# Patient Record
Sex: MALE | Race: WHITE | Employment: OTHER | URBAN - METROPOLITAN AREA
[De-identification: names, ages, dates, MRNs, and addresses within clinical notes are randomized per-mention and may not be internally consistent; named-entity substitution may affect disease eponyms.]

---

## 2017-03-28 ENCOUNTER — HOSPITAL ENCOUNTER (OUTPATIENT)
Dept: SPEECH THERAPY | Age: 62
Discharge: HOME | End: 2017-03-31
Attending: INTERNAL MEDICINE | Admitting: INTERNAL MEDICINE

## 2017-03-28 DIAGNOSIS — R07.0 THROAT PAIN: Primary | ICD-10-CM

## 2017-04-01 ENCOUNTER — HOSPITAL ENCOUNTER (OUTPATIENT)
Dept: OTHER | Age: 62
Discharge: HOME | End: 2017-04-30
Attending: INTERNAL MEDICINE | Admitting: INTERNAL MEDICINE

## 2017-04-10 ENCOUNTER — HOSPITAL ENCOUNTER (OUTPATIENT)
Dept: GENERAL RADIOLOGY | Age: 62
Discharge: HOME | End: 2017-04-10
Attending: UROLOGY | Admitting: UROLOGY

## 2017-04-10 DIAGNOSIS — N20.0 CALCULUS OF KIDNEY: ICD-10-CM

## 2017-07-17 ENCOUNTER — HOSPITAL ENCOUNTER (OUTPATIENT)
Dept: SPEECH THERAPY | Age: 62
Discharge: HOME | End: 2017-07-31
Attending: INTERNAL MEDICINE | Admitting: INTERNAL MEDICINE

## 2017-08-01 ENCOUNTER — HOSPITAL ENCOUNTER (OUTPATIENT)
Dept: OTHER | Age: 62
Discharge: HOME | End: 2017-08-31
Attending: INTERNAL MEDICINE | Admitting: INTERNAL MEDICINE

## 2018-07-20 PROBLEM — J96.00 SEPSIS DUE TO UNDETERMINED ORGANISM WITH ACUTE RESPIRATORY FAILURE (HCC): Status: ACTIVE | Noted: 2018-07-20

## 2018-07-20 PROBLEM — R65.20 SEPSIS DUE TO UNDETERMINED ORGANISM WITH ACUTE RESPIRATORY FAILURE (HCC): Status: ACTIVE | Noted: 2018-07-20

## 2018-07-20 PROBLEM — A41.9 SEPSIS DUE TO UNDETERMINED ORGANISM WITH ACUTE RESPIRATORY FAILURE (HCC): Status: ACTIVE | Noted: 2018-07-20

## 2018-11-06 ENCOUNTER — ANESTHESIA EVENT (OUTPATIENT)
Dept: ENDOSCOPY | Age: 63
End: 2018-11-06
Payer: MEDICARE

## 2018-11-06 RX ORDER — CYANOCOBALAMIN 1000 UG/ML
1000 INJECTION INTRAMUSCULAR; SUBCUTANEOUS
Status: ON HOLD | COMMUNITY
End: 2018-11-07

## 2018-11-06 RX ORDER — WHEAT DEXTRIN 3 G/3.8 G
4 POWDER (GRAM) ORAL 2 TIMES DAILY
COMMUNITY

## 2018-11-06 RX ORDER — PRIMIDONE 50 MG/1
50 TABLET ORAL 2 TIMES DAILY
Status: ON HOLD | COMMUNITY
End: 2020-03-25 | Stop reason: HOSPADM

## 2018-11-06 RX ORDER — LANOLIN ALCOHOL/MO/W.PET/CERES
3 CREAM (GRAM) TOPICAL NIGHTLY PRN
Status: ON HOLD | COMMUNITY
End: 2020-04-11

## 2018-11-06 RX ORDER — QUETIAPINE FUMARATE 50 MG/1
50 TABLET, EXTENDED RELEASE ORAL NIGHTLY
COMMUNITY

## 2018-11-06 RX ORDER — GUAIFENESIN 600 MG/1
1200 TABLET, EXTENDED RELEASE ORAL 2 TIMES DAILY PRN
Status: ON HOLD | COMMUNITY
End: 2018-11-07

## 2018-11-06 RX ORDER — HYDROCODONE BITARTRATE AND ACETAMINOPHEN 5; 325 MG/1; MG/1
1 TABLET ORAL EVERY 4 HOURS PRN
Status: ON HOLD | COMMUNITY
End: 2018-11-07

## 2018-11-06 RX ORDER — DICYCLOMINE HYDROCHLORIDE 10 MG/1
10 CAPSULE ORAL 3 TIMES DAILY
COMMUNITY
End: 2020-04-11 | Stop reason: ALTCHOICE

## 2018-11-06 RX ORDER — LISINOPRIL 5 MG/1
5 TABLET ORAL DAILY
Status: ON HOLD | COMMUNITY
End: 2020-03-25 | Stop reason: HOSPADM

## 2018-11-06 RX ORDER — FOLIC ACID 1 MG/1
1 TABLET ORAL DAILY
COMMUNITY

## 2018-11-06 RX ORDER — PAROXETINE HYDROCHLORIDE 20 MG/1
20 TABLET, FILM COATED ORAL EVERY MORNING
COMMUNITY

## 2018-11-06 RX ORDER — LEVOTHYROXINE SODIUM 112 UG/1
112 TABLET ORAL DAILY
Status: ON HOLD | COMMUNITY
End: 2020-04-13 | Stop reason: HOSPADM

## 2018-11-06 RX ORDER — SODIUM PHOSPHATE, DIBASIC AND SODIUM PHOSPHATE, MONOBASIC 7; 19 G/133ML; G/133ML
1 ENEMA RECTAL
Status: ON HOLD | COMMUNITY
End: 2018-11-07

## 2018-11-06 RX ORDER — LOPERAMIDE HYDROCHLORIDE 2 MG/1
2 CAPSULE ORAL 4 TIMES DAILY PRN
Status: ON HOLD | COMMUNITY
End: 2018-11-07

## 2018-11-06 RX ORDER — BISACODYL 10 MG
10 SUPPOSITORY, RECTAL RECTAL PRN
Status: ON HOLD | COMMUNITY
End: 2018-11-07

## 2018-11-06 NOTE — ANESTHESIA PRE PROCEDURE
limited  Mouth opening: > = 3 FB Dental:    (+) edentulous      Pulmonary:   (+) pneumonia: no interval change,  rhonchi,  current smoker                          ROS comment: Recurrent aspiration pneumonia   Cardiovascular:  Exercise tolerance: poor (<4 METS),   (+) hypertension:, hyperlipidemia      NYHA Classification: II                 Beta Blocker:  Not on Beta Blocker         Neuro/Psych:   (+) seizures (last seizure ,March 2013- hx grand mal):, psychiatric history:depression/anxiety              ROS comment: CP with hemiplegia   GI/Hepatic/Renal:   (+) PUD, renal disease: kidney stones,          ROS comment: Recurrent aspiration. Endo/Other:    (+) hypothyroidism, blood dyscrasia: anemia:., .                 Abdominal:           Vascular:                                      Anesthesia Plan      general and MAC     ASA 4       Induction: intravenous. Anesthetic plan and risks discussed with patient. Pre Anesthesia Assessment complete.  Chart reviewed on 11/6/2018        MARY Liang - JATIN   11/6/2018

## 2018-11-07 ENCOUNTER — HOSPITAL ENCOUNTER (OUTPATIENT)
Age: 63
Setting detail: OUTPATIENT SURGERY
Discharge: HOME | End: 2018-11-07
Attending: INTERNAL MEDICINE | Admitting: INTERNAL MEDICINE
Payer: MEDICARE

## 2018-11-07 ENCOUNTER — ANESTHESIA (OUTPATIENT)
Dept: ENDOSCOPY | Age: 63
End: 2018-11-07
Payer: MEDICARE

## 2018-11-07 VITALS
WEIGHT: 201 LBS | SYSTOLIC BLOOD PRESSURE: 125 MMHG | TEMPERATURE: 98 F | BODY MASS INDEX: 33.49 KG/M2 | DIASTOLIC BLOOD PRESSURE: 78 MMHG | HEART RATE: 61 BPM | OXYGEN SATURATION: 98 % | RESPIRATION RATE: 18 BRPM | HEIGHT: 65 IN

## 2018-11-07 PROCEDURE — 2580000003 HC RX 258

## 2018-11-07 RX ORDER — SELENIUM 50 MCG
1 TABLET ORAL DAILY
COMMUNITY

## 2018-11-07 RX ORDER — SODIUM CHLORIDE, SODIUM LACTATE, POTASSIUM CHLORIDE, CALCIUM CHLORIDE 600; 310; 30; 20 MG/100ML; MG/100ML; MG/100ML; MG/100ML
INJECTION, SOLUTION INTRAVENOUS ONCE
Status: COMPLETED | OUTPATIENT
Start: 2018-11-07 | End: 2018-11-07

## 2018-11-07 RX ORDER — SODIUM CHLORIDE, SODIUM LACTATE, POTASSIUM CHLORIDE, CALCIUM CHLORIDE 600; 310; 30; 20 MG/100ML; MG/100ML; MG/100ML; MG/100ML
INJECTION, SOLUTION INTRAVENOUS
Status: COMPLETED
Start: 2018-11-07 | End: 2018-11-07

## 2018-11-07 RX ADMIN — SODIUM CHLORIDE, SODIUM LACTATE, POTASSIUM CHLORIDE, CALCIUM CHLORIDE: 600; 310; 30; 20 INJECTION, SOLUTION INTRAVENOUS at 09:08

## 2018-11-07 RX ADMIN — SODIUM CHLORIDE, POTASSIUM CHLORIDE, SODIUM LACTATE AND CALCIUM CHLORIDE: 600; 310; 30; 20 INJECTION, SOLUTION INTRAVENOUS at 09:08

## 2018-11-07 ASSESSMENT — LIFESTYLE VARIABLES: SMOKING_STATUS: 1

## 2018-11-07 ASSESSMENT — PAIN - FUNCTIONAL ASSESSMENT: PAIN_FUNCTIONAL_ASSESSMENT: 0-10

## 2018-11-12 ENCOUNTER — ANESTHESIA EVENT (OUTPATIENT)
Dept: ENDOSCOPY | Age: 63
End: 2018-11-12
Payer: MEDICARE

## 2018-11-12 NOTE — ANESTHESIA PRE PROCEDURE
Department of Anesthesiology  Preprocedure Note       Name:  Jose Guadalupe Cevallos   Age:  59 y.o.  :  1955                                          MRN:  [id number]         Date:  2018      Surgeon: Edwar Kaminski):  Dee Dee Morel MD    Procedure: EGD ESOPHAGOGASTRODUODENOSCOPY WITH DILATION (N/A )    Medications prior to admission:   Prior to Admission medications    Medication Sig Start Date End Date Taking?  Authorizing Provider   Lactobacillus (ACIDOPHILUS) CAPS capsule Take 1 capsule by mouth daily    Historical Provider, MD   aspirin 81 MG tablet Take 81 mg by mouth daily    Historical Provider, MD   Wheat Dextrin (BENEFIBER) POWD Take 4 g by mouth 3 times daily (with meals) Takes 3 tsp in liquid twice per day    Historical Provider, MD   dicyclomine (BENTYL) 10 MG capsule Take 10 mg by mouth 3 times daily    Historical Provider, MD   Lactulose Encephalopathy (ENULOSE PO) Take 30 mLs by mouth 3 times daily    Historical Provider, MD   folic acid (FOLVITE) 1 MG tablet Take 1 mg by mouth daily    Historical Provider, MD   levothyroxine (SYNTHROID) 112 MCG tablet Take 112 mcg by mouth Daily    Historical Provider, MD   lisinopril (PRINIVIL;ZESTRIL) 5 MG tablet Take 5 mg by mouth daily    Historical Provider, MD   melatonin 3 MG TABS tablet Take 3 mg by mouth nightly as needed    Historical Provider, MD   PARoxetine (PAXIL) 20 MG tablet Take 20 mg by mouth every morning    Historical Provider, MD   primidone (MYSOLINE) 50 MG tablet Take 50 mg by mouth 2 times daily    Historical Provider, MD   QUEtiapine (SEROQUEL XR) 50 MG extended release tablet Take 50 mg by mouth nightly    Historical Provider, MD   Multiple Vitamins-Minerals (MULTIVITAMIN PO) Take by mouth daily    Historical Provider, MD   tamsulosin (FLOMAX) 0.4 MG capsule Take 1 capsule by mouth daily 3/18/17   Merdis [place], PA   acetaminophen (APAP EXTRA STRENGTH) 500 MG tablet Take 1 tablet by mouth every 6 hours as needed for Pain 3/18/17   Gabby Stake nightly      Multiple Vitamins-Minerals (MULTIVITAMIN PO) Take by mouth daily      tamsulosin (FLOMAX) 0.4 MG capsule Take 1 capsule by mouth daily 30 capsule 0    acetaminophen (APAP EXTRA STRENGTH) 500 MG tablet Take 1 tablet by mouth every 6 hours as needed for Pain 30 tablet 0    divalproex (DEPAKOTE) 500 MG DR tablet Take 500 mg by mouth 2 times daily       Lactose Hydrous POWD by Does not apply route      simvastatin (ZOCOR) 20 MG tablet Take 20 mg by mouth nightly.  Cholecalciferol (VITAMIN D3) 1000 UNITS CAPS Take 1 tablet by mouth daily.  vitamin B-12 (CYANOCOBALAMIN) 500 MCG tablet Take 500 mcg by mouth once a week. Takes on Wed      lacosamide (VIMPAT) 50 MG TABS tablet Take 150 mg by mouth 2 times daily.  clorazepate (TRANXENE) 7.5 MG tablet Take 7.5 mg by mouth 2 times daily.  zonisamide (ZONEGRAN) 100 MG capsule Take 300 mg by mouth nightly.  Patient takes 2 capsules in the am and 3 capsules at bedtime         Allergies:  No Known Allergies    Problem List:    Patient Active Problem List   Diagnosis Code    Cerebral palsy, infantile hemiplegia (HCC) G80.8    Rosacea, acne L71.9    IBS (irritable bowel syndrome) K58.9    Hypertension I10    Calculus of ureter N20.1    Pyelonephritis N12    Sepsis ([facility] A41.9    Pneumonia J18.9    Increased ammonia level R79.89    Aspiration pneumonia ([facility]) J69.0    Hypokalemia E87.6    Hypomagnesemia E83.42    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    Seizure disorder ([facility] G40.909    Seizure disorder, grand mal ([facility] G40.409    Sepsis due to undetermined organism with acute respiratory failure ([facility]) A41.9, R65.20, J96.00       Past Medical History:        Diagnosis Date    Anemia     Aspiration pneumonia ([address]     dx 6/9/2014 with this per ecf/ per old chart dx with pneumonia with admission 9/18/2018    Cerebral palsy ([facility]     \"has no feeling on left side of body\"alert but has some dementia but not diagnosed, has good long term but poor

## 2018-11-13 ENCOUNTER — ANESTHESIA (OUTPATIENT)
Dept: ENDOSCOPY | Age: 63
End: 2018-11-13
Payer: MEDICARE

## 2018-11-13 ENCOUNTER — HOSPITAL ENCOUNTER (OUTPATIENT)
Age: 63
Setting detail: OUTPATIENT SURGERY
Discharge: HOME | End: 2018-11-13
Attending: INTERNAL MEDICINE | Admitting: INTERNAL MEDICINE
Payer: MEDICARE

## 2018-11-13 VITALS
TEMPERATURE: 97 F | DIASTOLIC BLOOD PRESSURE: 80 MMHG | HEIGHT: 65 IN | OXYGEN SATURATION: 98 % | SYSTOLIC BLOOD PRESSURE: 158 MMHG | HEART RATE: 62 BPM | WEIGHT: 201 LBS | RESPIRATION RATE: 16 BRPM | BODY MASS INDEX: 33.49 KG/M2

## 2018-11-13 VITALS — DIASTOLIC BLOOD PRESSURE: 72 MMHG | OXYGEN SATURATION: 99 % | SYSTOLIC BLOOD PRESSURE: 143 MMHG

## 2018-11-13 PROCEDURE — 2500000003 HC RX 250 WO HCPCS: Performed by: NURSE ANESTHETIST, CERTIFIED REGISTERED

## 2018-11-13 PROCEDURE — 2580000003 HC RX 258: Performed by: NURSE ANESTHETIST, CERTIFIED REGISTERED

## 2018-11-13 PROCEDURE — C1726 CATH, BAL DIL, NON-VASCULAR: HCPCS | Performed by: INTERNAL MEDICINE

## 2018-11-13 PROCEDURE — 88305 TISSUE EXAM BY PATHOLOGIST: CPT

## 2018-11-13 PROCEDURE — 3609012500 HC EGD DILATION BALLOON: Performed by: INTERNAL MEDICINE

## 2018-11-13 PROCEDURE — 2580000003 HC RX 258: Performed by: ANESTHESIOLOGY

## 2018-11-13 PROCEDURE — 7100000010 HC PHASE II RECOVERY - FIRST 15 MIN: Performed by: INTERNAL MEDICINE

## 2018-11-13 PROCEDURE — 3700000000 HC ANESTHESIA ATTENDED CARE: Performed by: INTERNAL MEDICINE

## 2018-11-13 PROCEDURE — 6360000002 HC RX W HCPCS: Performed by: NURSE ANESTHETIST, CERTIFIED REGISTERED

## 2018-11-13 PROCEDURE — 7100000011 HC PHASE II RECOVERY - ADDTL 15 MIN: Performed by: INTERNAL MEDICINE

## 2018-11-13 PROCEDURE — 88342 IMHCHEM/IMCYTCHM 1ST ANTB: CPT

## 2018-11-13 PROCEDURE — 2709999900 HC NON-CHARGEABLE SUPPLY: Performed by: INTERNAL MEDICINE

## 2018-11-13 PROCEDURE — 3700000001 HC ADD 15 MINUTES (ANESTHESIA): Performed by: INTERNAL MEDICINE

## 2018-11-13 RX ORDER — PROPOFOL 10 MG/ML
INJECTION, EMULSION INTRAVENOUS PRN
Status: DISCONTINUED | OUTPATIENT
Start: 2018-11-13 | End: 2018-11-13 | Stop reason: SDUPTHER

## 2018-11-13 RX ORDER — SODIUM CHLORIDE, SODIUM LACTATE, POTASSIUM CHLORIDE, CALCIUM CHLORIDE 600; 310; 30; 20 MG/100ML; MG/100ML; MG/100ML; MG/100ML
INJECTION, SOLUTION INTRAVENOUS ONCE
Status: COMPLETED | OUTPATIENT
Start: 2018-11-13 | End: 2018-11-13

## 2018-11-13 RX ORDER — SODIUM CHLORIDE 9 MG/ML
INJECTION, SOLUTION INTRAVENOUS CONTINUOUS PRN
Status: DISCONTINUED | OUTPATIENT
Start: 2018-11-13 | End: 2018-11-13 | Stop reason: SDUPTHER

## 2018-11-13 RX ORDER — LIDOCAINE HYDROCHLORIDE 20 MG/ML
INJECTION, SOLUTION EPIDURAL; INFILTRATION; INTRACAUDAL; PERINEURAL PRN
Status: DISCONTINUED | OUTPATIENT
Start: 2018-11-13 | End: 2018-11-13 | Stop reason: SDUPTHER

## 2018-11-13 RX ADMIN — PROPOFOL 30 MG: 10 INJECTION, EMULSION INTRAVENOUS at 12:27

## 2018-11-13 RX ADMIN — PROPOFOL 30 MG: 10 INJECTION, EMULSION INTRAVENOUS at 12:22

## 2018-11-13 RX ADMIN — LIDOCAINE HYDROCHLORIDE 100 MG: 20 INJECTION, SOLUTION EPIDURAL; INFILTRATION; INTRACAUDAL; PERINEURAL at 12:19

## 2018-11-13 RX ADMIN — SODIUM CHLORIDE: 9 INJECTION, SOLUTION INTRAVENOUS at 12:18

## 2018-11-13 RX ADMIN — PROPOFOL 100 MG: 10 INJECTION, EMULSION INTRAVENOUS at 12:19

## 2018-11-13 RX ADMIN — SODIUM CHLORIDE, POTASSIUM CHLORIDE, SODIUM LACTATE AND CALCIUM CHLORIDE: 600; 310; 30; 20 INJECTION, SOLUTION INTRAVENOUS at 11:27

## 2018-11-13 RX ADMIN — PROPOFOL 30 MG: 10 INJECTION, EMULSION INTRAVENOUS at 12:24

## 2018-11-13 NOTE — H&P
Take 150 mg by mouth 2 times daily.  clorazepate (TRANXENE) 7.5 MG tablet Take 7.5 mg by mouth 2 times daily.  zonisamide (ZONEGRAN) 100 MG capsule Take 300 mg by mouth nightly. Patient takes 2 capsules in the am and 3 capsules at bedtime           Allergies:  Patient has no known allergies. Social History:   TOBACCO:   reports that he has never smoked. He has never used smokeless tobacco.  ETOH:   reports that he does not drink alcohol.     Family History:   Family History   Problem Relation Age of Onset    Heart Disease Mother         atrial fib    High Blood Pressure Mother     Asthma Mother     High Cholesterol Mother     Depression Mother    Davion Gather Migraines Mother     High Blood Pressure Father     Heart Disease Father     Asthma Sister     High Cholesterol Sister     Depression Sister     Migraines Sister        REVIEW OF SYSTEMS:    Negative except for HPI        PHYSICAL EXAM:      Vitals:    BP (!) 141/74   Pulse 83   Temp 97.4 °F (36.3 °C) (Temporal)   Resp 16   Ht 5' 5\" (1.651 m)   Wt 201 lb (91.2 kg)   SpO2 100%   BMI 33.45 kg/m²     General Appearance:    Alert, cooperative, no distress, appears stated age   [de-identified]:    Anicteric, normocephalic, atraumatic   Neck:   Supple, symmetrical, trachea midline, no adenopathy       Lungs:     Clear to auscultation bilaterally, respirations unlabored        Heart:    Regular rate and rhythm, S1 and S2 normal, no murmur, rub   or gallop   Abdomen:     Soft, non-tender, bowel sounds active all four quadrants,     no masses, no organomegaly, no ascites       Extremities:   Extremities normal, atraumatic, no cyanosis or edema   Pulses:   2+ and symmetric all extremities   Skin:   Skin color, texture, turgor normal, no rashes or lesions       Neurologic:   No focal deficits, moving all four extremities      ASA Grade:  ASA 3 - Patient with moderate systemic disease with functional limitations  Air way:    Prior Anesthesia complications: Nill      ASSESSMENT AND PLAN:    1. Patient is a 59 y.o. male here for EGD with deep sedation  2. Procedure options, risks and benefits reviewed with guardian. Guardian expresses understanding.       Mykel Fleming MD  [facility] gastroenterology  11/13/2018  12:13 PM

## 2019-01-22 ENCOUNTER — HOSPITAL ENCOUNTER (EMERGENCY)
Age: 64
Discharge: HOME | End: 2019-01-23
Attending: EMERGENCY MEDICINE
Payer: MEDICARE

## 2019-01-22 ENCOUNTER — APPOINTMENT (OUTPATIENT)
Dept: CT IMAGING | Age: 64
End: 2019-01-22
Payer: MEDICARE

## 2019-01-22 VITALS
HEART RATE: 82 BPM | RESPIRATION RATE: 19 BRPM | WEIGHT: 201 LBS | HEIGHT: 65 IN | SYSTOLIC BLOOD PRESSURE: 154 MMHG | OXYGEN SATURATION: 100 % | DIASTOLIC BLOOD PRESSURE: 82 MMHG | TEMPERATURE: 97.7 F | BODY MASS INDEX: 33.49 KG/M2

## 2019-01-22 DIAGNOSIS — S22.019A CLOSED FRACTURE OF FIRST THORACIC VERTEBRA, UNSPECIFIED FRACTURE MORPHOLOGY, INITIAL ENCOUNTER (HCC): Primary | ICD-10-CM
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
## 2019-01-22 PROCEDURE — 6370000000 HC RX 637 (ALT 250 FOR IP): Performed by: EMERGENCY MEDICINE

## 2019-01-22 PROCEDURE — 72128 CT CHEST SPINE W/O DYE: CPT

## 2019-01-22 PROCEDURE — 72125 CT NECK SPINE W/O DYE: CPT

## 2019-01-22 PROCEDURE — 99284 EMERGENCY DEPT VISIT MOD MDM: CPT

## 2019-01-22 RX ORDER — IBUPROFEN 800 MG/1
800 TABLET ORAL ONCE
Status: COMPLETED | OUTPATIENT
Start: 2019-01-22 | End: 2019-01-22

## 2019-01-22 RX ADMIN — IBUPROFEN 800 MG: 800 TABLET, FILM COATED ORAL at 23:08

## 2019-01-22 ASSESSMENT — PAIN SCALES - GENERAL
PAINLEVEL_OUTOF10: 8
PAINLEVEL_OUTOF10: 6

## 2019-01-22 ASSESSMENT — PAIN DESCRIPTION - PAIN TYPE: TYPE: ACUTE PAIN

## 2019-01-22 ASSESSMENT — PAIN DESCRIPTION - ORIENTATION: ORIENTATION: MID

## 2019-01-22 ASSESSMENT — PAIN DESCRIPTION - LOCATION: LOCATION: BACK;NECK

## 2019-01-23 PROCEDURE — 6370000000 HC RX 637 (ALT 250 FOR IP): Performed by: EMERGENCY MEDICINE

## 2019-01-23 RX ORDER — HYDROCODONE BITARTRATE AND ACETAMINOPHEN 5; 325 MG/1; MG/1
1 TABLET ORAL EVERY 6 HOURS PRN
Qty: 20 TABLET | Refills: 0 | Status: SHIPPED | OUTPATIENT
Start: 2019-01-23 | End: 2019-01-28

## 2019-01-23 RX ORDER — HYDROCODONE BITARTRATE AND ACETAMINOPHEN 5; 325 MG/1; MG/1
1 TABLET ORAL ONCE
Status: COMPLETED | OUTPATIENT
Start: 2019-01-23 | End: 2019-01-23

## 2019-01-23 RX ORDER — DOCUSATE SODIUM 100 MG/1
100 CAPSULE, LIQUID FILLED ORAL 2 TIMES DAILY PRN
Qty: 20 CAPSULE | Refills: 0 | Status: ON HOLD | OUTPATIENT
Start: 2019-01-23 | End: 2020-04-11

## 2019-01-23 RX ORDER — POLYETHYLENE GLYCOL 3350 17 G/17G
17 POWDER, FOR SOLUTION ORAL DAILY PRN
Qty: 10 EACH | Refills: 0 | Status: SHIPPED | OUTPATIENT
Start: 2019-01-23 | End: 2019-02-02

## 2019-01-23 RX ADMIN — HYDROCODONE BITARTRATE AND ACETAMINOPHEN 1 TABLET: 5; 325 TABLET ORAL at 00:56

## 2019-01-23 ASSESSMENT — PAIN SCALES - GENERAL: PAINLEVEL_OUTOF10: 4

## 2019-02-27 ENCOUNTER — HOSPITAL ENCOUNTER (EMERGENCY)
Age: 64
Discharge: HOME | End: 2019-02-27
Payer: MEDICARE

## 2019-02-27 VITALS
HEART RATE: 71 BPM | TEMPERATURE: 97.5 F | DIASTOLIC BLOOD PRESSURE: 70 MMHG | BODY MASS INDEX: 32.45 KG/M2 | SYSTOLIC BLOOD PRESSURE: 112 MMHG | RESPIRATION RATE: 12 BRPM | OXYGEN SATURATION: 99 % | WEIGHT: 195 LBS

## 2019-02-27 DIAGNOSIS — S00.81XA ABRASION OF FACE, INITIAL ENCOUNTER: ICD-10-CM
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
## 2019-02-27 PROCEDURE — 99283 EMERGENCY DEPT VISIT LOW MDM: CPT

## 2019-05-15 ENCOUNTER — HOSPITAL ENCOUNTER (OUTPATIENT)
Dept: GENERAL RADIOLOGY | Age: 64
Discharge: HOME | End: 2019-05-15
Payer: MEDICARE

## 2019-05-15 ENCOUNTER — HOSPITAL ENCOUNTER (OUTPATIENT)
Age: 64
Discharge: HOME | End: 2019-05-15
Payer: MEDICARE

## 2019-05-15 DIAGNOSIS — R09.89 ABNORMAL CHEST SOUNDS: ICD-10-CM

## 2019-05-15 DIAGNOSIS — R13.10 PROBLEMS WITH SWALLOWING AND MASTICATION: ICD-10-CM

## 2019-05-15 DIAGNOSIS — I10 ESSENTIAL HYPERTENSION, MALIGNANT: ICD-10-CM

## 2019-05-15 PROCEDURE — 71046 X-RAY EXAM CHEST 2 VIEWS: CPT

## 2019-05-15 PROCEDURE — 71045 X-RAY EXAM CHEST 1 VIEW: CPT

## 2019-05-16 ENCOUNTER — HOSPITAL ENCOUNTER (OUTPATIENT)
Age: 64
Setting detail: SPECIMEN
Discharge: HOME | End: 2019-05-16
Payer: MEDICARE

## 2019-05-16 LAB
BACTERIA: NEGATIVE /HPF
BILIRUBIN URINE: NEGATIVE MG/DL
BLOOD, URINE: NEGATIVE
CLARITY: CLEAR
COLOR: YELLOW
GLUCOSE, URINE: NEGATIVE MG/DL
KETONES, URINE: NEGATIVE MG/DL
LEUKOCYTE ESTERASE, URINE: NEGATIVE
MUCUS: ABNORMAL HPF
NITRITE URINE, QUANTITATIVE: NEGATIVE
PH, URINE: 5 (ref 5–8)
PROTEIN UA: NEGATIVE MG/DL
RBC URINE: <1 /HPF (ref 0–3)
SPECIFIC GRAVITY UA: 1.02 (ref 1–1.03)
SQUAMOUS EPITHELIAL: 1 /HPF
TRICHOMONAS: ABNORMAL /HPF
UROBILINOGEN, URINE: NORMAL MG/DL (ref 0.2–1)
WBC UA: 3 /HPF (ref 0–2)

## 2019-05-16 PROCEDURE — 81001 URINALYSIS AUTO W/SCOPE: CPT

## 2019-05-29 ENCOUNTER — HOSPITAL ENCOUNTER (OUTPATIENT)
Age: 64
Setting detail: SPECIMEN
Discharge: HOME | End: 2019-05-29
Payer: MEDICARE

## 2019-05-29 PROCEDURE — G0328 FECAL BLOOD SCRN IMMUNOASSAY: HCPCS

## 2019-05-30 LAB — HEMOCCULT SP1 STL QL: NEGATIVE

## 2019-07-08 ENCOUNTER — HOSPITAL ENCOUNTER (OUTPATIENT)
Dept: ULTRASOUND IMAGING | Age: 64
Discharge: HOME | End: 2019-07-08
Payer: MEDICARE

## 2019-07-08 DIAGNOSIS — E04.1 NONTOXIC UNINODULAR GOITER: ICD-10-CM

## 2019-07-08 PROCEDURE — 76536 US EXAM OF HEAD AND NECK: CPT

## 2019-08-08 ENCOUNTER — HOSPITAL ENCOUNTER (OUTPATIENT)
Age: 64
Discharge: HOME | End: 2019-08-08
Payer: MEDICARE

## 2019-08-08 LAB
AMMONIA: 24 UMOL/L (ref 16–60)
DOSE AMOUNT: NORMAL
DOSE TIME: NORMAL
VALPROIC ACID LEVEL: 51.5 UG/ML (ref 50–100)

## 2019-08-08 PROCEDURE — 80164 ASSAY DIPROPYLACETIC ACD TOT: CPT

## 2019-08-08 PROCEDURE — 36415 COLL VENOUS BLD VENIPUNCTURE: CPT

## 2019-08-08 PROCEDURE — 82140 ASSAY OF AMMONIA: CPT

## 2019-08-28 ENCOUNTER — HOSPITAL ENCOUNTER (OUTPATIENT)
Dept: GENERAL RADIOLOGY | Age: 64
Discharge: HOME | End: 2019-08-28
Payer: MEDICARE

## 2019-08-28 ENCOUNTER — HOSPITAL ENCOUNTER (OUTPATIENT)
Age: 64
Discharge: HOME | End: 2019-08-28
Payer: MEDICARE

## 2019-08-28 DIAGNOSIS — S09.93XA HEAD, FACE & NECK INJURY, INITIAL ENCOUNTER: ICD-10-CM

## 2019-08-28 DIAGNOSIS — S09.90XA HEAD, FACE & NECK INJURY, INITIAL ENCOUNTER: ICD-10-CM

## 2019-08-28 DIAGNOSIS — S19.9XXA HEAD, FACE & NECK INJURY, INITIAL ENCOUNTER: ICD-10-CM

## 2019-08-28 PROCEDURE — 72050 X-RAY EXAM NECK SPINE 4/5VWS: CPT

## 2019-09-17 ENCOUNTER — HOSPITAL ENCOUNTER (OUTPATIENT)
Dept: GENERAL RADIOLOGY | Age: 64
Discharge: HOME | End: 2019-09-17
Payer: MEDICARE

## 2019-09-17 ENCOUNTER — HOSPITAL ENCOUNTER (OUTPATIENT)
Dept: SPEECH THERAPY | Age: 64
Setting detail: THERAPIES SERIES
Discharge: HOME | End: 2019-09-17
Payer: MEDICARE

## 2019-09-17 DIAGNOSIS — R13.10 PROBLEMS WITH SWALLOWING AND MASTICATION: Primary | ICD-10-CM

## 2019-09-17 DIAGNOSIS — R13.10 DYSPHAGIA, UNSPECIFIED TYPE: ICD-10-CM

## 2019-09-17 DIAGNOSIS — R09.89 ABNORMAL CHEST SOUNDS: ICD-10-CM

## 2019-09-17 PROCEDURE — 92526 ORAL FUNCTION THERAPY: CPT | Performed by: SPEECH-LANGUAGE PATHOLOGIST

## 2019-09-17 PROCEDURE — 92611 MOTION FLUOROSCOPY/SWALLOW: CPT | Performed by: SPEECH-LANGUAGE PATHOLOGIST

## 2019-09-17 PROCEDURE — 74230 X-RAY XM SWLNG FUNCJ C+: CPT

## 2019-09-17 NOTE — PROCEDURES
chin-tuck strategy was tested for solid bolus with improved laryngeal elevation and pharyngeal clearance for regular solid. 3) A left head turn (weaker side) was attempted however, pt lacked meaningful ROM to accomplish. 4) Alternating solids and liquids was effective in clearing pharynx of moderate solid residual by >50%. Esophageal screen was unremarkable. Recommendations:  Continue Minced and moist (Mechanical soft) with no bread (due to caregiver report of choking). Thin liquids by straw. Strict chin-tuck posture for all intake (reduced penetration and increased clearance for solids). Encourage alternating solids and liquids during meals every 2-3 bites.  or [facility] Speech therapy treatment to train and monitor use of compensation strategies, complete trials of small bites of bread, target goals including therapeutic exercises and caregiver training. Dysphagia treatment was completed x 30 minutes, see goals targeted below. Treatment Dx and ICD 10: R13.12  Patient Position: Lateral and Patient Degrees: 90  Consistencies Administered: Reg solid; Dysphagia Pureed (Dysphagia I); Thin straw; Thin cup;Nectar straw;Nectar cup  Postural Changes and/or Swallow Maneuvers Trialed: Chin tuck; Head turn left    Dysphagia Outcome Severity Scale: Level 3: Moderate dysphagia- Total assisstance, supervision or strategies. Two or more diet consistencies restricted  Penetration-Aspiration Scale (PAS): 8 - Material Enters the airway, passes below the vocal folds, and no effort is made to eject    Recommended Diet:  Solid consistency: Dysphagia Minced and Moist (Dysphagia II)  Liquid consistency: Thin  Liquid administration via: Straw    Medication administration: Meds in puree    Safe Swallow Protocol:  Supervision: 1:1  Compensatory Swallowing Strategies: Alternate solids and liquids; Chin tuck;Eat/Feed slowly      Recommendations/Treatment  Requires SLP Intervention: Yes      Postural Changes and/or Swallow

## 2019-10-15 ENCOUNTER — HOSPITAL ENCOUNTER (OUTPATIENT)
Age: 64
Discharge: HOME | End: 2019-10-15
Payer: MEDICARE

## 2019-10-15 LAB
ALT SERPL-CCNC: 10 U/L (ref 10–40)
AST SERPL-CCNC: 11 IU/L (ref 15–37)
BASOPHILS ABSOLUTE: 0 K/CU MM
BASOPHILS RELATIVE PERCENT: 0.4 % (ref 0–1)
DIFFERENTIAL TYPE: ABNORMAL
DOSE AMOUNT: NORMAL
DOSE TIME: NORMAL
EOSINOPHILS ABSOLUTE: 0.4 K/CU MM
EOSINOPHILS RELATIVE PERCENT: 4.4 % (ref 0–3)
HCT VFR BLD CALC: 46 % (ref 42–52)
HEMOGLOBIN: 15 GM/DL (ref 13.5–18)
IMMATURE NEUTROPHIL %: 0.5 % (ref 0–0.43)
LYMPHOCYTES ABSOLUTE: 1.8 K/CU MM
LYMPHOCYTES RELATIVE PERCENT: 22.7 % (ref 24–44)
MCH RBC QN AUTO: 30.5 PG (ref 27–31)
MCHC RBC AUTO-ENTMCNC: 32.6 % (ref 32–36)
MCV RBC AUTO: 93.7 FL (ref 78–100)
MONOCYTES ABSOLUTE: 0.9 K/CU MM
MONOCYTES RELATIVE PERCENT: 11.9 % (ref 0–4)
NUCLEATED RBC %: 0 %
PDW BLD-RTO: 13.1 % (ref 11.7–14.9)
PLATELET # BLD: 204 K/CU MM (ref 140–440)
PMV BLD AUTO: 11.8 FL (ref 7.5–11.1)
RBC # BLD: 4.91 M/CU MM (ref 4.6–6.2)
SEGMENTED NEUTROPHILS ABSOLUTE COUNT: 4.8 K/CU MM
SEGMENTED NEUTROPHILS RELATIVE PERCENT: 60.1 % (ref 36–66)
TOTAL IMMATURE NEUTOROPHIL: 0.04 K/CU MM
TOTAL NUCLEATED RBC: 0 K/CU MM
VALPROIC ACID LEVEL: 52.9 UG/ML (ref 50–100)
WBC # BLD: 7.9 K/CU MM (ref 4–10.5)

## 2019-10-15 PROCEDURE — 84460 ALANINE AMINO (ALT) (SGPT): CPT

## 2019-10-15 PROCEDURE — 36415 COLL VENOUS BLD VENIPUNCTURE: CPT

## 2019-10-15 PROCEDURE — 80164 ASSAY DIPROPYLACETIC ACD TOT: CPT

## 2019-10-15 PROCEDURE — 84450 TRANSFERASE (AST) (SGOT): CPT

## 2019-10-15 PROCEDURE — 85025 COMPLETE CBC W/AUTO DIFF WBC: CPT

## 2019-10-18 ENCOUNTER — HOSPITAL ENCOUNTER (OUTPATIENT)
Dept: GENERAL RADIOLOGY | Age: 64
Discharge: HOME | End: 2019-10-18
Payer: MEDICARE

## 2019-10-18 ENCOUNTER — HOSPITAL ENCOUNTER (OUTPATIENT)
Age: 64
Discharge: HOME | End: 2019-10-18
Payer: MEDICARE

## 2019-10-18 DIAGNOSIS — N20.0 KIDNEY STONE: ICD-10-CM

## 2019-10-18 LAB — PROSTATE SPECIFIC ANTIGEN: 1.28 NG/ML (ref 0–4)

## 2019-10-18 PROCEDURE — G0103 PSA SCREENING: HCPCS

## 2019-10-18 PROCEDURE — 74018 RADEX ABDOMEN 1 VIEW: CPT

## 2019-10-18 PROCEDURE — 36415 COLL VENOUS BLD VENIPUNCTURE: CPT

## 2019-10-19 ENCOUNTER — APPOINTMENT (OUTPATIENT)
Dept: GENERAL RADIOLOGY | Age: 64
End: 2019-10-19
Payer: MEDICARE

## 2019-10-19 ENCOUNTER — APPOINTMENT (OUTPATIENT)
Dept: CT IMAGING | Age: 64
End: 2019-10-19
Payer: MEDICARE

## 2019-10-19 ENCOUNTER — HOSPITAL ENCOUNTER (EMERGENCY)
Age: 64
Discharge: HOME | End: 2019-10-19
Attending: EMERGENCY MEDICINE
Payer: MEDICARE

## 2019-10-19 VITALS
SYSTOLIC BLOOD PRESSURE: 117 MMHG | HEIGHT: 65 IN | BODY MASS INDEX: 32.49 KG/M2 | HEART RATE: 76 BPM | WEIGHT: 195 LBS | DIASTOLIC BLOOD PRESSURE: 32 MMHG | RESPIRATION RATE: 16 BRPM | OXYGEN SATURATION: 99 % | TEMPERATURE: 97.8 F

## 2019-10-19 DIAGNOSIS — S09.90XA INJURY OF HEAD, INITIAL ENCOUNTER: Primary | ICD-10-CM

## 2019-10-19 DIAGNOSIS — S69.91XA INJURY OF RIGHT HAND, INITIAL ENCOUNTER: ICD-10-CM

## 2019-10-19 PROCEDURE — 99284 EMERGENCY DEPT VISIT MOD MDM: CPT

## 2019-10-19 PROCEDURE — 70450 CT HEAD/BRAIN W/O DYE: CPT

## 2019-10-19 PROCEDURE — 72125 CT NECK SPINE W/O DYE: CPT

## 2019-10-19 PROCEDURE — 73130 X-RAY EXAM OF HAND: CPT

## 2019-10-19 ASSESSMENT — ENCOUNTER SYMPTOMS
STRIDOR: 0
SHORTNESS OF BREATH: 0
COLOR CHANGE: 0
EYE PAIN: 0
ABDOMINAL PAIN: 0
TROUBLE SWALLOWING: 0
VOMITING: 0
BACK PAIN: 0
WHEEZING: 0
NAUSEA: 0
VOICE CHANGE: 0

## 2019-10-19 ASSESSMENT — PAIN SCALES - GENERAL: PAINLEVEL_OUTOF10: 4

## 2019-10-19 ASSESSMENT — PAIN DESCRIPTION - PAIN TYPE: TYPE: ACUTE PAIN

## 2020-01-03 ENCOUNTER — HOSPITAL ENCOUNTER (OUTPATIENT)
Age: 65
Discharge: HOME | End: 2020-01-03
Payer: MEDICARE

## 2020-01-03 LAB — AMMONIA: 102 UMOL/L (ref 16–60)

## 2020-01-03 PROCEDURE — 82140 ASSAY OF AMMONIA: CPT

## 2020-01-03 PROCEDURE — 36415 COLL VENOUS BLD VENIPUNCTURE: CPT

## 2020-01-25 ENCOUNTER — APPOINTMENT (OUTPATIENT)
Dept: GENERAL RADIOLOGY | Age: 65
End: 2020-01-25
Payer: MEDICARE

## 2020-01-25 ENCOUNTER — HOSPITAL ENCOUNTER (EMERGENCY)
Age: 65
Discharge: HOME | End: 2020-01-26
Payer: MEDICARE

## 2020-01-25 VITALS
TEMPERATURE: 97.8 F | SYSTOLIC BLOOD PRESSURE: 124 MMHG | HEART RATE: 55 BPM | DIASTOLIC BLOOD PRESSURE: 74 MMHG | RESPIRATION RATE: 16 BRPM | WEIGHT: 195 LBS | HEIGHT: 65 IN | OXYGEN SATURATION: 100 % | BODY MASS INDEX: 32.49 KG/M2

## 2020-01-25 PROCEDURE — 72100 X-RAY EXAM L-S SPINE 2/3 VWS: CPT

## 2020-01-25 PROCEDURE — 73501 X-RAY EXAM HIP UNI 1 VIEW: CPT

## 2020-01-25 PROCEDURE — 99283 EMERGENCY DEPT VISIT LOW MDM: CPT

## 2020-01-25 ASSESSMENT — PAIN DESCRIPTION - ORIENTATION: ORIENTATION: RIGHT

## 2020-01-25 ASSESSMENT — PAIN DESCRIPTION - DESCRIPTORS: DESCRIPTORS: ACHING

## 2020-01-25 ASSESSMENT — PAIN DESCRIPTION - FREQUENCY: FREQUENCY: CONTINUOUS

## 2020-01-25 ASSESSMENT — PAIN DESCRIPTION - PAIN TYPE: TYPE: ACUTE PAIN

## 2020-01-25 ASSESSMENT — PAIN SCALES - GENERAL: PAINLEVEL_OUTOF10: 8

## 2020-01-25 ASSESSMENT — PAIN DESCRIPTION - ONSET: ONSET: SUDDEN

## 2020-01-25 ASSESSMENT — PAIN DESCRIPTION - LOCATION: LOCATION: BACK;HIP

## 2020-01-25 ASSESSMENT — PAIN DESCRIPTION - PROGRESSION: CLINICAL_PROGRESSION: NOT CHANGED

## 2020-01-26 NOTE — ED NOTES
Bed: ED-22  Expected date:   Expected time:   Means of arrival:   Comments:  ems     Ean Jaimes RN  01/25/20 2012

## 2020-01-26 NOTE — ED PROVIDER NOTES
4/2014    Hyperammonemia (HCC)     Hypertension     on Lisinopril    IBS (irritable bowel syndrome)     ulcerative colitis    Kidney stone     for surgery for stent placement 7/11/2013    Mood disorder ([facility]     per staff at [address] [facility] Occasional tremors     \"makes it difficult for him to write\"    Prostatitis     hx given per H&P old chart    Thyroid disease     Ulcerative colitis ([facility] [address]     hx given per staff at [facility] 4/13/2015     Past Surgical History:   Procedure Laterality Date    CHOLECYSTECTOMY      COLONOSCOPY  8/19/14, 5/2012 8/19/14: hemorrhoids, 5/2012 at [address] Left 07/11/2013    with stnet placement   [address]  [address] [place] [address] 13      OTHER SURGICAL HISTORY  04/15/2015    Revision of vagal nerve stimulator    UPPER GASTROINTESTINAL ENDOSCOPY N/A 11/13/2018    EGD DILATION BALLOON AND BIOPSY performed by Severo Grebe, MD at [place] [address]    Has to have bettery changed every 5 yrs.       Family History   Problem Relation Age of Onset    Heart Disease Mother         atrial fib    High Blood Pressure Mother     Asthma Mother     High Cholesterol Mother     Depression Mother    Ryan Yunior Migraines Mother     High Blood Pressure Father     Heart Disease Father     Asthma Sister     High Cholesterol Sister     Depression Sister     Migraines Sister      Social History     Socioeconomic History    Marital status: Single     Spouse name: Not on file    Number of children: Not on file    Years of education: Not on file    Highest education level: Not on file   Occupational History    Not on file   Social Needs    Financial resource strain: Not on file    Food insecurity:     Worry: Not on file     Inability: Not on file    Transportation needs:     Medical: Not on file     Non-medical: Not on file   Tobacco Use    Smoking status: Never Smoker    Smokeless tobacco: Never Used   Substance and Sexual Activity discoloration, focal tenderness, palpable cord. Han's sign negative  Integument:  Well hydrated, no rash, no pallor. Back:   - No gross deformity, swelling, or discoloration.    -  + generalized low lumbar and Paralumbar tenderness without focus. No masses, fluctuance, warmth, or skin changes.  - No localized midline bony tenderness.   - No change in pain with forward flexion  - SLR test negative      No CVA tenderness to percussion        Neurologic:    No obvious neurological deficits. Patient moves without obvious difficulty or weakness. HIGH sensitivity Neuro Exam for Lumbar spine  -(L1-L2)  inner thigh sensation intact  -(S3,4,5) Groin sensation intact      -Lower extremity ROM intact including:       -(L2) cross legs (adduct thighs)        -(L3) extend knees & flex knees (S1)       -(L4) dorsiflex ankles       -(L5) point great toes upward & plantar flex toes (S2)        -(L2,3,4) Knee reflexes intact bilaterally      Legs are equal in length without rotation. Patent stands without depression of hips. There is unilateral tenderness to palpation to the iliac crest. Full ROM of his bilateral with internal rotation, external rotation, Abduction, Adduction, flexion and extension. No obvious deformities. Vascular:    Femoral & Distal pulses, & capillary refill intact bilateral lower extremities             I have reviewed and interpreted all of the currently available lab results from this visit (if applicable):  No results found for this visit on 01/25/20. Radiographs (if obtained):  [] The following radiograph was interpreted by myself in the absence of a radiologist:   [] Radiologist's Report Reviewed:  XR LUMBAR SPINE (2-3 VIEWS)   Final Result   No acute osseous abnormality of the lumbar spine. No acute osseous abnormality of the pelvis. XR HIP 1 VW W PELVIS RIGHT   Final Result   No acute osseous abnormality of the lumbar spine.       No acute osseous abnormality of the spine, three views: Lumbar vertebral alignment is normal.  Bone density is preserved with no acute fracture. Multilevel lumbar spondylosis with bridging osteophytes anteriorly consistent with DISH. Sclerotic focus in the T12 vertebral body is stable, likely a bone island. AP pelvis: No acute osseous abnormality of the pelvis. There is partial ankylosis of the SI joints bilaterally, left more advanced than the right. Mild symmetric osteoarthritic changes of the hip joints, similar to the previous study. No acute osseous abnormality of the lumbar spine. No acute osseous abnormality of the pelvis. MDM:  Patient presented with back pain. No trauma. No evidence of cauda equina, no spinal cord injury. Patient's neurologic exam is intact and nonfocal.  Patient taking medications at home with no significant improvement. I estimate there is LOW risk for RAPIDLY EXPANDING OR RUPTURED AAA, CAUDA EQUINA SYNDROME, EPIDURAL MASS LESION OR HERNIATED DISK CAUSING SEVERE STENOSIS, thus I consider the discharge disposition reasonable. /74   Pulse 55   Temp 97.8 °F (36.6 °C) (Oral)   Resp 16   Ht 5' 5\" (1.651 m)   Wt 195 lb (88.5 kg)   SpO2 100%   BMI 32.45 kg/m²       Pt is to be discharged home. Pt is  to return immediately to the emergency department if he has any new, worrisome or worsening symptoms. Pt is to follow up with PCP within 2 days. Patient/Surrogate vocalizes agreement and understanding with this plan and he has no questions upon disposition. Pt is comfortable upon disposition home. Patient is stable, Patients vital signs are stable. Vital signs and nursing notes reviewed during ED course. I independently managed patient today in the ED. All pertinent Lab data and radiographic results reviewed with patient at bedside. The patient and/or the family were informed of the results of any tests/labs/imaging, the treatment plan, and time was allotted to answer questions. See chart for details of medications given during the ED stay. Clinical Impression:  1. Right hip pain      Disposition referral (if applicable):   Roland Gutierres MD  [address] [facility] [address] [phone]    In 2 days      Disposition medications (if applicable):  Discharge Medication List as of 1/25/2020 11:38 PM        (Please note that portions of this note may have been completed with a voice recognition program. Efforts were made to edit the dictations but occasionally words are mis-transcribed.)       Aisha Terrell PA-C  01/25/20 [address] [place], [place] [address]NIELS  02/01/20 [address] [place] [address]  02/01/20 [id number]

## 2020-02-12 ENCOUNTER — HOSPITAL ENCOUNTER (OUTPATIENT)
Age: 65
Discharge: HOME | End: 2020-02-12
Payer: MEDICARE

## 2020-02-12 LAB — AMMONIA: 46 UMOL/L (ref 16–60)

## 2020-02-12 PROCEDURE — 36415 COLL VENOUS BLD VENIPUNCTURE: CPT

## 2020-02-12 PROCEDURE — 82140 ASSAY OF AMMONIA: CPT

## 2020-03-04 ENCOUNTER — APPOINTMENT (OUTPATIENT)
Dept: GENERAL RADIOLOGY | Age: 65
End: 2020-03-04
Payer: MEDICARE

## 2020-03-04 ENCOUNTER — APPOINTMENT (OUTPATIENT)
Dept: CT IMAGING | Age: 65
End: 2020-03-04
Payer: MEDICARE

## 2020-03-04 ENCOUNTER — HOSPITAL ENCOUNTER (EMERGENCY)
Age: 65
Discharge: HOME | End: 2020-03-04
Payer: MEDICARE

## 2020-03-04 VITALS
SYSTOLIC BLOOD PRESSURE: 119 MMHG | OXYGEN SATURATION: 96 % | TEMPERATURE: 99 F | HEART RATE: 66 BPM | DIASTOLIC BLOOD PRESSURE: 68 MMHG | RESPIRATION RATE: 18 BRPM

## 2020-03-04 PROCEDURE — 72125 CT NECK SPINE W/O DYE: CPT

## 2020-03-04 PROCEDURE — 70450 CT HEAD/BRAIN W/O DYE: CPT

## 2020-03-04 PROCEDURE — 99284 EMERGENCY DEPT VISIT MOD MDM: CPT

## 2020-03-04 PROCEDURE — 73630 X-RAY EXAM OF FOOT: CPT

## 2020-03-04 PROCEDURE — 72170 X-RAY EXAM OF PELVIS: CPT

## 2020-03-04 NOTE — ED TRIAGE NOTES
Patient from group home with reports of fall. Patient with left sided weakness at baseline per EMS patient falls often. Patient fell multiple times in the last week. EMS repost bruising to the left side.

## 2020-03-05 NOTE — ED PROVIDER NOTES
eMERGENCY dEPARTMENT eNCOUnter      PCP: Noah Sidhu MD    CHIEF COMPLAINT    Chief Complaint   Patient presents with   Equilla Oralia Fall     This patient was not evaluated by the attending physician. I have independently evaluated this patient. HPI    Oli Jimenez is a 62 y.o. male with PMH of seizure disorder, cerebral palsy who presents after fall with bruise of right hip. Onset was around 6:15am. The reason why the patient fell (context) was patient stepped backward in the bathroom and lost his balance falling and landing on his right hip and hitting his head. He did have a helmet on and did not lose consciousness. He was able to get up after fall. The patient has no complaints at this time. Patient comes from group home with staff member who reports that his left foot looked \"purplish\" after he fell for a few minutes and wants it checked for an injury. Staff member reports that patient had to be seen by provider since he fell so they brought him to the ED. Staff reports patient has an unsteady gait and falls frequently. The fall was mechanical in nature without preceding symptoms. Patient is on ASA daily. Patient denies neurologic symptoms, pain. Staff denies patient acting abnormally. History provided by patient and staff. REVIEW OF SYSTEMS    General: No fever  ENT:  No visual changes. No headache. Cardiac: No Chest Pain, no syncope  Respiratory: No cough or difficulty breathing  GI: No vomiting. No Bloody Stool or Diarrhea  : No Dysuria or Hematuria  MSKTL:  See HPI. No neck or back pain. No hip pain. No Extremity pain. Integumentary: + bruise; No abrasions or lacerations  Neurologic: No LOC, no headache, dizziness, confusion.   No hearing loss    See HPI and nursing notes for additional information     PAST MEDICAL & SURGICAL HISTORY    Past Medical History:   Diagnosis Date    Anemia     Aspiration pneumonia ([facility] [address]     dx 6/9/2014 with this per ecf/ per old chart dx with pneumonia with admission 9/18/2018    Cerebral palsy ([facility] [address]     \"has no feeling on left side of body\"alert but has some dementia but not diagnosed, has good long term but poor short term and wakes up disoriented after a nap\"(per yaneth)(2014)    Depression     Epilepsy ([facility] [address] 1961    last seizure 2/2018- hx grand mal    Frequent falls     with phone assess- family stated pt fell this am (7/10/2013\"in the process of trying to find a facility to help build him back up- (pt now at [facility])    Glaucoma     \"has been in treatment for this in the past- no treatment needed at present\"    History of IBS     Hx MRSA infection     + nasal culture 4/2014    Hyperammonemia (HCC)     Hypertension     on Lisinopril    IBS (irritable bowel syndrome)     ulcerative colitis    Kidney stone     for surgery for stent placement 7/11/2013    Mood disorder ([facility] [address]     per staff at [address] [facility] Occasional tremors     \"makes it difficult for him to write\"    Prostatitis     hx given per H&P old chart    Thyroid disease     Ulcerative colitis ([facility] [address]     hx given per staff at [facility] 4/13/2015     Past Surgical History:   Procedure Laterality Date    CHOLECYSTECTOMY      COLONOSCOPY  8/19/14, 5/2012 8/19/14: hemorrhoids, 5/[address] Left 07/11/2013    with stnet placement   [address] [place] [address]      OTHER SURGICAL HISTORY  04/15/2015    Revision of vagal nerve stimulator    UPPER GASTROINTESTINAL ENDOSCOPY N/A 11/13/2018    EGD DILATION BALLOON AND BIOPSY performed by Seth Clarke MD at [facility]  2002    Has to have bettery changed every 5 yrs.         CURRENT MEDICATIONS    Current Outpatient Rx   Medication Sig Dispense Refill    docusate sodium (COLACE) 100 MG capsule Take 1 capsule by mouth 2 times daily as needed for Constipation 20 capsule 0    Lactobacillus (ACIDOPHILUS) CAPS capsule Take 1 capsule by mouth daily      aspirin 81 MG tablet Take 81 mg by mouth daily      Wheat Dextrin (BENEFIBER) POWD Take 4 g by mouth 3 times daily (with meals) Takes 3 tsp in liquid twice per day      dicyclomine (BENTYL) 10 MG capsule Take 10 mg by mouth 3 times daily      Lactulose Encephalopathy (ENULOSE PO) Take 30 mLs by mouth 3 times daily      folic acid (FOLVITE) 1 MG tablet Take 1 mg by mouth daily      levothyroxine (SYNTHROID) 112 MCG tablet Take 112 mcg by mouth Daily      lisinopril (PRINIVIL;ZESTRIL) 5 MG tablet Take 5 mg by mouth daily      melatonin 3 MG TABS tablet Take 3 mg by mouth nightly as needed      PARoxetine (PAXIL) 20 MG tablet Take 20 mg by mouth every morning      primidone (MYSOLINE) 50 MG tablet Take 50 mg by mouth 2 times daily      QUEtiapine (SEROQUEL XR) 50 MG extended release tablet Take 50 mg by mouth nightly      Multiple Vitamins-Minerals (MULTIVITAMIN PO) Take by mouth daily      tamsulosin (FLOMAX) 0.4 MG capsule Take 1 capsule by mouth daily 30 capsule 0    acetaminophen (APAP EXTRA STRENGTH) 500 MG tablet Take 1 tablet by mouth every 6 hours as needed for Pain 30 tablet 0    divalproex (DEPAKOTE) 500 MG DR tablet Take 500 mg by mouth 2 times daily       Lactose Hydrous POWD by Does not apply route      simvastatin (ZOCOR) 20 MG tablet Take 20 mg by mouth nightly.  Cholecalciferol (VITAMIN D3) 1000 UNITS CAPS Take 1 tablet by mouth daily.  vitamin B-12 (CYANOCOBALAMIN) 500 MCG tablet Take 500 mcg by mouth once a week. Takes on Wed      lacosamide (VIMPAT) 50 MG TABS tablet Take 150 mg by mouth 2 times daily.  clorazepate (TRANXENE) 7.5 MG tablet Take 7.5 mg by mouth 2 times daily.  zonisamide (ZONEGRAN) 100 MG capsule Take 300 mg by mouth nightly.  Patient takes 2 capsules in the am and 3 capsules at bedtime         ALLERGIES    No Known Allergies    SOCIAL & FAMILY HISTORY    Social History     Socioeconomic History    Marital status: Single     Spouse name: None    Number of

## 2020-03-21 ENCOUNTER — APPOINTMENT (OUTPATIENT)
Dept: GENERAL RADIOLOGY | Age: 65
DRG: 871 | End: 2020-03-21
Payer: MEDICARE

## 2020-03-21 ENCOUNTER — HOSPITAL ENCOUNTER (INPATIENT)
Age: 65
LOS: 4 days | Discharge: HOME | DRG: 871 | End: 2020-03-25
Attending: EMERGENCY MEDICINE | Admitting: INTERNAL MEDICINE
Payer: MEDICARE

## 2020-03-21 ENCOUNTER — APPOINTMENT (OUTPATIENT)
Dept: CT IMAGING | Age: 65
DRG: 871 | End: 2020-03-21
Payer: MEDICARE

## 2020-03-21 LAB
ALBUMIN SERPL-MCNC: 3.6 GM/DL (ref 3.4–5)
ALP BLD-CCNC: 75 IU/L (ref 40–129)
ALT SERPL-CCNC: 9 U/L (ref 10–40)
AMMONIA: 30 UMOL/L (ref 16–60)
ANION GAP SERPL CALCULATED.3IONS-SCNC: 10 MMOL/L (ref 4–16)
AST SERPL-CCNC: 12 IU/L (ref 15–37)
BACTERIA: NEGATIVE /HPF
BASE EXCESS MIXED: ABNORMAL (ref 0–1.2)
BASOPHILS ABSOLUTE: 0 K/CU MM
BASOPHILS RELATIVE PERCENT: 0.1 % (ref 0–1)
BILIRUB SERPL-MCNC: 0.5 MG/DL (ref 0–1)
BILIRUBIN URINE: NEGATIVE MG/DL
BLOOD, URINE: ABNORMAL
BUN BLDV-MCNC: 13 MG/DL (ref 6–23)
CALCIUM SERPL-MCNC: 9.1 MG/DL (ref 8.3–10.6)
CHLORIDE BLD-SCNC: 97 MMOL/L (ref 99–110)
CLARITY: CLEAR
CO2: 23 MMOL/L (ref 21–32)
COLOR: ABNORMAL
COMMENT: ABNORMAL
CREAT SERPL-MCNC: 0.6 MG/DL (ref 0.9–1.3)
DIFFERENTIAL TYPE: ABNORMAL
EOSINOPHILS ABSOLUTE: 0 K/CU MM
EOSINOPHILS RELATIVE PERCENT: 0.1 % (ref 0–3)
GFR AFRICAN AMERICAN: >60 ML/MIN/1.73M2
GFR NON-AFRICAN AMERICAN: >60 ML/MIN/1.73M2
GLUCOSE BLD-MCNC: 106 MG/DL (ref 70–99)
GLUCOSE, URINE: NEGATIVE MG/DL
HCO3 VENOUS: 26.5 MMOL/L (ref 19–25)
HCT VFR BLD CALC: 42.7 % (ref 42–52)
HEMOGLOBIN: 14.9 GM/DL (ref 13.5–18)
IMMATURE NEUTROPHIL %: 0.7 % (ref 0–0.43)
KETONES, URINE: NEGATIVE MG/DL
LACTATE: 1.3 MMOL/L (ref 0.4–2)
LEUKOCYTE ESTERASE, URINE: NEGATIVE
LYMPHOCYTES ABSOLUTE: 0.6 K/CU MM
LYMPHOCYTES RELATIVE PERCENT: 4.2 % (ref 24–44)
MAGNESIUM: 1.6 MG/DL (ref 1.8–2.4)
MCH RBC QN AUTO: 31.8 PG (ref 27–31)
MCHC RBC AUTO-ENTMCNC: 34.9 % (ref 32–36)
MCV RBC AUTO: 91 FL (ref 78–100)
MONOCYTES ABSOLUTE: 1.1 K/CU MM
MONOCYTES RELATIVE PERCENT: 7.7 % (ref 0–4)
NITRITE URINE, QUANTITATIVE: NEGATIVE
NUCLEATED RBC %: 0 %
O2 SAT, VEN: 94.9 % (ref 50–70)
PCO2, VEN: 40 MMHG (ref 38–52)
PDW BLD-RTO: 12.7 % (ref 11.7–14.9)
PH VENOUS: 7.43 (ref 7.32–7.42)
PH, URINE: 6 (ref 5–8)
PLATELET # BLD: 137 K/CU MM (ref 140–440)
PMV BLD AUTO: 11.3 FL (ref 7.5–11.1)
PO2, VEN: 148 MMHG (ref 28–48)
POTASSIUM SERPL-SCNC: 3.3 MMOL/L (ref 3.5–5.1)
PROCALCITONIN: 0.31
PROTEIN UA: NEGATIVE MG/DL
RAPID INFLUENZA  B AGN: NEGATIVE
RAPID INFLUENZA A AGN: NEGATIVE
RBC # BLD: 4.69 M/CU MM (ref 4.6–6.2)
RBC URINE: 1 /HPF (ref 0–3)
SEGMENTED NEUTROPHILS ABSOLUTE COUNT: 12.9 K/CU MM
SEGMENTED NEUTROPHILS RELATIVE PERCENT: 87.2 % (ref 36–66)
SODIUM BLD-SCNC: 130 MMOL/L (ref 135–145)
SPECIFIC GRAVITY UA: 1.01 (ref 1–1.03)
TOTAL CK: 68 IU/L (ref 38–174)
TOTAL IMMATURE NEUTOROPHIL: 0.11 K/CU MM
TOTAL NUCLEATED RBC: 0 K/CU MM
TOTAL PROTEIN: 7.2 GM/DL (ref 6.4–8.2)
TRICHOMONAS: ABNORMAL /HPF
UROBILINOGEN, URINE: NORMAL MG/DL (ref 0.2–1)
WBC # BLD: 14.8 K/CU MM (ref 4–10.5)
WBC UA: 1 /HPF (ref 0–2)

## 2020-03-21 PROCEDURE — 82550 ASSAY OF CK (CPK): CPT

## 2020-03-21 PROCEDURE — 84145 PROCALCITONIN (PCT): CPT

## 2020-03-21 PROCEDURE — 83735 ASSAY OF MAGNESIUM: CPT

## 2020-03-21 PROCEDURE — 6370000000 HC RX 637 (ALT 250 FOR IP): Performed by: EMERGENCY MEDICINE

## 2020-03-21 PROCEDURE — 71045 X-RAY EXAM CHEST 1 VIEW: CPT

## 2020-03-21 PROCEDURE — 70450 CT HEAD/BRAIN W/O DYE: CPT

## 2020-03-21 PROCEDURE — 87899 AGENT NOS ASSAY W/OPTIC: CPT

## 2020-03-21 PROCEDURE — 82805 BLOOD GASES W/O2 SATURATION: CPT

## 2020-03-21 PROCEDURE — 87449 NOS EACH ORGANISM AG IA: CPT

## 2020-03-21 PROCEDURE — 99285 EMERGENCY DEPT VISIT HI MDM: CPT

## 2020-03-21 PROCEDURE — 36415 COLL VENOUS BLD VENIPUNCTURE: CPT

## 2020-03-21 PROCEDURE — 2580000003 HC RX 258: Performed by: EMERGENCY MEDICINE

## 2020-03-21 PROCEDURE — 87804 INFLUENZA ASSAY W/OPTIC: CPT

## 2020-03-21 PROCEDURE — 83605 ASSAY OF LACTIC ACID: CPT

## 2020-03-21 PROCEDURE — 82140 ASSAY OF AMMONIA: CPT

## 2020-03-21 PROCEDURE — 85025 COMPLETE CBC W/AUTO DIFF WBC: CPT

## 2020-03-21 PROCEDURE — 6360000002 HC RX W HCPCS: Performed by: EMERGENCY MEDICINE

## 2020-03-21 PROCEDURE — 81001 URINALYSIS AUTO W/SCOPE: CPT

## 2020-03-21 PROCEDURE — 1200000000 HC SEMI PRIVATE

## 2020-03-21 PROCEDURE — 87040 BLOOD CULTURE FOR BACTERIA: CPT

## 2020-03-21 PROCEDURE — 80053 COMPREHEN METABOLIC PANEL: CPT

## 2020-03-21 RX ORDER — GUAIFENESIN 600 MG/1
600 TABLET, EXTENDED RELEASE ORAL 2 TIMES DAILY
Status: DISCONTINUED | OUTPATIENT
Start: 2020-03-21 | End: 2020-03-25 | Stop reason: HOSPADM

## 2020-03-21 RX ORDER — BENZONATATE 100 MG/1
100 CAPSULE ORAL 3 TIMES DAILY PRN
Status: DISCONTINUED | OUTPATIENT
Start: 2020-03-21 | End: 2020-03-25 | Stop reason: HOSPADM

## 2020-03-21 RX ORDER — LEVOTHYROXINE SODIUM 112 UG/1
112 TABLET ORAL DAILY
Status: DISCONTINUED | OUTPATIENT
Start: 2020-03-22 | End: 2020-03-25 | Stop reason: HOSPADM

## 2020-03-21 RX ORDER — ONDANSETRON 2 MG/ML
4 INJECTION INTRAMUSCULAR; INTRAVENOUS EVERY 6 HOURS PRN
Status: DISCONTINUED | OUTPATIENT
Start: 2020-03-21 | End: 2020-03-25 | Stop reason: HOSPADM

## 2020-03-21 RX ORDER — ATORVASTATIN CALCIUM 20 MG/1
20 TABLET, FILM COATED ORAL DAILY
Status: DISCONTINUED | OUTPATIENT
Start: 2020-03-22 | End: 2020-03-25 | Stop reason: HOSPADM

## 2020-03-21 RX ORDER — QUETIAPINE FUMARATE 50 MG/1
50 TABLET, EXTENDED RELEASE ORAL NIGHTLY
Status: DISCONTINUED | OUTPATIENT
Start: 2020-03-21 | End: 2020-03-25 | Stop reason: HOSPADM

## 2020-03-21 RX ORDER — POTASSIUM CHLORIDE 20 MEQ/1
40 TABLET, EXTENDED RELEASE ORAL PRN
Status: DISCONTINUED | OUTPATIENT
Start: 2020-03-21 | End: 2020-03-25 | Stop reason: HOSPADM

## 2020-03-21 RX ORDER — LORAZEPAM 2 MG/ML
1 INJECTION INTRAMUSCULAR EVERY 4 HOURS PRN
Status: DISCONTINUED | OUTPATIENT
Start: 2020-03-21 | End: 2020-03-25 | Stop reason: HOSPADM

## 2020-03-21 RX ORDER — SODIUM CHLORIDE 9 MG/ML
INJECTION, SOLUTION INTRAVENOUS CONTINUOUS
Status: DISCONTINUED | OUTPATIENT
Start: 2020-03-21 | End: 2020-03-25 | Stop reason: HOSPADM

## 2020-03-21 RX ORDER — IPRATROPIUM BROMIDE AND ALBUTEROL SULFATE 2.5; .5 MG/3ML; MG/3ML
1 SOLUTION RESPIRATORY (INHALATION)
Status: DISCONTINUED | OUTPATIENT
Start: 2020-03-22 | End: 2020-03-22

## 2020-03-21 RX ORDER — POLYETHYLENE GLYCOL 3350 17 G/17G
17 POWDER, FOR SOLUTION ORAL DAILY PRN
Status: DISCONTINUED | OUTPATIENT
Start: 2020-03-21 | End: 2020-03-25 | Stop reason: HOSPADM

## 2020-03-21 RX ORDER — LACTULOSE 10 G/15ML
20 SOLUTION ORAL 3 TIMES DAILY
Status: DISCONTINUED | OUTPATIENT
Start: 2020-03-21 | End: 2020-03-25 | Stop reason: HOSPADM

## 2020-03-21 RX ORDER — PRIMIDONE 50 MG/1
50 TABLET ORAL 2 TIMES DAILY
Status: DISCONTINUED | OUTPATIENT
Start: 2020-03-21 | End: 2020-03-22

## 2020-03-21 RX ORDER — SODIUM CHLORIDE 0.9 % (FLUSH) 0.9 %
10 SYRINGE (ML) INJECTION EVERY 12 HOURS SCHEDULED
Status: DISCONTINUED | OUTPATIENT
Start: 2020-03-21 | End: 2020-03-25 | Stop reason: HOSPADM

## 2020-03-21 RX ORDER — ZONISAMIDE 100 MG/1
300 CAPSULE ORAL NIGHTLY
Status: DISCONTINUED | OUTPATIENT
Start: 2020-03-21 | End: 2020-03-25 | Stop reason: HOSPADM

## 2020-03-21 RX ORDER — POTASSIUM CHLORIDE 7.45 MG/ML
10 INJECTION INTRAVENOUS PRN
Status: DISCONTINUED | OUTPATIENT
Start: 2020-03-21 | End: 2020-03-25 | Stop reason: HOSPADM

## 2020-03-21 RX ORDER — DIVALPROEX SODIUM 500 MG/1
500 TABLET, DELAYED RELEASE ORAL 2 TIMES DAILY
Status: DISCONTINUED | OUTPATIENT
Start: 2020-03-21 | End: 2020-03-25 | Stop reason: HOSPADM

## 2020-03-21 RX ORDER — FOLIC ACID 1 MG/1
1 TABLET ORAL DAILY
Status: DISCONTINUED | OUTPATIENT
Start: 2020-03-22 | End: 2020-03-25 | Stop reason: HOSPADM

## 2020-03-21 RX ORDER — DICYCLOMINE HYDROCHLORIDE 10 MG/1
10 CAPSULE ORAL 3 TIMES DAILY
Status: DISCONTINUED | OUTPATIENT
Start: 2020-03-21 | End: 2020-03-25 | Stop reason: HOSPADM

## 2020-03-21 RX ORDER — TAMSULOSIN HYDROCHLORIDE 0.4 MG/1
0.4 CAPSULE ORAL DAILY
Status: DISCONTINUED | OUTPATIENT
Start: 2020-03-22 | End: 2020-03-25 | Stop reason: HOSPADM

## 2020-03-21 RX ORDER — ACETAMINOPHEN 325 MG/1
650 TABLET ORAL EVERY 6 HOURS PRN
Status: DISCONTINUED | OUTPATIENT
Start: 2020-03-21 | End: 2020-03-25 | Stop reason: HOSPADM

## 2020-03-21 RX ORDER — ACETAMINOPHEN 650 MG/1
650 SUPPOSITORY RECTAL EVERY 6 HOURS PRN
Status: DISCONTINUED | OUTPATIENT
Start: 2020-03-21 | End: 2020-03-25 | Stop reason: HOSPADM

## 2020-03-21 RX ORDER — MAGNESIUM SULFATE 1 G/100ML
1 INJECTION INTRAVENOUS PRN
Status: DISCONTINUED | OUTPATIENT
Start: 2020-03-21 | End: 2020-03-25 | Stop reason: HOSPADM

## 2020-03-21 RX ORDER — SODIUM CHLORIDE 0.9 % (FLUSH) 0.9 %
10 SYRINGE (ML) INJECTION PRN
Status: DISCONTINUED | OUTPATIENT
Start: 2020-03-21 | End: 2020-03-25 | Stop reason: HOSPADM

## 2020-03-21 RX ORDER — ASPIRIN 81 MG/1
81 TABLET, CHEWABLE ORAL DAILY
Status: DISCONTINUED | OUTPATIENT
Start: 2020-03-22 | End: 2020-03-25 | Stop reason: HOSPADM

## 2020-03-21 RX ORDER — DOCUSATE SODIUM 100 MG/1
100 CAPSULE, LIQUID FILLED ORAL 2 TIMES DAILY PRN
Status: DISCONTINUED | OUTPATIENT
Start: 2020-03-21 | End: 2020-03-25 | Stop reason: HOSPADM

## 2020-03-21 RX ORDER — PAROXETINE HYDROCHLORIDE 20 MG/1
20 TABLET, FILM COATED ORAL EVERY MORNING
Status: DISCONTINUED | OUTPATIENT
Start: 2020-03-22 | End: 2020-03-25 | Stop reason: HOSPADM

## 2020-03-21 RX ORDER — 0.9 % SODIUM CHLORIDE 0.9 %
1000 INTRAVENOUS SOLUTION INTRAVENOUS ONCE
Status: COMPLETED | OUTPATIENT
Start: 2020-03-21 | End: 2020-03-22

## 2020-03-21 RX ORDER — PROMETHAZINE HYDROCHLORIDE 25 MG/1
12.5 TABLET ORAL EVERY 6 HOURS PRN
Status: DISCONTINUED | OUTPATIENT
Start: 2020-03-21 | End: 2020-03-25 | Stop reason: HOSPADM

## 2020-03-21 RX ORDER — ACETAMINOPHEN 500 MG
1000 TABLET ORAL ONCE
Status: COMPLETED | OUTPATIENT
Start: 2020-03-21 | End: 2020-03-21

## 2020-03-21 RX ADMIN — SODIUM CHLORIDE 1000 ML: 9 INJECTION, SOLUTION INTRAVENOUS at 20:12

## 2020-03-21 RX ADMIN — AZITHROMYCIN MONOHYDRATE 500 MG: 500 INJECTION, POWDER, LYOPHILIZED, FOR SOLUTION INTRAVENOUS at 21:17

## 2020-03-21 RX ADMIN — CEFEPIME HYDROCHLORIDE 2 G: 2 INJECTION, POWDER, FOR SOLUTION INTRAVENOUS at 20:12

## 2020-03-21 RX ADMIN — ACETAMINOPHEN 1000 MG: 500 TABLET ORAL at 17:06

## 2020-03-21 ASSESSMENT — ENCOUNTER SYMPTOMS
SORE THROAT: 0
EYE DISCHARGE: 0
SHORTNESS OF BREATH: 0
EYE PAIN: 0
RHINORRHEA: 0
BACK PAIN: 0
COUGH: 0
ABDOMINAL PAIN: 0
VOMITING: 0
NAUSEA: 0

## 2020-03-21 ASSESSMENT — PAIN DESCRIPTION - LOCATION: LOCATION: HEAD

## 2020-03-21 ASSESSMENT — PAIN SCALES - GENERAL
PAINLEVEL_OUTOF10: 0
PAINLEVEL_OUTOF10: 0
PAINLEVEL_OUTOF10: 5

## 2020-03-21 ASSESSMENT — PAIN DESCRIPTION - PAIN TYPE: TYPE: ACUTE PAIN

## 2020-03-21 NOTE — ED PROVIDER NOTES
pain, nausea and vomiting. Endocrine: Negative for polydipsia and polyuria. Genitourinary: Negative for dysuria and flank pain. Musculoskeletal: Negative for back pain and neck pain. Skin: Negative for pallor and wound. Neurological: Positive for seizures. Negative for dizziness, facial asymmetry, light-headedness, numbness and headaches. Psychiatric/Behavioral: Negative for confusion. Except as noted above the remainder of the review of systems was reviewed and negative. PAST MEDICAL HISTORY     Past Medical History:   Diagnosis Date    Anemia     Aspiration pneumonia ([address]     dx 6/9/2014 with this per ecf/ per old chart dx with pneumonia with admission 9/18/2018    Cerebral palsy ([facility]     \"has no feeling on left side of body\"alert but has some dementia but not diagnosed, has good long term but poor short term and wakes up disoriented after a nap\"(per yaneth)(2014)    Depression     Epilepsy ([facility] 1961    last seizure 2/2018- hx grand mal    Frequent falls     with phone assess- family stated pt fell this am (7/10/2013\"in the process of trying to find a facility to help build him back up- (pt now at [facility])    Glaucoma     \"has been in treatment for this in the past- no treatment needed at present\"    History of IBS     Hx MRSA infection     + nasal culture 4/2014    Hyperammonemia (HCC)     Hypertension     on Lisinopril    IBS (irritable bowel syndrome)     ulcerative colitis    Kidney stone     for surgery for stent placement 7/11/2013    Mood disorder ([facility]     per staff at [address] [facility] Occasional tremors     \"makes it difficult for him to write\"    Prostatitis     hx given per H&P old chart    Thyroid disease     Ulcerative colitis ([facility] [address]     hx given [facility] at [facility] 4/13/2015       Prior to Admission medications    Medication Sig Start Date End Date Taking?  Authorizing Provider   docusate sodium (COLACE) 100 MG capsule Take 1 capsule by mouth 2 times daily as daily.    DICYCLOMINE (BENTYL) 10 MG CAPSULE    Take 10 mg by mouth 3 times daily    DIVALPROEX (DEPAKOTE) 500 MG DR TABLET    Take 500 mg by mouth 2 times daily     DOCUSATE SODIUM (COLACE) 100 MG CAPSULE    Take 1 capsule by mouth 2 times daily as needed for Constipation    FOLIC ACID (FOLVITE) 1 MG TABLET    Take 1 mg by mouth daily    LACOSAMIDE (VIMPAT) 50 MG TABS TABLET    Take 150 mg by mouth 2 times daily. LACTOBACILLUS (ACIDOPHILUS) CAPS CAPSULE    Take 1 capsule by mouth daily    LACTOSE HYDROUS POWD    by Does not apply route    LACTULOSE ENCEPHALOPATHY (ENULOSE PO)    Take 30 mLs by mouth 3 times daily    LEVOTHYROXINE (SYNTHROID) 112 MCG TABLET    Take 112 mcg by mouth Daily    LISINOPRIL (PRINIVIL;ZESTRIL) 5 MG TABLET    Take 5 mg by mouth daily    MELATONIN 3 MG TABS TABLET    Take 3 mg by mouth nightly as needed    MULTIPLE VITAMINS-MINERALS (MULTIVITAMIN PO)    Take by mouth daily    PAROXETINE (PAXIL) 20 MG TABLET    Take 20 mg by mouth every morning    PRIMIDONE (MYSOLINE) 50 MG TABLET    Take 50 mg by mouth 2 times daily    QUETIAPINE (SEROQUEL XR) 50 MG EXTENDED RELEASE TABLET    Take 50 mg by mouth nightly    SIMVASTATIN (ZOCOR) 20 MG TABLET    Take 20 mg by mouth nightly. TAMSULOSIN (FLOMAX) 0.4 MG CAPSULE    Take 1 capsule by mouth daily    VITAMIN B-12 (CYANOCOBALAMIN) 500 MCG TABLET    Take 500 mcg by mouth once a week. [facility] POWD    Take 4 g by mouth 3 times daily (with meals) Takes 3 tsp in liquid twice per day    ZONISAMIDE (ZONEGRAN) 100 MG CAPSULE    Take 300 mg by mouth nightly. Patient takes 2 capsules in the am and 3 capsules at bedtime       ALLERGIES     Patient has no known allergies.     FAMILY HISTORY       Family History   Problem Relation Age of Onset    Heart Disease Mother         atrial fib    High Blood Pressure Mother     Asthma Mother     High Cholesterol Mother     Depression Mother     Migraines Mother     High Blood Pressure Father     Heart Disease Father     Asthma Sister     High Cholesterol Sister     Depression Sister     Migraines Sister           SOCIAL HISTORY       Social History     Socioeconomic History    Marital status: Single     Spouse name: None    Number of children: None    Years of education: None    Highest education level: None   Occupational History    None   Social Needs    Financial resource strain: None    Food insecurity     Worry: None     Inability: None    Transportation needs     Medical: None     Non-medical: None   Tobacco Use    Smoking status: Never Smoker    Smokeless tobacco: Never Used   Substance and Sexual Activity    Alcohol use: No    Drug use: No    Sexual activity: None   Lifestyle    Physical activity     Days per week: None     Minutes per session: None    Stress: None   Relationships    Social connections     Talks on phone: None     Gets together: None     Attends [employer] service: None     Active member of club or organization: None     Attends meetings of clubs or organizations: None     Relationship status: None    Intimate partner violence     Fear of current or ex partner: None     Emotionally abused: None     Physically abused: None     Forced sexual activity: None   Other Topics Concern    None   Social History Narrative    None       SCREENINGS    [place] Coma Scale  Eye Opening: Spontaneous  Best Verbal Response: Oriented  Best Motor Response: Obeys commands  [place] Coma Scale Score: 15          PHYSICAL EXAM    (up to 7 for level 4, 8 or more for level 5)     ED Triage Vitals [03/21/20 1537]   BP Temp Temp Source Pulse Resp SpO2 Height Weight   133/70 102.4 °F (39.1 °C) Oral 99 18 96 % 5' 5\" (1.651 m) 180 lb (81.6 kg)       Physical Exam  Vitals signs reviewed. Constitutional:       Appearance: He is not ill-appearing or toxic-appearing. HENT:      Head: Normocephalic and atraumatic. Nose: No congestion or rhinorrhea.       Mouth/Throat: fever and the findings of pneumonia they are not comfortable with treating this at home patella given that he has multiple roommates. He will be started on IV antibiotics and admitted for further evaluation management. He is agreed to plan of care. No convulsions or seizure activity were noted in the emergency department. He did have a low sodium 130 in the emergency department. He also at one point had low systolic blood pressure in the 90s. He is given fluids for treatment of both the low sodium as well as the blood pressure. Blood pressure did improve after treatment with fluids. Amount and/or Complexity of Data Reviewed  Clinical lab tests: reviewed  Tests in the radiology section of CPT®: reviewed  Decide to obtain previous medical records or to obtain history from someone other than the patient: yes          CRITICAL CARE TIME     Total critical care time provided today was 35 minutes. This excludes seperately billable procedures and family discussion time. Critical care time provided for obtaining history, conducting a physical exam, performing and monitoring interventions, ordering, collecting and interpreting tests, and establishing medical decision-making. There was a potential for life/limb threatening pathology requiring close evaluation and intervention with concern for patient decompensation. CONSULTS:  PHARMACY TO DOSE VANCOMYCIN  IP CONSULT TO HOSPITALIST    PROCEDURES:  None performed unless otherwise noted below     Procedures        FINAL IMPRESSION      1. Pneumonia due to organism    2. Fever, unspecified    3. Seizure ([facility]          DISPOSITION/PLAN   DISPOSITION        PATIENT REFERRED TO:  No follow-up provider specified. DISCHARGE MEDICATIONS:  New Prescriptions    No medications on file       ED Provider Disposition Time  DISPOSITION        The Patient was instructed to read the package inserts with any medication that was prescribed.   Major potential reactions and

## 2020-03-21 NOTE — ED NOTES
Bed: ED-14  Expected date:   Expected time:   Means of arrival:   Comments:  Medic 4 fever seizure     Jacob Culver RN  03/21/20 [id number]

## 2020-03-22 LAB
ALBUMIN SERPL-MCNC: 3.5 GM/DL (ref 3.4–5)
ALP BLD-CCNC: 74 IU/L (ref 40–128)
ALT SERPL-CCNC: 10 U/L (ref 10–40)
ANION GAP SERPL CALCULATED.3IONS-SCNC: 11 MMOL/L (ref 4–16)
AST SERPL-CCNC: 10 IU/L (ref 15–37)
BASOPHILS ABSOLUTE: 0 K/CU MM
BASOPHILS RELATIVE PERCENT: 0.3 % (ref 0–1)
BILIRUB SERPL-MCNC: 0.5 MG/DL (ref 0–1)
BUN BLDV-MCNC: 10 MG/DL (ref 6–23)
CALCIUM SERPL-MCNC: 9.5 MG/DL (ref 8.3–10.6)
CHLORIDE BLD-SCNC: 103 MMOL/L (ref 99–110)
CO2: 26 MMOL/L (ref 21–32)
CREAT SERPL-MCNC: 0.6 MG/DL (ref 0.9–1.3)
DIFFERENTIAL TYPE: ABNORMAL
EOSINOPHILS ABSOLUTE: 0.1 K/CU MM
EOSINOPHILS RELATIVE PERCENT: 0.5 % (ref 0–3)
GFR AFRICAN AMERICAN: >60 ML/MIN/1.73M2
GFR NON-AFRICAN AMERICAN: >60 ML/MIN/1.73M2
GLUCOSE BLD-MCNC: 145 MG/DL (ref 70–99)
HCT VFR BLD CALC: 40.6 % (ref 42–52)
HEMOGLOBIN: 13.4 GM/DL (ref 13.5–18)
HIGH SENSITIVE C-REACTIVE PROTEIN: 190.3 MG/L
IMMATURE NEUTROPHIL %: 0.7 % (ref 0–0.43)
LEGIONELLA URINARY AG: NEGATIVE
LYMPHOCYTES ABSOLUTE: 1 K/CU MM
LYMPHOCYTES RELATIVE PERCENT: 6.4 % (ref 24–44)
MAGNESIUM: 1.8 MG/DL (ref 1.8–2.4)
MCH RBC QN AUTO: 30.9 PG (ref 27–31)
MCHC RBC AUTO-ENTMCNC: 33 % (ref 32–36)
MCV RBC AUTO: 93.8 FL (ref 78–100)
MONOCYTES ABSOLUTE: 1.7 K/CU MM
MONOCYTES RELATIVE PERCENT: 10.4 % (ref 0–4)
NUCLEATED RBC %: 0.1 %
PDW BLD-RTO: 12.9 % (ref 11.7–14.9)
PLATELET # BLD: 138 K/CU MM (ref 140–440)
PMV BLD AUTO: 11 FL (ref 7.5–11.1)
POTASSIUM SERPL-SCNC: 3.7 MMOL/L (ref 3.5–5.1)
RBC # BLD: 4.33 M/CU MM (ref 4.6–6.2)
SEGMENTED NEUTROPHILS ABSOLUTE COUNT: 13.1 K/CU MM
SEGMENTED NEUTROPHILS RELATIVE PERCENT: 81.7 % (ref 36–66)
SODIUM BLD-SCNC: 140 MMOL/L (ref 135–145)
STREP PNEUMONIAE ANTIGEN: NORMAL
TOTAL IMMATURE NEUTOROPHIL: 0.11 K/CU MM
TOTAL NUCLEATED RBC: 0 K/CU MM
TOTAL PROTEIN: 6.3 GM/DL (ref 6.4–8.2)
WBC # BLD: 16 K/CU MM (ref 4–10.5)

## 2020-03-22 PROCEDURE — 2580000003 HC RX 258: Performed by: NURSE PRACTITIONER

## 2020-03-22 PROCEDURE — 6360000002 HC RX W HCPCS: Performed by: NURSE PRACTITIONER

## 2020-03-22 PROCEDURE — 86141 C-REACTIVE PROTEIN HS: CPT

## 2020-03-22 PROCEDURE — 2500000003 HC RX 250 WO HCPCS: Performed by: NURSE PRACTITIONER

## 2020-03-22 PROCEDURE — 80053 COMPREHEN METABOLIC PANEL: CPT

## 2020-03-22 PROCEDURE — 83735 ASSAY OF MAGNESIUM: CPT

## 2020-03-22 PROCEDURE — 6370000000 HC RX 637 (ALT 250 FOR IP): Performed by: NURSE PRACTITIONER

## 2020-03-22 PROCEDURE — 85025 COMPLETE CBC W/AUTO DIFF WBC: CPT

## 2020-03-22 PROCEDURE — 94761 N-INVAS EAR/PLS OXIMETRY MLT: CPT

## 2020-03-22 PROCEDURE — 6370000000 HC RX 637 (ALT 250 FOR IP): Performed by: INTERNAL MEDICINE

## 2020-03-22 PROCEDURE — 94640 AIRWAY INHALATION TREATMENT: CPT

## 2020-03-22 PROCEDURE — 1200000000 HC SEMI PRIVATE

## 2020-03-22 PROCEDURE — 6370000000 HC RX 637 (ALT 250 FOR IP): Performed by: PSYCHIATRY & NEUROLOGY

## 2020-03-22 RX ORDER — ALBUTEROL SULFATE 90 UG/1
2 AEROSOL, METERED RESPIRATORY (INHALATION) EVERY 6 HOURS PRN
Status: DISCONTINUED | OUTPATIENT
Start: 2020-03-22 | End: 2020-03-25 | Stop reason: HOSPADM

## 2020-03-22 RX ORDER — ALBUTEROL SULFATE 90 UG/1
2 AEROSOL, METERED RESPIRATORY (INHALATION) EVERY 6 HOURS PRN
Status: DISCONTINUED | OUTPATIENT
Start: 2020-03-22 | End: 2020-03-22

## 2020-03-22 RX ORDER — IPRATROPIUM BROMIDE AND ALBUTEROL SULFATE 2.5; .5 MG/3ML; MG/3ML
1 SOLUTION RESPIRATORY (INHALATION) EVERY 4 HOURS PRN
Status: DISCONTINUED | OUTPATIENT
Start: 2020-03-22 | End: 2020-03-22

## 2020-03-22 RX ORDER — PRIMIDONE 50 MG/1
100 TABLET ORAL 2 TIMES DAILY
Status: DISCONTINUED | OUTPATIENT
Start: 2020-03-22 | End: 2020-03-25 | Stop reason: HOSPADM

## 2020-03-22 RX ORDER — LACOSAMIDE 100 MG/1
200 TABLET ORAL 2 TIMES DAILY
Status: DISCONTINUED | OUTPATIENT
Start: 2020-03-22 | End: 2020-03-25 | Stop reason: HOSPADM

## 2020-03-22 RX ADMIN — PIPERACILLIN AND TAZOBACTAM 3.38 G: 3; .375 INJECTION, POWDER, LYOPHILIZED, FOR SOLUTION INTRAVENOUS at 03:30

## 2020-03-22 RX ADMIN — TAMSULOSIN HYDROCHLORIDE 0.4 MG: 0.4 CAPSULE ORAL at 07:37

## 2020-03-22 RX ADMIN — SODIUM CHLORIDE: 9 INJECTION, SOLUTION INTRAVENOUS at 00:33

## 2020-03-22 RX ADMIN — DICYCLOMINE HYDROCHLORIDE 10 MG: 10 CAPSULE ORAL at 15:51

## 2020-03-22 RX ADMIN — DIVALPROEX SODIUM 500 MG: 500 TABLET, DELAYED RELEASE ORAL at 07:38

## 2020-03-22 RX ADMIN — FAMOTIDINE 20 MG: 10 INJECTION INTRAVENOUS at 07:33

## 2020-03-22 RX ADMIN — QUETIAPINE FUMARATE 50 MG: 50 TABLET, EXTENDED RELEASE ORAL at 20:47

## 2020-03-22 RX ADMIN — PIPERACILLIN AND TAZOBACTAM 3.38 G: 3; .375 INJECTION, POWDER, LYOPHILIZED, FOR SOLUTION INTRAVENOUS at 15:50

## 2020-03-22 RX ADMIN — GUAIFENESIN 600 MG: 600 TABLET, EXTENDED RELEASE ORAL at 07:37

## 2020-03-22 RX ADMIN — FAMOTIDINE 20 MG: 10 INJECTION INTRAVENOUS at 00:34

## 2020-03-22 RX ADMIN — QUETIAPINE FUMARATE 50 MG: 50 TABLET, EXTENDED RELEASE ORAL at 00:28

## 2020-03-22 RX ADMIN — LACTULOSE 20 G: 10 SOLUTION ORAL at 00:34

## 2020-03-22 RX ADMIN — ASPIRIN 81 MG 81 MG: 81 TABLET ORAL at 07:37

## 2020-03-22 RX ADMIN — LACOSAMIDE 150 MG: 100 TABLET, FILM COATED ORAL at 00:41

## 2020-03-22 RX ADMIN — GUAIFENESIN 600 MG: 600 TABLET, EXTENDED RELEASE ORAL at 00:32

## 2020-03-22 RX ADMIN — POTASSIUM CHLORIDE 40 MEQ: 1500 TABLET, EXTENDED RELEASE ORAL at 00:32

## 2020-03-22 RX ADMIN — ATORVASTATIN CALCIUM 20 MG: 20 TABLET, FILM COATED ORAL at 07:37

## 2020-03-22 RX ADMIN — DIVALPROEX SODIUM 500 MG: 500 TABLET, DELAYED RELEASE ORAL at 00:30

## 2020-03-22 RX ADMIN — LACTULOSE 20 G: 10 SOLUTION ORAL at 20:47

## 2020-03-22 RX ADMIN — LACTULOSE 20 G: 10 SOLUTION ORAL at 07:37

## 2020-03-22 RX ADMIN — ZONISAMIDE 300 MG: 100 CAPSULE ORAL at 00:31

## 2020-03-22 RX ADMIN — PIPERACILLIN AND TAZOBACTAM 3.38 G: 3; .375 INJECTION, POWDER, LYOPHILIZED, FOR SOLUTION INTRAVENOUS at 11:40

## 2020-03-22 RX ADMIN — DICYCLOMINE HYDROCHLORIDE 10 MG: 10 CAPSULE ORAL at 07:37

## 2020-03-22 RX ADMIN — LEVOTHYROXINE SODIUM 112 MCG: 112 TABLET ORAL at 07:33

## 2020-03-22 RX ADMIN — DIVALPROEX SODIUM 500 MG: 500 TABLET, DELAYED RELEASE ORAL at 22:43

## 2020-03-22 RX ADMIN — DICYCLOMINE HYDROCHLORIDE 10 MG: 10 CAPSULE ORAL at 20:51

## 2020-03-22 RX ADMIN — PIPERACILLIN AND TAZOBACTAM 3.38 G: 3; .375 INJECTION, POWDER, LYOPHILIZED, FOR SOLUTION INTRAVENOUS at 20:46

## 2020-03-22 RX ADMIN — SODIUM CHLORIDE: 9 INJECTION, SOLUTION INTRAVENOUS at 20:47

## 2020-03-22 RX ADMIN — ZONISAMIDE 300 MG: 100 CAPSULE ORAL at 20:48

## 2020-03-22 RX ADMIN — PRIMIDONE 50 MG: 50 TABLET ORAL at 00:31

## 2020-03-22 RX ADMIN — ENOXAPARIN SODIUM 40 MG: 40 INJECTION SUBCUTANEOUS at 07:37

## 2020-03-22 RX ADMIN — SODIUM CHLORIDE, PRESERVATIVE FREE 10 ML: 5 INJECTION INTRAVENOUS at 20:46

## 2020-03-22 RX ADMIN — VANCOMYCIN HYDROCHLORIDE 2000 MG: 1 INJECTION, POWDER, LYOPHILIZED, FOR SOLUTION INTRAVENOUS at 00:35

## 2020-03-22 RX ADMIN — SODIUM CHLORIDE: 9 INJECTION, SOLUTION INTRAVENOUS at 18:39

## 2020-03-22 RX ADMIN — DICYCLOMINE HYDROCHLORIDE 10 MG: 10 CAPSULE ORAL at 00:32

## 2020-03-22 RX ADMIN — LACOSAMIDE 150 MG: 100 TABLET, FILM COATED ORAL at 07:33

## 2020-03-22 RX ADMIN — FAMOTIDINE 20 MG: 10 INJECTION INTRAVENOUS at 20:47

## 2020-03-22 RX ADMIN — PROMETHAZINE HYDROCHLORIDE 12.5 MG: 25 TABLET ORAL at 20:48

## 2020-03-22 RX ADMIN — FOLIC ACID 1 MG: 1 TABLET ORAL at 07:37

## 2020-03-22 RX ADMIN — LACTULOSE 20 G: 10 SOLUTION ORAL at 15:51

## 2020-03-22 RX ADMIN — PRIMIDONE 100 MG: 50 TABLET ORAL at 20:47

## 2020-03-22 RX ADMIN — PAROXETINE HYDROCHLORIDE 20 MG: 20 TABLET, FILM COATED ORAL at 07:37

## 2020-03-22 RX ADMIN — PRIMIDONE 50 MG: 50 TABLET ORAL at 07:36

## 2020-03-22 RX ADMIN — GUAIFENESIN 600 MG: 600 TABLET, EXTENDED RELEASE ORAL at 20:47

## 2020-03-22 RX ADMIN — LACOSAMIDE 200 MG: 100 TABLET, FILM COATED ORAL at 20:47

## 2020-03-22 RX ADMIN — ALBUTEROL SULFATE 2 PUFF: 90 AEROSOL, METERED RESPIRATORY (INHALATION) at 08:07

## 2020-03-22 ASSESSMENT — PAIN SCALES - GENERAL
PAINLEVEL_OUTOF10: 0

## 2020-03-22 NOTE — PROGRESS NOTES
Hospitalist Progress Note      Name:  Michelle Petty /Age/Sex: 1955  (62 y.o. male)   MRN & CSN:  [id number] & [id number] Admission Date/Time: 3/21/2020  3:37 PM   Location:  [address] [place] [address] PCP: Jamie Garcia MD         Hospital Day: 2    Assessment and Plan:   Michelle Petty is a 62 y.o.  male  who presents with Fever and seizure        Sepsis 2/2 Possible Asp Pneumonia in setting of seizure activity    -SIRS POA: Fever and leukocytosis   -CRP and Procal: 190.3 and 0.314  -Check Legionella and Strept Pneu Ag  -Continue IV Zosyn   -Swallow eval by RT                  Break Through Seizure   -CT head: No intracranial abnormality  -Continue AEDs  -Seizure precautions      -PRN Ativan  -Neurology on board      Electrolyte abnormalities  -Hypomagnesemia 1.6  -Hypokalemia 3.2  -Replete; will continue to monitor    Diet DIET CARDIAC;   DVT Prophylaxis [] Lovenox, []  Heparin, [] SCDs, [] Ambulation   GI Prophylaxis [] PPI,  [] H2 Blocker,  [] Carafate,  [] Diet/Tube Feeds   Code Status Full Code   Disposition Group Home   MDM      History of Present Illness:     Patient was seen and examined  Denied any further seizures or fevers  No N/V/D  No chest pain or worsening SOB  No dizziness or palpitation    Ten point ROS reviewed negative, unless as noted above    Objective: Intake/Output Summary (Last 24 hours) at 3/22/2020 1032  Last data filed at 3/22/2020 0031  Gross per 24 hour   Intake 50 ml   Output 100 ml   Net -50 ml      Vitals:   Vitals:    20 0807   BP:    Pulse:    Resp: 18   Temp:    SpO2:      Physical Exam:   GEN Awake male, sitting upright in bed in no apparent distress. Appears given age. EYES Pupils are equally round. No scleral erythema, discharge, or conjunctivitis. HENT Mucous membranes are moist. Oral pharynx without exudates, no evidence of thrush. NECK Supple, no apparent thyromegaly or masses. RESP Clear to auscultation, no wheezes, rales or rhonchi.   Symmetric chest movement while on room air. CARDIO/VASC S1/S2 auscultated. Regular rate without appreciable murmurs, rubs, or gallops. No JVD or carotid bruits. Peripheral pulses equal bilaterally and palpable. No peripheral edema. GI Abdomen is soft without significant tenderness, masses, or guarding. Bowel sounds are normoactive. Rectal exam deferred.  No costovertebral angle tenderness. Normal appearing external genitalia. Abdullahi catheter is not present. HEME/LYMPH No palpable cervical lymphadenopathy and no hepatosplenomegaly. No petechiae or ecchymoses. MSK No gross joint deformities. SKIN Normal coloration, warm, dry. NEURO Cranial nerves appear grossly intact, normal speech, no lateralizing weakness. PSYCH Awake, alert, oriented x 4. Affect appropriate.     Medications:   Medications:    zonisamide  300 mg Oral Nightly    tamsulosin  0.4 mg Oral Daily    QUEtiapine  50 mg Oral Nightly    primidone  50 mg Oral BID    PARoxetine  20 mg Oral QAM    levothyroxine  112 mcg Oral Daily    lacosamide  150 mg Oral BID    divalproex  500 mg Oral BID    folic acid  1 mg Oral Daily    aspirin  81 mg Oral Daily    dicyclomine  10 mg Oral TID    lactulose  20 g Oral TID    atorvastatin  20 mg Oral Daily    sodium chloride flush  10 mL Intravenous 2 times per day    famotidine (PEPCID) injection  20 mg Intravenous BID    enoxaparin  40 mg Subcutaneous Daily    guaiFENesin  600 mg Oral BID    piperacillin-tazobactam  3.375 g Intravenous Q6H      Infusions:    sodium chloride 75 mL/hr at 03/22/20 0033     PRN Meds: ipratropium-albuterol, 1 ampule, Q4H PRN  albuterol sulfate HFA, 2 puff, Q6H PRN  docusate sodium, 100 mg, BID PRN  sodium chloride flush, 10 mL, PRN  acetaminophen, 650 mg, Q6H PRN    Or  acetaminophen, 650 mg, Q6H PRN  polyethylene glycol, 17 g, Daily PRN  promethazine, 12.5 mg, Q6H PRN    Or  ondansetron, 4 mg, Q6H PRN  benzonatate, 100 mg, TID PRN  LORazepam, 1 mg, Q4H PRN  magnesium sulfate, 1 g,

## 2020-03-22 NOTE — CONSULTS
Arlin Soto MD.  Section of General Neurology - Adult  Consult Note        Reason for Consult:    Requesting Physician:  No referring provider defined for this encounter. Thank you for your kind referral.    CHIEF COMPLAINT:  Seizures. HISTORY OF PRESENT ILLNESS:              The patient is a 62 y.o. male with a history of male who presents with 2 seizure episodes, fever. Patient is MRDD and resides in a group home. Staff was concerned because of increased lethargy and fever T-max reported as 103. While in emergency room work-up was indicative of pneumonia. The patient denies cough he does report some chest congestion. Upon arrival to the emergency room he was alert and oriented and apparently at his baseline. pt has a hx of seizures with pt not having a seizure for a year per POA. pt is on keppra vimpat and zonegran.     Past Medical History:        Diagnosis Date    Anemia     Aspiration pneumonia ([address]     dx 6/9/2014 with this per ecf/ per old chart dx with pneumonia with admission 9/18/2018    Cerebral palsy ([address]     \"has no feeling on left side of body\"alert but has some dementia but not diagnosed, has good long term but poor short term and wakes up disoriented after a nap\"(per yaneth)(2014)    Depression     Epilepsy ([address] 1961    last seizure 2/2018- hx grand mal    Frequent falls     with phone assess- family stated pt fell this am (7/10/2013\"in the process of trying to find a facility to help build him back up- ([facility] at [facility])    Glaucoma     \"has been in treatment for this in the past- no treatment needed at present\"    History of IBS     Hx MRSA infection     + nasal culture 4/2014    Hyperammonemia (HCC)     Hypertension     on Lisinopril    IBS (irritable bowel syndrome)     ulcerative colitis    Kidney stone     for surgery for stent placement 7/11/2013    Mood disorder ([address]     per staff at [address] [facility] Occasional tremors     \"makes it difficult for him to write\"    Prostatitis     hx given per H&P old chart    Thyroid disease     Ulcerative colitis ([facility] [address]     hx [facility] staff [facility] 4/13/2015     Past Surgical History:        Procedure Laterality Date    CHOLECYSTECTOMY      COLONOSCOPY  8/19/14, 5/2012 8/19/14: hemorrhoids, 5/2012 at [address] Left 07/11/2013    with stnet placement   [address]  2005   [facility]      OTHER SURGICAL HISTORY  04/15/2015    Revision of vagal nerve stimulator    UPPER GASTROINTESTINAL ENDOSCOPY N/A 11/13/2018    EGD DILATION BALLOON AND BIOPSY performed by Geraldo Phillips MD at [place] [address]    Has to have bettery changed every 5 yrs.       Current Medications:   Current Facility-Administered Medications: albuterol sulfate  (90 Base) MCG/ACT inhaler 2 puff, 2 puff, Inhalation, Q6H PRN  [START ON 3/23/2020] influenza quadrivalent split vaccine (FLUZONE;FLUARIX;FLULAVAL;AFLURIA) injection 0.5 mL, 0.5 mL, Intramuscular, Once  lacosamide (VIMPAT) tablet 200 mg, 200 mg, Oral, BID  primidone (MYSOLINE) tablet 100 mg, 100 mg, Oral, BID  zonisamide (ZONEGRAN) capsule 300 mg, 300 mg, Oral, Nightly  tamsulosin (FLOMAX) capsule 0.4 mg, 0.4 mg, Oral, Daily  QUEtiapine (SEROQUEL XR) extended release tablet 50 mg, 50 mg, Oral, Nightly  PARoxetine (PAXIL) tablet 20 mg, 20 mg, Oral, QAM  levothyroxine (SYNTHROID) tablet 112 mcg, 112 mcg, Oral, Daily  divalproex (DEPAKOTE) DR tablet 500 mg, 500 mg, Oral, BID  folic acid (FOLVITE) tablet 1 mg, 1 mg, Oral, Daily  docusate sodium (COLACE) capsule 100 mg, 100 mg, Oral, BID PRN  aspirin chewable tablet 81 mg, 81 mg, Oral, Daily  dicyclomine (BENTYL) capsule 10 mg, 10 mg, Oral, TID  lactulose (CHRONULAC) 10 GM/15ML solution 20 g, 20 g, Oral, TID  atorvastatin (LIPITOR) tablet 20 mg, 20 mg, Oral, Daily  0.9 % sodium chloride infusion, , Intravenous, Continuous  sodium chloride flush 0.9 % injection 10 mL, 10 mL, negative  GASTROINTESTINAL:  negative  GENITOURINARY:  negative  MUSCULOSKELETAL:  negative  BEHAVIOR/PSYCH:  Negative    ROS neg    Family hx neg    PHYSICAL EXAM  ------------------------  Vitals:  /89   Pulse 71   Temp 98.9 °F (37.2 °C) (Oral)   Resp 18   Ht 5' 5\" (1.651 m)   Wt 201 lb 14.4 oz (91.6 kg)   SpO2 96%   BMI 33.60 kg/m²      General:  Awake, alert, oriented X 2. Well developed, well nourished, well groomed. No apparent distress. HEENT:  Normocephalic, atraumatic. Pupils equal, round, reactive to light. No scleral icterus. No conjunctival injection. Normal lips, teeth, and gums. No nasal discharge. Neck:  Supple  Heart:  RRR, no murmurs, gallops, rubs  Lungs:  CTA bilaterally, bilat symmetrical expansion, no wheeze, rales, or rhonchi  Abdomen: Bowel sounds present, soft, nontender, no masses, no organomegaly, no peritoneal signs  Extremities:  No clubbing, cyanosis, or edema  Skin:  Warm and dry, no open lesions or rash  Breast: deferred  Rectal: deferred  Genitalia:  deferred    NEUROLOGICAL EXAM  ---------------------------------    Mental Status Exam:             Alert and oriented times two,follows commands,speech and language mild to moderate MRDD    Cranial [id number] Intact.         Cranial nerve II           Visual acuity:  normal                 Cranial nerve III           Pupils:  equal, round, reactive to light      Cranial nerves III, IV, VI           Extraocular Movements: intact      Cranial nerve V           Facial sensation:  intact      Cranial nerve VII           Facial strength: intact      Cranial nerve VIII           Hearing:  intact      Cranial nerve IX           Palate:  intact      Cranial nerve XI         Shoulder shrug:  intact      Cranial nerve XII          Tongue movement:  normal    Motor:    Drift:  absent  Motor exam is symmetrical 5 out of 5 left side--4-/5 left side with contractures  Tone:  normal  Abnormal Movements:  Absent    DTRs-1+ biceps,triceps,brachioradialis,knee jerks and ankle jerks bilaterally symmetrical.  Toes-downgoing bilaterally            Sensory:normal sensation              CBC with Differential:    Lab Results   Component Value Date    WBC 16.0 03/22/2020    RBC 4.33 03/22/2020    HGB 13.4 03/22/2020    HCT 40.6 03/22/2020     03/22/2020    MCV 93.8 03/22/2020    MCH 30.9 03/22/2020    MCHC 33.0 03/22/2020    RDW 12.9 03/22/2020    SEGSPCT 81.7 03/22/2020    BANDSPCT 8 07/24/2018    LYMPHOPCT 6.4 03/22/2020    MONOPCT 10.4 03/22/2020    MYELOPCT 2 05/05/2014    BASOPCT 0.3 03/22/2020    MONOSABS 1.7 03/22/2020    LYMPHSABS 1.0 03/22/2020    EOSABS 0.1 03/22/2020    BASOSABS 0.0 03/22/2020    DIFFTYPE AUTOMATED DIFFERENTIAL 03/22/2020     CMP:    Lab Results   Component Value Date     03/22/2020    K 3.7 03/22/2020     03/22/2020    CO2 26 03/22/2020    BUN 10 03/22/2020    CREATININE 0.6 03/22/2020    GFRAA >60 03/22/2020    LABGLOM >60 03/22/2020    GLUCOSE 145 03/22/2020    PROT 6.3 03/22/2020    LABALBU 3.5 03/22/2020    CALCIUM 9.5 03/22/2020    BILITOT 0.5 03/22/2020    ALKPHOS 74 03/22/2020    AST 10 03/22/2020    ALT 10 03/22/2020     BMP:    Lab Results   Component Value Date     03/22/2020    K 3.7 03/22/2020     03/22/2020    CO2 26 03/22/2020    BUN 10 03/22/2020    LABALBU 3.5 03/22/2020    CREATININE 0.6 03/22/2020    CALCIUM 9.5 03/22/2020    GFRAA >60 03/22/2020    LABGLOM >60 03/22/2020    GLUCOSE 145 03/22/2020     PT/INR:  No results found for: PROTIME, INR  PTT:  No results found for: APTT, PTT[APTT  U/A:    Lab Results   Component Value Date    COLORU STRAW 03/21/2020    PHUR 6.0 05/10/2013    WBCUA 1 03/21/2020    RBCUA 1 03/21/2020    MUCUS RARE 05/16/2019    TRICHOMONAS NONE SEEN 03/21/2020    YEAST FEW 07/04/2015    BACTERIA NEGATIVE 03/21/2020    CLARITYU CLEAR 03/21/2020    SPECGRAV 1.008 03/21/2020    LEUKOCYTESUR NEGATIVE 03/21/2020    UROBILINOGEN NORMAL 03/21/2020

## 2020-03-22 NOTE — H&P
History and Physical      Name:  Albert Mcmahon /Age/Sex: 1955  (45 y.o. male)   MRN & CSN:  [id number] & [id number] Admission Date/Time: 3/21/2020  3:37 PM   Location:  ED14/ED-14 PCP: Haley Hernández MD       Hospital Day: 1        Admitting Physician: Dr. Jerez Door and Plan:   Albert Mcmahon is a 62 y.o. male who presents with  Seizures and Fever     Pneumonia. Meet SIRS criteria. Presenting febrile (102.4)/leukocytosis (14.8)    Ad[facility] to [facility]   Respiratory invention/pulmonary hygiene   Bronchodilators   Pending culture/strep/Legionella/viral PCR. IV antibiotics Vanco/Zosyn-coverIing for aspiration pneumonia   Procalcitonin elevated -lower suspicion for COVID     Seizure disorder-likely 2/2 febrile illness   Continue AEDs   Seizure precautions    PRN Ativan   Consult neurology for evaluation     Electrolyte abnormalities  Hypomagnesemia 1.6  Hypokalemia 3.2   Replacement protocol ordered     Patient case discussed with ED provider    Diet Cardiac   DVT Prophylaxis [x] Lovenox, []  Heparin, [] SCDs, [] Ambulation  [] Long term AC   GI Prophylaxis [] PPI,  [x] H2 Blocker,  [] Carafate,  [] Diet/Tube Feeds   Code Status Full     Disposition Admit to inpatient. Patient plans to return home upon discharge   MDM [] Low, [] Moderate,[x]  High     -Patient assessment and plan discussed and reviewed with admitting physician: Almaz Lord MD.       History of Present Illness:     Chief Complaint: Seizures and Fever    Albert Mcmahon is a 62 y.o. male who presents with 2 seizure episodes, fever. Patient is MRDD and resides in a group home. Staff was concerned because of increased lethargy and fever T-max reported as 103. While in emergency room work-up was indicative of pneumonia. The patient denies cough he does report some chest congestion. Upon arrival to the emergency room he was alert and oriented and apparently at his baseline.      Ten point ROS: reviewed negative, unless as noted in above HPI. Objective: Intake/Output Summary (Last 24 hours) at 3/21/2020 2101  Last data filed at 3/21/2020 2051  Gross per 24 hour   Intake 50 ml   Output --   Net 50 ml        Vitals:   Vitals:    03/21/20 1902 03/21/20 1917 03/21/20 1933 03/21/20 1938   BP: 115/62 (!) 97/53 102/60 102/60   Pulse:    95   Resp:    16   Temp:    98.9 °F (37.2 °C)   TempSrc:    Oral   SpO2: 95% 96% 94% 94%   Weight:       Height:           Physical Exam: 03/21/20     GEN -Awake acutely ill appearing male, sitting upright in bed , NAD. Normal body habitus. Appears given age. EYES -PERRLA. No scleral erythema, discharge, or conjunctivitis. HENT -MM are moist. Oral pharynx without exudates, no evidence of thrush. NECK -Supple, no apparent thyromegaly or masses. RESP -diffuse anterior bronchial rhonchi. Symmetric chest movement while on supplemental oxygen. C/V -S1/S2 auscultated. RRR without appreciable M/R/G. No JVD or carotid bruits. Peripheral pulses equal bilaterally and palpable. Cap refill <3 sec. no peripheral edema. GI -Abdomen is soft non distended and without significant TTP. + BS. No masses or guarding. Rectal exam deferred. No HSM   -No CVA/ flank tenderness. Abdullahi catheter is not present. LYMPH-No palpable cervical lymphadenopathy and no hepatosplenomegaly. No petechiae or ecchymoses. MS -No gross joint deformities. SKIN -Normal coloration, warm, dry. NEURO-Cranial nerves appear grossly intact, normal speech, no lateralizing weakness. PSYC-Awake, alert, oriented x 2- person, place, time, situation,  Appropriate affect.     Past Medical History:      Past Medical History:   Diagnosis Date    Anemia     Aspiration pneumonia ([address]     dx 6/9/2014 with this per ecf/ per old chart dx with pneumonia with admission 9/18/2018    Cerebral palsy ([address]     \"has no feeling on left side of body\"alert but has some dementia but not diagnosed, has good long term but poor short term and wakes up disoriented after a nap\"(per yaneth)(2014)    Depression     Epilepsy ([facility] 1962    last seizure 2/2018- hx grand mal    Frequent falls     with phone assess- family stated pt fell this am (7/10/2013\"in the process of trying to find a facility to help build him back up- ([facility] at [facility])    Glaucoma     \"has been in treatment for this in the past- no treatment needed at present\"    History of IBS     Hx MRSA infection     + nasal culture 4/2014    Hyperammonemia (HCC)     Hypertension     on Lisinopril    IBS (irritable bowel syndrome)     ulcerative colitis    Kidney stone     for surgery for stent placement 7/11/2013    Mood disorder ([facility]     per staff at [address] [place] [address] Occasional tremors     \"makes it difficult for him to write\"    Prostatitis     hx given per H&P old chart    Thyroid disease     Ulcerative colitis ([facility]     hx given per staff at [facility] 4/13/2015       Past Surgical  History:    has a past surgical history that includes Gallbladder surgery (2005); vagal nerve stimulation (2002); Cholecystectomy; Cystoscopy (Left, 07/11/2013); Kidney stone surgery; Colonoscopy (8/19/14, 5/2012); other surgical history (04/15/2015); and Upper gastrointestinal endoscopy (N/A, 11/13/2018). Social History:    FAM HX: Reviewed  family history includes Asthma in his mother and sister; Depression in his mother and sister; Heart Disease in his father and mother; High Blood Pressure in his father and mother; High Cholesterol in his mother and sister; Migraines in his mother and sister.     Soc HX:   Social History     Socioeconomic History    Marital status: Single     Spouse name: None    Number of children: None    Years of education: None    Highest education level: None   Occupational History    None   Social Needs    Financial resource strain: None    Food insecurity     Worry: None     Inability: None    Transportation needs     Medical: None     Non-medical: None   Tobacco Use    tablet Take 5 mg by mouth daily    Historical Provider, MD   melatonin 3 MG TABS tablet Take 3 mg by mouth nightly as needed    Historical Provider, MD   PARoxetine (PAXIL) 20 MG tablet Take 20 mg by mouth every morning    Historical Provider, MD   primidone (MYSOLINE) 50 MG tablet Take 50 mg by mouth 2 times daily    Historical Provider, MD   QUEtiapine (SEROQUEL XR) 50 MG extended release tablet Take 50 mg by mouth nightly    Historical Provider, MD   Multiple Vitamins-Minerals (MULTIVITAMIN PO) Take by mouth daily    Historical Provider, MD   tamsulosin (FLOMAX) 0.4 MG capsule Take 1 capsule by mouth daily 3/18/17   CHRIS Cruz   acetaminophen (APAP EXTRA STRENGTH) 500 MG tablet Take 1 tablet by mouth every 6 hours as needed for Pain 3/18/17   CHRIS Cruz   divalproex (DEPAKOTE) 500 MG DR tablet Take 500 mg by mouth 2 times daily     Historical Provider, MD   Lactose Hydrous POWD by Does not apply route    Historical Provider, MD   simvastatin (ZOCOR) 20 MG tablet Take 20 mg by mouth nightly. Historical Provider, MD   Cholecalciferol (VITAMIN D3) 1000 UNITS CAPS Take 1 tablet by mouth daily. Historical Provider, MD   vitamin B-12 (CYANOCOBALAMIN) 500 MCG tablet Take 500 mcg by mouth once a week. Takes on [address] Provider, MD   lacosamide (VIMPAT) 50 MG TABS tablet Take 150 mg by mouth 2 times daily. Historical Provider, MD   clorazepate (TRANXENE) 7.5 MG tablet Take 7.5 mg by mouth 2 times daily. Historical Provider, MD   zonisamide (ZONEGRAN) 100 MG capsule Take 300 mg by mouth nightly.  Patient takes 2 capsules in the am and 3 capsules at bedtime    Historical Provider, MD         Medications:   Medications:    azithromycin  500 mg Intravenous Once    sodium chloride  1,000 mL Intravenous Once    vancomycin  2,000 mg Intravenous Once      Infusions:   PRN Meds:      Data:     Laboratory this visit:  Reviewed  Recent Labs     03/21/20  1605   WBC 14.8*   HGB 14.9   HCT 42.7

## 2020-03-23 LAB
ANION GAP SERPL CALCULATED.3IONS-SCNC: 7 MMOL/L (ref 4–16)
BUN BLDV-MCNC: 10 MG/DL (ref 6–23)
CALCIUM SERPL-MCNC: 9.1 MG/DL (ref 8.3–10.6)
CHLORIDE BLD-SCNC: 105 MMOL/L (ref 99–110)
CO2: 23 MMOL/L (ref 21–32)
CREAT SERPL-MCNC: 0.6 MG/DL (ref 0.9–1.3)
DOSE AMOUNT: ABNORMAL
DOSE TIME: ABNORMAL
GFR AFRICAN AMERICAN: >60 ML/MIN/1.73M2
GFR NON-AFRICAN AMERICAN: >60 ML/MIN/1.73M2
GLUCOSE BLD-MCNC: 129 MG/DL (ref 70–99)
HCT VFR BLD CALC: 38.3 % (ref 42–52)
HEMOGLOBIN: 12.2 GM/DL (ref 13.5–18)
MAGNESIUM: 1.9 MG/DL (ref 1.8–2.4)
MCH RBC QN AUTO: 30.8 PG (ref 27–31)
MCHC RBC AUTO-ENTMCNC: 31.9 % (ref 32–36)
MCV RBC AUTO: 96.7 FL (ref 78–100)
PDW BLD-RTO: 13.2 % (ref 11.7–14.9)
PLATELET # BLD: 133 K/CU MM (ref 140–440)
PMV BLD AUTO: 10.9 FL (ref 7.5–11.1)
POTASSIUM SERPL-SCNC: 3.9 MMOL/L (ref 3.5–5.1)
RBC # BLD: 3.96 M/CU MM (ref 4.6–6.2)
SODIUM BLD-SCNC: 135 MMOL/L (ref 135–145)
VALPROIC ACID LEVEL: 48.7 UG/ML (ref 50–100)
WBC # BLD: 9.4 K/CU MM (ref 4–10.5)

## 2020-03-23 PROCEDURE — 83735 ASSAY OF MAGNESIUM: CPT

## 2020-03-23 PROCEDURE — 94761 N-INVAS EAR/PLS OXIMETRY MLT: CPT

## 2020-03-23 PROCEDURE — 1200000000 HC SEMI PRIVATE

## 2020-03-23 PROCEDURE — 80164 ASSAY DIPROPYLACETIC ACD TOT: CPT

## 2020-03-23 PROCEDURE — 80048 BASIC METABOLIC PNL TOTAL CA: CPT

## 2020-03-23 PROCEDURE — 6370000000 HC RX 637 (ALT 250 FOR IP): Performed by: NURSE PRACTITIONER

## 2020-03-23 PROCEDURE — 85027 COMPLETE CBC AUTOMATED: CPT

## 2020-03-23 PROCEDURE — 2580000003 HC RX 258: Performed by: NURSE PRACTITIONER

## 2020-03-23 PROCEDURE — 6370000000 HC RX 637 (ALT 250 FOR IP): Performed by: PSYCHIATRY & NEUROLOGY

## 2020-03-23 PROCEDURE — 2500000003 HC RX 250 WO HCPCS: Performed by: NURSE PRACTITIONER

## 2020-03-23 PROCEDURE — 6360000002 HC RX W HCPCS: Performed by: NURSE PRACTITIONER

## 2020-03-23 PROCEDURE — 92610 EVALUATE SWALLOWING FUNCTION: CPT

## 2020-03-23 RX ADMIN — PIPERACILLIN AND TAZOBACTAM 3.38 G: 3; .375 INJECTION, POWDER, LYOPHILIZED, FOR SOLUTION INTRAVENOUS at 15:50

## 2020-03-23 RX ADMIN — PIPERACILLIN AND TAZOBACTAM 3.38 G: 3; .375 INJECTION, POWDER, LYOPHILIZED, FOR SOLUTION INTRAVENOUS at 04:12

## 2020-03-23 RX ADMIN — DICYCLOMINE HYDROCHLORIDE 10 MG: 10 CAPSULE ORAL at 09:50

## 2020-03-23 RX ADMIN — SODIUM CHLORIDE, PRESERVATIVE FREE 10 ML: 5 INJECTION INTRAVENOUS at 09:57

## 2020-03-23 RX ADMIN — PAROXETINE HYDROCHLORIDE 20 MG: 20 TABLET, FILM COATED ORAL at 09:50

## 2020-03-23 RX ADMIN — ASPIRIN 81 MG 81 MG: 81 TABLET ORAL at 09:50

## 2020-03-23 RX ADMIN — LACOSAMIDE 200 MG: 100 TABLET, FILM COATED ORAL at 09:49

## 2020-03-23 RX ADMIN — LACTULOSE 20 G: 10 SOLUTION ORAL at 15:49

## 2020-03-23 RX ADMIN — PRIMIDONE 100 MG: 50 TABLET ORAL at 09:49

## 2020-03-23 RX ADMIN — LACTULOSE 20 G: 10 SOLUTION ORAL at 09:49

## 2020-03-23 RX ADMIN — ATORVASTATIN CALCIUM 20 MG: 20 TABLET, FILM COATED ORAL at 09:50

## 2020-03-23 RX ADMIN — PIPERACILLIN AND TAZOBACTAM 3.38 G: 3; .375 INJECTION, POWDER, LYOPHILIZED, FOR SOLUTION INTRAVENOUS at 09:50

## 2020-03-23 RX ADMIN — FOLIC ACID 1 MG: 1 TABLET ORAL at 09:50

## 2020-03-23 RX ADMIN — LEVOTHYROXINE SODIUM 112 MCG: 112 TABLET ORAL at 04:13

## 2020-03-23 RX ADMIN — DIVALPROEX SODIUM 500 MG: 500 TABLET, DELAYED RELEASE ORAL at 09:54

## 2020-03-23 RX ADMIN — DICYCLOMINE HYDROCHLORIDE 10 MG: 10 CAPSULE ORAL at 15:49

## 2020-03-23 RX ADMIN — GUAIFENESIN 600 MG: 600 TABLET, EXTENDED RELEASE ORAL at 09:50

## 2020-03-23 RX ADMIN — FAMOTIDINE 20 MG: 10 INJECTION INTRAVENOUS at 09:50

## 2020-03-23 RX ADMIN — TAMSULOSIN HYDROCHLORIDE 0.4 MG: 0.4 CAPSULE ORAL at 09:50

## 2020-03-23 ASSESSMENT — PAIN SCALES - GENERAL: PAINLEVEL_OUTOF10: 0

## 2020-03-23 NOTE — CARE COORDINATION
Patient discharged to [address] [place] [address] on 3/25/2020    3/23 patient is MRDD in a group home, will follow chart    Initial Patient Assessment Note    What symptoms brought you to the hospital? Patient came in with fever and seizures from group home, had 2 seizures and fever of 103. Patient is MRDD      CXR:New retrocardiac opacity concerning for pneumonia    Where do you live:       [] Home       [] Independent Living     [] Assisted Living                     [] Skilled Facility   [] Homeless/Homeless Shelter    [x] Group Home      Primary Care Physician:  [x] Dr Jh Strickland                      [] None    [] PA/NP              [] [place] Physician              Pulmonary Physician:    Consulted:  [] Miriam Burnett     [] Yes   [] Vikas     [] No  [] Dwainewaljimena              [] Bahadmarco:    [] Other:      Pharmacy:      Meds to Beds:  [] Mag Savage      [] Yes   [] [facility]      [] No   [] Chiquis Nichole   [] Annette      [] [employer]    [] [facility] [employer]   [] [employer]   [] [employer]   [] [place]   [] [employer]   [] [employer]   [] [employer]   [] Noris   [] Other:    Home Care:         [x] Yes, Name:Group Home       [] No            SNF:  [] Yes, Name:   [x] No           Do you have:            DME Company:  [] Home O2     [] CM DME    [] BIPAP/CPAP    [] [employer]   [] Nebulizer     [] [place]                [x] None of the above    [] Other:       Home COPD Medications:  Inhalers:  Nebulizers: Other:    Are you out of any medications? [] Yes   [x] No   Can you pay for your meds? [x] Yes   [] No  Do you have transportation? [x] Yes   [] No          What time do you prefer your appointments:  [] AM   [x] PM  [] Anytime           Pneumonia education using the Pneumonia Stoplight and About Pneumonia pamphlet  placed in [address] [place] [address] packet for staff to review    CN contact information given.       PNEUMONIA CHECKLIST    Was the PNA Order set used      Yes  Pneumonia Stoplight Education    Yes  Antibiotic given within 24 hours    Yes 3/21

## 2020-03-23 NOTE — PROGRESS NOTES
Hospitalist Progress Note      Name:  Adryan Cutler /Age/Sex: 1955  (62 y.o. male)   MRN & CSN:  [id number] & [id number] Admission Date/Time: 3/21/2020  3:37 PM   Location:  [facility] PCP: Lyman Boas, MD         Hospital Day: 3    Assessment and Plan:     Adryan Cutler is a 62 y.o.  male  who presents with Fever and seizure       Sepsis 2/2 Possible Asp Pneumonia in setting of seizure activity    -SIRS POA: Fever and leukocytosis   -CRP and Procal: 190.3 and 0.314; cycle  -Legionella and Strept Pneu Ag neg  -Leucocytosis resolved  -Continue IV Zosyn   -Swallow eval by SLP today   -Possible dc tomorrow on PO Augmentin      Break Through Seizure   -CT head: No intracranial abnormality  -Continue AEDs  -Seizure precautions      -PRN Ativan  -Neurology on board; readjusted meds      Electrolyte abnormalities  -Hypomagnesemia 1.6  -Hypokalemia 3.2  -Replete; will continue to monitor    Diet DIET DYSPHAGIA MINCED AND MOIST;   DVT Prophylaxis [] Lovenox, []  Heparin, [] SCDs, [] Ambulation   GI Prophylaxis [] PPI,  [] H2 Blocker,  [] Carafate,  [] Diet/Tube Feeds   Code Status Full Code   Disposition Group home   MDM      History of Present Illness:     Patient was seen and examined  No fever, chills, N/V/D  No chest pain or SOB  No abdominal pain or dizziness       Ten point ROS reviewed negative, unless as noted above    Objective:   No intake or output data in the 24 hours ending 20 1019   Vitals:   Vitals:    20 0457   BP: 133/65   Pulse: 71   Resp: 16   Temp: 98.3 °F (36.8 °C)   SpO2: 96%     Physical Exam:   GEN Awake male, laying in bed in no apparent distress. Appears given age. EYES Pupils are equally round. No scleral erythema, discharge, or conjunctivitis. HENT Mucous membranes are moist. Oral pharynx without exudates, no evidence of thrush. NECK Supple, no apparent thyromegaly or masses. RESP Clear to auscultation, no wheezes, rales or rhonchi.   Symmetric chest movement while on room air. CARDIO/VASC S1/S2 auscultated. Regular rate without appreciable murmurs, rubs, or gallops. No JVD or carotid bruits. Peripheral pulses equal bilaterally and palpable. No peripheral edema. GI Abdomen is soft without significant tenderness, masses, or guarding. Bowel sounds are normoactive. Rectal exam deferred.  No costovertebral angle tenderness. Normal appearing external genitalia. Abdullahi catheter is not present. HEME/LYMPH No palpable cervical lymphadenopathy and no hepatosplenomegaly. No petechiae or ecchymoses. MSK No gross joint deformities. SKIN Normal coloration, warm, dry. NEURO Cranial nerves appear grossly intact, normal speech, no lateralizing weakness. PSYCH Awake, alert, oriented x 4. Affect appropriate.     Medications:   Medications:    influenza virus vaccine  0.5 mL Intramuscular Once    lacosamide  200 mg Oral BID    primidone  100 mg Oral BID    zonisamide  300 mg Oral Nightly    tamsulosin  0.4 mg Oral Daily    QUEtiapine  50 mg Oral Nightly    PARoxetine  20 mg Oral QAM    levothyroxine  112 mcg Oral Daily    divalproex  500 mg Oral BID    folic acid  1 mg Oral Daily    aspirin  81 mg Oral Daily    dicyclomine  10 mg Oral TID    lactulose  20 g Oral TID    atorvastatin  20 mg Oral Daily    sodium chloride flush  10 mL Intravenous 2 times per day    famotidine (PEPCID) injection  20 mg Intravenous BID    enoxaparin  40 mg Subcutaneous Daily    guaiFENesin  600 mg Oral BID    piperacillin-tazobactam  3.375 g Intravenous Q6H      Infusions:    sodium chloride 75 mL/hr at 03/22/20 2047     PRN Meds: albuterol sulfate HFA, 2 puff, Q6H PRN  docusate sodium, 100 mg, BID PRN  sodium chloride flush, 10 mL, PRN  acetaminophen, 650 mg, Q6H PRN    Or  acetaminophen, 650 mg, Q6H PRN  polyethylene glycol, 17 g, Daily PRN  promethazine, 12.5 mg, Q6H PRN    Or  ondansetron, 4 mg, Q6H PRN  benzonatate, 100 mg, TID PRN  LORazepam, 1 mg, Q4H PRN  magnesium sulfate, 1 g, PRN  potassium chloride, 40 mEq, PRN    Or  potassium alternative oral replacement, 40 mEq, PRN    Or  potassium chloride, 10 mEq, PRN          Electronically signed by Elisha Brenner MD on 3/23/2020 at 10:19 AM

## 2020-03-23 NOTE — PROGRESS NOTES
Speech Language Pathology  Facility/Department: [address]   CLINICAL BEDSIDE SWALLOW EVALUATION    NAME: Kathleen Ray  : 1955  MRN: [id number]    ADMISSION DATE: 3/21/2020  ADMITTING DIAGNOSIS: has Cerebral palsy, infantile hemiplegia ([address] Rosacea, acne; IBS (irritable bowel syndrome); Hypertension; Calculus of ureter; Pyelonephritis; Sepsis ([address] Pneumonia; Increased ammonia level; Aspiration pneumonia ([address] Hypokalemia; Hypomagnesemia; Hypophosphatasia; Seizure disorder ([address] Seizure disorder, grand mal ([address] and Sepsis due to undetermined organism with acute respiratory failure ([address] on their problem list.       Impressions: Kathleen Ray was seen for a bedside swallowing evaluation after being admitted to [facility] with a seizure and fever. Pt was lethargic throughout assessment. Relevant medical hx includes cerebral palsy, MRDD, pneumonia and seizure disorder. Pt has a significant hx of dysphagia. Most recent modified barium swallow study was completed 19 which indicated silent aspiration of nectar thick liquids and thin liquids by cup sips. Pt was on a mechanical soft diet/thin liquids by straw sips with a recommended chin tuck posture at baseline. For today's assessment pt was positioned upright in bed and presented with a clear vocal quality and strong volitional cough. Pt was unable to follow commands to complete oral mechanism examination at this time. PO trials of puree, soft solids, nectar thick liquids and thin liquids by straw sips were given. Prolonged mastication (pt does not have dentures in hospital), timely oral A-P transit and minimal lingual residue was observed with trials of soft solids. Suspect mild-moderate pharyngeal dysphagia characterized delayed swallow initiation and reduced laryngeal elevation. Pt had a wet/gurgly vocal quality with trials of thin liquids by straw sips.   Clear vocal quality and 0 overt s/s of aspiration were observed with trials of puree, soft solids and nectar thick liquids by cup sips were observed. Recommend dysphagia 2 diet/nectar thick liquids by straw sips with strict aspiration precautions. ST will continue to follow Edmond Murcia for diet tolerance monitoring and possible diet level advancement. ONSET DATE: this admission     Date of Eval: 3/23/2020  Evaluating Therapist: Kayleen Kathleen    Current Diet level:  Current Diet : Dysphagia Minced and Moist (Dysphagia II)  Current Liquid Diet : Thin      Primary Complaint       Pain:  Pain Assessment  Pain Assessment: 0-10  Pain Level: 0  Patient's Stated Pain Goal: No pain  Pain Type: Acute pain  Pain Location: Head(headache)    Reason for Referral  Edmond Murcia was referred for a bedside swallow evaluation to assess the efficiency of his swallow function, identify signs and symptoms of aspiration and make recommendations regarding safe dietary consistencies, effective compensatory strategies, and safe eating environment. Impression  Dysphagia Diagnosis: Mild to moderate oral stage dysphagia; Moderate pharyngeal stage dysphagia  Dysphagia Outcome Severity Scale: Level 3: Moderate dysphagia- Total assisstance, supervision or strategies. Two or more diet consistencies restricted     Treatment Plan  Requires SLP Intervention: Yes  Duration/Frequency of Treatment: 2-3xs weekly/ LOS or until goals are met   D/C Recommendations: To be determined       Recommended Diet and Intervention  Diet Solids Recommendation: Dysphagia Minced and Moist (Dysphagia II)  Liquid Consistency Recommendation: Thin  Recommended Form of Meds: Meds in puree  Recommendations: Dysphagia treatment  Therapeutic Interventions: Diet tolerance monitoring;Patient/Family education; Therapeutic PO trials with SLP    Compensatory Swallowing Strategies  Compensatory Swallowing Strategies: Upright as possible for all oral intake;Eat/Feed slowly; Small bites/sips    Treatment/Goals  Short-term Goals  Timeframe for Short-term Goals: LOS or until goals are met   Goal 1: Pt will tolerate dysphagia 2 diet/nectar thick liquids without clinical evidence of apsiration 100%   Goal 2: Pt/caregivers will demonstrate comprehension of recommendations/POC    General  Chart Reviewed: Yes  Behavior/Cognition: Lethargic;Cooperative;Pleasant mood  Respiratory Status: O2 via nasual cannula  O2 Device: Nasal cannula  Communication Observation: Functional  Follows Directions: Simple  Dentition: Edentulous  Patient Positioning: Upright in bed  Baseline Vocal Quality: Normal  Volitional Swallow: Delayed  Prior Dysphagia History: Pt has significant hx of dysphagia   Consistencies Administered: Dysphagia Minced and Moist (Dysphagia II); Dysphagia Pureed (Dysphagia I); Thin - straw;Nectar - straw           Vision/Hearing  Vision  Vision: Within Functional Limits  Hearing  Hearing: Within functional limits    Oral Motor Deficits  Oral/Motor  Oral Motor: (DIRK)    Oral Phase Dysfunction  Oral Phase  Oral Phase: Exceptions  Oral Phase Dysfunction  Impaired Mastication: Soft Solid  Lingual/Palatal Residue: Soft solid     Indicators of Pharyngeal Phase Dysfunction   Pharyngeal Phase  Pharyngeal Phase: Exceptions  Indicators of Pharyngeal Phase Dysfunction  Delayed Swallow: All  Decreased Laryngeal Elevation: All  Wet Vocal Quality: Thin - straw    Prognosis  Prognosis  Prognosis for safe diet advancement: fair  Barriers to reach goals: severity of dysphagia  Individuals consulted  Consulted and agree with results and recommendations: Patient;RN    Education  Patient Education: recommendations/POC  Patient Education Response: Verbalizes understanding  Safety Devices in place: Yes  Type of devices:  All fall risk precautions in place       Therapy Time  SLP Individual Minutes  Time In: 1100  Time Out: 36  Minutes: [address], [facility]-SLP, 3/23/2020

## 2020-03-24 ENCOUNTER — APPOINTMENT (OUTPATIENT)
Dept: ULTRASOUND IMAGING | Age: 65
DRG: 871 | End: 2020-03-24
Payer: MEDICARE

## 2020-03-24 LAB
ADENOVIRUS DETECTION BY PCR: NOT DETECTED
ANION GAP SERPL CALCULATED.3IONS-SCNC: 11 MMOL/L (ref 4–16)
BORDETELLA PERTUSSIS PCR: NOT DETECTED
BUN BLDV-MCNC: 9 MG/DL (ref 6–23)
CALCIUM SERPL-MCNC: 9.5 MG/DL (ref 8.3–10.6)
CHLAMYDOPHILA PNEUMONIA PCR: NOT DETECTED
CHLORIDE BLD-SCNC: 109 MMOL/L (ref 99–110)
CO2: 23 MMOL/L (ref 21–32)
CORONAVIRUS 229E PCR: NOT DETECTED
CORONAVIRUS HKU1 PCR: NOT DETECTED
CORONAVIRUS NL63 PCR: NOT DETECTED
CORONAVIRUS OC43 PCR: NOT DETECTED
CREAT SERPL-MCNC: 0.6 MG/DL (ref 0.9–1.3)
FOLATE: 17 NG/ML (ref 3.1–17.5)
GFR AFRICAN AMERICAN: >60 ML/MIN/1.73M2
GFR NON-AFRICAN AMERICAN: >60 ML/MIN/1.73M2
GLUCOSE BLD-MCNC: 94 MG/DL (ref 70–99)
HCT VFR BLD CALC: 41.7 % (ref 42–52)
HEMOGLOBIN: 13.5 GM/DL (ref 13.5–18)
HIGH SENSITIVE C-REACTIVE PROTEIN: 86.7 MG/L
HOMOCYSTEINE: NORMAL UMOL/L (ref 0–10)
HUMAN METAPNEUMOVIRUS PCR: NOT DETECTED
INFLUENZA A BY PCR: NOT DETECTED
INFLUENZA A H1 (2009) PCR: NOT DETECTED
INFLUENZA A H1 PANDEMIC PCR: NOT DETECTED
INFLUENZA A H3 PCR: NOT DETECTED
INFLUENZA B BY PCR: NOT DETECTED
MAGNESIUM: 1.9 MG/DL (ref 1.8–2.4)
MCH RBC QN AUTO: 31.1 PG (ref 27–31)
MCHC RBC AUTO-ENTMCNC: 32.4 % (ref 32–36)
MCV RBC AUTO: 96.1 FL (ref 78–100)
MYCOPLASMA PNEUMONIAE PCR: NOT DETECTED
PARAINFLUENZA 1 PCR: NOT DETECTED
PARAINFLUENZA 2 PCR: NOT DETECTED
PARAINFLUENZA 3 PCR: NOT DETECTED
PARAINFLUENZA 4 PCR: NOT DETECTED
PDW BLD-RTO: 13.2 % (ref 11.7–14.9)
PLATELET # BLD: 181 K/CU MM (ref 140–440)
PMV BLD AUTO: 11.1 FL (ref 7.5–11.1)
POTASSIUM SERPL-SCNC: 3.8 MMOL/L (ref 3.5–5.1)
PROCALCITONIN: 1.06
RBC # BLD: 4.34 M/CU MM (ref 4.6–6.2)
RHINOVIRUS ENTEROVIRUS PCR: NOT DETECTED
RSV PCR: NOT DETECTED
SODIUM BLD-SCNC: 143 MMOL/L (ref 135–145)
TSH HIGH SENSITIVITY: 9.1 UIU/ML (ref 0.27–4.2)
VITAMIN B-12: 393.5 PG/ML (ref 211–911)
WBC # BLD: 5.8 K/CU MM (ref 4–10.5)

## 2020-03-24 PROCEDURE — 2500000003 HC RX 250 WO HCPCS: Performed by: NURSE PRACTITIONER

## 2020-03-24 PROCEDURE — U0002 COVID-19 LAB TEST NON-CDC: HCPCS

## 2020-03-24 PROCEDURE — 82607 VITAMIN B-12: CPT

## 2020-03-24 PROCEDURE — 6360000002 HC RX W HCPCS: Performed by: NURSE PRACTITIONER

## 2020-03-24 PROCEDURE — 83735 ASSAY OF MAGNESIUM: CPT

## 2020-03-24 PROCEDURE — 82746 ASSAY OF FOLIC ACID SERUM: CPT

## 2020-03-24 PROCEDURE — 93880 EXTRACRANIAL BILAT STUDY: CPT

## 2020-03-24 PROCEDURE — 94761 N-INVAS EAR/PLS OXIMETRY MLT: CPT

## 2020-03-24 PROCEDURE — 2580000003 HC RX 258: Performed by: NURSE PRACTITIONER

## 2020-03-24 PROCEDURE — 84145 PROCALCITONIN (PCT): CPT

## 2020-03-24 PROCEDURE — 80048 BASIC METABOLIC PNL TOTAL CA: CPT

## 2020-03-24 PROCEDURE — 87633 RESP VIRUS 12-25 TARGETS: CPT

## 2020-03-24 PROCEDURE — 86141 C-REACTIVE PROTEIN HS: CPT

## 2020-03-24 PROCEDURE — 87486 CHLMYD PNEUM DNA AMP PROBE: CPT

## 2020-03-24 PROCEDURE — 1200000000 HC SEMI PRIVATE

## 2020-03-24 PROCEDURE — 87798 DETECT AGENT NOS DNA AMP: CPT

## 2020-03-24 PROCEDURE — 87581 M.PNEUMON DNA AMP PROBE: CPT

## 2020-03-24 PROCEDURE — 83090 ASSAY OF HOMOCYSTEINE: CPT

## 2020-03-24 PROCEDURE — 6370000000 HC RX 637 (ALT 250 FOR IP): Performed by: PSYCHIATRY & NEUROLOGY

## 2020-03-24 PROCEDURE — ~ 87635 HC SO COVID-19 ANY TECHNIQUE NON-CDC

## 2020-03-24 PROCEDURE — 84443 ASSAY THYROID STIM HORMONE: CPT

## 2020-03-24 PROCEDURE — 6370000000 HC RX 637 (ALT 250 FOR IP): Performed by: NURSE PRACTITIONER

## 2020-03-24 PROCEDURE — 85027 COMPLETE CBC AUTOMATED: CPT

## 2020-03-24 PROCEDURE — 36415 COLL VENOUS BLD VENIPUNCTURE: CPT

## 2020-03-24 RX ORDER — TIMOLOL MALEATE 5 MG/ML
1 SOLUTION/ DROPS OPHTHALMIC DAILY
COMMUNITY

## 2020-03-24 RX ORDER — LATANOPROST 50 UG/ML
1 SOLUTION/ DROPS OPHTHALMIC NIGHTLY
COMMUNITY

## 2020-03-24 RX ADMIN — DICYCLOMINE HYDROCHLORIDE 10 MG: 10 CAPSULE ORAL at 10:21

## 2020-03-24 RX ADMIN — QUETIAPINE FUMARATE 50 MG: 50 TABLET, EXTENDED RELEASE ORAL at 00:31

## 2020-03-24 RX ADMIN — FAMOTIDINE 20 MG: 10 INJECTION INTRAVENOUS at 10:22

## 2020-03-24 RX ADMIN — DIVALPROEX SODIUM 500 MG: 500 TABLET, DELAYED RELEASE ORAL at 20:40

## 2020-03-24 RX ADMIN — QUETIAPINE FUMARATE 50 MG: 50 TABLET, EXTENDED RELEASE ORAL at 20:40

## 2020-03-24 RX ADMIN — PRIMIDONE 100 MG: 50 TABLET ORAL at 20:41

## 2020-03-24 RX ADMIN — TAMSULOSIN HYDROCHLORIDE 0.4 MG: 0.4 CAPSULE ORAL at 10:21

## 2020-03-24 RX ADMIN — LACOSAMIDE 200 MG: 100 TABLET, FILM COATED ORAL at 20:39

## 2020-03-24 RX ADMIN — DICYCLOMINE HYDROCHLORIDE 10 MG: 10 CAPSULE ORAL at 20:41

## 2020-03-24 RX ADMIN — DIVALPROEX SODIUM 500 MG: 500 TABLET, DELAYED RELEASE ORAL at 10:51

## 2020-03-24 RX ADMIN — FAMOTIDINE 20 MG: 10 INJECTION INTRAVENOUS at 20:41

## 2020-03-24 RX ADMIN — SODIUM CHLORIDE: 9 INJECTION, SOLUTION INTRAVENOUS at 03:02

## 2020-03-24 RX ADMIN — SODIUM CHLORIDE: 9 INJECTION, SOLUTION INTRAVENOUS at 00:52

## 2020-03-24 RX ADMIN — PIPERACILLIN AND TAZOBACTAM 3.38 G: 3; .375 INJECTION, POWDER, LYOPHILIZED, FOR SOLUTION INTRAVENOUS at 14:58

## 2020-03-24 RX ADMIN — ZONISAMIDE 300 MG: 100 CAPSULE ORAL at 20:40

## 2020-03-24 RX ADMIN — DICYCLOMINE HYDROCHLORIDE 10 MG: 10 CAPSULE ORAL at 00:30

## 2020-03-24 RX ADMIN — LEVOTHYROXINE SODIUM 112 MCG: 112 TABLET ORAL at 06:03

## 2020-03-24 RX ADMIN — ZONISAMIDE 300 MG: 100 CAPSULE ORAL at 00:31

## 2020-03-24 RX ADMIN — SODIUM CHLORIDE: 9 INJECTION, SOLUTION INTRAVENOUS at 05:00

## 2020-03-24 RX ADMIN — LACTULOSE 20 G: 10 SOLUTION ORAL at 13:51

## 2020-03-24 RX ADMIN — ENOXAPARIN SODIUM 40 MG: 40 INJECTION SUBCUTANEOUS at 10:21

## 2020-03-24 RX ADMIN — LACTULOSE 20 G: 10 SOLUTION ORAL at 10:21

## 2020-03-24 RX ADMIN — LACTULOSE 20 G: 10 SOLUTION ORAL at 00:31

## 2020-03-24 RX ADMIN — LACOSAMIDE 200 MG: 100 TABLET, FILM COATED ORAL at 00:29

## 2020-03-24 RX ADMIN — PIPERACILLIN AND TAZOBACTAM 3.38 G: 3; .375 INJECTION, POWDER, LYOPHILIZED, FOR SOLUTION INTRAVENOUS at 06:03

## 2020-03-24 RX ADMIN — PAROXETINE HYDROCHLORIDE 20 MG: 20 TABLET, FILM COATED ORAL at 10:21

## 2020-03-24 RX ADMIN — FOLIC ACID 1 MG: 1 TABLET ORAL at 10:21

## 2020-03-24 RX ADMIN — PIPERACILLIN AND TAZOBACTAM 3.38 G: 3; .375 INJECTION, POWDER, LYOPHILIZED, FOR SOLUTION INTRAVENOUS at 00:30

## 2020-03-24 RX ADMIN — GUAIFENESIN 600 MG: 600 TABLET, EXTENDED RELEASE ORAL at 20:41

## 2020-03-24 RX ADMIN — PIPERACILLIN AND TAZOBACTAM 3.38 G: 3; .375 INJECTION, POWDER, LYOPHILIZED, FOR SOLUTION INTRAVENOUS at 10:22

## 2020-03-24 RX ADMIN — DIVALPROEX SODIUM 500 MG: 500 TABLET, DELAYED RELEASE ORAL at 00:31

## 2020-03-24 RX ADMIN — FAMOTIDINE 20 MG: 10 INJECTION INTRAVENOUS at 00:30

## 2020-03-24 RX ADMIN — PIPERACILLIN AND TAZOBACTAM 3.38 G: 3; .375 INJECTION, POWDER, LYOPHILIZED, FOR SOLUTION INTRAVENOUS at 20:41

## 2020-03-24 RX ADMIN — ASPIRIN 81 MG 81 MG: 81 TABLET ORAL at 10:21

## 2020-03-24 RX ADMIN — GUAIFENESIN 600 MG: 600 TABLET, EXTENDED RELEASE ORAL at 00:32

## 2020-03-24 RX ADMIN — DICYCLOMINE HYDROCHLORIDE 10 MG: 10 CAPSULE ORAL at 13:51

## 2020-03-24 RX ADMIN — LACOSAMIDE 200 MG: 100 TABLET, FILM COATED ORAL at 10:51

## 2020-03-24 RX ADMIN — GUAIFENESIN 600 MG: 600 TABLET, EXTENDED RELEASE ORAL at 10:21

## 2020-03-24 RX ADMIN — PRIMIDONE 100 MG: 50 TABLET ORAL at 10:20

## 2020-03-24 RX ADMIN — ATORVASTATIN CALCIUM 20 MG: 20 TABLET, FILM COATED ORAL at 10:22

## 2020-03-24 RX ADMIN — LACTULOSE 20 G: 10 SOLUTION ORAL at 20:41

## 2020-03-24 RX ADMIN — PRIMIDONE 100 MG: 50 TABLET ORAL at 00:30

## 2020-03-24 NOTE — PROGRESS NOTES
Hospitalist Progress Note      Name:  Jo Zamora /Age/Sex: 1955  (62 y.o. male)   MRN & CSN:  [id number] & [id number] Admission Date/Time: 3/21/2020  3:37 PM   Location:  [address] [place] [address]- PCP: Thelma Gonzalez MD         Hospital Day: 4    Assessment and Plan:     Jo Zamora is a 62 y.o.  male  who presents with Fever and seizure       Sepsis 2/2 Possible Asp GN Pneumonia in setting of seizure activity    -SIRS POA: Fever and leukocytosis   -CRP and Procal: 190.3 and 0.314; cycle  -Legionella and Strept Pneu Ag neg  -Leucocytosis resolved  -Continue IV Zosyn   -Swallow eval by SLP today   -Possible dc tomorrow on PO Augmentin      Break Through Seizure   -CT head: No intracranial abnormality  -Continue AEDs  -Seizure precautions      -PRN Ativan  -Neurology on board; readjusted meds      Electrolyte abnormalities  -Hypomagnesemia 1.6  -Hypokalemia 3.2  -Replete; will continue to monitor      High fever and lives in group home - ordered resp viral panel and covid 19  Planning to dc to home with isolation parameters  AM labs     Diet DIET DYSPHAGIA MINCED AND MOIST; Mildly Thick (Nectar)   DVT Prophylaxis [] Lovenox, []  Heparin, [] SCDs, [] Ambulation   GI Prophylaxis [] PPI,  [] H2 Blocker,  [] Carafate,  [] Diet/Tube Feeds   Code Status Full Code   Disposition Group home   MDM   [] One or more chronic illnesses with mild exacerbation progression    [x] Two or more stable chronic illnesses    [] Undiagnosed new problem with uncertain prognosis    [] Elective major surgery    []Prescription drug management     History of Present Illness:     Patient was seen and examined  Denies cp sob or ha. States he has his own room at the group home and would be able to isolate if necessary. Ten point ROS reviewed negative, unless as noted above    Objective:        Intake/Output Summary (Last 24 hours) at 3/24/2020 1315  Last data filed at 3/24/2020 1036  Gross per 24 hour   Intake 990 ml   Output 1900 ml   Net sulfate, 1 g, PRN  potassium chloride, 40 mEq, PRN    Or  potassium alternative oral replacement, 40 mEq, PRN    Or  potassium chloride, 10 mEq, PRN          Electronically signed by Nata Perkins DO on 3/24/2020 at 1:15 PM

## 2020-03-24 NOTE — PROGRESS NOTES
NEUROLOGY NOTE  DR. Irene Langford MD.  -------------------------------------------------  Subjective:    pts tremors are some better    Denies side effects    Doing better. Denies any new symptoms. Denies headache nausea vomiting dizziness    Denies numbness weakness extremities    Denies blurring of vision double vision    Objective:    /72   Pulse 69   Temp 97.4 °F (36.3 °C) (Oral)   Resp 18   Ht 5' 5\" (1.651 m)   Wt 201 lb 14.4 oz (91.6 kg)   SpO2 98%   BMI 33.60 kg/m²   HEENT nl      Neuro exam    Alert Oriented  X 3  Follow simple commands  EOMI Pupils 3 mm jose de jesus  5/5 right side--4-/5 left side      RADIOLOGY  -----------------    Xr Pelvis (1-2 Views)    Result Date: 3/4/2020  EXAMINATION: ONE XRAY VIEW OF THE PELVIS 3/4/2020 5:44 pm COMPARISON: 01/25/2020 HISTORY: ORDERING SYSTEM PROVIDED HISTORY: fall TECHNOLOGIST PROVIDED HISTORY: Reason for exam:->fall Reason for Exam: pelvis pain Acuity: Acute Type of Exam: Initial Mechanism of Injury: fell x2 in 1 week FINDINGS: There is no evidence of acute fracture. There is normal alignment. No acute joint abnormality. No focal osseous lesion. No focal soft tissue abnormality. Surgical clip centrally in the pelvis and triangular foreign body overlying right SI joint again noted. No acute osseous abnormality. Xr Foot Left (min 3 Views)    Result Date: 3/4/2020  EXAMINATION: THREE XRAY VIEWS OF THE LEFT FOOT 3/4/2020 5:44 pm COMPARISON: None. HISTORY: ORDERING SYSTEM PROVIDED HISTORY: fall TECHNOLOGIST PROVIDED HISTORY: PORTABLE Reason for exam:->fall Reason for Exam: left foot pain Acuity: Acute Type of Exam: Initial Mechanism of Injury: fell x2 in 1 week Relevant Medical/Surgical History: celebral palsy FINDINGS: Three views of the left foot were reviewed. No acute fracture identified. Well corticated ossicle adjacent to the medial malleolus favored to reflect sequela of remote trauma.   Small posterior and plantar calcaneal reasonably achievable. COMPARISON: 10/19/2019 HISTORY: ORDERING SYSTEM PROVIDED HISTORY: fall TECHNOLOGIST PROVIDED HISTORY: Reason for exam:->fall Reason for Exam: fall, head/neck pain. Acuity: Acute Type of Exam: Initial Mechanism of Injury: fall, head/neck pain. Relevant Medical/Surgical History: none FINDINGS: BONES/ALIGNMENT: There is no acute fracture or traumatic malalignment. There are large anterior bridging osteophytes throughout the cervical spine and upper thoracic spine. A congenital block vertebra is suspected at C6-C7. DEGENERATIVE CHANGES: Large anterior bridging osteophytes suggest DISH. SOFT TISSUES: There is no prevertebral soft tissue swelling. No acute abnormality of the cervical spine. No change from 10/19/2019. Large anterior bridging osteophytes in the cervical spine and upper thoracic spine most consistent with DISH. 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 Xr Chest 1 Vw    Result Date: 3/21/2020  EXAMINATION: ONE XRAY VIEW OF THE CHEST 3/21/2020 4:13 pm COMPARISON: May 15, 2019 HISTORY: ORDERING SYSTEM PROVIDED HISTORY: fever TECHNOLOGIST PROVIDED HISTORY: Reason for exam:->fever Reason for Exam: patient reports a fever and shakey hands FINDINGS: Vagal nerve stimulator device projects over the left chest.  Stable cardiomediastinal silhouette. The lungs show a new retrocardiac opacity. No obvious pleural effusion. Low lung volumes are seen. No pneumothorax. No acute osseous abnormality. 1. New retrocardiac opacity concerning for pneumonia. 2. Low lung volumes.        LAB RESULTS  --------------------    Recent Results (from the past 24 hour(s))   Magnesium    Collection Time: 03/23/20  4:00 AM   Result Value Ref Range    Magnesium 1.9 1.8 - 2.4 mg/dl   Basic metabolic panel    Collection Time: 03/23/20  4:00 AM   Result Value Ref Range    Sodium 135 135 - 145 MMOL/L    Potassium 3.9 3.5 - 5.1 MMOL/L    Chloride 105 99 - 110 mMol/L    CO2 23 21 - 32 MMOL/L    Anion Gap

## 2020-03-25 VITALS
SYSTOLIC BLOOD PRESSURE: 122 MMHG | WEIGHT: 201 LBS | OXYGEN SATURATION: 96 % | BODY MASS INDEX: 33.49 KG/M2 | RESPIRATION RATE: 18 BRPM | TEMPERATURE: 97.3 F | HEART RATE: 60 BPM | DIASTOLIC BLOOD PRESSURE: 61 MMHG | HEIGHT: 65 IN

## 2020-03-25 LAB
ANION GAP SERPL CALCULATED.3IONS-SCNC: 9 MMOL/L (ref 4–16)
BUN BLDV-MCNC: 7 MG/DL (ref 6–23)
CALCIUM SERPL-MCNC: 8.9 MG/DL (ref 8.3–10.6)
CHLORIDE BLD-SCNC: 108 MMOL/L (ref 99–110)
CO2: 21 MMOL/L (ref 21–32)
CREAT SERPL-MCNC: 0.6 MG/DL (ref 0.9–1.3)
GFR AFRICAN AMERICAN: >60 ML/MIN/1.73M2
GFR NON-AFRICAN AMERICAN: >60 ML/MIN/1.73M2
GLUCOSE BLD-MCNC: 116 MG/DL (ref 70–99)
HCT VFR BLD CALC: 39.7 % (ref 42–52)
HEMOGLOBIN: 12.7 GM/DL (ref 13.5–18)
MAGNESIUM: 1.8 MG/DL (ref 1.8–2.4)
MCH RBC QN AUTO: 30.7 PG (ref 27–31)
MCHC RBC AUTO-ENTMCNC: 32 % (ref 32–36)
MCV RBC AUTO: 95.9 FL (ref 78–100)
PDW BLD-RTO: 13.2 % (ref 11.7–14.9)
PLATELET # BLD: 156 K/CU MM (ref 140–440)
PMV BLD AUTO: 9.8 FL (ref 7.5–11.1)
POTASSIUM SERPL-SCNC: 3.8 MMOL/L (ref 3.5–5.1)
RBC # BLD: 4.14 M/CU MM (ref 4.6–6.2)
SODIUM BLD-SCNC: 138 MMOL/L (ref 135–145)
WBC # BLD: 4.1 K/CU MM (ref 4–10.5)

## 2020-03-25 PROCEDURE — 2580000003 HC RX 258: Performed by: NURSE PRACTITIONER

## 2020-03-25 PROCEDURE — 80048 BASIC METABOLIC PNL TOTAL CA: CPT

## 2020-03-25 PROCEDURE — 6370000000 HC RX 637 (ALT 250 FOR IP): Performed by: NURSE PRACTITIONER

## 2020-03-25 PROCEDURE — 6370000000 HC RX 637 (ALT 250 FOR IP): Performed by: PSYCHIATRY & NEUROLOGY

## 2020-03-25 PROCEDURE — 85027 COMPLETE CBC AUTOMATED: CPT

## 2020-03-25 PROCEDURE — 83735 ASSAY OF MAGNESIUM: CPT

## 2020-03-25 PROCEDURE — 94761 N-INVAS EAR/PLS OXIMETRY MLT: CPT

## 2020-03-25 PROCEDURE — 6360000002 HC RX W HCPCS: Performed by: NURSE PRACTITIONER

## 2020-03-25 PROCEDURE — 2500000003 HC RX 250 WO HCPCS: Performed by: NURSE PRACTITIONER

## 2020-03-25 RX ORDER — PRIMIDONE 50 MG/1
100 TABLET ORAL 2 TIMES DAILY
Qty: 90 TABLET | Refills: 0 | Status: ON HOLD | OUTPATIENT
Start: 2020-03-25 | End: 2020-04-11

## 2020-03-25 RX ORDER — LACOSAMIDE 200 MG/1
200 TABLET ORAL 2 TIMES DAILY
Qty: 60 TABLET | Refills: 0 | Status: SHIPPED | OUTPATIENT
Start: 2020-03-25 | End: 2021-12-28 | Stop reason: SDUPTHER

## 2020-03-25 RX ADMIN — PRIMIDONE 100 MG: 50 TABLET ORAL at 09:36

## 2020-03-25 RX ADMIN — DICYCLOMINE HYDROCHLORIDE 10 MG: 10 CAPSULE ORAL at 09:30

## 2020-03-25 RX ADMIN — LEVOTHYROXINE SODIUM 112 MCG: 112 TABLET ORAL at 04:40

## 2020-03-25 RX ADMIN — PAROXETINE HYDROCHLORIDE 20 MG: 20 TABLET, FILM COATED ORAL at 09:31

## 2020-03-25 RX ADMIN — ENOXAPARIN SODIUM 40 MG: 40 INJECTION SUBCUTANEOUS at 09:30

## 2020-03-25 RX ADMIN — PIPERACILLIN AND TAZOBACTAM 3.38 G: 3; .375 INJECTION, POWDER, LYOPHILIZED, FOR SOLUTION INTRAVENOUS at 03:26

## 2020-03-25 RX ADMIN — TAMSULOSIN HYDROCHLORIDE 0.4 MG: 0.4 CAPSULE ORAL at 09:36

## 2020-03-25 RX ADMIN — ASPIRIN 81 MG 81 MG: 81 TABLET ORAL at 09:30

## 2020-03-25 RX ADMIN — PIPERACILLIN AND TAZOBACTAM 3.38 G: 3; .375 INJECTION, POWDER, LYOPHILIZED, FOR SOLUTION INTRAVENOUS at 09:31

## 2020-03-25 RX ADMIN — FAMOTIDINE 20 MG: 10 INJECTION INTRAVENOUS at 09:30

## 2020-03-25 RX ADMIN — LACTULOSE 20 G: 10 SOLUTION ORAL at 09:30

## 2020-03-25 RX ADMIN — DIVALPROEX SODIUM 500 MG: 500 TABLET, DELAYED RELEASE ORAL at 09:36

## 2020-03-25 RX ADMIN — GUAIFENESIN 600 MG: 600 TABLET, EXTENDED RELEASE ORAL at 09:30

## 2020-03-25 RX ADMIN — ATORVASTATIN CALCIUM 20 MG: 20 TABLET, FILM COATED ORAL at 09:30

## 2020-03-25 RX ADMIN — FOLIC ACID 1 MG: 1 TABLET ORAL at 09:30

## 2020-03-25 RX ADMIN — LACOSAMIDE 200 MG: 100 TABLET, FILM COATED ORAL at 09:36

## 2020-03-25 NOTE — PROGRESS NOTES
CLINICAL PHARMACY NOTE: MEDS TO [address] Select Patient?: No  Total # of Prescriptions Filled: 1   The following medications were delivered to the patient:  · Primidone 50mg  Total # of Interventions Completed: 0  Time Spent (min): 15    Additional Documentation:

## 2020-03-25 NOTE — PROGRESS NOTES
NEUROLOGY NOTE  DR. Cortez Funes MD.  -------------------------------------------------  Subjective:    pts tremors are a lot better    Denies side effects    Doing better. Denies any new symptoms. Denies headache nausea vomiting dizziness    Denies numbness weakness extremities    Denies blurring of vision double vision    Objective:    BP (!) 156/72   Pulse 74   Temp 97.7 °F (36.5 °C) (Oral)   Resp 19   Ht 5' 5\" (1.651 m)   Wt 201 lb (91.2 kg)   SpO2 98%   BMI 33.45 kg/m²   HEENT nl      Neuro exam    Alert Oriented  X 3  Follow simple commands  EOMI Pupils 3 mm jose de jesus  5/5 right side--4-/5 left side      RADIOLOGY  -----------------    Xr Pelvis (1-2 Views)    Result Date: 3/4/2020  EXAMINATION: ONE XRAY VIEW OF THE PELVIS 3/4/2020 5:44 pm COMPARISON: 01/25/2020 HISTORY: ORDERING SYSTEM PROVIDED HISTORY: fall TECHNOLOGIST PROVIDED HISTORY: Reason for exam:->fall Reason for Exam: pelvis pain Acuity: Acute Type of Exam: Initial Mechanism of Injury: fell x2 in 1 week FINDINGS: There is no evidence of acute fracture. There is normal alignment. No acute joint abnormality. No focal osseous lesion. No focal soft tissue abnormality. Surgical clip centrally in the pelvis and triangular foreign body overlying right SI joint again noted. No acute osseous abnormality. Xr Foot Left (min 3 Views)    Result Date: 3/4/2020  EXAMINATION: THREE XRAY VIEWS OF THE LEFT FOOT 3/4/2020 5:44 pm COMPARISON: None. HISTORY: ORDERING SYSTEM PROVIDED HISTORY: fall TECHNOLOGIST PROVIDED HISTORY: PORTABLE Reason for exam:->fall Reason for Exam: left foot pain Acuity: Acute Type of Exam: Initial Mechanism of Injury: fell x2 in 1 week Relevant Medical/Surgical History: celebral palsy FINDINGS: Three views of the left foot were reviewed. No acute fracture identified. Well corticated ossicle adjacent to the medial malleolus favored to reflect sequela of remote trauma. Small posterior and plantar calcaneal enthesophytes. Mild-to-moderate osteoarthritic changes of the interphalangeal joints of the 1st through 5th digits and 1st MTP joint. Mild-to-moderate osteoarthritis of the hindfoot and midfoot. Mild subcutaneous edema. 1. No acute osseous abnormality of the left foot. 2. Mild-to-moderate osteoarthritis of the left foot. Ct Head Wo Contrast    Result Date: 3/21/2020  EXAMINATION: CT OF THE HEAD WITHOUT CONTRAST  3/21/2020 3:36 pm TECHNIQUE: CT of the head was performed without the administration of intravenous contrast. Dose modulation, iterative reconstruction, and/or weight based adjustment of the mA/kV was utilized to reduce the radiation dose to as low as reasonably achievable. COMPARISON: March 4, 2020 HISTORY: ORDERING SYSTEM PROVIDED HISTORY: headache, seizure TECHNOLOGIST PROVIDED HISTORY: Reason for exam:->headache, seizure Has a \"code stroke\" or \"stroke alert\" been called? ->No Reason for Exam: headache, seizure Acuity: Acute Type of Exam: Initial Mechanism of Injury: PT FELL, STATES HE DIDN'T HIT HEAD AND NO LOC, PT HAS FEVER Additional signs and symptoms: PT FELL FINDINGS: BRAIN/VENTRICLES: There is no acute intracranial hemorrhage, mass effect or midline shift. No abnormal extra-axial fluid collection. The gray-white differentiation is maintained without evidence of an acute infarct. There is no evidence of hydrocephalus. Extensive encephalomalacia changes are seen involving the right cerebral hemisphere, related to prior right MCA distribution infarct. ORBITS: The visualized portion of the orbits demonstrate no acute abnormality. SINUSES: The visualized paranasal sinuses and mastoid air cells demonstrate no acute abnormality. SOFT TISSUES/SKULL:  Soft tissue swelling is seen within the parietal scalp region bilaterally, without evidence of a skull fracture. 1. No acute intracranial abnormality 2. Prior right MCA distribution infarct 3.  Parietal scalp hematoma, without evidence of a skull fracture Ct Head Wo Contrast    Result Date: 3/4/2020  EXAMINATION: CT OF THE HEAD WITHOUT CONTRAST  3/4/2020 4:52 pm TECHNIQUE: CT of the head was performed without the administration of intravenous contrast. Dose modulation, iterative reconstruction, and/or weight based adjustment of the mA/kV was utilized to reduce the radiation dose to as low as reasonably achievable. COMPARISON: 10/19/2019 HISTORY: ORDERING SYSTEM PROVIDED HISTORY: fall TECHNOLOGIST PROVIDED HISTORY: Has a \"code stroke\" or \"stroke alert\" been called? ->No Reason for exam:->fall Reason for Exam: fall, head/neck pain. Acuity: Acute Type of Exam: Initial Mechanism of Injury: fall, head/neck pain. Relevant Medical/Surgical History: none FINDINGS: BRAIN/VENTRICLES: There is no acute intracranial hemorrhage, mass effect or midline shift. No abnormal extra-axial fluid collection. The gray-white differentiation is maintained without evidence of an acute infarct. There is no evidence of hydrocephalus. Encephalomalacia within the right MCA distribution secondary to remote infarct. There is associated porencephalic dilatation of the right lateral ventricle. Stable wallerian degeneration of the right cerebral peduncle. ORBITS: The visualized portion of the orbits demonstrate no acute abnormality. SINUSES: The visualized paranasal sinuses and mastoid air cells demonstrate no acute abnormality. SOFT TISSUES/SKULL:  No acute abnormality of the visualized skull or soft tissues. No acute intracranial abnormality. Ct Cervical Spine Wo Contrast    Result Date: 3/4/2020  EXAMINATION: CT OF THE CERVICAL SPINE WITHOUT CONTRAST 3/4/2020 4:52 pm TECHNIQUE: CT of the cervical spine was performed without the administration of intravenous contrast. Multiplanar reformatted images are provided for review.  Dose modulation, iterative reconstruction, and/or weight based adjustment of the mA/kV was utilized to reduce the radiation dose to as low as reasonably NOT DETECTED NOT DETECTED    Influenza B by PCR NOT DETECTED NOT DETECTED    Parainfluenza 1 PCR NOT DETECTED NOT DETECTED    Parainfluenza 2 PCR NOT DETECTED NOT DETECTED    Parainfluenza 3 PCR NOT DETECTED NOT DETECTED    Parainfluenza 4 PCR NOT DETECTED NOT DETECTED    RSV PCR NOT DETECTED NOT DETECTED    B Pertussis by PCR NOT DETECTED NOT DETECTED    Chlamydophila Pneumonia PCR NOT DETECTED NOT DETECTED    Mycoplasma pneumo by PCR NOT DETECTED NOT DETECTED         Medical problems    Patient Active Problem List:     Cerebral palsy, infantile hemiplegia (HCC)     Rosacea, acne     IBS (irritable bowel syndrome)     Hypertension     Calculus of ureter     Pyelonephritis     Sepsis (HCC)     Pneumonia     Increased ammonia level     Aspiration pneumonia (HCC)     Hypokalemia     Hypomagnesemia     Hypophosphatasia     Seizure disorder (HCC)     Seizure disorder, grand mal ([address]     Sepsis due to undetermined organism with acute respiratory failure ([facility])      ASSESSMENT:  ---------------------    Seizure disorder     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     Electronically signed by Pascual Sandoval MD on 3/24/2020 at 9:36 PM

## 2020-03-26 ENCOUNTER — CARE COORDINATION (OUTPATIENT)
Dept: CASE MANAGEMENT | Age: 65
End: 2020-03-26

## 2020-03-26 LAB
CULTURE: NORMAL
CULTURE: NORMAL
Lab: NORMAL
Lab: NORMAL
SPECIMEN: NORMAL
SPECIMEN: NORMAL

## 2020-03-26 NOTE — CARE COORDINATION
Instructed on worsening s/s to report to MD.     Patient has following risk factors of:  PNA/ Sepsis. CTN reviewed discharge instructions, medical action plan and red flags such as increased shortness of breath, increasing fever and signs of decompensation with POA who verbalized understanding. Discussed exposure protocols and quarantine with CDC Guidelines What to do if you are sick with coronavirus disease 2019 POA who was given an opportunity for questions and concerns. The POA agrees to contact the [facility] exposure line [phone], local health department  and PCP office for questions related to their healthcare. CTN provided contact information for future reference. Reviewed and educated POA on any new and changed medications related to discharge diagnosis. Denies any questions. Reviewed PNA zone management tool and s/s to report to MD.    Patient POA reports that patient has 24/7 support and his care needs are met. Denies any questions, equipment or resource needs. Agreeable to continued Care Transition. Plan for follow-up call in 14 days based on severity of symptoms and risk factors. No future appointments.     Jeral Gitelman, RN

## 2020-03-26 NOTE — PROGRESS NOTES
NEUROLOGY NOTE  DR. Marylene Haste MD.  -------------------------------------------------  Subjective:    pts tremors are a lot better    Denies side effects    Doing better. Denies any new symptoms. Denies headache nausea vomiting dizziness    Denies numbness weakness extremities    Denies blurring of vision double vision    Objective:    /61   Pulse 60   Temp 97.3 °F (36.3 °C) (Oral)   Resp 18   Ht 5' 5\" (1.651 m)   Wt 201 lb (91.2 kg)   SpO2 96%   BMI 33.45 kg/m²   HEENT nl      Neuro exam    Alert Oriented  X 3  Follow simple commands  EOMI Pupils 3 mm jose de jesus  5/5 right side--4-/5 left side      RADIOLOGY  -----------------    Xr Pelvis (1-2 Views)    Result Date: 3/4/2020  EXAMINATION: ONE XRAY VIEW OF THE PELVIS 3/4/2020 5:44 pm COMPARISON: 01/25/2020 HISTORY: ORDERING SYSTEM PROVIDED HISTORY: fall TECHNOLOGIST PROVIDED HISTORY: Reason for exam:->fall Reason for Exam: pelvis pain Acuity: Acute Type of Exam: Initial Mechanism of Injury: fell x2 in 1 week FINDINGS: There is no evidence of acute fracture. There is normal alignment. No acute joint abnormality. No focal osseous lesion. No focal soft tissue abnormality. Surgical clip centrally in the pelvis and triangular foreign body overlying right SI joint again noted. No acute osseous abnormality. Xr Foot Left (min 3 Views)    Result Date: 3/4/2020  EXAMINATION: THREE XRAY VIEWS OF THE LEFT FOOT 3/4/2020 5:44 pm COMPARISON: None. HISTORY: ORDERING SYSTEM PROVIDED HISTORY: fall TECHNOLOGIST PROVIDED HISTORY: PORTABLE Reason for exam:->fall Reason for Exam: left foot pain Acuity: Acute Type of Exam: Initial Mechanism of Injury: fell x2 in 1 week Relevant Medical/Surgical History: celebral palsy FINDINGS: Three views of the left foot were reviewed. No acute fracture identified. Well corticated ossicle adjacent to the medial malleolus favored to reflect sequela of remote trauma. Small posterior and plantar calcaneal enthesophytes. Ct Head Wo Contrast    Result Date: 3/4/2020  EXAMINATION: CT OF THE HEAD WITHOUT CONTRAST  3/4/2020 4:52 pm TECHNIQUE: CT of the head was performed without the administration of intravenous contrast. Dose modulation, iterative reconstruction, and/or weight based adjustment of the mA/kV was utilized to reduce the radiation dose to as low as reasonably achievable. COMPARISON: 10/19/2019 HISTORY: ORDERING SYSTEM PROVIDED HISTORY: fall TECHNOLOGIST PROVIDED HISTORY: Has a \"code stroke\" or \"stroke alert\" been called? ->No Reason for exam:->fall Reason for Exam: fall, head/neck pain. Acuity: Acute Type of Exam: Initial Mechanism of Injury: fall, head/neck pain. Relevant Medical/Surgical History: none FINDINGS: BRAIN/VENTRICLES: There is no acute intracranial hemorrhage, mass effect or midline shift. No abnormal extra-axial fluid collection. The gray-white differentiation is maintained without evidence of an acute infarct. There is no evidence of hydrocephalus. Encephalomalacia within the right MCA distribution secondary to remote infarct. There is associated porencephalic dilatation of the right lateral ventricle. Stable wallerian degeneration of the right cerebral peduncle. ORBITS: The visualized portion of the orbits demonstrate no acute abnormality. SINUSES: The visualized paranasal sinuses and mastoid air cells demonstrate no acute abnormality. SOFT TISSUES/SKULL:  No acute abnormality of the visualized skull or soft tissues. No acute intracranial abnormality. Ct Cervical Spine Wo Contrast    Result Date: 3/4/2020  EXAMINATION: CT OF THE CERVICAL SPINE WITHOUT CONTRAST 3/4/2020 4:52 pm TECHNIQUE: CT of the cervical spine was performed without the administration of intravenous contrast. Multiplanar reformatted images are provided for review.  Dose modulation, iterative reconstruction, and/or weight based adjustment of the mA/kV was utilized to reduce the radiation dose to as low as reasonably BUN 7 6 - 23 MG/DL    CREATININE 0.6 (L) 0.9 - 1.3 MG/DL    Glucose 116 (H) 70 - 99 MG/DL    Calcium 8.9 8.3 - 10.6 MG/DL    GFR Non-African American >60 >60 mL/min/1.73m2    GFR African American >60 >60 mL/min/1.73m2   CBC    Collection Time: 03/25/20  5:18 AM   Result Value Ref Range    WBC 4.1 4.0 - 10.5 K/CU MM    RBC 4.14 (L) 4.6 - 6.2 M/CU MM    Hemoglobin 12.7 (L) 13.5 - 18.0 GM/DL    Hematocrit 39.7 (L) 42 - 52 %    MCV 95.9 78 - 100 FL    MCH 30.7 27 - 31 PG    MCHC 32.0 32.0 - 36.0 %    RDW 13.2 11.7 - 14.9 %    Platelets 060 636 - 937 K/CU MM    MPV 9.8 7.5 - 11.1 FL         Medical problems    Patient Active Problem List:     Cerebral palsy, infantile hemiplegia (HCC)     Rosacea, acne     IBS (irritable bowel syndrome)     Hypertension     Calculus of ureter     Pyelonephritis     Sepsis (HCC)     Pneumonia     Increased ammonia level     Aspiration pneumonia (HCC)     Hypokalemia     Hypomagnesemia     Hypophosphatasia     Seizure disorder (HCC)     Seizure disorder, grand mal (HCC)     Sepsis due to undetermined organism with acute respiratory failure (HCC)      ASSESSMENT:  ---------------------    Seizure disorder     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     Electronically signed by Oli Sabillon MD on 3/25/2020 at 10:09 PM

## 2020-04-03 ENCOUNTER — OFFICE VISIT (OUTPATIENT)
Dept: PRIMARY CARE CLINIC | Age: 65
End: 2020-04-03
Payer: MEDICARE

## 2020-04-03 ENCOUNTER — TELEPHONE (OUTPATIENT)
Dept: PRIMARY CARE CLINIC | Age: 65
End: 2020-04-03

## 2020-04-03 PROCEDURE — 98966 PH1 ASSMT&MGMT NQHP 5-10: CPT | Performed by: NURSE PRACTITIONER

## 2020-04-03 RX ORDER — DIPHENOXYLATE HYDROCHLORIDE AND ATROPINE SULFATE 2.5; .025 MG/1; MG/1
1 TABLET ORAL 4 TIMES DAILY PRN
Qty: 8 TABLET | Refills: 0 | Status: CANCELLED | OUTPATIENT
Start: 2020-04-03 | End: 2020-04-13

## 2020-04-03 RX ORDER — DIPHENOXYLATE HYDROCHLORIDE AND ATROPINE SULFATE 2.5; .025 MG/1; MG/1
1 TABLET ORAL 4 TIMES DAILY PRN
Qty: 8 TABLET | Refills: 0 | Status: ON HOLD | OUTPATIENT
Start: 2020-04-03 | End: 2020-04-13 | Stop reason: HOSPADM

## 2020-04-04 LAB
SARS-COV-2: NORMAL
SOURCE: NORMAL

## 2020-04-07 ENCOUNTER — APPOINTMENT (OUTPATIENT)
Dept: GENERAL RADIOLOGY | Age: 65
End: 2020-04-07
Payer: MEDICARE

## 2020-04-07 ENCOUNTER — HOSPITAL ENCOUNTER (EMERGENCY)
Age: 65
Discharge: HOME | End: 2020-04-07
Payer: MEDICARE

## 2020-04-07 VITALS
SYSTOLIC BLOOD PRESSURE: 115 MMHG | DIASTOLIC BLOOD PRESSURE: 74 MMHG | HEART RATE: 68 BPM | OXYGEN SATURATION: 99 % | TEMPERATURE: 97.8 F | RESPIRATION RATE: 16 BRPM

## 2020-04-07 PROCEDURE — 2500000003 HC RX 250 WO HCPCS: Performed by: PHYSICIAN ASSISTANT

## 2020-04-07 PROCEDURE — 4500000027

## 2020-04-07 PROCEDURE — 73630 X-RAY EXAM OF FOOT: CPT

## 2020-04-07 PROCEDURE — 99283 EMERGENCY DEPT VISIT LOW MDM: CPT

## 2020-04-07 PROCEDURE — 6370000000 HC RX 637 (ALT 250 FOR IP): Performed by: PHYSICIAN ASSISTANT

## 2020-04-07 RX ORDER — LIDOCAINE HYDROCHLORIDE 20 MG/ML
10 INJECTION, SOLUTION INFILTRATION; PERINEURAL ONCE
Status: COMPLETED | OUTPATIENT
Start: 2020-04-07 | End: 2020-04-07

## 2020-04-07 RX ORDER — DIAPER,BRIEF,INFANT-TODD,DISP
EACH MISCELLANEOUS ONCE
Status: COMPLETED | OUTPATIENT
Start: 2020-04-07 | End: 2020-04-07

## 2020-04-07 RX ADMIN — BACITRACIN: 500 OINTMENT TOPICAL at 10:30

## 2020-04-07 RX ADMIN — LIDOCAINE HYDROCHLORIDE 10 ML: 20 INJECTION, SOLUTION INFILTRATION; PERINEURAL at 10:59

## 2020-04-07 ASSESSMENT — PAIN SCALES - GENERAL: PAINLEVEL_OUTOF10: 7

## 2020-04-07 NOTE — ED PROVIDER NOTES
EMERGENCY DEPARTMENT ENCOUNTER        CHIEF COMPLAINT    Chief Complaint   Patient presents with    Laceration     left foot    Fall         HPI    Casandra Obrien is a 62 y.o. male who presents with a laceration. Onset was this morning. Context was patient was transferring from toilet to wheelchair and tripped, injured left foot. Denies other injuries including head injury, neck or back pain, extremity pain. Laceration is localized to the left foot on plantar aspects of 2-4 toes. Patient reports associated bleeding, denies pain. Patient is up-to-date on Tetanus. REVIEW OF SYSTEMS    See HPI for further details. Review of systems otherwise negative. Constitutional:  Denies fever, chills. Musculoskeletal:  Denies edema, tenderness. Integument:  + laceration  Neurologic:  Denies any numbness or tingling.       PAST MEDICAL HISTORY    Past Medical History:   Diagnosis Date    Anemia     Aspiration pneumonia ([address]     dx 6/9/2014 with this per ecf/ per old chart dx with pneumonia with admission 9/18/2018    Cerebral palsy ([address]     \"has no feeling on left side of body\"alert but has some dementia but not diagnosed, has good long term but poor short term and wakes up disoriented after a nap\"(per yaneth)(2014)    Depression     Epilepsy ([address] 1961    last seizure 2/2018- hx grand mal    Frequent falls     with phone assess- family stated pt fell this am (7/10/2013\"in the process of trying to find a facility to help build him back up- ([facility], [facility])    Glaucoma     \"has been in treatment for this in the past- no treatment needed at present\"    History of IBS     Hx MRSA infection     + nasal culture 4/2014    Hyperammonemia (HCC)     Hypertension     on Lisinopril    IBS (irritable bowel syndrome)     ulcerative colitis    Kidney stone     for surgery for stent placement 7/11/2013    Mood disorder ([address]     per staff at [facility] Occasional tremors     \"makes it difficult for  Transportation needs     Medical: None     Non-medical: None   Tobacco Use    Smoking status: Never Smoker    Smokeless tobacco: Never Used   Substance and Sexual Activity    Alcohol use: No    Drug use: No    Sexual activity: None   Lifestyle    Physical activity     Days per week: None     Minutes per session: None    Stress: None   Relationships    Social connections     Talks on phone: None     Gets together: None     Attends Zoroastrianism service: None     Active member of club or organization: None     Attends meetings of clubs or organizations: None     Relationship status: None    Intimate partner violence     Fear of current or ex partner: None     Emotionally abused: None     Physically abused: None     Forced sexual activity: None   Other Topics Concern    None   Social History Narrative    None         PHYSICAL EXAM    VITAL SIGNS: /74   Pulse 68   Temp 97.8 °F (36.6 °C) (Oral)   Resp 16   SpO2 99%   Constitutional:  Well developed, well nourished, no acute distress  HENT:  NC/AT. Ears, nose, mouth normal.   Respiratory:  Normal respiratory effort. Cardiovascular:  DP and PT pulses 2+ on left  Musculoskeletal:  No edema, no tenderness, no deformities. Integument: There are 1 cm lacerations noted running transversely on plantar aspect of 2-4 toes at MTP joint. No bleeding noted. RADIOLOGY  [] The following radiograph was interpreted by myself in the absence of a radiologist:   [] Radiologist's Report Reviewed:  XR FOOT LEFT (MIN 3 VIEWS)   Final Result   1. Fracture of the proximal 5th toe proximal phalanx diaphysis with medial   apex angulation. .  Based on the cortical margins, this may be subacute. PROCEDURES      Laceration Repair Procedure Note  Indication:  Laceration to the left plantar toes    Procedure:  The patient was placed in the appropriate position, draped in sterile fashion, and anesthesia around the lacerations were obtained by infiltration

## 2020-04-07 NOTE — ED NOTES
Transport with [facility] scheduled and will be here to pick patient up at [phone].       Gela Mejia RN  04/07/20 1109

## 2020-04-07 NOTE — ED NOTES
Report received from Jaiden Badillo; care assumed a this time     Lukas Mike, [place]  04/07/20 9550

## 2020-04-07 NOTE — LETTER
[facility] Emergency Department  [id number]  Phone: [phone]  Fax: [phone]             April 7, 2020    Patient: Nel Fletcher   YOB: 1955   Date of Visit: 4/7/2020       To Whom It May Concern:    Guillermo Baumann was seen and treated in our emergency department on 4/7/2020. He may return to work on May 2020.       Sincerely,     Rossi Ohara RN        Signature:__________________________________

## 2020-04-08 ENCOUNTER — CARE COORDINATION (OUTPATIENT)
Dept: CASE MANAGEMENT | Age: 65
End: 2020-04-08

## 2020-04-08 NOTE — CARE COORDINATION
[facility] Initial Follow Up Call    Call within 2 business days of discharge: Yes    Patient: Suzanna Olivera Patient : 1955   MRN: [id number]  Reason for Admission: Fall, Left Foot Laceration  Discharge Date: 20 RARS: Readmission Risk Score: 23      Last Discharge [facility]       Complaint Diagnosis Description Type Department Provider    20 Laceration; Fall Laceration of left foot, initial encounter . .. ED (DISCHARGE) [facility] ED         Facility:  [address] [place] [address] Transitions 24 Hour Call    Do you have any ongoing symptoms?:  Yes  Patient-reported symptoms:  Other (Comment: Diarrhea)  Care Transitions Interventions       Currently in BPCI Bundle /Sepsis from 3/25/2020 thru 2020. Seen in ED 2020 for foot laceration. [place] briefly w/ Mother [phone] for BPCI follow up call. Reports she spoke w/ Patient last evening and he is reporting ongoing diarrhea. Mother requests CTN contact [address] [place] [address] Message left for [address] M[address] at [phone] requesting call back. 1346 2nd attempt to reach [address] M[address] at [phone] unsuccessful; message left requesting call back. Follow Up  No future appointments.     Marlene Alberts, RN

## 2020-04-09 ENCOUNTER — CARE COORDINATION (OUTPATIENT)
Dept: CASE MANAGEMENT | Age: 65
End: 2020-04-09

## 2020-04-10 ENCOUNTER — APPOINTMENT (OUTPATIENT)
Dept: CT IMAGING | Age: 65
DRG: 394 | End: 2020-04-10
Payer: MEDICARE

## 2020-04-10 ENCOUNTER — APPOINTMENT (OUTPATIENT)
Dept: GENERAL RADIOLOGY | Age: 65
DRG: 394 | End: 2020-04-10
Payer: MEDICARE

## 2020-04-10 ENCOUNTER — HOSPITAL ENCOUNTER (INPATIENT)
Age: 65
LOS: 2 days | Discharge: HOME | DRG: 394 | End: 2020-04-13
Attending: EMERGENCY MEDICINE | Admitting: INTERNAL MEDICINE
Payer: MEDICARE

## 2020-04-10 LAB
ALBUMIN SERPL-MCNC: 4 GM/DL (ref 3.4–5)
ALP BLD-CCNC: 113 IU/L (ref 40–129)
ALT SERPL-CCNC: 38 U/L (ref 10–40)
AMMONIA: 57 UMOL/L (ref 16–60)
ANION GAP SERPL CALCULATED.3IONS-SCNC: 10 MMOL/L (ref 4–16)
AST SERPL-CCNC: 28 IU/L (ref 15–37)
BASOPHILS ABSOLUTE: 0 K/CU MM
BASOPHILS RELATIVE PERCENT: 0.5 % (ref 0–1)
BILIRUB SERPL-MCNC: 0.2 MG/DL (ref 0–1)
BUN BLDV-MCNC: 7 MG/DL (ref 6–23)
CALCIUM SERPL-MCNC: 9.6 MG/DL (ref 8.3–10.6)
CHLORIDE BLD-SCNC: 102 MMOL/L (ref 99–110)
CO2: 30 MMOL/L (ref 21–32)
CREAT SERPL-MCNC: 0.7 MG/DL (ref 0.9–1.3)
DIFFERENTIAL TYPE: ABNORMAL
EOSINOPHILS ABSOLUTE: 0.2 K/CU MM
EOSINOPHILS RELATIVE PERCENT: 2.5 % (ref 0–3)
GFR AFRICAN AMERICAN: >60 ML/MIN/1.73M2
GFR NON-AFRICAN AMERICAN: >60 ML/MIN/1.73M2
GLUCOSE BLD-MCNC: 103 MG/DL (ref 70–99)
HCT VFR BLD CALC: 46.1 % (ref 42–52)
HEMOGLOBIN: 15.6 GM/DL (ref 13.5–18)
IMMATURE NEUTROPHIL %: 0.9 % (ref 0–0.43)
LACTATE: 1.8 MMOL/L (ref 0.4–2)
LIPASE: 15 IU/L (ref 13–60)
LYMPHOCYTES ABSOLUTE: 1.3 K/CU MM
LYMPHOCYTES RELATIVE PERCENT: 20.8 % (ref 24–44)
MAGNESIUM: 2.2 MG/DL (ref 1.8–2.4)
MCH RBC QN AUTO: 31.1 PG (ref 27–31)
MCHC RBC AUTO-ENTMCNC: 33.8 % (ref 32–36)
MCV RBC AUTO: 91.8 FL (ref 78–100)
MONOCYTES ABSOLUTE: 0.8 K/CU MM
MONOCYTES RELATIVE PERCENT: 11.9 % (ref 0–4)
NUCLEATED RBC %: 0 %
PDW BLD-RTO: 13.2 % (ref 11.7–14.9)
PLATELET # BLD: 298 K/CU MM (ref 140–440)
PMV BLD AUTO: 10.4 FL (ref 7.5–11.1)
POTASSIUM SERPL-SCNC: 3.5 MMOL/L (ref 3.5–5.1)
RBC # BLD: 5.02 M/CU MM (ref 4.6–6.2)
SEGMENTED NEUTROPHILS ABSOLUTE COUNT: 4.1 K/CU MM
SEGMENTED NEUTROPHILS RELATIVE PERCENT: 63.4 % (ref 36–66)
SODIUM BLD-SCNC: 142 MMOL/L (ref 135–145)
TOTAL IMMATURE NEUTOROPHIL: 0.06 K/CU MM
TOTAL NUCLEATED RBC: 0 K/CU MM
TOTAL PROTEIN: 7.5 GM/DL (ref 6.4–8.2)
WBC # BLD: 6.4 K/CU MM (ref 4–10.5)

## 2020-04-10 PROCEDURE — 99285 EMERGENCY DEPT VISIT HI MDM: CPT

## 2020-04-10 PROCEDURE — 83690 ASSAY OF LIPASE: CPT

## 2020-04-10 PROCEDURE — 80053 COMPREHEN METABOLIC PANEL: CPT

## 2020-04-10 PROCEDURE — 87507 IADNA-DNA/RNA PROBE TQ 12-25: CPT

## 2020-04-10 PROCEDURE — 83735 ASSAY OF MAGNESIUM: CPT

## 2020-04-10 PROCEDURE — 87324 CLOSTRIDIUM AG IA: CPT

## 2020-04-10 PROCEDURE — 2580000003 HC RX 258: Performed by: EMERGENCY MEDICINE

## 2020-04-10 PROCEDURE — 82140 ASSAY OF AMMONIA: CPT

## 2020-04-10 PROCEDURE — 93005 ELECTROCARDIOGRAM TRACING: CPT | Performed by: EMERGENCY MEDICINE

## 2020-04-10 PROCEDURE — 85025 COMPLETE CBC W/AUTO DIFF WBC: CPT

## 2020-04-10 PROCEDURE — 74177 CT ABD & PELVIS W/CONTRAST: CPT

## 2020-04-10 PROCEDURE — 71045 X-RAY EXAM CHEST 1 VIEW: CPT

## 2020-04-10 PROCEDURE — 83605 ASSAY OF LACTIC ACID: CPT

## 2020-04-10 RX ORDER — SODIUM CHLORIDE, SODIUM LACTATE, POTASSIUM CHLORIDE, CALCIUM CHLORIDE 600; 310; 30; 20 MG/100ML; MG/100ML; MG/100ML; MG/100ML
1000 INJECTION, SOLUTION INTRAVENOUS ONCE
Status: COMPLETED | OUTPATIENT
Start: 2020-04-10 | End: 2020-04-10

## 2020-04-10 RX ADMIN — SODIUM CHLORIDE, POTASSIUM CHLORIDE, SODIUM LACTATE AND CALCIUM CHLORIDE 1000 ML: 600; 310; 30; 20 INJECTION, SOLUTION INTRAVENOUS at 22:26

## 2020-04-11 ENCOUNTER — APPOINTMENT (OUTPATIENT)
Dept: GENERAL RADIOLOGY | Age: 65
DRG: 394 | End: 2020-04-11
Payer: MEDICARE

## 2020-04-11 ENCOUNTER — APPOINTMENT (OUTPATIENT)
Dept: CT IMAGING | Age: 65
DRG: 394 | End: 2020-04-11
Payer: MEDICARE

## 2020-04-11 PROBLEM — K63.1 PERFORATED BOWEL (HCC): Status: ACTIVE | Noted: 2020-04-11

## 2020-04-11 LAB
ADENOVIRUS F 40 41 PCR: NOT DETECTED
ASTROVIRUS PCR: NOT DETECTED
BACTERIA: NEGATIVE /HPF
BILIRUBIN URINE: NEGATIVE MG/DL
BLOOD, URINE: NEGATIVE
CAMPYLOBACTER PCR: NOT DETECTED
CLARITY: CLEAR
CLOSTRIDIUM DIFFICILE, PCR: NORMAL
CLOSTRIDIUM DIFFICILE, PCR: NORMAL
COLOR: YELLOW
CRYPTOSPORIDIUM PCR: NOT DETECTED
CYCLOSPORA CAYETANENSIS PCR: NOT DETECTED
E COLI 0157 PCR: NOT DETECTED
E COLI ENTEROAGGREGATIVE PCR: NOT DETECTED
E COLI ENTEROPATHOGENIC PCR: NOT DETECTED
E COLI ENTEROTOXIGENIC PCR: NOT DETECTED
E COLI SHIGA LIKE TOXIN PCR: NOT DETECTED
E COLI SHIGELLA/ENTEROINVASIVE PCR: NOT DETECTED
ENTAMOEBA HISTOLYTICA PCR: NOT DETECTED
GIARDIA LAMBLIA PCR: NOT DETECTED
GLUCOSE, URINE: NEGATIVE MG/DL
HCT VFR BLD CALC: 40.4 % (ref 42–52)
HEMOGLOBIN: 13.5 GM/DL (ref 13.5–18)
KETONES, URINE: NEGATIVE MG/DL
LACTIC ACID, SEPSIS: 0.9 MMOL/L (ref 0.5–1.9)
LACTIC ACID, SEPSIS: 1 MMOL/L (ref 0.5–1.9)
LEUKOCYTE ESTERASE, URINE: NEGATIVE
MCH RBC QN AUTO: 30.8 PG (ref 27–31)
MCHC RBC AUTO-ENTMCNC: 33.4 % (ref 32–36)
MCV RBC AUTO: 92 FL (ref 78–100)
MUCUS: ABNORMAL HPF
NITRITE URINE, QUANTITATIVE: NEGATIVE
NON SQUAM EPI CELLS: <1 /HPF
NOROVIRUS GI GII PCR: NOT DETECTED
PDW BLD-RTO: 13.2 % (ref 11.7–14.9)
PH, URINE: 6 (ref 5–8)
PLATELET # BLD: 266 K/CU MM (ref 140–440)
PLESIOMONAS SHIGELLOIDES PCR: NOT DETECTED
PMV BLD AUTO: 10.9 FL (ref 7.5–11.1)
PROTEIN UA: NEGATIVE MG/DL
RBC # BLD: 4.39 M/CU MM (ref 4.6–6.2)
RBC URINE: ABNORMAL /HPF (ref 0–3)
ROTAVIRUS A PCR: NOT DETECTED
SALMONELLA PCR: NOT DETECTED
SAPOVIRUS PCR: NOT DETECTED
SPECIFIC GRAVITY UA: >1.06 (ref 1–1.03)
SPECIFIC GRAVITY UA: ABNORMAL (ref 1–1.03)
SQUAMOUS EPITHELIAL: 1 /HPF
TRICHOMONAS: ABNORMAL /HPF
UROBILINOGEN, URINE: NORMAL MG/DL (ref 0.2–1)
VIBRIO CHOLERAE PCR: NOT DETECTED
VIBRIO PCR: NOT DETECTED
WBC # BLD: 6.1 K/CU MM (ref 4–10.5)
WBC UA: 2 /HPF (ref 0–2)
YERSINIA ENTEROCOLITICA PCR: NOT DETECTED

## 2020-04-11 PROCEDURE — 96365 THER/PROPH/DIAG IV INF INIT: CPT

## 2020-04-11 PROCEDURE — 2500000003 HC RX 250 WO HCPCS: Performed by: INTERNAL MEDICINE

## 2020-04-11 PROCEDURE — 85027 COMPLETE CBC AUTOMATED: CPT

## 2020-04-11 PROCEDURE — 6360000002 HC RX W HCPCS: Performed by: EMERGENCY MEDICINE

## 2020-04-11 PROCEDURE — 6360000002 HC RX W HCPCS: Performed by: INTERNAL MEDICINE

## 2020-04-11 PROCEDURE — 2140000000 HC CCU INTERMEDIATE R&B

## 2020-04-11 PROCEDURE — 2580000003 HC RX 258: Performed by: INTERNAL MEDICINE

## 2020-04-11 PROCEDURE — 02HV33Z INSERTION OF INFUSION DEVICE INTO SUPERIOR VENA CAVA, PERCUTANEOUS APPROACH: ICD-10-PCS | Performed by: INTERNAL MEDICINE

## 2020-04-11 PROCEDURE — 2580000003 HC RX 258: Performed by: NURSE PRACTITIONER

## 2020-04-11 PROCEDURE — 96376 TX/PRO/DX INJ SAME DRUG ADON: CPT

## 2020-04-11 PROCEDURE — 71045 X-RAY EXAM CHEST 1 VIEW: CPT

## 2020-04-11 PROCEDURE — 36573 INSJ PICC RS&I 5 YR+: CPT

## 2020-04-11 PROCEDURE — 99222 1ST HOSP IP/OBS MODERATE 55: CPT | Performed by: INTERNAL MEDICINE

## 2020-04-11 PROCEDURE — 96367 TX/PROPH/DG ADDL SEQ IV INF: CPT

## 2020-04-11 PROCEDURE — 93010 ELECTROCARDIOGRAM REPORT: CPT | Performed by: INTERNAL MEDICINE

## 2020-04-11 PROCEDURE — 96368 THER/DIAG CONCURRENT INF: CPT

## 2020-04-11 PROCEDURE — 81001 URINALYSIS AUTO W/SCOPE: CPT

## 2020-04-11 PROCEDURE — 96375 TX/PRO/DX INJ NEW DRUG ADDON: CPT

## 2020-04-11 PROCEDURE — 74177 CT ABD & PELVIS W/CONTRAST: CPT

## 2020-04-11 PROCEDURE — 76937 US GUIDE VASCULAR ACCESS: CPT

## 2020-04-11 PROCEDURE — 96366 THER/PROPH/DIAG IV INF ADDON: CPT

## 2020-04-11 PROCEDURE — C1751 CATH, INF, PER/CENT/MIDLINE: HCPCS

## 2020-04-11 PROCEDURE — 6360000004 HC RX CONTRAST MEDICATION: Performed by: EMERGENCY MEDICINE

## 2020-04-11 PROCEDURE — 2580000003 HC RX 258: Performed by: EMERGENCY MEDICINE

## 2020-04-11 PROCEDURE — 94761 N-INVAS EAR/PLS OXIMETRY MLT: CPT

## 2020-04-11 PROCEDURE — 99221 1ST HOSP IP/OBS SF/LOW 40: CPT | Performed by: SURGERY

## 2020-04-11 PROCEDURE — 83605 ASSAY OF LACTIC ACID: CPT

## 2020-04-11 PROCEDURE — 6360000002 HC RX W HCPCS: Performed by: NURSE PRACTITIONER

## 2020-04-11 PROCEDURE — 96372 THER/PROPH/DIAG INJ SC/IM: CPT

## 2020-04-11 PROCEDURE — 6370000000 HC RX 637 (ALT 250 FOR IP): Performed by: INTERNAL MEDICINE

## 2020-04-11 PROCEDURE — 36415 COLL VENOUS BLD VENIPUNCTURE: CPT

## 2020-04-11 PROCEDURE — C9254 INJECTION, LACOSAMIDE: HCPCS | Performed by: INTERNAL MEDICINE

## 2020-04-11 PROCEDURE — 2500000003 HC RX 250 WO HCPCS: Performed by: SURGERY

## 2020-04-11 RX ORDER — IBUPROFEN 200 MG
1 CAPSULE ORAL PRN
COMMUNITY
End: 2020-05-27 | Stop reason: ALTCHOICE

## 2020-04-11 RX ORDER — OMEPRAZOLE 20 MG/1
20 CAPSULE, DELAYED RELEASE ORAL
COMMUNITY

## 2020-04-11 RX ORDER — LOPERAMIDE HYDROCHLORIDE 2 MG/1
2 CAPSULE ORAL 4 TIMES DAILY PRN
Status: ON HOLD | COMMUNITY
End: 2020-04-13 | Stop reason: HOSPADM

## 2020-04-11 RX ORDER — MUSCLE RUB CREAM 100; 150 MG/G; MG/G
1 CREAM TOPICAL PRN
COMMUNITY

## 2020-04-11 RX ORDER — METOPROLOL TARTRATE 5 MG/5ML
5 INJECTION INTRAVENOUS EVERY 6 HOURS PRN
Status: DISCONTINUED | OUTPATIENT
Start: 2020-04-11 | End: 2020-04-12

## 2020-04-11 RX ORDER — CIPROFLOXACIN 500 MG/1
500 TABLET, FILM COATED ORAL 2 TIMES DAILY
Qty: 14 TABLET | Refills: 0 | Status: SHIPPED | OUTPATIENT
Start: 2020-04-11 | End: 2020-04-13 | Stop reason: HOSPADM

## 2020-04-11 RX ORDER — ZONISAMIDE 100 MG/1
300 CAPSULE ORAL NIGHTLY
COMMUNITY
End: 2020-05-27 | Stop reason: ALTCHOICE

## 2020-04-11 RX ORDER — ONDANSETRON 4 MG/1
4 TABLET, ORALLY DISINTEGRATING ORAL EVERY 8 HOURS PRN
Qty: 20 TABLET | Refills: 0 | Status: SHIPPED | OUTPATIENT
Start: 2020-04-11 | End: 2020-04-13 | Stop reason: SDUPTHER

## 2020-04-11 RX ORDER — SODIUM CHLORIDE 0.9 % (FLUSH) 0.9 %
10 SYRINGE (ML) INJECTION PRN
Status: DISCONTINUED | OUTPATIENT
Start: 2020-04-11 | End: 2020-04-13 | Stop reason: HOSPADM

## 2020-04-11 RX ORDER — LORAZEPAM 2 MG/ML
1 INJECTION INTRAMUSCULAR EVERY 6 HOURS PRN
Status: DISCONTINUED | OUTPATIENT
Start: 2020-04-11 | End: 2020-04-13 | Stop reason: HOSPADM

## 2020-04-11 RX ORDER — SODIUM CHLORIDE 0.9 % (FLUSH) 0.9 %
10 SYRINGE (ML) INJECTION EVERY 12 HOURS SCHEDULED
Status: DISCONTINUED | OUTPATIENT
Start: 2020-04-11 | End: 2020-04-11 | Stop reason: SDUPTHER

## 2020-04-11 RX ORDER — SODIUM CHLORIDE 0.9 % (FLUSH) 0.9 %
10 SYRINGE (ML) INJECTION 2 TIMES DAILY
Status: DISCONTINUED | OUTPATIENT
Start: 2020-04-11 | End: 2020-04-11

## 2020-04-11 RX ORDER — ONDANSETRON 2 MG/ML
4 INJECTION INTRAMUSCULAR; INTRAVENOUS EVERY 6 HOURS PRN
Status: DISCONTINUED | OUTPATIENT
Start: 2020-04-11 | End: 2020-04-13 | Stop reason: HOSPADM

## 2020-04-11 RX ORDER — SODIUM CHLORIDE 0.9 % (FLUSH) 0.9 %
10 SYRINGE (ML) INJECTION EVERY 12 HOURS SCHEDULED
Status: DISCONTINUED | OUTPATIENT
Start: 2020-04-11 | End: 2020-04-13 | Stop reason: HOSPADM

## 2020-04-11 RX ORDER — ACETAMINOPHEN 325 MG/1
650 TABLET ORAL EVERY 4 HOURS PRN
COMMUNITY
End: 2020-09-07

## 2020-04-11 RX ORDER — PRIMIDONE 50 MG/1
50 TABLET ORAL 3 TIMES DAILY
COMMUNITY

## 2020-04-11 RX ORDER — LACOSAMIDE 200 MG/1
200 TABLET ORAL PRN
Status: ON HOLD | COMMUNITY
End: 2021-10-13 | Stop reason: HOSPADM

## 2020-04-11 RX ORDER — LATANOPROST 50 UG/ML
1 SOLUTION/ DROPS OPHTHALMIC NIGHTLY
Status: DISCONTINUED | OUTPATIENT
Start: 2020-04-11 | End: 2020-04-13 | Stop reason: HOSPADM

## 2020-04-11 RX ORDER — SODIUM CHLORIDE 0.9 % (FLUSH) 0.9 %
10 SYRINGE (ML) INJECTION PRN
Status: DISCONTINUED | OUTPATIENT
Start: 2020-04-11 | End: 2020-04-11 | Stop reason: SDUPTHER

## 2020-04-11 RX ORDER — ONDANSETRON 4 MG/1
4 TABLET, ORALLY DISINTEGRATING ORAL EVERY 8 HOURS PRN
Status: DISCONTINUED | OUTPATIENT
Start: 2020-04-11 | End: 2020-04-13 | Stop reason: HOSPADM

## 2020-04-11 RX ORDER — LEVOTHYROXINE SODIUM ANHYDROUS 100 UG/5ML
62.5 INJECTION, POWDER, LYOPHILIZED, FOR SOLUTION INTRAVENOUS DAILY
Status: DISCONTINUED | OUTPATIENT
Start: 2020-04-11 | End: 2020-04-12

## 2020-04-11 RX ORDER — GUAIFENESIN 600 MG/1
1200 TABLET, EXTENDED RELEASE ORAL DAILY PRN
COMMUNITY

## 2020-04-11 RX ORDER — AMLODIPINE BESYLATE 5 MG/1
5 TABLET ORAL DAILY
Status: ON HOLD | COMMUNITY
End: 2021-10-09 | Stop reason: HOSPADM

## 2020-04-11 RX ORDER — ZONISAMIDE 100 MG/1
100 CAPSULE ORAL EVERY MORNING
COMMUNITY
End: 2022-04-13 | Stop reason: SDUPTHER

## 2020-04-11 RX ORDER — SODIUM CHLORIDE, SODIUM LACTATE, POTASSIUM CHLORIDE, CALCIUM CHLORIDE 600; 310; 30; 20 MG/100ML; MG/100ML; MG/100ML; MG/100ML
INJECTION, SOLUTION INTRAVENOUS CONTINUOUS
Status: DISCONTINUED | OUTPATIENT
Start: 2020-04-11 | End: 2020-04-12

## 2020-04-11 RX ORDER — DICYCLOMINE HYDROCHLORIDE 10 MG/1
10 CAPSULE ORAL EVERY 6 HOURS PRN
Qty: 20 CAPSULE | Refills: 0 | Status: SHIPPED | OUTPATIENT
Start: 2020-04-11 | End: 2020-04-13 | Stop reason: SDUPTHER

## 2020-04-11 RX ORDER — PSEUDOEPHEDRINE HYDROCHLORIDE 30 MG/1
30 TABLET ORAL EVERY 4 HOURS PRN
Status: ON HOLD | COMMUNITY
End: 2022-08-20 | Stop reason: HOSPADM

## 2020-04-11 RX ORDER — DIVALPROEX SODIUM 500 MG/1
1000 TABLET, DELAYED RELEASE ORAL NIGHTLY
Status: ON HOLD | COMMUNITY
End: 2022-08-20 | Stop reason: HOSPADM

## 2020-04-11 RX ORDER — CLONAZEPAM 0.5 MG/1
0.25 TABLET ORAL DAILY
COMMUNITY

## 2020-04-11 RX ORDER — PHENOBARBITAL SODIUM 65 MG/ML
65 INJECTION INTRAMUSCULAR 2 TIMES DAILY
Status: DISCONTINUED | OUTPATIENT
Start: 2020-04-11 | End: 2020-04-12

## 2020-04-11 RX ORDER — MENTHOL 5 %
1 ADHESIVE PATCH, MEDICATED TOPICAL 3 TIMES DAILY PRN
COMMUNITY
End: 2020-05-27

## 2020-04-11 RX ORDER — ACETAMINOPHEN 325 MG/1
650 TABLET ORAL EVERY 6 HOURS PRN
Status: DISCONTINUED | OUTPATIENT
Start: 2020-04-11 | End: 2020-04-13 | Stop reason: HOSPADM

## 2020-04-11 RX ORDER — VANCOMYCIN HYDROCHLORIDE 1 G/200ML
1000 INJECTION, SOLUTION INTRAVENOUS ONCE
Status: COMPLETED | OUTPATIENT
Start: 2020-04-11 | End: 2020-04-11

## 2020-04-11 RX ORDER — ACETAMINOPHEN 650 MG/1
650 SUPPOSITORY RECTAL EVERY 6 HOURS PRN
Status: DISCONTINUED | OUTPATIENT
Start: 2020-04-11 | End: 2020-04-13 | Stop reason: HOSPADM

## 2020-04-11 RX ORDER — LIDOCAINE HYDROCHLORIDE 10 MG/ML
5 INJECTION, SOLUTION EPIDURAL; INFILTRATION; INTRACAUDAL; PERINEURAL ONCE
Status: COMPLETED | OUTPATIENT
Start: 2020-04-11 | End: 2020-04-11

## 2020-04-11 RX ORDER — SODIUM CHLORIDE 9 MG/ML
5 INJECTION INTRAVENOUS DAILY
Status: DISCONTINUED | OUTPATIENT
Start: 2020-04-11 | End: 2020-04-12

## 2020-04-11 RX ORDER — IBUPROFEN 600 MG/1
600 TABLET ORAL EVERY 6 HOURS PRN
Status: ON HOLD | COMMUNITY
End: 2020-11-26 | Stop reason: HOSPADM

## 2020-04-11 RX ORDER — LOPERAMIDE HYDROCHLORIDE 2 MG/1
2 CAPSULE ORAL 4 TIMES DAILY PRN
Qty: 20 CAPSULE | Refills: 0 | Status: SHIPPED | OUTPATIENT
Start: 2020-04-11 | End: 2020-04-13 | Stop reason: SDUPTHER

## 2020-04-11 RX ORDER — HYDROCHLOROTHIAZIDE 12.5 MG/1
12.5 CAPSULE, GELATIN COATED ORAL DAILY
Status: ON HOLD | COMMUNITY
End: 2021-10-13 | Stop reason: HOSPADM

## 2020-04-11 RX ORDER — MORPHINE SULFATE 4 MG/ML
4 INJECTION, SOLUTION INTRAMUSCULAR; INTRAVENOUS
Status: DISCONTINUED | OUTPATIENT
Start: 2020-04-11 | End: 2020-04-11

## 2020-04-11 RX ADMIN — VALPROATE SODIUM 500 MG: 100 INJECTION, SOLUTION INTRAVENOUS at 21:48

## 2020-04-11 RX ADMIN — PIPERACILLIN AND TAZOBACTAM 3.38 G: 3; .375 INJECTION, POWDER, LYOPHILIZED, FOR SOLUTION INTRAVENOUS at 03:18

## 2020-04-11 RX ADMIN — IOPAMIDOL 80 ML: 755 INJECTION, SOLUTION INTRAVENOUS at 00:08

## 2020-04-11 RX ADMIN — SODIUM CHLORIDE, PRESERVATIVE FREE 5 ML: 5 INJECTION INTRAVENOUS at 18:44

## 2020-04-11 RX ADMIN — PHENOBARBITAL SODIUM 65 MG: 65 INJECTION INTRAMUSCULAR; INTRAVENOUS at 21:31

## 2020-04-11 RX ADMIN — VALPROATE SODIUM 500 MG: 100 INJECTION, SOLUTION INTRAVENOUS at 12:27

## 2020-04-11 RX ADMIN — LATANOPROST 1 DROP: 50 SOLUTION OPHTHALMIC at 21:30

## 2020-04-11 RX ADMIN — VANCOMYCIN HYDROCHLORIDE 1250 MG: 5 INJECTION, POWDER, LYOPHILIZED, FOR SOLUTION INTRAVENOUS at 14:46

## 2020-04-11 RX ADMIN — METRONIDAZOLE 500 MG: 500 INJECTION, SOLUTION INTRAVENOUS at 18:44

## 2020-04-11 RX ADMIN — SODIUM CHLORIDE, POTASSIUM CHLORIDE, SODIUM LACTATE AND CALCIUM CHLORIDE: 600; 310; 30; 20 INJECTION, SOLUTION INTRAVENOUS at 11:08

## 2020-04-11 RX ADMIN — DEXTROSE MONOHYDRATE 200 MG: 50 INJECTION, SOLUTION INTRAVENOUS at 21:42

## 2020-04-11 RX ADMIN — SODIUM CHLORIDE, PRESERVATIVE FREE 10 ML: 5 INJECTION INTRAVENOUS at 21:48

## 2020-04-11 RX ADMIN — PIPERACILLIN AND TAZOBACTAM 3.38 G: 3; .375 INJECTION, POWDER, FOR SOLUTION INTRAVENOUS at 11:01

## 2020-04-11 RX ADMIN — ENOXAPARIN SODIUM 40 MG: 40 INJECTION SUBCUTANEOUS at 18:44

## 2020-04-11 RX ADMIN — LEVOTHYROXINE SODIUM ANHYDROUS 62.5 MCG: 100 INJECTION, POWDER, LYOPHILIZED, FOR SOLUTION INTRAVENOUS at 18:43

## 2020-04-11 RX ADMIN — DEXTROSE MONOHYDRATE 200 MG: 50 INJECTION, SOLUTION INTRAVENOUS at 12:27

## 2020-04-11 RX ADMIN — LIDOCAINE HYDROCHLORIDE ANHYDROUS 5 ML: 10 INJECTION, SOLUTION INFILTRATION at 13:36

## 2020-04-11 RX ADMIN — VANCOMYCIN HYDROCHLORIDE 1000 MG: 1 INJECTION, SOLUTION INTRAVENOUS at 03:57

## 2020-04-11 RX ADMIN — LEVETIRACETAM 1500 MG: 100 INJECTION, SOLUTION INTRAVENOUS at 14:46

## 2020-04-11 RX ADMIN — PIPERACILLIN AND TAZOBACTAM 3.38 G: 3; .375 INJECTION, POWDER, FOR SOLUTION INTRAVENOUS at 22:53

## 2020-04-11 RX ADMIN — SODIUM CHLORIDE, PRESERVATIVE FREE 10 ML: 5 INJECTION INTRAVENOUS at 11:02

## 2020-04-11 RX ADMIN — PIPERACILLIN AND TAZOBACTAM 3.38 G: 3; .375 INJECTION, POWDER, FOR SOLUTION INTRAVENOUS at 16:31

## 2020-04-11 RX ADMIN — LEVETIRACETAM 500 MG: 100 INJECTION, SOLUTION, CONCENTRATE INTRAVENOUS at 21:27

## 2020-04-11 RX ADMIN — PHENOBARBITAL SODIUM 65 MG: 65 INJECTION INTRAMUSCULAR; INTRAVENOUS at 11:01

## 2020-04-11 RX ADMIN — Medication 10 ML: at 02:43

## 2020-04-11 RX ADMIN — METRONIDAZOLE 500 MG: 500 INJECTION, SOLUTION INTRAVENOUS at 11:01

## 2020-04-11 ASSESSMENT — ENCOUNTER SYMPTOMS
SINUS PRESSURE: 0
EYE DISCHARGE: 0
SHORTNESS OF BREATH: 0
EYES NEGATIVE: 1
APNEA: 0
RECENT COUGH: 0
STRIDOR: 0
BLOOD IN STOOL: 0
TROUBLE SWALLOWING: 0
CHEST TIGHTNESS: 0
NAUSEA: 1
VOMITING: 0
PHOTOPHOBIA: 0
WHEEZING: 0
VOICE CHANGE: 0
COUGH: 0
EYE PAIN: 0
EYE ITCHING: 0
ABDOMINAL PAIN: 1
DIARRHEA: 1
CONSTIPATION: 0
RECTAL PAIN: 0
CHOKING: 0
COLOR CHANGE: 0
FACIAL SWELLING: 0
RESPIRATORY NEGATIVE: 1
EYE REDNESS: 0
RHINORRHEA: 0
SINUS PAIN: 0
BACK PAIN: 0

## 2020-04-11 ASSESSMENT — PAIN SCALES - GENERAL
PAINLEVEL_OUTOF10: 0

## 2020-04-11 NOTE — ED NOTES
Dr Lotus Solorzano, hospitalist, returned call @ [phone] -- transferred call to Dr Tabitha Reed  Waiting orders     Casimiro Bello  04/11/20 5676

## 2020-04-11 NOTE — CONSULTS
SURGICAL CONSULTATION    Date of Admission:  4/10/2020  Date of Consultation:  4/11/2020    PCP:  Haley Hernández MD  Thank you Dr. Lakesha Willis, DO very much for your consultation, it's always appreciated. I had the privilege today to see your patient Albert Mcmahon. Chief Complaint / History of Present Illness:  Albert Mcmahon is a very pleasant 62 y.o. male who presents with diarrhea x 3 wks, and is acute and improving compared to patient's normal condition. It is moderate in intensity for a duration of 3 weeks and is continuous with modifying factors increased by or worse with eating. Seda Garcia He is MRDD, but is very comfortable now, and hungry,     3 wks diarrhea, and now CT abnormality, CLINICALLY doing very well, and NOT concerning - I will follow. PMH:   has a past medical history of Anemia, Aspiration pneumonia ([address] Cerebral palsy ([address] Depression, Epilepsy ([address] (1962), Frequent falls, Glaucoma, History of IBS, MRSA infection, Hyperammonemia ([address] Hypertension, IBS (irritable bowel syndrome), Kidney stone, Mood disorder ([address] Occasional tremors, Prostatitis, Thyroid disease, and Ulcerative colitis ([address] PSH:   has a past surgical history that includes Gallbladder surgery (2005); vagal nerve stimulation (2002); Cholecystectomy; Cystoscopy (Left, 07/11/2013); Kidney stone surgery; Colonoscopy (8/19/14, 5/2012); other surgical history (04/15/2015); and Upper gastrointestinal endoscopy (N/A, 11/13/2018). Allergies:  No Known Allergies     Home Meds:    Prior to Admission medications    Medication Sig Start Date End Date Taking?  Authorizing Provider   dicyclomine (BENTYL) 10 MG capsule Take 1 capsule by mouth every 6 hours as needed (cramps) 4/11/20 4/16/20 Yes Lakesha Willis, DO   ondansetron (ZOFRAN-ODT) 4 MG disintegrating tablet Place 1 tablet under the tongue every 8 hours as needed for Nausea or Vomiting May Sub regular tablet (non-ODT) if insurance does not cover ODT. 4/11/20 4/18/20 Yes Su Barnes, DO   loperamide (RA ANTI-DIARRHEAL) 2 MG capsule Take 1 capsule by mouth 4 times daily as needed for Diarrhea 4/11/20 4/21/20 Yes Su Barnes, DO   ciprofloxacin (CIPRO) 500 MG tablet Take 1 tablet by mouth 2 times daily for 7 days 4/11/20 4/18/20 Yes Su Barnes, DO   diphenoxylate-atropine (DIPHENATOL) 2.5-0.025 MG per tablet Take 1 tablet by mouth 4 times daily as needed for Diarrhea for up to 10 days. 4/3/20 4/13/20  MARY Gaitan - CNP   lacosamide (VIMPAT) 200 MG tablet Take 1 tablet by mouth 2 times daily for 30 days.  3/25/20 4/24/20  Louise Adah Bertrand, DO   primidone (MYSOLINE) 50 MG tablet Take 2 tablets by mouth 2 times daily 3/25/20   Gianfranco Enriquez,    latanoprost (XALATAN) 0.005 % ophthalmic solution Place 1 drop into both eyes nightly    Historical Provider, MD   timolol (TIMOPTIC) 0.5 % ophthalmic solution Place 1 drop into both eyes 2 times daily    Historical Provider, MD   docusate sodium (COLACE) 100 MG capsule Take 1 capsule by mouth 2 times daily as needed for Constipation 1/23/19   Vamshi Bearden,    Lactobacillus (ACIDOPHILUS) CAPS capsule Take 1 capsule by mouth daily    Historical Provider, MD   aspirin 81 MG tablet Take 81 mg by mouth daily    Historical Provider, MD   Wheat Dextrin (BENEFIBER) POWD Take 4 g by mouth 3 times daily (with meals) Takes 3 tsp in liquid twice per day    Historical Provider, MD   Lactulose Encephalopathy (ENULOSE PO) Take 30 mLs by mouth 3 times daily    Historical Provider, MD   folic acid (FOLVITE) 1 MG tablet Take 1 mg by mouth daily    Historical Provider, MD   levothyroxine (SYNTHROID) 112 MCG tablet Take 112 mcg by mouth Daily    Historical Provider, MD   melatonin 3 MG TABS tablet Take 3 mg by mouth nightly as needed    Historical Provider, MD   PARoxetine (PAXIL) 20 MG tablet Take 20 mg by mouth every morning    Historical Provider, MD   QUEtiapine (SEROQUEL XR) 50 MG extended release tablet Take 50 mg by mouth acetaminophen (TYLENOL) tablet 650 mg  650 mg Oral Q6H PRN Russel Bradley Dubois MD        Or    acetaminophen (TYLENOL) suppository 650 mg  650 mg Rectal Q6H PRN Russel Bradley Dubois MD        ondansetron (ZOFRAN-ODT) disintegrating tablet 4 mg  4 mg Oral Q8H PRN Russel Bradley Dubois MD        Or    ondansetron (ZOFRAN) injection 4 mg  4 mg Intravenous Q6H PRN Russel Bradley Dubois MD        piperacillin-tazobactam (ZOSYN) 3.375 g in dextrose 5 % 50 mL IVPB (mini-bag)  3.375 g Intravenous Q6H Russel Bradley Dubois MD        vancomycin (VANCOCIN) 1,250 mg in dextrose 5 % 250 mL IVPB  1,250 mg Intravenous Q12H Russel Bradley Dubois MD        metronidazole (FLAGYL) 500 mg in NaCl 100 mL IVPB premix  500 mg Intravenous Q8H Ronn Feliz MD           Social History / Family History:     Social History     Socioeconomic History    Marital status: Single     Spouse name: None    Number of children: None    Years of education: None    Highest education level: None   Occupational History    None   Social Needs    Financial resource strain: None    Food insecurity     Worry: None     Inability: None    Transportation needs     Medical: None     Non-medical: None   Tobacco Use    Smoking status: Never Smoker    Smokeless tobacco: Never Used   Substance and Sexual Activity    Alcohol use: No    Drug use: No    Sexual activity: None   Lifestyle    Physical activity     Days per week: None     Minutes per session: None    Stress: None   Relationships    Social connections     Talks on phone: None     Gets together: None     Attends Anabaptism service: None     Active member of club or organization: None     Attends meetings of clubs or organizations: None     Relationship status: None    Intimate partner violence     Fear of current or ex partner: None     Emotionally abused: None     Physically abused: None     Forced sexual activity: None   Other Topics Concern    None   Social History Narrative    None      Family History   Problem

## 2020-04-11 NOTE — H&P
meetings of clubs or organizations: None     Relationship status: None    Intimate partner violence     Fear of current or ex partner: None     Emotionally abused: None     Physically abused: None     Forced sexual activity: None   Other Topics Concern    None   Social History Narrative    None       MEDICATIONS   Medications Prior to Admission  Not in a hospital admission. Current Medications  Current Facility-Administered Medications   Medication Dose Route Frequency Provider Last Rate Last Dose    sodium chloride flush 0.9 % injection 10 mL  10 mL Intravenous BID Miriam Salvia, DO   10 mL at 04/11/20 0243    vancomycin (VANCOCIN) 1000 mg in dextrose 5% 200 mL IVPB  1,000 mg Intravenous Once Miriam Salvia, DO        piperacillin-tazobactam (ZOSYN) 3.375 g in dextrose 5 % 50 mL IVPB (mini-bag)  3.375 g Intravenous Once Miriam Salvia,  mL/hr at 04/11/20 0318 3.375 g at 04/11/20 0318    morphine sulfate (PF) injection 4 mg  4 mg Intravenous Q3H PRN Miriam Salvia, DO         Current Outpatient Medications   Medication Sig Dispense Refill    dicyclomine (BENTYL) 10 MG capsule Take 1 capsule by mouth every 6 hours as needed (cramps) 20 capsule 0    ondansetron (ZOFRAN-ODT) 4 MG disintegrating tablet Place 1 tablet under the tongue every 8 hours as needed for Nausea or Vomiting May Sub regular tablet (non-ODT) if insurance does not cover ODT. 20 tablet 0    loperamide (RA ANTI-DIARRHEAL) 2 MG capsule Take 1 capsule by mouth 4 times daily as needed for Diarrhea 20 capsule 0    ciprofloxacin (CIPRO) 500 MG tablet Take 1 tablet by mouth 2 times daily for 7 days 14 tablet 0    diphenoxylate-atropine (DIPHENATOL) 2.5-0.025 MG per tablet Take 1 tablet by mouth 4 times daily as needed for Diarrhea for up to 10 days. 8 tablet 0    lacosamide (VIMPAT) 200 MG tablet Take 1 tablet by mouth 2 times daily for 30 days.  60 tablet 0    primidone (MYSOLINE) 50 MG tablet Take 2 tablets by mouth 2 times daily

## 2020-04-11 NOTE — CONSULTS
Consult to IV teanm for PICC line insertion for multiple non-compatible medications. Education regarding insertion of PICC reviewed with patient. Risk/benefit/and alternatives discussed. verbalized understanding. Consent given by patient. Time out done at 1315. PICC line inserted right upper arm  without difficulty per protocol. Pt tolerated well. Good blood return and flushes easily. ECG tip confirmation system utilized for tip placement with P wave changes noted by RN during insertion and ECG strips left on chart. Educational booklet left at bedside.   Alvin Willingham

## 2020-04-11 NOTE — ED NOTES
Bed: ED-25  Expected date:   Expected time:   Means of arrival:   Comments:  EMS      Shell Tran RN  04/10/20 8661

## 2020-04-11 NOTE — ED PROVIDER NOTES
pain, diarrhea and nausea. Negative for blood in stool, constipation, rectal pain and vomiting. Endocrine: Negative for polyuria. Genitourinary: Negative. Negative for dysuria, flank pain, frequency, hematuria and urgency. Musculoskeletal: Negative. Negative for arthralgias, back pain, gait problem, myalgias, neck pain and neck stiffness. Skin: Negative. Negative for color change, pallor, rash and wound. Neurological: Negative. Negative for dizziness, speech difficulty, light-headedness, numbness and headaches. Psychiatric/Behavioral: Negative. Negative for agitation, confusion, self-injury, sleep disturbance and suicidal ideas. The patient is not nervous/anxious. All other systems reviewed and are negative. Except as noted above the remainder of the review of systems was reviewed and negative.        PAST MEDICAL HISTORY     Past Medical History:   Diagnosis Date    Anemia     Aspiration pneumonia ([facility]     dx 6/9/2014 with this per ecf/ per old chart dx with pneumonia with admission 9/18/2018    Cerebral palsy ([facility]     \"has no feeling on left side of body\"alert but has some dementia but not diagnosed, has good long term but poor short term and wakes up disoriented after a nap\"(per yaneth)(2014)    Depression     Epilepsy ([facility] 1962    last seizure 2/2018- hx grand mal    Frequent falls     with phone assess- family stated pt fell this am (7/10/2013\"in the process of trying to find a facility to help build him back up- (pt now [facility])    Glaucoma     \"has been in treatment for this in the past- no treatment needed at present\"    History of IBS     Hx MRSA infection     + nasal culture 4/2014    Hyperammonemia (HCC)     Hypertension     on Lisinopril    IBS (irritable bowel syndrome)     ulcerative colitis    Kidney stone     for surgery for stent placement 7/11/2013    Mood disorder ([facility]     per staff at [facility] Occasional tremors     \"makes it difficult for him to write\"    Prostatitis     hx given per H&P old chart    Thyroid disease     Ulcerative colitis ([facility]     hx given per staff at [facility] [place] 4/13/2015       Prior to Admission medications    Medication Sig Start Date End Date Taking? Authorizing Provider   dicyclomine (BENTYL) 10 MG capsule Take 1 capsule by mouth every 6 hours as needed (cramps) 4/11/20 4/16/20 Yes Gracie Bryant DO   ondansetron (ZOFRAN-ODT) 4 MG disintegrating tablet Place 1 tablet under the tongue every 8 hours as needed for Nausea or Vomiting May Sub regular tablet (non-ODT) if insurance does not cover ODT. 4/11/20 4/18/20 Yes Gracie Bryant DO   loperamide (RA ANTI-DIARRHEAL) 2 MG capsule Take 1 capsule by mouth 4 times daily as needed for Diarrhea 4/11/20 4/21/20 Yes Gracie Bryant DO   ciprofloxacin (CIPRO) 500 MG tablet Take 1 tablet by mouth 2 times daily for 7 days 4/11/20 4/18/20 Yes Gracie Bryant DO   diphenoxylate-atropine (DIPHENATOL) 2.5-0.025 MG per tablet Take 1 tablet by mouth 4 times daily as needed for Diarrhea for up to 10 days. 4/3/20 4/13/20  Americo Ford APRN - CNP   lacosamide (VIMPAT) 200 MG tablet Take 1 tablet by mouth 2 times daily for 30 days.  3/25/20 4/24/20  Louise Siddiqui, DO   primidone (MYSOLINE) 50 MG tablet Take 2 tablets by mouth 2 times daily 3/25/20   Phylgordon Godinez, DO   latanoprost (XALATAN) 0.005 % ophthalmic solution Place 1 drop into both eyes nightly    Historical Provider, MD   timolol (TIMOPTIC) 0.5 % ophthalmic solution Place 1 drop into both eyes 2 times daily    Historical Provider, MD   docusate sodium (COLACE) 100 MG capsule Take 1 capsule by mouth 2 times daily as needed for Constipation 1/23/19   Vamshi Bearden,    Lactobacillus (ACIDOPHILUS) CAPS capsule Take 1 capsule by mouth daily    Historical Provider, MD   aspirin 81 MG tablet Take 81 mg by mouth daily    Historical Provider, MD   Wheat Dextrin (BENEFIBER) POWD Take 4 g by mouth 3 times daily (with meals) Takes 3 tsp in  Increased ammonia level    Aspiration pneumonia (HCC)    Hypokalemia    Hypomagnesemia    Hypophosphatasia    [facility]    Seizure disorder, grand mal ([facility]    Sepsis due to undetermined organism with acute respiratory failure [facility])         SURGICAL HISTORY       Past Surgical History:   Procedure Laterality Date    CHOLECYSTECTOMY      COLONOSCOPY  8/19/14, 5/2012 8/19/14: hemorrhoids, 5/2012 at [address] Left 07/11/2013    with stnet placement   [address]  2005   [facility]      OTHER SURGICAL HISTORY  04/15/2015    Revision of vagal nerve stimulator    UPPER GASTROINTESTINAL ENDOSCOPY N/A 11/13/2018    EGD DILATION BALLOON AND BIOPSY performed by Edwar Crum MD at [place] [address]    Has to have bettery changed every 5 yrs. CURRENT MEDICATIONS       Previous Medications    ACETAMINOPHEN (APAP EXTRA STRENGTH) 500 MG TABLET    Take 1 tablet by mouth every 6 hours as needed for Pain    ASPIRIN 81 MG TABLET    Take 81 mg by mouth daily    CHOLECALCIFEROL (VITAMIN D3) 1000 UNITS CAPS    Take 1 tablet by mouth daily. CLORAZEPATE (TRANXENE) 7.5 MG TABLET    Take 7.5 mg by mouth 2 times daily. DIPHENOXYLATE-ATROPINE (DIPHENATOL) 2.5-0.025 MG PER TABLET    Take 1 tablet by mouth 4 times daily as needed for Diarrhea for up to 10 days. DIVALPROEX (DEPAKOTE) 500 MG DR TABLET    Take 500 mg by mouth 2 times daily     DOCUSATE SODIUM (COLACE) 100 MG CAPSULE    Take 1 capsule by mouth 2 times daily as needed for Constipation    FOLIC ACID (FOLVITE) 1 MG TABLET    Take 1 mg by mouth daily    LACOSAMIDE (VIMPAT) 200 MG TABLET    Take 1 tablet by mouth 2 times daily for 30 days.     LACTOBACILLUS (ACIDOPHILUS) CAPS CAPSULE    Take 1 capsule by mouth daily    LACTOSE HYDROUS POWD    by Does not apply route    LACTULOSE ENCEPHALOPATHY (ENULOSE PO)    Take 30 mLs by mouth 3 times daily    LATANOPROST bronchovascular crowding at the right lung base-favor the   latter. ED BEDSIDE ULTRASOUND:   Performed by ED Physician Gracie Bryant DO       LABS:  Labs Reviewed   CBC WITH AUTO DIFFERENTIAL - Abnormal; Notable for the following components:       Result Value    MCH 31.1 (*)     Lymphocytes % 20.8 (*)     Monocytes % 11.9 (*)     Immature Neutrophil % 0.9 (*)     All other components within normal limits   COMPREHENSIVE METABOLIC PANEL W/ REFLEX TO MG FOR LOW K - Abnormal; Notable for the following components:    CREATININE 0.7 (*)     Glucose 103 (*)     All other components within normal limits   C DIFFICILE MOLECULAR/PCR   GI DISEASE PANEL PCR   LIPASE   LACTIC ACID, PLASMA   AMMONIA   MAGNESIUM   URINALYSIS       All other labs were within normal range or not returned as of this dictation. EMERGENCY DEPARTMENT COURSE and DIFFERENTIAL DIAGNOSIS/MDM:   Vitals:    Vitals:    04/10/20 2145 04/10/20 2235   BP: (!) 166/79 (!) 147/75   Pulse: 55    Resp: 14    Temp: 97.5 °F (36.4 °C)    TempSrc: Oral    SpO2: 99%    Weight: 200 lb (90.7 kg)    Height: 5' 10\" (1.778 m)            MDM  Number of Diagnoses or Management Options  Bowel perforation ([facility]):   Diarrhea of presumed infectious origin:   Pneumatosis intestinalis:   Diagnosis management comments: 80-year-old male presents emergency department with chief complaint of diarrhea that patient reports is been present for 3 weeks. Vital signs are unremarkable. Patient was recently admitted to hospital for pneumonia and ruled out for novel corona virus. Chest x-ray shows significant improvement in previous pneumonia. Stool specimen was collected and is currently pending. Lab says that they cannot run the specimen until tomorrow. Patient is otherwise very well-appearing. CT scan of the abdomen pelvis was ordered and radiology reports that the scan was completed but imaging was not available.   I made radiology aware of this who reported that they Pneumatosis intestinalis    3. Bowel perforation [facility])          DISPOSITION/PLAN   DISPOSITION Decision To Admit 04/11/2020 03:14:45 AM      PATIENT REFERRED TO:  No follow-up provider specified. DISCHARGE MEDICATIONS:  New Prescriptions    CIPROFLOXACIN (CIPRO) 500 MG TABLET    Take 1 tablet by mouth 2 times daily for 7 days    DICYCLOMINE (BENTYL) 10 MG CAPSULE    Take 1 capsule by mouth every 6 hours as needed (cramps)    LOPERAMIDE (RA ANTI-DIARRHEAL) 2 MG CAPSULE    Take 1 capsule by mouth 4 times daily as needed for Diarrhea    ONDANSETRON (ZOFRAN-ODT) 4 MG DISINTEGRATING TABLET    Place 1 tablet under the tongue every 8 hours as needed for Nausea or Vomiting May Sub regular tablet (non-ODT) if insurance does not cover ODT. ED Provider Disposition Time  DISPOSITION Decision To Admit 04/11/2020 03:14:45 AM      Appropriate personal protective equipment was worn during the patient's evaluation. These included surgical, eye protection, surgical mask or in 95 respirator and gloves. The patient was also placed in a surgical mask for the prevention of possible spread of respiratory viral illnesses. The Patient was instructed to read the package inserts with any medication that was prescribed. Major potential reactions and medication interactions were discussed. The Patient understands that there are numerous possible adverse reactions not covered. The patient was also instructed to arrange follow-up with his or her primary care provider for review of any pending labwork or incidental findings on any radiology results that were obtained. All efforts were made to discuss any incidental findings that require further monitoring. Controlled Substances Monitoring:     RX Monitoring 1/23/2019   Attestation The Prescription Monitoring Report for this patient was reviewed today.    Periodic Controlled Substance Monitoring Possible medication side effects, risk of tolerance/dependence & alternative treatments discussed. ;No signs of potential drug abuse or diversion identified.        (Please note that portions of this note were completed with a voice recognition program.  Efforts were made to edit the dictations but occasionally words are mis-transcribed.)    Tevin Guzman DO (electronically signed)  Attending Emergency Physician          Tevin Guzman DO  04/11/20 0328

## 2020-04-11 NOTE — ED TRIAGE NOTES
Patient from a group home. Staff states patient has had diarrhea for the last 3 weeks. They are concerned it may be C-Diff.

## 2020-04-12 PROBLEM — R10.10 PAIN OF UPPER ABDOMEN: Status: ACTIVE | Noted: 2020-04-11

## 2020-04-12 PROBLEM — R10.9 ABDOMINAL PAIN: Status: ACTIVE | Noted: 2020-04-12

## 2020-04-12 LAB
ALBUMIN SERPL-MCNC: 3.3 GM/DL (ref 3.4–5)
ALP BLD-CCNC: 88 IU/L (ref 40–128)
ALT SERPL-CCNC: 22 U/L (ref 10–40)
ANION GAP SERPL CALCULATED.3IONS-SCNC: 12 MMOL/L (ref 4–16)
AST SERPL-CCNC: 15 IU/L (ref 15–37)
BASOPHILS ABSOLUTE: 0 K/CU MM
BASOPHILS RELATIVE PERCENT: 0.6 % (ref 0–1)
BILIRUB SERPL-MCNC: 0.3 MG/DL (ref 0–1)
BUN BLDV-MCNC: 6 MG/DL (ref 6–23)
CALCIUM SERPL-MCNC: 8.8 MG/DL (ref 8.3–10.6)
CHLORIDE BLD-SCNC: 102 MMOL/L (ref 99–110)
CO2: 27 MMOL/L (ref 21–32)
CREAT SERPL-MCNC: 0.7 MG/DL (ref 0.9–1.3)
DIFFERENTIAL TYPE: ABNORMAL
DOSE AMOUNT: ABNORMAL
DOSE TIME: ABNORMAL
EOSINOPHILS ABSOLUTE: 0.3 K/CU MM
EOSINOPHILS RELATIVE PERCENT: 5.3 % (ref 0–3)
GFR AFRICAN AMERICAN: >60 ML/MIN/1.73M2
GFR NON-AFRICAN AMERICAN: >60 ML/MIN/1.73M2
GLUCOSE BLD-MCNC: 119 MG/DL (ref 70–99)
HCT VFR BLD CALC: 39.4 % (ref 42–52)
HEMOGLOBIN: 13.1 GM/DL (ref 13.5–18)
IMMATURE NEUTROPHIL %: 1 % (ref 0–0.43)
LYMPHOCYTES ABSOLUTE: 1.1 K/CU MM
LYMPHOCYTES RELATIVE PERCENT: 21.8 % (ref 24–44)
MAGNESIUM: 1.8 MG/DL (ref 1.8–2.4)
MCH RBC QN AUTO: 30.8 PG (ref 27–31)
MCHC RBC AUTO-ENTMCNC: 33.2 % (ref 32–36)
MCV RBC AUTO: 92.5 FL (ref 78–100)
MONOCYTES ABSOLUTE: 0.7 K/CU MM
MONOCYTES RELATIVE PERCENT: 13 % (ref 0–4)
NUCLEATED RBC %: 0 %
PDW BLD-RTO: 13.2 % (ref 11.7–14.9)
PLATELET # BLD: 238 K/CU MM (ref 140–440)
PMV BLD AUTO: 10.4 FL (ref 7.5–11.1)
POTASSIUM SERPL-SCNC: ABNORMAL MMOL/L (ref 3.5–5.1)
RBC # BLD: 4.26 M/CU MM (ref 4.6–6.2)
SEGMENTED NEUTROPHILS ABSOLUTE COUNT: 3.1 K/CU MM
SEGMENTED NEUTROPHILS RELATIVE PERCENT: 58.3 % (ref 36–66)
SODIUM BLD-SCNC: 141 MMOL/L (ref 135–145)
TOTAL IMMATURE NEUTOROPHIL: 0.05 K/CU MM
TOTAL NUCLEATED RBC: 0 K/CU MM
TOTAL PROTEIN: 5.6 GM/DL (ref 6.4–8.2)
VANCOMYCIN TROUGH: 9.8 UG/ML (ref 10–20)
WBC # BLD: 5.2 K/CU MM (ref 4–10.5)

## 2020-04-12 PROCEDURE — 36592 COLLECT BLOOD FROM PICC: CPT

## 2020-04-12 PROCEDURE — 99232 SBSQ HOSP IP/OBS MODERATE 35: CPT | Performed by: SURGERY

## 2020-04-12 PROCEDURE — 2500000003 HC RX 250 WO HCPCS: Performed by: INTERNAL MEDICINE

## 2020-04-12 PROCEDURE — 99232 SBSQ HOSP IP/OBS MODERATE 35: CPT | Performed by: INTERNAL MEDICINE

## 2020-04-12 PROCEDURE — 2580000003 HC RX 258: Performed by: INTERNAL MEDICINE

## 2020-04-12 PROCEDURE — 96368 THER/DIAG CONCURRENT INF: CPT

## 2020-04-12 PROCEDURE — 80202 ASSAY OF VANCOMYCIN: CPT

## 2020-04-12 PROCEDURE — 96366 THER/PROPH/DIAG IV INF ADDON: CPT

## 2020-04-12 PROCEDURE — 6370000000 HC RX 637 (ALT 250 FOR IP): Performed by: INTERNAL MEDICINE

## 2020-04-12 PROCEDURE — 2500000003 HC RX 250 WO HCPCS: Performed by: SURGERY

## 2020-04-12 PROCEDURE — 6360000002 HC RX W HCPCS: Performed by: NURSE PRACTITIONER

## 2020-04-12 PROCEDURE — 80053 COMPREHEN METABOLIC PANEL: CPT

## 2020-04-12 PROCEDURE — 83735 ASSAY OF MAGNESIUM: CPT

## 2020-04-12 PROCEDURE — G0378 HOSPITAL OBSERVATION PER HR: HCPCS

## 2020-04-12 PROCEDURE — 96375 TX/PRO/DX INJ NEW DRUG ADDON: CPT

## 2020-04-12 PROCEDURE — 6360000002 HC RX W HCPCS: Performed by: INTERNAL MEDICINE

## 2020-04-12 PROCEDURE — 96376 TX/PRO/DX INJ SAME DRUG ADON: CPT

## 2020-04-12 PROCEDURE — 94761 N-INVAS EAR/PLS OXIMETRY MLT: CPT

## 2020-04-12 PROCEDURE — 2140000000 HC CCU INTERMEDIATE R&B

## 2020-04-12 PROCEDURE — 96367 TX/PROPH/DG ADDL SEQ IV INF: CPT

## 2020-04-12 PROCEDURE — 96372 THER/PROPH/DIAG INJ SC/IM: CPT

## 2020-04-12 PROCEDURE — 85025 COMPLETE CBC W/AUTO DIFF WBC: CPT

## 2020-04-12 RX ORDER — DIVALPROEX SODIUM 250 MG/1
1000 TABLET, DELAYED RELEASE ORAL NIGHTLY
Status: DISCONTINUED | OUTPATIENT
Start: 2020-04-12 | End: 2020-04-13 | Stop reason: HOSPADM

## 2020-04-12 RX ORDER — CLONAZEPAM 0.5 MG/1
0.25 TABLET ORAL DAILY
Status: DISCONTINUED | OUTPATIENT
Start: 2020-04-12 | End: 2020-04-13 | Stop reason: HOSPADM

## 2020-04-12 RX ORDER — POTASSIUM CHLORIDE 20 MEQ/1
40 TABLET, EXTENDED RELEASE ORAL ONCE
Status: COMPLETED | OUTPATIENT
Start: 2020-04-12 | End: 2020-04-12

## 2020-04-12 RX ORDER — AMLODIPINE BESYLATE 5 MG/1
5 TABLET ORAL DAILY
Status: DISCONTINUED | OUTPATIENT
Start: 2020-04-12 | End: 2020-04-13 | Stop reason: HOSPADM

## 2020-04-12 RX ORDER — LACOSAMIDE 100 MG/1
200 TABLET ORAL 2 TIMES DAILY
Status: DISCONTINUED | OUTPATIENT
Start: 2020-04-12 | End: 2020-04-13 | Stop reason: HOSPADM

## 2020-04-12 RX ORDER — SODIUM CHLORIDE, SODIUM LACTATE, POTASSIUM CHLORIDE, CALCIUM CHLORIDE 600; 310; 30; 20 MG/100ML; MG/100ML; MG/100ML; MG/100ML
INJECTION, SOLUTION INTRAVENOUS CONTINUOUS
Status: DISCONTINUED | OUTPATIENT
Start: 2020-04-12 | End: 2020-04-13

## 2020-04-12 RX ORDER — LEVOTHYROXINE SODIUM 112 UG/1
112 TABLET ORAL DAILY
Status: DISCONTINUED | OUTPATIENT
Start: 2020-04-12 | End: 2020-04-13

## 2020-04-12 RX ORDER — ZONISAMIDE 100 MG/1
300 CAPSULE ORAL NIGHTLY
Status: DISCONTINUED | OUTPATIENT
Start: 2020-04-12 | End: 2020-04-13 | Stop reason: HOSPADM

## 2020-04-12 RX ORDER — PAROXETINE HYDROCHLORIDE 20 MG/1
20 TABLET, FILM COATED ORAL DAILY
Status: DISCONTINUED | OUTPATIENT
Start: 2020-04-12 | End: 2020-04-13 | Stop reason: HOSPADM

## 2020-04-12 RX ORDER — POTASSIUM CHLORIDE 7.45 MG/ML
10 INJECTION INTRAVENOUS PRN
Status: DISCONTINUED | OUTPATIENT
Start: 2020-04-12 | End: 2020-04-13 | Stop reason: HOSPADM

## 2020-04-12 RX ORDER — QUETIAPINE FUMARATE 25 MG/1
50 TABLET, FILM COATED ORAL NIGHTLY
Status: DISCONTINUED | OUTPATIENT
Start: 2020-04-12 | End: 2020-04-13 | Stop reason: HOSPADM

## 2020-04-12 RX ORDER — PRIMIDONE 50 MG/1
50 TABLET ORAL EVERY 8 HOURS SCHEDULED
Status: DISCONTINUED | OUTPATIENT
Start: 2020-04-12 | End: 2020-04-13 | Stop reason: HOSPADM

## 2020-04-12 RX ORDER — ZONISAMIDE 100 MG/1
100 CAPSULE ORAL EVERY MORNING
Status: DISCONTINUED | OUTPATIENT
Start: 2020-04-12 | End: 2020-04-13 | Stop reason: HOSPADM

## 2020-04-12 RX ADMIN — SODIUM CHLORIDE, POTASSIUM CHLORIDE, SODIUM LACTATE AND CALCIUM CHLORIDE: 600; 310; 30; 20 INJECTION, SOLUTION INTRAVENOUS at 12:24

## 2020-04-12 RX ADMIN — ZONISAMIDE 100 MG: 100 CAPSULE ORAL at 12:34

## 2020-04-12 RX ADMIN — LEVOTHYROXINE SODIUM 112 MCG: 112 TABLET ORAL at 12:22

## 2020-04-12 RX ADMIN — PRIMIDONE 50 MG: 50 TABLET ORAL at 12:21

## 2020-04-12 RX ADMIN — POTASSIUM CHLORIDE 10 MEQ: 7.46 INJECTION, SOLUTION INTRAVENOUS at 12:11

## 2020-04-12 RX ADMIN — CLONAZEPAM 0.25 MG: 0.5 TABLET ORAL at 12:22

## 2020-04-12 RX ADMIN — POTASSIUM CHLORIDE 40 MEQ: 1500 TABLET, EXTENDED RELEASE ORAL at 12:22

## 2020-04-12 RX ADMIN — POTASSIUM CHLORIDE 10 MEQ: 7.46 INJECTION, SOLUTION INTRAVENOUS at 14:10

## 2020-04-12 RX ADMIN — ONDANSETRON 4 MG: 2 INJECTION INTRAMUSCULAR; INTRAVENOUS at 16:54

## 2020-04-12 RX ADMIN — PIPERACILLIN AND TAZOBACTAM 3.38 G: 3; .375 INJECTION, POWDER, FOR SOLUTION INTRAVENOUS at 20:29

## 2020-04-12 RX ADMIN — VANCOMYCIN HYDROCHLORIDE 1250 MG: 5 INJECTION, POWDER, LYOPHILIZED, FOR SOLUTION INTRAVENOUS at 16:53

## 2020-04-12 RX ADMIN — SODIUM CHLORIDE, POTASSIUM CHLORIDE, SODIUM LACTATE AND CALCIUM CHLORIDE: 600; 310; 30; 20 INJECTION, SOLUTION INTRAVENOUS at 00:15

## 2020-04-12 RX ADMIN — PAROXETINE HYDROCHLORIDE 20 MG: 20 TABLET, FILM COATED ORAL at 12:21

## 2020-04-12 RX ADMIN — PRIMIDONE 50 MG: 50 TABLET ORAL at 20:25

## 2020-04-12 RX ADMIN — VALPROATE SODIUM 500 MG: 100 INJECTION, SOLUTION INTRAVENOUS at 12:24

## 2020-04-12 RX ADMIN — POTASSIUM CHLORIDE 10 MEQ: 7.46 INJECTION, SOLUTION INTRAVENOUS at 09:58

## 2020-04-12 RX ADMIN — PRIMIDONE 50 MG: 50 TABLET ORAL at 16:54

## 2020-04-12 RX ADMIN — METRONIDAZOLE 500 MG: 500 INJECTION, SOLUTION INTRAVENOUS at 02:00

## 2020-04-12 RX ADMIN — ZONISAMIDE 300 MG: 100 CAPSULE ORAL at 20:53

## 2020-04-12 RX ADMIN — ONDANSETRON 4 MG: 4 TABLET, ORALLY DISINTEGRATING ORAL at 06:36

## 2020-04-12 RX ADMIN — VANCOMYCIN HYDROCHLORIDE 1250 MG: 5 INJECTION, POWDER, LYOPHILIZED, FOR SOLUTION INTRAVENOUS at 00:15

## 2020-04-12 RX ADMIN — AMLODIPINE BESYLATE 5 MG: 5 TABLET ORAL at 12:22

## 2020-04-12 RX ADMIN — LEVOTHYROXINE SODIUM ANHYDROUS 62.5 MCG: 100 INJECTION, POWDER, LYOPHILIZED, FOR SOLUTION INTRAVENOUS at 06:37

## 2020-04-12 RX ADMIN — LACOSAMIDE 200 MG: 100 TABLET, FILM COATED ORAL at 20:25

## 2020-04-12 RX ADMIN — SODIUM CHLORIDE, PRESERVATIVE FREE 10 ML: 5 INJECTION INTRAVENOUS at 20:26

## 2020-04-12 RX ADMIN — SODIUM CHLORIDE, PRESERVATIVE FREE 5 ML: 5 INJECTION INTRAVENOUS at 06:37

## 2020-04-12 RX ADMIN — DIVALPROEX SODIUM 1000 MG: 250 TABLET, DELAYED RELEASE ORAL at 20:25

## 2020-04-12 RX ADMIN — POTASSIUM CHLORIDE 10 MEQ: 7.46 INJECTION, SOLUTION INTRAVENOUS at 11:00

## 2020-04-12 RX ADMIN — PIPERACILLIN AND TAZOBACTAM 3.38 G: 3; .375 INJECTION, POWDER, FOR SOLUTION INTRAVENOUS at 16:53

## 2020-04-12 RX ADMIN — LACOSAMIDE 200 MG: 100 TABLET, FILM COATED ORAL at 12:22

## 2020-04-12 RX ADMIN — LATANOPROST 1 DROP: 50 SOLUTION OPHTHALMIC at 20:27

## 2020-04-12 RX ADMIN — QUETIAPINE FUMARATE 50 MG: 25 TABLET ORAL at 20:25

## 2020-04-12 RX ADMIN — ENOXAPARIN SODIUM 40 MG: 40 INJECTION SUBCUTANEOUS at 18:53

## 2020-04-12 RX ADMIN — POTASSIUM CHLORIDE 10 MEQ: 7.46 INJECTION, SOLUTION INTRAVENOUS at 06:47

## 2020-04-12 RX ADMIN — PIPERACILLIN AND TAZOBACTAM 3.38 G: 3; .375 INJECTION, POWDER, FOR SOLUTION INTRAVENOUS at 08:15

## 2020-04-12 RX ADMIN — POTASSIUM CHLORIDE 10 MEQ: 7.46 INJECTION, SOLUTION INTRAVENOUS at 08:15

## 2020-04-12 RX ADMIN — PIPERACILLIN AND TAZOBACTAM 3.38 G: 3; .375 INJECTION, POWDER, FOR SOLUTION INTRAVENOUS at 03:04

## 2020-04-12 ASSESSMENT — PAIN SCALES - GENERAL
PAINLEVEL_OUTOF10: 0

## 2020-04-12 NOTE — PROGRESS NOTES
Prostatitis, Thyroid disease, and Ulcerative colitis ([facility] has a past surgical history that includes Gallbladder surgery (2005); vagal nerve stimulation (2002); Cholecystectomy; Cystoscopy (Left, 07/11/2013); Kidney stone surgery; Colonoscopy (8/19/14, 5/2012); other surgical history (04/15/2015); and Upper gastrointestinal endoscopy (N/A, 11/13/2018).   Physical Exam:  General:  Awake, alert, NAD  Head:normal  Eye:normal  Neck:  No JVD   Chest:  Clear to auscultation, respiration easy  Cardiovascular:  RRR S1S2  Abdomen:   nontender  Extremities:  no edema  Pulses; palpable  Neuro: grossly normal    Medications:    amLODIPine  5 mg Oral Daily    clonazePAM  0.25 mg Oral Daily    divalproex  1,000 mg Oral Nightly    primidone  50 mg Oral 3 times per day    zonisamide  100 mg Oral QAM    zonisamide  300 mg Oral Nightly    lacosamide  200 mg Oral BID    PARoxetine  20 mg Oral Daily    QUEtiapine  50 mg Oral Nightly    levothyroxine  112 mcg Oral Daily    latanoprost  1 drop Both Eyes Nightly    piperacillin-tazobactam  3.375 g Intravenous Q6H    vancomycin  1,250 mg Intravenous Q12H    enoxaparin  40 mg Subcutaneous Q24H    sodium chloride flush  10 mL Intravenous 2 times per day      lactated ringers 75 mL/hr at 04/12/20 1224     potassium chloride, LORazepam, acetaminophen **OR** acetaminophen, ondansetron **OR** ondansetron, sodium chloride flush    Lab Data:  CBC:   Recent Labs     04/10/20  2215 04/11/20  0638 04/12/20  0410   WBC 6.4 6.1 5.2   HGB 15.6 13.5 13.1*   HCT 46.1 40.4* 39.4*   MCV 91.8 92.0 92.5    266 238     BMP:   Recent Labs     04/10/20  2215 04/12/20  0410    141   K 3.5 3.0  K CALLED TO MAXIMINO LUIS RN AT 0508, KYOUNG MLS  RESULTS READ BACK  *    102   CO2 30 27   BUN 7 6   CREATININE 0.7* 0.7*     LIVER PROFILE:   Recent Labs     04/10/20  2215 04/12/20  0410   AST 28 15   ALT 38 22   LIPASE 15  --    BILITOT 0.2 0.3   ALKPHOS 113 88     PT/INR: No results for input(s): PROTIME, INR in the last 72 hours. APTT: No results for input(s): APTT in the last 72 hours. BNP:  No results for input(s): BNP in the last 72 hours.   TROPONIN: @TROPONINI:3@  Labs, consult, tests reviewed    Assessment/ PLAN:    As above                  Colby Castillo MD 4/12/2020 6:03 PM

## 2020-04-12 NOTE — PROGRESS NOTES
04/12/20 0811   BP: 130/67   Pulse: 62   Resp: 19   Temp: 97.4 °F (36.3 °C)   SpO2: 99%     Physical Exam:   GEN  male, sitting upright in bed. RESP Breathing comfortably on room air. CARDIO/VASC S1/S2 auscultated. Regular rate without appreciable murmurs. No peripheral edema. GI Abdomen is soft with no tenderness, no masses, or guarding. Bowel sounds are normoactive. MSK No gross joint deformities. SKIN Normal coloration, warm, dry. NEURO normal speech, no lateralizing weakness. PSYCH Awake, alert, oriented x 3    Medications:   Medications:    amLODIPine  5 mg Oral Daily    clonazePAM  0.25 mg Oral Daily    divalproex  1,000 mg Oral Nightly    primidone  50 mg Oral 3 times per day    zonisamide  100 mg Oral QAM    zonisamide  300 mg Oral Nightly    lacosamide  200 mg Oral BID    PARoxetine  20 mg Oral Daily    QUEtiapine  50 mg Oral Nightly    levothyroxine  112 mcg Oral Daily    latanoprost  1 drop Both Eyes Nightly    piperacillin-tazobactam  3.375 g Intravenous Q6H    vancomycin  1,250 mg Intravenous Q12H    enoxaparin  40 mg Subcutaneous Q24H    sodium chloride flush  10 mL Intravenous 2 times per day      Infusions:    lactated ringers 75 mL/hr at 04/12/20 0814     PRN Meds: potassium chloride, 10 mEq, PRN  LORazepam, 1 mg, Q6H PRN  acetaminophen, 650 mg, Q6H PRN    Or  acetaminophen, 650 mg, Q6H PRN  ondansetron, 4 mg, Q8H PRN    Or  ondansetron, 4 mg, Q6H PRN  sodium chloride flush, 10 mL, PRN        CBC   Recent Labs     04/10/20  2215 04/11/20  0638 04/12/20  0410   WBC 6.4 6.1 5.2   HGB 15.6 13.5 13.1*   HCT 46.1 40.4* 39.4*    266 238      BMP   Recent Labs     04/10/20  2215 04/12/20  0410    141   K 3.5 3.0  K CALLED TO MAXIMINO LUIS RN AT 0508, KYOUNG MLS  RESULTS READ BACK  *    102   CO2 30 27   BUN 7 6   CREATININE 0.7* 0.7*       Radiology report reviewed:   CT abd/pel  1.  Small amount of free air is seen within the abdomen and pelvis, concerning

## 2020-04-12 NOTE — PROGRESS NOTES
Basophils Absolute 0.0 K/CU MM    Nucleated RBC % 0.0 %    Total Nucleated RBC 0.0 K/CU MM    Total Immature Neutrophil 0.05 K/CU MM    Immature Neutrophil % 1.0 (H) 0 - 0.43 %   Magnesium    Collection Time: 04/12/20  4:10 AM   Result Value Ref Range    Magnesium 1.8 1.8 - 2.4 mg/dl       Scheduled Meds:   amLODIPine  5 mg Oral Daily    clonazePAM  0.25 mg Oral Daily    divalproex  1,000 mg Oral Nightly    primidone  50 mg Oral 3 times per day    latanoprost  1 drop Both Eyes Nightly    piperacillin-tazobactam  3.375 g Intravenous Q6H    vancomycin  1,250 mg Intravenous Q12H    enoxaparin  40 mg Subcutaneous Q24H    sodium chloride flush  10 mL Intravenous 2 times per day       Continuous Infusions:   lactated ringers         Physical Exam:  HEENT: Anicteric sclerae, Oropharyngeal mucosae moist, pink and intact. Heart:  Normal S1 and S2, RRR  Lungs: Clear to auscultation bilaterally, No audible Wheezes or Rales. Extremities: No edema. Neuro: Alert and Oriented x 3, Non focal.  Abdomen: Soft, Benign, Non tender, Non distended, Positive bowel sounds. Active Problems:    Pain of upper abdomen    Diarrhea of presumed infectious origin  Resolved Problems:    * No resolved hospital problems. *      Assessment and Plan:  Casandra Obrien is a 62 y.o. male with 3 wks diarrhea, and now CT abnormality, CLINICALLY doing very well, and NOT concerning - I will follow. .     Started dysphagia diet yesterday, will follow. ___________________________________________    Joseline Hernandez MD, FACS, FICS  4/12/2020  8:12 AM    Patient was seen with total face to face time of 25 minutes. More than 50% of this visit was counseling and education as above in my assessment and plan section of my note.

## 2020-04-13 VITALS
SYSTOLIC BLOOD PRESSURE: 161 MMHG | HEART RATE: 57 BPM | HEIGHT: 70 IN | DIASTOLIC BLOOD PRESSURE: 85 MMHG | TEMPERATURE: 97 F | OXYGEN SATURATION: 96 % | RESPIRATION RATE: 12 BRPM | WEIGHT: 197.4 LBS | BODY MASS INDEX: 28.26 KG/M2

## 2020-04-13 LAB — POTASSIUM SERPL-SCNC: 3.7 MMOL/L (ref 3.5–5.1)

## 2020-04-13 PROCEDURE — 36592 COLLECT BLOOD FROM PICC: CPT

## 2020-04-13 PROCEDURE — 97162 PT EVAL MOD COMPLEX 30 MIN: CPT

## 2020-04-13 PROCEDURE — 2580000003 HC RX 258: Performed by: INTERNAL MEDICINE

## 2020-04-13 PROCEDURE — 99232 SBSQ HOSP IP/OBS MODERATE 35: CPT | Performed by: SURGERY

## 2020-04-13 PROCEDURE — G0378 HOSPITAL OBSERVATION PER HR: HCPCS

## 2020-04-13 PROCEDURE — 84132 ASSAY OF SERUM POTASSIUM: CPT

## 2020-04-13 PROCEDURE — 94761 N-INVAS EAR/PLS OXIMETRY MLT: CPT

## 2020-04-13 PROCEDURE — 97166 OT EVAL MOD COMPLEX 45 MIN: CPT

## 2020-04-13 PROCEDURE — APPSS30 APP SPLIT SHARED TIME 16-30 MINUTES: Performed by: NURSE PRACTITIONER

## 2020-04-13 PROCEDURE — 6370000000 HC RX 637 (ALT 250 FOR IP): Performed by: INTERNAL MEDICINE

## 2020-04-13 PROCEDURE — 96368 THER/DIAG CONCURRENT INF: CPT

## 2020-04-13 PROCEDURE — 99232 SBSQ HOSP IP/OBS MODERATE 35: CPT | Performed by: INTERNAL MEDICINE

## 2020-04-13 PROCEDURE — 97535 SELF CARE MNGMENT TRAINING: CPT

## 2020-04-13 PROCEDURE — 97116 GAIT TRAINING THERAPY: CPT

## 2020-04-13 PROCEDURE — 96366 THER/PROPH/DIAG IV INF ADDON: CPT

## 2020-04-13 PROCEDURE — 6360000002 HC RX W HCPCS: Performed by: INTERNAL MEDICINE

## 2020-04-13 RX ORDER — LEVOTHYROXINE SODIUM 0.12 MG/1
125 TABLET ORAL DAILY
Qty: 30 TABLET | Refills: 3 | Status: SHIPPED | OUTPATIENT
Start: 2020-04-14

## 2020-04-13 RX ORDER — DICYCLOMINE HYDROCHLORIDE 10 MG/1
10 CAPSULE ORAL EVERY 6 HOURS PRN
Qty: 20 CAPSULE | Refills: 0 | Status: SHIPPED | OUTPATIENT
Start: 2020-04-13 | End: 2022-08-10

## 2020-04-13 RX ORDER — ONDANSETRON 4 MG/1
4 TABLET, ORALLY DISINTEGRATING ORAL EVERY 8 HOURS PRN
Qty: 20 TABLET | Refills: 0 | Status: SHIPPED | OUTPATIENT
Start: 2020-04-13 | End: 2020-04-20

## 2020-04-13 RX ORDER — LOPERAMIDE HYDROCHLORIDE 2 MG/1
2 CAPSULE ORAL 4 TIMES DAILY PRN
Qty: 20 CAPSULE | Refills: 0 | Status: SHIPPED | OUTPATIENT
Start: 2020-04-13 | End: 2020-04-23

## 2020-04-13 RX ORDER — LEVOTHYROXINE SODIUM 0.12 MG/1
125 TABLET ORAL DAILY
Status: DISCONTINUED | OUTPATIENT
Start: 2020-04-14 | End: 2020-04-13 | Stop reason: HOSPADM

## 2020-04-13 RX ADMIN — CLONAZEPAM 0.25 MG: 0.5 TABLET ORAL at 09:01

## 2020-04-13 RX ADMIN — PRIMIDONE 50 MG: 50 TABLET ORAL at 06:05

## 2020-04-13 RX ADMIN — LEVOTHYROXINE SODIUM 112 MCG: 112 TABLET ORAL at 06:05

## 2020-04-13 RX ADMIN — PRIMIDONE 50 MG: 50 TABLET ORAL at 13:24

## 2020-04-13 RX ADMIN — AMLODIPINE BESYLATE 5 MG: 5 TABLET ORAL at 09:01

## 2020-04-13 RX ADMIN — PAROXETINE HYDROCHLORIDE 20 MG: 20 TABLET, FILM COATED ORAL at 09:01

## 2020-04-13 RX ADMIN — LACOSAMIDE 200 MG: 100 TABLET, FILM COATED ORAL at 09:00

## 2020-04-13 RX ADMIN — SODIUM CHLORIDE, POTASSIUM CHLORIDE, SODIUM LACTATE AND CALCIUM CHLORIDE: 600; 310; 30; 20 INJECTION, SOLUTION INTRAVENOUS at 06:08

## 2020-04-13 RX ADMIN — ZONISAMIDE 100 MG: 100 CAPSULE ORAL at 09:01

## 2020-04-13 RX ADMIN — VANCOMYCIN HYDROCHLORIDE 1250 MG: 5 INJECTION, POWDER, LYOPHILIZED, FOR SOLUTION INTRAVENOUS at 04:25

## 2020-04-13 RX ADMIN — ONDANSETRON 4 MG: 4 TABLET, ORALLY DISINTEGRATING ORAL at 06:05

## 2020-04-13 RX ADMIN — PIPERACILLIN AND TAZOBACTAM 3.38 G: 3; .375 INJECTION, POWDER, FOR SOLUTION INTRAVENOUS at 04:23

## 2020-04-13 RX ADMIN — PIPERACILLIN AND TAZOBACTAM 3.38 G: 3; .375 INJECTION, POWDER, FOR SOLUTION INTRAVENOUS at 09:00

## 2020-04-13 NOTE — CONSULTS
15    Treatment:  Sup to sit: min A with min verbal cues for sequencing, assist at trunk. Sitting balance: SBA    Transfers: STS from bed x2 with CGA-min A with RW, min cues for anterior weight shift. Gait: ambulated to BR with CGA-min A for balance, patient wanting to stand at commode to urinate, difficulty navigating tight space with wires/IV, standing at commode min A d/t retropulsion, patient with knee flexion during standing unsure why as patient with poor communications, cues for upright posture and mod-mod A x2 to exit BR. Once out of BR ambulated x10ft with CGA. Safety: patient left in chair with chair alarm, call light within reach, RN notified, gait belt used. Assessment:  Patient is a 63 yo male who presents with abdominal pain, baseline MRDD and lives in group home, aware that patient has 24/7 care however required min-mod A for mobility today. Rec d/c to SNF unless caregiver is amenable to min-mod level of assist then rec [facility]. Complexity: Moderate  Prognosis: Good, no significant barriers to participation at this time. Plan Times per week: 3/week, 1 week,   Discharge Recommendations: Subacute/Skilled Nursing Facility  Equipment: patient has necessary equipment. Goals:  Short term goals  Time Frame for Short term goals: 1 week  Short term goal 1: Patient will perform sup to sit with min A. Short term goal 2: Patient will perform STS with CGA and RW. Short term goal 3: Patient will ambulate x25ft with CGA and RW. Treatment plan:  Bed mobility, transfers, balance, gait, TA, TX.     Recommendations for NURSING mobility:     Time:   Time in: 1035  Time out: 1052  Timed treatment minutes: 8  Total time: 17    Electronically signed by:    Edin Amato, PT  4/13/2020, 3:38 PM

## 2020-04-13 NOTE — PROGRESS NOTES
Pt celinec back home via [facility]. Andrae Duggan, Pt nurse, and [address] [facility] notified.

## 2020-04-13 NOTE — PROGRESS NOTES
Sister called with concern about him going back to the group home and how there has to be multiple things to get ready before he goes back. Told her that I didn't have a discharge order and I will wait till I get one before I call the group home and make sure all things are set up. Also told sister that I will recommend to the doctor to see if we could get pt/ot involved to make sure he is strong enough to go back to the group home. Received a phone call from the group home, tatum about concerns she had about him returning to the group home. She was worried that he would be too much for them to handle and there other concerns that she had about the diarrhea/vomiting/incont. Told her that I would text the hospitalist her name and number and have him get in contact with her so she can let him know of the concerns she has.

## 2020-04-13 NOTE — PROGRESS NOTES
Hospitalist Progress Note      Name:  Suzanna Olivera /Age/Sex: 1955  (62 y.o. male)   MRN & CSN:  [id number] & [id number] Admission Date/Time: 4/10/2020  9:51 PM   Location:  [address] [place] [address] PCP: Avery Clemons MD         Hospital Day: 4    Assessment and Plan:   Suzanna Olivera is a 62 y.o.  male  who presents with  Abdominal pain and diarrhea. --- Abdominal pain with free air on CT ?perforated bowel. Abdominal pain has resolved and abdominal exam is benign. Lactate is normal.  does not plan any surgery. --- Diarrhea at SNF X 3 weeks, due to lactulose. C diff and GI disease PCR neg. Diarrhea has resolved. --- Hypokalemia (3.0) - replaced. Plan  Stop IV fluid and antibiotics. He can be discharged per . Await PT/OT to determine disposition. He is otherwise medically stable for discharge. Chronic diagnoses  -- Seizure disorder -  Resume PO AEDs (zonisamide, clonazepam, lacosamide, depakote)  -- Depression - paxil and seroquel    -- HTN -  Amlodipine, Lisinopril    -- Hypothyroidism - TSH ~ 9. Will increase synthroid dose. -- B12 deficiency   -- tremor - primidone       Diet DIET DYSPHAGIA PUREED; Dysphagia Minced and Moist; Moderately Thick (Honey)   DVT Prophylaxis [x] Lovenox, []  Heparin, [] SCDs, [] Ambulation   GI Prophylaxis [] PPI,  [] H2 Blocker,  [] Carafate,  [] Diet/Tube Feeds   Code Status Full Code   Disposition Back to group home vs SNF based on PT/OT recommendation. He is medically stable for dc. MDM [] Low, [x] Moderate,[]  High     History of Present Illness:   Patient seen and examined  Abdominal pain is resolved. No diarrhea. Ten point ROS reviewed negative, unless as noted above    Objective:        Intake/Output Summary (Last 24 hours) at 2020 1006  Last data filed at 2020 0934  Gross per 24 hour   Intake 2622.5 ml   Output 800 ml   Net 1822.5 ml      Vitals:   Vitals:    20 0904   BP: (!) 156/72   Pulse: 57   Resp: 12   Temp: 97.9 °F (36.6 °C) SpO2:      Physical Exam:   GEN  male, sitting upright in bed. RESP Breathing comfortably on room air. CARDIO/VASC S1/S2 auscultated. Regular rate without appreciable murmurs. No peripheral edema. GI Abdomen is soft with no tenderness, no masses, or guarding. Bowel sounds are normoactive. MSK No gross joint deformities. SKIN Normal coloration, warm, dry. NEURO normal speech, no lateralizing weakness. PSYCH Awake, alert, oriented x 3    Medications:   Medications:    [START ON 4/14/2020] levothyroxine  125 mcg Oral Daily    amLODIPine  5 mg Oral Daily    clonazePAM  0.25 mg Oral Daily    divalproex  1,000 mg Oral Nightly    primidone  50 mg Oral 3 times per day    zonisamide  100 mg Oral QAM    zonisamide  300 mg Oral Nightly    lacosamide  200 mg Oral BID    PARoxetine  20 mg Oral Daily    QUEtiapine  50 mg Oral Nightly    latanoprost  1 drop Both Eyes Nightly    enoxaparin  40 mg Subcutaneous Q24H    sodium chloride flush  10 mL Intravenous 2 times per day      Infusions:     PRN Meds: potassium chloride, 10 mEq, PRN  LORazepam, 1 mg, Q6H PRN  acetaminophen, 650 mg, Q6H PRN    Or  acetaminophen, 650 mg, Q6H PRN  ondansetron, 4 mg, Q8H PRN    Or  ondansetron, 4 mg, Q6H PRN  sodium chloride flush, 10 mL, PRN        CBC   Recent Labs     04/10/20  2215 04/11/20  0638 04/12/20  0410   WBC 6.4 6.1 5.2   HGB 15.6 13.5 13.1*   HCT 46.1 40.4* 39.4*    266 238      BMP   Recent Labs     04/10/20  2215 04/12/20  0410 04/13/20  0614    141  --    K 3.5 3.0  K CALLED TO MAXIMINO LUIS RN AT 0508, TANYA MLS  RESULTS READ BACK  * 3.7    102  --    CO2 30 27  --    BUN 7 6  --    CREATININE 0.7* 0.7*  --        Radiology report reviewed:   CT abd/pel  1. Small amount of free air is seen within the abdomen and pelvis, concerning   for bowel perforation.    2. Diffuse extensive pneumatosis is seen involving large bowel with small   amount of portal venous gas, concerning for ischemic colitis. Findings were discussed with Bob Lawson at 3:12 am on 4/11/2020.          Electronically signed by Priti Davidson MD on 4/13/2020 at 10:06 AM

## 2020-04-13 NOTE — PROGRESS NOTES
Occupational Therapy  [address] [place] [address] OCCUPATIONAL THERAPY EVALUATION    History  [place]:  The primary encounter diagnosis was Diarrhea of presumed infectious origin. Diagnoses of Pneumatosis intestinalis and Bowel perforation ([address] were also pertinent to this visit. Restrictions:                           Communication with other providers: RN, PT    Subjective:  Patient states:  \"I need to go to the bathroom\"  Pain:  No c/o  Patient goal:  Not identified. Occupational profile (relevant social history and personal factors):         Examination of body systems (includes body structures/functions, activity/participation limitations):  · Orientation: ox self and place ; partially ox time and situation  · Cognition:  Hx of DD. Needs repetition and tactile cues to follow through with basic one step commands. · Observation:  Received pt in bed. Alert and cooperative. · Vision:  Iconfinder   · Hearing:  Mild hard of hearing  · ROM: WFL RUE function observed at least below shldr . LUE impaired due to hx of CP L digits contracted and has minimal extension for walker   · Strength: WFL BUE  · Sensation: not tested    ADLs  Feeding: setup    Grooming: setup in seated    Dressing: UB Lv in seated LB ModA, for distal components and steadying when hiking up c RUE. Bathing: UB Lv in seated LB ModA    Toileting: Pt stood at commode to urinate. Poor aim to toilet. Soiled gown and depend which was at knee level. Needed assistance with threading new brief and steadying while pt hiked his depend using his RUE. *Some ADL determined per observation of actual ADL performance, functional mobility, balance, activity tolerance, and cognition.      AM-PAC 6 click short form for inpatient daily activity:  Raw Score: 15  24/24 = unimpaired  23/24 = 1-20% impaired   20/24-22/24 = 21-40% impaired  15/24-19/24 = 41-59% impaired   10/24-14/24 = 60%-79% impaired  7/24-9/24 = 80%-99% impaired  6/24 = 100% impaired    Functional Mobility  Bed

## 2020-04-13 NOTE — DISCHARGE SUMMARY
(SYNTHROID) 125 MCG tablet  Take 1 tablet by mouth Daily             loperamide (RA ANTI-DIARRHEAL) 2 MG capsule  Take 1 capsule by mouth 4 times daily as needed for Diarrhea             Menthol (ICY HOT BACK) 5 % PTCH  Apply 1 patch topically 3 times daily as needed             Menthol-Methyl Salicylate (MUSCLE RUB) 10-15 % CREA cream  Apply 1 applicator topically as needed             mirabegron (MYRBETRIQ) 50 MG TB24  Take 50 mg by mouth daily             neomycin-bacitracin-polymyxin (NEOSPORIN) 3.5-400-5000 OINT ointment  Apply 1 each topically as needed Apply topically 4 times daily. omeprazole (PRILOSEC) 20 MG delayed release capsule  Take 20 mg by mouth 2 times daily (before meals)             ondansetron (ZOFRAN-ODT) 4 MG disintegrating tablet  Place 1 tablet under the tongue every 8 hours as needed for Nausea or Vomiting May Sub regular tablet (non-ODT) if insurance does not cover ODT. PARoxetine (PAXIL) 20 MG tablet  Take 20 mg by mouth every morning             primidone (MYSOLINE) 50 MG tablet  Take 50 mg by mouth 3 times daily             pseudoephedrine (SUDAFED) 30 MG tablet  Take 30 mg by mouth every 4 hours as needed for Congestion             QUEtiapine (SEROQUEL XR) 50 MG extended release tablet  Take 50 mg by mouth nightly             Simethicone (MI-ACID GAS RELIEF PO)  Take 1 Container by mouth every 4 hours as needed 10 cc every 4 hrs not to exceed 60 cc in 24 hrs             simvastatin (ZOCOR) 20 MG tablet  Take 20 mg by mouth nightly.              tamsulosin (FLOMAX) 0.4 MG capsule  Take 1 capsule by mouth daily             timolol (TIMOPTIC) 0.5 % ophthalmic solution  Place 1 drop into both eyes daily              Wheat Dextrin (BENEFIBER) POWD  Take 4 g by mouth 2 times daily Takes 3 tsp in liquid twice per day              zonisamide (ZONEGRAN) 100 MG capsule  Take 100 mg by mouth every morning             zonisamide (ZONEGRAN) 100 MG capsule  Take 300 mg by mouth

## 2020-04-13 NOTE — PLAN OF CARE
Problem: Falls - Risk of:  Goal: Will remain free from falls  Description: Will remain free from falls  Outcome: Ongoing  Goal: Absence of physical injury  Description: Absence of physical injury  Outcome: Ongoing     Problem: Pain:  Goal: Pain level will decrease  Description: Pain level will decrease  Outcome: Ongoing  Goal: Control of acute pain  Description: Control of acute pain  Outcome: Ongoing  Goal: Control of chronic pain  Description: Control of chronic pain  Outcome: Ongoing  Goal: Patient's pain/discomfort is manageable  Description: Patient's pain/discomfort is manageable  Outcome: Ongoing     Problem: Infection:  Goal: Will remain free from infection  Description: Will remain free from infection  Outcome: Ongoing     Problem: Safety:  Goal: Free from accidental physical injury  Description: Free from accidental physical injury  Outcome: Ongoing  Goal: Free from intentional harm  Description: Free from intentional harm  Outcome: Ongoing     Problem: Daily Care:  Goal: Daily care needs are met  Description: Daily care needs are met  Outcome: Ongoing     Problem: Skin Integrity:  Goal: Skin integrity will stabilize  Description: Skin integrity will stabilize  Outcome: Ongoing     Problem: Discharge Planning:  Goal: Patients continuum of care needs are met  Description: Patients continuum of care needs are met  Outcome: Ongoing

## 2020-04-13 NOTE — CARE COORDINATION
Spoke with Dr. Earl Hensley who states he is awaiting therapy evals for discharge disposition though patient is medically stable for discharge once this is determined. Whiteboard note placed to alert therapy to need for evals. CM following.       2:29 PM  [facility] update on [facility] planning and she shares PT/OT notes are pending. Updated her CM is not able to complete dc planning until therapy notes are available. CM following.       2:53 PM  CM received call from Lynda Lipoma with Developmental Disabilities who shared patient sister is his court appointed guardian. Also she shared patient lives at home with 2 room mates (not in a group home) where they have 2 staff members from all day except from 3 pm - 8 pm when their home has 3 staff members to assist the 3 residents. States patient uses his walker or wheelchair and typically goes to [employer] where he works part time. Osvaldo Montoya shares as long as patient is able to stand and pivot transfer then home is reasonable for him at discharge and states she would like for him to have [address]       3:27 PM  CM noted OT eval. Called and spoke with at patient sister/legal guardian Arden Beard [phone] and shared OT rec of SNF and patient ability to ambulate with CGA to Khari Beard states she prefers her brother return home with [address]. States she is unsure if his home staff will be able to provide transportation or if he will need medical transportation and requested call to Thalia/home supervisor to confirm transportation. PS to Dr. Earl Hensley to update him of plan to return home with new [facility]. Spoke with Ashley and updated her of plan to return home with new [facility] and to check with Thalia on transportation and to confirm medications prior to discharge. Also updated [facility] of plan for home at discharge.

## 2020-04-13 NOTE — PROGRESS NOTES
GENERAL SURGERY PROGRESS NOTE    CC/HPI:           Patient feels better and on dysphagia diet. Vitals:    04/12/20 2018 04/12/20 2040 04/13/20 0606 04/13/20 0610   BP: 136/66  139/65    Pulse: 61 66 56 54   Resp: 17 15 19 14   Temp: 97.5 °F (36.4 °C)  97.9 °F (36.6 °C)    TempSrc: Oral  Axillary    SpO2: 96%  96% 96%   Weight:   197 lb 6.4 oz (89.5 kg)    Height:         I/O last 3 completed shifts: In: 2872.5 [I.V.:1672.5; IV Piggyback:1200]  Out: 800 [Urine:800]  No intake/output data recorded. DIET DYSPHAGIA PUREED;  Dysphagia Minced and Moist; Moderately Thick (Honey)    Recent Results (from the past 48 hour(s))   Comprehensive Metabolic Panel w/ Reflex to MG    Collection Time: 04/12/20  4:10 AM   Result Value Ref Range    Sodium 141 135 - 145 MMOL/L    Potassium (LL) 3.5 - 5.1 MMOL/L     3.0  K CALLED TO MAXIMINO LUIS RN AT 0508, KYOUNG MLS  RESULTS READ BACK      Chloride 102 99 - 110 mMol/L    CO2 27 21 - 32 MMOL/L    BUN 6 6 - 23 MG/DL    CREATININE 0.7 (L) 0.9 - 1.3 MG/DL    Glucose 119 (H) 70 - 99 MG/DL    Calcium 8.8 8.3 - 10.6 MG/DL    Alb 3.3 (L) 3.4 - 5.0 GM/DL    Total Protein 5.6 (L) 6.4 - 8.2 GM/DL    Total Bilirubin 0.3 0.0 - 1.0 MG/DL    ALT 22 10 - 40 U/L    AST 15 15 - 37 IU/L    Alkaline Phosphatase 88 40 - 128 IU/L    GFR Non-African American >60 >60 mL/min/1.73m2    GFR African American >60 >60 mL/min/1.73m2    Anion Gap 12 4 - 16   CBC auto differential    Collection Time: 04/12/20  4:10 AM   Result Value Ref Range    WBC 5.2 4.0 - 10.5 K/CU MM    RBC 4.26 (L) 4.6 - 6.2 M/CU MM    Hemoglobin 13.1 (L) 13.5 - 18.0 GM/DL    Hematocrit 39.4 (L) 42 - 52 %    MCV 92.5 78 - 100 FL    MCH 30.8 27 - 31 PG    MCHC 33.2 32.0 - 36.0 %    RDW 13.2 11.7 - 14.9 %    Platelets 942 948 - 328 K/CU MM    MPV 10.4 7.5 - 11.1 FL    Differential Type AUTOMATED DIFFERENTIAL     Segs Relative 58.3 36 - 66 %    Lymphocytes % 21.8 (L) 24 - 44 %    Monocytes % 13.0 (H) 0 - 4 %    Eosinophils % 5.3 (H) 0 - 3 %    Basophils % 0.6 0 - 1 %    Segs Absolute 3.1 K/CU MM    Lymphocytes Absolute 1.1 K/CU MM    Monocytes Absolute 0.7 K/CU MM    Eosinophils Absolute 0.3 K/CU MM    Basophils Absolute 0.0 K/CU MM    Nucleated RBC % 0.0 %    Total Nucleated RBC 0.0 K/CU MM    Total Immature Neutrophil 0.05 K/CU MM    Immature Neutrophil % 1.0 (H) 0 - 0.43 %   Magnesium    Collection Time: 04/12/20  4:10 AM   Result Value Ref Range    Magnesium 1.8 1.8 - 2.4 mg/dl   Vancomycin, trough    Collection Time: 04/12/20  4:21 PM   Result Value Ref Range    Vancomycin Tr 9.8 (L) 10 - 20 UG/ML    DOSE AMOUNT DOSE AMT. GIVEN - 1,250 mg     DOSE TIME DOSE TIME GIVEN - 0000 / 1200    Potassium    Collection Time: 04/13/20  6:14 AM   Result Value Ref Range    Potassium 3.7 3.5 - 5.1 MMOL/L       Scheduled Meds:   [START ON 4/14/2020] levothyroxine  125 mcg Oral Daily    amLODIPine  5 mg Oral Daily    clonazePAM  0.25 mg Oral Daily    divalproex  1,000 mg Oral Nightly    primidone  50 mg Oral 3 times per day    zonisamide  100 mg Oral QAM    zonisamide  300 mg Oral Nightly    lacosamide  200 mg Oral BID    PARoxetine  20 mg Oral Daily    QUEtiapine  50 mg Oral Nightly    latanoprost  1 drop Both Eyes Nightly    piperacillin-tazobactam  3.375 g Intravenous Q6H    vancomycin  1,250 mg Intravenous Q12H    enoxaparin  40 mg Subcutaneous Q24H    sodium chloride flush  10 mL Intravenous 2 times per day       Continuous Infusions:   lactated ringers 75 mL/hr at 04/13/20 0608       Physical Exam:  HEENT: Anicteric sclerae, Oropharyngeal mucosae moist, pink and intact. Heart:  Normal S1 and S2, RRR  Lungs: Clear to auscultation bilaterally, No audible Wheezes or Rales. Extremities: No edema. Neuro: Alert and Oriented x 3, Non focal.  Abdomen: Soft, Benign, Non tender, Non distended, Positive bowel sounds.       Active Problems:    Pain of upper abdomen    Diarrhea of presumed infectious origin    Abdominal pain  Resolved Problems:    * No resolved hospital problems. *      Assessment and Plan:  Khadra Slaughter is a 62 y.o. male with 3 wks diarrhea, and now CT abnormality, CLINICALLY doing very well, and NOT concerning - MAY be discharged.     ___________________________________________    Corrinne Saunas, MD, FACS, FICS  4/13/2020  7:27 AM    Patient was seen with total face to face time of 25 minutes. More than 50% of this visit was counseling and education as above in my assessment and plan section of my note.

## 2020-04-13 NOTE — PROGRESS NOTES
Migraines in his mother and sister. No Known Allergies    Review of Systems:   All 14 systems were reviewed and are negative  Except for the positive findings which are documented     BP (!) 156/72   Pulse 57   Temp 97.9 °F (36.6 °C) (Axillary)   Resp 12   Ht 5' 10\" (1.778 m)   Wt 197 lb 6.4 oz (89.5 kg)   SpO2 96%   BMI 28.32 kg/m²       Intake/Output Summary (Last 24 hours) at 4/13/2020 1313  Last data filed at 4/13/2020 0934  Gross per 24 hour   Intake 2422.5 ml   Output 800 ml   Net 1622.5 ml       Physical Exam:  Constitutional:  Well developed, , No acute distress  HENT:  Normocephalic, Atraumatic, Bilateral external ears normal,  Nose normal.   Neck- trachea is midline  Eyes:  PEERL, Conjunctiva normal  Respiratory:  Normal breath sounds, No respiratory distress, No wheezing, No chest tenderness. Cardiovascular:  Normal heart rate, Normal rhythm, no murmurs appreciated, No rubs appreciated, No gallops appreciated, JVP not elevated  Abdomen/GI:  Soft, No tenderness  Musculoskeletal:  Intact distal pulses, no edema to lower legs, + weakness upper and lower extremities. Integument:  Warm, Dry  Lymphatic:  No lymphadenopathy noted.    Neurologic:  Alert & oriented person and time   Psychiatric:  Affect and Mood :pleasant     Medications:    [START ON 4/14/2020] levothyroxine  125 mcg Oral Daily    amLODIPine  5 mg Oral Daily    clonazePAM  0.25 mg Oral Daily    divalproex  1,000 mg Oral Nightly    primidone  50 mg Oral 3 times per day    zonisamide  100 mg Oral QAM    zonisamide  300 mg Oral Nightly    lacosamide  200 mg Oral BID    PARoxetine  20 mg Oral Daily    QUEtiapine  50 mg Oral Nightly    latanoprost  1 drop Both Eyes Nightly    enoxaparin  40 mg Subcutaneous Q24H    sodium chloride flush  10 mL Intravenous 2 times per day       potassium chloride, LORazepam, acetaminophen **OR** acetaminophen, ondansetron **OR** ondansetron, sodium chloride flush    Lab Data:  CBC:   Recent

## 2020-04-13 NOTE — CARE COORDINATION
Attempt to call Tristan Ferrera, sister, POA. No answer at [phone]. Message left with contact info for update on discharging to ARU prior to returning to group home.

## 2020-04-14 ENCOUNTER — CARE COORDINATION (OUTPATIENT)
Dept: CASE MANAGEMENT | Age: 65
End: 2020-04-14

## 2020-04-14 LAB
EKG ATRIAL RATE: 53 BPM
EKG DIAGNOSIS: NORMAL
EKG P AXIS: 72 DEGREES
EKG P-R INTERVAL: 172 MS
EKG Q-T INTERVAL: 482 MS
EKG QRS DURATION: 178 MS
EKG QTC CALCULATION (BAZETT): 452 MS
EKG R AXIS: -28 DEGREES
EKG T AXIS: -6 DEGREES
EKG VENTRICULAR RATE: 53 BPM

## 2020-04-15 ENCOUNTER — CARE COORDINATION (OUTPATIENT)
Dept: CASE MANAGEMENT | Age: 65
End: 2020-04-15

## 2020-04-16 ENCOUNTER — HOSPITAL ENCOUNTER (EMERGENCY)
Age: 65
Discharge: HOME | End: 2020-04-16
Attending: EMERGENCY MEDICINE
Payer: MEDICARE

## 2020-04-16 ENCOUNTER — APPOINTMENT (OUTPATIENT)
Dept: GENERAL RADIOLOGY | Age: 65
End: 2020-04-16
Payer: MEDICARE

## 2020-04-16 ENCOUNTER — CARE COORDINATION (OUTPATIENT)
Dept: CASE MANAGEMENT | Age: 65
End: 2020-04-16

## 2020-04-16 ENCOUNTER — APPOINTMENT (OUTPATIENT)
Dept: CT IMAGING | Age: 65
End: 2020-04-16
Payer: MEDICARE

## 2020-04-16 VITALS
SYSTOLIC BLOOD PRESSURE: 136 MMHG | BODY MASS INDEX: 28.2 KG/M2 | DIASTOLIC BLOOD PRESSURE: 74 MMHG | RESPIRATION RATE: 15 BRPM | WEIGHT: 197 LBS | HEIGHT: 70 IN | TEMPERATURE: 97.7 F | HEART RATE: 62 BPM | OXYGEN SATURATION: 99 %

## 2020-04-16 LAB
ALBUMIN SERPL-MCNC: 3.9 GM/DL (ref 3.4–5)
ALP BLD-CCNC: 88 IU/L (ref 40–129)
ALT SERPL-CCNC: 18 U/L (ref 10–40)
ANION GAP SERPL CALCULATED.3IONS-SCNC: 10 MMOL/L (ref 4–16)
AST SERPL-CCNC: 12 IU/L (ref 15–37)
BASOPHILS ABSOLUTE: 0 K/CU MM
BASOPHILS RELATIVE PERCENT: 0.7 % (ref 0–1)
BILIRUB SERPL-MCNC: 0.2 MG/DL (ref 0–1)
BUN BLDV-MCNC: 8 MG/DL (ref 6–23)
CALCIUM SERPL-MCNC: 9 MG/DL (ref 8.3–10.6)
CHLORIDE BLD-SCNC: 100 MMOL/L (ref 99–110)
CO2: 28 MMOL/L (ref 21–32)
CREAT SERPL-MCNC: 1.1 MG/DL (ref 0.9–1.3)
DIFFERENTIAL TYPE: ABNORMAL
EOSINOPHILS ABSOLUTE: 0.3 K/CU MM
EOSINOPHILS RELATIVE PERCENT: 5.1 % (ref 0–3)
GFR AFRICAN AMERICAN: >60 ML/MIN/1.73M2
GFR NON-AFRICAN AMERICAN: >60 ML/MIN/1.73M2
GLUCOSE BLD-MCNC: 69 MG/DL (ref 70–99)
HCT VFR BLD CALC: 42.4 % (ref 42–52)
HEMOGLOBIN: 13.9 GM/DL (ref 13.5–18)
IMMATURE NEUTROPHIL %: 0.7 % (ref 0–0.43)
LIPASE: 22 IU/L (ref 13–60)
LYMPHOCYTES ABSOLUTE: 1.4 K/CU MM
LYMPHOCYTES RELATIVE PERCENT: 25.1 % (ref 24–44)
MAGNESIUM: 2 MG/DL (ref 1.8–2.4)
MCH RBC QN AUTO: 31 PG (ref 27–31)
MCHC RBC AUTO-ENTMCNC: 32.8 % (ref 32–36)
MCV RBC AUTO: 94.6 FL (ref 78–100)
MONOCYTES ABSOLUTE: 0.7 K/CU MM
MONOCYTES RELATIVE PERCENT: 11.7 % (ref 0–4)
NUCLEATED RBC %: 0 %
PDW BLD-RTO: 13 % (ref 11.7–14.9)
PLATELET # BLD: 201 K/CU MM (ref 140–440)
PMV BLD AUTO: 10.7 FL (ref 7.5–11.1)
POTASSIUM SERPL-SCNC: 3.4 MMOL/L (ref 3.5–5.1)
RBC # BLD: 4.48 M/CU MM (ref 4.6–6.2)
SEGMENTED NEUTROPHILS ABSOLUTE COUNT: 3.2 K/CU MM
SEGMENTED NEUTROPHILS RELATIVE PERCENT: 56.7 % (ref 36–66)
SODIUM BLD-SCNC: 138 MMOL/L (ref 135–145)
TOTAL IMMATURE NEUTOROPHIL: 0.04 K/CU MM
TOTAL NUCLEATED RBC: 0 K/CU MM
TOTAL PROTEIN: 6.5 GM/DL (ref 6.4–8.2)
TROPONIN T: <0.01 NG/ML
WBC # BLD: 5.7 K/CU MM (ref 4–10.5)

## 2020-04-16 PROCEDURE — 99284 EMERGENCY DEPT VISIT MOD MDM: CPT

## 2020-04-16 PROCEDURE — 83735 ASSAY OF MAGNESIUM: CPT

## 2020-04-16 PROCEDURE — 71045 X-RAY EXAM CHEST 1 VIEW: CPT

## 2020-04-16 PROCEDURE — 70450 CT HEAD/BRAIN W/O DYE: CPT

## 2020-04-16 PROCEDURE — 96374 THER/PROPH/DIAG INJ IV PUSH: CPT

## 2020-04-16 PROCEDURE — 83690 ASSAY OF LIPASE: CPT

## 2020-04-16 PROCEDURE — 85025 COMPLETE CBC W/AUTO DIFF WBC: CPT

## 2020-04-16 PROCEDURE — 83880 ASSAY OF NATRIURETIC PEPTIDE: CPT

## 2020-04-16 PROCEDURE — 96376 TX/PRO/DX INJ SAME DRUG ADON: CPT

## 2020-04-16 PROCEDURE — 80053 COMPREHEN METABOLIC PANEL: CPT

## 2020-04-16 PROCEDURE — 93005 ELECTROCARDIOGRAM TRACING: CPT | Performed by: EMERGENCY MEDICINE

## 2020-04-16 PROCEDURE — 2580000003 HC RX 258: Performed by: EMERGENCY MEDICINE

## 2020-04-16 PROCEDURE — 84484 ASSAY OF TROPONIN QUANT: CPT

## 2020-04-16 RX ORDER — SODIUM CHLORIDE, SODIUM LACTATE, POTASSIUM CHLORIDE, CALCIUM CHLORIDE 600; 310; 30; 20 MG/100ML; MG/100ML; MG/100ML; MG/100ML
1000 INJECTION, SOLUTION INTRAVENOUS ONCE
Status: COMPLETED | OUTPATIENT
Start: 2020-04-16 | End: 2020-04-16

## 2020-04-16 RX ADMIN — SODIUM CHLORIDE, POTASSIUM CHLORIDE, SODIUM LACTATE AND CALCIUM CHLORIDE 1000 ML: 600; 310; 30; 20 INJECTION, SOLUTION INTRAVENOUS at 21:19

## 2020-04-16 ASSESSMENT — ENCOUNTER SYMPTOMS
RECTAL PAIN: 0
ABDOMINAL PAIN: 0
BACK PAIN: 0
NAUSEA: 0
COLOR CHANGE: 0
FACIAL SWELLING: 0
SHORTNESS OF BREATH: 0
CHEST TIGHTNESS: 0
EYE PAIN: 0
WHEEZING: 0
TROUBLE SWALLOWING: 0
BOWEL INCONTINENCE: 0
SINUS PRESSURE: 0
BLOOD IN STOOL: 0
VISUAL CHANGE: 0
APNEA: 0
EYE DISCHARGE: 0
STRIDOR: 0
EYES NEGATIVE: 1
RESPIRATORY NEGATIVE: 1
RHINORRHEA: 0
PHOTOPHOBIA: 0
CHOKING: 0
CONSTIPATION: 0
EYE REDNESS: 0
COUGH: 0
DIARRHEA: 1
VOICE CHANGE: 0
EYE ITCHING: 0
SINUS PAIN: 0
VOMITING: 0

## 2020-04-17 PROCEDURE — 93010 ELECTROCARDIOGRAM REPORT: CPT | Performed by: INTERNAL MEDICINE

## 2020-04-17 NOTE — ED TRIAGE NOTES
Pt presents to the ER via EMS for complaints of a fall while in the shower; EMS report no loss of consciousness; pt is from a group home; pt does have a red to on the right side of his head from the fall; pt is alert and oriented; pt does have a history of epilepsy;

## 2020-04-17 NOTE — ED NOTES
Bed: ED-25  Expected date:   Expected time:   Means of arrival:   Comments:  EMS     Patience Grace RN  04/16/20 2007

## 2020-04-17 NOTE — ED PROVIDER NOTES
[address] ENCOUNTER      Pt Name: Darleen Stewart  MRN: [id number]  [place] 1955  Date of evaluation: 4/16/2020  Provider: Mariya Garcia DO    CHIEF COMPLAINT       Chief Complaint   Patient presents with    Fall         HISTORY OF PRESENT ILLNESS      Darleen Stewart is a 62 y.o. male who presents to the emergency department  for   Chief Complaint   Patient presents with   [place]       The history is provided by the patient and the EMS personnel. No  was used. Fall   The accident occurred less than 1 hour ago. The fall occurred while standing. He fell from a height of 1 to 2 ft. He landed on a hard floor. The point of impact was the head. The pain is at a severity of 0/10. The patient is experiencing no pain. He was ambulatory at the scene. There was no entrapment after the fall. There was no drug use involved in the accident. There was no alcohol use involved in the accident. Pertinent negatives include no visual change, no fever, no numbness, no abdominal pain, no bowel incontinence, no nausea, no vomiting, no hematuria, no headaches, no hearing loss, no loss of consciousness and no tingling. He has tried nothing for the symptoms. The treatment provided no relief. Nursing Notes, Triage Notes & Vital Signs were reviewed. REVIEW OF SYSTEMS    (2-9 systems for level 4, 10 or more for level 5)     Review of Systems   Constitutional: Negative. Negative for activity change, appetite change, chills, fatigue and fever. HENT: Negative. Negative for congestion, dental problem, drooling, facial swelling, nosebleeds, postnasal drip, rhinorrhea, sinus pressure, sinus pain, tinnitus, trouble swallowing and voice change. Eyes: Negative. Negative for photophobia, pain, discharge, redness, itching and visual disturbance. Respiratory: Negative.   Negative for apnea, cough, choking, chest tightness, patch topically 3 times daily as needed    Historical Provider, MD   Simethicone (MI-ACID GAS RELIEF PO) Take 1 Container by mouth every 4 hours as needed 10 cc every 4 hrs not to exceed 60 cc in 24 hrs    Historical Provider, MD   guaiFENesin (MUCINEX) 600 MG extended release tablet Take 1,200 mg by mouth daily as needed for Congestion    Historical Provider, MD   Menthol-Methyl Salicylate (MUSCLE RUB) 10-15 % CREA cream Apply 1 applicator topically as needed    Historical Provider, MD   pseudoephedrine (SUDAFED) 30 MG tablet Take 30 mg by mouth every 4 hours as needed for Congestion    Historical Provider, MD   neomycin-bacitracin-polymyxin (NEOSPORIN) 3.5-400-5000 OINT ointment Apply 1 each topically as needed Apply topically 4 times daily. Historical Provider, MD   lacosamide (VIMPAT) 200 MG tablet Take 200 mg by mouth as needed. Give 1 tab after seizure activity as needed    Historical Provider, MD   lacosamide (VIMPAT) 200 MG tablet Take 1 tablet by mouth 2 times daily for 30 days.  3/25/20 4/24/20  Louise Woodward,    latanoprost (XALATAN) 0.005 % ophthalmic solution Place 1 drop into both eyes nightly    Historical Provider, MD   timolol (TIMOPTIC) 0.5 % ophthalmic solution Place 1 drop into both eyes daily     Historical Provider, MD   Lactobacillus (ACIDOPHILUS) CAPS capsule Take 1 capsule by mouth daily    Historical Provider, MD   aspirin 81 MG tablet Take 81 mg by mouth daily    Historical Provider, MD   Wheat Dextrin (BENEFIBER) POWD Take 4 g by mouth 2 times daily Takes 3 tsp in liquid twice per day     Historical Provider, MD   Lactulose Encephalopathy (ENULOSE PO) Take 30 mLs by mouth 3 times daily    Historical Provider, MD   folic acid (FOLVITE) 1 MG tablet Take 1 mg by mouth daily    Historical Provider, MD   PARoxetine (PAXIL) 20 MG tablet Take 20 mg by mouth every morning    Historical Provider, MD   QUEtiapine (SEROQUEL XR) 50 MG extended release tablet Take 50 mg by mouth nightly Diarrhea, Disp-20 capsule, R-0Normal      ondansetron (ZOFRAN-ODT) 4 MG disintegrating tablet Place 1 tablet under the tongue every 8 hours as needed for Nausea or Vomiting May Sub regular tablet (non-ODT) if insurance does not cover ODT., Disp-20 tablet, R-0Normal      dicyclomine (BENTYL) 10 MG capsule Take 1 capsule by mouth every 6 hours as needed (cramps), Disp-20 capsule, R-0Normal      amLODIPine (NORVASC) 5 MG tablet Take 5 mg by mouth dailyHistorical Med      clonazePAM (KLONOPIN) 0.5 MG tablet Take 0.25 mg by mouth daily. Historical Med      divalproex (DEPAKOTE) 500 MG DR tablet Take 1,000 mg by mouth nightlyHistorical Med      hydrochlorothiazide (MICROZIDE) 12.5 MG capsule Take 12.5 mg by mouth dailyHistorical Med      mirabegron (MYRBETRIQ) 50 MG TB24 Take 50 mg by mouth dailyHistorical Med      omeprazole (PRILOSEC) 20 MG delayed release capsule Take 20 mg by mouth 2 times daily (before meals)Historical Med      primidone (MYSOLINE) 50 MG tablet Take 50 mg by mouth 3 times dailyHistorical Med      !! zonisamide (ZONEGRAN) 100 MG capsule Take 100 mg by mouth every morningHistorical Med      !! zonisamide (ZONEGRAN) 100 MG capsule Take 300 mg by mouth nightlyHistorical Med      acetaminophen (TYLENOL) 325 MG tablet Take 650 mg by mouth every 4 hours as needed for PainHistorical Med      ibuprofen (ADVIL;MOTRIN) 600 MG tablet Take 600 mg by mouth every 6 hours as needed for PainHistorical Med      Menthol (ICY HOT BACK) 5 % PTCH Apply 1 patch topically 3 times daily as neededHistorical Med      Simethicone (MI-ACID GAS RELIEF PO) Take 1 Container by mouth every 4 hours as needed 10 cc every 4 hrs not to exceed 60 cc in 24 hrsHistorical Med      guaiFENesin (MUCINEX) 600 MG extended release tablet Take 1,200 mg by mouth daily as needed for CongestionHistorical Med      Menthol-Methyl Salicylate (MUSCLE RUB) 10-15 % CREA cream Apply 1 applicator topically as neededHistorical Med      pseudoephedrine (SUDAFED) 30 MG tablet Take 30 mg by mouth every 4 hours as needed for CongestionHistorical Med      neomycin-bacitracin-polymyxin (NEOSPORIN) 3.5-400-5000 OINT ointment Apply 1 each topically as needed Apply topically 4 times daily. , Topical, PRN, Historical Med      !! lacosamide (VIMPAT) 200 MG tablet Take 200 mg by mouth as needed. Give 1 tab after seizure activity as neededHistorical Med      !! lacosamide (VIMPAT) 200 MG tablet Take 1 tablet by mouth 2 times daily for 30 days. , Disp-60 tablet, R-0Print      latanoprost (XALATAN) 0.005 % ophthalmic solution Place 1 drop into both eyes nightlyHistorical Med      timolol (TIMOPTIC) 0.5 % ophthalmic solution Place 1 drop into both eyes daily Historical Med      Lactobacillus (ACIDOPHILUS) CAPS capsule Take 1 capsule by mouth dailyHistorical Med      aspirin 81 MG tablet Take 81 mg by mouth dailyHistorical Med      Wheat Dextrin (BENEFIBER) POWD Take 4 g by mouth 2 times daily Takes 3 tsp in liquid twice per day Historical Med      Lactulose Encephalopathy (ENULOSE PO) Take 30 mLs by mouth 3 times dailyHistorical Med      folic acid (FOLVITE) 1 MG tablet Take 1 mg by mouth dailyHistorical Med      PARoxetine (PAXIL) 20 MG tablet Take 20 mg by mouth every morningHistorical Med      QUEtiapine (SEROQUEL XR) 50 MG extended release tablet Take 50 mg by mouth nightlyHistorical Med      tamsulosin (FLOMAX) 0.4 MG capsule Take 1 capsule by mouth daily, Disp-30 capsule, R-0Print      simvastatin (ZOCOR) 20 MG tablet Take 20 mg by mouth nightly. Cholecalciferol (VITAMIN D3) 1000 UNITS CAPS Take 1 tablet by mouth daily. clorazepate (TRANXENE) 7.5 MG tablet Take 7.5 mg by mouth 2 times daily. !! - Potential duplicate medications found. Please discuss with provider. ALLERGIES     Patient has no known allergies.     FAMILY HISTORY       Family History   Problem Relation Age of Onset    Heart Disease Mother         atrial fib    High Blood Pressure adenopathy. Skin:     Capillary Refill: Capillary refill takes less than 2 seconds. Coloration: Skin is not jaundiced or pale. Findings: No bruising, erythema, lesion or rash. Neurological:      General: No focal deficit present. Mental Status: He is alert and oriented to person, place, and time. Mental status is at baseline. Cranial Nerves: No cranial nerve deficit. Sensory: No sensory deficit. Motor: No weakness. Coordination: Coordination normal.      Gait: Gait normal.      Deep Tendon Reflexes: Reflexes normal.   Psychiatric:         Mood and Affect: Mood normal.         Behavior: Behavior normal.         Thought Content: Thought content normal.         Judgment: Judgment normal.         DIAGNOSTIC RESULTS     Labs Reviewed   CBC WITH AUTO DIFFERENTIAL - Abnormal; Notable for the following components:       Result Value    RBC 4.48 (*)     Monocytes % 11.7 (*)     Eosinophils % 5.1 (*)     Immature Neutrophil % 0.7 (*)     All other components within normal limits   COMPREHENSIVE METABOLIC PANEL W/ REFLEX TO MG FOR LOW K - Abnormal; Notable for the following components:    Potassium 3.4 (*)     Glucose 69 (*)     AST 12 (*)     All other components within normal limits   LIPASE   TROPONIN   MAGNESIUM   URINALYSIS          EKG: All EKG's are interpreted by the Emergency Department Physician who either signs or Co-signs this chart in the absence of a cardiologist.       EKG Interpretation    Interpreted by emergency department physician    Rhythm: normal sinus   Rate: normal  Axis: normal  Ectopy: none  Conduction: normal  ST Segments: no acute change  T Waves: no acute change  Q Waves: none    Clinical Impression: no acute changes    Edenilson Robledo     RADIOLOGY:     Non-plain film images such as CT, Ultrasound and MRI are read by the radiologist. Plain radiographic images are visualized and preliminarily interpreted by the emergency physician.        Interpretation per the Radiologist below, if available at the time of this note:    CT Head WO Contrast   Final Result   No acute intracranial abnormality. 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 XR CHEST PORTABLE   Final Result   No convincing acute cardiopulmonary abnormality. ED BEDSIDE ULTRASOUND:   Performed by ED Physician Kain Roberts DO       LABS:  Labs Reviewed   CBC WITH AUTO DIFFERENTIAL - Abnormal; Notable for the following components:       Result Value    RBC 4.48 (*)     Monocytes % 11.7 (*)     Eosinophils % 5.1 (*)     Immature Neutrophil % 0.7 (*)     All other components within normal limits   COMPREHENSIVE METABOLIC PANEL W/ REFLEX TO MG FOR LOW K - Abnormal; Notable for the following components:    Potassium 3.4 (*)     Glucose 69 (*)     AST 12 (*)     All other components within normal limits   LIPASE   TROPONIN   MAGNESIUM   URINALYSIS       All other labs were within normal range or not returned as of this dictation. EMERGENCY DEPARTMENT COURSE and DIFFERENTIAL DIAGNOSIS/MDM:   Vitals:    Vitals:    04/16/20 2006 04/16/20 2008 04/16/20 2122 04/16/20 2202   BP: (!) 154/78 (!) 156/67 (!) 142/81 136/74   Pulse: 60 62     Resp: 14 15     Temp: 97.7 °F (36.5 °C)      TempSrc: Oral      SpO2: 96% 100% 100% 99%   Weight: 197 lb (89.4 kg)      Height: 5' 10\" (1.778 m)              MDM  Number of Diagnoses or Management Options  Closed head injury, initial encounter:   Fall, initial encounter:   Diagnosis management comments: 62-year-old male presents emergency department via EMS from a nursing home status post mechanical fall. Patient reports no pain whatsoever. Patient has no obvious signs of trauma. Lab work is unremarkable. CT scan of the head was negative. Patient is ambulatory. Patient reports continued diarrhea which he was evaluated for and admitted to the hospital for 1 week ago. Stool studies including C. difficile, Campylobacter were negative. Patient has no abdominal tenderness. Will discharge patient to home with return precautions and primary care follow-up in the next 1 to 2 days. Patient is well-appearing and vital signs are normal and stable other than mild essential hypertension. Amount and/or Complexity of Data Reviewed  Clinical lab tests: ordered and reviewed  Tests in the radiology section of CPT®: ordered and reviewed  Tests in the medicine section of CPT®: ordered and reviewed    Risk of Complications, Morbidity, and/or Mortality  Presenting problems: moderate  Diagnostic procedures: moderate  Management options: moderate    Critical Care  Total time providing critical care: < 30 minutes    Patient Progress  Patient progress: improved        REASSESSMENT          CRITICAL CARE TIME     Total critical care time provided today was 0 minutes. This excludes seperately billable procedures and family discussion time. Critical care time provided for obtaining history, conducting a physical exam, performing and monitoring interventions, ordering, collecting and interpreting tests, and establishing medical decision-making. There was a potential for life/limb threatening pathology requiring close evaluation and intervention with concern for patient decompensation. CONSULTS:  None    PROCEDURES:  None performed unless otherwise noted below     Procedures        FINAL IMPRESSION      1. Fall, initial encounter    2. Closed head injury, initial encounter    3.  Diarrhea, unspecified type          DISPOSITION/PLAN   DISPOSITION Decision To Discharge 04/16/2020 09:26:27 PM      PATIENT REFERRED TO:  Kelle Strong MD  [address] [place] [id number] [phone]    In 2 days        DISCHARGE MEDICATIONS:  Discharge Medication List as of 4/16/2020 10:23 PM          ED Provider Disposition Time  DISPOSITION Decision To Discharge 04/16/2020 09:26:27 PM      Appropriate personal protective equipment was worn during the patient's evaluation. These included surgical, eye protection, surgical mask or in 95 respirator and gloves. The patient was also placed in a surgical mask for the prevention of possible spread of respiratory viral illnesses. The Patient was instructed to read the package inserts with any medication that was prescribed. Major potential reactions and medication interactions were discussed. The Patient understands that there are numerous possible adverse reactions not covered. The patient was also instructed to arrange follow-up with his or her primary care provider for review of any pending labwork or incidental findings on any radiology results that were obtained. All efforts were made to discuss any incidental findings that require further monitoring. Controlled Substances Monitoring:     RX Monitoring 1/23/2019   Attestation The Prescription Monitoring Report for this patient was reviewed today. Periodic Controlled Substance Monitoring Possible medication side effects, risk of tolerance/dependence & alternative treatments discussed. ;No signs of potential drug abuse or diversion identified.        (Please note that portions of this note were completed with a voice recognition program.  Efforts were made to edit the dictations but occasionally words are mis-transcribed.)    Jorje Goldberg, DO (electronically signed)  Attending Emergency Physician            Jorje Goldberg, DO  04/16/20 2132       Jorje Goldberg, DO  04/17/20 800 Prdenisntemmanuel Marcus DO  04/25/20 2008

## 2020-04-17 NOTE — CARE COORDINATION
Patient identified as potential readmission. Last admission 4/10-4/13 for abdominal pain and diarrhea. Patient here today following a fall while in the shower. Denies loss of consciousness. CM reviewed previous CM documentation. Patient lives at home with 2 roommates, with 24/7 staff. Sophie Whalen is home supervisor. Patient has a legal guardian Rachel Li [phone]. No acute findings identified today. Readmission was avoided and patient was able to be discharged back to group home with outpatient follow up.

## 2020-04-23 ENCOUNTER — CARE COORDINATION (OUTPATIENT)
Dept: CASE MANAGEMENT | Age: 65
End: 2020-04-23

## 2020-04-28 LAB
EKG ATRIAL RATE: 60 BPM
EKG DIAGNOSIS: NORMAL
EKG P AXIS: 4 DEGREES
EKG P-R INTERVAL: 164 MS
EKG Q-T INTERVAL: 446 MS
EKG QRS DURATION: 158 MS
EKG QTC CALCULATION (BAZETT): 446 MS
EKG R AXIS: -59 DEGREES
EKG T AXIS: -13 DEGREES
EKG VENTRICULAR RATE: 60 BPM

## 2020-04-30 ENCOUNTER — CARE COORDINATION (OUTPATIENT)
Dept: CASE MANAGEMENT | Age: 65
End: 2020-04-30

## 2020-04-30 NOTE — CARE COORDINATION
[facility] Transitions Follow Up Call    2020    Patient: Maida Deal  Patient : 1955   MRN: [id number]  Reason for Admission:   Discharge Date: 20 RARS: Readmission Risk Score: 23         Spoke with: [address] Transitions Subsequent and Final Call    Subsequent and Final Calls  Do you have any ongoing symptoms?:  No  Have your medications changed?:  No  Do you have any questions related to your medications?:  No  Do you currently have any active services?:  Yes  Are you currently active with any services?:  Home Health  Do you have any needs or concerns that I can assist you with?:  No  Identified Barriers:  None  Care Transitions Interventions  Other Interventions:          CTN spoke to Opal Hdez, pt's guardian who stated pt is doing OK since recent fall and ED visit. Stated he has CP and left side is shorter and smaller than right which makes his balance off causing him to fall in shower. Stated he now has to wear a gait belt and helmet in shower otherwise would have to go to SNF for recovery. Stated he was offered IP rehab at hospital but he was not ready for that yet. Pt has PT/OT visits in [address] [place] [address] as well as RN once a week and progressing well. No concerns at this time. 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 Follow Up  No future appointments.     Aneta Werner RN

## 2020-05-16 ENCOUNTER — HOSPITAL ENCOUNTER (EMERGENCY)
Age: 65
Discharge: HOME | End: 2020-05-16
Attending: EMERGENCY MEDICINE
Payer: MEDICARE

## 2020-05-16 VITALS
BODY MASS INDEX: 28.2 KG/M2 | HEART RATE: 66 BPM | TEMPERATURE: 97.6 F | WEIGHT: 197 LBS | SYSTOLIC BLOOD PRESSURE: 117 MMHG | OXYGEN SATURATION: 96 % | DIASTOLIC BLOOD PRESSURE: 69 MMHG | RESPIRATION RATE: 16 BRPM | HEIGHT: 70 IN

## 2020-05-16 LAB
ALBUMIN SERPL-MCNC: 3.2 GM/DL (ref 3.4–5)
ALP BLD-CCNC: 65 IU/L (ref 40–129)
ALT SERPL-CCNC: 9 U/L (ref 10–40)
AMMONIA: 66 UMOL/L (ref 16–60)
ANION GAP SERPL CALCULATED.3IONS-SCNC: 13 MMOL/L (ref 4–16)
AST SERPL-CCNC: 12 IU/L (ref 15–37)
BACTERIA: NEGATIVE /HPF
BASOPHILS ABSOLUTE: 0 K/CU MM
BASOPHILS RELATIVE PERCENT: 0.3 % (ref 0–1)
BILIRUB SERPL-MCNC: 0.3 MG/DL (ref 0–1)
BILIRUBIN URINE: NEGATIVE MG/DL
BLOOD, URINE: NEGATIVE
BUN BLDV-MCNC: 8 MG/DL (ref 6–23)
CALCIUM SERPL-MCNC: 9 MG/DL (ref 8.3–10.6)
CHLORIDE BLD-SCNC: 98 MMOL/L (ref 99–110)
CLARITY: CLEAR
CO2: 24 MMOL/L (ref 21–32)
COLOR: YELLOW
CREAT SERPL-MCNC: 0.7 MG/DL (ref 0.9–1.3)
DIFFERENTIAL TYPE: ABNORMAL
DOSE AMOUNT: ABNORMAL
DOSE TIME: ABNORMAL
EOSINOPHILS ABSOLUTE: 0.3 K/CU MM
EOSINOPHILS RELATIVE PERCENT: 3 % (ref 0–3)
GFR AFRICAN AMERICAN: >60 ML/MIN/1.73M2
GFR NON-AFRICAN AMERICAN: >60 ML/MIN/1.73M2
GLUCOSE BLD-MCNC: 109 MG/DL (ref 70–99)
GLUCOSE, URINE: NEGATIVE MG/DL
HCT VFR BLD CALC: 42 % (ref 42–52)
HEMOGLOBIN: 14 GM/DL (ref 13.5–18)
IMMATURE NEUTROPHIL %: 0.4 % (ref 0–0.43)
KETONES, URINE: NEGATIVE MG/DL
LEUKOCYTE ESTERASE, URINE: NEGATIVE
LYMPHOCYTES ABSOLUTE: 1 K/CU MM
LYMPHOCYTES RELATIVE PERCENT: 9.3 % (ref 24–44)
MCH RBC QN AUTO: 31.2 PG (ref 27–31)
MCHC RBC AUTO-ENTMCNC: 33.3 % (ref 32–36)
MCV RBC AUTO: 93.5 FL (ref 78–100)
MONOCYTES ABSOLUTE: 1.5 K/CU MM
MONOCYTES RELATIVE PERCENT: 13.7 % (ref 0–4)
MUCUS: ABNORMAL HPF
NITRITE URINE, QUANTITATIVE: NEGATIVE
NUCLEATED RBC %: 0 %
PDW BLD-RTO: 12.6 % (ref 11.7–14.9)
PH, URINE: 6 (ref 5–8)
PLATELET # BLD: 168 K/CU MM (ref 140–440)
PMV BLD AUTO: 11.2 FL (ref 7.5–11.1)
POTASSIUM SERPL-SCNC: 3.3 MMOL/L (ref 3.5–5.1)
PROTEIN UA: NEGATIVE MG/DL
RBC # BLD: 4.49 M/CU MM (ref 4.6–6.2)
RBC URINE: 1 /HPF (ref 0–3)
SEGMENTED NEUTROPHILS ABSOLUTE COUNT: 8 K/CU MM
SEGMENTED NEUTROPHILS RELATIVE PERCENT: 73.3 % (ref 36–66)
SODIUM BLD-SCNC: 135 MMOL/L (ref 135–145)
SPECIFIC GRAVITY UA: 1.01 (ref 1–1.03)
TOTAL IMMATURE NEUTOROPHIL: 0.04 K/CU MM
TOTAL NUCLEATED RBC: 0 K/CU MM
TOTAL PROTEIN: 7 GM/DL (ref 6.4–8.2)
TRICHOMONAS: ABNORMAL /HPF
UROBILINOGEN, URINE: 1 MG/DL (ref 0.2–1)
VALPROIC ACID LEVEL: 43 UG/ML (ref 50–100)
WBC # BLD: 10.9 K/CU MM (ref 4–10.5)
WBC UA: 2 /HPF (ref 0–2)

## 2020-05-16 PROCEDURE — 85025 COMPLETE CBC W/AUTO DIFF WBC: CPT

## 2020-05-16 PROCEDURE — 80164 ASSAY DIPROPYLACETIC ACD TOT: CPT

## 2020-05-16 PROCEDURE — 82140 ASSAY OF AMMONIA: CPT

## 2020-05-16 PROCEDURE — 80053 COMPREHEN METABOLIC PANEL: CPT

## 2020-05-16 PROCEDURE — 81001 URINALYSIS AUTO W/SCOPE: CPT

## 2020-05-16 PROCEDURE — 6370000000 HC RX 637 (ALT 250 FOR IP): Performed by: EMERGENCY MEDICINE

## 2020-05-16 PROCEDURE — 99285 EMERGENCY DEPT VISIT HI MDM: CPT

## 2020-05-16 RX ORDER — LACTULOSE 10 G/15ML
30 SOLUTION ORAL ONCE
Status: COMPLETED | OUTPATIENT
Start: 2020-05-16 | End: 2020-05-16

## 2020-05-16 RX ADMIN — LACTULOSE 30 G: 10 SOLUTION ORAL at 20:30

## 2020-05-16 ASSESSMENT — PAIN DESCRIPTION - FREQUENCY: FREQUENCY: CONTINUOUS

## 2020-05-16 ASSESSMENT — PAIN DESCRIPTION - PAIN TYPE: TYPE: ACUTE PAIN

## 2020-05-16 ASSESSMENT — PAIN SCALES - WONG BAKER: WONGBAKER_NUMERICALRESPONSE: 4

## 2020-05-16 ASSESSMENT — PAIN DESCRIPTION - LOCATION: LOCATION: BUTTOCKS

## 2020-05-16 ASSESSMENT — PAIN DESCRIPTION - DESCRIPTORS: DESCRIPTORS: ACHING

## 2020-05-16 NOTE — ED PROVIDER NOTES
Triage Chief Complaint:   Wound Infection    [place]:  Darleen Stewart is a 62 y.o. male that presents from a group with pressure ulcer to left butt cheek. There for a 1-1.5 weeks. Started as a bruise from a fall a few weeks ago. States there is now a hole there. Area is painful. Unknown fevers. Denies any chest pain, shortness of breath or cough. Patient sister states that he has had some increasing generalized weakness recently and they are concerned although so he was seen at his [de-identified] office yesterday where they tested his ammonia levels and Depakote levels per patient's sister. States he just has not been acting right and is not eating and drinking like they expect. On top of these things they found the wound this morning which prompted visit to the ER. After speaking with caretaker at his facility, she explained that that a few weeks ago his PCP decrease him to twice daily lactulose as opposed to 3 times daily lactulose which she had been on for an extended period of time. She is unsure why his primary care physician made this change. ROS:   Review of Systems   Constitutional: Negative for chills and fever. HENT: Negative for congestion, rhinorrhea and sore throat. Eyes: Negative for redness and visual disturbance. Respiratory: Negative for cough and shortness of breath. Cardiovascular: Negative for chest pain and leg swelling. Gastrointestinal: Negative for abdominal pain, nausea and vomiting. Genitourinary: Negative for dysuria and frequency. Musculoskeletal: Negative for arthralgias and back pain. Skin: Positive for wound (bruise to left buttocks, now a wound). Negative for rash. Neurological: Positive for weakness (generalized, per sister, patient denies). Negative for dizziness, syncope, numbness and headaches. Psychiatric/Behavioral: Negative for hallucinations and suicidal ideas.        Past Medical History:   Diagnosis Date    Anemia     Aspiration pneumonia [facility])     dx 6/9/2014 with this per ecf/ per old chart dx with pneumonia with admission 9/18/2018    Cerebral palsy ([facility] [address] [address]     \"has no feeling on left side of body\"alert but has some dementia but not diagnosed, has good long term but poor short term and wakes up disoriented after a nap\"(per yaneth)(2014)    Depression     Epilepsy ([address] 1961    last seizure 2/2018- hx grand mal    Frequent falls     with phone assess- family stated pt fell this am (7/10/2013\"in the process of trying to find a facility to help build him back up- ([facility] now at [facility])    Glaucoma     \"has been in treatment for this in the past- no treatment needed at present\"    History of IBS     Hx MRSA infection     + nasal culture 4/2014    Hyperammonemia ([address]     Hypertension     on Lisinopril    IBS (irritable bowel syndrome)     ulcerative colitis    Kidney stone     for surgery for stent placement 7/11/2013    Mood disorder ([address]     per staff at [address] [facility] Occasional tremors     \"makes it difficult for him to write\"    Prostatitis     hx given per H&P old chart    Thyroid disease     Ulcerative colitis ([facility] [address]     hx given per staff at [facility] 4/13/2015     Past Surgical History:   Procedure Laterality Date    CHOLECYSTECTOMY      COLONOSCOPY  8/19/14, 5/2012 8/19/14: hemorrhoids, 5/2012 at [address] Left 07/11/2013    with stnet placement   [address]  [address] [place] [address]      OTHER SURGICAL HISTORY  04/15/2015    Revision of vagal nerve stimulator    UPPER GASTROINTESTINAL ENDOSCOPY N/A 11/13/2018    EGD DILATION BALLOON AND BIOPSY performed by Elpidio Chang MD at [facility]  2002    Has to have bettery changed every 5 yrs.       Family History   Problem Relation Age of Onset    Heart Disease Mother         atrial fib    High Blood Pressure Mother     Asthma Mother     High Cholesterol Mother     Depression Mother     Migraines Mother    [address] [facility]  divalproex (DEPAKOTE) 500 MG DR tablet Take 1,000 mg by mouth nightly      hydrochlorothiazide (MICROZIDE) 12.5 MG capsule Take 12.5 mg by mouth daily      mirabegron (MYRBETRIQ) 50 MG TB24 Take 50 mg by mouth daily      omeprazole (PRILOSEC) 20 MG delayed release capsule Take 20 mg by mouth 2 times daily (before meals)      primidone (MYSOLINE) 50 MG tablet Take 50 mg by mouth 3 times daily      zonisamide (ZONEGRAN) 100 MG capsule Take 100 mg by mouth every morning      zonisamide (ZONEGRAN) 100 MG capsule Take 300 mg by mouth nightly      acetaminophen (TYLENOL) 325 MG tablet Take 650 mg by mouth every 4 hours as needed for Pain      ibuprofen (ADVIL;MOTRIN) 600 MG tablet Take 600 mg by mouth every 6 hours as needed for Pain      Menthol (ICY HOT BACK) 5 % PTCH Apply 1 patch topically 3 times daily as needed      Simethicone (MI-ACID GAS RELIEF PO) Take 1 Container by mouth every 4 hours as needed 10 cc every 4 hrs not to exceed 60 cc in 24 hrs      guaiFENesin (MUCINEX) 600 MG extended release tablet Take 1,200 mg by mouth daily as needed for Congestion      Menthol-Methyl Salicylate (MUSCLE RUB) 10-15 % CREA cream Apply 1 applicator topically as needed      pseudoephedrine (SUDAFED) 30 MG tablet Take 30 mg by mouth every 4 hours as needed for Congestion      neomycin-bacitracin-polymyxin (NEOSPORIN) 3.5-400-5000 OINT ointment Apply 1 each topically as needed Apply topically 4 times daily.  lacosamide (VIMPAT) 200 MG tablet Take 200 mg by mouth as needed. Give 1 tab after seizure activity as needed      lacosamide (VIMPAT) 200 MG tablet Take 1 tablet by mouth 2 times daily for 30 days.  60 tablet 0    latanoprost (XALATAN) 0.005 % ophthalmic solution Place 1 drop into both eyes nightly      timolol (TIMOPTIC) 0.5 % ophthalmic solution Place 1 drop into both eyes daily       Lactobacillus (ACIDOPHILUS) CAPS capsule Take 1 capsule by mouth daily      aspirin 81 MG tablet Take 81 mg by General: No swelling or signs of injury. Skin:     General: Skin is warm and dry. Neurological:      General: No focal deficit present. Mental Status: He is alert. Mental status is at baseline. Cranial Nerves: No cranial nerve deficit. Comments: Hx of CP. Poor mobility at baseline. Unchanged per patient.    Psychiatric:         Mood and Affect: Mood normal.         Behavior: Behavior normal.         I have reviewed and interpreted all of the currently available lab results from this visit (if applicable):  Results for orders placed or performed during the hospital encounter of 05/16/20   CBC Auto Differential   Result Value Ref Range    WBC 10.9 (H) 4.0 - 10.5 K/CU MM    RBC 4.49 (L) 4.6 - 6.2 M/CU MM    Hemoglobin 14.0 13.5 - 18.0 GM/DL    Hematocrit 42.0 42 - 52 %    MCV 93.5 78 - 100 FL    MCH 31.2 (H) 27 - 31 PG    MCHC 33.3 32.0 - 36.0 %    RDW 12.6 11.7 - 14.9 %    Platelets 577 853 - 281 K/CU MM    MPV 11.2 (H) 7.5 - 11.1 FL    Differential Type AUTOMATED DIFFERENTIAL     Segs Relative 73.3 (H) 36 - 66 %    Lymphocytes % 9.3 (L) 24 - 44 %    Monocytes % 13.7 (H) 0 - 4 %    Eosinophils % 3.0 0 - 3 %    Basophils % 0.3 0 - 1 %    Segs Absolute 8.0 K/CU MM    Lymphocytes Absolute 1.0 K/CU MM    Monocytes Absolute 1.5 K/CU MM    Eosinophils Absolute 0.3 K/CU MM    Basophils Absolute 0.0 K/CU MM    Nucleated RBC % 0.0 %    Total Nucleated RBC 0.0 K/CU MM    Total Immature Neutrophil 0.04 K/CU MM    Immature Neutrophil % 0.4 0 - 0.43 %   Comprehensive Metabolic Panel   Result Value Ref Range    Sodium 135 135 - 145 MMOL/L    Potassium 3.3 (L) 3.5 - 5.1 MMOL/L    Chloride 98 (L) 99 - 110 mMol/L    CO2 24 21 - 32 MMOL/L    BUN 8 6 - 23 MG/DL    CREATININE 0.7 (L) 0.9 - 1.3 MG/DL    Glucose 109 (H) 70 - 99 MG/DL    Calcium 9.0 8.3 - 10.6 MG/DL    Alb 3.2 (L) 3.4 - 5.0 GM/DL    Total Protein 7.0 6.4 - 8.2 GM/DL    Total Bilirubin 0.3 0.0 - 1.0 MG/DL    ALT 9 (L) 10 - 40 U/L    AST 12 (L) 15 - 37 IU/L    Alkaline Phosphatase 65 40 - 129 IU/L    GFR Non-African American >60 >60 mL/min/1.73m2    GFR African American >60 >60 mL/min/1.73m2    Anion Gap 13 4 - 16   Urinalysis, reflex to microscopic   Result Value Ref Range    Color, UA YELLOW YELLOW    Clarity, UA CLEAR CLEAR    Glucose, Urine NEGATIVE NEGATIVE MG/DL    Bilirubin Urine NEGATIVE NEGATIVE MG/DL    Ketones, Urine NEGATIVE NEGATIVE MG/DL    Specific Gravity, UA 1.012 1.001 - 1.035    Blood, Urine NEGATIVE NEGATIVE    pH, Urine 6.0 5.0 - 8.0    Protein, UA NEGATIVE NEGATIVE MG/DL    Urobilinogen, Urine 1 0.2 - 1.0 MG/DL    Nitrite Urine, Quantitative NEGATIVE NEGATIVE    Leukocyte Esterase, Urine NEGATIVE NEGATIVE    RBC, UA 1 0 - 3 /HPF    WBC, UA 2 0 - 2 /HPF    Bacteria, UA NEGATIVE NEGATIVE /HPF    Mucus, UA RARE (A) NEGATIVE HPF    Trichomonas, UA NONE SEEN NONE SEEN /HPF   Ammonia Level   Result Value Ref Range    Ammonia 66 (H) 16 - 60 UMOL/L   Valproic acid level, total   Result Value Ref Range    Valproic Acid Lvl 43.0 (L) 50 - 100 UG/ML    DOSE AMOUNT DOSE AMT. GIVEN - home dose     DOSE TIME DOSE TIME GIVEN - home dose       Radiographs (if obtained):  [] The following radiograph was interpreted by myself in the absence of a radiologist:  [x]Radiologist's Report Reviewed:  No orders to display         EKG (if obtained): (All EKG's are interpreted by myself in the absence of a cardiologist)    MDM:  Differential diagnoses considered include pressure wound cellulitis, electrolyte abnormality, abnormal ammonia level, elevated Depakote level, urinary tract infection. Wound to patient's buttocks looks to be a pressure wound. I discussed that this with patient's caretaker at the facility as well as covering the area and turning him to that he does not spend a majority of time on this area. I recommended follow-up with his primary care physician and wound care for the area. They will keep a close eye on it.   Basic labs were obtained and

## 2020-05-16 NOTE — ED NOTES
Pt states he wants to sign out AMA and that his brother-in-law can come pick him up or he can walk home. Pt states \"it does not take 3hrs to get results for blood work\".      Steven Muir, MEENAKSHI  05/16/20 [facility], RN  05/16/20 [id number]

## 2020-05-17 ASSESSMENT — ENCOUNTER SYMPTOMS
SORE THROAT: 0
VOMITING: 0
EYE REDNESS: 0
COUGH: 0
RHINORRHEA: 0
BACK PAIN: 0
NAUSEA: 0
ABDOMINAL PAIN: 0
SHORTNESS OF BREATH: 0

## 2020-05-17 NOTE — ED NOTES
Returned call to [employer] caretaker and explained that Dr. Alba Ortiz stated that she expanded this patient's labs which all came back normal except ammonia levels and that patient showed no weakness and that a follow up with Dr. Trupti Davies office was needed regarding any other concerns.       Fanny Hinojosa RN  05/16/20 [id number]

## 2020-05-27 ENCOUNTER — HOSPITAL ENCOUNTER (OUTPATIENT)
Dept: WOUND CARE | Age: 65
Discharge: HOME | End: 2020-05-27
Payer: MEDICARE

## 2020-05-27 VITALS
HEART RATE: 54 BPM | RESPIRATION RATE: 16 BRPM | WEIGHT: 197 LBS | SYSTOLIC BLOOD PRESSURE: 120 MMHG | TEMPERATURE: 98.2 F | DIASTOLIC BLOOD PRESSURE: 61 MMHG | BODY MASS INDEX: 28.2 KG/M2 | HEIGHT: 70 IN

## 2020-05-27 PROBLEM — L89.43 PRESSURE INJURY OF CONTIGUOUS REGION INVOLVING BACK AND LEFT BUTTOCK, STAGE 3 (HCC): Status: ACTIVE | Noted: 2020-05-27

## 2020-05-27 PROCEDURE — 87186 SC STD MICRODIL/AGAR DIL: CPT

## 2020-05-27 PROCEDURE — 11042 DBRDMT SUBQ TIS 1ST 20SQCM/<: CPT

## 2020-05-27 PROCEDURE — 99213 OFFICE O/P EST LOW 20 MIN: CPT

## 2020-05-27 PROCEDURE — 87071 CULTURE AEROBIC QUANT OTHER: CPT

## 2020-05-27 PROCEDURE — 87073 CULTURE BACTERIA ANAEROBIC: CPT

## 2020-05-27 PROCEDURE — 87077 CULTURE AEROBIC IDENTIFY: CPT

## 2020-05-27 RX ORDER — SOLIFENACIN SUCCINATE 10 MG/1
10 TABLET, FILM COATED ORAL DAILY
Status: ON HOLD | COMMUNITY
End: 2021-03-24 | Stop reason: HOSPADM

## 2020-05-27 NOTE — PROGRESS NOTES
pseudoephedrine (SUDAFED) 30 MG tablet Take 30 mg by mouth every 4 hours as needed for Congestion      lacosamide (VIMPAT) 200 MG tablet Take 200 mg by mouth as needed. Give 1 tab after seizure activity as needed      Cholecalciferol (VITAMIN D3) 1000 UNITS CAPS Take 1 tablet by mouth daily. No current facility-administered medications on file prior to encounter. REVIEW OF SYSTEMS    Pertinent items are noted in HPI. Constitutional: Negative for systemic symptoms including fever, chills and malaise. Objective:      /61   Pulse 54   Temp 98.2 °F (36.8 °C) (Temporal)   Resp 16   Ht 5' 10\" (1.778 m)   Wt 197 lb (89.4 kg)   BMI 28.27 kg/m²     PHYSICAL EXAM      General: The patient is in no acute distress. Mental status:  Patient is appropriate, is  oriented to place and plan of care. Dermatologic exam: Visual inspection of the periwound reveals the skin to be normal in turgor and texture  Wound exam: see wound description below in procedure note      Assessment:     Problem List Items Addressed This Visit     Pressure injury of contiguous region involving back and left buttock, stage 3 (HCC)    Relevant Orders    Culture, Wound        Procedure Note    Indications:  Based on my examination of this patient's wound(s) today, sharp excision into necrotic subcutaneous tissue is required to promote healing and evaluate the extent of previous healing. Performed by: Chasity Dejesus MD    Consent obtained: Yes    Time out taken:  Yes    Pain Control: Anesthetic  Anesthetic: 4% Lidocaine Liquid Topical     Debridement:Excisional Debridement    Using scissors and forceps the wound(s) was/were sharply debrided down through and including the removal of subcutaneous tissue.         Devitalized Tissue Debrided:  slough, necrotic/eschar and exudate    Pre Debridement Measurements:  Are located in the Wound Documentation Flow Sheet    All active wounds listed below with today's date are

## 2020-05-30 LAB
CULTURE: ABNORMAL
Lab: ABNORMAL
SPECIMEN: ABNORMAL

## 2020-06-02 ENCOUNTER — HOSPITAL ENCOUNTER (OUTPATIENT)
Age: 65
Setting detail: SPECIMEN
Discharge: HOME | End: 2020-06-02
Payer: MEDICARE

## 2020-06-02 LAB
BACTERIA: NEGATIVE /HPF
BILIRUBIN URINE: NEGATIVE MG/DL
BLOOD, URINE: NEGATIVE
CLARITY: CLEAR
COLOR: ABNORMAL
GLUCOSE, URINE: NEGATIVE MG/DL
KETONES, URINE: NEGATIVE MG/DL
LEUKOCYTE ESTERASE, URINE: NEGATIVE
MUCUS: ABNORMAL HPF
NITRITE URINE, QUANTITATIVE: POSITIVE
PH, URINE: 6 (ref 5–8)
PROTEIN UA: NEGATIVE MG/DL
RBC URINE: 1 /HPF (ref 0–3)
SPECIFIC GRAVITY UA: 1.02 (ref 1–1.03)
SQUAMOUS EPITHELIAL: 2 /HPF
TRICHOMONAS: ABNORMAL /HPF
UROBILINOGEN, URINE: 2 MG/DL (ref 0.2–1)
WBC UA: 3 /HPF (ref 0–2)

## 2020-06-02 PROCEDURE — 87086 URINE CULTURE/COLONY COUNT: CPT

## 2020-06-02 PROCEDURE — 81001 URINALYSIS AUTO W/SCOPE: CPT

## 2020-06-03 ENCOUNTER — HOSPITAL ENCOUNTER (OUTPATIENT)
Dept: WOUND CARE | Age: 65
Discharge: HOME | End: 2020-06-03
Payer: MEDICARE

## 2020-06-03 VITALS — DIASTOLIC BLOOD PRESSURE: 60 MMHG | RESPIRATION RATE: 16 BRPM | SYSTOLIC BLOOD PRESSURE: 126 MMHG | HEART RATE: 52 BPM

## 2020-06-03 PROCEDURE — 99214 OFFICE O/P EST MOD 30 MIN: CPT

## 2020-06-03 NOTE — PROGRESS NOTES
it difficult for him to write\"    Prostatitis     hx given per H&P old chart    Thyroid disease     Ulcerative colitis ([facility] [address] [address]     hx [facility] staff at [facility] 4/13/2015       PAST SURGICAL HISTORY    Past Surgical History:   Procedure Laterality Date    CHOLECYSTECTOMY      COLONOSCOPY  8/19/14, 5/2012 8/19/14: hemorrhoids, 5/2012 at [address] Left 07/11/2013    with stnet placement   [address]  [address]   [facility]      OTHER SURGICAL HISTORY  04/15/2015    Revision of vagal nerve stimulator    UPPER GASTROINTESTINAL ENDOSCOPY N/A 11/13/2018    EGD DILATION BALLOON AND BIOPSY performed by Bradley Garcia MD at [place] [address]    Has to have bettery changed every 5 yrs. FAMILY HISTORY    Family History   Problem Relation Age of Onset    Heart Disease Mother         atrial fib    High Blood Pressure Mother     Asthma Mother     High Cholesterol Mother     Depression Mother    [place] Migraines Mother     High Blood Pressure Father     Heart Disease Father     Asthma Sister     High Cholesterol Sister     Depression Sister     Migraines Sister        SOCIAL HISTORY    Social History     Tobacco Use    Smoking status: Never Smoker    Smokeless tobacco: Never Used   Substance Use Topics    Alcohol use: No    Drug use: No       ALLERGIES    No Known Allergies    MEDICATIONS    Current Outpatient Medications on File Prior to Encounter   Medication Sig Dispense Refill    solifenacin (VESICARE) 10 MG tablet Take 10 mg by mouth daily      levothyroxine (SYNTHROID) 125 MCG tablet Take 1 tablet by mouth Daily 30 tablet 3    amLODIPine (NORVASC) 5 MG tablet Take 5 mg by mouth daily      clonazePAM (KLONOPIN) 0.5 MG tablet Take 0.25 mg by mouth daily.       divalproex (DEPAKOTE) 500 MG DR tablet Take 1,000 mg by mouth nightly      hydrochlorothiazide (MICROZIDE) 12.5 MG capsule Take 12.5 mg by mouth daily      mirabegron CREA cream Apply 1 applicator topically as needed      pseudoephedrine (SUDAFED) 30 MG tablet Take 30 mg by mouth every 4 hours as needed for Congestion      lacosamide (VIMPAT) 200 MG tablet Take 200 mg by mouth as needed. Give 1 tab after seizure activity as needed      lacosamide (VIMPAT) 200 MG tablet Take 1 tablet by mouth 2 times daily for 30 days. 60 tablet 0     No current facility-administered medications on file prior to encounter. REVIEW OF SYSTEMS    Pertinent items are noted in HPI. Constitutional: Negative for systemic symptoms including fever, chills and malaise. Objective:      /60   Pulse 52   Resp 16     PHYSICAL EXAM      General: The patient is in no acute distress. Mental status:  Patient is appropriate, is  oriented to place and plan of care. Dermatologic exam: Visual inspection of the periwound reveals the skin to be moist.  Wound exam:  see wound description below     All active wounds listed below with today's date are evaluated      Wound 05/27/20 Buttocks Left #1 left buttock  (Active)   Dressing Status Clean;Dry; Intact 5/27/2020  8:57 AM   Dressing Changed Changed/New 5/27/2020  8:57 AM   Wound Length (cm) 1.4 cm 6/3/2020 10:36 AM   Wound Width (cm) 1.5 cm 6/3/2020 10:36 AM   Wound Depth (cm) 0.1 cm 6/3/2020 10:36 AM   Wound Surface Area (cm^2) 2.1 cm^2 6/3/2020 10:36 AM   Change in Wound Size % (l*w) 25 6/3/2020 10:36 AM   Wound Volume (cm^3) 0.21 cm^3 6/3/2020 10:36 AM   Wound Healing % 81 6/3/2020 10:36 AM   Post-Procedure Length (cm) 1.4 cm 5/27/2020  8:56 AM   Post-Procedure Width (cm) 2 cm 5/27/2020  8:56 AM   Post-Procedure Depth (cm) 0.4 cm 5/27/2020  8:56 AM   Post-Procedure Surface Area (cm^2) 2.8 cm^2 5/27/2020  8:56 AM   Post-Procedure Volume (cm^3) 1.12 cm^3 5/27/2020  8:56 AM   Distance Tunneling (cm) 0 cm 5/27/2020  8:30 AM   Tunneling Position ___ O'Clock 0 5/27/2020  8:30 AM   Undermining Starts ___ O'Clock 7 5/27/2020  8:56 AM   Undermining

## 2020-06-04 LAB
CULTURE: NORMAL
Lab: NORMAL
SPECIMEN: NORMAL

## 2020-06-16 ENCOUNTER — HOSPITAL ENCOUNTER (OUTPATIENT)
Dept: SPEECH THERAPY | Age: 65
Setting detail: THERAPIES SERIES
Discharge: HOME | End: 2020-06-16
Payer: COMMERCIAL

## 2020-06-16 ENCOUNTER — HOSPITAL ENCOUNTER (OUTPATIENT)
Dept: GENERAL RADIOLOGY | Age: 65
Discharge: HOME | End: 2020-06-16
Payer: MEDICARE

## 2020-06-16 PROCEDURE — 92611 MOTION FLUOROSCOPY/SWALLOW: CPT

## 2020-06-16 PROCEDURE — 74230 X-RAY XM SWLNG FUNCJ C+: CPT

## 2020-06-16 NOTE — PROCEDURES
dysphagia R13.12   Patient Position: Lateral and Patient Degrees: 90  Consistencies Administered: Reg solid; Dysphagia Pureed (Dysphagia I); Dysphagia Minced and Moist (Dysphagia II); Thin straw; Thin teaspoon; Thin cup;Nectar straw;Nectar cup;Honey straw;Honey cup  Compensatory Swallowing Strategies Attempted: Upright as possible for all oral intake     Chato Zamora was seen for an outpatient modified barium swallow study. He was accompanied to today's assessment by a caregiver from [facility] in [place] [facility], where he resides. His caregiver reports concerns that pt is not tolerating his current diet. She further reports he has had several \"choking\" episodes and is not compliant in performing a chin tuck when swallowing as was previously recommended. He does not wear his dentures during meals despite caregiver encouragement and does not respond well to cues. Most recent MBS was completed 9/17/2019; he was recommended mechanical soft/minced and moist diet with thin liquids and use of chin tuck for all swallows. Pt seen for today's study seated upright in chair, filmed in lateral view. He was alert and cooperative throughout. He self-fed throughout and was noted to be impulsive with all intake. Dentures were removed prior to study d/t caregiver report that pt does not wear dentures for meals. He was presented with PO trials of thin liquids via tsp/cup/straw, nectar thick liquids via cup/straw, honey thick liquids via cup/straw, puree, soft solids, and regular solids. Oral stage is mild-moderately impaired with prolonged mastication and reduced bolus formation, adequate AP transit. Premature posterior loss noted with thin liquids into valleculae and pyriform sinuses, nectar thick liquids and puree over epiglottis/nearly to pyriforms, honey thick liquids into valleculae.  Pharyngeal stage is mild-moderately impaired with delayed swallow initiation (thin liquids into valleculae and over epiglottis, nectar and honey thick liquids into valleculae), reduced tongue base retraction, minimally reduced hyolaryngeal excursion, incomplete/minimal epiglottic inversion 2/2 bony protrusion at C3/C4 resulting in reduced closure of the laryngeal vestibule during the swallow. UES opening appears slightly narrowed. Mild-moderate residue remains in valleculae and pyriform sinuses. Laryngeal penetration noted during the swallow for trials of thin liquids, nectar thick liquids, honey thick liquids, and pureed textures. Thin liquids penetrate to the vocal folds and are inconsistently cleared; nectar thick liquids penetrate deep/nearly to the vocal folds but appear to clear with completion of the swallow; honey thick liquids and pureed textures penetrate shallowly and clear from laryngeal vestibule with completion of the swallow. No aspiration seen with any presented texture. Positional strategies not trialed as caregiver indicates pt is noncompliant with previously recommended strategies. Recommend continued minced and moist diet texture (as per IDDSI) with thin liquids. Recommend 1:1 supervision for all meals with cuing for single drinks at a time and complete oral clearance between bites (i.e., instructing pt to place the fork down and wait until mouth is clear before initiating his next bite). If pt continues to drink impulsively with a straw, recommend discontinuing use of straws and allowing drinks only via cup. Ensure pt is fully upright for all meals. Encourage thorough/regular oral care for oral cavity and dentures; recommend 2-3x/daily. Results and recommendations were reviewed with pt/caregiver in detail following the study. Dysphagia Outcome Severity Scale: Level 4: Mild moderate dysphagia- Intermittent supervision/cueing.  One - two diet consistencies restricted  Penetration-Aspiration Scale (PAS): 5 - Material enters the airway, contacts the vocal folds, and is not ejected into the airway    Recommended Diet:  Solid consistency: Dysphagia Minced and Moist (Dysphagia II)  Liquid consistency: Thin  Liquid administration via: Cup    Medication administration: Meds in puree    Safe Swallow Protocol:  Supervision: 1:1  Compensatory Swallowing Strategies: Small bites/sips;Eat/Feed slowly; Other (comments)(cue for one bite/drink at a time)              Recommendations/Treatment  Requires SLP Intervention: No        D/C Recommendations: 24 hour supervision/assistance         Recommended Exercises:   N/A         Education: Images and recommendations were reviewed with Khadra Slaughter and caregiver following this exam.   Patient Education: results and recommendations  Patient Education Response: Verbalizes understanding    Duration/Frequency of Treatment  Duration/Frequency of Treatment: N/A  Safety Devices  Safety Devices in place: Yes  Type of devices:  All fall risk precautions in place      Goals:    N/A                          Oral Preparation / Oral Phase  Oral Phase: Impaired        Pharyngeal Phase  Pharyngeal Phase: Impaired      Esophageal Phase  DNT    Therapy Time:   Individual Concurrent Group Co-treatment   Time In 0945         Time Out 1030         Minutes [address] [place] [facility], 6/16/2020, 12:20 PM

## 2020-06-17 ENCOUNTER — HOSPITAL ENCOUNTER (OUTPATIENT)
Dept: WOUND CARE | Age: 65
Discharge: HOME | End: 2020-06-17
Payer: MEDICARE

## 2020-06-17 VITALS
RESPIRATION RATE: 18 BRPM | DIASTOLIC BLOOD PRESSURE: 84 MMHG | TEMPERATURE: 98.7 F | SYSTOLIC BLOOD PRESSURE: 153 MMHG | HEART RATE: 53 BPM

## 2020-06-17 PROCEDURE — 99214 OFFICE O/P EST MOD 30 MIN: CPT

## 2020-06-23 ENCOUNTER — CARE COORDINATION (OUTPATIENT)
Dept: CASE MANAGEMENT | Age: 65
End: 2020-06-23

## 2020-06-23 NOTE — CARE COORDINATION
[facility] Transitions Follow Up Call    2020    Patient: Adryan Courser  Patient : 1955   MRN: <[id number]>  Reason for Admission:   Discharge Date: 20 RARS: Readmission Risk Score: 23         Spoke with: Sister and PA Nerissa Essex     Patient's sister is concerned as the patient is having a rough last few days. He has poor appetite and a headache . No fever, cough or SOB. Spoke to [facility] [employer], [employer] [facility] and states patient had seizures and is still recuperating from them. He is eating and gets supplemental shakes during the time after his seizures to help. His bedsore is just about gone and he gets [address] for that and they will end this Friday. Deyanira Fofana is taking all his medications as directed. Instructed that this is the Final BPCI Call (Patient has been followed for 90 days) and to call PCP/Specialist for any problems or issues that occur. Expresses Understanding. Alexys Daniel LPN     [facility] / [address] [place] [address] Transitions Subsequent and Final Call    Subsequent and Final Calls  Do you have any ongoing symptoms?:  Yes  Patient-reported symptoms:  Other  Have your medications changed?:  No  Do you have any questions related to your medications?:  No  Do you currently have any active services?:  Yes  Are you currently active with any services?:  Home Health  Do you have any needs or concerns that I can assist you with?:  No  Identified Barriers:  Impairment  Care Transitions Interventions  Other Interventions:             Follow Up  Future Appointments   Date Time Provider Yasmany Schuler   2020 10:30 AM Zaida Mills MD [address] [place] [address]

## 2020-06-26 ENCOUNTER — HOSPITAL ENCOUNTER (OUTPATIENT)
Age: 65
Setting detail: SPECIMEN
Discharge: HOME | End: 2020-06-26
Payer: MEDICARE

## 2020-06-26 LAB
BACTERIA: NEGATIVE /HPF
BILIRUBIN URINE: NEGATIVE MG/DL
BLOOD, URINE: NEGATIVE
CLARITY: ABNORMAL
COLOR: YELLOW
GLUCOSE, URINE: NEGATIVE MG/DL
KETONES, URINE: NEGATIVE MG/DL
LEUKOCYTE ESTERASE, URINE: ABNORMAL
MUCUS: ABNORMAL HPF
NITRITE URINE, QUANTITATIVE: NEGATIVE
PH, URINE: 8 (ref 5–8)
PROTEIN UA: NEGATIVE MG/DL
RBC URINE: 2 /HPF (ref 0–3)
SPECIFIC GRAVITY UA: 1.01 (ref 1–1.03)
SQUAMOUS EPITHELIAL: <1 /HPF
TRICHOMONAS: ABNORMAL /HPF
UROBILINOGEN, URINE: NORMAL MG/DL (ref 0.2–1)
WBC CLUMP: ABNORMAL /HPF
WBC UA: 196 /HPF (ref 0–2)

## 2020-06-26 PROCEDURE — 87077 CULTURE AEROBIC IDENTIFY: CPT

## 2020-06-26 PROCEDURE — 87186 SC STD MICRODIL/AGAR DIL: CPT

## 2020-06-26 PROCEDURE — 87086 URINE CULTURE/COLONY COUNT: CPT

## 2020-06-26 PROCEDURE — 81001 URINALYSIS AUTO W/SCOPE: CPT

## 2020-06-28 ENCOUNTER — APPOINTMENT (OUTPATIENT)
Dept: CT IMAGING | Age: 65
End: 2020-06-28
Payer: MEDICARE

## 2020-06-28 ENCOUNTER — APPOINTMENT (OUTPATIENT)
Dept: GENERAL RADIOLOGY | Age: 65
End: 2020-06-28
Payer: MEDICARE

## 2020-06-28 ENCOUNTER — HOSPITAL ENCOUNTER (EMERGENCY)
Age: 65
Discharge: HOME | End: 2020-06-28
Payer: MEDICARE

## 2020-06-28 VITALS
OXYGEN SATURATION: 96 % | DIASTOLIC BLOOD PRESSURE: 60 MMHG | RESPIRATION RATE: 17 BRPM | HEART RATE: 66 BPM | SYSTOLIC BLOOD PRESSURE: 124 MMHG | TEMPERATURE: 98 F

## 2020-06-28 LAB
CULTURE: ABNORMAL
Lab: ABNORMAL
SPECIMEN: ABNORMAL

## 2020-06-28 PROCEDURE — 73030 X-RAY EXAM OF SHOULDER: CPT

## 2020-06-28 PROCEDURE — 70450 CT HEAD/BRAIN W/O DYE: CPT

## 2020-06-28 PROCEDURE — 72125 CT NECK SPINE W/O DYE: CPT

## 2020-06-28 PROCEDURE — 6370000000 HC RX 637 (ALT 250 FOR IP): Performed by: PHYSICIAN ASSISTANT

## 2020-06-28 PROCEDURE — 99284 EMERGENCY DEPT VISIT MOD MDM: CPT

## 2020-06-28 PROCEDURE — 94761 N-INVAS EAR/PLS OXIMETRY MLT: CPT

## 2020-06-28 RX ORDER — IBUPROFEN 600 MG/1
600 TABLET ORAL ONCE
Status: COMPLETED | OUTPATIENT
Start: 2020-06-28 | End: 2020-06-28

## 2020-06-28 RX ADMIN — IBUPROFEN 600 MG: 600 TABLET, FILM COATED ORAL at 16:08

## 2020-06-28 ASSESSMENT — PAIN SCALES - GENERAL: PAINLEVEL_OUTOF10: 4

## 2020-06-28 NOTE — ED TRIAGE NOTES
Patient was going to bathroom and fell hitting his head on the wall. Denies LOC and states no head pain. EMS states patient normally wears helmet and took it off to go to bathroom.

## 2020-07-01 ENCOUNTER — TELEPHONE (OUTPATIENT)
Dept: WOUND CARE | Age: 65
End: 2020-07-01

## 2020-07-06 ENCOUNTER — HOSPITAL ENCOUNTER (INPATIENT)
Age: 65
LOS: 7 days | Discharge: HOME HEALTH | DRG: 603 | End: 2020-07-13
Attending: INTERNAL MEDICINE | Admitting: INTERNAL MEDICINE
Payer: MEDICARE

## 2020-07-06 ENCOUNTER — APPOINTMENT (OUTPATIENT)
Dept: CT IMAGING | Age: 65
DRG: 603 | End: 2020-07-06
Payer: MEDICARE

## 2020-07-06 PROBLEM — L03.315 CELLULITIS, PERINEUM: Status: ACTIVE | Noted: 2020-07-06

## 2020-07-06 LAB
ALBUMIN SERPL-MCNC: 3.4 GM/DL (ref 3.4–5)
ALP BLD-CCNC: 70 IU/L (ref 40–129)
ALT SERPL-CCNC: 12 U/L (ref 10–40)
ANION GAP SERPL CALCULATED.3IONS-SCNC: 11 MMOL/L (ref 4–16)
AST SERPL-CCNC: 14 IU/L (ref 15–37)
BACTERIA: ABNORMAL /HPF
BANDED NEUTROPHILS ABSOLUTE COUNT: 1.43 K/CU MM
BANDED NEUTROPHILS RELATIVE PERCENT: 8 % (ref 5–11)
BILIRUB SERPL-MCNC: 0.3 MG/DL (ref 0–1)
BILIRUBIN URINE: NEGATIVE MG/DL
BLOOD, URINE: ABNORMAL
BUN BLDV-MCNC: 9 MG/DL (ref 6–23)
CALCIUM SERPL-MCNC: 9.6 MG/DL (ref 8.3–10.6)
CHLORIDE BLD-SCNC: 97 MMOL/L (ref 99–110)
CLARITY: ABNORMAL
CO2: 27 MMOL/L (ref 21–32)
COLOR: YELLOW
CREAT SERPL-MCNC: 0.7 MG/DL (ref 0.9–1.3)
DIFFERENTIAL TYPE: ABNORMAL
EOSINOPHILS ABSOLUTE: 0.5 K/CU MM
EOSINOPHILS RELATIVE PERCENT: 3 % (ref 0–3)
GFR AFRICAN AMERICAN: >60 ML/MIN/1.73M2
GFR NON-AFRICAN AMERICAN: >60 ML/MIN/1.73M2
GLUCOSE BLD-MCNC: 125 MG/DL (ref 70–99)
GLUCOSE, URINE: NEGATIVE MG/DL
HCT VFR BLD CALC: 44.3 % (ref 42–52)
HEMOGLOBIN: 14.6 GM/DL (ref 13.5–18)
KETONES, URINE: NEGATIVE MG/DL
LACTATE: 1.3 MMOL/L (ref 0.4–2)
LEUKOCYTE ESTERASE, URINE: ABNORMAL
LYMPHOCYTES ABSOLUTE: 1.6 K/CU MM
LYMPHOCYTES RELATIVE PERCENT: 9 % (ref 24–44)
MAGNESIUM: 2.1 MG/DL (ref 1.8–2.4)
MCH RBC QN AUTO: 30.4 PG (ref 27–31)
MCHC RBC AUTO-ENTMCNC: 33 % (ref 32–36)
MCV RBC AUTO: 92.1 FL (ref 78–100)
MONOCYTES ABSOLUTE: 0.2 K/CU MM
MONOCYTES RELATIVE PERCENT: 1 % (ref 0–4)
NITRITE URINE, QUANTITATIVE: NEGATIVE
PDW BLD-RTO: 13.2 % (ref 11.7–14.9)
PH, URINE: 6 (ref 5–8)
PLATELET # BLD: 195 K/CU MM (ref 140–440)
PMV BLD AUTO: 10.5 FL (ref 7.5–11.1)
POTASSIUM SERPL-SCNC: 3.4 MMOL/L (ref 3.5–5.1)
PROTEIN UA: NEGATIVE MG/DL
RBC # BLD: 4.81 M/CU MM (ref 4.6–6.2)
RBC URINE: 4 /HPF (ref 0–3)
SEGMENTED NEUTROPHILS ABSOLUTE COUNT: 14.2 K/CU MM
SEGMENTED NEUTROPHILS RELATIVE PERCENT: 79 % (ref 36–66)
SODIUM BLD-SCNC: 135 MMOL/L (ref 135–145)
SPECIFIC GRAVITY UA: 1.05 (ref 1–1.03)
SQUAMOUS EPITHELIAL: <1 /HPF
TOTAL PROTEIN: 8.1 GM/DL (ref 6.4–8.2)
TRICHOMONAS: ABNORMAL /HPF
UROBILINOGEN, URINE: 1 MG/DL (ref 0.2–1)
WBC # BLD: 17.9 K/CU MM (ref 4–10.5)
WBC CLUMP: ABNORMAL /HPF
WBC UA: 131 /HPF (ref 0–2)

## 2020-07-06 PROCEDURE — 85027 COMPLETE CBC AUTOMATED: CPT

## 2020-07-06 PROCEDURE — 74177 CT ABD & PELVIS W/CONTRAST: CPT

## 2020-07-06 PROCEDURE — 85007 BL SMEAR W/DIFF WBC COUNT: CPT

## 2020-07-06 PROCEDURE — 6370000000 HC RX 637 (ALT 250 FOR IP): Performed by: NURSE PRACTITIONER

## 2020-07-06 PROCEDURE — 81001 URINALYSIS AUTO W/SCOPE: CPT

## 2020-07-06 PROCEDURE — 6360000002 HC RX W HCPCS: Performed by: NURSE PRACTITIONER

## 2020-07-06 PROCEDURE — 99285 EMERGENCY DEPT VISIT HI MDM: CPT

## 2020-07-06 PROCEDURE — 2580000003 HC RX 258: Performed by: PHYSICIAN ASSISTANT

## 2020-07-06 PROCEDURE — 83735 ASSAY OF MAGNESIUM: CPT

## 2020-07-06 PROCEDURE — 80053 COMPREHEN METABOLIC PANEL: CPT

## 2020-07-06 PROCEDURE — 2500000003 HC RX 250 WO HCPCS: Performed by: PHYSICIAN ASSISTANT

## 2020-07-06 PROCEDURE — 6360000004 HC RX CONTRAST MEDICATION: Performed by: PHYSICIAN ASSISTANT

## 2020-07-06 PROCEDURE — 2580000003 HC RX 258: Performed by: NURSE PRACTITIONER

## 2020-07-06 PROCEDURE — 1200000000 HC SEMI PRIVATE

## 2020-07-06 PROCEDURE — 83605 ASSAY OF LACTIC ACID: CPT

## 2020-07-06 RX ORDER — QUETIAPINE FUMARATE 50 MG/1
50 TABLET, EXTENDED RELEASE ORAL NIGHTLY
Status: DISCONTINUED | OUTPATIENT
Start: 2020-07-06 | End: 2020-07-13 | Stop reason: HOSPADM

## 2020-07-06 RX ORDER — LACTULOSE 10 G/15ML
10 SOLUTION ORAL 3 TIMES DAILY
Status: DISCONTINUED | OUTPATIENT
Start: 2020-07-06 | End: 2020-07-10

## 2020-07-06 RX ORDER — LATANOPROST 50 UG/ML
1 SOLUTION/ DROPS OPHTHALMIC NIGHTLY
Status: DISCONTINUED | OUTPATIENT
Start: 2020-07-06 | End: 2020-07-13 | Stop reason: HOSPADM

## 2020-07-06 RX ORDER — SODIUM CHLORIDE 0.9 % (FLUSH) 0.9 %
10 SYRINGE (ML) INJECTION PRN
Status: DISCONTINUED | OUTPATIENT
Start: 2020-07-06 | End: 2020-07-13 | Stop reason: HOSPADM

## 2020-07-06 RX ORDER — LORAZEPAM 0.5 MG/1
1 TABLET ORAL 2 TIMES DAILY
Status: DISCONTINUED | OUTPATIENT
Start: 2020-07-06 | End: 2020-07-13 | Stop reason: HOSPADM

## 2020-07-06 RX ORDER — TIMOLOL MALEATE 5 MG/ML
1 SOLUTION/ DROPS OPHTHALMIC DAILY
Status: DISCONTINUED | OUTPATIENT
Start: 2020-07-06 | End: 2020-07-13 | Stop reason: HOSPADM

## 2020-07-06 RX ORDER — ACETAMINOPHEN 650 MG/1
650 SUPPOSITORY RECTAL EVERY 6 HOURS PRN
Status: DISCONTINUED | OUTPATIENT
Start: 2020-07-06 | End: 2020-07-13 | Stop reason: HOSPADM

## 2020-07-06 RX ORDER — HYDROCHLOROTHIAZIDE 12.5 MG/1
12.5 TABLET ORAL DAILY
Status: DISCONTINUED | OUTPATIENT
Start: 2020-07-06 | End: 2020-07-13 | Stop reason: HOSPADM

## 2020-07-06 RX ORDER — LACOSAMIDE 50 MG/1
200 TABLET ORAL 2 TIMES DAILY
Status: DISCONTINUED | OUTPATIENT
Start: 2020-07-06 | End: 2020-07-13 | Stop reason: HOSPADM

## 2020-07-06 RX ORDER — ZONISAMIDE 100 MG/1
100 CAPSULE ORAL EVERY MORNING
Status: DISCONTINUED | OUTPATIENT
Start: 2020-07-07 | End: 2020-07-13 | Stop reason: HOSPADM

## 2020-07-06 RX ORDER — LACTOBACILLUS RHAMNOSUS GG 10B CELL
1 CAPSULE ORAL DAILY
Status: DISCONTINUED | OUTPATIENT
Start: 2020-07-06 | End: 2020-07-13 | Stop reason: HOSPADM

## 2020-07-06 RX ORDER — TAMSULOSIN HYDROCHLORIDE 0.4 MG/1
0.4 CAPSULE ORAL DAILY
Status: DISCONTINUED | OUTPATIENT
Start: 2020-07-06 | End: 2020-07-13 | Stop reason: HOSPADM

## 2020-07-06 RX ORDER — POLYETHYLENE GLYCOL 3350 17 G/17G
17 POWDER, FOR SOLUTION ORAL DAILY PRN
Status: DISCONTINUED | OUTPATIENT
Start: 2020-07-06 | End: 2020-07-13 | Stop reason: HOSPADM

## 2020-07-06 RX ORDER — PRIMIDONE 50 MG/1
50 TABLET ORAL 3 TIMES DAILY
Status: DISCONTINUED | OUTPATIENT
Start: 2020-07-06 | End: 2020-07-13 | Stop reason: HOSPADM

## 2020-07-06 RX ORDER — ACETAMINOPHEN 325 MG/1
650 TABLET ORAL EVERY 6 HOURS PRN
Status: DISCONTINUED | OUTPATIENT
Start: 2020-07-06 | End: 2020-07-13 | Stop reason: HOSPADM

## 2020-07-06 RX ORDER — GUAIFENESIN 600 MG/1
1200 TABLET, EXTENDED RELEASE ORAL DAILY PRN
Status: DISCONTINUED | OUTPATIENT
Start: 2020-07-06 | End: 2020-07-13 | Stop reason: HOSPADM

## 2020-07-06 RX ORDER — FOLIC ACID 1 MG/1
1 TABLET ORAL DAILY
Status: DISCONTINUED | OUTPATIENT
Start: 2020-07-06 | End: 2020-07-13 | Stop reason: HOSPADM

## 2020-07-06 RX ORDER — SIMVASTATIN 20 MG
20 TABLET ORAL NIGHTLY
Status: DISCONTINUED | OUTPATIENT
Start: 2020-07-06 | End: 2020-07-13 | Stop reason: HOSPADM

## 2020-07-06 RX ORDER — AMLODIPINE BESYLATE 5 MG/1
5 TABLET ORAL DAILY
Status: DISCONTINUED | OUTPATIENT
Start: 2020-07-06 | End: 2020-07-13 | Stop reason: HOSPADM

## 2020-07-06 RX ORDER — CLORAZEPATE DIPOTASSIUM 3.75 MG/1
7.5 TABLET ORAL 2 TIMES DAILY
Status: DISCONTINUED | OUTPATIENT
Start: 2020-07-06 | End: 2020-07-06 | Stop reason: CLARIF

## 2020-07-06 RX ORDER — POTASSIUM CHLORIDE 20 MEQ/1
40 TABLET, EXTENDED RELEASE ORAL PRN
Status: DISCONTINUED | OUTPATIENT
Start: 2020-07-06 | End: 2020-07-13 | Stop reason: HOSPADM

## 2020-07-06 RX ORDER — PROMETHAZINE HYDROCHLORIDE 12.5 MG/1
12.5 TABLET ORAL EVERY 6 HOURS PRN
Status: DISCONTINUED | OUTPATIENT
Start: 2020-07-06 | End: 2020-07-13 | Stop reason: HOSPADM

## 2020-07-06 RX ORDER — SIMETHICONE 80 MG
80 TABLET,CHEWABLE ORAL EVERY 4 HOURS PRN
Status: DISCONTINUED | OUTPATIENT
Start: 2020-07-06 | End: 2020-07-13 | Stop reason: HOSPADM

## 2020-07-06 RX ORDER — PANTOPRAZOLE SODIUM 40 MG/1
40 TABLET, DELAYED RELEASE ORAL
Status: DISCONTINUED | OUTPATIENT
Start: 2020-07-07 | End: 2020-07-13 | Stop reason: HOSPADM

## 2020-07-06 RX ORDER — LACOSAMIDE 50 MG/1
200 TABLET ORAL PRN
Status: DISCONTINUED | OUTPATIENT
Start: 2020-07-06 | End: 2020-07-13 | Stop reason: HOSPADM

## 2020-07-06 RX ORDER — DICYCLOMINE HYDROCHLORIDE 10 MG/1
10 CAPSULE ORAL EVERY 6 HOURS PRN
Status: DISCONTINUED | OUTPATIENT
Start: 2020-07-06 | End: 2020-07-13 | Stop reason: HOSPADM

## 2020-07-06 RX ORDER — ASPIRIN 81 MG/1
81 TABLET ORAL DAILY
Status: DISCONTINUED | OUTPATIENT
Start: 2020-07-06 | End: 2020-07-13 | Stop reason: HOSPADM

## 2020-07-06 RX ORDER — SODIUM CHLORIDE 0.9 % (FLUSH) 0.9 %
10 SYRINGE (ML) INJECTION EVERY 12 HOURS SCHEDULED
Status: DISCONTINUED | OUTPATIENT
Start: 2020-07-06 | End: 2020-07-13 | Stop reason: HOSPADM

## 2020-07-06 RX ORDER — ONDANSETRON 2 MG/ML
4 INJECTION INTRAMUSCULAR; INTRAVENOUS EVERY 6 HOURS PRN
Status: DISCONTINUED | OUTPATIENT
Start: 2020-07-06 | End: 2020-07-13 | Stop reason: HOSPADM

## 2020-07-06 RX ORDER — CLONAZEPAM 0.5 MG/1
0.25 TABLET ORAL DAILY
Status: DISCONTINUED | OUTPATIENT
Start: 2020-07-06 | End: 2020-07-13 | Stop reason: HOSPADM

## 2020-07-06 RX ORDER — DIVALPROEX SODIUM 500 MG/1
1000 TABLET, DELAYED RELEASE ORAL NIGHTLY
Status: DISCONTINUED | OUTPATIENT
Start: 2020-07-06 | End: 2020-07-13 | Stop reason: HOSPADM

## 2020-07-06 RX ORDER — POTASSIUM CHLORIDE 7.45 MG/ML
10 INJECTION INTRAVENOUS PRN
Status: DISCONTINUED | OUTPATIENT
Start: 2020-07-06 | End: 2020-07-13 | Stop reason: HOSPADM

## 2020-07-06 RX ORDER — SOLIFENACIN SUCCINATE 10 MG/1
10 TABLET, FILM COATED ORAL DAILY
Status: DISCONTINUED | OUTPATIENT
Start: 2020-07-06 | End: 2020-07-06 | Stop reason: CLARIF

## 2020-07-06 RX ORDER — LEVOTHYROXINE SODIUM 0.12 MG/1
125 TABLET ORAL DAILY
Status: DISCONTINUED | OUTPATIENT
Start: 2020-07-06 | End: 2020-07-13 | Stop reason: HOSPADM

## 2020-07-06 RX ORDER — 0.9 % SODIUM CHLORIDE 0.9 %
1000 INTRAVENOUS SOLUTION INTRAVENOUS ONCE
Status: COMPLETED | OUTPATIENT
Start: 2020-07-06 | End: 2020-07-06

## 2020-07-06 RX ORDER — PAROXETINE HYDROCHLORIDE 20 MG/1
20 TABLET, FILM COATED ORAL EVERY MORNING
Status: DISCONTINUED | OUTPATIENT
Start: 2020-07-07 | End: 2020-07-13 | Stop reason: HOSPADM

## 2020-07-06 RX ORDER — TROSPIUM CHLORIDE 20 MG/1
20 TABLET, FILM COATED ORAL 2 TIMES DAILY
Status: DISCONTINUED | OUTPATIENT
Start: 2020-07-06 | End: 2020-07-13 | Stop reason: HOSPADM

## 2020-07-06 RX ADMIN — LORAZEPAM 1 MG: 0.5 TABLET ORAL at 20:24

## 2020-07-06 RX ADMIN — TROSPIUM CHLORIDE 20 MG: 20 TABLET, FILM COATED ORAL at 20:24

## 2020-07-06 RX ADMIN — Medication 1 CAPSULE: at 18:57

## 2020-07-06 RX ADMIN — VANCOMYCIN HYDROCHLORIDE 1750 MG: 5 INJECTION, POWDER, LYOPHILIZED, FOR SOLUTION INTRAVENOUS at 20:23

## 2020-07-06 RX ADMIN — Medication 1 PACKET: at 20:24

## 2020-07-06 RX ADMIN — SIMVASTATIN 20 MG: 20 TABLET, FILM COATED ORAL at 20:23

## 2020-07-06 RX ADMIN — TAMSULOSIN HYDROCHLORIDE 0.4 MG: 0.4 CAPSULE ORAL at 18:57

## 2020-07-06 RX ADMIN — DEXTROSE MONOHYDRATE 600 MG: 50 INJECTION, SOLUTION INTRAVENOUS at 18:57

## 2020-07-06 RX ADMIN — LATANOPROST 1 DROP: 50 SOLUTION OPHTHALMIC at 20:24

## 2020-07-06 RX ADMIN — LACOSAMIDE 200 MG: 50 TABLET, FILM COATED ORAL at 20:24

## 2020-07-06 RX ADMIN — DIVALPROEX SODIUM 1000 MG: 500 TABLET, DELAYED RELEASE ORAL at 20:24

## 2020-07-06 RX ADMIN — SODIUM CHLORIDE 1000 ML: 9 INJECTION, SOLUTION INTRAVENOUS at 15:45

## 2020-07-06 RX ADMIN — QUETIAPINE FUMARATE 50 MG: 50 TABLET, EXTENDED RELEASE ORAL at 20:23

## 2020-07-06 RX ADMIN — PRIMIDONE 50 MG: 50 TABLET ORAL at 20:24

## 2020-07-06 RX ADMIN — IOPAMIDOL 80 ML: 755 INJECTION, SOLUTION INTRAVENOUS at 14:19

## 2020-07-06 ASSESSMENT — PAIN DESCRIPTION - PAIN TYPE: TYPE: ACUTE PAIN

## 2020-07-06 ASSESSMENT — PAIN DESCRIPTION - PROGRESSION
CLINICAL_PROGRESSION: NOT CHANGED

## 2020-07-06 ASSESSMENT — PAIN DESCRIPTION - DESCRIPTORS: DESCRIPTORS: SORE

## 2020-07-06 ASSESSMENT — PAIN DESCRIPTION - FREQUENCY: FREQUENCY: CONTINUOUS

## 2020-07-06 ASSESSMENT — PAIN DESCRIPTION - ONSET: ONSET: ON-GOING

## 2020-07-06 ASSESSMENT — PAIN DESCRIPTION - ORIENTATION: ORIENTATION: RIGHT

## 2020-07-06 ASSESSMENT — PAIN DESCRIPTION - LOCATION: LOCATION: BUTTOCKS

## 2020-07-06 ASSESSMENT — PAIN SCALES - GENERAL: PAINLEVEL_OUTOF10: 8

## 2020-07-06 NOTE — LETTER
[address] [address]  Phone: [phone]    No name on file. July 13, 2020     Patient: Mai Hancock   YOB: 1955   Date of Visit: 7/6/2020       To Whom It May Concern: It is my medical opinion that Lorena Muniz may return to work on 7/14/2020. If you have any questions or concerns, please don't hesitate to call.     Sincerely,        Caroline Mattson RN

## 2020-07-06 NOTE — ED NOTES
Pt presents to ED from a group home for suspected UTI and bed sores on the right buttcheek     Jayy Corrales RN  07/06/20 [id number]

## 2020-07-06 NOTE — ED TRIAGE NOTES
Patient presents to ED via EMS from [facility] with concern for bed sore/wound on right buttocks and report that patient has E. Coli in his urine. Patient denies any pain.

## 2020-07-06 NOTE — ED PROVIDER NOTES
eMERGENCY dEPARTMENT eNCOUnter      PCP: Natali Burris MD    CHIEF COMPLAINT    Chief Complaint   Patient presents with    Urinary Tract Infection    Wound Check     bed sores, on right buttocks       HPI    Emmie Edmonds is a 62 y.o. male with past medical history cerebral palsy, seizures who presents from a group home with a bedsore to right buttock and concern for urinary tract infection. Symptoms began with a pressure ulcer to right buttock and staff noticed surrounding redness over the past couple days. Patient is denying any current pain or symptoms. Denies any recent fevers, nausea, vomiting or diarrhea. Denies history of diabetes mellitus. Denies current antibiotic use.       REVIEW OF SYSTEMS    Constitutional:  Denies fever, chills, weight loss or weakness   HENT:  Denies sore throat or ear pain   Cardiovascular:  Denies chest pain, palpitations   Respiratory:  Denies cough or shortness of breath    GI:  Denies abdominal pain, nausea, vomiting, or diarrhea  :  Denies any urinary symptoms   Musculoskeletal:  Denies back pain,   Skin:  Denies rash  Neurologic:  Denies headache, focal weakness or sensory changes   Endocrine:  Denies polyuria or polydypsia   Lymphatic:  Denies swollen glands     All other review of systems are negative  See HPI and nursing notes for additional information     PAST MEDICAL AND SURGICAL HISTORY    Past Medical History:   Diagnosis Date    Anemia     Aspiration pneumonia ([facility] [address]     dx 6/9/2014 with this per ecf/ per old chart dx with pneumonia with admission 9/18/2018    Cerebral palsy ([facility] [address]     \"has no feeling on left side of body\"alert but has some dementia but not diagnosed, has good long term but poor short term and wakes up disoriented after a nap\"(per yaneth)(2014)    Depression     Epilepsy ([address] 1961    last seizure 2/2018- hx grand mal    Frequent falls     with phone assess- family stated pt fell this am (7/10/2013\"in the process of trying to find a facility 20 MG delayed release capsule Take 20 mg by mouth 2 times daily (before meals)      primidone (MYSOLINE) 50 MG tablet Take 50 mg by mouth 3 times daily      zonisamide (ZONEGRAN) 100 MG capsule Take 100 mg by mouth every morning      acetaminophen (TYLENOL) 325 MG tablet Take 650 mg by mouth every 4 hours as needed for Pain      ibuprofen (ADVIL;MOTRIN) 600 MG tablet Take 600 mg by mouth every 6 hours as needed for Pain      Simethicone (MI-ACID GAS RELIEF PO) Take 1 Container by mouth every 4 hours as needed 10 cc every 4 hrs not to exceed 60 cc in 24 hrs      guaiFENesin (MUCINEX) 600 MG extended release tablet Take 1,200 mg by mouth daily as needed for Congestion      Menthol-Methyl Salicylate (MUSCLE RUB) 10-15 % CREA cream Apply 1 applicator topically as needed      pseudoephedrine (SUDAFED) 30 MG tablet Take 30 mg by mouth every 4 hours as needed for Congestion      lacosamide (VIMPAT) 200 MG tablet Take 200 mg by mouth as needed. Give 1 tab after seizure activity as needed      lacosamide (VIMPAT) 200 MG tablet Take 1 tablet by mouth 2 times daily for 30 days.  60 tablet 0    latanoprost (XALATAN) 0.005 % ophthalmic solution Place 1 drop into both eyes nightly      timolol (TIMOPTIC) 0.5 % ophthalmic solution Place 1 drop into both eyes daily       Lactobacillus (ACIDOPHILUS) CAPS capsule Take 1 capsule by mouth daily      aspirin 81 MG tablet Take 81 mg by mouth daily      Wheat Dextrin (BENEFIBER) POWD Take 4 g by mouth 2 times daily Takes 3 tsp in liquid twice per day       Lactulose Encephalopathy (ENULOSE PO) Take 30 mLs by mouth 3 times daily      folic acid (FOLVITE) 1 MG tablet Take 1 mg by mouth daily      PARoxetine (PAXIL) 20 MG tablet Take 20 mg by mouth every morning      QUEtiapine (SEROQUEL XR) 50 MG extended release tablet Take 50 mg by mouth nightly      tamsulosin (FLOMAX) 0.4 MG capsule Take 1 capsule by mouth daily 30 capsule 0    simvastatin (ZOCOR) 20 MG tablet Take 20 mg by mouth nightly.  Cholecalciferol (VITAMIN D3) 1000 UNITS CAPS Take 1 tablet by mouth daily.  clorazepate (TRANXENE) 7.5 MG tablet Take 7.5 mg by mouth 2 times daily.          ALLERGIES    No Known Allergies    SOCIAL AND FAMILY HISTORY    Social History     Socioeconomic History    Marital status: Single     Spouse name: Not on file    Number of children: Not on file    Years of education: Not on file    Highest education level: Not on file   Occupational History    Not on file   Social Needs    Financial resource strain: Not on file    Food insecurity     Worry: Not on file     Inability: Not on file    Transportation needs     Medical: Not on file     Non-medical: Not on file   Tobacco Use    Smoking status: Never Smoker    Smokeless tobacco: Never Used   Substance and Sexual Activity    Alcohol use: No    Drug use: No    Sexual activity: Not on file   Lifestyle    Physical activity     Days per week: Not on file     Minutes per session: Not on file    Stress: Not on file   Relationships    Social connections     Talks on phone: Not on file     Gets together: Not on file     Attends Voodoo service: Not on file     Active member of club or organization: Not on file     Attends meetings of clubs or organizations: Not on file     Relationship status: Not on file    Intimate partner violence     Fear of current or ex partner: Not on file     Emotionally abused: Not on file     Physically abused: Not on file     Forced sexual activity: Not on file   Other Topics Concern    Not on file   Social History Narrative    Not on file     Family History   Problem Relation Age of Onset    Heart Disease Mother         atrial fib    High Blood Pressure Mother     Asthma Mother     High Cholesterol Mother     Depression Mother     Migraines Mother     High Blood Pressure Father     Heart Disease Father     Asthma Sister     High Cholesterol Sister     Depression Sister    [place] Migraines Sister          PHYSICAL EXAM    VITAL SIGNS: /71   Pulse 66   Temp 99.7 °F (37.6 °C) (Oral)   Resp 14   Ht 5' 10\" (1.778 m)   Wt 197 lb (89.4 kg)   SpO2 98%   BMI 28.27 kg/m²    Constitutional:  Well developed, Well nourished  HENT:  Normocephalic, Atraumatic, PERRL. EOMI. Sclera clear. Conjunctiva normal, No discharge. Neck/Lymphatics: supple, no JVD, no swollen nodes  Cardiovascular:  Normal heart rate, Normal rhythm, No murmurs  Respiratory:  Nonlabored breathing. Normal breath sounds, No wheezing  Abdomen: Bowel sounds normal, Soft, No tenderness, no masses. Musculoskeletal: No edema, No tenderness, No cyanosis  Integument:    Right buttock with approximately half dollar sized area of shallow wound with small amount of clear/yellow drainage. There is surrounding induration into the buttock and into the perineum. No testicular tenderness, redness, swelling or induration. Neurologic: Alert & oriented , No focal deficits noted. Cranial nerves II through XII grossly intact. Normal gross motor coordination & motor strength bilateral upper and lower extremities  Sensation intact.   Psychiatric:  Affect normal, Mood normal.       Labs:  Results for orders placed or performed during the hospital encounter of 07/06/20   CBC auto diff   Result Value Ref Range    WBC 17.9 (H) 4.0 - 10.5 K/CU MM    RBC 4.81 4.6 - 6.2 M/CU MM    Hemoglobin 14.6 13.5 - 18.0 GM/DL    Hematocrit 44.3 42 - 52 %    MCV 92.1 78 - 100 FL    MCH 30.4 27 - 31 PG    MCHC 33.0 32.0 - 36.0 %    RDW 13.2 11.7 - 14.9 %    Platelets 590 706 - 810 K/CU MM    MPV 10.5 7.5 - 11.1 FL    Bands Relative 8 5 - 11 %    Segs Relative 79.0 (H) 36 - 66 %    Eosinophils % 3.0 0 - 3 %    Lymphocytes % 9.0 (L) 24 - 44 %    Monocytes % 1.0 0 - 4 %    Bands Absolute 1.43 K/CU MM    Segs Absolute 14.2 K/CU MM    Eosinophils Absolute 0.5 K/CU MM    Lymphocytes Absolute 1.6 K/CU MM    Monocytes Absolute 0.2 K/CU MM    Differential Type MANUAL DIFFERENTIAL    CMP   Result Value Ref Range    Sodium 135 135 - 145 MMOL/L    Potassium 3.4 (L) 3.5 - 5.1 MMOL/L    Chloride 97 (L) 99 - 110 mMol/L    CO2 27 21 - 32 MMOL/L    BUN 9 6 - 23 MG/DL    CREATININE 0.7 (L) 0.9 - 1.3 MG/DL    Glucose 125 (H) 70 - 99 MG/DL    Calcium 9.6 8.3 - 10.6 MG/DL    Alb 3.4 3.4 - 5.0 GM/DL    Total Protein 8.1 6.4 - 8.2 GM/DL    Total Bilirubin 0.3 0.0 - 1.0 MG/DL    ALT 12 10 - 40 U/L    AST 14 (L) 15 - 37 IU/L    Alkaline Phosphatase 70 40 - 129 IU/L    GFR Non-African American >60 >60 mL/min/1.73m2    GFR African American >60 >60 mL/min/1.73m2    Anion Gap 11 4 - 16   Lactic Acid, Plasma   Result Value Ref Range    Lactate 1.3 0.4 - 2.0 mMOL/L           RADIOLOGY    CT ABDOMEN PELVIS W IV CONTRAST   Final Result   1. Right perineal cellulitis and phlegmonous changes without a discrete   abscess. 2. Right inguinal adenopathy, likely reactive. ED COURSE & MEDICAL DECISION MAKING       Vital signs and nursing notes reviewed during ED course. I have independently evaluated this patient . Supervising MD present in the Emergency Department, available for consultation, throughout entirety of  patient care. I did don/doff appropriate PPE (including N95 face mask, safety glasses, gloves), as recommended by the health facility/national standard best practice, during my bedside interactions with the patient. Patient presents as above. He is hemodynamically stable on arrival, temperature 99.7 on arrival here to the ED. Lab work with leukocytosis of 17,900 with lactic within normal limits. Potassium 3.4. CT abdomen and pelvis with right perineal cellulitis and phlegmonous changes without discrete abscess. Right inguinal adenopathy, likely reactive. With area of cellulitis associated with likely pressure ulcer, some purulent drainage and how rapidly this area of redness progressed, will plan on admission for further evaluation and treatment.   He is started on IV clindamycin here in the ED. In consideration of current COVID19 pandemic, with effort to minimize unnecessary provider exposure, this patient was seen at bedside by me independently. However, in compliance with current hospital HEIDE/ED protocol, prior to admission I did discuss this patient case with emergency department physician, Dr. Mateo Isabel.   - I discussed Pt case with Corbin Oviedo, who agrees to admit Pt. The patient and/or the family were informed of the results of any tests/labs/imaging, the treatment plan, and time was allotted to answer questions. Clinical  IMPRESSION    1. Cellulitis of perineum    2. Leukocytosis, unspecified type        Admission    Comment: Please note this report has been produced using speech recognition software and may contain errors related to that system including errors in grammar, punctuation, and spelling, as well as words and phrases that may be inappropriate. If there are any questions or concerns please feel free to contact the dictating provider for clarification.           CHRIS Briones  07/06/20 [id number]

## 2020-07-06 NOTE — H&P
History and Physical  MARY Birmingham-BC   Internal Medicine Hospitalist      Name:  Chelle Askew /Age/Sex: 1955  (62 y.o. male)   MRN & CSN:  [id number] & [id number] Admission Date/Time: 2020 11:32 AM   Location:  ED17/ED-17 PCP: Gera Metzger MD       Hospital Day: 1      Supervising Physician: Dr. Venkata Joy     Chief Complaint: Urinary Tract Infection and Wound Check (bed sores, on right buttocks)     Assessment and Plan:   Chelle Askew is a 62 y.o. male who presents with Cellulitis, perineum     Right perineal Cellulitis - as per CT A/P, temp of 99.7, VS stable, leukocytosis (17.9). - wound site with redness and small serosanguinous drainage         - admit inpatient, telemetry         - ABx: Vanc (Pharm to dose)         - to area of cellulitis/erythematous with marker for future comparison         - consult wound care eval and treat         - skin inspect per unit guidelines/turn pt         - h/o MRSA - start contact isolation         - check lab works in AM     Acute hypokalemia - serum K 3.4        - cont tele monitoring        - on K sliding scale protocol        - replete and re-check, BMP in AM        - pending Mg level     Chronic Illnesses: will continue current home medications unless contraindicated by above plan and assessment. - Cerebral palsy        - grand mal seizure disorder - c/w Depakote, Vimpat, seizure precutions        - thyroid disease        - hypertension        - GERD     Current diagnosis and plan of management discussed with the patient at the time of admission in lay language who agree to the above plan and disposition of admission for further care. All concerns and questions addressed.       Patient assessment and plan in conjunction with supervising physician - Dr. Rodolph Heimlich    DVT Prophylaxis [x] Lovenox, []  Heparin, [] SCDs, [] Ambulation  [] Long term AC   GI Prophylaxis [x] PPI,  [] H2 Blocker,  [] Carafate,  [] Diet,  [] No GI prophylaxis, N/A: patient is not under significant medical stress, non-ICU or is receiving a diet/tube feeds   Code Status  Full CODE   Disposition Patient requires continued admission due to Cellulitis, perineum. Discharge Plan: Patient plans to return home upon discharge after seen by case management team.    MDM [] Low, [x] Moderate,[]  High  Patient's risk as above due to:      [x] One or more chronic illnesses with mild exacerbation progression      [] Two or more stable chronic illnesses      [] Undiagnosed new problem with uncertain prognosis      [] Elective major surgery      []Prescription drug management     History of Present Illness:     Principal Problem: Cellulitis, perineum   Shantel Miranda is a 62 y.o. male with past medical history of Cerebral palsy, grand mal seizure disorder, IBS, thyroid disease, hypertension, GERD, and MRSA presented to the ED from a group home with a bedsore to right buttock and concern for urinary tract infection. ED reports that symptoms started as pressure ulcer at right buttock, now staff nurse noticed increased redness around the wound with small drainage. Noticed serosanguinous drainage at the dressing but no drainage coming out drom the wound. Patient is currently denying any pain at the site. He denies chest pain, fever, chills, cough, and SOB or difficulty breathing. Denies any other symptoms including headache, paresthesias, abdominal pain, nausea, vomiting, changes in bowels, dysuria, hematuria, frequency or urgency, and weakness. Upon interview, the patient provided the history as above. ED Course: Discussed case with ED physician prior to admission. ROS: Ten point ROS reviewed and negative, unless as noted above per HPI.     Objective:   No intake or output data in the 24 hours ending 07/06/20 1544     Vitals: Temp/Oral 99.7, RR 14  Vitals:    07/06/20 1324 07/06/20 1338 07/06/20 1415 07/06/20 1430   BP:  (!) 134/57 97/63 with phone assess- family stated pt fell this am (7/10/2013\"in the process of trying to find a facility to help build him back up- (pt now at [facility])    Glaucoma     \"has been in treatment for this in the past- no treatment needed at present\"    History of IBS     Hx MRSA infection     + nasal culture 4/2014    Hyperammonemia (HCC)     Hypertension     on Lisinopril    IBS (irritable bowel syndrome)     ulcerative colitis    Kidney stone     for surgery for stent placement 7/11/2013    Mood disorder ([facility] [address]     per staff at [address] [place] [address] Occasional tremors     \"makes it difficult for him to write\"    Prostatitis     hx given per H&P old chart    Thyroid disease     Ulcerative colitis ([facility] [address]     hx given per staff at [facility] 4/13/2015     Past Surgery History:  Patient  has a past surgical history that includes Gallbladder surgery (2005); vagal nerve stimulation (2002); Cholecystectomy; Cystoscopy (Left, 07/11/2013); Kidney stone surgery; Colonoscopy (8/19/14, 5/2012); other surgical history (04/15/2015); and Upper gastrointestinal endoscopy (N/A, 11/13/2018). Social History:    FAM HX: Assessed: family history includes Asthma in his mother and sister; Depression in his mother and sister; Heart Disease in his father and mother; High Blood Pressure in his father and mother; High Cholesterol in his mother and sister; Migraines in his mother and sister.   Soc HX:   Social History     Socioeconomic History    Marital status: Single     Spouse name: Not on file    Number of children: Not on file    Years of education: Not on file    Highest education level: Not on file   Occupational History    Not on file   Social Needs    Financial resource strain: Not on file    Food insecurity     Worry: Not on file     Inability: Not on file    Transportation needs     Medical: Not on file     Non-medical: Not on file   Tobacco Use    Smoking status: Never Smoker    Smokeless tobacco: Never Used   Substance and Sexual Activity    Alcohol use: No    Drug use: No    Sexual activity: Not on file   Lifestyle    Physical activity     Days per week: Not on file     Minutes per session: Not on file    Stress: Not on file   Relationships    Social connections     Talks on phone: Not on file     Gets together: Not on file     Attends Hindu service: Not on file     Active member of club or organization: Not on file     Attends meetings of clubs or organizations: Not on file     Relationship status: Not on file    Intimate partner violence     Fear of current or ex partner: Not on file     Emotionally abused: Not on file     Physically abused: Not on file     Forced sexual activity: Not on file   Other Topics Concern    Not on file   Social History Narrative    Not on file     TOBACCO:   reports that he has never smoked. He has never used smokeless tobacco.  ETOH:   reports no history of alcohol use. Drugs:  reports no history of drug use. Allergies: No Known Allergies  Medications:   Medications:    sodium chloride  1,000 mL Intravenous Once    clindamycin (CLEOCIN) IV  600 mg Intravenous Once      Infusions:   PRN Meds:    Prior to Admission Meds:  Prior to Admission medications    Medication Sig Start Date End Date Taking? Authorizing Provider   solifenacin (VESICARE) 10 MG tablet Take 10 mg by mouth daily    Historical Provider, MD   levothyroxine (SYNTHROID) 125 MCG tablet Take 1 tablet by mouth Daily 4/14/20   Myesha Randolph MD   dicyclomine (BENTYL) 10 MG capsule Take 1 capsule by mouth every 6 hours as needed (cramps) 4/13/20 4/18/20  yMesha Randolph MD   amLODIPine (NORVASC) 5 MG tablet Take 5 mg by mouth daily    Historical Provider, MD   clonazePAM (KLONOPIN) 0.5 MG tablet Take 0.25 mg by mouth daily.     Historical Provider, MD   divalproex (DEPAKOTE) 500 MG DR tablet Take 1,000 mg by mouth nightly    Historical Provider, MD   hydrochlorothiazide (MICROZIDE) 12.5 MG capsule Take 12.5 mg by mouth daily    Historical Provider, MD   mirabegron (MYRBETRIQ) 50 MG TB24 Take 50 mg by mouth daily    Historical Provider, MD   omeprazole (PRILOSEC) 20 MG delayed release capsule Take 20 mg by mouth 2 times daily (before meals)    Historical Provider, MD   primidone (MYSOLINE) 50 MG tablet Take 50 mg by mouth 3 times daily    Historical Provider, MD   zonisamide (ZONEGRAN) 100 MG capsule Take 100 mg by mouth every morning    Historical Provider, MD   acetaminophen (TYLENOL) 325 MG tablet Take 650 mg by mouth every 4 hours as needed for Pain    Historical Provider, MD   ibuprofen (ADVIL;MOTRIN) 600 MG tablet Take 600 mg by mouth every 6 hours as needed for Pain    Historical Provider, MD   Simethicone (MI-ACID GAS RELIEF PO) Take 1 Container by mouth every 4 hours as needed 10 cc every 4 hrs not to exceed 60 cc in 24 hrs    Historical Provider, MD   guaiFENesin (MUCINEX) 600 MG extended release tablet Take 1,200 mg by mouth daily as needed for Congestion    Historical Provider, MD   Menthol-Methyl Salicylate (MUSCLE RUB) 10-15 % CREA cream Apply 1 applicator topically as needed    Historical Provider, MD   pseudoephedrine (SUDAFED) 30 MG tablet Take 30 mg by mouth every 4 hours as needed for Congestion    Historical Provider, MD   lacosamide (VIMPAT) 200 MG tablet Take 200 mg by mouth as needed. Give 1 tab after seizure activity as needed    Historical Provider, MD   lacosamide (VIMPAT) 200 MG tablet Take 1 tablet by mouth 2 times daily for 30 days.  3/25/20 5/27/20  Louise Renata Cranbury, DO   latanoprost (XALATAN) 0.005 % ophthalmic solution Place 1 drop into both eyes nightly    Historical Provider, MD   timolol (TIMOPTIC) 0.5 % ophthalmic solution Place 1 drop into both eyes daily     Historical Provider, MD   Lactobacillus (ACIDOPHILUS) CAPS capsule Take 1 capsule by mouth daily    Historical Provider, MD   aspirin 81 MG tablet Take 81 mg by mouth daily    Historical Provider, MD   Wheat Dextrin (BENEFIBER) POWD Take 4 g by mouth 2 times daily Takes 3 tsp in liquid twice per day     Historical Provider, MD   Lactulose Encephalopathy (ENULOSE PO) Take 30 mLs by mouth 3 times daily    Historical Provider, MD   folic acid (FOLVITE) 1 MG tablet Take 1 mg by mouth daily    Historical Provider, MD   PARoxetine (PAXIL) 20 MG tablet Take 20 mg by mouth every morning    Historical Provider, MD   QUEtiapine (SEROQUEL XR) 50 MG extended release tablet Take 50 mg by mouth nightly    Historical Provider, MD   tamsulosin (FLOMAX) 0.4 MG capsule Take 1 capsule by mouth daily 3/18/17   CHRIS Villa   simvastatin (ZOCOR) 20 MG tablet Take 20 mg by mouth nightly. Historical Provider, MD   Cholecalciferol (VITAMIN D3) 1000 UNITS CAPS Take 1 tablet by mouth daily. Historical Provider, MD   clorazepate (TRANXENE) 7.5 MG tablet Take 7.5 mg by mouth 2 times daily. Historical Provider, MD     Data:     Laboratory this visit:  Reviewed  Recent Labs     07/06/20  1257   WBC 17.9*   HGB 14.6   HCT 44.3         Recent Labs     07/06/20  1257      K 3.4*   CL 97*   CO2 27   BUN 9   CREATININE 0.7*     Recent Labs     07/06/20  1257   AST 14*   ALT 12   BILITOT 0.3   ALKPHOS 70     No results for input(s): INR in the last 72 hours. No results for input(s): CKTOTAL, CKMB, CKMBINDEX in the last 72 hours. Invalid input(s): Yasmin Dice input(s): PRO-BNP    Radiology this visit:  Reviewed. Xr Shoulder Right (min 2 Views)    Result Date: 6/28/2020  EXAMINATION: THREE XRAY VIEWS OF THE RIGHT SHOULDER 6/28/2020 2:09 pm COMPARISON: Chest radiograph dated April 16, 2020 HISTORY: ORDERING SYSTEM PROVIDED HISTORY: trauma TECHNOLOGIST PROVIDED HISTORY: Right Shoulder - XR Portable Reason for exam:->trauma Reason for Exam:  fell hitting his head on the wall Acuity: Acute Type of Exam: Initial Mechanism of Injury: fall FINDINGS: Three views of the shoulder demonstrate no acute fracture or dislocation. No suspicious osseous lesion. Moderate degenerative changes of the glenohumeral joint. Moderate degenerative changes of the AC joint. Visualized hemithorax is unremarkable. No soft tissue abnormality. 1. No acute osseous abnormality of the right shoulder. 2. Moderate shoulder osteoarthritis. Ct Head Wo Contrast    Result Date: 6/28/2020  EXAMINATION: CT OF THE HEAD WITHOUT CONTRAST  6/28/2020 1:31 pm TECHNIQUE: CT of the head was performed without the administration of intravenous contrast. Dose modulation, iterative reconstruction, and/or weight based adjustment of the mA/kV was utilized to reduce the radiation dose to as low as reasonably achievable. COMPARISON: 04/16/2020 HISTORY: ORDERING SYSTEM PROVIDED HISTORY: head pain TECHNOLOGIST PROVIDED HISTORY: Has a \"code stroke\" or \"stroke alert\" been called? ->No Reason for exam:->head pain Reason for Exam: FALL  HIT TOP OF HEAD Acuity: Acute Type of Exam: Initial FINDINGS: BRAIN/VENTRICLES: The ventricles and sulci are diffusely enlarged. Low attenuation is seen in the periventricular and subcortical white matter. No acute intracranial hemorrhage or acute infarct is identified. Encephalomalacia right MCA distribution. ORBITS: The visualized portion of the orbits demonstrate no acute abnormality. SINUSES: The visualized paranasal sinuses and mastoid air cells demonstrate no acute abnormality. SOFT TISSUES/SKULL:  No acute abnormality of the visualized skull or soft tissues. No acute intracranial abnormality. 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 Multiplanar reformatted images are provided for review.  Dose modulation, iterative reconstruction, and/or weight based adjustment of the mA/kV was utilized to reduce the radiation dose to as low as reasonably achievable. COMPARISON: C-spine CT, 03/04/2020 HISTORY: ORDERING SYSTEM PROVIDED HISTORY: trauma TECHNOLOGIST PROVIDED HISTORY: Reason for exam:->trauma Reason for Exam: FALL  NECK PAIN Acuity: Acute Type of Exam: Initial FINDINGS: BONES/ALIGNMENT: There is no acute fracture or traumatic malalignment. DEGENERATIVE CHANGES: There is moderate to severe multilevel degenerative disc disease throughout the cervical spine manifested by disc space narrowing and endplate osteophyte formation. There are also bridging syndesmophytes at multiple levels compatible with DISH. There is prominent degenerative arthrosis at C1-. Mild-to-moderate multilevel bilateral facet arthropathy is present in the upper and mid cervical spine. SOFT TISSUES: There is no prevertebral soft tissue swelling. Surgical clips seen along the left side of the neck. No acute abnormality of the cervical spine. Multilevel degenerative disc disease and facet arthropathy throughout the cervical spine. If there are neurologic symptoms, MRI could be performed for further evaluation. Findings compatible with DISH. Ct Abdomen Pelvis W Iv Contrast    Result Date: 7/6/2020  EXAMINATION: CT OF THE ABDOMEN AND PELVIS WITH CONTRAST 7/6/2020 2:19 pm TECHNIQUE: CT of the abdomen and pelvis was performed with the administration of intravenous contrast. Multiplanar reformatted images are provided for review. Dose modulation, iterative reconstruction, and/or weight based adjustment of the mA/kV was utilized to reduce the radiation dose to as low as reasonably achievable.  COMPARISON: 04/11/2020 HISTORY: ORDERING SYSTEM PROVIDED HISTORY: Abdominal pain TECHNOLOGIST PROVIDED HISTORY: Reason for exam:->Abdominal pain Reason for Exam: ABD PAIN Acuity: Acute Type of Exam: Initial Relevant Medical/Surgical History: [de-identified] ML FINDINGS: Lower Chest: Motion artifact degrades image see ED notes.     Current Treatment Team:  Treatment Team: Attending Provider: Favian Ashford MD; Physician Assistant: CHRIS Martienz; Registered Nurse: MEENAKSHI Farris APRN-BC [facility]  7/6/2020 3:44 PM      Electronically signed by MARY Espitia CNP on 7/6/2020 at 3:44 PM

## 2020-07-07 LAB
ANION GAP SERPL CALCULATED.3IONS-SCNC: 7 MMOL/L (ref 4–16)
BASOPHILS ABSOLUTE: 0 K/CU MM
BASOPHILS RELATIVE PERCENT: 0.2 % (ref 0–1)
BUN BLDV-MCNC: 11 MG/DL (ref 6–23)
CALCIUM SERPL-MCNC: 9 MG/DL (ref 8.3–10.6)
CHLORIDE BLD-SCNC: 100 MMOL/L (ref 99–110)
CO2: 28 MMOL/L (ref 21–32)
CREAT SERPL-MCNC: 0.7 MG/DL (ref 0.9–1.3)
DIFFERENTIAL TYPE: ABNORMAL
EOSINOPHILS ABSOLUTE: 0.4 K/CU MM
EOSINOPHILS RELATIVE PERCENT: 3.4 % (ref 0–3)
GFR AFRICAN AMERICAN: >60 ML/MIN/1.73M2
GFR NON-AFRICAN AMERICAN: >60 ML/MIN/1.73M2
GLUCOSE BLD-MCNC: 125 MG/DL (ref 70–99)
HCT VFR BLD CALC: 38.5 % (ref 42–52)
HEMOGLOBIN: 12.7 GM/DL (ref 13.5–18)
IMMATURE NEUTROPHIL %: 0.6 % (ref 0–0.43)
LYMPHOCYTES ABSOLUTE: 1.2 K/CU MM
LYMPHOCYTES RELATIVE PERCENT: 9.6 % (ref 24–44)
MCH RBC QN AUTO: 30.4 PG (ref 27–31)
MCHC RBC AUTO-ENTMCNC: 33 % (ref 32–36)
MCV RBC AUTO: 92.1 FL (ref 78–100)
MONOCYTES ABSOLUTE: 1.7 K/CU MM
MONOCYTES RELATIVE PERCENT: 13 % (ref 0–4)
NUCLEATED RBC %: 0 %
PDW BLD-RTO: 13 % (ref 11.7–14.9)
PLATELET # BLD: 181 K/CU MM (ref 140–440)
PMV BLD AUTO: 10.3 FL (ref 7.5–11.1)
POTASSIUM SERPL-SCNC: 3.7 MMOL/L (ref 3.5–5.1)
RBC # BLD: 4.18 M/CU MM (ref 4.6–6.2)
SEGMENTED NEUTROPHILS ABSOLUTE COUNT: 9.3 K/CU MM
SEGMENTED NEUTROPHILS RELATIVE PERCENT: 73.2 % (ref 36–66)
SODIUM BLD-SCNC: 135 MMOL/L (ref 135–145)
TOTAL IMMATURE NEUTOROPHIL: 0.08 K/CU MM
TOTAL NUCLEATED RBC: 0 K/CU MM
WBC # BLD: 12.7 K/CU MM (ref 4–10.5)

## 2020-07-07 PROCEDURE — 6360000002 HC RX W HCPCS: Performed by: INTERNAL MEDICINE

## 2020-07-07 PROCEDURE — 2580000003 HC RX 258: Performed by: INTERNAL MEDICINE

## 2020-07-07 PROCEDURE — 6360000002 HC RX W HCPCS: Performed by: NURSE PRACTITIONER

## 2020-07-07 PROCEDURE — 80048 BASIC METABOLIC PNL TOTAL CA: CPT

## 2020-07-07 PROCEDURE — 85025 COMPLETE CBC W/AUTO DIFF WBC: CPT

## 2020-07-07 PROCEDURE — 6370000000 HC RX 637 (ALT 250 FOR IP): Performed by: NURSE PRACTITIONER

## 2020-07-07 PROCEDURE — 2580000003 HC RX 258: Performed by: NURSE PRACTITIONER

## 2020-07-07 PROCEDURE — 1200000000 HC SEMI PRIVATE

## 2020-07-07 PROCEDURE — 99211 OFF/OP EST MAY X REQ PHY/QHP: CPT

## 2020-07-07 PROCEDURE — 94761 N-INVAS EAR/PLS OXIMETRY MLT: CPT

## 2020-07-07 RX ORDER — IBUPROFEN 600 MG/1
600 TABLET ORAL EVERY 6 HOURS PRN
Status: DISCONTINUED | OUTPATIENT
Start: 2020-07-07 | End: 2020-07-13 | Stop reason: HOSPADM

## 2020-07-07 RX ADMIN — FOLIC ACID 1 MG: 1 TABLET ORAL at 09:19

## 2020-07-07 RX ADMIN — QUETIAPINE FUMARATE 50 MG: 50 TABLET, EXTENDED RELEASE ORAL at 21:18

## 2020-07-07 RX ADMIN — TROSPIUM CHLORIDE 20 MG: 20 TABLET, FILM COATED ORAL at 21:19

## 2020-07-07 RX ADMIN — LATANOPROST 1 DROP: 50 SOLUTION OPHTHALMIC at 22:59

## 2020-07-07 RX ADMIN — TAMSULOSIN HYDROCHLORIDE 0.4 MG: 0.4 CAPSULE ORAL at 09:18

## 2020-07-07 RX ADMIN — SIMVASTATIN 20 MG: 20 TABLET, FILM COATED ORAL at 21:18

## 2020-07-07 RX ADMIN — Medication 1 PACKET: at 21:19

## 2020-07-07 RX ADMIN — LORAZEPAM 1 MG: 0.5 TABLET ORAL at 09:20

## 2020-07-07 RX ADMIN — Medication 1000 UNITS: at 09:19

## 2020-07-07 RX ADMIN — LORAZEPAM 1 MG: 0.5 TABLET ORAL at 21:19

## 2020-07-07 RX ADMIN — PRIMIDONE 50 MG: 50 TABLET ORAL at 14:42

## 2020-07-07 RX ADMIN — LACTULOSE 10 G: 10 SOLUTION ORAL at 21:19

## 2020-07-07 RX ADMIN — TROSPIUM CHLORIDE 20 MG: 20 TABLET, FILM COATED ORAL at 09:19

## 2020-07-07 RX ADMIN — VANCOMYCIN HYDROCHLORIDE 1250 MG: 5 INJECTION, POWDER, LYOPHILIZED, FOR SOLUTION INTRAVENOUS at 09:20

## 2020-07-07 RX ADMIN — PAROXETINE HYDROCHLORIDE 20 MG: 20 TABLET, FILM COATED ORAL at 09:19

## 2020-07-07 RX ADMIN — Medication 1 PACKET: at 09:20

## 2020-07-07 RX ADMIN — HYDROCHLOROTHIAZIDE 12.5 MG: 12.5 TABLET ORAL at 09:20

## 2020-07-07 RX ADMIN — VANCOMYCIN HYDROCHLORIDE 1250 MG: 5 INJECTION, POWDER, LYOPHILIZED, FOR SOLUTION INTRAVENOUS at 23:03

## 2020-07-07 RX ADMIN — CLONAZEPAM 0.25 MG: 0.5 TABLET ORAL at 09:18

## 2020-07-07 RX ADMIN — LACOSAMIDE 200 MG: 50 TABLET, FILM COATED ORAL at 09:19

## 2020-07-07 RX ADMIN — PRIMIDONE 50 MG: 50 TABLET ORAL at 09:19

## 2020-07-07 RX ADMIN — ASPIRIN 81 MG: 81 TABLET, COATED ORAL at 09:19

## 2020-07-07 RX ADMIN — SODIUM CHLORIDE, PRESERVATIVE FREE 10 ML: 5 INJECTION INTRAVENOUS at 21:18

## 2020-07-07 RX ADMIN — LEVOTHYROXINE SODIUM 125 MCG: 125 TABLET ORAL at 06:02

## 2020-07-07 RX ADMIN — Medication 1 CAPSULE: at 09:19

## 2020-07-07 RX ADMIN — PRIMIDONE 50 MG: 50 TABLET ORAL at 21:23

## 2020-07-07 RX ADMIN — IBUPROFEN 600 MG: 600 TABLET, FILM COATED ORAL at 00:25

## 2020-07-07 RX ADMIN — ZONISAMIDE 100 MG: 100 CAPSULE ORAL at 09:19

## 2020-07-07 RX ADMIN — CEFTRIAXONE SODIUM 1 G: 1 INJECTION, POWDER, FOR SOLUTION INTRAMUSCULAR; INTRAVENOUS at 21:19

## 2020-07-07 RX ADMIN — PANTOPRAZOLE SODIUM 40 MG: 40 TABLET, DELAYED RELEASE ORAL at 06:02

## 2020-07-07 RX ADMIN — LACOSAMIDE 200 MG: 50 TABLET, FILM COATED ORAL at 21:19

## 2020-07-07 RX ADMIN — SODIUM CHLORIDE, PRESERVATIVE FREE 10 ML: 5 INJECTION INTRAVENOUS at 09:20

## 2020-07-07 RX ADMIN — DIVALPROEX SODIUM 1000 MG: 500 TABLET, DELAYED RELEASE ORAL at 21:19

## 2020-07-07 RX ADMIN — ENOXAPARIN SODIUM 40 MG: 40 INJECTION SUBCUTANEOUS at 09:20

## 2020-07-07 ASSESSMENT — PAIN DESCRIPTION - DESCRIPTORS: DESCRIPTORS: SORE

## 2020-07-07 ASSESSMENT — PAIN DESCRIPTION - PROGRESSION: CLINICAL_PROGRESSION: NOT CHANGED

## 2020-07-07 ASSESSMENT — PAIN - FUNCTIONAL ASSESSMENT: PAIN_FUNCTIONAL_ASSESSMENT: ACTIVITIES ARE NOT PREVENTED

## 2020-07-07 ASSESSMENT — PAIN DESCRIPTION - ONSET: ONSET: ON-GOING

## 2020-07-07 ASSESSMENT — PAIN SCALES - GENERAL
PAINLEVEL_OUTOF10: 4
PAINLEVEL_OUTOF10: 0
PAINLEVEL_OUTOF10: 0

## 2020-07-07 ASSESSMENT — PAIN DESCRIPTION - FREQUENCY: FREQUENCY: CONTINUOUS

## 2020-07-07 ASSESSMENT — PAIN DESCRIPTION - PAIN TYPE: TYPE: ACUTE PAIN

## 2020-07-07 ASSESSMENT — PAIN DESCRIPTION - ORIENTATION: ORIENTATION: RIGHT

## 2020-07-07 ASSESSMENT — PAIN DESCRIPTION - LOCATION: LOCATION: GENERALIZED

## 2020-07-07 NOTE — PLAN OF CARE
Problem: Pain:  Description: Pain management should include both nonpharmacologic and pharmacologic interventions.   Goal: Pain level will decrease  Description: Pain level will decrease  Outcome: Ongoing  Goal: Control of acute pain  Description: Control of acute pain  Outcome: Ongoing  Goal: Control of chronic pain  Description: Control of chronic pain  Outcome: Ongoing

## 2020-07-07 NOTE — CONSULTS
PHARMACY VANCOMYCIN MONITORING SERVICE    Aston Mariee is a 62 y.o. male started on vancomycin for right perineal cellulitis. Pharmacy consulted by Dr. Taryn Xiao / NP Héctor Callahan for monitoring and adjustment. Indication for treatment: Right perineal cellulitis  Goal trough: 15 mcg/mL  Other antimicrobials: Clindamycin (given in the ED)    Ht Readings from Last 1 Encounters:   07/06/20 5' 10\" (1.778 m)     Wt Readings from Last 3 Encounters:   07/07/20 205 lb 1.6 oz (93 kg)   05/27/20 197 lb (89.4 kg)   05/16/20 197 lb (89.4 kg)        Pertinent Laboratory Values:   Temp Readings from Last 3 Encounters:   07/07/20 98 °F (36.7 °C) (Oral)   06/28/20 98 °F (36.7 °C) (Oral)   06/17/20 98.7 °F (37.1 °C) (Temporal)     Recent Labs     07/06/20  1257 07/06/20  1258 07/07/20  0351   WBC 17.9*  --  12.7*   LACTATE  --  1.3  --      Recent Labs     07/06/20  1257 07/07/20  0351   BUN 9 11   CREATININE 0.7* 0.7*     Estimated Creatinine Clearance: 124 mL/min (A) (based on SCr of 0.7 mg/dL (L)). No intake or output data in the 24 hours ending 07/07/20 1624      Previous vancomycin levels:  TROUGH:  No results for input(s): VANCOTROUGH in the last 72 hours. RANDOM:  No results for input(s): VANCORANDOM in the last 72 hours. Assessment/Plan:   WBC trending down to 12.7, pt afebrile   SCR has been normal, no output data   Urine culture ordered   Continue vancomycin 1,250mg q12h, check the vanco level tomorrow    This vancomycin regimen is expected to produce a therapeutic trough between 10-15mg/dL. [address] [place] [address] will continue to monitor renal function, clinical status & recommend de-escalation if appropriate. Patient does have cerebral palsy which may artificially increase calculated CrCl.  Will adjust therapy as indicated    VANCOMYCIN TROUGH SCHEDULED FOR 7/8/2020 @ 6228    Thank you for the consult and the opportunity to serve you and your patients. Ross Malik  07/07/20  4:29 PM

## 2020-07-07 NOTE — CONSULTS
PHARMACY VANCOMYCIN MONITORING SERVICE    Dk Elizalde is a 62 y.o. male started on vancomycin for right perineal cellulitis. Pharmacy consulted by Dr. Maria Luisa Briggs / NP Toni Mcnally for monitoring and adjustment. Indication for treatment: Right perineal cellulitis  Goal trough: 15 mcg/mL  Other antimicrobials: Clindamycin (given in the ED)    Ht Readings from Last 1 Encounters:   07/06/20 5' 10\" (1.778 m)     Wt Readings from Last 3 Encounters:   07/06/20 197 lb (89.4 kg)   05/27/20 197 lb (89.4 kg)   05/16/20 197 lb (89.4 kg)        Pertinent Laboratory Values:   Temp Readings from Last 3 Encounters:   07/06/20 98.4 °F (36.9 °C) (Oral)   06/28/20 98 °F (36.7 °C) (Oral)   06/17/20 98.7 °F (37.1 °C) (Temporal)     Recent Labs     07/06/20  1257 07/06/20  1258   WBC 17.9*  --    LACTATE  --  1.3     Recent Labs     07/06/20  1257   BUN 9   CREATININE 0.7*     Estimated Creatinine Clearance: 122 mL/min (A) (based on SCr of 0.7 mg/dL (L)). No intake or output data in the 24 hours ending 07/06/20 2040      Previous vancomycin levels:  TROUGH:  No results for input(s): VANCOTROUGH in the last 72 hours. RANDOM:  No results for input(s): VANCORANDOM in the last 72 hours. Assessment/Plan:  Patient will receive a one-time loading dose of vancomycin 1,750mg (20mg/kg) followed by a vancomycin regimen of 1,250mg q12h. This vancomycin regimen is expected to produce a therapeutic trough between 10-15mg/dL. Pharmacy will continue to monitor renal function, clinical status & recommend de-escalation if appropriate. Patient does have cerebral palsy which may artificially increase calculated CrCl. Will adjust therapy as indicated      VANCOMYCIN TROUGH SCHEDULED FOR 7/8/2020 @ 6759      Thank you for the consult and the opportunity to serve you and your patients.    Christiano Pizarro RPh, PharmD, BCPS  Phone: [phone]  7/6/2020   8:42 PM

## 2020-07-07 NOTE — PROGRESS NOTES
Hospitalist Progress Note      Name:  Uzma Amato /Age/Sex: 1955  (89 y.o. male)   MRN & CSN:  [id number] & [id number] Admission Date/Time: 2020 11:32 AM   Location:  [address] [place] [address] PCP: Mahin Nguyễn MD         Hospital Day: 2    Assessment and Plan:   Uzma Amato is a 62 y.o.  male  who presents with:    Disposition-appreciate surgery consult. Continue IV antibiotics. Wound culture requested. Chronic pressure ulcer right outer hip with wound with some yellow drainage on arrival  -there is surrounding right buttock and perineal redness consistent with cellulitis  -hx MRSA noted  -started on Vanco IV on admission, continue  -added Rocephin  to cover gram negatives  -send culture  -Dr. Shanelle Gonzalez treats him at the wound care center, appreciate consult  -Trend CRP, if no improvement in infection consider ID consult    Hypokalemia  - K+ was 3.4, will recheck        Chronic Problems    Cerebral palsy  -spoke with sister, his guardian    Aurora cummings seizure disorder - c/w Depakote and Vimpat and Zonegram    Thyroid disease  -Synthroid    Hypertension/HLD  -Norvasc and HCTZ  -Simvastatin    GERD    On clonazepam 0.25 mg daily and Ativan 1 mg BID, Paxil and Seroquel XR 50 mg HS for apparent mood disorder    Hyperammonemia  -on lactulose, will check ammonia level per sister's request     - on Flomax and Sanctura    History of Present Illness:     Smiling, and in good spirits today    Objective:   No intake or output data in the 24 hours ending 20 1043   Vitals:   Vitals:    20 0852   BP: (!) 107/59   Pulse: 56   Resp: 16   Temp: 98 °F (36.7 °C)   SpO2: 95%     Physical Exam:      Physical Exam  Pulmonary:      Effort: Pulmonary effort is normal.   Skin:     General: Skin is dry. Neurological:      Mental Status: He is alert.            Medications:   Medications:    amLODIPine  5 mg Oral Daily    aspirin  81 mg Oral Daily    vitamin D  1 capsule Oral Daily    clonazePAM  0.25 mg Oral

## 2020-07-07 NOTE — CARE COORDINATION
Reviewed chart and spoke with pt's Guardian/Sister, she confirmed pt is from Group home and his 1st choice would be to return with [facility]. but she is concerned that pt has had a rough last several months and would maybe need short term rehab stay. Not clear of what discharge plan should be at this time. CM will continue to follow.

## 2020-07-07 NOTE — CONSULTS
KIDNEY STONE SURGERY      OTHER SURGICAL HISTORY  04/15/2015    Revision of vagal nerve stimulator    UPPER GASTROINTESTINAL ENDOSCOPY N/A 11/13/2018    EGD DILATION BALLOON AND BIOPSY performed by Hermelinda Crews MD at [place] [address]    Has to have bettery changed every 5 yrs. FAMILY HISTORY    Family History   Problem Relation Age of Onset    Heart Disease Mother         atrial fib    High Blood Pressure Mother     Asthma Mother     High Cholesterol Mother     Depression Mother     Can Migraines Mother     High Blood Pressure Father     Heart Disease Father     Asthma Sister     High Cholesterol Sister     Depression Sister     Migraines Sister        SOCIAL HISTORY    Social History     Tobacco Use    Smoking status: Never Smoker    Smokeless tobacco: Never Used   Substance Use Topics    Alcohol use: No    Drug use: No       ALLERGIES    No Known Allergies    MEDICATIONS    No current facility-administered medications on file prior to encounter. Current Outpatient Medications on File Prior to Encounter   Medication Sig Dispense Refill    solifenacin (VESICARE) 10 MG tablet Take 10 mg by mouth daily      levothyroxine (SYNTHROID) 125 MCG tablet Take 1 tablet by mouth Daily 30 tablet 3    dicyclomine (BENTYL) 10 MG capsule Take 1 capsule by mouth every 6 hours as needed (cramps) 20 capsule 0    amLODIPine (NORVASC) 5 MG tablet Take 5 mg by mouth daily      clonazePAM (KLONOPIN) 0.5 MG tablet Take 0.25 mg by mouth daily.       divalproex (DEPAKOTE) 500 MG DR tablet Take 1,000 mg by mouth nightly      hydrochlorothiazide (MICROZIDE) 12.5 MG capsule Take 12.5 mg by mouth daily      mirabegron (MYRBETRIQ) 50 MG TB24 Take 50 mg by mouth daily      omeprazole (PRILOSEC) 20 MG delayed release capsule Take 20 mg by mouth 2 times daily (before meals)      primidone (MYSOLINE) 50 MG tablet Take 50 mg by mouth 3 times daily      zonisamide (ZONEGRAN) 100 MG capsule Take 100 mg by mouth every morning      acetaminophen (TYLENOL) 325 MG tablet Take 650 mg by mouth every 4 hours as needed for Pain      ibuprofen (ADVIL;MOTRIN) 600 MG tablet Take 600 mg by mouth every 6 hours as needed for Pain      Simethicone (MI-ACID GAS RELIEF PO) Take 1 Container by mouth every 4 hours as needed 10 cc every 4 hrs not to exceed 60 cc in 24 hrs      guaiFENesin (MUCINEX) 600 MG extended release tablet Take 1,200 mg by mouth daily as needed for Congestion      Menthol-Methyl Salicylate (MUSCLE RUB) 10-15 % CREA cream Apply 1 applicator topically as needed      pseudoephedrine (SUDAFED) 30 MG tablet Take 30 mg by mouth every 4 hours as needed for Congestion      lacosamide (VIMPAT) 200 MG tablet Take 200 mg by mouth as needed. Give 1 tab after seizure activity as needed      lacosamide (VIMPAT) 200 MG tablet Take 1 tablet by mouth 2 times daily for 30 days. 60 tablet 0    latanoprost (XALATAN) 0.005 % ophthalmic solution Place 1 drop into both eyes nightly      timolol (TIMOPTIC) 0.5 % ophthalmic solution Place 1 drop into both eyes daily       Lactobacillus (ACIDOPHILUS) CAPS capsule Take 1 capsule by mouth daily      aspirin 81 MG tablet Take 81 mg by mouth daily      Wheat Dextrin (BENEFIBER) POWD Take 4 g by mouth 2 times daily Takes 3 tsp in liquid twice per day       Lactulose Encephalopathy (ENULOSE PO) Take 30 mLs by mouth 3 times daily      folic acid (FOLVITE) 1 MG tablet Take 1 mg by mouth daily      PARoxetine (PAXIL) 20 MG tablet Take 20 mg by mouth every morning      QUEtiapine (SEROQUEL XR) 50 MG extended release tablet Take 50 mg by mouth nightly      tamsulosin (FLOMAX) 0.4 MG capsule Take 1 capsule by mouth daily 30 capsule 0    simvastatin (ZOCOR) 20 MG tablet Take 20 mg by mouth nightly.  Cholecalciferol (VITAMIN D3) 1000 UNITS CAPS Take 1 tablet by mouth daily.  clorazepate (TRANXENE) 7.5 MG tablet Take 7.5 mg by mouth 2 times daily. Dressing Changed Changed/New 7/7/2020  8:40 AM   Wound Cleansed Rinsed/Irrigated with saline 7/7/2020  8:40 AM   Wound Length (cm) 5 cm 7/7/2020  8:40 AM   Wound Width (cm) 2.7 cm 7/7/2020  8:40 AM   Wound Depth (cm) 0.2 cm 7/7/2020  8:40 AM   Wound Surface Area (cm^2) 13.5 cm^2 7/7/2020  8:40 AM   Wound Volume (cm^3) 2.7 cm^3 7/7/2020  8:40 AM   Distance Tunneling (cm) 0 cm 7/7/2020  8:40 AM   Tunneling Position ___ O'Clock 0 7/7/2020  8:40 AM   Undermining Starts ___ O'Clock 0 7/7/2020  8:40 AM   Undermining Ends___ O'Clock 0 7/7/2020  8:40 AM   Undermining Maxium Distance (cm) 0 7/7/2020  8:40 AM   Wound Assessment Edema; Yellow; Red 7/7/2020  8:40 AM   Drainage Amount Small 7/7/2020  8:40 AM   Drainage Description Yellow 7/7/2020  8:40 AM   Odor None 7/7/2020  8:40 AM   Margins Defined edges 7/7/2020  8:40 AM   Melanie-wound Assessment Swelling;Red 7/7/2020  8:40 AM   Non-staged Wound Description Full thickness 7/7/2020  8:40 AM   Red%Wound Bed 80 7/7/2020  8:40 AM   Yellow%Wound Bed 20 7/7/2020  8:40 AM   Number of days: 0       Response to treatment:  Well tolerated by patient. Pain Assessment:  Severity:  none  Quality of pain: na  Wound Pain Timing/Severity: na  Premedicated: no    Plan:     Plan of Care: Wound 07/07/20 Buttocks Right ischial-Dressing/Treatment: (aquacel AG)     Pt in bed. Agreeable to wound assessment. Right buttock/ischial wound noted with area with erythema and firm surrounding wound. Picture and measurements taken. Applied Aquacel AG and secured with depends. Possible abscess. Agree with surgical consult. Dr. Kaiden Morgan consulted to evaluate. Right heel red, blanching, and intact. Left heel intact. Atmos air pump applied to mattress. Repositioned to left side with pillow support. Pt is at mild risk for skin breakdown AEB Jean. Follow Jean orders.      Specialty Bed Required : yes  [] Low Air Loss   [x] Pressure Redistribution  [] Fluid Immersion  [] Bariatric  [] Total Pressure Relief  [] Other:     Discharge Plan:  Placement for patient upon discharge: tbd  Hospice Care: no  Patient appropriate for Outpatient [address]: [facility]    Patient/Caregiver Teaching:  Level of patient/caregiver understanding able to:   Needs reinforcement.         Electronically signed by Tree Gay RN,  on 7/7/2020 at 10:38 AM

## 2020-07-07 NOTE — PROGRESS NOTES
This nurse returned call to pts Miguel Interiano and updated her on patient. No further questions at this time.

## 2020-07-08 LAB
AMMONIA: 116 UMOL/L (ref 16–60)
ANION GAP SERPL CALCULATED.3IONS-SCNC: 9 MMOL/L (ref 4–16)
BACTERIA: NEGATIVE /HPF
BILIRUBIN URINE: NEGATIVE MG/DL
BLOOD, URINE: ABNORMAL
BUN BLDV-MCNC: 11 MG/DL (ref 6–23)
CALCIUM SERPL-MCNC: 9.2 MG/DL (ref 8.3–10.6)
CHLORIDE BLD-SCNC: 100 MMOL/L (ref 99–110)
CLARITY: CLEAR
CO2: 28 MMOL/L (ref 21–32)
COLOR: YELLOW
CREAT SERPL-MCNC: 0.6 MG/DL (ref 0.9–1.3)
DOSE AMOUNT: NORMAL
DOSE TIME: NORMAL
GFR AFRICAN AMERICAN: >60 ML/MIN/1.73M2
GFR NON-AFRICAN AMERICAN: >60 ML/MIN/1.73M2
GLUCOSE BLD-MCNC: 134 MG/DL (ref 70–99)
GLUCOSE, URINE: NEGATIVE MG/DL
HCT VFR BLD CALC: 41.3 % (ref 42–52)
HEMOGLOBIN: 13.6 GM/DL (ref 13.5–18)
HIGH SENSITIVE C-REACTIVE PROTEIN: 141.4 MG/L
KETONES, URINE: NEGATIVE MG/DL
LEUKOCYTE ESTERASE, URINE: ABNORMAL
MCH RBC QN AUTO: 30.2 PG (ref 27–31)
MCHC RBC AUTO-ENTMCNC: 32.9 % (ref 32–36)
MCV RBC AUTO: 91.6 FL (ref 78–100)
MUCUS: ABNORMAL HPF
NITRITE URINE, QUANTITATIVE: NEGATIVE
PDW BLD-RTO: 13.2 % (ref 11.7–14.9)
PH, URINE: 5 (ref 5–8)
PLATELET # BLD: 219 K/CU MM (ref 140–440)
PMV BLD AUTO: 10.4 FL (ref 7.5–11.1)
POTASSIUM SERPL-SCNC: 3.6 MMOL/L (ref 3.5–5.1)
PROTEIN UA: 30 MG/DL
RBC # BLD: 4.51 M/CU MM (ref 4.6–6.2)
RBC URINE: 67 /HPF (ref 0–3)
SODIUM BLD-SCNC: 137 MMOL/L (ref 135–145)
SPECIFIC GRAVITY UA: 1.03 (ref 1–1.03)
SQUAMOUS EPITHELIAL: <1 /HPF
TRICHOMONAS: ABNORMAL /HPF
UROBILINOGEN, URINE: 1 MG/DL (ref 0.2–1)
VANCOMYCIN TROUGH: 10.6 UG/ML (ref 10–20)
WBC # BLD: 9.2 K/CU MM (ref 4–10.5)
WBC UA: 12 /HPF (ref 0–2)

## 2020-07-08 PROCEDURE — 6370000000 HC RX 637 (ALT 250 FOR IP): Performed by: NURSE PRACTITIONER

## 2020-07-08 PROCEDURE — 86141 C-REACTIVE PROTEIN HS: CPT

## 2020-07-08 PROCEDURE — 2580000003 HC RX 258: Performed by: NURSE PRACTITIONER

## 2020-07-08 PROCEDURE — 85027 COMPLETE CBC AUTOMATED: CPT

## 2020-07-08 PROCEDURE — 1200000000 HC SEMI PRIVATE

## 2020-07-08 PROCEDURE — 36415 COLL VENOUS BLD VENIPUNCTURE: CPT

## 2020-07-08 PROCEDURE — 6360000002 HC RX W HCPCS: Performed by: NURSE PRACTITIONER

## 2020-07-08 PROCEDURE — 6360000002 HC RX W HCPCS: Performed by: INTERNAL MEDICINE

## 2020-07-08 PROCEDURE — 87070 CULTURE OTHR SPECIMN AEROBIC: CPT

## 2020-07-08 PROCEDURE — 87186 SC STD MICRODIL/AGAR DIL: CPT

## 2020-07-08 PROCEDURE — 87075 CULTR BACTERIA EXCEPT BLOOD: CPT

## 2020-07-08 PROCEDURE — 87077 CULTURE AEROBIC IDENTIFY: CPT

## 2020-07-08 PROCEDURE — 80202 ASSAY OF VANCOMYCIN: CPT

## 2020-07-08 PROCEDURE — 2580000003 HC RX 258: Performed by: INTERNAL MEDICINE

## 2020-07-08 PROCEDURE — 82565 ASSAY OF CREATININE: CPT

## 2020-07-08 PROCEDURE — 84145 PROCALCITONIN (PCT): CPT

## 2020-07-08 PROCEDURE — 82140 ASSAY OF AMMONIA: CPT

## 2020-07-08 PROCEDURE — 87147 CULTURE TYPE IMMUNOLOGIC: CPT

## 2020-07-08 PROCEDURE — 94761 N-INVAS EAR/PLS OXIMETRY MLT: CPT

## 2020-07-08 PROCEDURE — 81001 URINALYSIS AUTO W/SCOPE: CPT

## 2020-07-08 PROCEDURE — 87086 URINE CULTURE/COLONY COUNT: CPT

## 2020-07-08 PROCEDURE — 80048 BASIC METABOLIC PNL TOTAL CA: CPT

## 2020-07-08 RX ADMIN — LORAZEPAM 1 MG: 0.5 TABLET ORAL at 09:55

## 2020-07-08 RX ADMIN — VANCOMYCIN HYDROCHLORIDE 1250 MG: 5 INJECTION, POWDER, LYOPHILIZED, FOR SOLUTION INTRAVENOUS at 22:45

## 2020-07-08 RX ADMIN — Medication 1 PACKET: at 21:42

## 2020-07-08 RX ADMIN — PRIMIDONE 50 MG: 50 TABLET ORAL at 14:49

## 2020-07-08 RX ADMIN — Medication 1 CAPSULE: at 09:54

## 2020-07-08 RX ADMIN — Medication 1000 UNITS: at 09:55

## 2020-07-08 RX ADMIN — FOLIC ACID 1 MG: 1 TABLET ORAL at 09:55

## 2020-07-08 RX ADMIN — LACOSAMIDE 200 MG: 50 TABLET, FILM COATED ORAL at 09:54

## 2020-07-08 RX ADMIN — PAROXETINE HYDROCHLORIDE 20 MG: 20 TABLET, FILM COATED ORAL at 09:55

## 2020-07-08 RX ADMIN — LEVOTHYROXINE SODIUM 125 MCG: 125 TABLET ORAL at 09:55

## 2020-07-08 RX ADMIN — Medication 1 PACKET: at 09:54

## 2020-07-08 RX ADMIN — SODIUM CHLORIDE, PRESERVATIVE FREE 10 ML: 5 INJECTION INTRAVENOUS at 09:59

## 2020-07-08 RX ADMIN — PRIMIDONE 50 MG: 50 TABLET ORAL at 09:55

## 2020-07-08 RX ADMIN — PANTOPRAZOLE SODIUM 40 MG: 40 TABLET, DELAYED RELEASE ORAL at 09:54

## 2020-07-08 RX ADMIN — SIMVASTATIN 20 MG: 20 TABLET, FILM COATED ORAL at 21:43

## 2020-07-08 RX ADMIN — LACOSAMIDE 200 MG: 50 TABLET, FILM COATED ORAL at 21:43

## 2020-07-08 RX ADMIN — TROSPIUM CHLORIDE 20 MG: 20 TABLET, FILM COATED ORAL at 09:55

## 2020-07-08 RX ADMIN — QUETIAPINE FUMARATE 50 MG: 50 TABLET, EXTENDED RELEASE ORAL at 21:43

## 2020-07-08 RX ADMIN — PRIMIDONE 50 MG: 50 TABLET ORAL at 21:43

## 2020-07-08 RX ADMIN — DIVALPROEX SODIUM 1000 MG: 500 TABLET, DELAYED RELEASE ORAL at 21:42

## 2020-07-08 RX ADMIN — LACTULOSE 10 G: 10 SOLUTION ORAL at 21:42

## 2020-07-08 RX ADMIN — ENOXAPARIN SODIUM 40 MG: 40 INJECTION SUBCUTANEOUS at 09:58

## 2020-07-08 RX ADMIN — CLONAZEPAM 0.25 MG: 0.5 TABLET ORAL at 09:55

## 2020-07-08 RX ADMIN — TROSPIUM CHLORIDE 20 MG: 20 TABLET, FILM COATED ORAL at 21:43

## 2020-07-08 RX ADMIN — ASPIRIN 81 MG: 81 TABLET, COATED ORAL at 09:55

## 2020-07-08 RX ADMIN — IBUPROFEN 600 MG: 600 TABLET, FILM COATED ORAL at 06:41

## 2020-07-08 RX ADMIN — LACTULOSE 10 G: 10 SOLUTION ORAL at 09:54

## 2020-07-08 RX ADMIN — CEFTRIAXONE SODIUM 1 G: 1 INJECTION, POWDER, FOR SOLUTION INTRAMUSCULAR; INTRAVENOUS at 18:41

## 2020-07-08 RX ADMIN — LACTULOSE 10 G: 10 SOLUTION ORAL at 14:49

## 2020-07-08 RX ADMIN — VANCOMYCIN HYDROCHLORIDE 1250 MG: 5 INJECTION, POWDER, LYOPHILIZED, FOR SOLUTION INTRAVENOUS at 09:54

## 2020-07-08 RX ADMIN — TAMSULOSIN HYDROCHLORIDE 0.4 MG: 0.4 CAPSULE ORAL at 09:55

## 2020-07-08 RX ADMIN — ZONISAMIDE 100 MG: 100 CAPSULE ORAL at 09:54

## 2020-07-08 RX ADMIN — LORAZEPAM 1 MG: 0.5 TABLET ORAL at 21:43

## 2020-07-08 RX ADMIN — AMLODIPINE BESYLATE 5 MG: 5 TABLET ORAL at 09:54

## 2020-07-08 RX ADMIN — HYDROCHLOROTHIAZIDE 12.5 MG: 12.5 TABLET ORAL at 09:55

## 2020-07-08 ASSESSMENT — PAIN SCALES - GENERAL
PAINLEVEL_OUTOF10: 0
PAINLEVEL_OUTOF10: 0
PAINLEVEL_OUTOF10: 4
PAINLEVEL_OUTOF10: 0

## 2020-07-08 NOTE — PROGRESS NOTES
Pt seen and examined. No acute events overnight. AF VSS. UOP adequate. Tolerating diet. Right hip stage II pressure ulceration with redness and associated cellulitis. No fluid collections or abscesses on exam or with CT. Continue offloading measures. Appreciate WCN and continue with current wound care. 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 On vanco and rocephin. No debridement required. CPM.   Will sign off. Call with questions or concerns. F/U with me at the [facility] after discharge.

## 2020-07-08 NOTE — PROGRESS NOTES
Hospitalist Progress Note      Name:  Mina Beltran /Age/Sex: 1955  (62 y.o. male)   MRN & CSN:  [id number] & [id number] Admission Date/Time: 2020 11:32 AM   Location:  [address] [place] [address] PCP: Thiago Murphy MD         Hospital Day: 3    Assessment and Plan:   Mina Beltran is a 62 y.o.  male  who presents with Cellulitis, perineum    1) Cellulitis involving Rt hip with stage II pressure ulcer   -CT A/P: Rt perineal cellulitis and phlegmonous changes without a discrete abscess  -CRP elevated; will cycle  -General Surgery on board; no debridement needed  -Continue IV abx       Chronic Problems     Cerebral palsy  -spoke with sister, his guardian     Grand mal seizure disorder - c/w Depakote and Vimpat and Zonegram     Thyroid disease  -Synthroid     Hypertension/HLD  -Norvasc and HCTZ  -Simvastatin     GERD   -Protonix    Hyperammonemia  -Continue lactulose     - on Flomax and Sanctura    Diet DIET DYSPHAGIA MINCED AND MOIST;   DVT Prophylaxis [] Lovenox, []  Heparin, [] SCDs, [] Ambulation   GI Prophylaxis [] PPI,  [] H2 Blocker,  [] Carafate,  [] Diet/Tube Feeds   Code Status Full Code   Disposition Group Home   MDM      History of Present Illness:     Patient was seen and examined  Denied any fever, chills  No N/V/D  No chest pain, SOB, palpitations  Ten point ROS reviewed negative, unless as noted above    Objective: Intake/Output Summary (Last 24 hours) at 2020 1457  Last data filed at 2020 1144  Gross per 24 hour   Intake 10 ml   Output 175 ml   Net -165 ml      Vitals:   Vitals:    20 0817   BP: (!) 147/67   Pulse: 64   Resp: 17   Temp: 97.6 °F (36.4 °C)   SpO2: 96%     Physical Exam:   GEN Awake male, laying in bed in no apparent distress. Appears given age. EYES Pupils are equally round. No scleral erythema, discharge, or conjunctivitis. HENT Mucous membranes are moist. Oral pharynx without exudates, no evidence of thrush.   NECK Supple, no apparent thyromegaly or masses. RESP Clear to auscultation, no wheezes, rales or rhonchi. Symmetric chest movement while on room air. CARDIO/VASC S1/S2 auscultated. Regular rate without appreciable murmurs, rubs, or gallops. No JVD or carotid bruits. Peripheral pulses equal bilaterally and palpable. No peripheral edema. GI Abdomen is soft without significant tenderness, masses, or guarding. Bowel sounds are normoactive. Rectal exam deferred.  No costovertebral angle tenderness. Normal appearing external genitalia. Abdullahi catheter is not present. HEME/LYMPH No palpable cervical lymphadenopathy and no hepatosplenomegaly. No petechiae or ecchymoses. MSK No gross joint deformities. SKIN Normal coloration, warm, dry. NEURO Cranial nerves appear grossly intact, normal speech, no lateralizing weakness. PSYCH Awake, alert, oriented x 4. Affect appropriate.     Medications:   Medications:    cefTRIAXone (ROCEPHIN) IV  1 g Intravenous Q24H    amLODIPine  5 mg Oral Daily    aspirin  81 mg Oral Daily    vitamin D  1 capsule Oral Daily    clonazePAM  0.25 mg Oral Daily    divalproex  1,000 mg Oral Nightly    folic acid  1 mg Oral Daily    hydrochlorothiazide  12.5 mg Oral Daily    lacosamide  200 mg Oral BID    lactobacillus  1 capsule Oral Daily    lactulose  10 g Oral TID    zonisamide  100 mg Oral QAM    psyllium  1 Package Oral BID    timolol  1 drop Both Eyes Daily    tamsulosin  0.4 mg Oral Daily    simvastatin  20 mg Oral Nightly    QUEtiapine  50 mg Oral Nightly    primidone  50 mg Oral TID    PARoxetine  20 mg Oral QAM    pantoprazole  40 mg Oral QAM AC    mirabegron  50 mg Oral Daily    levothyroxine  125 mcg Oral Daily    latanoprost  1 drop Both Eyes Nightly    sodium chloride flush  10 mL Intravenous 2 times per day    enoxaparin  40 mg Subcutaneous Daily    LORazepam  1 mg Oral BID    trospium  20 mg Oral BID    vancomycin  1,250 mg Intravenous Q12H      Infusions:   PRN Meds: ibuprofen, 600 mg, Q6H PRN  dicyclomine, 10 mg, Q6H PRN  guaiFENesin, 1,200 mg, Daily PRN  lacosamide, 200 mg, PRN  simethicone, 80 mg, Q4H PRN  sodium chloride flush, 10 mL, PRN  acetaminophen, 650 mg, Q6H PRN    Or  acetaminophen, 650 mg, Q6H PRN  polyethylene glycol, 17 g, Daily PRN  promethazine, 12.5 mg, Q6H PRN    Or  ondansetron, 4 mg, Q6H PRN  potassium chloride, 40 mEq, PRN    Or  potassium alternative oral replacement, 40 mEq, PRN    Or  potassium chloride, 10 mEq, PRN          Electronically signed by Feng Kan MD on 7/8/2020 at 2:57 PM

## 2020-07-08 NOTE — PLAN OF CARE
Problem: Pain:  Description: Pain management should include both nonpharmacologic and pharmacologic interventions.   Goal: Pain level will decrease  Description: Pain level will decrease  7/8/2020 0202 by Sophia Kilgore RN  Outcome: Ongoing  7/8/2020 0158 by Sophia Kilgore RN  Outcome: Ongoing  Goal: Control of acute pain  Description: Control of acute pain  7/8/2020 0202 by Sophia Kilgore RN  Outcome: Ongoing  7/8/2020 0158 by Sophia Kilgore RN  Outcome: Ongoing  Goal: Control of chronic pain  Description: Control of chronic pain  7/8/2020 0202 by Sophia Kilgore RN  Outcome: Ongoing  7/8/2020 0158 by Sophia Kilgore RN  Outcome: Ongoing     Problem: Falls - Risk of:  Goal: Will remain free from falls  Description: Will remain free from falls  7/8/2020 0202 by Sophia Kilgore RN  Outcome: Ongoing  7/8/2020 0158 by Sophia Kilgore RN  Outcome: Ongoing  Goal: Absence of physical injury  Description: Absence of physical injury  7/8/2020 0202 by Sophia Kilgore RN  Outcome: Ongoing  7/8/2020 0158 by Sophia Kilgore RN  Outcome: Ongoing     Problem: Skin Integrity:  Goal: Will show no infection signs and symptoms  Description: Will show no infection signs and symptoms  7/8/2020 0202 by Sophia Kilgore RN  Outcome: Ongoing  7/8/2020 0158 by Sophia Kilgore RN  Outcome: Ongoing  Goal: Absence of new skin breakdown  Description: Absence of new skin breakdown  7/8/2020 0202 by Sophia Kilgore RN  Outcome: Ongoing  7/8/2020 0158 by Sophia Kilgore RN  Outcome: Ongoing

## 2020-07-08 NOTE — CONSULTS
PHARMACY VANCOMYCIN MONITORING SERVICE    Becki Arana is a 62 y.o. male started on vancomycin for right perineal cellulitis. Pharmacy consulted by Dr. Cindy Martinez / NP Caty Villatoro for monitoring and adjustment. Indication for treatment: Right perineal cellulitis  Goal trough: 15 mcg/mL  Other antimicrobials: Clindamycin (given in the ED)    Ht Readings from Last 1 Encounters:   07/06/20 5' 10\" (1.778 m)     Wt Readings from Last 3 Encounters:   07/07/20 205 lb 1.6 oz (93 kg)   05/27/20 197 lb (89.4 kg)   05/16/20 197 lb (89.4 kg)        Pertinent Laboratory Values:   Temp Readings from Last 3 Encounters:   07/08/20 97.6 °F (36.4 °C) (Oral)   06/28/20 98 °F (36.7 °C) (Oral)   06/17/20 98.7 °F (37.1 °C) (Temporal)     Recent Labs     07/06/20  1257 07/06/20  1258 07/07/20  0351 07/08/20  0727   WBC 17.9*  --  12.7* 9.2   LACTATE  --  1.3  --   --      Recent Labs     07/06/20  1257 07/07/20  0351 07/08/20  0727   BUN 9 11 11   CREATININE 0.7* 0.7* 0.6*     Estimated Creatinine Clearance: 144 mL/min (A) (based on SCr of 0.6 mg/dL (L)). Intake/Output Summary (Last 24 hours) at 7/8/2020 1127  Last data filed at 7/8/2020 0815  Gross per 24 hour   Intake 10 ml   Output 175 ml   Net -165 ml         Previous vancomycin levels:  TROUGH:    Recent Labs     07/08/20  0727   VANCOTROUGH 10.6     RANDOM:  No results for input(s): VANCORANDOM in the last 72 hours. Assessment/Plan:   WBC WNL, patient remains afebrile   SCR WNL, + UOP   Urine culture ordered   Wound culture ordered today   Trough level @ 10.6, therapeutic.  Will continue Vancomycin 1,250mg IV every 12 hours. [address] [place] [address] will continue to monitor renal function, clinical status & recommend de-escalation if appropriate. Patient does have cerebral palsy which may artificially increase calculated CrCl.     Will adjust therapy as indicated    Repeat VANCOMYCIN TROUGH SCHEDULED FOR 7/10/2020 @ 4870    Thank you for the consult and the opportunity to serve you and your patients.      Aleksandr Eagle [place]  7/8/2020  11:33 AM

## 2020-07-08 NOTE — PLAN OF CARE
Problem: Pain:  Goal: Pain level will decrease  Description: Pain level will decrease  7/8/2020 1133 by Aristeo Martinez LPN  Outcome: Ongoing  7/8/2020 0202 by Sophia Kilgore RN  Outcome: Ongoing  7/8/2020 0158 by Sophia Kilgore RN  Outcome: Ongoing  Goal: Control of acute pain  Description: Control of acute pain  7/8/2020 1133 by Aristeo Martinez LPN  Outcome: Ongoing  7/8/2020 0202 by Sophia Kilgore RN  Outcome: Ongoing  7/8/2020 0158 by Sophia Kilgore RN  Outcome: Ongoing  Goal: Control of chronic pain  Description: Control of chronic pain  7/8/2020 1133 by Aristeo Martinez LPN  Outcome: Ongoing  7/8/2020 0202 by Sophia Kilgore RN  Outcome: Ongoing  7/8/2020 0158 by Sophia Kilgore RN  Outcome: Ongoing     Problem: Falls - Risk of:  Goal: Will remain free from falls  Description: Will remain free from falls  7/8/2020 1133 by Aristeo Martinez LPN  Outcome: Ongoing  7/8/2020 0202 by Sophia Kilgore RN  Outcome: Ongoing  7/8/2020 0158 by Sophia Kilgore RN  Outcome: Ongoing  Goal: Absence of physical injury  Description: Absence of physical injury  7/8/2020 1133 by Aristeo Martinez LPN  Outcome: Ongoing  7/8/2020 0202 by Sophia Kilgore RN  Outcome: Ongoing  7/8/2020 0158 by Sophai Kilgore RN  Outcome: Ongoing

## 2020-07-09 PROBLEM — L89.42 PRESSURE INJURY OF CONTIGUOUS REGION INVOLVING BACK AND RIGHT BUTTOCK, STAGE 2 (HCC): Status: ACTIVE | Noted: 2020-05-27

## 2020-07-09 LAB
AMMONIA: 148 UMOL/L (ref 16–60)
ANION GAP SERPL CALCULATED.3IONS-SCNC: 9 MMOL/L (ref 4–16)
BUN BLDV-MCNC: 12 MG/DL (ref 6–23)
CALCIUM SERPL-MCNC: 9.4 MG/DL (ref 8.3–10.6)
CHLORIDE BLD-SCNC: 99 MMOL/L (ref 99–110)
CO2: 29 MMOL/L (ref 21–32)
CREAT SERPL-MCNC: 0.5 MG/DL (ref 0.9–1.3)
GFR AFRICAN AMERICAN: >60 ML/MIN/1.73M2
GFR NON-AFRICAN AMERICAN: >60 ML/MIN/1.73M2
GLUCOSE BLD-MCNC: 119 MG/DL (ref 70–99)
HCT VFR BLD CALC: 38.3 % (ref 42–52)
HEMOGLOBIN: 12.5 GM/DL (ref 13.5–18)
HIGH SENSITIVE C-REACTIVE PROTEIN: 78.2 MG/L
MCH RBC QN AUTO: 29.9 PG (ref 27–31)
MCHC RBC AUTO-ENTMCNC: 32.6 % (ref 32–36)
MCV RBC AUTO: 91.6 FL (ref 78–100)
PDW BLD-RTO: 13.2 % (ref 11.7–14.9)
PLATELET # BLD: 244 K/CU MM (ref 140–440)
PMV BLD AUTO: 10.3 FL (ref 7.5–11.1)
POTASSIUM SERPL-SCNC: 3.8 MMOL/L (ref 3.5–5.1)
PROCALCITONIN: 0.05
PROCALCITONIN: 0.07
RBC # BLD: 4.18 M/CU MM (ref 4.6–6.2)
SODIUM BLD-SCNC: 137 MMOL/L (ref 135–145)
TSH HIGH SENSITIVITY: 4.72 UIU/ML (ref 0.27–4.2)
WBC # BLD: 6.5 K/CU MM (ref 4–10.5)

## 2020-07-09 PROCEDURE — 94761 N-INVAS EAR/PLS OXIMETRY MLT: CPT

## 2020-07-09 PROCEDURE — 2580000003 HC RX 258: Performed by: INTERNAL MEDICINE

## 2020-07-09 PROCEDURE — 6370000000 HC RX 637 (ALT 250 FOR IP): Performed by: INTERNAL MEDICINE

## 2020-07-09 PROCEDURE — 6370000000 HC RX 637 (ALT 250 FOR IP): Performed by: NURSE PRACTITIONER

## 2020-07-09 PROCEDURE — 2580000003 HC RX 258: Performed by: NURSE PRACTITIONER

## 2020-07-09 PROCEDURE — 99211 OFF/OP EST MAY X REQ PHY/QHP: CPT

## 2020-07-09 PROCEDURE — 6360000002 HC RX W HCPCS: Performed by: INTERNAL MEDICINE

## 2020-07-09 PROCEDURE — 6360000002 HC RX W HCPCS: Performed by: NURSE PRACTITIONER

## 2020-07-09 PROCEDURE — 84145 PROCALCITONIN (PCT): CPT

## 2020-07-09 PROCEDURE — 80048 BASIC METABOLIC PNL TOTAL CA: CPT

## 2020-07-09 PROCEDURE — 1200000000 HC SEMI PRIVATE

## 2020-07-09 PROCEDURE — 86141 C-REACTIVE PROTEIN HS: CPT

## 2020-07-09 PROCEDURE — 36415 COLL VENOUS BLD VENIPUNCTURE: CPT

## 2020-07-09 PROCEDURE — 84443 ASSAY THYROID STIM HORMONE: CPT

## 2020-07-09 PROCEDURE — 85027 COMPLETE CBC AUTOMATED: CPT

## 2020-07-09 PROCEDURE — 82140 ASSAY OF AMMONIA: CPT

## 2020-07-09 RX ADMIN — SIMVASTATIN 20 MG: 20 TABLET, FILM COATED ORAL at 20:13

## 2020-07-09 RX ADMIN — Medication 1000 UNITS: at 07:53

## 2020-07-09 RX ADMIN — CEFTRIAXONE SODIUM 1 G: 1 INJECTION, POWDER, FOR SOLUTION INTRAMUSCULAR; INTRAVENOUS at 18:25

## 2020-07-09 RX ADMIN — PRIMIDONE 50 MG: 50 TABLET ORAL at 13:19

## 2020-07-09 RX ADMIN — PRIMIDONE 50 MG: 50 TABLET ORAL at 07:53

## 2020-07-09 RX ADMIN — PRIMIDONE 50 MG: 50 TABLET ORAL at 20:12

## 2020-07-09 RX ADMIN — Medication 1 CAPSULE: at 07:52

## 2020-07-09 RX ADMIN — LACOSAMIDE 200 MG: 50 TABLET, FILM COATED ORAL at 20:12

## 2020-07-09 RX ADMIN — LEVOTHYROXINE SODIUM 125 MCG: 125 TABLET ORAL at 07:54

## 2020-07-09 RX ADMIN — QUETIAPINE FUMARATE 50 MG: 50 TABLET, EXTENDED RELEASE ORAL at 20:13

## 2020-07-09 RX ADMIN — SODIUM CHLORIDE, PRESERVATIVE FREE 10 ML: 5 INJECTION INTRAVENOUS at 20:11

## 2020-07-09 RX ADMIN — LATANOPROST 1 DROP: 50 SOLUTION OPHTHALMIC at 20:16

## 2020-07-09 RX ADMIN — ZONISAMIDE 100 MG: 100 CAPSULE ORAL at 07:52

## 2020-07-09 RX ADMIN — RIFAXIMIN 550 MG: 550 TABLET ORAL at 07:51

## 2020-07-09 RX ADMIN — VANCOMYCIN HYDROCHLORIDE 1250 MG: 5 INJECTION, POWDER, LYOPHILIZED, FOR SOLUTION INTRAVENOUS at 08:01

## 2020-07-09 RX ADMIN — Medication 1 PACKET: at 07:53

## 2020-07-09 RX ADMIN — LACTULOSE 10 G: 10 SOLUTION ORAL at 07:52

## 2020-07-09 RX ADMIN — FOLIC ACID 1 MG: 1 TABLET ORAL at 07:53

## 2020-07-09 RX ADMIN — AMLODIPINE BESYLATE 5 MG: 5 TABLET ORAL at 07:52

## 2020-07-09 RX ADMIN — LACTULOSE 10 G: 10 SOLUTION ORAL at 13:19

## 2020-07-09 RX ADMIN — ASPIRIN 81 MG: 81 TABLET, COATED ORAL at 07:52

## 2020-07-09 RX ADMIN — VANCOMYCIN HYDROCHLORIDE 1250 MG: 5 INJECTION, POWDER, LYOPHILIZED, FOR SOLUTION INTRAVENOUS at 20:11

## 2020-07-09 RX ADMIN — TAMSULOSIN HYDROCHLORIDE 0.4 MG: 0.4 CAPSULE ORAL at 07:54

## 2020-07-09 RX ADMIN — PANTOPRAZOLE SODIUM 40 MG: 40 TABLET, DELAYED RELEASE ORAL at 07:52

## 2020-07-09 RX ADMIN — PAROXETINE HYDROCHLORIDE 20 MG: 20 TABLET, FILM COATED ORAL at 07:55

## 2020-07-09 RX ADMIN — CLONAZEPAM 0.25 MG: 0.5 TABLET ORAL at 07:53

## 2020-07-09 RX ADMIN — RIFAXIMIN 550 MG: 550 TABLET ORAL at 20:12

## 2020-07-09 RX ADMIN — HYDROCHLOROTHIAZIDE 12.5 MG: 12.5 TABLET ORAL at 07:52

## 2020-07-09 RX ADMIN — Medication 1 PACKET: at 20:12

## 2020-07-09 RX ADMIN — LACTULOSE 10 G: 10 SOLUTION ORAL at 20:12

## 2020-07-09 RX ADMIN — DIVALPROEX SODIUM 1000 MG: 500 TABLET, DELAYED RELEASE ORAL at 20:12

## 2020-07-09 RX ADMIN — LORAZEPAM 1 MG: 0.5 TABLET ORAL at 07:51

## 2020-07-09 RX ADMIN — TROSPIUM CHLORIDE 20 MG: 20 TABLET, FILM COATED ORAL at 07:51

## 2020-07-09 RX ADMIN — ENOXAPARIN SODIUM 40 MG: 40 INJECTION SUBCUTANEOUS at 07:53

## 2020-07-09 RX ADMIN — LORAZEPAM 1 MG: 0.5 TABLET ORAL at 20:12

## 2020-07-09 RX ADMIN — LACOSAMIDE 200 MG: 50 TABLET, FILM COATED ORAL at 07:52

## 2020-07-09 RX ADMIN — TROSPIUM CHLORIDE 20 MG: 20 TABLET, FILM COATED ORAL at 20:12

## 2020-07-09 ASSESSMENT — PAIN SCALES - GENERAL
PAINLEVEL_OUTOF10: 0
PAINLEVEL_OUTOF10: 0

## 2020-07-09 NOTE — PROGRESS NOTES
Hospitalist Progress Note      Name:  Jacqulynn Landau /Age/Sex: 1955  (62 y.o. male)   MRN & CSN:  [id number] & [id number] Admission Date/Time: 2020 11:32 AM   Location:  [address] [place] [address] PCP: Bernice Mccartney MD         Hospital Day: 4    Assessment and Plan:   Jacqulynn Landau is a 62 y.o.  male  who presents with Cellulitis, perineum       1) Cellulitis involving Rt gluteal with stage II pressure ulcer   -CT A/P: Rt perineal cellulitis and phlegmonous changes without a discrete abscess  -CRP trending down  -General Surgery on board; no debridement needed  -Continue IV abx        Chronic Problems     Cerebral palsy  -spoke with sister, his guardian     Grand mal seizure disorder - c/w Depakote and Vimpat and Zonegram     Thyroid disease  -Synthroid     Hypertension/HLD  -Norvasc and HCTZ  -Simvastatin     GERD   -Protonix     Hyperammonemia  -Continue lactulose and Rifaximin      - on Flomax and Sanctura    Diet DIET DYSPHAGIA MINCED AND MOIST;   DVT Prophylaxis [] Lovenox, []  Heparin, [] SCDs, [] Ambulation   GI Prophylaxis [] PPI,  [] H2 Blocker,  [] Carafate,  [] Diet/Tube Feeds   Code Status Full Code   Disposition Group home   MDM      History of Present Illness:     Patient was seen and examined  Denied any fever, chills  No chest pain, SOB, palpitations  No abdominal pain reported    Ten point ROS reviewed negative, unless as noted above    Objective: Intake/Output Summary (Last 24 hours) at 2020 1025  Last data filed at 2020 1613  Gross per 24 hour   Intake 240 ml   Output --   Net 240 ml      Vitals:   Vitals:    20 0830   BP: (!) 148/70   Pulse: 61   Resp: 16   Temp: 98.9 °F (37.2 °C)   SpO2: 95%     Physical Exam:   GEN Awake male, laying in bed in no apparent distress. Appears given age. EYES Pupils are equally round. No scleral erythema, discharge, or conjunctivitis. HENT Mucous membranes are moist. Oral pharynx without exudates, no evidence of thrush.   NECK Supple, no apparent thyromegaly or masses. RESP Clear to auscultation, no wheezes, rales or rhonchi. Symmetric chest movement while on room air. CARDIO/VASC S1/S2 auscultated. Regular rate without appreciable murmurs, rubs, or gallops. No JVD or carotid bruits. Peripheral pulses equal bilaterally and palpable. No peripheral edema. GI Abdomen is soft without significant tenderness, masses, or guarding. Bowel sounds are normoactive. Rectal exam deferred.  No costovertebral angle tenderness. Normal appearing external genitalia. Abdullahi catheter is not present. HEME/LYMPH No palpable cervical lymphadenopathy and no hepatosplenomegaly. No petechiae or ecchymoses. MSK No gross joint deformities. SKIN Normal coloration, warm, dry. NEURO Cranial nerves appear grossly intact, normal speech, no lateralizing weakness. PSYCH Awake, alert. Affect appropriate.     Medications:   Medications:    rifaximin  550 mg Oral BID    cefTRIAXone (ROCEPHIN) IV  1 g Intravenous Q24H    amLODIPine  5 mg Oral Daily    aspirin  81 mg Oral Daily    vitamin D  1 capsule Oral Daily    clonazePAM  0.25 mg Oral Daily    divalproex  1,000 mg Oral Nightly    folic acid  1 mg Oral Daily    hydrochlorothiazide  12.5 mg Oral Daily    lacosamide  200 mg Oral BID    lactobacillus  1 capsule Oral Daily    lactulose  10 g Oral TID    zonisamide  100 mg Oral QAM    psyllium  1 Package Oral BID    timolol  1 drop Both Eyes Daily    tamsulosin  0.4 mg Oral Daily    simvastatin  20 mg Oral Nightly    QUEtiapine  50 mg Oral Nightly    primidone  50 mg Oral TID    PARoxetine  20 mg Oral QAM    pantoprazole  40 mg Oral QAM AC    mirabegron  50 mg Oral Daily    levothyroxine  125 mcg Oral Daily    latanoprost  1 drop Both Eyes Nightly    sodium chloride flush  10 mL Intravenous 2 times per day    enoxaparin  40 mg Subcutaneous Daily    LORazepam  1 mg Oral BID    trospium  20 mg Oral BID    vancomycin  1,250 mg Intravenous Q12H Infusions:   PRN Meds: ibuprofen, 600 mg, Q6H PRN  dicyclomine, 10 mg, Q6H PRN  guaiFENesin, 1,200 mg, Daily PRN  lacosamide, 200 mg, PRN  simethicone, 80 mg, Q4H PRN  sodium chloride flush, 10 mL, PRN  acetaminophen, 650 mg, Q6H PRN    Or  acetaminophen, 650 mg, Q6H PRN  polyethylene glycol, 17 g, Daily PRN  promethazine, 12.5 mg, Q6H PRN    Or  ondansetron, 4 mg, Q6H PRN  potassium chloride, 40 mEq, PRN    Or  potassium alternative oral replacement, 40 mEq, PRN    Or  potassium chloride, 10 mEq, PRN          Electronically signed by Tana Rapp MD on 7/9/2020 at 10:25 AM

## 2020-07-09 NOTE — CONSULTS
PHARMACY VANCOMYCIN MONITORING SERVICE    Hamilton Garcia is a 62 y.o. male started on vancomycin for right perineal cellulitis. Pharmacy consulted by Dr. Tianna Vazquez / NP Florina Blank for monitoring and adjustment. Indication for treatment: Right perineal cellulitis  Goal trough: 15 mcg/mL  Other antimicrobials: Clindamycin (given in the ED)    Ht Readings from Last 1 Encounters:   07/06/20 5' 10\" (1.778 m)     Wt Readings from Last 3 Encounters:   07/09/20 194 lb (88 kg)   05/27/20 197 lb (89.4 kg)   05/16/20 197 lb (89.4 kg)        Pertinent Laboratory Values:   Temp Readings from Last 3 Encounters:   07/09/20 98.9 °F (37.2 °C) (Oral)   06/28/20 98 °F (36.7 °C) (Oral)   06/17/20 98.7 °F (37.1 °C) (Temporal)     Recent Labs     07/06/20  1258 07/07/20  0351 07/08/20  0727 07/09/20  0458   WBC  --  12.7* 9.2 6.5   LACTATE 1.3  --   --   --      Recent Labs     07/07/20  0351 07/08/20  0727 07/09/20  0458   BUN 11 11 12   CREATININE 0.7* 0.6* 0.5*     Estimated Creatinine Clearance: 169 mL/min (A) (based on SCr of 0.5 mg/dL (L)). Intake/Output Summary (Last 24 hours) at 7/9/2020 1225  Last data filed at 7/8/2020 1613  Gross per 24 hour   Intake 240 ml   Output --   Net 240 ml         Previous vancomycin levels:  TROUGH:    Recent Labs     07/08/20  0727   VANCOTROUGH 10.6     RANDOM:  No results for input(s): VANCORANDOM in the last 72 hours. Assessment/Plan:   WBC WNL, patient remains afebrile   SCR WNL, + UOP   Urine culture Pending   Wound culture Pending    Continue Vancomycin 1,250mg IV every 12 hours. [address] [place] [address] will continue to monitor renal function, clinical status & recommend de-escalation if appropriate. Patient does have cerebral palsy which may artificially increase calculated CrCl.  Will adjust therapy as indicated    Repeat VANCOMYCIN TROUGH SCHEDULED FOR 7/10/2020 @ 5709    Thank you for the consult.   Stormy Sinha, [address]  7/9/2020  12:25 PM

## 2020-07-09 NOTE — CONSULTS
[id number] [facility] Wound Ostomy Continence Nurse  Consult Note       Roxanna Valentin  AGE: 62 y.o.    GENDER: male  : 1955  TODAY'S DATE:  2020    Subjective:     Reason for  Evaluation and Assessment: wound reassessment      Roxanna Valentin is a 62 y.o. male referred by:   [x] Physician  [] Nursing  [] Other:     Wound Identification:  Wound Type: pressure and possible abscess  Contributing Factors: chronic pressure, decreased mobility, incontinence of stool and incontinence of urine        PAST MEDICAL HISTORY        Diagnosis Date    Anemia     Aspiration pneumonia ([address]     dx 2014 with this per ecf/ per old chart dx with pneumonia with admission 2018    Cerebral palsy ([facility]     \"has no feeling on left side of body\"alert but has some dementia but not diagnosed, has good long term but poor short term and wakes up disoriented after a nap\"(per yaneth)()    Depression     Epilepsy ([facility]     last seizure 2018- hx grand mal    Frequent falls     with phone assess- family stated pt fell this am (7/10/2013\"in the process of trying to find a facility to help build him back up- (pt now at [facility])    Glaucoma     \"has been in treatment for this in the past- no treatment needed at present\"    History of IBS     Hx MRSA infection     + nasal culture 2014    Hyperammonemia ([address]     Hypertension     on Lisinopril    IBS (irritable bowel syndrome)     ulcerative colitis    Kidney stone     for surgery for stent placement 2013    Mood disorder ([address]     per staff at [address] [place] [address] Occasional tremors     \"makes it difficult for him to write\"    Prostatitis     hx given per H&P old chart    Thyroid disease     Ulcerative colitis ([address]     hx given per staff at [facility] 2015       PAST SURGICAL HISTORY    Past Surgical History:   Procedure Laterality Date    CHOLECYSTECTOMY      COLONOSCOPY  14, 14: hemorrhoids, 2012 at [address] [facility] Left 07/11/2013    with stnet placement    GALLBLADDER SURGERY  2005    KIDNEY STONE SURGERY      OTHER SURGICAL HISTORY  04/15/2015    Revision of vagal nerve stimulator    UPPER GASTROINTESTINAL ENDOSCOPY N/A 11/13/2018    EGD DILATION BALLOON AND BIOPSY performed by Lara Orantes MD at [facility]  2002    Has to have bettery changed every 5 yrs. FAMILY HISTORY    Family History   Problem Relation Age of Onset    Heart Disease Mother         atrial fib    High Blood Pressure Mother     Asthma Mother     High Cholesterol Mother     Depression Mother    [employer] Migraines Mother     High Blood Pressure Father     Heart Disease Father     Asthma Sister     High Cholesterol Sister     Depression Sister     Migraines Sister        SOCIAL HISTORY    Social History     Tobacco Use    Smoking status: Never Smoker    Smokeless tobacco: Never Used   Substance Use Topics    Alcohol use: No    Drug use: No       ALLERGIES    No Known Allergies    MEDICATIONS    No current facility-administered medications on file prior to encounter. Current Outpatient Medications on File Prior to Encounter   Medication Sig Dispense Refill    solifenacin (VESICARE) 10 MG tablet Take 10 mg by mouth daily      levothyroxine (SYNTHROID) 125 MCG tablet Take 1 tablet by mouth Daily 30 tablet 3    dicyclomine (BENTYL) 10 MG capsule Take 1 capsule by mouth every 6 hours as needed (cramps) 20 capsule 0    amLODIPine (NORVASC) 5 MG tablet Take 5 mg by mouth daily      clonazePAM (KLONOPIN) 0.5 MG tablet Take 0.25 mg by mouth daily.       divalproex (DEPAKOTE) 500 MG DR tablet Take 1,000 mg by mouth nightly      hydrochlorothiazide (MICROZIDE) 12.5 MG capsule Take 12.5 mg by mouth daily      mirabegron (MYRBETRIQ) 50 MG TB24 Take 50 mg by mouth daily      omeprazole (PRILOSEC) 20 MG delayed release capsule Take 20 mg by mouth 2 times daily (before meals)      primidone (MYSOLINE) 50 MG tablet Take 50 mg by mouth 3 times daily      zonisamide (ZONEGRAN) 100 MG capsule Take 100 mg by mouth every morning      acetaminophen (TYLENOL) 325 MG tablet Take 650 mg by mouth every 4 hours as needed for Pain      ibuprofen (ADVIL;MOTRIN) 600 MG tablet Take 600 mg by mouth every 6 hours as needed for Pain      Simethicone (MI-ACID GAS RELIEF PO) Take 1 Container by mouth every 4 hours as needed 10 cc every 4 hrs not to exceed 60 cc in 24 hrs      guaiFENesin (MUCINEX) 600 MG extended release tablet Take 1,200 mg by mouth daily as needed for Congestion      Menthol-Methyl Salicylate (MUSCLE RUB) 10-15 % CREA cream Apply 1 applicator topically as needed      pseudoephedrine (SUDAFED) 30 MG tablet Take 30 mg by mouth every 4 hours as needed for Congestion      lacosamide (VIMPAT) 200 MG tablet Take 200 mg by mouth as needed. Give 1 tab after seizure activity as needed      lacosamide (VIMPAT) 200 MG tablet Take 1 tablet by mouth 2 times daily for 30 days. 60 tablet 0    latanoprost (XALATAN) 0.005 % ophthalmic solution Place 1 drop into both eyes nightly      timolol (TIMOPTIC) 0.5 % ophthalmic solution Place 1 drop into both eyes daily       Lactobacillus (ACIDOPHILUS) CAPS capsule Take 1 capsule by mouth daily      aspirin 81 MG tablet Take 81 mg by mouth daily      Wheat Dextrin (BENEFIBER) POWD Take 4 g by mouth 2 times daily Takes 3 tsp in liquid twice per day       Lactulose Encephalopathy (ENULOSE PO) Take 30 mLs by mouth 3 times daily      folic acid (FOLVITE) 1 MG tablet Take 1 mg by mouth daily      PARoxetine (PAXIL) 20 MG tablet Take 20 mg by mouth every morning      QUEtiapine (SEROQUEL XR) 50 MG extended release tablet Take 50 mg by mouth nightly      tamsulosin (FLOMAX) 0.4 MG capsule Take 1 capsule by mouth daily 30 capsule 0    simvastatin (ZOCOR) 20 MG tablet Take 20 mg by mouth nightly.  Cholecalciferol (VITAMIN D3) 1000 UNITS CAPS Take 1 tablet by mouth daily.       clorazepate (TRANXENE) 7.5 MG tablet Take 7.5 mg by mouth 2 times daily.            Objective:      BP (!) 148/70   Pulse 61   Temp 98.9 °F (37.2 °C) (Oral)   Resp 16   Ht 5' 10\" (1.778 m)   Wt 194 lb (88 kg)   SpO2 95%   BMI 27.84 kg/m²   Jean Risk Score: Jean Scale Score: 17    LABS    CBC:   Lab Results   Component Value Date    WBC 6.5 07/09/2020    RBC 4.18 07/09/2020    HGB 12.5 07/09/2020    HCT 38.3 07/09/2020    MCV 91.6 07/09/2020    MCH 29.9 07/09/2020    MCHC 32.6 07/09/2020    RDW 13.2 07/09/2020     07/09/2020    MPV 10.3 07/09/2020     CMP:    Lab Results   Component Value Date     07/09/2020    K 3.8 07/09/2020    CL 99 07/09/2020    CO2 29 07/09/2020    BUN 12 07/09/2020    CREATININE 0.5 07/09/2020    GFRAA >60 07/09/2020    LABGLOM >60 07/09/2020    GLUCOSE 119 07/09/2020    PROT 8.1 07/06/2020    LABALBU 3.4 07/06/2020    CALCIUM 9.4 07/09/2020    BILITOT 0.3 07/06/2020    ALKPHOS 70 07/06/2020    AST 14 07/06/2020    ALT 12 07/06/2020     Albumin:    Lab Results   Component Value Date    LABALBU 3.4 07/06/2020     PT/INR:  No results found for: PROTIME, INR  HgBA1c:  No results found for: LABA1C      Assessment:     Patient Active Problem List   Diagnosis    Cerebral palsy, infantile hemiplegia (HCC)    Rosacea, acne    IBS (irritable bowel syndrome)    Hypertension    Calculus of ureter    Pyelonephritis    Sepsis [facility]    Pneumonia    Increased ammonia level    Aspiration pneumonia (HCC)    Hypokalemia    Hypomagnesemia    Hypophosphatasia    Seizure disorder (HCC)    Seizure disorder, grand mal ([facility]    Sepsis due to undetermined organism with acute respiratory failure (HCC)    Pain of upper abdomen    Diarrhea of presumed infectious origin    Abdominal pain    Pressure injury of contiguous region involving back and left buttock, stage 3 (HCC)    Cellulitis, perineum       Measurements:  Wound 07/07/20 Buttocks Right ischial (Active)   Wound Other 7/9/2020  8:45 AM   Dressing Status Changed 7/9/2020  8:45 AM   Dressing Changed Changed/New 7/9/2020  8:45 AM   Dressing/Treatment Alginate with Ag 7/9/2020  8:45 AM   Wound Cleansed Rinsed/Irrigated with saline 7/9/2020  8:45 AM   Wound Length (cm) 5 cm 7/7/2020  8:40 AM   Wound Width (cm) 2.7 cm 7/7/2020  8:40 AM   Wound Depth (cm) 0.2 cm 7/7/2020  8:40 AM   Wound Surface Area (cm^2) 13.5 cm^2 7/7/2020  8:40 AM   Wound Volume (cm^3) 2.7 cm^3 7/7/2020  8:40 AM   Distance Tunneling (cm) 0 cm 7/7/2020  8:40 AM   Tunneling Position ___ O'Clock 0 7/7/2020  8:40 AM   Undermining Starts ___ O'Clock 0 7/7/2020  8:40 AM   Undermining Ends___ O'Clock 0 7/7/2020  8:40 AM   Undermining Maxium Distance (cm) 0 7/7/2020  8:40 AM   Wound Assessment Edema; Yellow; Red 7/9/2020  8:45 AM   Drainage Amount Moderate 7/9/2020  8:45 AM   Drainage Description Yellow 7/9/2020  8:45 AM   Odor None 7/9/2020  8:45 AM   Margins Defined edges 7/9/2020  8:45 AM   Melanie-wound Assessment Edema;Red;Swelling 7/9/2020  8:45 AM   Non-staged Wound Description Full thickness 7/9/2020  8:45 AM   Red%Wound Bed 80 7/9/2020  8:45 AM   Yellow%Wound Bed 20 7/9/2020  8:45 AM   Number of days: 2       Response to treatment:  Well tolerated by patient. Pain Assessment:  Severity:  none  Quality of pain: na  Wound Pain Timing/Severity: na  Premedicated: no    Plan:     Plan of Care: Wound 07/07/20 Buttocks Right ischial-Dressing/Treatment: Alginate with Ag     Stopped by Dr. Thuy Fuller to reassess right buttock/ischial wound with him. Pt in bed. Agreeable to wound assessment. Pt incontinent of urine. Wound with yellow drainage when pressed. Surrounding erythema. Melanie care done. Aquacel AG applied and secured with depends. Dr. Aleksey Washington evaluated pt. No intervention at this time and to follow up at outpatient wound clinic. Positioned to left side with pillow support. Tolerated well.        Specialty Bed Required : yes  [] Low Air Loss   [x] Pressure Redistribution  [] Fluid Immersion  [] Bariatric  [] Total Pressure Relief  [] Other:     Discharge Plan:  Placement for patient upon discharge: tbd  Hospice Care: no  Patient appropriate for Outpatient [address]: yes-referral sent    Patient/Caregiver Teaching:  Level of patient/caregiver understanding able to:   Needs reinforcement.         Electronically signed by Sean Ya RN,  on 7/9/2020 at 10:33 AM

## 2020-07-10 LAB
AMMONIA: 139 UMOL/L (ref 16–60)
ANION GAP SERPL CALCULATED.3IONS-SCNC: 12 MMOL/L (ref 4–16)
BUN BLDV-MCNC: 14 MG/DL (ref 6–23)
CALCIUM SERPL-MCNC: 9.6 MG/DL (ref 8.3–10.6)
CHLORIDE BLD-SCNC: 99 MMOL/L (ref 99–110)
CO2: 28 MMOL/L (ref 21–32)
CREAT SERPL-MCNC: 0.6 MG/DL (ref 0.9–1.3)
CULTURE: NORMAL
DOSE AMOUNT: NORMAL
DOSE TIME: NORMAL
GFR AFRICAN AMERICAN: >60 ML/MIN/1.73M2
GFR NON-AFRICAN AMERICAN: >60 ML/MIN/1.73M2
GLUCOSE BLD-MCNC: 108 MG/DL (ref 70–99)
HCT VFR BLD CALC: 42.1 % (ref 42–52)
HEMOGLOBIN: 13.5 GM/DL (ref 13.5–18)
HIGH SENSITIVE C-REACTIVE PROTEIN: 37.7 MG/L
Lab: NORMAL
MCH RBC QN AUTO: 29.6 PG (ref 27–31)
MCHC RBC AUTO-ENTMCNC: 32.1 % (ref 32–36)
MCV RBC AUTO: 92.3 FL (ref 78–100)
PDW BLD-RTO: 12.9 % (ref 11.7–14.9)
PLATELET # BLD: 273 K/CU MM (ref 140–440)
PMV BLD AUTO: 10.1 FL (ref 7.5–11.1)
POTASSIUM SERPL-SCNC: 4.1 MMOL/L (ref 3.5–5.1)
RBC # BLD: 4.56 M/CU MM (ref 4.6–6.2)
SODIUM BLD-SCNC: 139 MMOL/L (ref 135–145)
SPECIMEN: NORMAL
VANCOMYCIN TROUGH: 12.6 UG/ML (ref 10–20)
WBC # BLD: 7.6 K/CU MM (ref 4–10.5)

## 2020-07-10 PROCEDURE — U0002 COVID-19 LAB TEST NON-CDC: HCPCS

## 2020-07-10 PROCEDURE — 82565 ASSAY OF CREATININE: CPT

## 2020-07-10 PROCEDURE — 85027 COMPLETE CBC AUTOMATED: CPT

## 2020-07-10 PROCEDURE — 1200000000 HC SEMI PRIVATE

## 2020-07-10 PROCEDURE — 2580000003 HC RX 258: Performed by: INTERNAL MEDICINE

## 2020-07-10 PROCEDURE — 6360000002 HC RX W HCPCS: Performed by: NURSE PRACTITIONER

## 2020-07-10 PROCEDURE — 86141 C-REACTIVE PROTEIN HS: CPT

## 2020-07-10 PROCEDURE — 6370000000 HC RX 637 (ALT 250 FOR IP): Performed by: INTERNAL MEDICINE

## 2020-07-10 PROCEDURE — 80048 BASIC METABOLIC PNL TOTAL CA: CPT

## 2020-07-10 PROCEDURE — 6360000002 HC RX W HCPCS: Performed by: INTERNAL MEDICINE

## 2020-07-10 PROCEDURE — 2580000003 HC RX 258: Performed by: NURSE PRACTITIONER

## 2020-07-10 PROCEDURE — 94761 N-INVAS EAR/PLS OXIMETRY MLT: CPT

## 2020-07-10 PROCEDURE — 82140 ASSAY OF AMMONIA: CPT

## 2020-07-10 PROCEDURE — 6370000000 HC RX 637 (ALT 250 FOR IP): Performed by: NURSE PRACTITIONER

## 2020-07-10 PROCEDURE — 80202 ASSAY OF VANCOMYCIN: CPT

## 2020-07-10 RX ORDER — LACTULOSE 10 G/15ML
20 SOLUTION ORAL 3 TIMES DAILY
Status: DISCONTINUED | OUTPATIENT
Start: 2020-07-10 | End: 2020-07-13 | Stop reason: HOSPADM

## 2020-07-10 RX ADMIN — PAROXETINE HYDROCHLORIDE 20 MG: 20 TABLET, FILM COATED ORAL at 09:47

## 2020-07-10 RX ADMIN — PRIMIDONE 50 MG: 50 TABLET ORAL at 20:39

## 2020-07-10 RX ADMIN — ASPIRIN 81 MG: 81 TABLET, COATED ORAL at 09:46

## 2020-07-10 RX ADMIN — TAMSULOSIN HYDROCHLORIDE 0.4 MG: 0.4 CAPSULE ORAL at 09:46

## 2020-07-10 RX ADMIN — AMLODIPINE BESYLATE 5 MG: 5 TABLET ORAL at 09:46

## 2020-07-10 RX ADMIN — SODIUM CHLORIDE, PRESERVATIVE FREE 10 ML: 5 INJECTION INTRAVENOUS at 20:43

## 2020-07-10 RX ADMIN — Medication 1 PACKET: at 09:47

## 2020-07-10 RX ADMIN — LEVOTHYROXINE SODIUM 125 MCG: 125 TABLET ORAL at 05:22

## 2020-07-10 RX ADMIN — Medication 1 PACKET: at 20:39

## 2020-07-10 RX ADMIN — PANTOPRAZOLE SODIUM 40 MG: 40 TABLET, DELAYED RELEASE ORAL at 05:22

## 2020-07-10 RX ADMIN — VANCOMYCIN HYDROCHLORIDE 1250 MG: 5 INJECTION, POWDER, LYOPHILIZED, FOR SOLUTION INTRAVENOUS at 20:38

## 2020-07-10 RX ADMIN — SIMVASTATIN 20 MG: 20 TABLET, FILM COATED ORAL at 20:39

## 2020-07-10 RX ADMIN — QUETIAPINE FUMARATE 50 MG: 50 TABLET, EXTENDED RELEASE ORAL at 20:38

## 2020-07-10 RX ADMIN — LATANOPROST 1 DROP: 50 SOLUTION OPHTHALMIC at 20:38

## 2020-07-10 RX ADMIN — PRIMIDONE 50 MG: 50 TABLET ORAL at 09:47

## 2020-07-10 RX ADMIN — LACTULOSE 20 G: 10 SOLUTION ORAL at 20:38

## 2020-07-10 RX ADMIN — LACOSAMIDE 200 MG: 50 TABLET, FILM COATED ORAL at 09:46

## 2020-07-10 RX ADMIN — DIVALPROEX SODIUM 1000 MG: 500 TABLET, DELAYED RELEASE ORAL at 20:39

## 2020-07-10 RX ADMIN — PRIMIDONE 50 MG: 50 TABLET ORAL at 14:57

## 2020-07-10 RX ADMIN — LORAZEPAM 1 MG: 0.5 TABLET ORAL at 09:48

## 2020-07-10 RX ADMIN — LACTULOSE 20 G: 10 SOLUTION ORAL at 14:57

## 2020-07-10 RX ADMIN — ZONISAMIDE 100 MG: 100 CAPSULE ORAL at 09:47

## 2020-07-10 RX ADMIN — TROSPIUM CHLORIDE 20 MG: 20 TABLET, FILM COATED ORAL at 20:38

## 2020-07-10 RX ADMIN — Medication 1 CAPSULE: at 09:46

## 2020-07-10 RX ADMIN — ENOXAPARIN SODIUM 40 MG: 40 INJECTION SUBCUTANEOUS at 09:48

## 2020-07-10 RX ADMIN — RIFAXIMIN 550 MG: 550 TABLET ORAL at 09:46

## 2020-07-10 RX ADMIN — CLONAZEPAM 0.25 MG: 0.5 TABLET ORAL at 09:48

## 2020-07-10 RX ADMIN — LACOSAMIDE 200 MG: 50 TABLET, FILM COATED ORAL at 20:39

## 2020-07-10 RX ADMIN — SODIUM CHLORIDE, PRESERVATIVE FREE 10 ML: 5 INJECTION INTRAVENOUS at 09:49

## 2020-07-10 RX ADMIN — LORAZEPAM 1 MG: 0.5 TABLET ORAL at 20:39

## 2020-07-10 RX ADMIN — Medication 1000 UNITS: at 09:46

## 2020-07-10 RX ADMIN — VANCOMYCIN HYDROCHLORIDE 1250 MG: 5 INJECTION, POWDER, LYOPHILIZED, FOR SOLUTION INTRAVENOUS at 09:48

## 2020-07-10 RX ADMIN — FOLIC ACID 1 MG: 1 TABLET ORAL at 09:48

## 2020-07-10 RX ADMIN — TROSPIUM CHLORIDE 20 MG: 20 TABLET, FILM COATED ORAL at 09:46

## 2020-07-10 RX ADMIN — RIFAXIMIN 550 MG: 550 TABLET ORAL at 20:39

## 2020-07-10 RX ADMIN — HYDROCHLOROTHIAZIDE 12.5 MG: 12.5 TABLET ORAL at 09:46

## 2020-07-10 ASSESSMENT — PAIN SCALES - GENERAL: PAINLEVEL_OUTOF10: 0

## 2020-07-10 NOTE — CONSULTS
[address] [place] [address], [facility]                                  CONSULTATION    PATIENT NAME: Todd Paul                      :        1955  MED REC NO:   [id number]                          ROOM:       [id number]  ACCOUNT NO:   [de-identified]                           ADMIT DATE: 2020  PROVIDER:     Camille Rankin MD    CONSULT DATE:  2020    REFERRING PROVIDER:  Romi Talamantes MD, hospitalist service. REASON FOR CONSULTATION:  Evaluate right hip and buttock pressure  ulceration. CHIEF COMPLAINT/HISTORY OF PRESENT ILLNESS:  The patient is a  81-year-old  male, who I have taken care of before at  [facility] [place], operated by [facility] for a right hip ulceration which was  induced by pressure and then has a history of MRSA infection. I was  able to get this healed. He was recently discharged from our facility. He now represents back to the hospital due to concerns of a urinary  tract along with a wound infection on this right buttock and hip area. He did have a CT of the abdomen and pelvis upon evaluation, was shown to  have some indurations and inflammatory changes within this area. I have  been now asked to see the patient in consultation to see if any surgical  debridement needs to be performed. Examination of the wound bed reveals  that it is a stage II ulcer with some very mild redness in the periulcer  area. He does have some induration and tenderness, but there are no  ballotable fluid collections that need to be drained in the subcutaneous  tissue, which I also did not see on the CT imaging. He does have a  history of cerebral palsy along with seizure disorder. He did have a  leukocytosis of 18,000 on admission, it is now decreased to 13,000. He  has been placed on broad-spectrum antibiotics, which did include  vancomycin due to his history of MRSA infections.   He has already been  evaluated by the wound care team as well. He was also shown to be  hypokalemic which has been corrected with only potassium sliding scale  and treatment for his comorbidities are being done as well. PAST MEDICAL HISTORY:  Cerebral palsy, seizure disorder, thyroid  disease, essential hypertension, gastroesophageal reflux disease,  history of pressure ulcerations and MRSA infection, history of anemia,  IBS. PAST SURGICAL HISTORY:  Cholecystectomy in 2005, vagal nerve stimulator  in 2002, and then this was revised by my partner, Dr. Wendi Lambert, about 5  years ago. He has had a cystoscopy, kidney stone removal,  colonoscopies, and EGD in 2018. SOCIAL HISTORY:  Negative for alcohol, tobacco, or IV drug abuse. FAMILY HISTORY:  Positive for asthma, coronary artery disease,  hypertension, depression, hyperlipidemia, and migraines. ALLERGIES:  No known drug allergies. MEDICATIONS:  Please refer to the medication reconciliation sheet. REVIEW OF SYSTEMS:  A 10-point review of systems has been reviewed and  negative unless as noted above in the history of present illness. PHYSICAL EXAMINATION:  VITAL SIGNS:  His temperature is 97.6, respiratory rate 16, pulse 58,  blood pressure 125/63, O2 sat 99% on room air. GENERAL:  In no acute distress. He is alert, awake, oriented. He is  able to answer my questions appropriately. Pale appearing. HEENT:  Normocephalic, atraumatic. Pupils are equal, round, and  reactive to light. Extraocular muscles are intact. Trachea is midline. No lymphadenopathy. Anicteric sclerae. CHEST:  Clear to auscultation bilaterally. Air entry is bilaterally  equal.  No rales, rhonchi, wheezes, or crackles. Vagal nerve stimulator  present in the left upper chest.  HEART:  Positive S1 and S2. Normal sinus rhythm. No murmurs, rubs or  gallops. ABDOMEN:  Nondistended. No rebound or guarding. No herniations,  pulsations, or fluid shifts. No CVA tenderness.   EXTREMITIES:  Negative for erythema, edema, cellulitis or cyanosis,  except in the right hip area extending back into the buttock and ischial  region where he had a stage II small pressure ulceration with some  periwound erythematous changes with some induration, but no evidence of  any fluctuance. No abscesses. No crepitus. ASSESSMENT AND PLAN:  A 60-year-old  male with a history of  cerebral palsy, who now has developed a stage II right hip pressure  ulceration. Cultures will be pending. He has also been treated for  urinary tract infection. We will continue him on broad-spectrum  antibiotics and also place him on vancomycin for his history of MRSA  infections from the previous wound I took care of in the left hip area. Due to this, he does not need any surgical debridement at this time. We  will continue to offload _____ dressings and as some of the skin starts  to deteriorate, he may benefit from the use of collagenase. Once he  gets discharged from the hospital, he can follow up with me at  [facility] [place] in the near future for further wound care  management and also to get this healed. I would like to thank the hospitalist service for giving me the  opportunity to assist in his surgical care and evaluation.         Albert Hall MD    D: 07/09/2020 16:23:18       T: 07/09/2020 23:17:49     [email or web address]  Job#: [id number]     Doc#: [id number]    CC:

## 2020-07-10 NOTE — PROGRESS NOTES
[address]  consulted by Dr. Padmini Silva for monitoring and adjustment. Indication for treatment: Right perineal cellulitis  Goal trough: 15 mcg/mL     Pertinent Laboratory Values:   Temp Readings from Last 3 Encounters:   07/10/20 97.6 °F (36.4 °C) (Oral)   06/28/20 98 °F (36.7 °C) (Oral)   06/17/20 98.7 °F (37.1 °C) (Temporal)     Recent Labs     07/08/20  0727 07/09/20  0458 07/10/20  0721   WBC 9.2 6.5 7.6     Recent Labs     07/08/20  0727 07/09/20  0458 07/10/20  0721   BUN 11 12 14   CREATININE 0.6* 0.5* 0.6*     Estimated Creatinine Clearance: 143 mL/min (A) (based on SCr of 0.6 mg/dL (L)). Intake/Output Summary (Last 24 hours) at 7/10/2020 1306  Last data filed at 7/10/2020 0526  Gross per 24 hour   Intake 850 ml   Output --   Net 850 ml     Pertinent Cultures:  Date    Source    Results  7/8   Buttocks   MRSA    Vancomycin level:   TROUGH:    Recent Labs     07/08/20  0727 07/10/20  0721   VANCOTROUGH 10.6 12.6     RANDOM:  No results for input(s): VANCORANDOM in the last 72 hours. Assessment:  · WBC and temperature: WBC WNL/afebrile  · SCr, BUN, and urine output: Stable  · Day(s) of therapy: 5   · Vancomycin level: 12.6 (~12 hours)    Plan:  · Dosing comments: Continue vancomycin 1250mg q12h   · Pharmacy will continue to monitor patient and adjust therapy as indicated    Thank you for the consult.   Bryn Aguilar, [address]  7/10/2020 1:06 PM

## 2020-07-10 NOTE — PLAN OF CARE
Problem: Pain:  Goal: Pain level will decrease  Description: Pain level will decrease  7/9/2020 2121 by John Lopez RN  Outcome: Ongoing  7/9/2020 1423 by Terrell Delatorre RN  Outcome: Ongoing  Goal: Control of acute pain  Description: Control of acute pain  7/9/2020 2121 by John Lopez RN  Outcome: Ongoing  7/9/2020 1423 by Terrell Delatorre RN  Outcome: Ongoing  Goal: Control of chronic pain  Description: Control of chronic pain  7/9/2020 2121 by John Lopez RN  Outcome: Ongoing  7/9/2020 1423 by Terrell Delatorre RN  Outcome: Ongoing     Problem: Falls - Risk of:  Goal: Will remain free from falls  Description: Will remain free from falls  7/9/2020 2121 by John Lopez RN  Outcome: Ongoing  7/9/2020 1423 by Terrell Delatorre RN  Outcome: Ongoing  Goal: Absence of physical injury  Description: Absence of physical injury  7/9/2020 2121 by John Lopez RN  Outcome: Ongoing  7/9/2020 1423 by Terrell Delatorre RN  Outcome: Ongoing     Problem: Skin Integrity:  Goal: Will show no infection signs and symptoms  Description: Will show no infection signs and symptoms  7/9/2020 2121 by John Lopez RN  Outcome: Ongoing  7/9/2020 1423 by Terrell Delatorre RN  Outcome: Ongoing  Goal: Absence of new skin breakdown  Description: Absence of new skin breakdown  7/9/2020 2121 by John Lopez RN  Outcome: Ongoing  7/9/2020 1423 by Terrell Delatorre RN  Outcome: Ongoing

## 2020-07-10 NOTE — FLOWSHEET NOTE
While washing patient up this nurse noted abscess to rt. Perineal area, Dr. Fortino Viera made aware via perfect serve, informed this nurse he was already aware.

## 2020-07-10 NOTE — PROGRESS NOTES
Hospitalist Progress Note      Name:  Hamilton Garcia /Age/Sex: 1955  (62 y.o. male)   MRN & CSN:  [id number] & [id number] Admission Date/Time: 2020 11:32 AM   Location:  [address] [place] [address] PCP: Seferino Kaplan MD         Hospital Day: 5    Assessment and Plan:   Hamilton Garcia is a 62 y.o.  male  who presents with Cellulitis, perineum       1) Cellulitis involving Rt gluteal with stage II pressure ulcer   -CT A/P: Rt perineal cellulitis and phlegmonous changes without a discrete abscess  -CRP trending down  -Wound Cx growing MRSA  -General Surgery on board; no debridement needed  -Wound care on board  -Continue IV abx        Chronic Problems     Cerebral palsy  -spoke with sister, his guardian     Grand mal seizure disorder - c/w Depakote and Vimpat and Zonegram     Thyroid disease  -Synthroid     Hypertension/HLD  -Norvasc and HCTZ  -Simvastatin     GERD   -Protonix     Hyperammonemia  -Continue lactulose and Rifaximin      - on Flomax and Sanctura       Diet DIET DYSPHAGIA MINCED AND MOIST;   DVT Prophylaxis [] Lovenox, []  Heparin, [] SCDs, [] Ambulation   GI Prophylaxis [] PPI,  [] H2 Blocker,  [] Carafate,  [] Diet/Tube Feeds   Code Status Full Code   Disposition Group Home   MDM      History of Present Illness:     Patient was seen and examined  Denied any fever, chills, N/V/D  No abdominal pain, chest pain  No dizziness, palpitations    Ten point ROS reviewed negative, unless as noted above    Objective: Intake/Output Summary (Last 24 hours) at 7/10/2020 1011  Last data filed at 7/10/2020 0526  Gross per 24 hour   Intake 850 ml   Output --   Net 850 ml      Vitals:   Vitals:    07/10/20 0913   BP: 139/72   Pulse: 64   Resp: 16   Temp: 97.6 °F (36.4 °C)   SpO2: 92%     Physical Exam:   GEN Awake male, sitting upright in bed in no apparent distress. Appears given age. EYES Pupils are equally round. No scleral erythema, discharge, or conjunctivitis.   HENT Mucous membranes are moist. Oral pharynx without exudates, no evidence of thrush. NECK Supple, no apparent thyromegaly or masses. RESP Clear to auscultation, no wheezes, rales or rhonchi. Symmetric chest movement while on room air. CARDIO/VASC S1/S2 auscultated. Regular rate without appreciable murmurs, rubs, or gallops. No JVD or carotid bruits. Peripheral pulses equal bilaterally and palpable. No peripheral edema. GI Abdomen is soft without significant tenderness, masses, or guarding. Bowel sounds are normoactive. Rectal exam deferred.  No costovertebral angle tenderness. Normal appearing external genitalia. Abdullahi catheter is not present. HEME/LYMPH No palpable cervical lymphadenopathy and no hepatosplenomegaly. No petechiae or ecchymoses. MSK No gross joint deformities. SKIN Normal coloration, warm, dry. NEURO Cranial nerves appear grossly intact, normal speech, no lateralizing weakness. PSYCH Awake, alert, oriented x 4. Affect appropriate.     Medications:   Medications:    lactulose  20 g Oral TID    rifaximin  550 mg Oral BID    amLODIPine  5 mg Oral Daily    aspirin  81 mg Oral Daily    vitamin D  1 capsule Oral Daily    clonazePAM  0.25 mg Oral Daily    divalproex  1,000 mg Oral Nightly    folic acid  1 mg Oral Daily    hydrochlorothiazide  12.5 mg Oral Daily    lacosamide  200 mg Oral BID    lactobacillus  1 capsule Oral Daily    zonisamide  100 mg Oral QAM    psyllium  1 Package Oral BID    timolol  1 drop Both Eyes Daily    tamsulosin  0.4 mg Oral Daily    simvastatin  20 mg Oral Nightly    QUEtiapine  50 mg Oral Nightly    primidone  50 mg Oral TID    PARoxetine  20 mg Oral QAM    pantoprazole  40 mg Oral QAM AC    mirabegron  50 mg Oral Daily    levothyroxine  125 mcg Oral Daily    latanoprost  1 drop Both Eyes Nightly    sodium chloride flush  10 mL Intravenous 2 times per day    enoxaparin  40 mg Subcutaneous Daily    LORazepam  1 mg Oral BID    trospium  20 mg Oral BID    vancomycin  1,250 mg Intravenous Q12H      Infusions:   PRN Meds: ibuprofen, 600 mg, Q6H PRN  dicyclomine, 10 mg, Q6H PRN  guaiFENesin, 1,200 mg, Daily PRN  lacosamide, 200 mg, PRN  simethicone, 80 mg, Q4H PRN  sodium chloride flush, 10 mL, PRN  acetaminophen, 650 mg, Q6H PRN    Or  acetaminophen, 650 mg, Q6H PRN  polyethylene glycol, 17 g, Daily PRN  promethazine, 12.5 mg, Q6H PRN    Or  ondansetron, 4 mg, Q6H PRN  potassium chloride, 40 mEq, PRN    Or  potassium alternative oral replacement, 40 mEq, PRN    Or  potassium chloride, 10 mEq, PRN          Electronically signed by Homer Nichole MD on 7/10/2020 at 10:11 AM

## 2020-07-11 LAB
AMMONIA: 74 UMOL/L (ref 16–60)
ANION GAP SERPL CALCULATED.3IONS-SCNC: 7 MMOL/L (ref 4–16)
BUN BLDV-MCNC: 14 MG/DL (ref 6–23)
CALCIUM SERPL-MCNC: 9.5 MG/DL (ref 8.3–10.6)
CHLORIDE BLD-SCNC: 100 MMOL/L (ref 99–110)
CO2: 29 MMOL/L (ref 21–32)
CREAT SERPL-MCNC: 0.5 MG/DL (ref 0.9–1.3)
GFR AFRICAN AMERICAN: >60 ML/MIN/1.73M2
GFR NON-AFRICAN AMERICAN: >60 ML/MIN/1.73M2
GLUCOSE BLD-MCNC: 110 MG/DL (ref 70–99)
HCT VFR BLD CALC: 41.2 % (ref 42–52)
HEMOGLOBIN: 13.2 GM/DL (ref 13.5–18)
HIGH SENSITIVE C-REACTIVE PROTEIN: 17.9 MG/L
MCH RBC QN AUTO: 29.7 PG (ref 27–31)
MCHC RBC AUTO-ENTMCNC: 32 % (ref 32–36)
MCV RBC AUTO: 92.6 FL (ref 78–100)
PDW BLD-RTO: 13.1 % (ref 11.7–14.9)
PLATELET # BLD: 288 K/CU MM (ref 140–440)
PMV BLD AUTO: 10.1 FL (ref 7.5–11.1)
POTASSIUM SERPL-SCNC: 3.8 MMOL/L (ref 3.5–5.1)
RBC # BLD: 4.45 M/CU MM (ref 4.6–6.2)
SODIUM BLD-SCNC: 136 MMOL/L (ref 135–145)
WBC # BLD: 6.4 K/CU MM (ref 4–10.5)

## 2020-07-11 PROCEDURE — 86141 C-REACTIVE PROTEIN HS: CPT

## 2020-07-11 PROCEDURE — 1200000000 HC SEMI PRIVATE

## 2020-07-11 PROCEDURE — 82140 ASSAY OF AMMONIA: CPT

## 2020-07-11 PROCEDURE — 2580000003 HC RX 258: Performed by: INTERNAL MEDICINE

## 2020-07-11 PROCEDURE — 36415 COLL VENOUS BLD VENIPUNCTURE: CPT

## 2020-07-11 PROCEDURE — 6370000000 HC RX 637 (ALT 250 FOR IP): Performed by: INTERNAL MEDICINE

## 2020-07-11 PROCEDURE — 80048 BASIC METABOLIC PNL TOTAL CA: CPT

## 2020-07-11 PROCEDURE — 6360000002 HC RX W HCPCS: Performed by: NURSE PRACTITIONER

## 2020-07-11 PROCEDURE — 85027 COMPLETE CBC AUTOMATED: CPT

## 2020-07-11 PROCEDURE — 6370000000 HC RX 637 (ALT 250 FOR IP): Performed by: NURSE PRACTITIONER

## 2020-07-11 PROCEDURE — 2580000003 HC RX 258: Performed by: NURSE PRACTITIONER

## 2020-07-11 PROCEDURE — 6360000002 HC RX W HCPCS: Performed by: INTERNAL MEDICINE

## 2020-07-11 RX ADMIN — DIVALPROEX SODIUM 1000 MG: 500 TABLET, DELAYED RELEASE ORAL at 21:07

## 2020-07-11 RX ADMIN — HYDROCHLOROTHIAZIDE 12.5 MG: 12.5 TABLET ORAL at 09:48

## 2020-07-11 RX ADMIN — VANCOMYCIN HYDROCHLORIDE 1250 MG: 5 INJECTION, POWDER, LYOPHILIZED, FOR SOLUTION INTRAVENOUS at 09:49

## 2020-07-11 RX ADMIN — Medication 1 PACKET: at 21:08

## 2020-07-11 RX ADMIN — SODIUM CHLORIDE, PRESERVATIVE FREE 10 ML: 5 INJECTION INTRAVENOUS at 09:51

## 2020-07-11 RX ADMIN — ASPIRIN 81 MG: 81 TABLET, COATED ORAL at 09:48

## 2020-07-11 RX ADMIN — SIMVASTATIN 20 MG: 20 TABLET, FILM COATED ORAL at 21:07

## 2020-07-11 RX ADMIN — PRIMIDONE 50 MG: 50 TABLET ORAL at 15:51

## 2020-07-11 RX ADMIN — PRIMIDONE 50 MG: 50 TABLET ORAL at 09:48

## 2020-07-11 RX ADMIN — TROSPIUM CHLORIDE 20 MG: 20 TABLET, FILM COATED ORAL at 21:07

## 2020-07-11 RX ADMIN — PANTOPRAZOLE SODIUM 40 MG: 40 TABLET, DELAYED RELEASE ORAL at 05:10

## 2020-07-11 RX ADMIN — LATANOPROST 1 DROP: 50 SOLUTION OPHTHALMIC at 21:06

## 2020-07-11 RX ADMIN — RIFAXIMIN 550 MG: 550 TABLET ORAL at 21:07

## 2020-07-11 RX ADMIN — LACOSAMIDE 200 MG: 50 TABLET, FILM COATED ORAL at 09:47

## 2020-07-11 RX ADMIN — LORAZEPAM 1 MG: 0.5 TABLET ORAL at 09:48

## 2020-07-11 RX ADMIN — LACTULOSE 20 G: 10 SOLUTION ORAL at 21:07

## 2020-07-11 RX ADMIN — SODIUM CHLORIDE, PRESERVATIVE FREE 10 ML: 5 INJECTION INTRAVENOUS at 21:08

## 2020-07-11 RX ADMIN — LACOSAMIDE 200 MG: 50 TABLET, FILM COATED ORAL at 21:07

## 2020-07-11 RX ADMIN — LORAZEPAM 1 MG: 0.5 TABLET ORAL at 21:07

## 2020-07-11 RX ADMIN — RIFAXIMIN 550 MG: 550 TABLET ORAL at 09:47

## 2020-07-11 RX ADMIN — TROSPIUM CHLORIDE 20 MG: 20 TABLET, FILM COATED ORAL at 09:48

## 2020-07-11 RX ADMIN — VANCOMYCIN HYDROCHLORIDE 1250 MG: 5 INJECTION, POWDER, LYOPHILIZED, FOR SOLUTION INTRAVENOUS at 21:06

## 2020-07-11 RX ADMIN — PAROXETINE HYDROCHLORIDE 20 MG: 20 TABLET, FILM COATED ORAL at 09:48

## 2020-07-11 RX ADMIN — LEVOTHYROXINE SODIUM 125 MCG: 125 TABLET ORAL at 05:10

## 2020-07-11 RX ADMIN — Medication 1 CAPSULE: at 09:48

## 2020-07-11 RX ADMIN — TAMSULOSIN HYDROCHLORIDE 0.4 MG: 0.4 CAPSULE ORAL at 09:47

## 2020-07-11 RX ADMIN — PRIMIDONE 50 MG: 50 TABLET ORAL at 21:07

## 2020-07-11 RX ADMIN — ZONISAMIDE 100 MG: 100 CAPSULE ORAL at 09:47

## 2020-07-11 RX ADMIN — Medication 1 PACKET: at 09:48

## 2020-07-11 RX ADMIN — Medication 1000 UNITS: at 09:47

## 2020-07-11 RX ADMIN — ENOXAPARIN SODIUM 40 MG: 40 INJECTION SUBCUTANEOUS at 09:49

## 2020-07-11 RX ADMIN — AMLODIPINE BESYLATE 5 MG: 5 TABLET ORAL at 09:48

## 2020-07-11 RX ADMIN — LACTULOSE 20 G: 10 SOLUTION ORAL at 15:51

## 2020-07-11 RX ADMIN — QUETIAPINE FUMARATE 50 MG: 50 TABLET, EXTENDED RELEASE ORAL at 21:07

## 2020-07-11 RX ADMIN — FOLIC ACID 1 MG: 1 TABLET ORAL at 09:47

## 2020-07-11 RX ADMIN — CLONAZEPAM 0.25 MG: 0.5 TABLET ORAL at 09:46

## 2020-07-11 RX ADMIN — LACTULOSE 20 G: 10 SOLUTION ORAL at 09:49

## 2020-07-11 ASSESSMENT — PAIN SCALES - GENERAL: PAINLEVEL_OUTOF10: 0

## 2020-07-11 NOTE — PROGRESS NOTES
[facility]  consulted by Dr. Viry Butler for monitoring and adjustment. Indication for treatment: Right perineal cellulitis  Goal trough: 15 mcg/mL     Pertinent Laboratory Values:   Temp Readings from Last 3 Encounters:   07/11/20 97.9 °F (36.6 °C) (Oral)   06/28/20 98 °F (36.7 °C) (Oral)   06/17/20 98.7 °F (37.1 °C) (Temporal)     Recent Labs     07/09/20  0458 07/10/20  0721 07/11/20  0402   WBC 6.5 7.6 6.4     Recent Labs     07/09/20  0458 07/10/20  0721 07/11/20  0402   BUN 12 14 14   CREATININE 0.5* 0.6* 0.5*     Estimated Creatinine Clearance: 169 mL/min (A) (based on SCr of 0.5 mg/dL (L)). Intake/Output Summary (Last 24 hours) at 7/11/2020 1534  Last data filed at 7/11/2020 1448  Gross per 24 hour   Intake 480 ml   Output 100 ml   Net 380 ml     Pertinent Cultures:  Date    Source    Results  7/8   Buttocks   MRSA    Vancomycin level:   TROUGH:    Recent Labs     07/10/20  0721   VANCOTROUGH 12.6     RANDOM:  No results for input(s): VANCORANDOM in the last 72 hours. Assessment:  · WBC and temperature: WBC WNL/afebrile  · SCr, BUN, and urine output: Stable  · Day(s) of therapy: 6  · Vancomycin level: 12.6 (~12 hours)     Plan:  · Continue vancomycin 1250mg q12h  · Therapeutic trough @ 12.6   · Pharmacy will continue to monitor patient and adjust therapy as indicated    Thank you for the consult.   Slime Soto [facility]  7/11/2020 3:34 PM

## 2020-07-11 NOTE — PROGRESS NOTES
Hospitalist Progress Note      Name:  Maria Ines Morales /Age/Sex: 1955  (62 y.o. male)   MRN & CSN:  [id number] & [id number] Admission Date/Time: 2020 11:32 AM   Location:  [address] [place] [address] PCP: Austin Hernandez MD         Hospital Day: 6    Assessment and Plan:   Maria Ines Morales is a 62 y.o.  male  who presents with Cellulitis, perineum     1) MRSA Cellulitis involving Rt gluteal with stage II pressure ulcer   -CT A/P: Rt perineal cellulitis and phlegmonous changes without a discrete abscess  -CRP trending down significantly  -Wound Cx growing MRSA  -General Surgery on board; no debridement needed  -Wound care on board  -Continue IV Vanc        Chronic Problems     Cerebral palsy  -spoke with sister, his guardian     Grand mal seizure disorder - c/w Depakote and Vimpat and Zonegram     Thyroid disease  -Synthroid     Hypertension/HLD  -Norvasc and HCTZ  -Simvastatin     GERD   -Protonix     Hyperammonemia  -Continue lactulose and Rifaximin      - on Flomax and Sanctura    COVID 19 Screen ordered    Diet DIET DYSPHAGIA MINCED AND MOIST;   DVT Prophylaxis [] Lovenox, []  Heparin, [] SCDs, [] Ambulation   GI Prophylaxis [] PPI,  [] H2 Blocker,  [] Carafate,  [] Diet/Tube Feeds   Code Status Full Code   Disposition Group home   MDM      History of Present Illness:     Patient was seen and examined  Denied any fever, chills  No worsening pain  No dizziness, palpitation  No N/V/D    Ten point ROS reviewed negative, unless as noted above    Objective: Intake/Output Summary (Last 24 hours) at 2020 0937  Last data filed at 7/10/2020 1849  Gross per 24 hour   Intake 480 ml   Output --   Net 480 ml      Vitals:   Vitals:    20 0324   BP: 139/72   Pulse: 57   Resp: 20   Temp: 97.7 °F (36.5 °C)   SpO2: 96%     Physical Exam:   GEN Awake male, laying in bed in no apparent distress. Appears given age. EYES Pupils are equally round. No scleral erythema, discharge, or conjunctivitis.   HENT Mucous membranes are moist. Oral pharynx without exudates, no evidence of thrush. NECK Supple, no apparent thyromegaly or masses. RESP Clear to auscultation, no wheezes, rales or rhonchi. Symmetric chest movement while on room air. CARDIO/VASC S1/S2 auscultated. Regular rate without appreciable murmurs, rubs, or gallops. No JVD or carotid bruits. Peripheral pulses equal bilaterally and palpable. No peripheral edema. GI Abdomen is soft without significant tenderness, masses, or guarding. Bowel sounds are normoactive. Rectal exam deferred.  No costovertebral angle tenderness. Normal appearing external genitalia. Abdullahi catheter is not present. HEME/LYMPH No palpable cervical lymphadenopathy and no hepatosplenomegaly. No petechiae or ecchymoses. MSK No gross joint deformities. SKIN Normal coloration, warm, dry. NEURO Cranial nerves appear grossly intact, normal speech, no lateralizing weakness. PSYCH Awake, alert, oriented x 4. Affect appropriate.     Medications:   Medications:    lactulose  20 g Oral TID    rifaximin  550 mg Oral BID    amLODIPine  5 mg Oral Daily    aspirin  81 mg Oral Daily    vitamin D  1 capsule Oral Daily    clonazePAM  0.25 mg Oral Daily    divalproex  1,000 mg Oral Nightly    folic acid  1 mg Oral Daily    hydrochlorothiazide  12.5 mg Oral Daily    lacosamide  200 mg Oral BID    lactobacillus  1 capsule Oral Daily    zonisamide  100 mg Oral QAM    psyllium  1 Package Oral BID    timolol  1 drop Both Eyes Daily    tamsulosin  0.4 mg Oral Daily    simvastatin  20 mg Oral Nightly    QUEtiapine  50 mg Oral Nightly    primidone  50 mg Oral TID    PARoxetine  20 mg Oral QAM    pantoprazole  40 mg Oral QAM AC    mirabegron  50 mg Oral Daily    levothyroxine  125 mcg Oral Daily    latanoprost  1 drop Both Eyes Nightly    sodium chloride flush  10 mL Intravenous 2 times per day    enoxaparin  40 mg Subcutaneous Daily    LORazepam  1 mg Oral BID    trospium  20 mg Oral BID    vancomycin  1,250 mg Intravenous Q12H      Infusions:   PRN Meds: ibuprofen, 600 mg, Q6H PRN  dicyclomine, 10 mg, Q6H PRN  guaiFENesin, 1,200 mg, Daily PRN  lacosamide, 200 mg, PRN  simethicone, 80 mg, Q4H PRN  sodium chloride flush, 10 mL, PRN  acetaminophen, 650 mg, Q6H PRN    Or  acetaminophen, 650 mg, Q6H PRN  polyethylene glycol, 17 g, Daily PRN  promethazine, 12.5 mg, Q6H PRN    Or  ondansetron, 4 mg, Q6H PRN  potassium chloride, 40 mEq, PRN    Or  potassium alternative oral replacement, 40 mEq, PRN    Or  potassium chloride, 10 mEq, PRN          Electronically signed by Melisa Virk MD on 7/11/2020 at 9:37 AM

## 2020-07-12 LAB
AMMONIA: 114 UMOL/L (ref 16–60)
ANION GAP SERPL CALCULATED.3IONS-SCNC: 8 MMOL/L (ref 4–16)
BUN BLDV-MCNC: 12 MG/DL (ref 6–23)
CALCIUM SERPL-MCNC: 9.4 MG/DL (ref 8.3–10.6)
CHLORIDE BLD-SCNC: 99 MMOL/L (ref 99–110)
CO2: 31 MMOL/L (ref 21–32)
CREAT SERPL-MCNC: 0.6 MG/DL (ref 0.9–1.3)
GFR AFRICAN AMERICAN: >60 ML/MIN/1.73M2
GFR NON-AFRICAN AMERICAN: >60 ML/MIN/1.73M2
GLUCOSE BLD-MCNC: 102 MG/DL (ref 70–99)
HCT VFR BLD CALC: 43.8 % (ref 42–52)
HEMOGLOBIN: 14 GM/DL (ref 13.5–18)
HIGH SENSITIVE C-REACTIVE PROTEIN: 11.2 MG/L
MCH RBC QN AUTO: 29.4 PG (ref 27–31)
MCHC RBC AUTO-ENTMCNC: 32 % (ref 32–36)
MCV RBC AUTO: 91.8 FL (ref 78–100)
PDW BLD-RTO: 12.9 % (ref 11.7–14.9)
PLATELET # BLD: 318 K/CU MM (ref 140–440)
PMV BLD AUTO: 9.7 FL (ref 7.5–11.1)
POTASSIUM SERPL-SCNC: 4.1 MMOL/L (ref 3.5–5.1)
RBC # BLD: 4.77 M/CU MM (ref 4.6–6.2)
SARS-COV-2: NOT DETECTED
SODIUM BLD-SCNC: 138 MMOL/L (ref 135–145)
SOURCE: NORMAL
WBC # BLD: 6.8 K/CU MM (ref 4–10.5)

## 2020-07-12 PROCEDURE — 2580000003 HC RX 258: Performed by: NURSE PRACTITIONER

## 2020-07-12 PROCEDURE — 6370000000 HC RX 637 (ALT 250 FOR IP): Performed by: INTERNAL MEDICINE

## 2020-07-12 PROCEDURE — 82565 ASSAY OF CREATININE: CPT

## 2020-07-12 PROCEDURE — 6360000002 HC RX W HCPCS: Performed by: INTERNAL MEDICINE

## 2020-07-12 PROCEDURE — 80048 BASIC METABOLIC PNL TOTAL CA: CPT

## 2020-07-12 PROCEDURE — 94761 N-INVAS EAR/PLS OXIMETRY MLT: CPT

## 2020-07-12 PROCEDURE — 6370000000 HC RX 637 (ALT 250 FOR IP): Performed by: NURSE PRACTITIONER

## 2020-07-12 PROCEDURE — 82140 ASSAY OF AMMONIA: CPT

## 2020-07-12 PROCEDURE — 2580000003 HC RX 258: Performed by: INTERNAL MEDICINE

## 2020-07-12 PROCEDURE — 1200000000 HC SEMI PRIVATE

## 2020-07-12 PROCEDURE — 86141 C-REACTIVE PROTEIN HS: CPT

## 2020-07-12 PROCEDURE — 6360000002 HC RX W HCPCS: Performed by: NURSE PRACTITIONER

## 2020-07-12 PROCEDURE — 85027 COMPLETE CBC AUTOMATED: CPT

## 2020-07-12 RX ADMIN — CLONAZEPAM 0.25 MG: 0.5 TABLET ORAL at 10:33

## 2020-07-12 RX ADMIN — LATANOPROST 1 DROP: 50 SOLUTION OPHTHALMIC at 20:56

## 2020-07-12 RX ADMIN — PRIMIDONE 50 MG: 50 TABLET ORAL at 20:55

## 2020-07-12 RX ADMIN — VANCOMYCIN HYDROCHLORIDE 1250 MG: 5 INJECTION, POWDER, LYOPHILIZED, FOR SOLUTION INTRAVENOUS at 10:45

## 2020-07-12 RX ADMIN — LORAZEPAM 1 MG: 0.5 TABLET ORAL at 20:55

## 2020-07-12 RX ADMIN — ASPIRIN 81 MG: 81 TABLET, COATED ORAL at 10:34

## 2020-07-12 RX ADMIN — ENOXAPARIN SODIUM 40 MG: 40 INJECTION SUBCUTANEOUS at 10:31

## 2020-07-12 RX ADMIN — LACTULOSE 20 G: 10 SOLUTION ORAL at 10:31

## 2020-07-12 RX ADMIN — LACOSAMIDE 200 MG: 50 TABLET, FILM COATED ORAL at 20:55

## 2020-07-12 RX ADMIN — LACTULOSE 20 G: 10 SOLUTION ORAL at 14:54

## 2020-07-12 RX ADMIN — Medication 1 PACKET: at 10:34

## 2020-07-12 RX ADMIN — RIFAXIMIN 550 MG: 550 TABLET ORAL at 20:55

## 2020-07-12 RX ADMIN — PANTOPRAZOLE SODIUM 40 MG: 40 TABLET, DELAYED RELEASE ORAL at 05:15

## 2020-07-12 RX ADMIN — TROSPIUM CHLORIDE 20 MG: 20 TABLET, FILM COATED ORAL at 10:32

## 2020-07-12 RX ADMIN — Medication 1 PACKET: at 20:55

## 2020-07-12 RX ADMIN — SIMVASTATIN 20 MG: 20 TABLET, FILM COATED ORAL at 20:55

## 2020-07-12 RX ADMIN — HYDROCHLOROTHIAZIDE 12.5 MG: 12.5 TABLET ORAL at 10:32

## 2020-07-12 RX ADMIN — QUETIAPINE FUMARATE 50 MG: 50 TABLET, EXTENDED RELEASE ORAL at 20:56

## 2020-07-12 RX ADMIN — AMLODIPINE BESYLATE 5 MG: 5 TABLET ORAL at 10:32

## 2020-07-12 RX ADMIN — Medication 1000 UNITS: at 10:32

## 2020-07-12 RX ADMIN — DIVALPROEX SODIUM 1000 MG: 500 TABLET, DELAYED RELEASE ORAL at 20:55

## 2020-07-12 RX ADMIN — FOLIC ACID 1 MG: 1 TABLET ORAL at 10:32

## 2020-07-12 RX ADMIN — SODIUM CHLORIDE, PRESERVATIVE FREE 10 ML: 5 INJECTION INTRAVENOUS at 20:56

## 2020-07-12 RX ADMIN — LACTULOSE 20 G: 10 SOLUTION ORAL at 20:55

## 2020-07-12 RX ADMIN — LACOSAMIDE 200 MG: 50 TABLET, FILM COATED ORAL at 10:35

## 2020-07-12 RX ADMIN — PRIMIDONE 50 MG: 50 TABLET ORAL at 14:54

## 2020-07-12 RX ADMIN — TAMSULOSIN HYDROCHLORIDE 0.4 MG: 0.4 CAPSULE ORAL at 10:32

## 2020-07-12 RX ADMIN — PRIMIDONE 50 MG: 50 TABLET ORAL at 10:33

## 2020-07-12 RX ADMIN — VANCOMYCIN HYDROCHLORIDE 1250 MG: 5 INJECTION, POWDER, LYOPHILIZED, FOR SOLUTION INTRAVENOUS at 20:55

## 2020-07-12 RX ADMIN — Medication 1 CAPSULE: at 10:32

## 2020-07-12 RX ADMIN — TROSPIUM CHLORIDE 20 MG: 20 TABLET, FILM COATED ORAL at 20:55

## 2020-07-12 RX ADMIN — LEVOTHYROXINE SODIUM 125 MCG: 125 TABLET ORAL at 05:15

## 2020-07-12 RX ADMIN — SODIUM CHLORIDE, PRESERVATIVE FREE 10 ML: 5 INJECTION INTRAVENOUS at 10:52

## 2020-07-12 RX ADMIN — ZONISAMIDE 100 MG: 100 CAPSULE ORAL at 10:32

## 2020-07-12 RX ADMIN — PAROXETINE HYDROCHLORIDE 20 MG: 20 TABLET, FILM COATED ORAL at 10:32

## 2020-07-12 RX ADMIN — RIFAXIMIN 550 MG: 550 TABLET ORAL at 10:31

## 2020-07-12 RX ADMIN — LORAZEPAM 1 MG: 0.5 TABLET ORAL at 10:33

## 2020-07-12 ASSESSMENT — PAIN SCALES - GENERAL: PAINLEVEL_OUTOF10: 0

## 2020-07-12 NOTE — PROGRESS NOTES
Hospitalist Progress Note      Name:  Iesha Mann /Age/Sex: 1955  (62 y.o. male)   MRN & CSN:  [id number] & [id number] Admission Date/Time: 2020 11:32 AM   Location:  [address] [place] [address] PCP: Odessa Galeana MD         Hospital Day: 7    Assessment and Plan:   Iesha Mann is a 62 y.o.  male  who presents with Cellulitis, perineum     1) MRSA Cellulitis involving Rt gluteal with stage II pressure ulcer   -CT A/P: Rt perineal cellulitis and phlegmonous changes without a discrete abscess  -CRP trending down significantly  -Wound Cx growing MRSA  -General Surgery on board; no debridement needed  -Wound care on board  -Continue IV Vanc  -Possible dc tomorrow        Chronic Problems     Cerebral palsy  -spoke with sister, his guardian     Grand mal seizure disorder - c/w Depakote and Vimpat and Zonegram     Thyroid disease  -Synthroid     Hypertension/HLD  -Norvasc and HCTZ  -Simvastatin     GERD   -Protonix     Hyperammonemia  -Continue lactulose and Rifaximin      - on Flomax and Sanctura     COVID 19 Screen ordered    Diet DIET DYSPHAGIA MINCED AND MOIST;   DVT Prophylaxis [] Lovenox, []  Heparin, [] SCDs, [] Ambulation   GI Prophylaxis [] PPI,  [] H2 Blocker,  [] Carafate,  [] Diet/Tube Feeds   Code Status Full Code   Disposition Group home   MDM      History of Present Illness:     Patient was seen and examined  Denied any chest pain or SOB  No fever, chills, N/V/D  Seems to be back to his baseline    Ten point ROS reviewed negative, unless as noted above    Objective: Intake/Output Summary (Last 24 hours) at 2020 1018  Last data filed at 2020 0940  Gross per 24 hour   Intake --   Output 325 ml   Net -325 ml      Vitals:   Vitals:    20 0937   BP: (!) 144/82   Pulse: 67   Resp: 17   Temp: 97.8 °F (36.6 °C)   SpO2: 95%     Physical Exam:   GEN Awake male, sitting upright in bed in no apparent distress. Appears given age. EYES Pupils are equally round.   No scleral erythema, discharge, or conjunctivitis. HENT Mucous membranes are moist. Oral pharynx without exudates, no evidence of thrush. NECK Supple, no apparent thyromegaly or masses. RESP Clear to auscultation, no wheezes, rales or rhonchi. Symmetric chest movement while on room air. CARDIO/VASC S1/S2 auscultated. Regular rate without appreciable murmurs, rubs, or gallops. No JVD or carotid bruits. Peripheral pulses equal bilaterally and palpable. No peripheral edema. GI Abdomen is soft without significant tenderness, masses, or guarding. Bowel sounds are normoactive. Rectal exam deferred.  No costovertebral angle tenderness. Normal appearing external genitalia. Abdullahi catheter is not present. HEME/LYMPH No palpable cervical lymphadenopathy and no hepatosplenomegaly. No petechiae or ecchymoses. MSK No gross joint deformities. SKIN Normal coloration, warm, dry. NEURO Cranial nerves appear grossly intact, normal speech, no lateralizing weakness. PSYCH Awake, alert, oriented x 4. Affect appropriate.     Medications:   Medications:    lactulose  20 g Oral TID    rifaximin  550 mg Oral BID    amLODIPine  5 mg Oral Daily    aspirin  81 mg Oral Daily    vitamin D  1 capsule Oral Daily    clonazePAM  0.25 mg Oral Daily    divalproex  1,000 mg Oral Nightly    folic acid  1 mg Oral Daily    hydrochlorothiazide  12.5 mg Oral Daily    lacosamide  200 mg Oral BID    lactobacillus  1 capsule Oral Daily    zonisamide  100 mg Oral QAM    psyllium  1 Package Oral BID    timolol  1 drop Both Eyes Daily    tamsulosin  0.4 mg Oral Daily    simvastatin  20 mg Oral Nightly    QUEtiapine  50 mg Oral Nightly    primidone  50 mg Oral TID    PARoxetine  20 mg Oral QAM    pantoprazole  40 mg Oral QAM AC    mirabegron  50 mg Oral Daily    levothyroxine  125 mcg Oral Daily    latanoprost  1 drop Both Eyes Nightly    sodium chloride flush  10 mL Intravenous 2 times per day    enoxaparin  40 mg Subcutaneous Daily    LORazepam  1 mg Oral BID    trospium  20 mg Oral BID    vancomycin  1,250 mg Intravenous Q12H      Infusions:   PRN Meds: ibuprofen, 600 mg, Q6H PRN  dicyclomine, 10 mg, Q6H PRN  guaiFENesin, 1,200 mg, Daily PRN  lacosamide, 200 mg, PRN  simethicone, 80 mg, Q4H PRN  sodium chloride flush, 10 mL, PRN  acetaminophen, 650 mg, Q6H PRN    Or  acetaminophen, 650 mg, Q6H PRN  polyethylene glycol, 17 g, Daily PRN  promethazine, 12.5 mg, Q6H PRN    Or  ondansetron, 4 mg, Q6H PRN  potassium chloride, 40 mEq, PRN    Or  potassium alternative oral replacement, 40 mEq, PRN    Or  potassium chloride, 10 mEq, PRN          Electronically signed by Bj Segovia MD on 7/12/2020 at 10:18 AM

## 2020-07-13 VITALS
SYSTOLIC BLOOD PRESSURE: 144 MMHG | BODY MASS INDEX: 27.76 KG/M2 | TEMPERATURE: 97.5 F | RESPIRATION RATE: 17 BRPM | WEIGHT: 193.9 LBS | HEIGHT: 70 IN | DIASTOLIC BLOOD PRESSURE: 78 MMHG | OXYGEN SATURATION: 96 % | HEART RATE: 72 BPM

## 2020-07-13 LAB
AMMONIA: 63 UMOL/L (ref 16–60)
ANION GAP SERPL CALCULATED.3IONS-SCNC: 10 MMOL/L (ref 4–16)
BUN BLDV-MCNC: 12 MG/DL (ref 6–23)
CALCIUM SERPL-MCNC: 9.2 MG/DL (ref 8.3–10.6)
CHLORIDE BLD-SCNC: 97 MMOL/L (ref 99–110)
CO2: 31 MMOL/L (ref 21–32)
CREAT SERPL-MCNC: 0.6 MG/DL (ref 0.9–1.3)
CULTURE: ABNORMAL
CULTURE: ABNORMAL
GFR AFRICAN AMERICAN: >60 ML/MIN/1.73M2
GFR NON-AFRICAN AMERICAN: >60 ML/MIN/1.73M2
GLUCOSE BLD-MCNC: 96 MG/DL (ref 70–99)
HCT VFR BLD CALC: 42.8 % (ref 42–52)
HEMOGLOBIN: 13.5 GM/DL (ref 13.5–18)
HIGH SENSITIVE C-REACTIVE PROTEIN: 7.4 MG/L
Lab: ABNORMAL
MCH RBC QN AUTO: 29.1 PG (ref 27–31)
MCHC RBC AUTO-ENTMCNC: 31.5 % (ref 32–36)
MCV RBC AUTO: 92.2 FL (ref 78–100)
PDW BLD-RTO: 12.9 % (ref 11.7–14.9)
PLATELET # BLD: 299 K/CU MM (ref 140–440)
PMV BLD AUTO: 10 FL (ref 7.5–11.1)
POTASSIUM SERPL-SCNC: 3.9 MMOL/L (ref 3.5–5.1)
RBC # BLD: 4.64 M/CU MM (ref 4.6–6.2)
SODIUM BLD-SCNC: 138 MMOL/L (ref 135–145)
SPECIMEN: ABNORMAL
WBC # BLD: 7.7 K/CU MM (ref 4–10.5)

## 2020-07-13 PROCEDURE — 36415 COLL VENOUS BLD VENIPUNCTURE: CPT

## 2020-07-13 PROCEDURE — 94761 N-INVAS EAR/PLS OXIMETRY MLT: CPT

## 2020-07-13 PROCEDURE — 6370000000 HC RX 637 (ALT 250 FOR IP): Performed by: INTERNAL MEDICINE

## 2020-07-13 PROCEDURE — 80048 BASIC METABOLIC PNL TOTAL CA: CPT

## 2020-07-13 PROCEDURE — 82140 ASSAY OF AMMONIA: CPT

## 2020-07-13 PROCEDURE — 2580000003 HC RX 258: Performed by: NURSE PRACTITIONER

## 2020-07-13 PROCEDURE — 86141 C-REACTIVE PROTEIN HS: CPT

## 2020-07-13 PROCEDURE — 6370000000 HC RX 637 (ALT 250 FOR IP): Performed by: NURSE PRACTITIONER

## 2020-07-13 PROCEDURE — 6360000002 HC RX W HCPCS: Performed by: INTERNAL MEDICINE

## 2020-07-13 PROCEDURE — 85027 COMPLETE CBC AUTOMATED: CPT

## 2020-07-13 PROCEDURE — 6360000002 HC RX W HCPCS: Performed by: NURSE PRACTITIONER

## 2020-07-13 PROCEDURE — 82565 ASSAY OF CREATININE: CPT

## 2020-07-13 PROCEDURE — 2580000003 HC RX 258: Performed by: INTERNAL MEDICINE

## 2020-07-13 RX ORDER — LINEZOLID 600 MG/1
600 TABLET, FILM COATED ORAL 2 TIMES DAILY
Qty: 14 TABLET | Refills: 0 | Status: SHIPPED | OUTPATIENT
Start: 2020-07-13 | End: 2020-07-20

## 2020-07-13 RX ORDER — GINSENG 100 MG
CAPSULE ORAL
Qty: 1 TUBE | Refills: 0 | Status: SHIPPED | OUTPATIENT
Start: 2020-07-13 | End: 2020-07-23

## 2020-07-13 RX ADMIN — FOLIC ACID 1 MG: 1 TABLET ORAL at 10:09

## 2020-07-13 RX ADMIN — LACOSAMIDE 200 MG: 50 TABLET, FILM COATED ORAL at 10:08

## 2020-07-13 RX ADMIN — CLONAZEPAM 0.25 MG: 0.5 TABLET ORAL at 10:10

## 2020-07-13 RX ADMIN — LACTULOSE 20 G: 10 SOLUTION ORAL at 10:08

## 2020-07-13 RX ADMIN — LORAZEPAM 1 MG: 0.5 TABLET ORAL at 10:09

## 2020-07-13 RX ADMIN — PRIMIDONE 50 MG: 50 TABLET ORAL at 10:12

## 2020-07-13 RX ADMIN — Medication 1 CAPSULE: at 10:11

## 2020-07-13 RX ADMIN — VANCOMYCIN HYDROCHLORIDE 1250 MG: 5 INJECTION, POWDER, LYOPHILIZED, FOR SOLUTION INTRAVENOUS at 08:15

## 2020-07-13 RX ADMIN — TROSPIUM CHLORIDE 20 MG: 20 TABLET, FILM COATED ORAL at 10:12

## 2020-07-13 RX ADMIN — RIFAXIMIN 550 MG: 550 TABLET ORAL at 10:09

## 2020-07-13 RX ADMIN — HYDROCHLOROTHIAZIDE 12.5 MG: 12.5 TABLET ORAL at 10:11

## 2020-07-13 RX ADMIN — PAROXETINE HYDROCHLORIDE 20 MG: 20 TABLET, FILM COATED ORAL at 10:12

## 2020-07-13 RX ADMIN — TAMSULOSIN HYDROCHLORIDE 0.4 MG: 0.4 CAPSULE ORAL at 10:11

## 2020-07-13 RX ADMIN — ENOXAPARIN SODIUM 40 MG: 40 INJECTION SUBCUTANEOUS at 10:09

## 2020-07-13 RX ADMIN — ASPIRIN 81 MG: 81 TABLET, COATED ORAL at 10:10

## 2020-07-13 RX ADMIN — Medication 1 PACKET: at 10:11

## 2020-07-13 RX ADMIN — LACTULOSE 20 G: 10 SOLUTION ORAL at 15:34

## 2020-07-13 RX ADMIN — LEVOTHYROXINE SODIUM 125 MCG: 125 TABLET ORAL at 06:16

## 2020-07-13 RX ADMIN — AMLODIPINE BESYLATE 5 MG: 5 TABLET ORAL at 10:11

## 2020-07-13 RX ADMIN — Medication 1000 UNITS: at 10:11

## 2020-07-13 RX ADMIN — PANTOPRAZOLE SODIUM 40 MG: 40 TABLET, DELAYED RELEASE ORAL at 06:16

## 2020-07-13 RX ADMIN — ZONISAMIDE 100 MG: 100 CAPSULE ORAL at 10:12

## 2020-07-13 RX ADMIN — SODIUM CHLORIDE, PRESERVATIVE FREE 10 ML: 5 INJECTION INTRAVENOUS at 10:13

## 2020-07-13 ASSESSMENT — PAIN DESCRIPTION - PROGRESSION: CLINICAL_PROGRESSION: NOT CHANGED

## 2020-07-13 NOTE — PROGRESS NOTES
[address] [facility]  consulted by Dr. Beau Pineda for monitoring and adjustment. Indication for treatment: Right perineal cellulitis  Goal trough: 15 mcg/mL     Pertinent Laboratory Values:   Temp Readings from Last 3 Encounters:   07/13/20 97.9 °F (36.6 °C) (Oral)   06/28/20 98 °F (36.7 °C) (Oral)   06/17/20 98.7 °F (37.1 °C) (Temporal)     Recent Labs     07/11/20  0402 07/12/20  0142 07/13/20  0443   WBC 6.4 6.8 7.7     Recent Labs     07/11/20  0402 07/12/20  0142 07/13/20  0443   BUN 14 12 12   CREATININE 0.5* 0.6* 0.6*     Estimated Creatinine Clearance: 141 mL/min (A) (based on SCr of 0.6 mg/dL (L)). Intake/Output Summary (Last 24 hours) at 7/13/2020 1507  Last data filed at 7/13/2020 1122  Gross per 24 hour   Intake 240 ml   Output --   Net 240 ml     Pertinent Cultures:  Date    Source    Results  7/8   Buttocks   MRSA    Vancomycin level:   TROUGH:    No results for input(s): VANCOTROUGH in the last 72 hours. RANDOM:  No results for input(s): VANCORANDOM in the last 72 hours. Assessment:  · WBC and temperature: WBC WNL/afebrile  · SCr, BUN, and urine output: Stable; crcl > 100  · Day(s) of therapy: 8  · Vancomycin level: 12.6 (~12 hours) [1DZOE ago]     Plan:  · CONTINUE SAME DOSE AND FREQUENCY =  · vancomycin 1250mg q12h  · Renal labs are stable. · Therapeutic trough @ 12.6   · Possible discharge soon today; [outpatient Rx occurring now]. · Pharmacy will continue to monitor patient and adjust therapy as indicated    -No other Vanco levels- at this moment. Thank you for the consult.   Jony Monday [facility]  7/13/2020 3:07 PM

## 2020-07-13 NOTE — PROGRESS NOTES
Comprehensive Nutrition Assessment    Type and Reason for Visit:  Initial(length of stay)    Nutrition Recommendations/Plan:   Continue current minced and moist diet   Assist with meals as needed  Offer high protein oral nutrition supplement as needed/accepted by patient    Nutrition Assessment:  Admit from group home with wound, hx stage 2 pressure ulcer hip. Currently on minced and moist diet with meal intake %. Drinks premiers protein drink at home, willing to try high protein ensure if remains inpatient. Patient reports plan for discharge today. Moderate nutrition risk with increased needs for healing. Malnutrition Assessment:  Malnutrition Status: At risk for malnutrition (Comment)(recent decreased intake)    Context:  Chronic Illness       Estimated Daily Nutrient Needs:  Energy (kcal):  6873-4061; Weight Used for Energy Requirements:  Current     Protein (g):  93-97 (1.2-1.3 g/kg); Weight Used for Protein Requirements:  Ideal        Fluid (ml/day):  5292-1428 (1ml/dionisio); Weight Used for Fluid Requirements:  Current      Nutrition Related Findings:  Up in bed eating some breakfast, receiving culturelle, lactulose, psyllium, hx CP      Wounds:  Stage II, Pressure Ulcer       Current Nutrition Therapies:    DIET DYSPHAGIA MINCED AND MOIST; Anthropometric Measures:  · Height: 5' 10\" (177.8 cm)  · Current Body Weight: 194 lb 0.1 oz (88 kg)   · Admission Body Weight: 198 lb 3.1 oz (89.9 kg)    · Usual Body Weight: 197 lb (89.4 kg)     · Ideal Body Weight: 166 lbs; 116.9 lbs   · BMI: 27.8  · Adjusted Body Weight:  ; No Adjustment   · BMI Categories: Overweight (BMI 25.0-29. 9)       Nutrition Diagnosis:   · Increased nutrient needs related to other (comment)(healing wound) as evidenced by wounds     Nutrition Interventions:   Food and/or Nutrient Delivery:  Continue Current Diet, Start Oral Nutrition Supplement  Nutrition Education/Counseling:  Education not indicated   Coordination of Nutrition Care: Continued Inpatient Monitoring    Goals:  Patient will consume at least 75% at meals during stay       Nutrition Monitoring and Evaluation:   Food/Nutrient Intake Outcomes:  Diet Advancement/Tolerance, Food and Nutrient Intake  Physical Signs/Symptoms Outcomes:  Biochemical Data, Chewing or Swallowing, Skin, Weight     Discharge Planning:    Continue current diet     Electronically signed by Stormy Whiteside RD, LD on 7/13/20 at 10:00 AM EDT    Contact: [phone]

## 2020-07-13 NOTE — DISCHARGE SUMMARY
Discharge Summary    Name:  Uzma Amato /Age/Sex: 1955  (62 y.o. male)   MRN & CSN:  [id number] & [id number] Admission Date/Time: 2020 11:32 AM   Attending:  Gabriella Baires MD Discharging Physician: Melisa Virk MD     Hospital Course:   Uzma Amato is a 62 y.o.  male  who presents with Cellulitis, perineum     Patient was seen and examined\  Denied any new complaint this morning  No dizziness, palpitations  No fever, chills, N/V/D    Assessment and Plan  1) MRSA Cellulitis involving Rt gluteal with stage II pressure ulcer   -CT A/P: Rt perineal cellulitis and phlegmonous changes without a discrete abscess  -CRP trending down significantly  -Wound Cx growing MRSA  -General Surgery on board; no debridement needed  -Wound care on board  -Continue Zyvox PO        Chronic Problems     Cerebral palsy  -spoke with sister, his guardian     Grand mal seizure disorder - c/w Depakote and Vimpat and Zonegram     Thyroid disease  -Synthroid     Hypertension/HLD  -Norvasc and HCTZ  -Simvastatin     GERD   -Protonix     Hyperammonemia  -Continue lactulose and Rifaximin      - on Flomax and Sanctura     COVID 19 Screen ordered    The patient expressed appropriate understanding of and agreement with the discharge recommendations, medications, and plan.      Consults this admission:  IP CONSULT TO HOSPITALIST  PHARMACY TO DOSE VANCOMYCIN  IP CONSULT TO GENERAL SURGERY  IP CONSULT TO [address] [place] [address] NEEDS    Discharge Instruction:   Follow up appointments: Wound care in 2 weeks  Primary care physician:  within 2 weeks    Diet:  cardiac diet   Activity: activity as tolerated  Disposition: Discharged to:   []Home, [x]HHC, []SNF, []Acute Rehab, []Hospice   Condition on discharge: Stable    Discharge Medications:      Blue, [address] [facility]  Medication Instructions [facility]:[id number]    Printed on:20 1142   Medication Information                      acetaminophen (TYLENOL) 325 MG tablet  Take 650 mg by mouth every 4 hours as needed for Pain             amLODIPine (NORVASC) 5 MG tablet  Take 5 mg by mouth daily             aspirin 81 MG tablet  Take 81 mg by mouth daily             Cholecalciferol (VITAMIN D3) 1000 UNITS CAPS  Take 1 tablet by mouth daily. clonazePAM (KLONOPIN) 0.5 MG tablet  Take 0.25 mg by mouth daily. clorazepate (TRANXENE) 7.5 MG tablet  Take 7.5 mg by mouth 2 times daily. dicyclomine (BENTYL) 10 MG capsule  Take 1 capsule by mouth every 6 hours as needed (cramps)             divalproex (DEPAKOTE) 500 MG DR tablet  Take 1,000 mg by mouth nightly             folic acid (FOLVITE) 1 MG tablet  Take 1 mg by mouth daily             guaiFENesin (MUCINEX) 600 MG extended release tablet  Take 1,200 mg by mouth daily as needed for Congestion             hydrochlorothiazide (MICROZIDE) 12.5 MG capsule  Take 12.5 mg by mouth daily             ibuprofen (ADVIL;MOTRIN) 600 MG tablet  Take 600 mg by mouth every 6 hours as needed for Pain             lacosamide (VIMPAT) 200 MG tablet  Take 1 tablet by mouth 2 times daily for 30 days. lacosamide (VIMPAT) 200 MG tablet  Take 200 mg by mouth as needed.  Give 1 tab after seizure activity as needed             Lactobacillus (ACIDOPHILUS) CAPS capsule  Take 1 capsule by mouth daily             Lactulose Encephalopathy (ENULOSE PO)  Take 30 mLs by mouth 3 times daily             latanoprost (XALATAN) 0.005 % ophthalmic solution  Place 1 drop into both eyes nightly             levothyroxine (SYNTHROID) 125 MCG tablet  Take 1 tablet by mouth Daily             linezolid (ZYVOX) 600 MG tablet  Take 1 tablet by mouth 2 times daily for 7 days             Menthol-Methyl Salicylate (MUSCLE RUB) 10-15 % CREA cream  Apply 1 applicator topically as needed             mirabegron (MYRBETRIQ) 50 MG TB24  Take 50 mg by mouth daily             omeprazole (PRILOSEC) 20 MG delayed release capsule  Take 20 mg by mouth 2 times daily (before meals)             PARoxetine (PAXIL) 20 MG tablet  Take 20 mg by mouth every morning             primidone (MYSOLINE) 50 MG tablet  Take 50 mg by mouth 3 times daily             pseudoephedrine (SUDAFED) 30 MG tablet  Take 30 mg by mouth every 4 hours as needed for Congestion             QUEtiapine (SEROQUEL XR) 50 MG extended release tablet  Take 50 mg by mouth nightly             Simethicone (MI-ACID GAS RELIEF PO)  Take 1 Container by mouth every 4 hours as needed 10 cc every 4 hrs not to exceed 60 cc in 24 hrs             simvastatin (ZOCOR) 20 MG tablet  Take 20 mg by mouth nightly. solifenacin (VESICARE) 10 MG tablet  Take 10 mg by mouth daily             tamsulosin (FLOMAX) 0.4 MG capsule  Take 1 capsule by mouth daily             timolol (TIMOPTIC) 0.5 % ophthalmic solution  Place 1 drop into both eyes daily              Wheat Dextrin (BENEFIBER) POWD  Take 4 g by mouth 2 times daily Takes 3 tsp in liquid twice per day              zonisamide (ZONEGRAN) 100 MG capsule  Take 100 mg by mouth every morning                 Objective Findings at Discharge:   /76   Pulse 68   Temp 97.9 °F (36.6 °C) (Oral)   Resp 19   Ht 5' 10\" (1.778 m)   Wt 193 lb 14.4 oz (88 kg)   SpO2 94%   BMI 27.82 kg/m²            PHYSICAL EXAM   GEN Awake male, sitting upright in bed in no apparent distress. Appears given age. EYES Pupils are equally round. No scleral erythema, discharge, or conjunctivitis. HENT Mucous membranes are moist. Oral pharynx without exudates, no evidence of thrush. NECK Supple, no apparent thyromegaly or masses. RESP Clear to auscultation, no wheezes, rales or rhonchi. Symmetric chest movement while on room air. CARDIO/VASC S1/S2 auscultated. Regular rate without appreciable murmurs, rubs, or gallops. No JVD or carotid bruits. Peripheral pulses equal bilaterally and palpable. No peripheral edema. GI Abdomen is soft without significant tenderness, masses, or guarding.  Bowel sounds are normoactive. Rectal exam deferred.  No costovertebral angle tenderness. Normal appearing external genitalia. Abdullahi catheter is not present. HEME/LYMPH No palpable cervical lymphadenopathy and no hepatosplenomegaly. No petechiae or ecchymoses. MSK No gross joint deformities. SKIN Normal coloration, warm, dry. NEURO Cranial nerves appear grossly intact, normal speech, no lateralizing weakness. PSYCH Awake, alert, oriented x 4. Affect appropriate.     BMP/CBC  Recent Labs     07/11/20  0402 07/12/20  0142 07/13/20  0443    138 138   K 3.8 4.1 3.9    99 97*   CO2 29 31 31   BUN 14 12 12   CREATININE 0.5* 0.6* 0.6*   WBC 6.4 6.8 7.7   HCT 41.2* 43.8 42.8    318 299       IMAGING:  As above    Discharge Time of 35 minutes    Electronically signed by Cisco Buerger, MD on 7/13/2020 at 11:42 AM

## 2020-07-13 NOTE — FLOWSHEET NOTE
Pt states that he is going through many life changing physical issues and would like to be closer to his family. He has a family working here at [facility]. Andrews Harkins he would like to see. Will pass information over to nurse.  will follow up with spiritual support.

## 2020-07-13 NOTE — CARE COORDINATION
Spoke with Dr Dano Madera [facility] for nursing, PT and OT. [place]VM for [id number] [facility] supervisor [phone] and Spoke with Mina Calloway at the [facility] [phone]. Also updated pt's Yaritza Piña , she is agreeable with plan. PS to Ramya DUGAN [facility]. Pt set up to leave at 1600, HC, nursing, [address] [facility] and Group home sup. Aware and agreeable with plan.

## 2020-07-13 NOTE — PROGRESS NOTES
[facility] Liaison spoke with pt and is aware of discharge & will initiate Earl Ying.

## 2020-07-13 NOTE — PROGRESS NOTES
CLINICAL PHARMACY NOTE: MEDS TO [address] Select Patient?: No  Total # of Prescriptions Filled: 1   The following medications were delivered to the patient:  · Linezolid 600mg  Total # of Interventions Completed: 0  Time Spent (min): 15    Additional Documentation:  Bacitracin ointment is not covered because it's otc. He may purchase at another pharmacy.

## 2020-07-15 ENCOUNTER — HOSPITAL ENCOUNTER (OUTPATIENT)
Dept: WOUND CARE | Age: 65
Discharge: HOME | End: 2020-07-15
Payer: MEDICARE

## 2020-07-15 VITALS
TEMPERATURE: 98.7 F | RESPIRATION RATE: 18 BRPM | HEART RATE: 55 BPM | SYSTOLIC BLOOD PRESSURE: 133 MMHG | DIASTOLIC BLOOD PRESSURE: 71 MMHG

## 2020-07-15 PROBLEM — L89.312 PRESSURE INJURY OF RIGHT BUTTOCK, STAGE 2 (HCC): Status: ACTIVE | Noted: 2020-07-15

## 2020-07-15 PROCEDURE — 11042 DBRDMT SUBQ TIS 1ST 20SQCM/<: CPT

## 2020-07-15 NOTE — PROGRESS NOTES
Wound Care Center Progress Note With Procedure    Angela Leal  AGE: 62 y.o. GENDER: male  : 1955  EPISODE DATE:  7/15/2020     Subjective:     Chief Complaint   Patient presents with    Wound Check     buttocks/hip         HISTORY of PRESENT ILLNESS      Aston Mariee is a 62 y.o. male who presents today for wound evaluation of Acute pressure and and prior infection ulcer(s) of the right buttock area. The ulcer is of mild severity. The underlying cause of the wound is pressure and resolving infection/cellulitis. Improved since I saw him in the hospital this weekend.   Wound Pain Timing/Severity: none  Quality of pain: N/A  Severity of pain:  0 / 10   Modifying Factors: chronic pressure and shear force  Associated Signs/Symptoms: drainage        PAST MEDICAL HISTORY        Diagnosis Date    Anemia     Aspiration pneumonia (HCC)     dx 2014 with this per ecf/ per old chart dx with pneumonia with admission 2018    Cerebral palsy ([address]     \"has no feeling on left side of body\"alert but has some dementia but not diagnosed, has good long term but poor short term and wakes up disoriented after a nap\"(per yaneth)()    Depression     Epilepsy ([address] 196    last seizure 2018- hx grand mal    Frequent falls     with phone assess- family stated pt fell this am (7/10/2013\"in the process of trying to find a facility to help build him back up- (pt now at [facility])    Glaucoma     \"has been in treatment for this in the past- no treatment needed at present\"    History of IBS     Hx MRSA infection     + nasal culture 2014    Hyperammonemia (HCC)     Hypertension     on Lisinopril    IBS (irritable bowel syndrome)     ulcerative colitis    Kidney stone     for surgery for stent placement 2013    Mood disorder ([address]     per staff at [address] Occasional tremors     \"makes it difficult for him to write\"    Pressure injury of contiguous region involving back and right buttock, stage 2 ([facility] 5/27/2020    Prostatitis     hx given per H&P old chart    Thyroid disease     Ulcerative colitis ([facility]     hx given per staff at [facility] 4/13/2015       PAST SURGICAL HISTORY    Past Surgical History:   Procedure Laterality Date    CHOLECYSTECTOMY      COLONOSCOPY  8/19/14, 5/2012 8/19/14: hemorrhoids, 5/2012 at [address] Left 07/11/2013    with stnet placement   [address]  [address]    KIDNEY STONE SURGERY      OTHER SURGICAL HISTORY  04/15/2015    Revision of vagal nerve stimulator    UPPER GASTROINTESTINAL ENDOSCOPY N/A 11/13/2018    EGD DILATION BALLOON AND BIOPSY performed by Kay Quispe MD at [place] [address]    Has to have bettery changed every 5 yrs. FAMILY HISTORY    Family History   Problem Relation Age of Onset    Heart Disease Mother         atrial fib    High Blood Pressure Mother     Asthma Mother     High Cholesterol Mother     Depression Mother    Argelia Barrera Migraines Mother     High Blood Pressure Father     Heart Disease Father     Asthma Sister     High Cholesterol Sister     Depression Sister     Migraines Sister        SOCIAL HISTORY    Social History     Tobacco Use    Smoking status: Never Smoker    Smokeless tobacco: Never Used   Substance Use Topics    Alcohol use: No    Drug use: No       ALLERGIES    No Known Allergies    MEDICATIONS    Current Outpatient Medications on File Prior to Encounter   Medication Sig Dispense Refill    linezolid (ZYVOX) 600 MG tablet Take 1 tablet by mouth 2 times daily for 7 days 14 tablet 0    bacitracin 500 UNIT/GM ointment Apply topically around the gluteal area 2 times daily.  1 Tube 0    solifenacin (VESICARE) 10 MG tablet Take 10 mg by mouth daily      levothyroxine (SYNTHROID) 125 MCG tablet Take 1 tablet by mouth Daily 30 tablet 3    amLODIPine (NORVASC) 5 MG tablet Take 5 mg by mouth daily      clonazePAM (KLONOPIN) 0.5 MG tablet Take 0.25 mg by mouth daily.  divalproex (DEPAKOTE) 500 MG DR tablet Take 1,000 mg by mouth nightly      hydrochlorothiazide (MICROZIDE) 12.5 MG capsule Take 12.5 mg by mouth daily      mirabegron (MYRBETRIQ) 50 MG TB24 Take 50 mg by mouth daily      omeprazole (PRILOSEC) 20 MG delayed release capsule Take 20 mg by mouth 2 times daily (before meals)      primidone (MYSOLINE) 50 MG tablet Take 50 mg by mouth 3 times daily      zonisamide (ZONEGRAN) 100 MG capsule Take 100 mg by mouth every morning      acetaminophen (TYLENOL) 325 MG tablet Take 650 mg by mouth every 4 hours as needed for Pain      Simethicone (MI-ACID GAS RELIEF PO) Take 1 Container by mouth every 4 hours as needed 10 cc every 4 hrs not to exceed 60 cc in 24 hrs      guaiFENesin (MUCINEX) 600 MG extended release tablet Take 1,200 mg by mouth daily as needed for Congestion      Menthol-Methyl Salicylate (MUSCLE RUB) 10-15 % CREA cream Apply 1 applicator topically as needed      pseudoephedrine (SUDAFED) 30 MG tablet Take 30 mg by mouth every 4 hours as needed for Congestion      lacosamide (VIMPAT) 200 MG tablet Take 200 mg by mouth as needed.  Give 1 tab after seizure activity as needed      latanoprost (XALATAN) 0.005 % ophthalmic solution Place 1 drop into both eyes nightly      timolol (TIMOPTIC) 0.5 % ophthalmic solution Place 1 drop into both eyes daily       Lactobacillus (ACIDOPHILUS) CAPS capsule Take 1 capsule by mouth daily      aspirin 81 MG tablet Take 81 mg by mouth daily      Wheat Dextrin (BENEFIBER) POWD Take 4 g by mouth 2 times daily Takes 3 tsp in liquid twice per day       Lactulose Encephalopathy (ENULOSE PO) Take 30 mLs by mouth 3 times daily      folic acid (FOLVITE) 1 MG tablet Take 1 mg by mouth daily      PARoxetine (PAXIL) 20 MG tablet Take 20 mg by mouth every morning      QUEtiapine (SEROQUEL XR) 50 MG extended release tablet Take 50 mg by mouth nightly      tamsulosin (FLOMAX) 0.4 MG capsule Take 1 capsule by mouth daily 30 capsule 0    simvastatin (ZOCOR) 20 MG tablet Take 20 mg by mouth nightly.  Cholecalciferol (VITAMIN D3) 1000 UNITS CAPS Take 1 tablet by mouth daily.  clorazepate (TRANXENE) 7.5 MG tablet Take 7.5 mg by mouth 2 times daily.  dicyclomine (BENTYL) 10 MG capsule Take 1 capsule by mouth every 6 hours as needed (cramps) 20 capsule 0    ibuprofen (ADVIL;MOTRIN) 600 MG tablet Take 600 mg by mouth every 6 hours as needed for Pain      lacosamide (VIMPAT) 200 MG tablet Take 1 tablet by mouth 2 times daily for 30 days. 60 tablet 0     No current facility-administered medications on file prior to encounter. REVIEW OF SYSTEMS        Constitutional: Negative for systemic symptoms including fever, chills and malaise. Objective:      /71   Pulse 55   Temp 98.7 °F (37.1 °C) (Temporal)   Resp 18     PHYSICAL EXAM      General: The patient is in no acute distress. Mental status:  Patient is appropriate, is  oriented to place and plan of care. Dermatologic exam: Visual inspection of the periwound reveals the skin to be normal in turgor and texture  Wound exam: see wound description below in procedure note      Assessment:     Problem List Items Addressed This Visit     Pressure injury of right buttock, stage 2 (HCC)        Procedure Note    Indications:  Based on my examination of this patient's wound(s) today, sharp excision into necrotic subcutaneous tissue is required to promote healing and evaluate the extent of previous healing. Performed by: Lauryn Santiago MD    Consent obtained: Yes    Time out taken:  Yes    Pain Control:       Debridement:Excisional Debridement    Using #15 blade scalpel the wound(s) was/were sharply debrided down through and including the removal of subcutaneous tissue.         Devitalized Tissue Debrided:  slough and exudate    Pre Debridement Measurements:  Are located in the Wound Documentation Flow Sheet    All active wounds listed below with today's date are evaluated  Wound(s)    debrided this date include # : 2     Post  Debridement Measurements:  Wound 07/15/20 Right #2 right ischium (Active)   Wound Image   07/15/20 0830   Dressing Status Clean;Dry; Intact 07/15/20 0913   Dressing Changed Changed/New 07/15/20 0913   Wound Length (cm) 0.5 cm 07/15/20 0830   Wound Width (cm) 0.5 cm 07/15/20 0830   Wound Depth (cm) 0.1 cm 07/15/20 0830   Wound Surface Area (cm^2) 0.25 cm^2 07/15/20 0830   Wound Volume (cm^3) 0.02 cm^3 07/15/20 0830   Post-Procedure Length (cm) 0.5 cm 07/15/20 0845   Post-Procedure Width (cm) 0.5 cm 07/15/20 0845   Post-Procedure Depth (cm) 0.1 cm 07/15/20 0845   Post-Procedure Surface Area (cm^2) 0.25 cm^2 07/15/20 0845   Post-Procedure Volume (cm^3) 0.02 cm^3 07/15/20 0845   Distance Tunneling (cm) 0 cm 07/15/20 0830   Tunneling Position ___ O'Clock 0 07/15/20 0830   Undermining Starts ___ O'Clock 0 07/15/20 0830   Undermining Ends___ O'Clock 0 07/15/20 0830   Undermining Maxium Distance (cm) 0 07/15/20 0830   Wound Assessment Yellow 07/15/20 0830   Drainage Amount Moderate 07/15/20 0830   Drainage Description Serosanguinous 07/15/20 0830   Odor None 07/15/20 0830   Margins Defined edges 07/15/20 0830   Melanie-wound Assessment Pink 07/15/20 0830   Non-staged Wound Description Full thickness 07/15/20 0830   Pink%Wound Bed 0 07/15/20 0830   Red%Wound Bed 0 07/15/20 0830   Yellow%Wound Bed 100 07/15/20 0830   Black%Wound Bed 0 07/15/20 0830   Purple%Wound Bed 0 07/15/20 0830   Other%Wound Bed 0 07/15/20 0830   Number of days: 0       Percent of Wound(s) Debrided: approximately 100%    Total  Area  Debrided:  0.5 sq cm     Bleeding:  Minimal    Hemostasis Achieved:  by pressure    Procedural Pain:  0  / 10     Post Procedural Pain:  0 / 10     Response to treatment:  Well tolerated by patient. Status of wound progress and description from last visit:   Improving.       Plan:       Discharge Instructions       PHYSICIAN ORDERS AND DISCHARGE INSTRUCTIONS     NOTE: Upon discharge from the [address], you will receive a patient experience survey. We would be grateful if you would take the time to fill this survey out.     Wound care order history:                 Cultures:    Obtained on 5/27/2020              Labs/ HbA1c:   Date               Grafts:  Date               HBO:                Antibiotics: Cephloxin and Bactrim DS prior to appt on 5/27/2020. Start using bactroban cream e-scribed to pharmacy 6/3/20               Earlier Wound care treatments:                Authorizations:                        Consults:   Date                           Primary care physician:      Continuing wound care orders and information:              Residence: [facility]               Continue [facility] health care with: [facility]               [facility] wound-care supplies will be provided by:   Jessica Anton loading:  Date               CHS Medications:              PMBXQ cleansing:                           NM not scrub or use excessive force.                          Wash hands with soap and water before and after dressing changes.                           Prior to applying a clean dressing, cleanse wound with normal saline,                                wound cleanser, or mild soap and water.                           Ask the physician or nurse before getting the wound(s) wet in a shower              Daily Wound management:                          Keep weight off wounds and reposition every 2 hours.                          QCQEG standing for long periods of time.                          CUWWY wraps/stockings in AM and remove at bedtime.                          If swelling is present, elevate legs to the level of the heart or above for 30                              minutes 4-5 times a day and/or when sitting.                                             When taking antibiotics take entire prescription as ordered by physician

## 2020-07-15 NOTE — PLAN OF CARE
Problem: Pressure Ulcer:  Goal: Signs of wound healing will improve  Description: Signs of wound healing will improve  7/15/2020 0915 by Martha Monzon LPN  Outcome: Ongoing  7/15/2020 0915 by Martha Monzon LPN  Outcome: Ongoing  Goal: Absence of new pressure ulcer  Description: Absence of new pressure ulcer  7/15/2020 0915 by Martha Monzon LPN  Outcome: Ongoing  7/15/2020 0915 by Martha Monzon LPN  Outcome: Ongoing  Goal: Will show no infection signs and symptoms  Description: Will show no infection signs and symptoms  7/15/2020 0915 by Martha Monzon LPN  Outcome: Ongoing  7/15/2020 0915 by Martha Monzon LPN  Outcome: Ongoing

## 2020-07-22 ENCOUNTER — HOSPITAL ENCOUNTER (OUTPATIENT)
Dept: WOUND CARE | Age: 65
Discharge: HOME | End: 2020-07-22
Payer: MEDICARE

## 2020-07-22 VITALS
DIASTOLIC BLOOD PRESSURE: 84 MMHG | SYSTOLIC BLOOD PRESSURE: 130 MMHG | HEART RATE: 66 BPM | RESPIRATION RATE: 16 BRPM | TEMPERATURE: 97.2 F

## 2020-07-22 PROBLEM — L89.321 PRESSURE INJURY OF LEFT BUTTOCK, STAGE 1: Status: ACTIVE | Noted: 2020-07-22

## 2020-07-22 PROCEDURE — 87070 CULTURE OTHR SPECIMN AEROBIC: CPT

## 2020-07-22 PROCEDURE — 11042 DBRDMT SUBQ TIS 1ST 20SQCM/<: CPT

## 2020-07-22 PROCEDURE — 87186 SC STD MICRODIL/AGAR DIL: CPT

## 2020-07-22 PROCEDURE — 87077 CULTURE AEROBIC IDENTIFY: CPT

## 2020-07-22 PROCEDURE — 87075 CULTR BACTERIA EXCEPT BLOOD: CPT

## 2020-07-22 ASSESSMENT — PAIN DESCRIPTION - ORIENTATION: ORIENTATION: RIGHT;LEFT

## 2020-07-22 ASSESSMENT — PAIN SCALES - GENERAL: PAINLEVEL_OUTOF10: 2

## 2020-07-22 ASSESSMENT — PAIN DESCRIPTION - PAIN TYPE: TYPE: ACUTE PAIN

## 2020-07-22 ASSESSMENT — PAIN DESCRIPTION - DESCRIPTORS: DESCRIPTORS: ACHING

## 2020-07-22 NOTE — PROGRESS NOTES
Pressure injury of contiguous region involving back and right buttock, stage 2 ([facility] 5/27/2020    Pressure injury of left buttock, stage 1 7/22/2020    Prostatitis     hx given per H&P old chart    Thyroid disease     Ulcerative colitis ([facility]     hx given per staff at [facility] 4/13/2015       PAST SURGICAL HISTORY    Past Surgical History:   Procedure Laterality Date    CHOLECYSTECTOMY      COLONOSCOPY  8/19/14, 5/2012 8/19/14: hemorrhoids, 5/2012 at [address] Left 07/11/2013    with stnet placement   [address]  [address]   [place] [address]      OTHER SURGICAL HISTORY  04/15/2015    Revision of vagal nerve stimulator    UPPER GASTROINTESTINAL ENDOSCOPY N/A 11/13/2018    EGD DILATION BALLOON AND BIOPSY performed by Homer Terrazas MD at [place] [address]    Has to have bettery changed every 5 yrs. FAMILY HISTORY    Family History   Problem Relation Age of Onset    Heart Disease Mother         atrial fib    High Blood Pressure Mother     Asthma Mother     High Cholesterol Mother     Depression Mother    [facility] Migraines Mother     High Blood Pressure Father     Heart Disease Father     Asthma Sister     High Cholesterol Sister     Depression Sister     Migraines Sister        SOCIAL HISTORY    Social History     Tobacco Use    Smoking status: Never Smoker    Smokeless tobacco: Never Used   Substance Use Topics    Alcohol use: No    Drug use: No       ALLERGIES    No Known Allergies    MEDICATIONS    Current Outpatient Medications on File Prior to Encounter   Medication Sig Dispense Refill    solifenacin (VESICARE) 10 MG tablet Take 10 mg by mouth daily      levothyroxine (SYNTHROID) 125 MCG tablet Take 1 tablet by mouth Daily 30 tablet 3    amLODIPine (NORVASC) 5 MG tablet Take 5 mg by mouth daily      clonazePAM (KLONOPIN) 0.5 MG tablet Take 0.25 mg by mouth daily.       divalproex (DEPAKOTE) 500 MG DR tablet Take 1,000 mg by mouth nightly      hydrochlorothiazide (MICROZIDE) 12.5 MG capsule Take 12.5 mg by mouth daily      mirabegron (MYRBETRIQ) 50 MG TB24 Take 50 mg by mouth daily      omeprazole (PRILOSEC) 20 MG delayed release capsule Take 20 mg by mouth 2 times daily (before meals)      primidone (MYSOLINE) 50 MG tablet Take 50 mg by mouth 3 times daily      zonisamide (ZONEGRAN) 100 MG capsule Take 100 mg by mouth every morning      Simethicone (MI-ACID GAS RELIEF PO) Take 1 Container by mouth every 4 hours as needed 10 cc every 4 hrs not to exceed 60 cc in 24 hrs      latanoprost (XALATAN) 0.005 % ophthalmic solution Place 1 drop into both eyes nightly      timolol (TIMOPTIC) 0.5 % ophthalmic solution Place 1 drop into both eyes daily       Lactobacillus (ACIDOPHILUS) CAPS capsule Take 1 capsule by mouth daily      Wheat Dextrin (BENEFIBER) POWD Take 4 g by mouth 2 times daily Takes 3 tsp in liquid twice per day       Lactulose Encephalopathy (ENULOSE PO) Take 30 mLs by mouth 3 times daily      folic acid (FOLVITE) 1 MG tablet Take 1 mg by mouth daily      PARoxetine (PAXIL) 20 MG tablet Take 20 mg by mouth every morning      QUEtiapine (SEROQUEL XR) 50 MG extended release tablet Take 50 mg by mouth nightly      simvastatin (ZOCOR) 20 MG tablet Take 20 mg by mouth nightly.  Cholecalciferol (VITAMIN D3) 1000 UNITS CAPS Take 1 tablet by mouth daily.  clorazepate (TRANXENE) 7.5 MG tablet Take 7.5 mg by mouth 2 times daily.  bacitracin 500 UNIT/GM ointment Apply topically around the gluteal area 2 times daily.  1 Tube 0    dicyclomine (BENTYL) 10 MG capsule Take 1 capsule by mouth every 6 hours as needed (cramps) 20 capsule 0    acetaminophen (TYLENOL) 325 MG tablet Take 650 mg by mouth every 4 hours as needed for Pain      ibuprofen (ADVIL;MOTRIN) 600 MG tablet Take 600 mg by mouth every 6 hours as needed for Pain      guaiFENesin (MUCINEX) 600 MG extended release tablet Take 1,200 mg by mouth daily as needed for Congestion      Menthol-Methyl Salicylate (MUSCLE RUB) 10-15 % CREA cream Apply 1 applicator topically as needed      pseudoephedrine (SUDAFED) 30 MG tablet Take 30 mg by mouth every 4 hours as needed for Congestion      lacosamide (VIMPAT) 200 MG tablet Take 200 mg by mouth as needed. Give 1 tab after seizure activity as needed      lacosamide (VIMPAT) 200 MG tablet Take 1 tablet by mouth 2 times daily for 30 days. 60 tablet 0    aspirin 81 MG tablet Take 81 mg by mouth daily      tamsulosin (FLOMAX) 0.4 MG capsule Take 1 capsule by mouth daily 30 capsule 0     No current facility-administered medications on file prior to encounter. REVIEW OF SYSTEMS    Pertinent items are noted in HPI. Constitutional: Negative for systemic symptoms including fever, chills and malaise. Objective:      /84   Pulse 66   Temp 97.2 °F (36.2 °C)   Resp 16     PHYSICAL EXAM      General: The patient is in no acute distress. Mental status:  Patient is appropriate, is  oriented to place and plan of care. Dermatologic exam: Visual inspection of the periwound reveals the skin to be normal in turgor and texture  Wound exam: see wound description below in procedure note      Assessment:     Problem List Items Addressed This Visit     Pressure injury of right buttock, stage 2 (HCC) - Primary    Relevant Orders    Culture, Wound    Pressure injury of left buttock, stage 1        Procedure Note    Indications:  Based on my examination of this patient's wound(s) today, sharp excision into necrotic subcutaneous tissue is required to promote healing and evaluate the extent of previous healing. Performed by: Reynaldo Rizzo MD    Consent obtained: Yes    Time out taken:  Yes    Pain Control:       Debridement:Excisional Debridement    Using scissors and forceps the wound(s) was/were sharply debrided down through and including the removal of subcutaneous tissue.         Devitalized Tissue Debrided:  slough and exudate    Pre Debridement Measurements:  Are located in the Wound Documentation Flow Sheet    All active wounds listed below with today's date are evaluated  Wound(s)    debrided this date include # : 2     Post  Debridement Measurements:  Wound 05/27/20 Buttocks Left #1 left buttock  (Active)   Wound Image   06/17/20 1113   Wound Pressure Stage  3 06/17/20 1113   Dressing Status Clean;Dry; Intact 07/22/20 1151   Dressing Changed Changed/New 07/22/20 1151   Wound Cleansed Rinsed/Irrigated with saline 07/22/20 0926   Wound Length (cm) 0.6 cm 07/22/20 0926   Wound Width (cm) 0.7 cm 07/22/20 0926   Wound Depth (cm) 0.1 cm 07/22/20 0926   Wound Surface Area (cm^2) 0.42 cm^2 07/22/20 0926   Change in Wound Size % (l*w) 85 07/22/20 0926   Wound Volume (cm^3) 0.04 cm^3 07/22/20 0926   Wound Healing % 96 07/22/20 0926   Post-Procedure Length (cm) 1.4 cm 05/27/20 0856   Post-Procedure Width (cm) 2 cm 05/27/20 0856   Post-Procedure Depth (cm) 0.4 cm 05/27/20 0856   Post-Procedure Surface Area (cm^2) 2.8 cm^2 05/27/20 0856   Post-Procedure Volume (cm^3) 1.12 cm^3 05/27/20 0856   Distance Tunneling (cm) 0 cm 07/22/20 0926   Tunneling Position ___ O'Clock 0 07/22/20 0926   Undermining Starts ___ O'Clock 0 07/22/20 0926   Undermining Ends___ O'Clock 0 07/22/20 0926   Undermining Maxium Distance (cm) 0 07/22/20 0926   Wound Assessment Pink 07/22/20 0926   Drainage Amount Moderate 07/22/20 0926   Drainage Description Yellow 07/22/20 0926   Odor None 07/22/20 0926   Margins Defined edges 07/22/20 0926   Melanie-wound Assessment Pink 07/22/20 0926   Non-staged Wound Description Full thickness 07/22/20 0926   Rigby%Wound Bed 100 07/22/20 0926   Red%Wound Bed 0 07/22/20 0926   Yellow%Wound Bed 0 07/22/20 0926   Black%Wound Bed 0 07/22/20 0926   Purple%Wound Bed 0 07/22/20 0926   Other%Wound Bed 0 06/17/20 1113   Culture Taken Yes 05/27/20 0856   Number of days: 56       Wound 07/15/20 Right #2 right ischium (Active)

## 2020-07-27 LAB
CULTURE: ABNORMAL
Lab: ABNORMAL
SPECIMEN: ABNORMAL

## 2020-07-29 ENCOUNTER — HOSPITAL ENCOUNTER (OUTPATIENT)
Dept: WOUND CARE | Age: 65
Discharge: HOME | End: 2020-07-29
Payer: MEDICARE

## 2020-07-29 VITALS
HEART RATE: 62 BPM | TEMPERATURE: 98.4 F | DIASTOLIC BLOOD PRESSURE: 86 MMHG | SYSTOLIC BLOOD PRESSURE: 145 MMHG | RESPIRATION RATE: 16 BRPM

## 2020-07-29 PROCEDURE — 99213 OFFICE O/P EST LOW 20 MIN: CPT

## 2020-07-29 NOTE — PLAN OF CARE
Problem: Pressure Ulcer:  Goal: Signs of wound healing will improve  Description: Signs of wound healing will improve  Outcome: Ongoing  Goal: Absence of new pressure ulcer  Description: Absence of new pressure ulcer  Outcome: Ongoing  Goal: Will show no infection signs and symptoms  Description: Will show no infection signs and symptoms  Outcome: Ongoing

## 2020-07-29 NOTE — PROGRESS NOTES
Wound Care Center Progress Note       Ryan Goldsmith  AGE: 62 y.o. GENDER: male  : 1955  TODAY'S DATE:  2020        Subjective:     Chief Complaint   Patient presents with    Wound Check     rt/lt buttock         HISTORY of PRESENT ILLNESS     Ryan Goldsmith is a 62 y.o. male who presents today for wound evaluation of Chronic pressure ulcer(s) of bilateral buttock areas. The left side has healed. The right side is almost healed. The ulcer is of mild severity. The underlying cause of the wound is pressure and prior infections.     Wound Pain Timing/Severity: none  Quality of pain: N/A  Severity of pain:  0 / 10   Modifying Factors: chronic pressure and shear force  Associated Signs/Symptoms: none        PAST MEDICAL HISTORY        Diagnosis Date    Anemia     Aspiration pneumonia ([address]     dx 2014 with this per ecf/ per old chart dx with pneumonia with admission 2018    Cerebral palsy ([address]     \"has no feeling on left side of body\"alert but has some dementia but not diagnosed, has good long term but poor short term and wakes up disoriented after a nap\"(per yaneth)()    Depression     Epilepsy ([address] 196    last seizure 2018- hx grand mal    Frequent falls     with phone assess- family stated pt fell this am (7/10/2013\"in the process of trying to find a facility to help build him back up- (pt [facility])    Glaucoma     \"has been in treatment for this in the past- no treatment needed at present\"    History of IBS     Hx MRSA infection     + nasal culture 2014    Hyperammonemia (HCC)     Hypertension     on Lisinopril    IBS (irritable bowel syndrome)     ulcerative colitis    Kidney stone     for surgery for stent placement 2013    Mood disorder ([address]     per staff at [facility] Occasional tremors     \"makes it difficult for him to write\"    Pressure injury of contiguous region involving back and right buttock, stage 2 ([address] 2020    Pressure injury of left buttock, stage 1 7/22/2020    Prostatitis     hx given per H&P old chart    Thyroid disease     Ulcerative [address] [facility]     hx given per staff at [facility] 4/13/2015       PAST SURGICAL HISTORY    Past Surgical History:   Procedure Laterality Date    CHOLECYSTECTOMY      COLONOSCOPY  8/19/14, 5/2012 8/19/14: hemorrhoids, 5/2012 at [address] Left 07/11/2013    with stnet placement   [address]  2005    KIDNEY STONE SURGERY      OTHER SURGICAL HISTORY  04/15/2015    Revision of vagal nerve stimulator    UPPER GASTROINTESTINAL ENDOSCOPY N/A 11/13/2018    EGD DILATION BALLOON AND BIOPSY performed by Michael Bass MD at [place] [address]    Has to have bettery changed every 5 yrs. FAMILY HISTORY    Family History   Problem Relation Age of Onset    Heart Disease Mother         atrial fib    High Blood Pressure Mother     Asthma Mother     High Cholesterol Mother     Depression Mother    [facility] Migraines Mother     High Blood Pressure Father     Heart Disease Father     Asthma Sister     High Cholesterol Sister     Depression Sister     Migraines Sister        SOCIAL HISTORY    Social History     Tobacco Use    Smoking status: Never Smoker    Smokeless tobacco: Never Used   Substance Use Topics    Alcohol use: No    Drug use: No       ALLERGIES    No Known Allergies    MEDICATIONS    Current Outpatient Medications on File Prior to Encounter   Medication Sig Dispense Refill    solifenacin (VESICARE) 10 MG tablet Take 10 mg by mouth daily      levothyroxine (SYNTHROID) 125 MCG tablet Take 1 tablet by mouth Daily 30 tablet 3    amLODIPine (NORVASC) 5 MG tablet Take 5 mg by mouth daily      clonazePAM (KLONOPIN) 0.5 MG tablet Take 0.25 mg by mouth daily.       divalproex (DEPAKOTE) 500 MG DR tablet Take 1,000 mg by mouth nightly      hydrochlorothiazide (MICROZIDE) 12.5 MG capsule Take 12.5 mg by mouth daily      mirabegron (MYRBETRIQ) 50 MG TB24 Take 50 mg by mouth daily      omeprazole (PRILOSEC) 20 MG delayed release capsule Take 20 mg by mouth 2 times daily (before meals)      primidone (MYSOLINE) 50 MG tablet Take 50 mg by mouth 3 times daily      zonisamide (ZONEGRAN) 100 MG capsule Take 100 mg by mouth every morning      Lactobacillus (ACIDOPHILUS) CAPS capsule Take 1 capsule by mouth daily      aspirin 81 MG tablet Take 81 mg by mouth daily      Wheat Dextrin (BENEFIBER) POWD Take 4 g by mouth 2 times daily Takes 3 tsp in liquid twice per day       Lactulose Encephalopathy (ENULOSE PO) Take 30 mLs by mouth 3 times daily      folic acid (FOLVITE) 1 MG tablet Take 1 mg by mouth daily      PARoxetine (PAXIL) 20 MG tablet Take 20 mg by mouth every morning      QUEtiapine (SEROQUEL XR) 50 MG extended release tablet Take 50 mg by mouth nightly      simvastatin (ZOCOR) 20 MG tablet Take 20 mg by mouth nightly.  Cholecalciferol (VITAMIN D3) 1000 UNITS CAPS Take 1 tablet by mouth daily.  clorazepate (TRANXENE) 7.5 MG tablet Take 7.5 mg by mouth 2 times daily.  dicyclomine (BENTYL) 10 MG capsule Take 1 capsule by mouth every 6 hours as needed (cramps) 20 capsule 0    acetaminophen (TYLENOL) 325 MG tablet Take 650 mg by mouth every 4 hours as needed for Pain      ibuprofen (ADVIL;MOTRIN) 600 MG tablet Take 600 mg by mouth every 6 hours as needed for Pain      Simethicone (MI-ACID GAS RELIEF PO) Take 1 Container by mouth every 4 hours as needed 10 cc every 4 hrs not to exceed 60 cc in 24 hrs      guaiFENesin (MUCINEX) 600 MG extended release tablet Take 1,200 mg by mouth daily as needed for Congestion      Menthol-Methyl Salicylate (MUSCLE RUB) 10-15 % CREA cream Apply 1 applicator topically as needed      pseudoephedrine (SUDAFED) 30 MG tablet Take 30 mg by mouth every 4 hours as needed for Congestion      lacosamide (VIMPAT) 200 MG tablet Take 200 mg by mouth as needed.  Give 1 tab after seizure activity as needed      lacosamide (VIMPAT) 200 MG tablet Take 1 tablet by mouth 2 times daily for 30 days. 60 tablet 0    latanoprost (XALATAN) 0.005 % ophthalmic solution Place 1 drop into both eyes nightly      timolol (TIMOPTIC) 0.5 % ophthalmic solution Place 1 drop into both eyes daily       tamsulosin (FLOMAX) 0.4 MG capsule Take 1 capsule by mouth daily 30 capsule 0     No current facility-administered medications on file prior to encounter. REVIEW OF SYSTEMS    Pertinent items are noted in HPI. Constitutional: Negative for systemic symptoms including fever, chills and malaise. Objective:      BP (!) 145/86   Pulse 62   Temp 98.4 °F (36.9 °C) (Temporal)   Resp 16     PHYSICAL EXAM      General: The patient is in no acute distress. Mental status:  Patient is appropriate, is  oriented to place and plan of care. Dermatologic exam: Visual inspection of the periwound reveals the skin to be normal in turgor and texture. Wound exam:  see wound description below     All active wounds listed below with today's date are evaluated      Wound 05/27/20 Buttocks Left #1 left buttock  (Active)   Wound Image   06/17/20 1113   Wound Pressure Stage  3 06/17/20 1113   Dressing Status Clean;Dry; Intact 07/22/20 1151   Dressing Changed Changed/New 07/22/20 1151   Wound Cleansed Vashe 07/29/20 1100   Wound Length (cm) 0.1 cm 07/29/20 1100   Wound Width (cm) 0.1 cm 07/29/20 1100   Wound Depth (cm) 0.1 cm 07/29/20 1100   Wound Surface Area (cm^2) 0.01 cm^2 07/29/20 1100   Change in Wound Size % (l*w) 99.64 07/29/20 1100   Wound Volume (cm^3) 0 cm^3 07/29/20 1100   Wound Healing % 100 07/29/20 1100   Post-Procedure Length (cm) 1.4 cm 05/27/20 0856   Post-Procedure Width (cm) 2 cm 05/27/20 0856   Post-Procedure Depth (cm) 0.4 cm 05/27/20 0856   Post-Procedure Surface Area (cm^2) 2.8 cm^2 05/27/20 0856   Post-Procedure Volume (cm^3) 1.12 cm^3 05/27/20 0856   Distance Tunneling (cm) 0 cm 07/29/20 1100 Tunneling Position ___ O'Clock 0 07/29/20 1100   Undermining Starts ___ O'Clock 0 07/29/20 1100   Undermining Ends___ O'Clock 0 07/29/20 1100   Undermining Maxium Distance (cm) 0 07/29/20 1100   Wound Assessment [place] 07/29/20 1100   Drainage Amount None 07/29/20 1100   Drainage Description Yellow 07/29/20 1100   Odor None 07/29/20 1100   Margins Defined edges 07/29/20 1100   Melanie-wound Assessment Pink 07/29/20 1100   Non-staged Wound Description Full thickness 07/29/20 1100   [place]%Wound Bed 100 07/29/20 1100   Red%Wound Bed 0 07/29/20 1100   Yellow%Wound Bed 0 07/29/20 1100   Black%Wound Bed 0 07/29/20 1100   Purple%Wound Bed 0 07/29/20 1100   Other%Wound Bed 0 06/17/20 1113   Culture Taken Yes 05/27/20 0856   Number of days: 63       Wound 07/15/20 Right #2 right ischium (Active)   Wound Image   07/15/20 0830   Dressing Status Clean;Dry; Intact 07/22/20 1151   Dressing Changed Changed/New 07/22/20 1151   Wound Cleansed Vashe 07/29/20 1100   Wound Length (cm) 0.1 cm 07/29/20 1100   Wound Width (cm) 0.1 cm 07/29/20 1100   Wound Depth (cm) 0.1 cm 07/29/20 1100   Wound Surface Area (cm^2) 0.01 cm^2 07/29/20 1100   Change in Wound Size % (l*w) 96 07/29/20 1100   Wound Volume (cm^3) 0 cm^3 07/29/20 1100   Wound Healing % 100 07/29/20 1100   Post-Procedure Length (cm) 0.5 cm 07/22/20 0959   Post-Procedure Width (cm) 0.3 cm 07/22/20 0959   Post-Procedure Depth (cm) 0.1 cm 07/22/20 0959   Post-Procedure Surface Area (cm^2) 0.15 cm^2 07/22/20 0959   Post-Procedure Volume (cm^3) 0.02 cm^3 07/22/20 0959   Distance Tunneling (cm) 0 cm 07/29/20 1100   Tunneling Position ___ O'Clock 0 07/29/20 1100   Undermining Starts ___ O'Clock 0 07/29/20 1100   Undermining Ends___ O'Clock 0 07/29/20 1100   Undermining [place] Distance (cm) 0 07/29/20 1100   Wound Assessment [place] 07/29/20 1100   Drainage Amount None 07/29/20 1100   Drainage Description Yellow 07/22/20 0926   Odor None 07/29/20 1100   Margins Defined edges 07/29/20 1100 Melanie-wound Assessment Pink 07/29/20 1100   Non-staged Wound Description Full thickness 07/29/20 1100   [place]%Wound Bed 100 07/29/20 1100   Red%Wound Bed 0 07/29/20 1100   Yellow%Wound Bed 0 07/29/20 1100   Black%Wound Bed 0 07/29/20 1100   Purple%Wound Bed 0 07/29/20 1100   Other%Wound Bed 0 07/29/20 1100   Number of days: 14       Assessment:       Problem List Items Addressed This Visit     Pressure injury of right buttock, stage 2 (HCC) - Primary    Pressure injury of left buttock, stage 1          Status of wound progress and description from last visit:   Moderately improved. Hopeful that these will be healed by next week. Plan:     Discharge Instructions       PHYSICIAN ORDERS AND DISCHARGE INSTRUCTIONS     NOTE: Upon discharge from the [address], you will receive a patient experience survey. We would be grateful if you would take the time to fill this survey out.     Wound care order history:                 Cultures:    Obtained on 5/27/2020              Labs/ HbA1c:   Date               Grafts:  Date               HBO:                Antibiotics: Cephloxin and Bactrim DS prior to appt on 5/27/2020. Start using bactroban cream e-scribed to pharmacy 6/3/20               Earlier Wound care treatments:                Authorizations:                        Consults:   Date                           Primary care physician:      Continuing wound care orders and information:              Residence: [facility]               Continue home health care with: [facility] wound-care supplies will be provided by:   Dasia Isaac loading:  Date               WWFYW Medications:              ANJXA cleansing:                           DO not scrub or use excessive force.                          Wash hands with soap and water before and after dressing changes.                           Prior to applying a clean dressing, cleanse wound with normal saline,                                wound cleanser, or mild soap and water.                           Ask the physician or nurse before getting the wound(s) wet in a shower              Daily Wound management:                          Keep weight off wounds and reposition every 2 hours.                          BIFIK standing for long periods of time.                          YDVDH wraps/stockings in AM and remove at bedtime.                          If swelling is present, elevate legs to the level of the heart or above for 30                              minutes 4-5 times a day and/or when sitting.                                             When taking antibiotics take entire prescription as ordered by physician                             do not stop taking until medicine is all gone.                                                       Orders for this week: 7/29/2020                       Right and Left Buttock -- wash with soap and water, pat dry.  Apply  Mepilex Border Change Daily       Follow up with Dr Adriana Hamilton 1  weeks in the wound care center  Call  for any questions or concerns.   Date__________   Time____________        Treatment Note      Written Patient Dismissal Instructions Given            Electronically signed by Timothy Valderrama MD on 7/29/2020 at 11:55 AM

## 2020-07-31 ENCOUNTER — HOSPITAL ENCOUNTER (OUTPATIENT)
Age: 65
Setting detail: SPECIMEN
Discharge: HOME | End: 2020-07-31
Payer: MEDICARE

## 2020-07-31 LAB
BACTERIA: NEGATIVE /HPF
BILIRUBIN URINE: NEGATIVE MG/DL
BLOOD, URINE: ABNORMAL
CLARITY: ABNORMAL
COLOR: YELLOW
GLUCOSE, URINE: NEGATIVE MG/DL
KETONES, URINE: NEGATIVE MG/DL
LEUKOCYTE ESTERASE, URINE: ABNORMAL
MUCUS: ABNORMAL HPF
NITRITE URINE, QUANTITATIVE: NEGATIVE
PH, URINE: 8 (ref 5–8)
PROTEIN UA: NEGATIVE MG/DL
RBC URINE: ABNORMAL /HPF (ref 0–3)
SPECIFIC GRAVITY UA: 1.01 (ref 1–1.03)
TRICHOMONAS: ABNORMAL /HPF
UROBILINOGEN, URINE: NORMAL MG/DL (ref 0.2–1)
WBC CLUMP: ABNORMAL /HPF
WBC UA: 223 /HPF (ref 0–2)

## 2020-07-31 PROCEDURE — 87077 CULTURE AEROBIC IDENTIFY: CPT

## 2020-07-31 PROCEDURE — 87086 URINE CULTURE/COLONY COUNT: CPT

## 2020-07-31 PROCEDURE — 81001 URINALYSIS AUTO W/SCOPE: CPT

## 2020-07-31 PROCEDURE — 87186 SC STD MICRODIL/AGAR DIL: CPT

## 2020-08-02 LAB
CULTURE: ABNORMAL
CULTURE: ABNORMAL
Lab: ABNORMAL
SPECIMEN: ABNORMAL

## 2020-08-05 ENCOUNTER — HOSPITAL ENCOUNTER (OUTPATIENT)
Dept: WOUND CARE | Age: 65
Discharge: HOME | End: 2020-08-05
Payer: MEDICARE

## 2020-08-05 VITALS
HEART RATE: 56 BPM | SYSTOLIC BLOOD PRESSURE: 153 MMHG | DIASTOLIC BLOOD PRESSURE: 81 MMHG | RESPIRATION RATE: 18 BRPM | TEMPERATURE: 98.6 F

## 2020-08-05 PROCEDURE — 99213 OFFICE O/P EST LOW 20 MIN: CPT

## 2020-08-05 NOTE — PROGRESS NOTES
Nurse notes. mepliex border applied to right and left buttocks, tolerated well.       .Electronically signed by Magdaleno Flaherty LPN on 6/6/3816 at 39:30 PM

## 2020-08-05 NOTE — PROGRESS NOTES
Wound Care Center Progress Note       Emmie Edmonds  AGE: 60 y.o. GENDER: male  : 1955  TODAY'S DATE:  2020        Subjective:     Chief Complaint   Patient presents with    Wound Check     wound         HISTORY of PRESENT ILLNESS     Emmie Edmonds is a 60 y.o. male who presents today for wound evaluation of Chronic pressure ulcer(s) of right buttock area and left hip. The ulcer is of mild severity. The underlying cause of the wound is pressure. Both areas are completely healed now. I did do a repeat culture on the right buttock area and just had a little light growth of Klebsiella, but it is healed now.   Wound Pain Timing/Severity: none  Quality of pain: N/A  Severity of pain:  0 / 10   Modifying Factors: chronic pressure and shear force  Associated Signs/Symptoms: none        PAST MEDICAL HISTORY        Diagnosis Date    Anemia     Aspiration pneumonia ([address]     dx 2014 with this per ecf/ per old chart dx with pneumonia with admission 2018    Cerebral palsy ([address]     \"has no feeling on left side of body\"alert but has some dementia but not diagnosed, has good long term but poor short term and wakes up disoriented after a nap\"(per yaneth)()    Depression     Epilepsy ([address] 196    last seizure 2018- hx grand mal    Frequent falls     with phone assess- family stated pt fell this am (7/10/2013\"in the process of trying to find a facility to help build him back up- (pt now at [facility])    Glaucoma     \"has been in treatment for this in the past- no treatment needed at present\"    History of IBS     Hx MRSA infection     + nasal culture 2014    Hyperammonemia (HCC)     Hypertension     on Lisinopril    IBS (irritable bowel syndrome)     ulcerative colitis    Kidney stone     for surgery for stent placement 2013    Mood disorder ([address]     per staff at [facility] Occasional tremors     \"makes it difficult for him to write\"    Pressure injury of contiguous region involving back and right buttock, stage 2 ([facility] 5/27/2020    Pressure injury of left buttock, stage 1 7/22/2020    Prostatitis     hx given per H&P old chart    Thyroid disease     Ulcerative colitis ([facility]     hx given per staff at [facility] 4/13/2015       PAST SURGICAL HISTORY    Past Surgical History:   Procedure Laterality Date    CHOLECYSTECTOMY      COLONOSCOPY  8/19/14, 5/2012 8/19/14: hemorrhoids, 5/2012 at [address] Left 07/11/2013    with stnet placement   [address]  [address] [place] [address]      OTHER SURGICAL HISTORY  04/15/2015    Revision of vagal nerve stimulator    UPPER GASTROINTESTINAL ENDOSCOPY N/A 11/13/2018    EGD DILATION BALLOON AND BIOPSY performed by Manjeet Camacho MD at [place] [address]    Has to have bettery changed every 5 yrs.         FAMILY HISTORY    Family History   Problem Relation Age of Onset    Heart Disease Mother         atrial fib    High Blood Pressure Mother     Asthma Mother     High Cholesterol Mother     Depression Mother    [place] Migraines Mother     High Blood Pressure Father     Heart Disease Father     Asthma Sister     High Cholesterol Sister     Depression Sister     Migraines Sister        SOCIAL HISTORY    Social History     Tobacco Use    Smoking status: Never Smoker    Smokeless tobacco: Never Used   Substance Use Topics    Alcohol use: No    Drug use: No       ALLERGIES    No Known Allergies    MEDICATIONS    Current Outpatient Medications on File Prior to Encounter   Medication Sig Dispense Refill    solifenacin (VESICARE) 10 MG tablet Take 10 mg by mouth daily      levothyroxine (SYNTHROID) 125 MCG tablet Take 1 tablet by mouth Daily 30 tablet 3    dicyclomine (BENTYL) 10 MG capsule Take 1 capsule by mouth every 6 hours as needed (cramps) 20 capsule 0    amLODIPine (NORVASC) 5 MG tablet Take 5 mg by mouth daily      clonazePAM (KLONOPIN) 0.5 MG tablet Take 0.25 mg by mouth daily.  divalproex (DEPAKOTE) 500 MG DR tablet Take 1,000 mg by mouth nightly      hydrochlorothiazide (MICROZIDE) 12.5 MG capsule Take 12.5 mg by mouth daily      mirabegron (MYRBETRIQ) 50 MG TB24 Take 50 mg by mouth daily      omeprazole (PRILOSEC) 20 MG delayed release capsule Take 20 mg by mouth 2 times daily (before meals)      primidone (MYSOLINE) 50 MG tablet Take 50 mg by mouth 3 times daily      zonisamide (ZONEGRAN) 100 MG capsule Take 100 mg by mouth every morning      acetaminophen (TYLENOL) 325 MG tablet Take 650 mg by mouth every 4 hours as needed for Pain      ibuprofen (ADVIL;MOTRIN) 600 MG tablet Take 600 mg by mouth every 6 hours as needed for Pain      Simethicone (MI-ACID GAS RELIEF PO) Take 1 Container by mouth every 4 hours as needed 10 cc every 4 hrs not to exceed 60 cc in 24 hrs      guaiFENesin (MUCINEX) 600 MG extended release tablet Take 1,200 mg by mouth daily as needed for Congestion      Menthol-Methyl Salicylate (MUSCLE RUB) 10-15 % CREA cream Apply 1 applicator topically as needed      pseudoephedrine (SUDAFED) 30 MG tablet Take 30 mg by mouth every 4 hours as needed for Congestion      lacosamide (VIMPAT) 200 MG tablet Take 200 mg by mouth as needed. Give 1 tab after seizure activity as needed      lacosamide (VIMPAT) 200 MG tablet Take 1 tablet by mouth 2 times daily for 30 days.  60 tablet 0    latanoprost (XALATAN) 0.005 % ophthalmic solution Place 1 drop into both eyes nightly      timolol (TIMOPTIC) 0.5 % ophthalmic solution Place 1 drop into both eyes daily       Lactobacillus (ACIDOPHILUS) CAPS capsule Take 1 capsule by mouth daily      aspirin 81 MG tablet Take 81 mg by mouth daily      Wheat Dextrin (BENEFIBER) POWD Take 4 g by mouth 2 times daily Takes 3 tsp in liquid twice per day       Lactulose Encephalopathy (ENULOSE PO) Take 30 mLs by mouth 3 times daily      folic acid (FOLVITE) 1 MG tablet Take 1 mg by mouth daily      PARoxetine (PAXIL) 20 MG tablet Take 20 mg by mouth every morning      QUEtiapine (SEROQUEL XR) 50 MG extended release tablet Take 50 mg by mouth nightly      tamsulosin (FLOMAX) 0.4 MG capsule Take 1 capsule by mouth daily 30 capsule 0    simvastatin (ZOCOR) 20 MG tablet Take 20 mg by mouth nightly.  Cholecalciferol (VITAMIN D3) 1000 UNITS CAPS Take 1 tablet by mouth daily.  clorazepate (TRANXENE) 7.5 MG tablet Take 7.5 mg by mouth 2 times daily. No current facility-administered medications on file prior to encounter. REVIEW OF SYSTEMS    Pertinent items are noted in HPI. Constitutional: Negative for systemic symptoms including fever, chills and malaise. Objective:      BP (!) 153/81   Pulse 56   Temp 98.6 °F (37 °C)   Resp 18     PHYSICAL EXAM      General: The patient is in no acute distress. Mental status:  Patient is appropriate, is  oriented to place and plan of care. Dermatologic exam: Visual inspection of the periwound reveals the skin to be normal in turgor and texture. Wound exam:  see wound description below     All active wounds listed below with today's date are evaluated           Assessment:       Problem List Items Addressed This Visit     Pressure injury of contiguous region involving back and right buttock, stage 2 ([address] - Primary          Status of wound progress and description from last visit:   Completely healed. Plan:     Discharge Instructions       PHYSICIAN ORDERS AND DISCHARGE INSTRUCTIONS     NOTE: Upon discharge from the [address], you will receive a patient experience survey. We would be grateful if you would take the time to fill this survey out.     Wound care order history:                 Cultures:    Obtained on 5/27/2020              Labs/ HbA1c:   Date               Grafts:  Date               HBO:                Antibiotics: Cephloxin and Bactrim DS prior to appt on 5/27/2020.  Start using bactroban cream e-scribed to pharmacy 6/3/20 [place] MD on 8/5/2020 at 11:47 AM

## 2020-09-07 ENCOUNTER — APPOINTMENT (OUTPATIENT)
Dept: GENERAL RADIOLOGY | Age: 65
End: 2020-09-07
Payer: MEDICARE

## 2020-09-07 ENCOUNTER — APPOINTMENT (OUTPATIENT)
Dept: CT IMAGING | Age: 65
End: 2020-09-07
Payer: MEDICARE

## 2020-09-07 ENCOUNTER — HOSPITAL ENCOUNTER (EMERGENCY)
Age: 65
Discharge: HOME | End: 2020-09-07
Payer: MEDICARE

## 2020-09-07 VITALS
HEART RATE: 60 BPM | HEIGHT: 66 IN | OXYGEN SATURATION: 97 % | DIASTOLIC BLOOD PRESSURE: 81 MMHG | BODY MASS INDEX: 28.93 KG/M2 | WEIGHT: 180 LBS | RESPIRATION RATE: 20 BRPM | TEMPERATURE: 98 F | SYSTOLIC BLOOD PRESSURE: 125 MMHG

## 2020-09-07 PROCEDURE — 73501 X-RAY EXAM HIP UNI 1 VIEW: CPT

## 2020-09-07 PROCEDURE — 99284 EMERGENCY DEPT VISIT MOD MDM: CPT

## 2020-09-07 PROCEDURE — 70450 CT HEAD/BRAIN W/O DYE: CPT

## 2020-09-07 PROCEDURE — 72125 CT NECK SPINE W/O DYE: CPT

## 2020-09-07 PROCEDURE — 6370000000 HC RX 637 (ALT 250 FOR IP): Performed by: PHYSICIAN ASSISTANT

## 2020-09-07 RX ORDER — ACETAMINOPHEN 500 MG
500 TABLET ORAL EVERY 6 HOURS PRN
Qty: 30 TABLET | Refills: 0 | Status: SHIPPED | OUTPATIENT
Start: 2020-09-07

## 2020-09-07 RX ORDER — HYDROCODONE BITARTRATE AND ACETAMINOPHEN 5; 325 MG/1; MG/1
1 TABLET ORAL ONCE
Status: COMPLETED | OUTPATIENT
Start: 2020-09-07 | End: 2020-09-07

## 2020-09-07 RX ADMIN — HYDROCODONE BITARTRATE AND ACETAMINOPHEN 1 TABLET: 5; 325 TABLET ORAL at 09:51

## 2020-09-07 ASSESSMENT — PAIN DESCRIPTION - PAIN TYPE: TYPE: ACUTE PAIN

## 2020-09-07 ASSESSMENT — PAIN SCALES - GENERAL
PAINLEVEL_OUTOF10: 6
PAINLEVEL_OUTOF10: 10

## 2020-09-07 ASSESSMENT — PAIN DESCRIPTION - ORIENTATION: ORIENTATION: LEFT

## 2020-09-07 ASSESSMENT — PAIN DESCRIPTION - LOCATION: LOCATION: LEG;NECK

## 2020-09-07 ASSESSMENT — PAIN DESCRIPTION - PROGRESSION: CLINICAL_PROGRESSION: GRADUALLY WORSENING

## 2020-09-07 NOTE — ED NOTES
1110 called med trans for transportation back to private residence. ETA 1130.      Jacobo Agrawal  09/07/20 1110

## 2020-09-07 NOTE — ED PROVIDER NOTES
eMERGENCY dEPARTMENT eNCOUnter      PCP: Yolanda Samuels MD    CHIEF COMPLAINT    Chief Complaint   Patient presents with    Fall       HPI    Edi Prado is a 60 y.o. male with past medical history of cerebral palsy who presents from his skilled nursing facility following a fall. Patient states that he was standing to get out of his bed this morning when he stumbled over bedding on the floor causing him to fall backwards. He states he hit the back of his head and neck on the ground. He denies loss of consciousness, visual changes, nausea or vomiting since the fall. He also complains of right-sided hip and thigh pain. Patient typically ambulates with the help of a caregiver at the facility, however states that he was attempting to get up on his own when the fall occurred. He denies any preceding chest pain, shortness of breath or lightheadedness. Denies use of blood thinning medications. REVIEW OF SYSTEMS    General: No fever  ENT:  No visual changes. No headache. Cardiac: No Chest Pain, denies syncope  Respiratory: No cough or difficulty breathing  GI: No vomiting. No Bloody Stool or Diarrhea  : No Dysuria or Hematuria  MSKTL:  See HPI.  + neck pain, no back pain. Neurologic: denies LOC, no headache, dizziness, confusion.   No hearing loss    See HPI and nursing notes for additional information     PAST MEDICAL & SURGICAL HISTORY    Past Medical History:   Diagnosis Date    Anemia     Aspiration pneumonia ([address]     dx 6/9/2014 with this per ecf/ per old chart dx with pneumonia with admission 9/18/2018    Cerebral palsy ([facility]     \"has no feeling on left side of body\"alert but has some dementia but not diagnosed, has good long term but poor short term and wakes up disoriented after a nap\"(per yaneth)(2014)    Depression     Epilepsy ([facility] [address] 1962    last seizure 2/2018- hx grand mal    Frequent falls     with phone assess- family stated pt fell this am (7/10/2013\"in the process of trying to find a facility to help build him back up- (pt now at [facility])    Glaucoma     \"has been in treatment for this in the past- no treatment needed at present\"    History of IBS     Hx MRSA infection     + nasal culture 4/2014    Hyperammonemia (HCC)     Hypertension     on Lisinopril    IBS (irritable bowel syndrome)     ulcerative colitis    Kidney stone     for surgery for stent placement 7/11/2013    Mood disorder ([facility]     per staff at [address] [facility] Occasional tremors     \"makes it difficult for him to write\"    Pressure injury of contiguous region involving back and right buttock, stage 2 ([facility] [address] 5/27/2020    Pressure injury of left buttock, stage 1 7/22/2020    Prostatitis     hx given per H&P old chart    Thyroid disease     Ulcerative colitis ([facility] [address]     hx given per staff at [facility] 4/13/2015     Past Surgical History:   Procedure Laterality Date    CHOLECYSTECTOMY      COLONOSCOPY  8/19/14, 5/2012 8/19/14: hemorrhoids, 5/2012 at [address] Left 07/11/2013    with stnet placement   [address] [address]      OTHER SURGICAL HISTORY  04/15/2015    Revision of vagal nerve stimulator    UPPER GASTROINTESTINAL ENDOSCOPY N/A 11/13/2018    EGD DILATION BALLOON AND BIOPSY performed by Zia Verdugo MD at [facility]  2002    Has to have bettery changed every 5 yrs.         CURRENT MEDICATIONS    Current Outpatient Rx   Medication Sig Dispense Refill    acetaminophen (APAP EXTRA STRENGTH) 500 MG tablet Take 1 tablet by mouth every 6 hours as needed for Pain 30 tablet 0    solifenacin (VESICARE) 10 MG tablet Take 10 mg by mouth daily      levothyroxine (SYNTHROID) 125 MCG tablet Take 1 tablet by mouth Daily 30 tablet 3    dicyclomine (BENTYL) 10 MG capsule Take 1 capsule by mouth every 6 hours as needed (cramps) 20 capsule 0    amLODIPine (NORVASC) 5 MG tablet Take 5 mg by mouth daily      clonazePAM (KLONOPIN) 0.5 MG tablet Take 0.25 mg by mouth daily.  divalproex (DEPAKOTE) 500 MG DR tablet Take 1,000 mg by mouth nightly      hydrochlorothiazide (MICROZIDE) 12.5 MG capsule Take 12.5 mg by mouth daily      mirabegron (MYRBETRIQ) 50 MG TB24 Take 50 mg by mouth daily      omeprazole (PRILOSEC) 20 MG delayed release capsule Take 20 mg by mouth 2 times daily (before meals)      primidone (MYSOLINE) 50 MG tablet Take 50 mg by mouth 3 times daily      zonisamide (ZONEGRAN) 100 MG capsule Take 100 mg by mouth every morning      ibuprofen (ADVIL;MOTRIN) 600 MG tablet Take 600 mg by mouth every 6 hours as needed for Pain      Simethicone (MI-ACID GAS RELIEF PO) Take 1 Container by mouth every 4 hours as needed 10 cc every 4 hrs not to exceed 60 cc in 24 hrs      guaiFENesin (MUCINEX) 600 MG extended release tablet Take 1,200 mg by mouth daily as needed for Congestion      Menthol-Methyl Salicylate (MUSCLE RUB) 10-15 % CREA cream Apply 1 applicator topically as needed      pseudoephedrine (SUDAFED) 30 MG tablet Take 30 mg by mouth every 4 hours as needed for Congestion      lacosamide (VIMPAT) 200 MG tablet Take 200 mg by mouth as needed. Give 1 tab after seizure activity as needed      lacosamide (VIMPAT) 200 MG tablet Take 1 tablet by mouth 2 times daily for 30 days.  60 tablet 0    latanoprost (XALATAN) 0.005 % ophthalmic solution Place 1 drop into both eyes nightly      timolol (TIMOPTIC) 0.5 % ophthalmic solution Place 1 drop into both eyes daily       Lactobacillus (ACIDOPHILUS) CAPS capsule Take 1 capsule by mouth daily      aspirin 81 MG tablet Take 81 mg by mouth daily      Wheat Dextrin (BENEFIBER) POWD Take 4 g by mouth 2 times daily Takes 3 tsp in liquid twice per day       Lactulose Encephalopathy (ENULOSE PO) Take 30 mLs by mouth 3 times daily      folic acid (FOLVITE) 1 MG tablet Take 1 mg by mouth daily      PARoxetine (PAXIL) 20 MG tablet Take 20 mg by mouth every morning      QUEtiapine (SEROQUEL XR) 50 MG extended release tablet Take 50 mg by mouth nightly      tamsulosin (FLOMAX) 0.4 MG capsule Take 1 capsule by mouth daily 30 capsule 0    simvastatin (ZOCOR) 20 MG tablet Take 20 mg by mouth nightly.  Cholecalciferol (VITAMIN D3) 1000 UNITS CAPS Take 1 tablet by mouth daily.  clorazepate (TRANXENE) 7.5 MG tablet Take 7.5 mg by mouth 2 times daily.          ALLERGIES    No Known Allergies    SOCIAL & FAMILY HISTORY    Social History     Socioeconomic History    Marital status: Single     Spouse name: None    Number of children: None    Years of education: None    Highest education level: None   Occupational History    None   Social Needs    Financial resource strain: None    Food insecurity     Worry: None     Inability: None    Transportation needs     Medical: None     Non-medical: None   Tobacco Use    Smoking status: Never Smoker    Smokeless tobacco: Never Used   Substance and Sexual Activity    Alcohol use: No    Drug use: No    Sexual activity: None   Lifestyle    Physical activity     Days per week: None     Minutes per session: None    Stress: None   Relationships    Social connections     Talks on phone: None     Gets together: None     Attends [employer] service: None     Active member of club or organization: None     Attends meetings of clubs or organizations: None     Relationship status: None    Intimate partner violence     Fear of current or ex partner: None     Emotionally abused: None     Physically abused: None     Forced sexual activity: None   Other Topics Concern    None   Social History Narrative    None     Family History   Problem Relation Age of Onset    Heart Disease Mother         atrial fib    High Blood Pressure Mother     Asthma Mother     High Cholesterol Mother     Depression Mother     Migraines Mother     High Blood Pressure Father     Heart Disease Father     Asthma Sister     High Cholesterol Sister     Depression Sister Result   1. No evidence of an acute fracture in the cervical spine. 2. Diffuse idiopathic skeletal hyperostosis. ED COURSE & MEDICAL DECISION MAKING       Vital signs and nursing notes reviewed during ED course. I have independently evaluated this patient . Supervising MD present in the Emergency Department, available for consultation, throughout entirety of  patient care. Patient presents as above following a fall. He is hemodynamically stable, afebrile on arrival, oxygenating at 97% on room air. Neurologically intact. Primarily complains of left thigh and neck pain. Imaging of head, neck and left hip and pelvis obtained. He is given dose of pain medication. CT imaging without acute intracranial abnormality. Chronic right MCA infarct noted. CT cervical spine without acute fracture. Left hip with mild degenerative changes noted without acute fracture. Patient resting comfortably on reevaluation. At this time, will plan discharging home as I believe he is appropriate for further outpatient evaluation and treatment. Will discharge with Tylenol to use as needed. Patient to return with any new or worsening symptoms or onset of neurological symptoms. He is agreeable with this plan. Diagnosis, disposition, and plan discussed in detail with patient who understands and agrees. Patient understands and agrees to follow up with PCP in the next 2-3 days for recheck. Patient understands and agrees to return emergency department for any new or worsening symptoms including but not limited to worsening pain, difficulty breathing, focal neurological deficits (discussed today). Clinical  IMPRESSION    1. Fall, initial encounter    2. Cervical pain    3.  Left hip pain          Comment: Please note this report has been produced using speech recognition software and may contain errors related to that system including errors in grammar, punctuation, and spelling, as well as words and phrases that may be inappropriate. If there are any questions or concerns please feel free to contact the dictating provider for clarification.         CHRIS Yun  09/07/20 3889

## 2020-09-07 NOTE — ED TRIAGE NOTES
Patient to rm. 32 via EMS with c/o fall at approx. 0430. Patient states he tripped and fell this morning while trying to get the bathroom. States he is now having neck and left thigh pain. Denies LOC or hitting head. Resps even and unlabored. Patient is currently on 81 mg. Asa daily but no other anticoagulates listed.

## 2020-09-07 NOTE — ED NOTES
Patient and EMS crew given discharge instructions. Patient verbalizes understanding and denies any further needs, questions, concerns or complaints. Patient loaded onto [facility] stretcher for transport.       Libby Sanchez RN  09/07/20 1124

## 2020-09-24 ENCOUNTER — HOSPITAL ENCOUNTER (OUTPATIENT)
Dept: GENERAL RADIOLOGY | Age: 65
Discharge: HOME | End: 2020-09-24
Payer: MEDICARE

## 2020-09-24 ENCOUNTER — HOSPITAL ENCOUNTER (OUTPATIENT)
Age: 65
Discharge: HOME | End: 2020-09-24
Payer: MEDICARE

## 2020-09-24 ENCOUNTER — HOSPITAL ENCOUNTER (OUTPATIENT)
Dept: ULTRASOUND IMAGING | Age: 65
Discharge: HOME | End: 2020-09-24
Payer: MEDICARE

## 2020-09-24 LAB
ANION GAP SERPL CALCULATED.3IONS-SCNC: 9 MMOL/L (ref 4–16)
BASOPHILS ABSOLUTE: 0 K/CU MM
BASOPHILS RELATIVE PERCENT: 0.5 % (ref 0–1)
BUN BLDV-MCNC: 17 MG/DL (ref 6–23)
CALCIUM SERPL-MCNC: 9.6 MG/DL (ref 8.3–10.6)
CHLORIDE BLD-SCNC: 97 MMOL/L (ref 99–110)
CO2: 30 MMOL/L (ref 21–32)
CREAT SERPL-MCNC: 0.7 MG/DL (ref 0.9–1.3)
DIFFERENTIAL TYPE: ABNORMAL
EOSINOPHILS ABSOLUTE: 0.3 K/CU MM
EOSINOPHILS RELATIVE PERCENT: 4 % (ref 0–3)
GFR AFRICAN AMERICAN: >60 ML/MIN/1.73M2
GFR NON-AFRICAN AMERICAN: >60 ML/MIN/1.73M2
GLUCOSE BLD-MCNC: 116 MG/DL (ref 70–99)
HCT VFR BLD CALC: 44.4 % (ref 42–52)
HEMOGLOBIN: 14.5 GM/DL (ref 13.5–18)
IMMATURE NEUTROPHIL %: 0.9 % (ref 0–0.43)
LYMPHOCYTES ABSOLUTE: 1.5 K/CU MM
LYMPHOCYTES RELATIVE PERCENT: 22.4 % (ref 24–44)
MCH RBC QN AUTO: 30.3 PG (ref 27–31)
MCHC RBC AUTO-ENTMCNC: 32.7 % (ref 32–36)
MCV RBC AUTO: 92.7 FL (ref 78–100)
MONOCYTES ABSOLUTE: 0.7 K/CU MM
MONOCYTES RELATIVE PERCENT: 10.1 % (ref 0–4)
NUCLEATED RBC %: 0 %
PDW BLD-RTO: 13.5 % (ref 11.7–14.9)
PLATELET # BLD: 171 K/CU MM (ref 140–440)
PMV BLD AUTO: 11.7 FL (ref 7.5–11.1)
POTASSIUM SERPL-SCNC: 3.6 MMOL/L (ref 3.5–5.1)
RBC # BLD: 4.79 M/CU MM (ref 4.6–6.2)
SEGMENTED NEUTROPHILS ABSOLUTE COUNT: 4.1 K/CU MM
SEGMENTED NEUTROPHILS RELATIVE PERCENT: 62.1 % (ref 36–66)
SODIUM BLD-SCNC: 136 MMOL/L (ref 135–145)
TOTAL IMMATURE NEUTOROPHIL: 0.06 K/CU MM
TOTAL NUCLEATED RBC: 0 K/CU MM
WBC # BLD: 6.6 K/CU MM (ref 4–10.5)

## 2020-09-24 PROCEDURE — 84153 ASSAY OF PSA TOTAL: CPT

## 2020-09-24 PROCEDURE — 76770 US EXAM ABDO BACK WALL COMP: CPT

## 2020-09-24 PROCEDURE — 84154 ASSAY OF PSA FREE: CPT

## 2020-09-24 PROCEDURE — 74018 RADEX ABDOMEN 1 VIEW: CPT

## 2020-09-24 PROCEDURE — 80048 BASIC METABOLIC PNL TOTAL CA: CPT

## 2020-09-24 PROCEDURE — 85025 COMPLETE CBC W/AUTO DIFF WBC: CPT

## 2020-09-24 PROCEDURE — 36415 COLL VENOUS BLD VENIPUNCTURE: CPT

## 2020-09-26 LAB
PROSTATE SPECIFIC ANTIGEN FREE: 0.2 NG/ML
PROSTATE SPECIFIC ANTIGEN PERCENT FREE: 13 %
PROSTATE SPECIFIC ANTIGEN: 1.5 NG/ML (ref 0–4)

## 2020-09-30 ENCOUNTER — HOSPITAL ENCOUNTER (OUTPATIENT)
Age: 65
Discharge: HOME | End: 2020-09-30
Payer: MEDICARE

## 2020-09-30 LAB
ALBUMIN SERPL-MCNC: 4.2 GM/DL (ref 3.4–5)
ALP BLD-CCNC: 78 IU/L (ref 40–128)
ALT SERPL-CCNC: 15 U/L (ref 10–40)
AMMONIA: 51 UMOL/L (ref 16–60)
ANION GAP SERPL CALCULATED.3IONS-SCNC: 12 MMOL/L (ref 4–16)
AST SERPL-CCNC: 12 IU/L (ref 15–37)
BASOPHILS ABSOLUTE: 0 K/CU MM
BASOPHILS RELATIVE PERCENT: 0.4 % (ref 0–1)
BILIRUB SERPL-MCNC: 0.3 MG/DL (ref 0–1)
BUN BLDV-MCNC: 11 MG/DL (ref 6–23)
C-REACTIVE PROTEIN, HIGH SENSITIVITY: 136.4 MG/L
CALCIUM SERPL-MCNC: 9.6 MG/DL (ref 8.3–10.6)
CHLORIDE BLD-SCNC: 98 MMOL/L (ref 99–110)
CO2: 27 MMOL/L (ref 21–32)
CREAT SERPL-MCNC: 0.7 MG/DL (ref 0.9–1.3)
DIFFERENTIAL TYPE: ABNORMAL
EOSINOPHILS ABSOLUTE: 0.2 K/CU MM
EOSINOPHILS RELATIVE PERCENT: 2.9 % (ref 0–3)
ERYTHROCYTE SEDIMENTATION RATE: 105 MM/HR (ref 0–20)
GFR AFRICAN AMERICAN: >60 ML/MIN/1.73M2
GFR NON-AFRICAN AMERICAN: >60 ML/MIN/1.73M2
GLUCOSE BLD-MCNC: 128 MG/DL (ref 70–99)
HCT VFR BLD CALC: 45.5 % (ref 42–52)
HEMOGLOBIN: 14.6 GM/DL (ref 13.5–18)
IMMATURE NEUTROPHIL %: 1.4 % (ref 0–0.43)
LYMPHOCYTES ABSOLUTE: 0.9 K/CU MM
LYMPHOCYTES RELATIVE PERCENT: 12.8 % (ref 24–44)
MCH RBC QN AUTO: 29.4 PG (ref 27–31)
MCHC RBC AUTO-ENTMCNC: 32.1 % (ref 32–36)
MCV RBC AUTO: 91.5 FL (ref 78–100)
MONOCYTES ABSOLUTE: 1.2 K/CU MM
MONOCYTES RELATIVE PERCENT: 15.8 % (ref 0–4)
NUCLEATED RBC %: 0 %
PDW BLD-RTO: 13.1 % (ref 11.7–14.9)
PLATELET # BLD: 274 K/CU MM (ref 140–440)
PMV BLD AUTO: 10.7 FL (ref 7.5–11.1)
POTASSIUM SERPL-SCNC: 3.7 MMOL/L (ref 3.5–5.1)
RBC # BLD: 4.97 M/CU MM (ref 4.6–6.2)
SEGMENTED NEUTROPHILS ABSOLUTE COUNT: 4.9 K/CU MM
SEGMENTED NEUTROPHILS RELATIVE PERCENT: 66.7 % (ref 36–66)
SODIUM BLD-SCNC: 137 MMOL/L (ref 135–145)
T4 FREE: 1.4 NG/DL (ref 0.9–1.8)
TOTAL IMMATURE NEUTOROPHIL: 0.1 K/CU MM
TOTAL NUCLEATED RBC: 0 K/CU MM
TOTAL PROTEIN: 7.8 GM/DL (ref 6.4–8.2)
TSH HIGH SENSITIVITY: 2.84 UIU/ML (ref 0.27–4.2)
WBC # BLD: 7.3 K/CU MM (ref 4–10.5)

## 2020-09-30 PROCEDURE — 84481 FREE ASSAY (FT-3): CPT

## 2020-09-30 PROCEDURE — 82140 ASSAY OF AMMONIA: CPT

## 2020-09-30 PROCEDURE — 36415 COLL VENOUS BLD VENIPUNCTURE: CPT

## 2020-09-30 PROCEDURE — 86140 C-REACTIVE PROTEIN: CPT

## 2020-09-30 PROCEDURE — 85652 RBC SED RATE AUTOMATED: CPT

## 2020-09-30 PROCEDURE — 84439 ASSAY OF FREE THYROXINE: CPT

## 2020-09-30 PROCEDURE — 85025 COMPLETE CBC W/AUTO DIFF WBC: CPT

## 2020-09-30 PROCEDURE — 80053 COMPREHEN METABOLIC PANEL: CPT

## 2020-09-30 PROCEDURE — 84443 ASSAY THYROID STIM HORMONE: CPT

## 2020-10-01 LAB — T3 FREE: 2.5 PG/ML (ref 2.3–4.2)

## 2020-11-20 ENCOUNTER — HOSPITAL ENCOUNTER (OUTPATIENT)
Age: 65
Discharge: HOME | DRG: 871 | End: 2020-11-20
Payer: MEDICARE

## 2020-11-20 LAB — AMMONIA: 21 UMOL/L (ref 16–60)

## 2020-11-20 PROCEDURE — 82140 ASSAY OF AMMONIA: CPT

## 2020-11-20 PROCEDURE — 36415 COLL VENOUS BLD VENIPUNCTURE: CPT

## 2020-11-23 ENCOUNTER — APPOINTMENT (OUTPATIENT)
Dept: GENERAL RADIOLOGY | Age: 65
DRG: 871 | End: 2020-11-23
Payer: MEDICARE

## 2020-11-23 ENCOUNTER — HOSPITAL ENCOUNTER (INPATIENT)
Age: 65
LOS: 4 days | Discharge: HOME | DRG: 871 | End: 2020-11-27
Attending: INTERNAL MEDICINE | Admitting: INTERNAL MEDICINE
Payer: MEDICARE

## 2020-11-23 PROBLEM — U07.1 COVID-19 VIRUS DETECTED: Status: ACTIVE | Noted: 2020-11-23

## 2020-11-23 PROBLEM — J18.9 MULTIFOCAL PNEUMONIA: Status: ACTIVE | Noted: 2020-11-23

## 2020-11-23 PROBLEM — E87.1 HYPONATREMIA: Status: ACTIVE | Noted: 2020-11-23

## 2020-11-23 LAB
ALBUMIN SERPL-MCNC: 3.8 GM/DL (ref 3.4–5)
ALP BLD-CCNC: 75 IU/L (ref 40–129)
ALT SERPL-CCNC: 25 U/L (ref 10–40)
ANION GAP SERPL CALCULATED.3IONS-SCNC: 14 MMOL/L (ref 4–16)
APTT: 34 SECONDS (ref 25.1–37.1)
AST SERPL-CCNC: 27 IU/L (ref 15–37)
BASOPHILS ABSOLUTE: 0 K/CU MM
BASOPHILS RELATIVE PERCENT: 0.2 % (ref 0–1)
BILIRUB SERPL-MCNC: 0.3 MG/DL (ref 0–1)
BUN BLDV-MCNC: 7 MG/DL (ref 6–23)
CALCIUM SERPL-MCNC: 9.3 MG/DL (ref 8.3–10.6)
CHLORIDE BLD-SCNC: 94 MMOL/L (ref 99–110)
CO2: 26 MMOL/L (ref 21–32)
CREAT SERPL-MCNC: 0.6 MG/DL (ref 0.9–1.3)
D DIMER: 289 NG/ML(DDU)
DIFFERENTIAL TYPE: ABNORMAL
EOSINOPHILS ABSOLUTE: 0 K/CU MM
EOSINOPHILS RELATIVE PERCENT: 0 % (ref 0–3)
FERRITIN: 644 NG/ML (ref 30–400)
FIBRINOGEN LEVEL: 412 MG/DL (ref 196.9–442.1)
GFR AFRICAN AMERICAN: >60 ML/MIN/1.73M2
GFR NON-AFRICAN AMERICAN: >60 ML/MIN/1.73M2
GLUCOSE BLD-MCNC: 173 MG/DL (ref 70–99)
HCT VFR BLD CALC: 46.3 % (ref 42–52)
HEMOGLOBIN: 16 GM/DL (ref 13.5–18)
HIGH SENSITIVE C-REACTIVE PROTEIN: 84.1 MG/L
IMMATURE NEUTROPHIL %: 0.2 % (ref 0–0.43)
INR BLD: 1.02 INDEX
LACTATE DEHYDROGENASE: 178 IU/L (ref 120–246)
LACTATE: 1.9 MMOL/L (ref 0.4–2)
LYMPHOCYTES ABSOLUTE: 0.4 K/CU MM
LYMPHOCYTES RELATIVE PERCENT: 4.4 % (ref 24–44)
MAGNESIUM: 1.8 MG/DL (ref 1.8–2.4)
MCH RBC QN AUTO: 30.1 PG (ref 27–31)
MCHC RBC AUTO-ENTMCNC: 34.6 % (ref 32–36)
MCV RBC AUTO: 87.2 FL (ref 78–100)
MONOCYTES ABSOLUTE: 0.4 K/CU MM
MONOCYTES RELATIVE PERCENT: 5.3 % (ref 0–4)
NUCLEATED RBC %: 0 %
PDW BLD-RTO: 12.9 % (ref 11.7–14.9)
PLATELET # BLD: 112 K/CU MM (ref 140–440)
PMV BLD AUTO: 11.9 FL (ref 7.5–11.1)
POTASSIUM SERPL-SCNC: 2.9 MMOL/L (ref 3.5–5.1)
PROCALCITONIN: 0.28
PROTHROMBIN TIME: 12.3 SECONDS (ref 11.7–14.5)
RBC # BLD: 5.31 M/CU MM (ref 4.6–6.2)
SARS-COV-2, NAAT: DETECTED
SEGMENTED NEUTROPHILS ABSOLUTE COUNT: 7.3 K/CU MM
SEGMENTED NEUTROPHILS RELATIVE PERCENT: 89.9 % (ref 36–66)
SODIUM BLD-SCNC: 134 MMOL/L (ref 135–145)
TOTAL IMMATURE NEUTOROPHIL: 0.02 K/CU MM
TOTAL NUCLEATED RBC: 0 K/CU MM
TOTAL PROTEIN: 8.1 GM/DL (ref 6.4–8.2)
VITAMIN D 25-HYDROXY: 58.98 NG/ML
WBC # BLD: 8.1 K/CU MM (ref 4–10.5)

## 2020-11-23 PROCEDURE — 85384 FIBRINOGEN ACTIVITY: CPT

## 2020-11-23 PROCEDURE — 84145 PROCALCITONIN (PCT): CPT

## 2020-11-23 PROCEDURE — 85730 THROMBOPLASTIN TIME PARTIAL: CPT

## 2020-11-23 PROCEDURE — 87040 BLOOD CULTURE FOR BACTERIA: CPT

## 2020-11-23 PROCEDURE — 82306 VITAMIN D 25 HYDROXY: CPT

## 2020-11-23 PROCEDURE — 85025 COMPLETE CBC W/AUTO DIFF WBC: CPT

## 2020-11-23 PROCEDURE — 94761 N-INVAS EAR/PLS OXIMETRY MLT: CPT

## 2020-11-23 PROCEDURE — 2580000003 HC RX 258: Performed by: PHYSICIAN ASSISTANT

## 2020-11-23 PROCEDURE — 96374 THER/PROPH/DIAG INJ IV PUSH: CPT

## 2020-11-23 PROCEDURE — U0002 COVID-19 LAB TEST NON-CDC: HCPCS

## 2020-11-23 PROCEDURE — 87804 INFLUENZA ASSAY W/OPTIC: CPT

## 2020-11-23 PROCEDURE — 2580000003 HC RX 258: Performed by: INTERNAL MEDICINE

## 2020-11-23 PROCEDURE — 82728 ASSAY OF FERRITIN: CPT

## 2020-11-23 PROCEDURE — 2060000000 HC ICU INTERMEDIATE R&B

## 2020-11-23 PROCEDURE — 83605 ASSAY OF LACTIC ACID: CPT

## 2020-11-23 PROCEDURE — 6360000002 HC RX W HCPCS: Performed by: PHYSICIAN ASSISTANT

## 2020-11-23 PROCEDURE — 71045 X-RAY EXAM CHEST 1 VIEW: CPT

## 2020-11-23 PROCEDURE — 83735 ASSAY OF MAGNESIUM: CPT

## 2020-11-23 PROCEDURE — 99284 EMERGENCY DEPT VISIT MOD MDM: CPT

## 2020-11-23 PROCEDURE — 85610 PROTHROMBIN TIME: CPT

## 2020-11-23 PROCEDURE — 83615 LACTATE (LD) (LDH) ENZYME: CPT

## 2020-11-23 PROCEDURE — 86141 C-REACTIVE PROTEIN HS: CPT

## 2020-11-23 PROCEDURE — 85379 FIBRIN DEGRADATION QUANT: CPT

## 2020-11-23 PROCEDURE — 6370000000 HC RX 637 (ALT 250 FOR IP): Performed by: PHYSICIAN ASSISTANT

## 2020-11-23 PROCEDURE — 87150 DNA/RNA AMPLIFIED PROBE: CPT

## 2020-11-23 PROCEDURE — 80053 COMPREHEN METABOLIC PANEL: CPT

## 2020-11-23 RX ORDER — HYDROCHLOROTHIAZIDE 12.5 MG/1
12.5 CAPSULE, GELATIN COATED ORAL DAILY
Status: CANCELLED | OUTPATIENT
Start: 2020-11-23

## 2020-11-23 RX ORDER — ACETAMINOPHEN 500 MG
500 TABLET ORAL ONCE
Status: COMPLETED | OUTPATIENT
Start: 2020-11-23 | End: 2020-11-23

## 2020-11-23 RX ORDER — POTASSIUM CHLORIDE 7.45 MG/ML
10 INJECTION INTRAVENOUS ONCE
Status: COMPLETED | OUTPATIENT
Start: 2020-11-23 | End: 2020-11-24

## 2020-11-23 RX ORDER — 0.9 % SODIUM CHLORIDE 0.9 %
1000 INTRAVENOUS SOLUTION INTRAVENOUS ONCE
Status: COMPLETED | OUTPATIENT
Start: 2020-11-23 | End: 2020-11-23

## 2020-11-23 RX ORDER — 0.9 % SODIUM CHLORIDE 0.9 %
1000 INTRAVENOUS SOLUTION INTRAVENOUS ONCE
Status: COMPLETED | OUTPATIENT
Start: 2020-11-23 | End: 2020-11-24

## 2020-11-23 RX ORDER — DEXAMETHASONE SODIUM PHOSPHATE 10 MG/ML
10 INJECTION, SOLUTION INTRAMUSCULAR; INTRAVENOUS ONCE
Status: COMPLETED | OUTPATIENT
Start: 2020-11-23 | End: 2020-11-23

## 2020-11-23 RX ADMIN — ACETAMINOPHEN 500 MG: 500 TABLET ORAL at 17:49

## 2020-11-23 RX ADMIN — CEFTRIAXONE 1 G: 1 INJECTION, POWDER, FOR SOLUTION INTRAMUSCULAR; INTRAVENOUS at 19:08

## 2020-11-23 RX ADMIN — SODIUM CHLORIDE 1000 ML: 9 INJECTION, SOLUTION INTRAVENOUS at 17:50

## 2020-11-23 RX ADMIN — AZITHROMYCIN MONOHYDRATE 500 MG: 500 INJECTION, POWDER, LYOPHILIZED, FOR SOLUTION INTRAVENOUS at 19:50

## 2020-11-23 RX ADMIN — POTASSIUM CHLORIDE 10 MEQ: 7.46 INJECTION, SOLUTION INTRAVENOUS at 23:54

## 2020-11-23 RX ADMIN — DEXAMETHASONE SODIUM PHOSPHATE 10 MG: 10 INJECTION, SOLUTION INTRAMUSCULAR; INTRAVENOUS at 19:08

## 2020-11-23 RX ADMIN — SODIUM CHLORIDE 1000 ML: 9 INJECTION, SOLUTION INTRAVENOUS at 23:55

## 2020-11-23 NOTE — ED NOTES
Bed: ED-36  Expected date:   Expected time:   Means of arrival:   Comments:  ems     Laquita Archuleta RN  11/23/20 6244

## 2020-11-23 NOTE — ED NOTES
1830 paged hospitalist     Mitzi Castro  11/23/20 1831  [id number] mid level with [employer] returned call      Mitzi Castro  11/23/20 1841

## 2020-11-23 NOTE — ED PROVIDER NOTES
EMERGENCY DEPARTMENT ENCOUNTER      PCP: Nacho Mohan MD    [facility]    Chief Complaint   Patient presents with    Fever     onset 5 days ago, with sob and shakes 102.5 in triage           This patient was not evaluated by the attending physician. I have independently evaluated this patient. HPI    Joaquín Medina is a 60 y.o. male who presents with nasal congestion, cough, fever. Onset of symptoms 5 days ago. Context is patient has the above symptoms for 5 days. Patient has a history of cerebral palsy and resides in a group home. No known sick, COVID-19 contacts. REVIEW OF SYSTEMS    Constitutional:  + fever, chills  HENT:  Denies sore throat or ear pain   Cardiovascular:  Denies chest pain, palpitations   Respiratory: See HPI. No shortness of breath.   GI:  Denies abdominal pain, nausea, vomiting, or diarrhea  :  Denies any urinary symptoms  Musculoskeletal:  Denies back pain  Skin:  Denies rash  Neurologic:  Denies headache, focal weakness or sensory changes   Endocrine:  Denies polyuria or polydypsia   Lymphatic:  Denies swollen glands     All other review of systems are negative  See HPI and nursing notes for additional information     PAST MEDICAL AND SURGICAL HISTORY    Past Medical History:   Diagnosis Date    Anemia     Aspiration pneumonia ([address]     dx 6/9/2014 with this per ecf/ per old chart dx with pneumonia with admission 9/18/2018    Cerebral palsy ([address]     \"has no feeling on left side of body\"alert but has some dementia but not diagnosed, has good long term but poor short term and wakes up disoriented after a nap\"(per yaneth)(2014)    Depression     Epilepsy ([address] 1961    last seizure 2/2018- hx grand mal    Frequent falls     with phone assess- family stated pt fell this am (7/10/2013\"in the process of trying to find a facility to help build him back up- (pt now at [facility])    Glaucoma     \"has been in treatment for this in the past- no treatment needed at present\"    History of IBS     Hx MRSA infection     + nasal culture 4/2014    Hyperammonemia (HCC)     Hypertension     on Lisinopril    IBS (irritable bowel syndrome)     ulcerative colitis    Kidney stone     for surgery for stent placement 7/11/2013    Mood disorder ([facility] [address]     per staff at [address] [facility] Occasional tremors     \"makes it difficult for him to write\"    Pressure injury of contiguous region involving back and right buttock, stage 2 ([facility] [address] 5/27/2020    Pressure injury of left buttock, stage 1 7/22/2020    Prostatitis     hx given per H&P old chart    Thyroid disease     Ulcerative colitis ([facility] [address]     hx [facility] staff at [facility] 4/13/2015     Past Surgical History:   Procedure Laterality Date    CHOLECYSTECTOMY      COLONOSCOPY  8/19/14, 5/2012 8/19/14: hemorrhoids, 5/2012 at [address] Left 07/11/2013    with stnet placement   [address] [address]      OTHER SURGICAL HISTORY  04/15/2015    Revision of vagal nerve stimulator    UPPER GASTROINTESTINAL ENDOSCOPY N/A 11/13/2018    EGD DILATION BALLOON AND BIOPSY performed by Veena Mart MD at [address] [address]    Has to have bettery changed every 5 yrs. CURRENT MEDICATIONS    Current Outpatient Rx   Medication Sig Dispense Refill    acetaminophen (APAP EXTRA STRENGTH) 500 MG tablet Take 1 tablet by mouth every 6 hours as needed for Pain 30 tablet 0    solifenacin (VESICARE) 10 MG tablet Take 10 mg by mouth daily      levothyroxine (SYNTHROID) 125 MCG tablet Take 1 tablet by mouth Daily 30 tablet 3    dicyclomine (BENTYL) 10 MG capsule Take 1 capsule by mouth every 6 hours as needed (cramps) 20 capsule 0    amLODIPine (NORVASC) 5 MG tablet Take 5 mg by mouth daily      clonazePAM (KLONOPIN) 0.5 MG tablet Take 0.25 mg by mouth daily.       divalproex (DEPAKOTE) 500 MG DR tablet Take 1,000 mg by mouth nightly      hydrochlorothiazide (MICROZIDE) 12.5 MG capsule Take 12.5 mg by mouth daily      mirabegron (MYRBETRIQ) 50 MG TB24 Take 50 mg by mouth daily      omeprazole (PRILOSEC) 20 MG delayed release capsule Take 20 mg by mouth 2 times daily (before meals)      primidone (MYSOLINE) 50 MG tablet Take 50 mg by mouth 3 times daily      zonisamide (ZONEGRAN) 100 MG capsule Take 100 mg by mouth every morning      ibuprofen (ADVIL;MOTRIN) 600 MG tablet Take 600 mg by mouth every 6 hours as needed for Pain      Simethicone (MI-ACID GAS RELIEF PO) Take 1 Container by mouth every 4 hours as needed 10 cc every 4 hrs not to exceed 60 cc in 24 hrs      guaiFENesin (MUCINEX) 600 MG extended release tablet Take 1,200 mg by mouth daily as needed for Congestion      Menthol-Methyl Salicylate (MUSCLE RUB) 10-15 % CREA cream Apply 1 applicator topically as needed      pseudoephedrine (SUDAFED) 30 MG tablet Take 30 mg by mouth every 4 hours as needed for Congestion      lacosamide (VIMPAT) 200 MG tablet Take 200 mg by mouth as needed. Give 1 tab after seizure activity as needed      lacosamide (VIMPAT) 200 MG tablet Take 1 tablet by mouth 2 times daily for 30 days.  60 tablet 0    latanoprost (XALATAN) 0.005 % ophthalmic solution Place 1 drop into both eyes nightly      timolol (TIMOPTIC) 0.5 % ophthalmic solution Place 1 drop into both eyes daily       Lactobacillus (ACIDOPHILUS) CAPS capsule Take 1 capsule by mouth daily      aspirin 81 MG tablet Take 81 mg by mouth daily      Wheat Dextrin (BENEFIBER) POWD Take 4 g by mouth 2 times daily Takes 3 tsp in liquid twice per day       Lactulose Encephalopathy (ENULOSE PO) Take 30 mLs by mouth 3 times daily      folic acid (FOLVITE) 1 MG tablet Take 1 mg by mouth daily      PARoxetine (PAXIL) 20 MG tablet Take 20 mg by mouth every morning      QUEtiapine (SEROQUEL XR) 50 MG extended release tablet Take 50 mg by mouth nightly      tamsulosin (FLOMAX) 0.4 MG capsule Take 1 capsule by mouth daily 30 capsule 0    simvastatin (ZOCOR) 20 MG tablet Take 20 mg by mouth nightly.  Cholecalciferol (VITAMIN D3) 1000 UNITS CAPS Take 1 tablet by mouth daily.  clorazepate (TRANXENE) 7.5 MG tablet Take 7.5 mg by mouth 2 times daily.          ALLERGIES    No Known Allergies    SOCIAL AND FAMILY HISTORY    Social History     Socioeconomic History    Marital status: Single     Spouse name: None    Number of children: None    Years of education: None    Highest education level: None   Occupational History    None   Social Needs    Financial resource strain: None    Food insecurity     Worry: None     Inability: None    Transportation needs     Medical: None     Non-medical: None   Tobacco Use    Smoking status: Never Smoker    Smokeless tobacco: Never Used   Substance and Sexual Activity    Alcohol use: No    Drug use: No    Sexual activity: None   Lifestyle    Physical activity     Days per week: None     Minutes per session: None    Stress: None   Relationships    Social connections     Talks on phone: None     Gets together: None     Attends [employer] service: None     Active member of club or organization: None     Attends meetings of clubs or organizations: None     Relationship status: None    Intimate partner violence     Fear of current or ex partner: None     Emotionally abused: None     Physically abused: None     Forced sexual activity: None   Other Topics Concern    None   Social History Narrative    None     Family History   Problem Relation Age of Onset    Heart Disease Mother         atrial fib    High Blood Pressure Mother     Asthma Mother     High Cholesterol Mother     Depression Mother     Migraines Mother     High Blood Pressure Father     Heart Disease Father     Asthma Sister     High Cholesterol Sister     Depression Sister     Migraines Sister          PHYSICAL EXAM    VITAL SIGNS: BP (!) 136/51   Pulse 112   Temp 100.5 °F (38.1 °C) (Oral)   Resp 20   Ht 5' 6\" (1.676 m) Wt 180 lb (81.6 kg)   SpO2 93%   BMI 29.05 kg/m²    Constitutional:  Well developed, Well nourished. No distress  HENT:  Normocephalic, Atraumatic, PERRL. EOMI. Sclera clear. Conjunctiva normal, No discharge. Neck/Lymphatics: supple, no JVD, no swollen nodes  Cardiovascular:  Rate 110, regular,  no murmurs/rubs/gallops. No JVD    Respiratory:  Nonlabored breathing. Normal breath sounds, No wheezing  Abdomen: Bowel sounds normal, Soft, No tenderness, no masses. Musculoskeletal:    There is no edema, asymmetry, or calf / thigh tenderness bilaterally. No cyanosis. No cool or pale-appearing limb. Distal cap refill and pulses intact bilateral upper and lower extremities  Bilateral upper and lower extremity ROM intact without pain or obvious deficit  Integument:  Warm, Dry  Neurologic: Alert & oriented , No focal deficits noted. Cranial nerves II through XII grossly intact. Normal gross motor coordination & motor strength bilateral upper and lower extremities  Sensation intact.   Psychiatric:  Affect normal, Mood normal.       Labs:  Results for orders placed or performed during the hospital encounter of 11/23/20   CBC Auto Differential   Result Value Ref Range    WBC 8.1 4.0 - 10.5 K/CU MM    RBC 5.31 4.6 - 6.2 M/CU MM    Hemoglobin 16.0 13.5 - 18.0 GM/DL    Hematocrit 46.3 42 - 52 %    MCV 87.2 78 - 100 FL    MCH 30.1 27 - 31 PG    MCHC 34.6 32.0 - 36.0 %    RDW 12.9 11.7 - 14.9 %    Platelets 321 (L) 474 - 440 K/CU MM    MPV 11.9 (H) 7.5 - 11.1 FL    Differential Type AUTOMATED DIFFERENTIAL     Segs Relative 89.9 (H) 36 - 66 %    Lymphocytes % 4.4 (L) 24 - 44 %    Monocytes % 5.3 (H) 0 - 4 %    Eosinophils % 0.0 0 - 3 %    Basophils % 0.2 0 - 1 %    Segs Absolute 7.3 K/CU MM    Lymphocytes Absolute 0.4 K/CU MM    Monocytes Absolute 0.4 K/CU MM    Eosinophils Absolute 0.0 K/CU MM    Basophils Absolute 0.0 K/CU MM    Nucleated RBC % 0.0 %    Total Nucleated RBC 0.0 K/CU MM    Total Immature Neutrophil 0.02 K/CU MM    Immature Neutrophil % 0.2 0 - 0.43 %   Comprehensive Metabolic Panel   Result Value Ref Range    Sodium 134 (L) 135 - 145 MMOL/L    Potassium 2.9 (LL) 3.5 - 5.1 MMOL/L    Chloride 94 (L) 99 - 110 mMol/L    CO2 26 21 - 32 MMOL/L    BUN 7 6 - 23 MG/DL    CREATININE 0.6 (L) 0.9 - 1.3 MG/DL    Glucose 173 (H) 70 - 99 MG/DL    Calcium 9.3 8.3 - 10.6 MG/DL    Alb 3.8 3.4 - 5.0 GM/DL    Total Protein 8.1 6.4 - 8.2 GM/DL    Total Bilirubin 0.3 0.0 - 1.0 MG/DL    ALT 25 10 - 40 U/L    AST 27 15 - 37 IU/L    Alkaline Phosphatase 75 40 - 129 IU/L    GFR Non-African American >60 >60 mL/min/1.73m2    GFR African American >60 >60 mL/min/1.73m2    Anion Gap 14 4 - 16   COVID-19    Specimen: Nasopharynx/Oropharynx   Result Value Ref Range    SARS-CoV-2, NAAT DETECTED (A)            RADIOLOGY    XR CHEST PORTABLE   Final Result   New multifocal pneumonia. Recommend imaging follow-up to resolution. ED COURSE & MEDICAL DECISION MAKING       Patient presents as above with clinical picture and evaluation in ED consistent with COVID-19 pneumonia. Patient's pulse oximetry 93-95% on room air and patient without focal adventitious lung sounds and able to speak full sentences. In consideration of current COVID19 pandemic, with effort to minimize unnecessary provider exposure, this patient was seen at bedside by me independently. However, in compliance with current hospital HEIDE/ED protocol, prior to admission I did discuss this patient case with emergency department physician, Dr. Edwar Gross. [phone] - I discussed Pt case with hospitalist nurse practitioner Maliha, who agrees to admit Pt. she requests empiric IV antibiotics given possibility of bacterial source of pneumonia. Rocephin and azithromycin ordered at this time. Clinical  IMPRESSION    1. Pneumonia due to COVID-19 virus    2.  Hypokalemia            Comment: Please note this report has been produced using speech recognition software and

## 2020-11-24 PROCEDURE — 86850 RBC ANTIBODY SCREEN: CPT

## 2020-11-24 PROCEDURE — 2060000000 HC ICU INTERMEDIATE R&B

## 2020-11-24 PROCEDURE — 6360000002 HC RX W HCPCS: Performed by: INTERNAL MEDICINE

## 2020-11-24 PROCEDURE — 86900 BLOOD TYPING SEROLOGIC ABO: CPT

## 2020-11-24 PROCEDURE — 86901 BLOOD TYPING SEROLOGIC RH(D): CPT

## 2020-11-24 PROCEDURE — 6360000002 HC RX W HCPCS: Performed by: NURSE PRACTITIONER

## 2020-11-24 PROCEDURE — 6370000000 HC RX 637 (ALT 250 FOR IP): Performed by: NURSE PRACTITIONER

## 2020-11-24 PROCEDURE — 99211 OFF/OP EST MAY X REQ PHY/QHP: CPT

## 2020-11-24 PROCEDURE — 94761 N-INVAS EAR/PLS OXIMETRY MLT: CPT

## 2020-11-24 PROCEDURE — 2580000003 HC RX 258: Performed by: INTERNAL MEDICINE

## 2020-11-24 PROCEDURE — 2580000003 HC RX 258: Performed by: NURSE PRACTITIONER

## 2020-11-24 RX ORDER — QUETIAPINE FUMARATE 50 MG/1
50 TABLET, EXTENDED RELEASE ORAL NIGHTLY
Status: DISCONTINUED | OUTPATIENT
Start: 2020-11-24 | End: 2020-11-27 | Stop reason: HOSPADM

## 2020-11-24 RX ORDER — PRIMIDONE 50 MG/1
50 TABLET ORAL 3 TIMES DAILY
Status: DISCONTINUED | OUTPATIENT
Start: 2020-11-24 | End: 2020-11-27 | Stop reason: HOSPADM

## 2020-11-24 RX ORDER — GUAIFENESIN/DEXTROMETHORPHAN 100-10MG/5
5 SYRUP ORAL EVERY 4 HOURS PRN
Status: DISCONTINUED | OUTPATIENT
Start: 2020-11-24 | End: 2020-11-27 | Stop reason: HOSPADM

## 2020-11-24 RX ORDER — SIMVASTATIN 20 MG
20 TABLET ORAL NIGHTLY
Status: DISCONTINUED | OUTPATIENT
Start: 2020-11-24 | End: 2020-11-27 | Stop reason: HOSPADM

## 2020-11-24 RX ORDER — LACOSAMIDE 100 MG/1
200 TABLET ORAL PRN
Status: DISCONTINUED | OUTPATIENT
Start: 2020-11-24 | End: 2020-11-27 | Stop reason: HOSPADM

## 2020-11-24 RX ORDER — AMLODIPINE BESYLATE 5 MG/1
5 TABLET ORAL DAILY
Status: DISCONTINUED | OUTPATIENT
Start: 2020-11-24 | End: 2020-11-27 | Stop reason: HOSPADM

## 2020-11-24 RX ORDER — LACTOBACILLUS RHAMNOSUS GG 10B CELL
1 CAPSULE ORAL DAILY
Status: DISCONTINUED | OUTPATIENT
Start: 2020-11-24 | End: 2020-11-27 | Stop reason: HOSPADM

## 2020-11-24 RX ORDER — WHEAT DEXTRIN 3 G/3.8 G
4 POWDER (GRAM) ORAL 2 TIMES DAILY
Status: DISCONTINUED | OUTPATIENT
Start: 2020-11-24 | End: 2020-11-24

## 2020-11-24 RX ORDER — CLONAZEPAM 0.5 MG/1
0.25 TABLET ORAL DAILY
Status: DISCONTINUED | OUTPATIENT
Start: 2020-11-24 | End: 2020-11-27 | Stop reason: HOSPADM

## 2020-11-24 RX ORDER — DICYCLOMINE HYDROCHLORIDE 10 MG/1
10 CAPSULE ORAL EVERY 6 HOURS PRN
Status: DISCONTINUED | OUTPATIENT
Start: 2020-11-24 | End: 2020-11-27 | Stop reason: HOSPADM

## 2020-11-24 RX ORDER — LACTULOSE 10 G/15ML
10 SOLUTION ORAL 3 TIMES DAILY
Status: DISCONTINUED | OUTPATIENT
Start: 2020-11-24 | End: 2020-11-27 | Stop reason: HOSPADM

## 2020-11-24 RX ORDER — ACETAMINOPHEN 650 MG/1
650 SUPPOSITORY RECTAL EVERY 6 HOURS PRN
Status: DISCONTINUED | OUTPATIENT
Start: 2020-11-24 | End: 2020-11-27 | Stop reason: HOSPADM

## 2020-11-24 RX ORDER — LORAZEPAM 1 MG/1
1 TABLET ORAL 2 TIMES DAILY
Status: DISCONTINUED | OUTPATIENT
Start: 2020-11-24 | End: 2020-11-27 | Stop reason: HOSPADM

## 2020-11-24 RX ORDER — TAMSULOSIN HYDROCHLORIDE 0.4 MG/1
0.4 CAPSULE ORAL DAILY
Status: DISCONTINUED | OUTPATIENT
Start: 2020-11-24 | End: 2020-11-27 | Stop reason: HOSPADM

## 2020-11-24 RX ORDER — TIMOLOL MALEATE 5 MG/ML
1 SOLUTION/ DROPS OPHTHALMIC DAILY
Status: DISCONTINUED | OUTPATIENT
Start: 2020-11-24 | End: 2020-11-27 | Stop reason: HOSPADM

## 2020-11-24 RX ORDER — FOLIC ACID 1 MG/1
1 TABLET ORAL DAILY
Status: DISCONTINUED | OUTPATIENT
Start: 2020-11-24 | End: 2020-11-27 | Stop reason: HOSPADM

## 2020-11-24 RX ORDER — LACOSAMIDE 100 MG/1
200 TABLET ORAL 2 TIMES DAILY
Status: DISCONTINUED | OUTPATIENT
Start: 2020-11-24 | End: 2020-11-27 | Stop reason: HOSPADM

## 2020-11-24 RX ORDER — ZONISAMIDE 100 MG/1
100 CAPSULE ORAL EVERY MORNING
Status: DISCONTINUED | OUTPATIENT
Start: 2020-11-24 | End: 2020-11-27 | Stop reason: HOSPADM

## 2020-11-24 RX ORDER — POTASSIUM CHLORIDE 20 MEQ/1
40 TABLET, EXTENDED RELEASE ORAL PRN
Status: DISCONTINUED | OUTPATIENT
Start: 2020-11-24 | End: 2020-11-27 | Stop reason: HOSPADM

## 2020-11-24 RX ORDER — SODIUM CHLORIDE 0.9 % (FLUSH) 0.9 %
10 SYRINGE (ML) INJECTION PRN
Status: DISCONTINUED | OUTPATIENT
Start: 2020-11-24 | End: 2020-11-27 | Stop reason: HOSPADM

## 2020-11-24 RX ORDER — SODIUM CHLORIDE 0.9 % (FLUSH) 0.9 %
10 SYRINGE (ML) INJECTION EVERY 12 HOURS SCHEDULED
Status: DISCONTINUED | OUTPATIENT
Start: 2020-11-24 | End: 2020-11-27 | Stop reason: HOSPADM

## 2020-11-24 RX ORDER — ACETAMINOPHEN 325 MG/1
650 TABLET ORAL EVERY 6 HOURS PRN
Status: DISCONTINUED | OUTPATIENT
Start: 2020-11-24 | End: 2020-11-27 | Stop reason: HOSPADM

## 2020-11-24 RX ORDER — PANTOPRAZOLE SODIUM 40 MG/1
40 TABLET, DELAYED RELEASE ORAL
Status: DISCONTINUED | OUTPATIENT
Start: 2020-11-24 | End: 2020-11-27 | Stop reason: HOSPADM

## 2020-11-24 RX ORDER — PAROXETINE HYDROCHLORIDE 20 MG/1
20 TABLET, FILM COATED ORAL EVERY MORNING
Status: DISCONTINUED | OUTPATIENT
Start: 2020-11-24 | End: 2020-11-27 | Stop reason: HOSPADM

## 2020-11-24 RX ORDER — TROSPIUM CHLORIDE 20 MG/1
20 TABLET, FILM COATED ORAL DAILY
Status: DISCONTINUED | OUTPATIENT
Start: 2020-11-24 | End: 2020-11-27 | Stop reason: HOSPADM

## 2020-11-24 RX ORDER — LEVOTHYROXINE SODIUM 0.12 MG/1
125 TABLET ORAL DAILY
Status: DISCONTINUED | OUTPATIENT
Start: 2020-11-24 | End: 2020-11-27 | Stop reason: HOSPADM

## 2020-11-24 RX ORDER — LATANOPROST 50 UG/ML
1 SOLUTION/ DROPS OPHTHALMIC NIGHTLY
Status: DISCONTINUED | OUTPATIENT
Start: 2020-11-24 | End: 2020-11-27 | Stop reason: HOSPADM

## 2020-11-24 RX ORDER — DIVALPROEX SODIUM 500 MG/1
1000 TABLET, DELAYED RELEASE ORAL NIGHTLY
Status: DISCONTINUED | OUTPATIENT
Start: 2020-11-24 | End: 2020-11-27 | Stop reason: HOSPADM

## 2020-11-24 RX ORDER — PSEUDOEPHEDRINE HYDROCHLORIDE 30 MG/1
30 TABLET ORAL EVERY 4 HOURS PRN
Status: DISCONTINUED | OUTPATIENT
Start: 2020-11-24 | End: 2020-11-27 | Stop reason: HOSPADM

## 2020-11-24 RX ORDER — DEXAMETHASONE 4 MG/1
6 TABLET ORAL DAILY
Status: DISCONTINUED | OUTPATIENT
Start: 2020-11-24 | End: 2020-11-27 | Stop reason: HOSPADM

## 2020-11-24 RX ORDER — GUAIFENESIN 600 MG/1
1200 TABLET, EXTENDED RELEASE ORAL DAILY PRN
Status: DISCONTINUED | OUTPATIENT
Start: 2020-11-24 | End: 2020-11-27 | Stop reason: HOSPADM

## 2020-11-24 RX ORDER — HYDROCORTISONE 0.5 %
1 CREAM (GRAM) TOPICAL 4 TIMES DAILY PRN
Status: DISCONTINUED | OUTPATIENT
Start: 2020-11-24 | End: 2020-11-27 | Stop reason: HOSPADM

## 2020-11-24 RX ORDER — POTASSIUM CHLORIDE 7.45 MG/ML
10 INJECTION INTRAVENOUS PRN
Status: DISCONTINUED | OUTPATIENT
Start: 2020-11-24 | End: 2020-11-27 | Stop reason: HOSPADM

## 2020-11-24 RX ADMIN — LACTULOSE 10 G: 10 SOLUTION ORAL at 18:41

## 2020-11-24 RX ADMIN — PRIMIDONE 50 MG: 50 TABLET ORAL at 18:41

## 2020-11-24 RX ADMIN — TAMSULOSIN HYDROCHLORIDE 0.4 MG: 0.4 CAPSULE ORAL at 08:39

## 2020-11-24 RX ADMIN — Medication 1000 UNITS: at 08:38

## 2020-11-24 RX ADMIN — PRIMIDONE 50 MG: 50 TABLET ORAL at 08:39

## 2020-11-24 RX ADMIN — CEFEPIME HYDROCHLORIDE 2 G: 2 INJECTION, POWDER, FOR SOLUTION INTRAVENOUS at 18:40

## 2020-11-24 RX ADMIN — PRIMIDONE 50 MG: 50 TABLET ORAL at 21:18

## 2020-11-24 RX ADMIN — LACOSAMIDE 200 MG: 100 TABLET, FILM COATED ORAL at 21:18

## 2020-11-24 RX ADMIN — Medication 1 PACKET: at 22:04

## 2020-11-24 RX ADMIN — DEXAMETHASONE 6 MG: 4 TABLET ORAL at 08:39

## 2020-11-24 RX ADMIN — LACOSAMIDE 200 MG: 100 TABLET, FILM COATED ORAL at 08:38

## 2020-11-24 RX ADMIN — LORAZEPAM 1 MG: 1 TABLET ORAL at 08:39

## 2020-11-24 RX ADMIN — PAROXETINE HYDROCHLORIDE 20 MG: 20 TABLET, FILM COATED ORAL at 08:39

## 2020-11-24 RX ADMIN — LACTULOSE 10 G: 10 SOLUTION ORAL at 08:38

## 2020-11-24 RX ADMIN — VANCOMYCIN HYDROCHLORIDE 1750 MG: 5 INJECTION, POWDER, LYOPHILIZED, FOR SOLUTION INTRAVENOUS at 08:37

## 2020-11-24 RX ADMIN — CLONAZEPAM 0.25 MG: 0.5 TABLET ORAL at 08:38

## 2020-11-24 RX ADMIN — ENOXAPARIN SODIUM 40 MG: 40 INJECTION SUBCUTANEOUS at 21:18

## 2020-11-24 RX ADMIN — ZONISAMIDE 100 MG: 100 CAPSULE ORAL at 08:39

## 2020-11-24 RX ADMIN — AMLODIPINE BESYLATE 5 MG: 5 TABLET ORAL at 08:39

## 2020-11-24 RX ADMIN — FOLIC ACID 1 MG: 1 TABLET ORAL at 08:39

## 2020-11-24 RX ADMIN — DIVALPROEX SODIUM 1000 MG: 500 TABLET, DELAYED RELEASE ORAL at 21:16

## 2020-11-24 RX ADMIN — TROSPIUM CHLORIDE 20 MG: 20 TABLET, FILM COATED ORAL at 08:39

## 2020-11-24 RX ADMIN — TIMOLOL MALEATE 1 DROP: 5 SOLUTION OPHTHALMIC at 18:43

## 2020-11-24 RX ADMIN — SIMVASTATIN 20 MG: 20 TABLET, FILM COATED ORAL at 21:16

## 2020-11-24 RX ADMIN — QUETIAPINE FUMARATE 50 MG: 50 TABLET, EXTENDED RELEASE ORAL at 21:16

## 2020-11-24 RX ADMIN — SODIUM CHLORIDE, PRESERVATIVE FREE 10 ML: 5 INJECTION INTRAVENOUS at 21:18

## 2020-11-24 RX ADMIN — LORAZEPAM 1 MG: 1 TABLET ORAL at 21:18

## 2020-11-24 RX ADMIN — LATANOPROST 1 DROP: 50 SOLUTION/ DROPS OPHTHALMIC at 21:16

## 2020-11-24 RX ADMIN — Medication 1 CAPSULE: at 08:39

## 2020-11-24 RX ADMIN — LACTULOSE 10 G: 10 SOLUTION ORAL at 21:14

## 2020-11-24 RX ADMIN — SODIUM CHLORIDE, PRESERVATIVE FREE 10 ML: 5 INJECTION INTRAVENOUS at 08:39

## 2020-11-24 ASSESSMENT — PAIN SCALES - GENERAL
PAINLEVEL_OUTOF10: 0

## 2020-11-24 NOTE — PROGRESS NOTES
Hospitalist Progress Note         Admit Date: 11/23/2020    PCP: Orion El MD     Chief Complaint   Patient presents with    Fever     onset 5 days ago, with sob and shakes 102.5 in triage        Assessment and Plan:     -Sepsis secondary to Covid infection and possible gram-negative pneumonia    Continue empiric broad-spectrum antibiotics. Continue PPE and droplet precautions. Consider convulsant plasma if hypoxia gets worse. On Decadron, INH and monitor  -Seizure disorder continue Depakote, Vimpat.  -Electrolyte imbalance on replacement protocol. Monitor  -Hypothyroidism continue current Synthroid.  -Pressure ulcers wound care on board. -BPH continue Flomax and [place]. -HTN continue Norvasc.  -Cerebral palsy provide supportive care.     VTE prophylaxis LMWH    Current Facility-Administered Medications   Medication Dose Route Frequency Provider Last Rate Last Dose    acetaminophen (TYLENOL) tablet 650 mg  650 mg Oral Q6H PRN David Sera, APRN - CNP        Or    acetaminophen (TYLENOL) suppository 650 mg  650 mg Rectal Q6H PRN Farideh Thao, APRN - CNP        guaiFENesin-dextromethorphan (ROBITUSSIN DM) 100-10 MG/5ML syrup 5 mL  5 mL Oral Q4H PRN David Sera, APRN - CNP        dexamethasone (DECADRON) tablet 6 mg  6 mg Oral Daily Farideh Thao, APRN - CNP   6 mg at 11/24/20 0839    amLODIPine (NORVASC) tablet 5 mg  5 mg Oral Daily David Sera, APRN - CNP   5 mg at 11/24/20 0839    vitamin D CAPS 1,000 Units  1 capsule Oral Daily David Sera, APRN - CNP   1,000 Units at 11/24/20 5735    clonazePAM (KLONOPIN) tablet 0.25 mg  0.25 mg Oral Daily David Sera, APRN - CNP   0.25 mg at 11/24/20 7113    LORazepam (ATIVAN) tablet 1 mg  1 mg Oral BID David Sera, APRN - CNP   1 mg at 11/24/20 0007    dicyclomine (BENTYL) capsule 10 mg  10 mg Oral Q6H PRN David Sera, APRN - CNP        divalproex (DEPAKOTE) DR tablet 1,000 mg  1,000 mg Oral Nightly David Sera, APRN - CNP  folic acid (FOLVITE) tablet 1 mg  1 mg Oral Daily Desiree Lick, APRN - CNP   1 mg at 11/24/20 7998    guaiFENesin (MUCINEX) extended release tablet 1,200 mg  1,200 mg Oral Daily PRN Desiree Lick, APRN - CNP        lacosamide (VIMPAT) tablet 200 mg  200 mg Oral BID Desiree Lick, APRN - CNP   200 mg at 11/24/20 7660    lacosamide (VIMPAT) tablet 200 mg  200 mg Oral PRN Desiree Lick, APRN - CNP        lactobacillus (CULTURELLE) capsule 1 capsule  1 capsule Oral Daily Desiree Lick, APRN - CNP   1 capsule at 11/24/20 0839    lactulose (CHRONULAC) 10 GM/15ML solution 10 g  10 g Oral TID Desiree Lick, APRN - CNP   10 g at 11/24/20 0838    latanoprost (XALATAN) 0.005 % ophthalmic solution 1 drop  1 drop Both Eyes Nightly Farideh Thao APRN - CNP        levothyroxine (SYNTHROID) tablet 125 mcg  125 mcg Oral Daily Farideh Thao, APRN - CNP        methyl salicylate-menthol (ALBIN MONTOYA GREASELESS) 10-15 % cream CREA 1 each  1 each Topical 4x Daily PRN Desiree Lick, APRN - CNP        mirabegron (MYRBETRIQ) extended release tablet 50 mg  50 mg Oral Daily Farideh Thao, APRN - CNP        pantoprazole (PROTONIX) tablet 40 mg  40 mg Oral QAM AC Farideh Thao APRN - CNP        PARoxetine (PAXIL) tablet 20 mg  20 mg Oral QAM Desiree Lick, APRN - CNP   20 mg at 11/24/20 0839    primidone (MYSOLINE) tablet 50 mg  50 mg Oral TID Desiree Lick, APRN - CNP   50 mg at 11/24/20 0839    pseudoephedrine (SUDAFED) tablet 30 mg  30 mg Oral Q4H PRN Desiree Lick, APRN - CNP        QUEtiapine (SEROQUEL XR) extended release tablet 50 mg  50 mg Oral Nightly Farideh Thao, APRN - CNP        simvastatin (ZOCOR) tablet 20 mg  20 mg Oral Nightly Farideh Thao, APRN - CNP        zonisamide (ZONEGRAN) capsule 100 mg  100 mg Oral QAM MARY Seymour - CNP   100 mg at 11/24/20 0839    Benefiber POWD 4 g  4 g Oral BID MARY Seymour CNP        timolol (TIMOPTIC) 0.5 % ophthalmic solution 1 drop  1 drop Both Eyes Daily MARY Brannon CNP        tamsulosin (FLOMAX) capsule 0.4 mg  0.4 mg Oral Daily MARY Brannon CNP   0.4 mg at 11/24/20 0839    trospium (SANCTURA) tablet 20 mg  20 mg Oral Daily MARY Brannon CNP   20 mg at 11/24/20 0839    sodium chloride flush 0.9 % injection 10 mL  10 mL Intravenous 2 times per day MARY Brannon CNP   10 mL at 11/24/20 0839    sodium chloride flush 0.9 % injection 10 mL  10 mL Intravenous PRN MARY Brannon CNP        cefepime (MAXIPIME) 2 g IVPB minibag  2 g Intravenous Q8H MARY Brannon CNP        [START ON 11/25/2020] vancomycin (VANCOCIN) 1,250 mg in dextrose 5 % 250 mL IVPB  15 mg/kg Intravenous Q12H MARY Seymour CNP        potassium chloride (KLOR-CON M) extended release tablet 40 mEq  40 mEq Oral PRN MARY Brannon CNP        Or    potassium bicarb-citric acid (EFFER-K) effervescent tablet 40 mEq  40 mEq Oral PRN MARY Brannon CNP        Or    potassium chloride 10 mEq/100 mL IVPB (Peripheral Line)  10 mEq Intravenous PRN MARY Brannon CNP        enoxaparin (LOVENOX) injection 40 mg  40 mg Subcutaneous BID MD Xander           Subjective:     Patient denied any new complaints. No acute overnight events. Currently on 2 L of O2 by NC    Objective:        Intake/Output Summary (Last 24 hours) at 11/24/2020 1550  Last data filed at 11/24/2020 6238  Gross per 24 hour   Intake 1110 ml   Output --   Net 1110 ml      Vitals:   Vitals:    11/24/20 0803   BP: (!) 106/91   Pulse: 73   Resp: 20   Temp: 97.1 °F (36.2 °C)   SpO2:      Physical Exam:  General Appearance:    Alert, cooperative, no distress  Head:      Normocephalic, without obvious abnormality, atraumatic  Eyes:       Conjunctiva/corneas clear, EOM's intact  Lungs:    B/L intermittent rhonchi/crackles +  Heart:                Regular rate and rhythm, S1 and S2 normal, no murmur,   rub or gallop  Abdomen: Soft, non-tender, bowel sounds active, no masses, no organomegaly  Extremities:   Extremities normal, atraumatic, no cyanosis or edema   Neurological:   Grossly Intact. Significant Diagnostic Studies:   DATA:    CBC   Recent Labs     11/23/20  1720   WBC 8.1   HGB 16.0   HCT 46.3   *      BMP   Recent Labs     11/23/20  1720   *   K 2.9*   CL 94*   CO2 26   BUN 7   CREATININE 0.6*     LFT'S   Recent Labs     11/23/20  1720   AST 27   ALT 25   BILITOT 0.3   ALKPHOS 75     COAG   Recent Labs     11/23/20  1720   INR 1.02     POC:   Lab Results   Component Value Date    POCGLU 78 10/27/2014     [id number]:No results found for: LABA1C  CARDIAC ENZYMES  No results for input(s): CKTOTAL, CKMB, CKMBINDEX, TROPONINI in the last 72 hours. Troponin: No results for input(s): TROPONINT in the last 72 hours. BNP: No results for input(s): PROBNP in the last 72 hours. U/A:    Lab Results   Component Value Date    COLORU YELLOW 07/31/2020    WBCUA 223 07/31/2020    RBCUA NONE SEEN 07/31/2020    MUCUS RARE 07/31/2020    BACTERIA NEGATIVE 07/31/2020    CLARITYU HAZY 07/31/2020    SPECGRAV 1.008 07/31/2020    LEUKOCYTESUR LARGE 07/31/2020    BLOODU SMALL 07/31/2020    GLUCOSEU NEGATIVE 05/10/2013       Xr Chest Portable    Result Date: 11/23/2020  EXAMINATION: ONE XRAY VIEW OF THE CHEST 11/23/2020 5:06 pm COMPARISON: 04/16/2020 HISTORY: ORDERING SYSTEM PROVIDED HISTORY: Chest pain TECHNOLOGIST PROVIDED HISTORY: Reason for exam:->Chest pain Reason for Exam: Chest pain Acuity: Unknown Type of Exam: Initial Additional signs and symptoms: unknown Relevant Medical/Surgical History: aspiration pna FINDINGS: Cardiomediastinal silhouette is stable. New patchy bilateral airspace opacities. No pleural effusion or pneumothorax. No gross bony abnormality. New multifocal pneumonia. Recommend imaging follow-up to resolution.            [facility]

## 2020-11-24 NOTE — ED NOTES
Pt was incontinent of urine. Linens changed and depends placed. PIV infiltrated so new PIV inserted in R wrist.  Patient is alert and oriented, no acute distress. Continuing to await inpatient bed.      Michael Johnson RN  11/24/20 4222

## 2020-11-24 NOTE — CARE COORDINATION
CM reviewed patient's documentation, radiology reports and lab results. Patient requiring admission. Patient found to have potassium 2.9. Per chest xray report: New multifocal pneumonia.  Recommend imaging follow-up to resolution. Per physician documentation: Mylene Bateman is a 60 y.o. male who presents with nasal congestion, cough, fever. Onset of symptoms 5 days ago. Context is patient has the above symptoms for 5 days. Patient has a history of cerebral palsy and resides in a group home. No known sick, COVID-19 contacts. Patient presents as above with clinical picture and evaluation in ED consistent with COVID-19 pneumonia. Patient's pulse oximetry 93-95% on room air and patient without focal adventitious lung sounds and able to speak full sentences. In consideration of current COVID19 pandemic, with effort to minimize unnecessary provider exposure, this patient was seen at bedside by me independently. However, in compliance with current hospital HEIDE/ED protocol, prior to admission I did discuss this patient case with emergency department physician, Dr. Merced Ann.     [phone] - I discussed Pt case with hospitalist nurse practitioner Maliha, who agrees to admit Pt. she requests empiric IV antibiotics given possibility of bacterial source of pneumonia.   Rocephin and azithromycin ordered at this time.

## 2020-11-24 NOTE — PROGRESS NOTES
[address] [facility]  consulted by Dr. Eva Palomo for monitoring and adjustment. Indication for treatment: Covid-19, possible secondary bacterial infection, sepsis  Goal trough:  15 mcg/mL     Pertinent Laboratory Values:   Temp Readings from Last 3 Encounters:   11/24/20 97.4 °F (36.3 °C) (Oral)   09/07/20 98 °F (36.7 °C) (Oral)   08/05/20 98.6 °F (37 °C)     Recent Labs     11/23/20  1720   WBC 8.1   LACTATE 1.9     Recent Labs     11/23/20  1720   BUN 7   CREATININE 0.6*     Estimated Creatinine Clearance: 125 mL/min (A) (based on SCr of 0.6 mg/dL (L)). Intake/Output Summary (Last 24 hours) at 11/24/2020 0728  Last data filed at 11/24/2020 0226  Gross per 24 hour   Intake 1100 ml   Output --   Net 1100 ml       Pertinent Cultures:  Date    Source    Results  11/24   Respiratory   Ordered  11/24   Strep/Legionella  Ordered  11/24   Influenza A/B   Ordered  11/24   Blood    Ordered  11/23   Covid-19   Positive    Vancomycin level:   TROUGH:  No results for input(s): VANCOTROUGH in the last 72 hours. RANDOM:  No results for input(s): VANCORANDOM in the last 72 hours. Assessment:  · WBC and temperature: WBC WNL;  Tmax = 102.5  · SCr, BUN, and urine output: previously WNL  · Day(s) of therapy: #1  · Vancomycin level: to be collected    Plan:  · Vancomycin 1750 mg x1 dose, followed by vancomycin 1250 mg IVPB q12h  · Plan to check a trough level prior to the 4th total dose  · Pharmacy will continue to monitor patient and adjust therapy as indicated    [place] 11/25 @ 2030    Thank you for the consult,  Lio Velasquez, [address]  11/24/2020 7:28 AM

## 2020-11-24 NOTE — CONSULTS
[address] [facility] Wound Ostomy Continence Nurse  Consult Note       Sanjay Downs  AGE: 60 y.o.    GENDER: male  : 1955  TODAY'S DATE:  2020    Subjective:     Reason for  Evaluation and Assessment:  Wound care evalValentina Downs is a 60 y.o. male referred by:   [x] Physician  [] Nursing  [] Other:     Wound Identification:  Wound Type: healed pressure wounds   Contributing Factors: chronic pressure and decreased mobility        PAST MEDICAL HISTORY        Diagnosis Date    Anemia     Aspiration pneumonia ([address]     dx 2014 with this per ecf/ per old chart dx with pneumonia with admission 2018    Cerebral palsy ([address]     \"has no feeling on left side of body\"alert but has some dementia but not diagnosed, has good long term but poor short term and wakes up disoriented after a nap\"(per yaneth)()    Depression     Epilepsy ([address]     last seizure 2018- hx grand mal    Frequent falls     with phone assess- family stated pt fell this am (7/10/2013\"in the process of trying to find a facility to help build him back up- (pt now at [facility])    Glaucoma     \"has been in treatment for this in the past- no treatment needed at present\"    History of IBS     Hx MRSA infection     + nasal culture 2014    Hyperammonemia ([address]     Hypertension     on Lisinopril    IBS (irritable bowel syndrome)     ulcerative colitis    Kidney stone     for surgery for stent placement 2013    Mood disorder ([address]     per staff at [facility] Occasional tremors     \"makes it difficult for him to write\"    Pressure injury of contiguous region involving back and right buttock, stage 2 ([address] 2020    Pressure injury of left buttock, stage 1 2020    Prostatitis     hx given per H&P old chart    Thyroid disease     Ulcerative colitis ([address]     hx given per staff at [facility] 2015       PAST SURGICAL HISTORY    Past Surgical History:   Procedure Laterality Date    CHOLECYSTECTOMY      COLONOSCOPY  8/19/14, 5/2012 8/19/14: hemorrhoids, 5/2012 at [address] Left 07/11/2013    with stnet placement   [address]  2005    KIDNEY STONE SURGERY      OTHER SURGICAL HISTORY  04/15/2015    Revision of vagal nerve stimulator    UPPER GASTROINTESTINAL ENDOSCOPY N/A 11/13/2018    EGD DILATION BALLOON AND BIOPSY performed by Roxana Peraza MD at [facility]  2002    Has to have bettery changed every 5 yrs. FAMILY HISTORY    Family History   Problem Relation Age of Onset    Heart Disease Mother         atrial fib    High Blood Pressure Mother     Asthma Mother     High Cholesterol Mother     Depression Mother    [de-identified] Migraines Mother     High Blood Pressure Father     Heart Disease Father     Asthma Sister     High Cholesterol Sister     Depression Sister     Migraines Sister        SOCIAL HISTORY    Social History     Tobacco Use    Smoking status: Never Smoker    Smokeless tobacco: Never Used   Substance Use Topics    Alcohol use: No    Drug use: No       ALLERGIES    No Known Allergies    MEDICATIONS    No current facility-administered medications on file prior to encounter. Current Outpatient Medications on File Prior to Encounter   Medication Sig Dispense Refill    acetaminophen (APAP EXTRA STRENGTH) 500 MG tablet Take 1 tablet by mouth every 6 hours as needed for Pain 30 tablet 0    solifenacin (VESICARE) 10 MG tablet Take 10 mg by mouth daily      levothyroxine (SYNTHROID) 125 MCG tablet Take 1 tablet by mouth Daily 30 tablet 3    dicyclomine (BENTYL) 10 MG capsule Take 1 capsule by mouth every 6 hours as needed (cramps) 20 capsule 0    amLODIPine (NORVASC) 5 MG tablet Take 5 mg by mouth daily      clonazePAM (KLONOPIN) 0.5 MG tablet Take 0.25 mg by mouth daily.       divalproex (DEPAKOTE) 500 MG DR tablet Take 1,000 mg by mouth nightly      hydrochlorothiazide (MICROZIDE) 12.5 MG capsule Take 12.5 mg by mouth daily      mirabegron (MYRBETRIQ) 50 MG TB24 Take 50 mg by mouth daily      omeprazole (PRILOSEC) 20 MG delayed release capsule Take 20 mg by mouth 2 times daily (before meals)      primidone (MYSOLINE) 50 MG tablet Take 50 mg by mouth 3 times daily      zonisamide (ZONEGRAN) 100 MG capsule Take 100 mg by mouth every morning      ibuprofen (ADVIL;MOTRIN) 600 MG tablet Take 600 mg by mouth every 6 hours as needed for Pain      Simethicone (MI-ACID GAS RELIEF PO) Take 1 Container by mouth every 4 hours as needed 10 cc every 4 hrs not to exceed 60 cc in 24 hrs      guaiFENesin (MUCINEX) 600 MG extended release tablet Take 1,200 mg by mouth daily as needed for Congestion      Menthol-Methyl Salicylate (MUSCLE RUB) 10-15 % CREA cream Apply 1 applicator topically as needed      pseudoephedrine (SUDAFED) 30 MG tablet Take 30 mg by mouth every 4 hours as needed for Congestion      lacosamide (VIMPAT) 200 MG tablet Take 200 mg by mouth as needed. Give 1 tab after seizure activity as needed      lacosamide (VIMPAT) 200 MG tablet Take 1 tablet by mouth 2 times daily for 30 days.  60 tablet 0    latanoprost (XALATAN) 0.005 % ophthalmic solution Place 1 drop into both eyes nightly      timolol (TIMOPTIC) 0.5 % ophthalmic solution Place 1 drop into both eyes daily       Lactobacillus (ACIDOPHILUS) CAPS capsule Take 1 capsule by mouth daily      aspirin 81 MG tablet Take 81 mg by mouth daily      Wheat Dextrin (BENEFIBER) POWD Take 4 g by mouth 2 times daily Takes 3 tsp in liquid twice per day       Lactulose Encephalopathy (ENULOSE PO) Take 30 mLs by mouth 3 times daily      folic acid (FOLVITE) 1 MG tablet Take 1 mg by mouth daily      PARoxetine (PAXIL) 20 MG tablet Take 20 mg by mouth every morning      QUEtiapine (SEROQUEL XR) 50 MG extended release tablet Take 50 mg by mouth nightly      tamsulosin (FLOMAX) 0.4 MG capsule Take 1 capsule by mouth daily 30 capsule 0    simvastatin (ZOCOR) 20 MG tablet Take 20 mg by mouth nightly.  Cholecalciferol (VITAMIN D3) 1000 UNITS CAPS Take 1 tablet by mouth daily.  clorazepate (TRANXENE) 7.5 MG tablet Take 7.5 mg by mouth 2 times daily.            Objective:      BP (!) 106/91   Pulse 73   Temp 97.1 °F (36.2 °C) (Axillary)   Resp 20   Ht 5' 6\" (1.676 m)   Wt 180 lb (81.6 kg)   SpO2 95%   BMI 29.05 kg/m²   Jean Risk Score: Jean Scale Score: 17    LABS    CBC:   Lab Results   Component Value Date    WBC 8.1 11/23/2020    RBC 5.31 11/23/2020    HGB 16.0 11/23/2020    HCT 46.3 11/23/2020    MCV 87.2 11/23/2020    MCH 30.1 11/23/2020    MCHC 34.6 11/23/2020    RDW 12.9 11/23/2020     11/23/2020    MPV 11.9 11/23/2020     CMP:    Lab Results   Component Value Date     11/23/2020    K 2.9 11/23/2020    CL 94 11/23/2020    CO2 26 11/23/2020    BUN 7 11/23/2020    CREATININE 0.6 11/23/2020    GFRAA >60 11/23/2020    LABGLOM >60 11/23/2020    GLUCOSE 173 11/23/2020    PROT 8.1 11/23/2020    LABALBU 3.8 11/23/2020    CALCIUM 9.3 11/23/2020    BILITOT 0.3 11/23/2020    ALKPHOS 75 11/23/2020    AST 27 11/23/2020    ALT 25 11/23/2020     Albumin:    Lab Results   Component Value Date    LABALBU 3.8 11/23/2020     PT/INR:    Lab Results   Component Value Date    PROTIME 12.3 11/23/2020    INR 1.02 11/23/2020     HgBA1c:  No results found for: LABA1C      Assessment:     Patient Active Problem List   Diagnosis    Cerebral palsy, infantile hemiplegia (HCC)    Rosacea, acne    IBS (irritable bowel syndrome)    Hypertension    Calculus of ureter    Pyelonephritis    Sepsis ([facility]    Pneumonia    Increased ammonia level    Aspiration pneumonia (HCC)    Hypokalemia    Hypomagnesemia    Hypophosphatasia    Seizure disorder (HCC)    Seizure disorder, grand mal ([address]    Sepsis due to undetermined organism with acute respiratory failure (HCC)    Pain of upper abdomen    Diarrhea of presumed infectious origin    Abdominal pain    Pressure injury of contiguous region involving back and right buttock, stage 2 (HCC)    Cellulitis, perineum    Pressure injury of right buttock, stage 2 (HCC)    Pressure injury of left buttock, stage 1    COVID-19 virus detected    Hyponatremia    Multifocal pneumonia       Measurements:       Response to treatment:  Well tolerated by patient. Pain Assessment:  Severity:  none  Quality of pain: none  Wound Pain Timing/Severity:   Premedicated: no    Plan:     Plan of Care:          Pt in bed watching tv agreeable to wound care eval. Pt had previous wounds to buttocks now with pink scar tissue and intact. Recommend calazime cream. Pt is at mild risk for skin breakdown AEB sandra score. Observe sandra orders. Heels intact. Specialty Bed Required :  yes  [x] Low Air Loss   [] Pressure Redistribution  [] Fluid Immersion  [] Bariatric  [] Total Pressure Relief  [] Other:     Discharge Plan:  Placement for patient upon discharge:  Pt lives in a group home. Hospice Care: no  Patient appropriate for Outpatient [address]:  no    Patient/Caregiver Teaching:  Level of patient/caregiver understanding able to:  Cares explained as given. Electronically signed by Zulema Hopson.  MEENAKSHI Steven,  on 11/24/2020 at 11:44 AM

## 2020-11-24 NOTE — CARE COORDINATION
[address] [facility] 70 caregiver Tiffany at [phone]. She stated that patient lives in Group home ([facility]) He lives with 2 other adult men; 24 hr supervision (2 staff) in a 1 floor home. He is assisted with all ADL's; incont of urine for last 2 weeks. He is a high falll risk; wears helmet and gait belt. Patient has a wheelchair at the [address] [place] [address]. He needs pureed foods. Patient is active with [facility]; Will is caregiver. Arturo Jeffries stated that she has been in touch with patient's guardian Luis Gates (sister). Plan at this time is return to [address] [place] [address] with  Earl Ying support. Sampson Jones RN    PLEASE CALL UPON DISCHARGE  -   GROUP HOME CAREGIVER Kip Broussard [phone]  MARTIN VILLARREAL Mary [address] [phone]. [phone]. DISABILITIES  - Iris Martinez [phone]

## 2020-11-24 NOTE — ED NOTES
Pt resting comfortably and is easy to arouse when staff enters room. Inpatient bed is being cleaned at this time. Will continue to closely monitor.       Loras Nissen, RN  11/24/20 4374

## 2020-11-24 NOTE — H&P
History and Physical  MARY Diana-BC   Internal Medicine Hospitalist      Name:  Espinoza Ibrahim /Age/Sex: 1955  (60 y.o. male)   MRN & CSN:  [id number] & [id number] Admission Date/Time: 2020  4:22 PM   Location:  ED36/ED[facility] PCP: Melony Sullivan MD       Hospital Day: 1      Supervising Physician: Dr. Mirella Sanchez     Chief Complaint: Fever (onset 5 days ago, with sob and shakes 102.5 in triage)     Assessment and Plan:   Espinoza Ibrahim is a 60 y.o. male who presents with Sepsis ([address]     Sepsis, 2/2 pna -tachycardia, tachypneic, fever (102.5).  Multifocal pneumonia - as per chest x-ray.    COVID-19 virus detected - symptomatic, rapid Covid 19 in ED Positive.       -pt comes from group home       -No respiratory distress noted on room air       -admit, inpatient, COVID unit, telemetry monitoring       -Empiric antibiotics: Cefepime, Vanc (pharm to dose)-may de-escalate antibiotic based on procalcitonin result       -start droplet plus isolation                -on Decadron, albuterol inhaler, Robitussin/Mucinex       -continuous pulse ox checks       -daily COVID Inflammatory markers       -pending cx, UA, rapid flu test, legionella, strep, procalcitonin, CRP, Ferritin, LDH, D-dimer       -pending fibrinogen, Vit D 25 hydroxy, PT/INR, APTT, type and screen     Hyponatremia (134), Hypokalemia (2.9) - could d/t diuretic.       -Hold hydrochlorothiazide       -neuro intact       -Mg level normal       -cont tele monitoring       -IV KCL 10 mEq given in ED       -on K sliding scale protocol       -replete and re-check, monitor BMP      H/o pressure ulcer at buttocks        - skin assessment/turn patient        - wound care consult        Chronic Illnesses:         - Cerebral palsy        - seizure disorder - c/w Depakote, Vimpat, seizure precutions        - thyroid disease -on levothyroxine        - hypertension -on Norvasc, holding hydrochlorothiazide due to low Na and K+        - HLD -on Zocor        - GERD -on PPI        - IBS/Ulcerative colitis        - anemia        - depression -on Paxil        - MRSA -contact isolation     Current diagnosis and plan of management discussed with the patient at the time of admission in lay language who agree to the above plan and disposition of admission for further care. All concerns and questions addressed. Patient assessment and plan in conjunction with supervising physician - Dr. Mague Vogel    DVT Prophylaxis [x] Lovenox, []  Heparin, [] SCDs, [] Ambulation  [] Long term AC   GI Prophylaxis [x] PPI,  [] H2 Blocker,  [] Carafate,  [] Diet,  [] No GI prophylaxis, N/A: patient is not under significant medical stress, non-ICU or is receiving a diet/tube feeds   Code Status  Full CODE   Disposition Patient requires continued admission due to Sepsis ([facility] [address] Discharge Plan: Patient plans to return back to group home upon discharge after seen by case management team.   MDM [] Low, [] Moderate,[x]  High  Patient's risk as above due to:      [x] One or more chronic illnesses with mild exacerbation progression      [] Two or more stable chronic illnesses      [x] Undiagnosed new problem with uncertain prognosis      [] Elective major surgery      []Prescription drug management     History of Present Illness:     Principal Problem: Sepsis ([facility] [address]  Jessy Weems is a 60 y.o. male with past medical history of Cerebral palsy, grand mal seizure disorder, IBS, ulcerative colitis, thyroid disease, hypertension, GERD, anemia, depression, pressure ulcer at buttocks, and MRSA presented to the ED from group home with complaints of nasal congestion, dry cough, and subjective fever. Patient is presently awake, alert, and oriented on examination. Patient reports that symptoms started 5 days ago. He denies exposure to anybody with known positive Covid and recent travel. Denied oxygen use at home.   Patient's breathing stable on room air without No gross joint deformities. No swelling, intact sensation symmetrical.   SKIN  -Normal coloration, warm, dry. H/o pressure ulcer at buttocks. NEURO  -CN 2-12 appear grossly intact, normal speech, no lateralizing weakness. PSYC  -Awake, alert, oriented x 4. Appropriate affect. Past Medical History:      Past Medical History:   Diagnosis Date    Anemia     Aspiration pneumonia ([address]     dx 6/9/2014 with this per ecf/ per old chart dx with pneumonia with admission 9/18/2018    Cerebral palsy ([address]     \"has no feeling on left side of body\"alert but has some dementia but not diagnosed, has good long term but poor short term and wakes up disoriented after a nap\"(per yaneth)(2014)    Depression     Epilepsy ([address] 1961    last seizure 2/2018- hx grand mal    Frequent falls     with phone assess- family stated pt fell this am (7/10/2013\"in the process of trying to find a facility to help build him back up- (pt now [facility])    Glaucoma     \"has been in treatment for this in the past- no treatment needed at present\"    History of IBS     Hx MRSA infection     + nasal culture 4/2014    Hyperammonemia (HCC)     Hypertension     on Lisinopril    IBS (irritable bowel syndrome)     ulcerative colitis    Kidney stone     for surgery for stent placement 7/11/2013    Mood disorder ([address]     per staff at [address] [facility] Occasional tremors     \"makes it difficult for him to write\"    Pressure injury of contiguous region involving back and right buttock, stage 2 ([address] 5/27/2020    Pressure injury of left buttock, stage 1 7/22/2020    Prostatitis     hx given per H&P old chart    Thyroid disease     Ulcerative colitis ([address]     hx given per staff at [facility] 4/13/2015     Past Surgery History:  Patient  has a past surgical history that includes Gallbladder surgery (2005); vagal nerve stimulation (2002); Cholecystectomy; Cystoscopy (Left, 07/11/2013); Kidney stone surgery;  Colonoscopy (8/19/14, 5/2012); other surgical history (04/15/2015); and Upper gastrointestinal endoscopy (N/A, 11/13/2018). Social History:    FAM HX: Assessed: family history includes Asthma in his mother and sister; Depression in his mother and sister; Heart Disease in his father and mother; High Blood Pressure in his father and mother; High Cholesterol in his mother and sister; Migraines in his mother and sister. Soc HX:   Social History     Socioeconomic History    Marital status: Single     Spouse name: None    Number of children: None    Years of education: None    Highest education level: None   Occupational History    None   Social Needs    Financial resource strain: None    Food insecurity     Worry: None     Inability: None    Transportation needs     Medical: None     Non-medical: None   Tobacco Use    Smoking status: Never Smoker    Smokeless tobacco: Never Used   Substance and Sexual Activity    Alcohol use: No    Drug use: No    Sexual activity: None   Lifestyle    Physical activity     Days per week: None     Minutes per session: None    Stress: None   Relationships    Social connections     Talks on phone: None     Gets together: None     Attends Jehovah's witness service: None     Active member of club or organization: None     Attends meetings of clubs or organizations: None     Relationship status: None    Intimate partner violence     Fear of current or ex partner: None     Emotionally abused: None     Physically abused: None     Forced sexual activity: None   Other Topics Concern    None   Social History Narrative    None     TOBACCO:   reports that he has never smoked. He has never used smokeless tobacco.  ETOH:   reports no history of alcohol use. Drugs:  reports no history of drug use.     Allergies: No Known Allergies  Medications:   Medications:    dexamethasone  10 mg Intravenous Once    potassium chloride  10 mEq Intravenous Once    cefTRIAXone (ROCEPHIN) IV  1 g Intravenous Once    azithromycin  500 mg Intravenous Once      Infusions:   PRN Meds:    Prior to Admission Meds:  Prior to Admission medications    Medication Sig Start Date End Date Taking? Authorizing Provider   acetaminophen (APAP EXTRA STRENGTH) 500 MG tablet Take 1 tablet by mouth every 6 hours as needed for Pain 9/7/20   CHRIS Rodriguez   solifenacin (VESICARE) 10 MG tablet Take 10 mg by mouth daily    Historical Provider, MD   levothyroxine (SYNTHROID) 125 MCG tablet Take 1 tablet by mouth Daily 4/14/20   Dinora Members, MD   dicyclomine (BENTYL) 10 MG capsule Take 1 capsule by mouth every 6 hours as needed (cramps) 4/13/20 4/18/20  Dinora Members, MD   amLODIPine (NORVASC) 5 MG tablet Take 5 mg by mouth daily    Historical Provider, MD   clonazePAM (KLONOPIN) 0.5 MG tablet Take 0.25 mg by mouth daily.     Historical Provider, MD   divalproex (DEPAKOTE) 500 MG DR tablet Take 1,000 mg by mouth nightly    Historical Provider, MD   hydrochlorothiazide (MICROZIDE) 12.5 MG capsule Take 12.5 mg by mouth daily    Historical Provider, MD   mirabegron (MYRBETRIQ) 50 MG TB24 Take 50 mg by mouth daily    Historical Provider, MD   omeprazole (PRILOSEC) 20 MG delayed release capsule Take 20 mg by mouth 2 times daily (before meals)    Historical Provider, MD   primidone (MYSOLINE) 50 MG tablet Take 50 mg by mouth 3 times daily    Historical Provider, MD   zonisamide (ZONEGRAN) 100 MG capsule Take 100 mg by mouth every morning    Historical Provider, MD   ibuprofen (ADVIL;MOTRIN) 600 MG tablet Take 600 mg by mouth every 6 hours as needed for Pain    Historical Provider, MD   Simethicone (MI-ACID GAS RELIEF PO) Take 1 Container by mouth every 4 hours as needed 10 cc every 4 hrs not to exceed 60 cc in 24 hrs    Historical Provider, MD   guaiFENesin (MUCINEX) 600 MG extended release tablet Take 1,200 mg by mouth daily as needed for Congestion    Historical Provider, MD   Menthol-Methyl Salicylate (MUSCLE RUB) 10-15 % CREA cream Apply 1 applicator topically as needed    Historical Provider, MD   pseudoephedrine (SUDAFED) 30 MG tablet Take 30 mg by mouth every 4 hours as needed for Congestion    Historical Provider, MD   lacosamide (VIMPAT) 200 MG tablet Take 200 mg by mouth as needed. Give 1 tab after seizure activity as needed    Historical Provider, MD   lacosamide (VIMPAT) 200 MG tablet Take 1 tablet by mouth 2 times daily for 30 days. 3/25/20 5/27/20  Louise Rosario DO   latanoprost (XALATAN) 0.005 % ophthalmic solution Place 1 drop into both eyes nightly    Historical Provider, MD   timolol (TIMOPTIC) 0.5 % ophthalmic solution Place 1 drop into both eyes daily     Historical Provider, MD   Lactobacillus (ACIDOPHILUS) CAPS capsule Take 1 capsule by mouth daily    Historical Provider, MD   aspirin 81 MG tablet Take 81 mg by mouth daily    Historical Provider, MD   Wheat Dextrin (BENEFIBER) POWD Take 4 g by mouth 2 times daily Takes 3 tsp in liquid twice per day     Historical Provider, MD   Lactulose Encephalopathy (ENULOSE PO) Take 30 mLs by mouth 3 times daily    Historical Provider, MD   folic acid (FOLVITE) 1 MG tablet Take 1 mg by mouth daily    Historical Provider, MD   PARoxetine (PAXIL) 20 MG tablet Take 20 mg by mouth every morning    Historical Provider, MD   QUEtiapine (SEROQUEL XR) 50 MG extended release tablet Take 50 mg by mouth nightly    Historical Provider, MD   tamsulosin (FLOMAX) 0.4 MG capsule Take 1 capsule by mouth daily 3/18/17   CHRIS Ivan   simvastatin (ZOCOR) 20 MG tablet Take 20 mg by mouth nightly. Historical Provider, MD   Cholecalciferol (VITAMIN D3) 1000 UNITS CAPS Take 1 tablet by mouth daily. Historical Provider, MD   clorazepate (TRANXENE) 7.5 MG tablet Take 7.5 mg by mouth 2 times daily.     Historical Provider, MD     Data:     Laboratory this visit:  Reviewed  Recent Labs     11/23/20  1720   WBC 8.1   HGB 16.0   HCT 46.3   *      Recent Labs     11/23/20  1720   *   K 2.9*   CL 94*   CO2 26   BUN 7 CREATININE 0.6*     Recent Labs     11/23/20  1720   AST 27   ALT 25   BILITOT 0.3   ALKPHOS 75     Recent Labs     11/23/20  1720   INR 1.02     No results for input(s): CKTOTAL, CKMB, CKMBINDEX in the last 72 hours. Invalid input(s): Colette Drought input(s): PRO-BNP    Radiology this visit:  Reviewed. Xr Chest Portable    Result Date: 11/23/2020  EXAMINATION: ONE XRAY VIEW OF THE CHEST 11/23/2020 5:06 pm COMPARISON: 04/16/2020 HISTORY: ORDERING SYSTEM PROVIDED HISTORY: Chest pain TECHNOLOGIST PROVIDED HISTORY: Reason for exam:->Chest pain Reason for Exam: Chest pain Acuity: Unknown Type of Exam: Initial Additional signs and symptoms: unknown Relevant Medical/Surgical History: aspiration pna FINDINGS: Cardiomediastinal silhouette is stable. New patchy bilateral airspace opacities. No pleural effusion or pneumothorax. No gross bony abnormality. New multifocal pneumonia. Recommend imaging follow-up to resolution. EKG this visit:   EKG: No available ECG to review during assessment/interview. Please see ED notes.     Current Treatment Team:  Treatment Team: Attending Provider: Darren Jacobs MD; Physician Assistant: CHRIS Harris; Registered Nurse: Hosea Christian RN; Consulting Physician: MARY Hooker CNP, APRN-BC [facility]  11/23/2020 7:49 PM      Electronically signed by MARY Hooker CNP on 11/23/2020 at 7:49 PM

## 2020-11-24 NOTE — ED NOTES
Report called to receiving RN. Patient will be prepared for transfer.        Bertin Maciel RN  11/24/20 [id number]

## 2020-11-25 LAB
ABO/RH: NORMAL
ALBUMIN SERPL-MCNC: 3.8 GM/DL (ref 3.4–5)
ALP BLD-CCNC: 61 IU/L (ref 40–128)
ALT SERPL-CCNC: 14 U/L (ref 10–40)
AMORPHOUS: ABNORMAL /HPF
ANION GAP SERPL CALCULATED.3IONS-SCNC: 8 MMOL/L (ref 4–16)
ANTIBODY SCREEN: NEGATIVE
APTT: 33.2 SECONDS (ref 25.1–37.1)
AST SERPL-CCNC: 11 IU/L (ref 15–37)
BACTERIA: ABNORMAL /HPF
BASOPHILS ABSOLUTE: 0 K/CU MM
BASOPHILS RELATIVE PERCENT: 0.1 % (ref 0–1)
BILIRUB SERPL-MCNC: 0.2 MG/DL (ref 0–1)
BILIRUBIN URINE: NEGATIVE MG/DL
BLOOD, URINE: NEGATIVE
BUN BLDV-MCNC: 10 MG/DL (ref 6–23)
CALCIUM SERPL-MCNC: 9 MG/DL (ref 8.3–10.6)
CHLORIDE BLD-SCNC: 101 MMOL/L (ref 99–110)
CLARITY: ABNORMAL
CO2: 26 MMOL/L (ref 21–32)
COLOR: YELLOW
CREAT SERPL-MCNC: 0.6 MG/DL (ref 0.9–1.3)
D DIMER: <200 NG/ML(DDU)
DIFFERENTIAL TYPE: ABNORMAL
EOSINOPHILS ABSOLUTE: 0 K/CU MM
EOSINOPHILS RELATIVE PERCENT: 0 % (ref 0–3)
FIBRINOGEN LEVEL: 331 MG/DL (ref 196.9–442.1)
GFR AFRICAN AMERICAN: >60 ML/MIN/1.73M2
GFR NON-AFRICAN AMERICAN: >60 ML/MIN/1.73M2
GLUCOSE BLD-MCNC: 156 MG/DL (ref 70–99)
GLUCOSE, URINE: 50 MG/DL
HCT VFR BLD CALC: 37.9 % (ref 42–52)
HEMOGLOBIN: 13 GM/DL (ref 13.5–18)
IMMATURE NEUTROPHIL %: 0.3 % (ref 0–0.43)
INR BLD: 0.9 INDEX
KETONES, URINE: NEGATIVE MG/DL
LEUKOCYTE ESTERASE, URINE: NEGATIVE
LYMPHOCYTES ABSOLUTE: 0.8 K/CU MM
LYMPHOCYTES RELATIVE PERCENT: 8.9 % (ref 24–44)
MCH RBC QN AUTO: 30.4 PG (ref 27–31)
MCHC RBC AUTO-ENTMCNC: 34.3 % (ref 32–36)
MCV RBC AUTO: 88.8 FL (ref 78–100)
MONOCYTES ABSOLUTE: 0.9 K/CU MM
MONOCYTES RELATIVE PERCENT: 10 % (ref 0–4)
MUCUS: ABNORMAL HPF
NITRITE URINE, QUANTITATIVE: NEGATIVE
NUCLEATED RBC %: 0 %
PDW BLD-RTO: 13.3 % (ref 11.7–14.9)
PH, URINE: 8 (ref 5–8)
PLATELET # BLD: 128 K/CU MM (ref 140–440)
PMV BLD AUTO: 11.5 FL (ref 7.5–11.1)
POTASSIUM SERPL-SCNC: 3.6 MMOL/L (ref 3.5–5.1)
PROTEIN UA: NEGATIVE MG/DL
PROTHROMBIN TIME: 10.9 SECONDS (ref 11.7–14.5)
RBC # BLD: 4.27 M/CU MM (ref 4.6–6.2)
RBC URINE: ABNORMAL /HPF (ref 0–3)
SEGMENTED NEUTROPHILS ABSOLUTE COUNT: 7.1 K/CU MM
SEGMENTED NEUTROPHILS RELATIVE PERCENT: 80.7 % (ref 36–66)
SODIUM BLD-SCNC: 135 MMOL/L (ref 135–145)
SPECIFIC GRAVITY UA: 1.02 (ref 1–1.03)
SQUAMOUS EPITHELIAL: <1 /HPF
TOTAL IMMATURE NEUTOROPHIL: 0.03 K/CU MM
TOTAL NUCLEATED RBC: 0 K/CU MM
TOTAL PROTEIN: 6.4 GM/DL (ref 6.4–8.2)
TRICHOMONAS: ABNORMAL /HPF
UROBILINOGEN, URINE: NORMAL MG/DL (ref 0.2–1)
WBC # BLD: 8.8 K/CU MM (ref 4–10.5)
WBC UA: ABNORMAL /HPF (ref 0–2)

## 2020-11-25 PROCEDURE — 6360000002 HC RX W HCPCS: Performed by: NURSE PRACTITIONER

## 2020-11-25 PROCEDURE — 6370000000 HC RX 637 (ALT 250 FOR IP): Performed by: NURSE PRACTITIONER

## 2020-11-25 PROCEDURE — 85025 COMPLETE CBC W/AUTO DIFF WBC: CPT

## 2020-11-25 PROCEDURE — 85730 THROMBOPLASTIN TIME PARTIAL: CPT

## 2020-11-25 PROCEDURE — 81001 URINALYSIS AUTO W/SCOPE: CPT

## 2020-11-25 PROCEDURE — 2580000003 HC RX 258: Performed by: NURSE PRACTITIONER

## 2020-11-25 PROCEDURE — 87449 NOS EACH ORGANISM AG IA: CPT

## 2020-11-25 PROCEDURE — 87081 CULTURE SCREEN ONLY: CPT

## 2020-11-25 PROCEDURE — 80053 COMPREHEN METABOLIC PANEL: CPT

## 2020-11-25 PROCEDURE — 80048 BASIC METABOLIC PNL TOTAL CA: CPT

## 2020-11-25 PROCEDURE — 85379 FIBRIN DEGRADATION QUANT: CPT

## 2020-11-25 PROCEDURE — 85384 FIBRINOGEN ACTIVITY: CPT

## 2020-11-25 PROCEDURE — 6360000002 HC RX W HCPCS: Performed by: INTERNAL MEDICINE

## 2020-11-25 PROCEDURE — 2060000000 HC ICU INTERMEDIATE R&B

## 2020-11-25 PROCEDURE — 85610 PROTHROMBIN TIME: CPT

## 2020-11-25 RX ADMIN — DIVALPROEX SODIUM 1000 MG: 500 TABLET, DELAYED RELEASE ORAL at 20:15

## 2020-11-25 RX ADMIN — PRIMIDONE 50 MG: 50 TABLET ORAL at 20:15

## 2020-11-25 RX ADMIN — LACTULOSE 10 G: 10 SOLUTION ORAL at 20:14

## 2020-11-25 RX ADMIN — CLONAZEPAM 0.25 MG: 0.5 TABLET ORAL at 09:24

## 2020-11-25 RX ADMIN — CEFEPIME HYDROCHLORIDE 2 G: 2 INJECTION, POWDER, FOR SOLUTION INTRAVENOUS at 23:37

## 2020-11-25 RX ADMIN — CEFEPIME HYDROCHLORIDE 2 G: 2 INJECTION, POWDER, FOR SOLUTION INTRAVENOUS at 00:41

## 2020-11-25 RX ADMIN — ENOXAPARIN SODIUM 40 MG: 40 INJECTION SUBCUTANEOUS at 20:15

## 2020-11-25 RX ADMIN — Medication 1 CAPSULE: at 09:25

## 2020-11-25 RX ADMIN — SIMVASTATIN 20 MG: 20 TABLET, FILM COATED ORAL at 20:15

## 2020-11-25 RX ADMIN — SODIUM CHLORIDE, PRESERVATIVE FREE 10 ML: 5 INJECTION INTRAVENOUS at 20:15

## 2020-11-25 RX ADMIN — LEVOTHYROXINE SODIUM 125 MCG: 125 TABLET ORAL at 06:04

## 2020-11-25 RX ADMIN — ZONISAMIDE 100 MG: 100 CAPSULE ORAL at 09:25

## 2020-11-25 RX ADMIN — DEXAMETHASONE 6 MG: 4 TABLET ORAL at 09:25

## 2020-11-25 RX ADMIN — VANCOMYCIN HYDROCHLORIDE 1250 MG: 5 INJECTION, POWDER, LYOPHILIZED, FOR SOLUTION INTRAVENOUS at 12:35

## 2020-11-25 RX ADMIN — PAROXETINE HYDROCHLORIDE 20 MG: 20 TABLET, FILM COATED ORAL at 09:25

## 2020-11-25 RX ADMIN — LACOSAMIDE 200 MG: 100 TABLET, FILM COATED ORAL at 09:26

## 2020-11-25 RX ADMIN — LORAZEPAM 1 MG: 1 TABLET ORAL at 20:15

## 2020-11-25 RX ADMIN — LORAZEPAM 1 MG: 1 TABLET ORAL at 09:25

## 2020-11-25 RX ADMIN — FOLIC ACID 1 MG: 1 TABLET ORAL at 09:25

## 2020-11-25 RX ADMIN — CEFEPIME HYDROCHLORIDE 2 G: 2 INJECTION, POWDER, FOR SOLUTION INTRAVENOUS at 06:03

## 2020-11-25 RX ADMIN — LATANOPROST 1 DROP: 50 SOLUTION/ DROPS OPHTHALMIC at 20:21

## 2020-11-25 RX ADMIN — Medication 1000 UNITS: at 09:25

## 2020-11-25 RX ADMIN — QUETIAPINE FUMARATE 50 MG: 50 TABLET, EXTENDED RELEASE ORAL at 20:14

## 2020-11-25 RX ADMIN — PRIMIDONE 50 MG: 50 TABLET ORAL at 14:33

## 2020-11-25 RX ADMIN — PRIMIDONE 50 MG: 50 TABLET ORAL at 09:25

## 2020-11-25 RX ADMIN — TAMSULOSIN HYDROCHLORIDE 0.4 MG: 0.4 CAPSULE ORAL at 09:26

## 2020-11-25 RX ADMIN — PANTOPRAZOLE SODIUM 40 MG: 40 TABLET, DELAYED RELEASE ORAL at 06:04

## 2020-11-25 RX ADMIN — TROSPIUM CHLORIDE 20 MG: 20 TABLET, FILM COATED ORAL at 09:25

## 2020-11-25 RX ADMIN — LACOSAMIDE 200 MG: 100 TABLET, FILM COATED ORAL at 20:14

## 2020-11-25 RX ADMIN — AMLODIPINE BESYLATE 5 MG: 5 TABLET ORAL at 09:26

## 2020-11-25 RX ADMIN — LACTULOSE 10 G: 10 SOLUTION ORAL at 09:26

## 2020-11-25 RX ADMIN — LACTULOSE 10 G: 10 SOLUTION ORAL at 14:33

## 2020-11-25 RX ADMIN — CEFEPIME HYDROCHLORIDE 2 G: 2 INJECTION, POWDER, FOR SOLUTION INTRAVENOUS at 14:33

## 2020-11-25 ASSESSMENT — PAIN SCALES - GENERAL: PAINLEVEL_OUTOF10: 0

## 2020-11-25 NOTE — PROGRESS NOTES
[facility]  consulted by Dr. Jolene Velazco for monitoring and adjustment. Indication for treatment: Covid-19, possible secondary bacterial infection  Goal trough:  15 mcg/mL     Pertinent Laboratory Values:   Temp Readings from Last 3 Encounters:   11/25/20 98.5 °F (36.9 °C) (Oral)   09/07/20 98 °F (36.7 °C) (Oral)   08/05/20 98.6 °F (37 °C)     Recent Labs     11/23/20  1720 11/25/20  0340   WBC 8.1 8.8   LACTATE 1.9  --      Recent Labs     11/23/20  1720 11/25/20  0340   BUN 7 10   CREATININE 0.6* 0.6*     Estimated Creatinine Clearance: 131 mL/min (A) (based on SCr of 0.6 mg/dL (L)). Intake/Output Summary (Last 24 hours) at 11/25/2020 1147  Last data filed at 11/25/2020 0879  Gross per 24 hour   Intake 10 ml   Output 750 ml   Net -740 ml       Pertinent Cultures:  Date    Source    Results  11/24   Respiratory   Ordered  11/24   Strep/Legionella  Ordered  11/24   Influenza A/B   Ordered  11/24   Blood    Ordered  11/23   Covid-19   Positive    Vancomycin level:   TROUGH:  No results for input(s): VANCOTROUGH in the last 72 hours. RANDOM:  No results for input(s): VANCORANDOM in the last 72 hours.     Assessment:  · WBC and temperature: WBC WNL; afebrile  · SCr, BUN, and urine output: stable  · Day(s) of therapy: #2  · Vancomycin level: to be collected    Plan:  · Vancomycin 1750 mg x1 dose, followed by vancomycin 1250 mg IVPB q12h  · Plan to check a trough level prior to the 4th total dose  · Will order MRSA screen to aid in de-escalation   · Pharmacy will continue to monitor patient and adjust therapy as indicated    Tamara Sy SCHEDULED FOR 11/26 @ 8707    Thank you for the consult,  Rima Ruelas, [address]ancy Ramesh  11/25/2020 11:47 AM

## 2020-11-25 NOTE — PROGRESS NOTES
1,000 mg Oral Nightly Martha Must, APRN - CNP   1,000 mg at 79/41/39 1208    folic acid (FOLVITE) tablet 1 mg  1 mg Oral Daily Martha Must, APRN - CNP   1 mg at 11/25/20 9459    guaiFENesin (MUCINEX) extended release tablet 1,200 mg  1,200 mg Oral Daily PRN Martha Must, APRN - CNP        lacosamide (VIMPAT) tablet 200 mg  200 mg Oral BID Martha Must, APRN - CNP   200 mg at 11/25/20 0926    lacosamide (VIMPAT) tablet 200 mg  200 mg Oral PRN Martha Must, APRN - CNP        lactobacillus (CULTURELLE) capsule 1 capsule  1 capsule Oral Daily Martha Must, APRN - CNP   1 capsule at 11/25/20 0925    lactulose (CHRONULAC) 10 GM/15ML solution 10 g  10 g Oral TID Martha Must, APRN - CNP   10 g at 11/25/20 1433    latanoprost (XALATAN) 0.005 % ophthalmic solution 1 drop  1 drop Both Eyes Nightly Martha Must, APRN - CNP   1 drop at 11/24/20 2116    levothyroxine (SYNTHROID) tablet 125 mcg  125 mcg Oral Daily Martha Must, APRN - CNP   125 mcg at 11/25/20 3301    methyl salicylate-menthol (ALBIN MONTOYA GREASELESS) 10-15 % cream CREA 1 each  1 each Topical 4x Daily PRN Martha Must, APRN - CNP        mirabegron (MYRBETRIQ) extended release tablet 50 mg  50 mg Oral Daily Farideh Thao, APRN - CNP        pantoprazole (PROTONIX) tablet 40 mg  40 mg Oral QAM AC Martha Must, APRN - CNP   40 mg at 11/25/20 0604    PARoxetine (PAXIL) tablet 20 mg  20 mg Oral QAM Martha Must, APRN - CNP   20 mg at 11/25/20 0925    primidone (MYSOLINE) tablet 50 mg  50 mg Oral TID Martha Must, APRN - CNP   50 mg at 11/25/20 1433    pseudoephedrine (SUDAFED) tablet 30 mg  30 mg Oral Q4H PRN Martha Must, APRN - CNP        QUEtiapine (SEROQUEL XR) extended release tablet 50 mg  50 mg Oral Nightly Farideh Thao, APRN - CNP   50 mg at 11/24/20 2116    simvastatin (ZOCOR) tablet 20 mg  20 mg Oral Nightly Martha Parks APRN - CNP   20 mg at 11/24/20 2116    zonisamide (ZONEGRAN) capsule 100 mg  100 mg Oral QAM MARY Watson CNP   100 mg at 11/25/20 0925    timolol (TIMOPTIC) 0.5 % ophthalmic solution 1 drop  1 drop Both Eyes Daily MARY Watson CNP   1 drop at 11/24/20 1843    tamsulosin (FLOMAX) capsule 0.4 mg  0.4 mg Oral Daily MARY Watson CNP   0.4 mg at 11/25/20 0926    trospium (SANCTURA) tablet 20 mg  20 mg Oral Daily MARY Watson CNP   20 mg at 11/25/20 0925    sodium chloride flush 0.9 % injection 10 mL  10 mL Intravenous 2 times per day MARY Watson CNP   10 mL at 11/24/20 2118    sodium chloride flush 0.9 % injection 10 mL  10 mL Intravenous PRN MARY Watson CNP        cefepime (MAXIPIME) 2 g IVPB minibag  2 g Intravenous Q8H MARY Watson CNP   Stopped at 11/25/20 1503    vancomycin (VANCOCIN) 1,250 mg in dextrose 5 % 250 mL IVPB  15 mg/kg Intravenous Q12H MARY Watson CNP   Stopped at 11/25/20 1438    potassium chloride (KLOR-CON M) extended release tablet 40 mEq  40 mEq Oral PRN MARY Watson CNP        Or    potassium bicarb-citric acid (EFFER-K) effervescent tablet 40 mEq  40 mEq Oral PRN MARY Watson CNP        Or    potassium chloride 10 mEq/100 mL IVPB (Peripheral Line)  10 mEq Intravenous PRN MARY Watson CNP        enoxaparin (LOVENOX) injection 40 mg  40 mg Subcutaneous BID MD Xander   40 mg at 11/24/20 2118    psyllium (HYDROCIL) 95 % packet 1 packet  1 packet Oral BID WC MARY Watson CNP   1 packet at 11/24/20 2204       Subjective:     Patient denied any new complaints. No acute overnight events. Currently saturating around 90s at room air    Objective:        Intake/Output Summary (Last 24 hours) at 11/25/2020 1741  Last data filed at 11/25/2020 6486  Gross per 24 hour   Intake 10 ml   Output 750 ml   Net -740 ml      Vitals:   Vitals:    11/25/20 0800   BP: (!) 121/103   Pulse: 54   Resp: 12   Temp: 98.5 °F (36.9 °C)   SpO2: 98%     Physical Exam:  General Appearance:    Alert, cooperative, no distress  Head:      Normocephalic, without obvious abnormality, atraumatic  Eyes:       Conjunctiva/corneas clear, EOM's intact  Lungs:    B/L intermittent rhonchi/crackles +  Heart:                Regular rate and rhythm, S1 and S2 normal, no murmur,   rub or gallop  Abdomen:     Soft, non-tender, bowel sounds active, no masses, no organomegaly  Extremities:   Extremities normal, atraumatic, no cyanosis or edema   Neurological:   Grossly Intact. Significant Diagnostic Studies:   DATA:    CBC   Recent Labs     11/23/20 1720 11/25/20 0340   WBC 8.1 8.8   HGB 16.0 13.0*   HCT 46.3 37.9*   * 128*      BMP   Recent Labs     11/23/20 1720 11/25/20 0340   * 135   K 2.9* 3.6   CL 94* 101   CO2 26 26   BUN 7 10   CREATININE 0.6* 0.6*     LFT'S   Recent Labs     11/23/20 1720 11/25/20 0340   AST 27 11*   ALT 25 14   BILITOT 0.3 0.2   ALKPHOS 75 61     COAG   Recent Labs     11/23/20 1720 11/25/20 0340   INR 1.02 0.90     POC:   Lab Results   Component Value Date    POCGLU 78 10/27/2014     [id number]:No results found for: LABA1C  CARDIAC ENZYMES  No results for input(s): CKTOTAL, CKMB, CKMBINDEX, TROPONINI in the last 72 hours. Troponin: No results for input(s): TROPONINT in the last 72 hours. BNP: No results for input(s): PROBNP in the last 72 hours.   U/A:    Lab Results   Component Value Date    COLORU YELLOW 11/25/2020    WBCUA NONE SEEN 11/25/2020    RBCUA NONE SEEN 11/25/2020    MUCUS RARE 11/25/2020    BACTERIA MODERATE 11/25/2020    CLARITYU HAZY 11/25/2020    SPECGRAV 1.016 11/25/2020    LEUKOCYTESUR NEGATIVE 11/25/2020    BLOODU NEGATIVE 11/25/2020    GLUCOSEU NEGATIVE 05/10/2013    AMORPHOUS OCCASIONAL 11/25/2020       Xr Chest Portable    Result Date: 11/23/2020  EXAMINATION: ONE XRAY VIEW OF THE CHEST 11/23/2020 5:06 pm COMPARISON: 04/16/2020 HISTORY: ORDERING SYSTEM PROVIDED HISTORY: Chest pain TECHNOLOGIST PROVIDED HISTORY: Reason for exam:->Chest pain Reason for Exam: Chest pain Acuity: Unknown Type of Exam: Initial Additional signs and symptoms: unknown Relevant Medical/Surgical History: aspiration pna FINDINGS: Cardiomediastinal silhouette is stable. New patchy bilateral airspace opacities. No pleural effusion or pneumothorax. No gross bony abnormality. New multifocal pneumonia. Recommend imaging follow-up to resolution.            Edgar Faustin  [facility] Hospitalist

## 2020-11-25 NOTE — PROGRESS NOTES
Occupational Therapy      Per therapy triage process, the OT referral has been discharged. Pt is performing at baseline level. Pt at baseline is a fall risk and needs assisted mobility. Pt performing at this level with nursing per nursing notes and flowsheets. PT has also discharged pt due to pt performing at baseline level.  Please re-order in future if pt presents w/ skilled therapy needs    [place] Regulus OTR/L [id number]  3:20 PM,11/25/2020

## 2020-11-25 NOTE — CONSULTS
Per the physical rehabilitation triage process, the PT referral was discontinued. At baseline, has fall risk and needs assisted mobility. Performed similarly with nursing staff this admission. Recommend assisted mobility per baseline needs. No new functional change requiring acute care PT services.     Patricia Peña, PT  11/25/2020, 2:42 PM None known

## 2020-11-26 LAB
ALBUMIN SERPL-MCNC: 3.5 GM/DL (ref 3.4–5)
ALP BLD-CCNC: 62 IU/L (ref 40–128)
ALT SERPL-CCNC: 13 U/L (ref 10–40)
ANION GAP SERPL CALCULATED.3IONS-SCNC: 9 MMOL/L (ref 4–16)
APTT: 26.6 SECONDS (ref 25.1–37.1)
AST SERPL-CCNC: 8 IU/L (ref 15–37)
BASOPHILS ABSOLUTE: 0 K/CU MM
BASOPHILS RELATIVE PERCENT: 0 % (ref 0–1)
BILIRUB SERPL-MCNC: 0.2 MG/DL (ref 0–1)
BUN BLDV-MCNC: 14 MG/DL (ref 6–23)
CALCIUM SERPL-MCNC: 9.2 MG/DL (ref 8.3–10.6)
CHLORIDE BLD-SCNC: 106 MMOL/L (ref 99–110)
CO2: 25 MMOL/L (ref 21–32)
CREAT SERPL-MCNC: 0.5 MG/DL (ref 0.9–1.3)
D DIMER: <200 NG/ML(DDU)
DIFFERENTIAL TYPE: ABNORMAL
EOSINOPHILS ABSOLUTE: 0 K/CU MM
EOSINOPHILS RELATIVE PERCENT: 0 % (ref 0–3)
FIBRINOGEN LEVEL: 306 MG/DL (ref 196.9–442.1)
GFR AFRICAN AMERICAN: >60 ML/MIN/1.73M2
GFR NON-AFRICAN AMERICAN: >60 ML/MIN/1.73M2
GLUCOSE BLD-MCNC: 159 MG/DL (ref 70–99)
HCT VFR BLD CALC: 40.9 % (ref 42–52)
HEMOGLOBIN: 13.2 GM/DL (ref 13.5–18)
IMMATURE NEUTROPHIL %: 0.1 % (ref 0–0.43)
INR BLD: 0.87 INDEX
LEGIONELLA URINARY AG: NEGATIVE
LYMPHOCYTES ABSOLUTE: 0.7 K/CU MM
LYMPHOCYTES RELATIVE PERCENT: 10.8 % (ref 24–44)
MCH RBC QN AUTO: 29.4 PG (ref 27–31)
MCHC RBC AUTO-ENTMCNC: 32.3 % (ref 32–36)
MCV RBC AUTO: 91.1 FL (ref 78–100)
MONOCYTES ABSOLUTE: 0.7 K/CU MM
MONOCYTES RELATIVE PERCENT: 10.7 % (ref 0–4)
NUCLEATED RBC %: 0 %
PDW BLD-RTO: 13.4 % (ref 11.7–14.9)
PLATELET # BLD: 141 K/CU MM (ref 140–440)
PMV BLD AUTO: 11.2 FL (ref 7.5–11.1)
POTASSIUM SERPL-SCNC: 3.9 MMOL/L (ref 3.5–5.1)
PROTHROMBIN TIME: 10.5 SECONDS (ref 11.7–14.5)
RBC # BLD: 4.49 M/CU MM (ref 4.6–6.2)
SEGMENTED NEUTROPHILS ABSOLUTE COUNT: 5.3 K/CU MM
SEGMENTED NEUTROPHILS RELATIVE PERCENT: 78.4 % (ref 36–66)
SODIUM BLD-SCNC: 140 MMOL/L (ref 135–145)
TOTAL IMMATURE NEUTOROPHIL: 0.01 K/CU MM
TOTAL NUCLEATED RBC: 0 K/CU MM
TOTAL PROTEIN: 6.7 GM/DL (ref 6.4–8.2)
WBC # BLD: 6.8 K/CU MM (ref 4–10.5)

## 2020-11-26 PROCEDURE — 85384 FIBRINOGEN ACTIVITY: CPT

## 2020-11-26 PROCEDURE — 2580000003 HC RX 258: Performed by: NURSE PRACTITIONER

## 2020-11-26 PROCEDURE — 6360000002 HC RX W HCPCS: Performed by: NURSE PRACTITIONER

## 2020-11-26 PROCEDURE — 80048 BASIC METABOLIC PNL TOTAL CA: CPT

## 2020-11-26 PROCEDURE — 1200000000 HC SEMI PRIVATE

## 2020-11-26 PROCEDURE — 6370000000 HC RX 637 (ALT 250 FOR IP): Performed by: NURSE PRACTITIONER

## 2020-11-26 PROCEDURE — 94761 N-INVAS EAR/PLS OXIMETRY MLT: CPT

## 2020-11-26 PROCEDURE — 85025 COMPLETE CBC W/AUTO DIFF WBC: CPT

## 2020-11-26 PROCEDURE — 85379 FIBRIN DEGRADATION QUANT: CPT

## 2020-11-26 PROCEDURE — 85730 THROMBOPLASTIN TIME PARTIAL: CPT

## 2020-11-26 PROCEDURE — 6360000002 HC RX W HCPCS: Performed by: INTERNAL MEDICINE

## 2020-11-26 PROCEDURE — 85610 PROTHROMBIN TIME: CPT

## 2020-11-26 PROCEDURE — 80053 COMPREHEN METABOLIC PANEL: CPT

## 2020-11-26 RX ORDER — AMOXICILLIN AND CLAVULANATE POTASSIUM 875; 125 MG/1; MG/1
1 TABLET, FILM COATED ORAL 2 TIMES DAILY WITH MEALS
Qty: 10 TABLET | Refills: 0 | Status: SHIPPED | OUTPATIENT
Start: 2020-11-26 | End: 2020-12-01

## 2020-11-26 RX ORDER — DEXAMETHASONE 6 MG/1
6 TABLET ORAL DAILY
Qty: 7 TABLET | Refills: 0 | Status: SHIPPED | OUTPATIENT
Start: 2020-11-27 | End: 2020-12-04

## 2020-11-26 RX ADMIN — LACOSAMIDE 200 MG: 100 TABLET, FILM COATED ORAL at 08:54

## 2020-11-26 RX ADMIN — TROSPIUM CHLORIDE 20 MG: 20 TABLET, FILM COATED ORAL at 08:54

## 2020-11-26 RX ADMIN — PAROXETINE HYDROCHLORIDE 20 MG: 20 TABLET, FILM COATED ORAL at 08:55

## 2020-11-26 RX ADMIN — LACOSAMIDE 200 MG: 100 TABLET, FILM COATED ORAL at 20:19

## 2020-11-26 RX ADMIN — Medication 1 PACKET: at 17:56

## 2020-11-26 RX ADMIN — TIMOLOL MALEATE 1 DROP: 5 SOLUTION OPHTHALMIC at 08:58

## 2020-11-26 RX ADMIN — VANCOMYCIN HYDROCHLORIDE 1250 MG: 5 INJECTION, POWDER, LYOPHILIZED, FOR SOLUTION INTRAVENOUS at 00:44

## 2020-11-26 RX ADMIN — DIVALPROEX SODIUM 1000 MG: 500 TABLET, DELAYED RELEASE ORAL at 20:20

## 2020-11-26 RX ADMIN — QUETIAPINE FUMARATE 50 MG: 50 TABLET, EXTENDED RELEASE ORAL at 20:20

## 2020-11-26 RX ADMIN — PANTOPRAZOLE SODIUM 40 MG: 40 TABLET, DELAYED RELEASE ORAL at 06:33

## 2020-11-26 RX ADMIN — SIMVASTATIN 20 MG: 20 TABLET, FILM COATED ORAL at 20:20

## 2020-11-26 RX ADMIN — Medication 1 CAPSULE: at 08:55

## 2020-11-26 RX ADMIN — SODIUM CHLORIDE, PRESERVATIVE FREE 10 ML: 5 INJECTION INTRAVENOUS at 08:57

## 2020-11-26 RX ADMIN — PRIMIDONE 50 MG: 50 TABLET ORAL at 14:31

## 2020-11-26 RX ADMIN — LATANOPROST 1 DROP: 50 SOLUTION/ DROPS OPHTHALMIC at 20:21

## 2020-11-26 RX ADMIN — SODIUM CHLORIDE, PRESERVATIVE FREE 10 ML: 5 INJECTION INTRAVENOUS at 20:20

## 2020-11-26 RX ADMIN — ENOXAPARIN SODIUM 40 MG: 40 INJECTION SUBCUTANEOUS at 08:56

## 2020-11-26 RX ADMIN — PRIMIDONE 50 MG: 50 TABLET ORAL at 08:55

## 2020-11-26 RX ADMIN — CEFEPIME HYDROCHLORIDE 2 G: 2 INJECTION, POWDER, FOR SOLUTION INTRAVENOUS at 14:31

## 2020-11-26 RX ADMIN — AMLODIPINE BESYLATE 5 MG: 5 TABLET ORAL at 08:55

## 2020-11-26 RX ADMIN — LACTULOSE 10 G: 10 SOLUTION ORAL at 20:19

## 2020-11-26 RX ADMIN — LORAZEPAM 1 MG: 1 TABLET ORAL at 20:20

## 2020-11-26 RX ADMIN — TAMSULOSIN HYDROCHLORIDE 0.4 MG: 0.4 CAPSULE ORAL at 08:55

## 2020-11-26 RX ADMIN — LEVOTHYROXINE SODIUM 125 MCG: 125 TABLET ORAL at 06:32

## 2020-11-26 RX ADMIN — Medication 1 PACKET: at 08:54

## 2020-11-26 RX ADMIN — LACTULOSE 10 G: 10 SOLUTION ORAL at 08:57

## 2020-11-26 RX ADMIN — CLONAZEPAM 0.25 MG: 0.5 TABLET ORAL at 08:54

## 2020-11-26 RX ADMIN — PRIMIDONE 50 MG: 50 TABLET ORAL at 20:20

## 2020-11-26 RX ADMIN — ZONISAMIDE 100 MG: 100 CAPSULE ORAL at 08:55

## 2020-11-26 RX ADMIN — FOLIC ACID 1 MG: 1 TABLET ORAL at 08:55

## 2020-11-26 RX ADMIN — DEXAMETHASONE 6 MG: 4 TABLET ORAL at 08:54

## 2020-11-26 RX ADMIN — Medication 1000 UNITS: at 08:54

## 2020-11-26 RX ADMIN — LACTULOSE 10 G: 10 SOLUTION ORAL at 14:31

## 2020-11-26 RX ADMIN — LORAZEPAM 1 MG: 1 TABLET ORAL at 08:55

## 2020-11-26 RX ADMIN — CEFEPIME HYDROCHLORIDE 2 G: 2 INJECTION, POWDER, FOR SOLUTION INTRAVENOUS at 08:56

## 2020-11-26 RX ADMIN — ENOXAPARIN SODIUM 40 MG: 40 INJECTION SUBCUTANEOUS at 20:19

## 2020-11-26 RX ADMIN — VANCOMYCIN HYDROCHLORIDE 1250 MG: 5 INJECTION, POWDER, LYOPHILIZED, FOR SOLUTION INTRAVENOUS at 14:31

## 2020-11-26 ASSESSMENT — PAIN SCALES - GENERAL
PAINLEVEL_OUTOF10: 0

## 2020-11-26 NOTE — PROGRESS NOTES
[facility] [facility]  consulted by Dr. Mandi Hall for monitoring and adjustment. Indication for treatment: Covid-19, possible secondary bacterial infection  Goal trough:  15 mcg/mL     Pertinent Laboratory Values:   Temp Readings from Last 3 Encounters:   11/26/20 98.6 °F (37 °C) (Oral)   09/07/20 98 °F (36.7 °C) (Oral)   08/05/20 98.6 °F (37 °C)     Recent Labs     11/23/20  1720 11/25/20  0340 11/26/20  0425   WBC 8.1 8.8 6.8   LACTATE 1.9  --   --      Recent Labs     11/23/20  1720 11/25/20  0340 11/26/20  0425   BUN 7 10 14   CREATININE 0.6* 0.6* 0.5*     Estimated Creatinine Clearance: 157 mL/min (A) (based on SCr of 0.5 mg/dL (L)). Intake/Output Summary (Last 24 hours) at 11/26/2020 1148  Last data filed at 11/26/2020 0905  Gross per 24 hour   Intake 200 ml   Output 225 ml   Net -25 ml       Pertinent Cultures:  Date    Source    Results  11/24   Respiratory   Ordered  11/24   Strep/Legionella  Ordered  11/24   Influenza A/B   Ordered  11/24   Blood    CoNS (1 of 2)   11/23   Covid-19   Positive  11/25   MRSA nasal screen  Ordered    Vancomycin level:   TROUGH:  No results for input(s): VANCOTROUGH in the last 72 hours. RANDOM:  No results for input(s): VANCORANDOM in the last 72 hours.     Assessment:  · WBC and temperature: WBC WNL; afebrile  · SCr, BUN, and urine output: stable  · Day(s) of therapy: #3  · Vancomycin level: to be collected    Plan:  · Vancomycin 1750 mg x1 followed by vancomycin 1250 mg q12h  · Trough intended to be collected today @ 1230, missed   · Will attempt with next dose    · Pharmacy will continue to monitor patient and adjust therapy as indicated    Burnice Stake SCHEDULED FOR 11/27 @ 0030     Thank you for the consult,  Viola ValdezCharlotte [facility]  11/26/2020 11:48 AM

## 2020-11-26 NOTE — DISCHARGE SUMMARY
[id number]  PCP:  Abner Castro MD    Admit date: 11/23/2020  Admitting Physician: Darren Jacobs MD    Discharge date: 11/26/2020 Discharge Physician: MD Xander         Discharge Diagnoses. As per below    Hospital Course:  History of present illness at admission: As per H&P  Subsequent Hospital Course:     -Sepsis secondary to Covid infection and possible gram-negative pneumonia    Continue empiric broad-spectrum antibiotics. Continue PPE and droplet precautions. Not given convulsant plasma or remdesivir. Improved on Decadron, INH.   -Seizure disorder continued Depakote, Vimpat.  -Electrolyte imbalance improved on replacement protocol.  -Hypothyroidism continue current Synthroid.  -Pressure ulcers wound care on board. -BPH continue Flomax and [place]. -HTN continue Norvasc.  -Cerebral palsy provide supportive care.     VTE prophylaxis LMWH    On the day of discharge, pt felt better. No new complaints.     Pertinent Exam Findings on Day of Discharge:  General Appearance:    Alert, cooperative, no distress, appears stated age  Head:    Normocephalic, without obvious abnormality, atraumatic  Eyes:    PERRL, conjunctiva/corneas clear, EOM's intact  Lungs:    Clear to auscultation bilaterally, respirations unlabored   Heart:    Regular rate and rhythm, S1 and S2 normal, no murmur,   rub or gallop  Abdomen:     Soft, non-tender, bowel sounds active, no masses, no organomegaly  Extremities:   Extremities normal, atraumatic, no cyanosis or edema    Consults:  IP CONSULT TO HOSPITALIST  IP CONSULT TO CASE MANAGEMENT  PHARMACY TO DOSE VANCOMYCIN    Patient Instructions:   Radhames Hernandez   Home Medication Instructions OWI:[id number]    Printed on:11/26/20 1011   Medication Information                      acetaminophen (APAP EXTRA STRENGTH) 500 MG tablet  Take 1 tablet by mouth every 6 hours as needed for Pain             amLODIPine (NORVASC) 5 MG tablet  Take 5 mg by mouth daily             amoxicillin-clavulanate (AUGMENTIN) 875-125 MG per tablet  Take 1 tablet by mouth 2 times daily (with meals) for 5 days             apixaban (ELIQUIS) 2.5 MG TABS tablet  Take 1 tablet by mouth 2 times daily for 21 days             aspirin 81 MG tablet  Take 81 mg by mouth daily             Cholecalciferol (VITAMIN D3) 1000 UNITS CAPS  Take 1 tablet by mouth daily. clonazePAM (KLONOPIN) 0.5 MG tablet  Take 0.25 mg by mouth daily. clorazepate (TRANXENE) 7.5 MG tablet  Take 7.5 mg by mouth 2 times daily. dexamethasone (DECADRON) 6 MG tablet  Take 1 tablet by mouth daily for 7 days             dicyclomine (BENTYL) 10 MG capsule  Take 1 capsule by mouth every 6 hours as needed (cramps)             divalproex (DEPAKOTE) 500 MG DR tablet  Take 1,000 mg by mouth nightly             folic acid (FOLVITE) 1 MG tablet  Take 1 mg by mouth daily             guaiFENesin (MUCINEX) 600 MG extended release tablet  Take 1,200 mg by mouth daily as needed for Congestion             hydrochlorothiazide (MICROZIDE) 12.5 MG capsule  Take 12.5 mg by mouth daily             lacosamide (VIMPAT) 200 MG tablet  Take 1 tablet by mouth 2 times daily for 30 days. lacosamide (VIMPAT) 200 MG tablet  Take 200 mg by mouth as needed.  Give 1 tab after seizure activity as needed             Lactobacillus (ACIDOPHILUS) CAPS capsule  Take 1 capsule by mouth daily             Lactulose Encephalopathy (ENULOSE PO)  Take 30 mLs by mouth 3 times daily             latanoprost (XALATAN) 0.005 % ophthalmic solution  Place 1 drop into both eyes nightly             levothyroxine (SYNTHROID) 125 MCG tablet  Take 1 tablet by mouth Daily             Menthol-Methyl Salicylate (MUSCLE RUB) 10-15 % CREA cream  Apply 1 applicator topically as needed             mirabegron (MYRBETRIQ) 50 MG TB24  Take 50 mg by mouth daily             omeprazole (PRILOSEC) 20 MG delayed release capsule  Take 20 mg by mouth 2

## 2020-11-26 NOTE — PROGRESS NOTES
Perfect serve sent to  asking if anything else needs to be completed for pt to be able to discharge today.  Nurse waiting on response

## 2020-11-26 NOTE — PROGRESS NOTES
Nurse called group home and left message. Nurse called guardian. Guardian stated it would be impossible to discharge pt today due to staffing at [facility]. Guardian notified that if pt stays till tomorrow for discharge pt will be moved to [facility] floor and possibly be two patients to a room. Guardian agreeable. Charge nurse notified of conversation.  being notified.

## 2020-11-26 NOTE — PLAN OF CARE
Problem: Airway Clearance - Ineffective  Goal: Achieve or maintain patent airway  11/26/2020 0033 by Aftab Rosario LPN  Outcome: Ongoing  11/25/2020 1204 by Macie Greco RN  Outcome: Ongoing     Problem: Gas Exchange - Impaired  Goal: Absence of hypoxia  11/26/2020 0033 by Aftab Rosario LPN  Outcome: Ongoing  11/25/2020 1204 by Macie Greco RN  Outcome: Ongoing  Goal: Promote optimal lung function  11/26/2020 0033 by Aftab Rosario LPN  Outcome: Ongoing  11/25/2020 1204 by Macie Greco RN  Outcome: Ongoing     Problem: Breathing Pattern - Ineffective  Goal: Ability to achieve and maintain a regular respiratory rate  11/26/2020 0033 by Aftab Rosario LPN  Outcome: Ongoing  11/25/2020 1204 by Macie Greco RN  Outcome: Ongoing     Problem:  Body Temperature -  Risk of, Imbalanced  Goal: Ability to maintain a body temperature within defined limits  11/26/2020 0033 by Aftab Rosario LPN  Outcome: Ongoing  11/25/2020 1204 by Macie Greco RN  Outcome: Ongoing  Goal: Will regain or maintain usual level of consciousness  11/26/2020 0033 by Aftab Rosario LPN  Outcome: Ongoing  11/25/2020 1204 by Macie Greco RN  Outcome: Ongoing  Goal: Complications related to the disease process, condition or treatment will be avoided or minimized  11/26/2020 0033 by Aftab Rosario LPN  Outcome: Ongoing  11/25/2020 1204 by Macie Greco RN  Outcome: Ongoing     Problem: Isolation Precautions - Risk of Spread of Infection  Goal: Prevent transmission of infection  11/26/2020 0033 by Aftab Rosario LPN  Outcome: Ongoing  11/25/2020 1204 by Macie Greco RN  Outcome: Ongoing     Problem: Nutrition Deficits  Goal: Optimize nutrtional status  11/26/2020 0033 by Aftab Rosario LPN  Outcome: Ongoing  11/25/2020 1204 by Macie Greco RN  Outcome: Ongoing     Problem: Risk for Fluid Volume Deficit  Goal: Maintain normal heart rhythm  11/26/2020 0033 by Aftab Rosario LPN  Outcome: Ongoing  11/25/2020 1204 by Macie Greco, RN  Outcome: Ongoing  Goal: Maintain absence of muscle cramping  11/26/2020 0033 by Yg Lockett LPN  Outcome: Ongoing  11/25/2020 1204 by Thuy Reeves RN  Outcome: Ongoing  Goal: Maintain normal serum potassium, sodium, calcium, phosphorus, and pH  11/26/2020 0033 by Yg Lockett LPN  Outcome: Ongoing  11/25/2020 1204 by Thuy Reeves RN  Outcome: Ongoing     Problem: Loneliness or Risk for Loneliness  Goal: Demonstrate positive use of time alone when socialization is not possible  11/26/2020 0033 by Yg Lockett LPN  Outcome: Ongoing  11/25/2020 1204 by Thuy Reeves RN  Outcome: Ongoing     Problem: Fatigue  Goal: Verbalize increase energy and improved vitality  11/26/2020 0033 by Yg Lockett LPN  Outcome: Ongoing  11/25/2020 1204 by Thuy Reeves RN  Outcome: Ongoing     Problem: Patient Education: Go to Patient Education Activity  Goal: Patient/Family Education  11/26/2020 0033 by Yg Lockett LPN  Outcome: Ongoing  11/25/2020 1204 by Thuy Reeves RN  Outcome: Ongoing     Problem: Falls - Risk of:  Goal: Will remain free from falls  Description: Will remain free from falls  11/26/2020 0033 by Yg Lockett LPN  Outcome: Ongoing  11/25/2020 1204 by Thuy Reeves RN  Outcome: Ongoing  Goal: Absence of physical injury  Description: Absence of physical injury  11/26/2020 0033 by Yg Lockett LPN  Outcome: Ongoing  11/25/2020 1204 by Thuy Reeves RN  Outcome: Ongoing     Problem: Skin Integrity:  Goal: Will show no infection signs and symptoms  Description: Will show no infection signs and symptoms  11/26/2020 0033 by Yg Lockett LPN  Outcome: Ongoing  11/25/2020 1204 by Thuy Reeves RN  Outcome: Ongoing  Goal: Absence of new skin breakdown  Description: Absence of new skin breakdown  11/26/2020 0033 by Yg Lockett LPN  Outcome: Ongoing  11/25/2020 1204 by Thuy Reeves RN  Outcome: Ongoing

## 2020-11-27 VITALS
WEIGHT: 200.4 LBS | BODY MASS INDEX: 32.21 KG/M2 | HEART RATE: 58 BPM | HEIGHT: 66 IN | RESPIRATION RATE: 18 BRPM | DIASTOLIC BLOOD PRESSURE: 67 MMHG | TEMPERATURE: 97.4 F | OXYGEN SATURATION: 97 % | SYSTOLIC BLOOD PRESSURE: 142 MMHG

## 2020-11-27 LAB
ALBUMIN SERPL-MCNC: 3.5 GM/DL (ref 3.4–5)
ALP BLD-CCNC: 74 IU/L (ref 40–128)
ALT SERPL-CCNC: 26 U/L (ref 10–40)
ANION GAP SERPL CALCULATED.3IONS-SCNC: 12 MMOL/L (ref 4–16)
APTT: 25.7 SECONDS (ref 25.1–37.1)
AST SERPL-CCNC: 16 IU/L (ref 15–37)
BASOPHILS ABSOLUTE: 0 K/CU MM
BASOPHILS RELATIVE PERCENT: 0.2 % (ref 0–1)
BILIRUB SERPL-MCNC: 0.2 MG/DL (ref 0–1)
BUN BLDV-MCNC: 11 MG/DL (ref 6–23)
CALCIUM SERPL-MCNC: 9.2 MG/DL (ref 8.3–10.6)
CHLORIDE BLD-SCNC: 102 MMOL/L (ref 99–110)
CO2: 23 MMOL/L (ref 21–32)
CREAT SERPL-MCNC: 0.4 MG/DL (ref 0.9–1.3)
CULTURE: ABNORMAL
D DIMER: <200 NG/ML(DDU)
DIFFERENTIAL TYPE: ABNORMAL
DOSE AMOUNT: ABNORMAL
DOSE TIME: ABNORMAL
EOSINOPHILS ABSOLUTE: 0 K/CU MM
EOSINOPHILS RELATIVE PERCENT: 0 % (ref 0–3)
FIBRINOGEN LEVEL: 332 MG/DL (ref 196.9–442.1)
GFR AFRICAN AMERICAN: >60 ML/MIN/1.73M2
GFR NON-AFRICAN AMERICAN: >60 ML/MIN/1.73M2
GLUCOSE BLD-MCNC: 204 MG/DL (ref 70–99)
HCT VFR BLD CALC: 41.6 % (ref 42–52)
HEMOGLOBIN: 13.4 GM/DL (ref 13.5–18)
IMMATURE NEUTROPHIL %: 0.9 % (ref 0–0.43)
INR BLD: 0.9 INDEX
LYMPHOCYTES ABSOLUTE: 0.5 K/CU MM
LYMPHOCYTES RELATIVE PERCENT: 8.2 % (ref 24–44)
Lab: ABNORMAL
Lab: ABNORMAL
MCH RBC QN AUTO: 29.3 PG (ref 27–31)
MCHC RBC AUTO-ENTMCNC: 32.2 % (ref 32–36)
MCV RBC AUTO: 90.8 FL (ref 78–100)
MONOCYTES ABSOLUTE: 0.6 K/CU MM
MONOCYTES RELATIVE PERCENT: 11.2 % (ref 0–4)
NUCLEATED RBC %: 0 %
PDW BLD-RTO: 13.4 % (ref 11.7–14.9)
PLATELET # BLD: 149 K/CU MM (ref 140–440)
PMV BLD AUTO: 10.8 FL (ref 7.5–11.1)
POTASSIUM SERPL-SCNC: 4.1 MMOL/L (ref 3.5–5.1)
PROTHROMBIN TIME: 10.9 SECONDS (ref 11.7–14.5)
RBC # BLD: 4.58 M/CU MM (ref 4.6–6.2)
SEGMENTED NEUTROPHILS ABSOLUTE COUNT: 4.3 K/CU MM
SEGMENTED NEUTROPHILS RELATIVE PERCENT: 79.5 % (ref 36–66)
SODIUM BLD-SCNC: 137 MMOL/L (ref 135–145)
SPECIMEN: ABNORMAL
SPECIMEN: ABNORMAL
TOTAL IMMATURE NEUTOROPHIL: 0.05 K/CU MM
TOTAL NUCLEATED RBC: 0 K/CU MM
TOTAL PROTEIN: 6.8 GM/DL (ref 6.4–8.2)
VANCOMYCIN TROUGH: <4 UG/ML (ref 10–20)
WBC # BLD: 5.5 K/CU MM (ref 4–10.5)

## 2020-11-27 PROCEDURE — 85025 COMPLETE CBC W/AUTO DIFF WBC: CPT

## 2020-11-27 PROCEDURE — 85610 PROTHROMBIN TIME: CPT

## 2020-11-27 PROCEDURE — 80053 COMPREHEN METABOLIC PANEL: CPT

## 2020-11-27 PROCEDURE — 6370000000 HC RX 637 (ALT 250 FOR IP): Performed by: NURSE PRACTITIONER

## 2020-11-27 PROCEDURE — 6360000002 HC RX W HCPCS: Performed by: INTERNAL MEDICINE

## 2020-11-27 PROCEDURE — 93005 ELECTROCARDIOGRAM TRACING: CPT | Performed by: INTERNAL MEDICINE

## 2020-11-27 PROCEDURE — 94761 N-INVAS EAR/PLS OXIMETRY MLT: CPT

## 2020-11-27 PROCEDURE — 85730 THROMBOPLASTIN TIME PARTIAL: CPT

## 2020-11-27 PROCEDURE — 80202 ASSAY OF VANCOMYCIN: CPT

## 2020-11-27 PROCEDURE — 80048 BASIC METABOLIC PNL TOTAL CA: CPT

## 2020-11-27 PROCEDURE — 2580000003 HC RX 258: Performed by: INTERNAL MEDICINE

## 2020-11-27 PROCEDURE — 6360000002 HC RX W HCPCS: Performed by: NURSE PRACTITIONER

## 2020-11-27 PROCEDURE — 85384 FIBRINOGEN ACTIVITY: CPT

## 2020-11-27 PROCEDURE — 85379 FIBRIN DEGRADATION QUANT: CPT

## 2020-11-27 PROCEDURE — 2580000003 HC RX 258: Performed by: NURSE PRACTITIONER

## 2020-11-27 RX ADMIN — PANTOPRAZOLE SODIUM 40 MG: 40 TABLET, DELAYED RELEASE ORAL at 06:55

## 2020-11-27 RX ADMIN — LACOSAMIDE 200 MG: 100 TABLET, FILM COATED ORAL at 09:55

## 2020-11-27 RX ADMIN — PRIMIDONE 50 MG: 50 TABLET ORAL at 15:09

## 2020-11-27 RX ADMIN — AMLODIPINE BESYLATE 5 MG: 5 TABLET ORAL at 09:55

## 2020-11-27 RX ADMIN — PAROXETINE HYDROCHLORIDE 20 MG: 20 TABLET, FILM COATED ORAL at 09:55

## 2020-11-27 RX ADMIN — DEXAMETHASONE 6 MG: 4 TABLET ORAL at 09:55

## 2020-11-27 RX ADMIN — LACTULOSE 10 G: 10 SOLUTION ORAL at 15:09

## 2020-11-27 RX ADMIN — Medication 1000 UNITS: at 09:55

## 2020-11-27 RX ADMIN — LEVOTHYROXINE SODIUM 125 MCG: 125 TABLET ORAL at 06:55

## 2020-11-27 RX ADMIN — ENOXAPARIN SODIUM 40 MG: 40 INJECTION SUBCUTANEOUS at 09:56

## 2020-11-27 RX ADMIN — VANCOMYCIN HYDROCHLORIDE 1750 MG: 5 INJECTION, POWDER, LYOPHILIZED, FOR SOLUTION INTRAVENOUS at 02:41

## 2020-11-27 RX ADMIN — PRIMIDONE 50 MG: 50 TABLET ORAL at 09:55

## 2020-11-27 RX ADMIN — CEFEPIME HYDROCHLORIDE 2 G: 2 INJECTION, POWDER, FOR SOLUTION INTRAVENOUS at 06:56

## 2020-11-27 RX ADMIN — ZONISAMIDE 100 MG: 100 CAPSULE ORAL at 09:56

## 2020-11-27 RX ADMIN — Medication 1 PACKET: at 09:54

## 2020-11-27 RX ADMIN — CEFEPIME HYDROCHLORIDE 2 G: 2 INJECTION, POWDER, FOR SOLUTION INTRAVENOUS at 00:19

## 2020-11-27 RX ADMIN — Medication 1 CAPSULE: at 09:55

## 2020-11-27 RX ADMIN — LACTULOSE 10 G: 10 SOLUTION ORAL at 09:54

## 2020-11-27 RX ADMIN — FOLIC ACID 1 MG: 1 TABLET ORAL at 09:55

## 2020-11-27 RX ADMIN — CLONAZEPAM 0.25 MG: 0.5 TABLET ORAL at 09:54

## 2020-11-27 RX ADMIN — LORAZEPAM 1 MG: 1 TABLET ORAL at 09:55

## 2020-11-27 RX ADMIN — SODIUM CHLORIDE, PRESERVATIVE FREE 10 ML: 5 INJECTION INTRAVENOUS at 09:54

## 2020-11-27 RX ADMIN — TROSPIUM CHLORIDE 20 MG: 20 TABLET, FILM COATED ORAL at 09:55

## 2020-11-27 RX ADMIN — TIMOLOL MALEATE 1 DROP: 5 SOLUTION OPHTHALMIC at 09:58

## 2020-11-27 RX ADMIN — TAMSULOSIN HYDROCHLORIDE 0.4 MG: 0.4 CAPSULE ORAL at 09:55

## 2020-11-27 ASSESSMENT — PAIN SCALES - GENERAL: PAINLEVEL_OUTOF10: 0

## 2020-11-27 NOTE — CARE COORDINATION
Received VM from chuck Baires (Left 11/26 ay 3:46) Contacted Ms. Bhavana Gonzales to address concerns. Discussed conversation with Yayo Carrasco; no issue with discharge to [address] [place] [address] at that time. Ms Bhavana Gonzales stated \"everytime he's discharged it is a debacle\". This CM encouraged her to plan for discharge today.  Hung Sanchez RN

## 2020-11-27 NOTE — DISCHARGE SUMMARY
(TRANXENE) 7.5 MG tablet  Take 7.5 mg by mouth 2 times daily. dexamethasone (DECADRON) 6 MG tablet  Take 1 tablet by mouth daily for 7 days             dicyclomine (BENTYL) 10 MG capsule  Take 1 capsule by mouth every 6 hours as needed (cramps)             divalproex (DEPAKOTE) 500 MG DR tablet  Take 1,000 mg by mouth nightly             folic acid (FOLVITE) 1 MG tablet  Take 1 mg by mouth daily             guaiFENesin (MUCINEX) 600 MG extended release tablet  Take 1,200 mg by mouth daily as needed for Congestion             hydrochlorothiazide (MICROZIDE) 12.5 MG capsule  Take 12.5 mg by mouth daily             lacosamide (VIMPAT) 200 MG tablet  Take 1 tablet by mouth 2 times daily for 30 days. lacosamide (VIMPAT) 200 MG tablet  Take 200 mg by mouth as needed.  Give 1 tab after seizure activity as needed             Lactobacillus (ACIDOPHILUS) CAPS capsule  Take 1 capsule by mouth daily             Lactulose Encephalopathy (ENULOSE PO)  Take 30 mLs by mouth 3 times daily             latanoprost (XALATAN) 0.005 % ophthalmic solution  Place 1 drop into both eyes nightly             levothyroxine (SYNTHROID) 125 MCG tablet  Take 1 tablet by mouth Daily             Menthol-Methyl Salicylate (MUSCLE RUB) 10-15 % CREA cream  Apply 1 applicator topically as needed             mirabegron (MYRBETRIQ) 50 MG TB24  Take 50 mg by mouth daily             omeprazole (PRILOSEC) 20 MG delayed release capsule  Take 20 mg by mouth 2 times daily (before meals)             PARoxetine (PAXIL) 20 MG tablet  Take 20 mg by mouth every morning             primidone (MYSOLINE) 50 MG tablet  Take 50 mg by mouth 3 times daily             pseudoephedrine (SUDAFED) 30 MG tablet  Take 30 mg by mouth every 4 hours as needed for Congestion             QUEtiapine (SEROQUEL XR) 50 MG extended release tablet  Take 50 mg by mouth nightly             Simethicone (MI-ACID GAS RELIEF PO)  Take 1 Container by mouth every 4 hours as needed 10 cc every 4 hrs not to exceed 60 cc in 24 hrs             simvastatin (ZOCOR) 20 MG tablet  Take 20 mg by mouth nightly. solifenacin (VESICARE) 10 MG tablet  Take 10 mg by mouth daily             tamsulosin (FLOMAX) 0.4 MG capsule  Take 1 capsule by mouth daily             timolol (TIMOPTIC) 0.5 % ophthalmic solution  Place 1 drop into both eyes daily              Wheat Dextrin (BENEFIBER) POWD  Take 4 g by mouth 2 times daily Takes 3 tsp in liquid twice per day              zonisamide (ZONEGRAN) 100 MG capsule  Take 100 mg by mouth every morning                   Diet:  DIET DYSPHAGIA MINCED AND MOIST; Activity:   activity as tolerated     Patient was discharged yesterday. However group home was not able to take the patient yesterday. He is getting discharged later today as per RN.

## 2020-11-27 NOTE — CARE COORDINATION
CM placed a PS to [facility] for home care. CM called Tiffany regarding discharge transportation. CM left a VM. CM called Tata Pradhan. CM discussed discharge transportation. Liana Clark shared that [facility] and/ or [facility] would be fined. Guardian is aware of discharge and agreeable for pt to return to the group home. Ruben Farris  [phone]   Kanchan at the Group home [phone]. CM called Kanchan at [facility] and fine is busy. CM is awaiting call back from [facility] to discuss discharge planning. [address]  [facility] called transport time is set up  [phone] CM called [facility] and spoke with Ruben Farris. Group home is prepared to accept pt. Medications in process per group home. Staff at Group home ready to accept pt. CM called Ashutosh Gipson and informed/ agreed. CM called Sabrina ARRIETA and informed .   CM will remain available for any needs or concerns.  [address]

## 2020-11-27 NOTE — PROGRESS NOTES
[facility]  consulted by Dr. Enrique Howe for monitoring and adjustment. Indication for treatment: Covid-19, possible secondary bacterial infection  Goal trough:  15 mcg/mL     Pertinent Laboratory Values:   Temp Readings from Last 3 Encounters:   11/26/20 97.8 °F (36.6 °C) (Oral)   09/07/20 98 °F (36.7 °C) (Oral)   08/05/20 98.6 °F (37 °C)     Recent Labs     11/25/20  0340 11/26/20  0425   WBC 8.8 6.8     Recent Labs     11/25/20  0340 11/26/20  0425   BUN 10 14   CREATININE 0.6* 0.5*     Estimated Creatinine Clearance: 157 mL/min (A) (based on SCr of 0.5 mg/dL (L)). Intake/Output Summary (Last 24 hours) at 11/27/2020 0411  Last data filed at 11/26/2020 1820  Gross per 24 hour   Intake 400 ml   Output 150 ml   Net 250 ml       Pertinent Cultures:  Date    Source    Results  11/24   Respiratory   Ordered  11/24   Strep/Legionella  Ordered  11/24   Influenza A/B   Ordered  11/24   Blood    CoNS (1 of 2)   11/23   Covid-19   Positive  11/25   MRSA nasal screen  Ordered    Vancomycin level:   TROUGH:    Recent Labs     11/27/20  0040   VANCOTROUGH <4.0*     RANDOM:  No results for input(s): VANCORANDOM in the last 72 hours.     Assessment:  · WBC and temperature: WBC WNL; afebrile  · SCr, BUN, and urine output: stable  · Day(s) of therapy: # 4  · Vancomycin level: < 4.0, sub-therapeutic    Plan:  · Started on Vancomycin 1750 mg x 1, followed by 1250 mg IVPB q12h with a sub-therapeutic trough  · Will increase Vancomycin dose to 1750 mg IVPB q12h  · Trough due on 11/28 @ 1430   · Pharmacy will continue to monitor patient and adjust therapy as indicated    VANCOMYCIN TROUGH SCHEDULED FOR 11/28/20 @ 1430    Thank you for the consult,  Bhanu Esposito RPh  11/27/2020 4:11 AM

## 2020-11-28 ENCOUNTER — CARE COORDINATION (OUTPATIENT)
Dept: CASE MANAGEMENT | Age: 65
End: 2020-11-28

## 2020-11-28 LAB
CULTURE: NORMAL
Lab: NORMAL
SPECIMEN: NORMAL

## 2020-11-28 PROCEDURE — 93010 ELECTROCARDIOGRAM REPORT: CPT | Performed by: INTERNAL MEDICINE

## 2020-11-28 NOTE — CARE COORDINATION
CTN contacted [facility] Supervisor who verified patient was home with all discharge instructions and medications. Terra Sadlergary denied any further questions or concerns.      Nikhil REINAN, RN, [facility]  Care Transition Nurse   [phone] office  [phone] mobile

## 2020-11-30 LAB
EKG ATRIAL RATE: 63 BPM
EKG DIAGNOSIS: NORMAL
EKG P AXIS: 41 DEGREES
EKG P-R INTERVAL: 152 MS
EKG Q-T INTERVAL: 452 MS
EKG QRS DURATION: 162 MS
EKG QTC CALCULATION (BAZETT): 462 MS
EKG R AXIS: -61 DEGREES
EKG T AXIS: -8 DEGREES
EKG VENTRICULAR RATE: 63 BPM

## 2020-12-01 ENCOUNTER — HOSPITAL ENCOUNTER (EMERGENCY)
Age: 65
Discharge: HOME | End: 2020-12-01
Payer: MEDICARE

## 2020-12-01 ENCOUNTER — APPOINTMENT (OUTPATIENT)
Dept: CT IMAGING | Age: 65
End: 2020-12-01
Payer: MEDICARE

## 2020-12-01 VITALS
HEART RATE: 61 BPM | DIASTOLIC BLOOD PRESSURE: 74 MMHG | RESPIRATION RATE: 20 BRPM | TEMPERATURE: 98.2 F | SYSTOLIC BLOOD PRESSURE: 131 MMHG | OXYGEN SATURATION: 97 %

## 2020-12-01 PROCEDURE — 70450 CT HEAD/BRAIN W/O DYE: CPT

## 2020-12-01 PROCEDURE — 99284 EMERGENCY DEPT VISIT MOD MDM: CPT

## 2020-12-01 PROCEDURE — 70486 CT MAXILLOFACIAL W/O DYE: CPT

## 2020-12-01 PROCEDURE — 72125 CT NECK SPINE W/O DYE: CPT

## 2020-12-01 NOTE — ED NOTES
Pupils are unequal with the left pupil at 5mm and the right at 3 mm. CHRIS Brunner notified.      Verl Schaumann, RN  12/01/20 [id number]

## 2020-12-01 NOTE — ED NOTES
The patient presents to the emergency department by EMS alert and oriented with a complaint of a fall out of bed at the nursing home. The patient denies any loss of consciousness, blurred vision, nausea or headache. The patients pupils are unequal with the left pupil at 5 mm and the right at 3 mm. The patient has a very small 1 mm laceration over the left eye. Bleeding is conrolled. The patient placed on the monitor with vital signs taken. Assessment as follows; Skin is pink, warm and dry.       Tanvir Soto RN  12/01/20 [id number]

## 2020-12-01 NOTE — CARE COORDINATION
Pt identified as potential readmission. Last admission 11/23 - 11/27 for Sepsis secondary to Covid infection and possible gram-negative pneumonia. Pt here today for with a head injury with an onset this morning. The context (mechanism) was patient states he rolled over in bed and fell on a bed hitting the left side of his head. Patient has associated laceration. No Acute Findings. Readmission was avoided and pt was able to be d/c'd home.

## 2020-12-01 NOTE — ED PROVIDER NOTES
poor short term and wakes up disoriented after a nap\"(per yaneth)(2014)    Depression     Epilepsy ([facility] [address] 1961    last seizure 2/2018- hx grand mal    Frequent falls     with phone assess- family stated pt fell this am (7/10/2013\"in the process of trying to find a facility to help build him back up- (pt now at [facility])    Glaucoma     \"has been in treatment for this in the past- no treatment needed at present\"    History of IBS     Hx MRSA infection     + nasal culture 4/2014    Hyperammonemia (HCC)     Hypertension     on Lisinopril    IBS (irritable bowel syndrome)     ulcerative colitis    Kidney stone     for surgery for stent placement 7/11/2013    Mood disorder ([facility] [address]     per staff at [address] [facility] Occasional tremors     \"makes it difficult for him to write\"    Pressure injury of contiguous region involving back and right buttock, stage 2 ([facility] [address] 5/27/2020    Pressure injury of left buttock, stage 1 7/22/2020    Prostatitis     hx given per H&P old chart    Thyroid disease     Ulcerative colitis ([facility] [address]     hx given per staff at [facility] 4/13/2015     Past Surgical History:   Procedure Laterality Date    CHOLECYSTECTOMY      COLONOSCOPY  8/19/14, 5/2012 8/19/14: hemorrhoids, 5/2012 at [address] Left 07/11/2013    with stnet placement   [address] [address]      OTHER SURGICAL HISTORY  04/15/2015    Revision of vagal nerve stimulator    UPPER GASTROINTESTINAL ENDOSCOPY N/A 11/13/2018    EGD DILATION BALLOON AND BIOPSY performed by Del Ahuja MD at [facility]  2002    Has to have bettery changed every 5 yrs.         CURRENT MEDICATIONS    Current Outpatient Rx   Medication Sig Dispense Refill    dexamethasone (DECADRON) 6 MG tablet Take 1 tablet by mouth daily for 7 days 7 tablet 0    amoxicillin-clavulanate (AUGMENTIN) 875-125 MG per tablet Take 1 tablet by mouth 2 times daily (with meals) for 5 days 10 tablet 0    apixaban (ELIQUIS) 2.5 MG TABS tablet Take 1 tablet by mouth 2 times daily for 21 days 42 tablet 0    acetaminophen (APAP EXTRA STRENGTH) 500 MG tablet Take 1 tablet by mouth every 6 hours as needed for Pain 30 tablet 0    solifenacin (VESICARE) 10 MG tablet Take 10 mg by mouth daily      levothyroxine (SYNTHROID) 125 MCG tablet Take 1 tablet by mouth Daily 30 tablet 3    dicyclomine (BENTYL) 10 MG capsule Take 1 capsule by mouth every 6 hours as needed (cramps) 20 capsule 0    amLODIPine (NORVASC) 5 MG tablet Take 5 mg by mouth daily      clonazePAM (KLONOPIN) 0.5 MG tablet Take 0.25 mg by mouth daily.  divalproex (DEPAKOTE) 500 MG DR tablet Take 1,000 mg by mouth nightly      hydrochlorothiazide (MICROZIDE) 12.5 MG capsule Take 12.5 mg by mouth daily      mirabegron (MYRBETRIQ) 50 MG TB24 Take 50 mg by mouth daily      omeprazole (PRILOSEC) 20 MG delayed release capsule Take 20 mg by mouth 2 times daily (before meals)      primidone (MYSOLINE) 50 MG tablet Take 50 mg by mouth 3 times daily      zonisamide (ZONEGRAN) 100 MG capsule Take 100 mg by mouth every morning      Simethicone (MI-ACID GAS RELIEF PO) Take 1 Container by mouth every 4 hours as needed 10 cc every 4 hrs not to exceed 60 cc in 24 hrs      guaiFENesin (MUCINEX) 600 MG extended release tablet Take 1,200 mg by mouth daily as needed for Congestion      Menthol-Methyl Salicylate (MUSCLE RUB) 10-15 % CREA cream Apply 1 applicator topically as needed      pseudoephedrine (SUDAFED) 30 MG tablet Take 30 mg by mouth every 4 hours as needed for Congestion      lacosamide (VIMPAT) 200 MG tablet Take 200 mg by mouth as needed. Give 1 tab after seizure activity as needed      lacosamide (VIMPAT) 200 MG tablet Take 1 tablet by mouth 2 times daily for 30 days.  60 tablet 0    latanoprost (XALATAN) 0.005 % ophthalmic solution Place 1 drop into both eyes nightly      timolol (TIMOPTIC) 0.5 % ophthalmic solution Place 1 drop into both eyes daily       Lactobacillus (ACIDOPHILUS) CAPS capsule Take 1 capsule by mouth daily      aspirin 81 MG tablet Take 81 mg by mouth daily      Wheat Dextrin (BENEFIBER) POWD Take 4 g by mouth 2 times daily Takes 3 tsp in liquid twice per day       Lactulose Encephalopathy (ENULOSE PO) Take 30 mLs by mouth 3 times daily      folic acid (FOLVITE) 1 MG tablet Take 1 mg by mouth daily      PARoxetine (PAXIL) 20 MG tablet Take 20 mg by mouth every morning      QUEtiapine (SEROQUEL XR) 50 MG extended release tablet Take 50 mg by mouth nightly      tamsulosin (FLOMAX) 0.4 MG capsule Take 1 capsule by mouth daily 30 capsule 0    simvastatin (ZOCOR) 20 MG tablet Take 20 mg by mouth nightly.  Cholecalciferol (VITAMIN D3) 1000 UNITS CAPS Take 1 tablet by mouth daily.  clorazepate (TRANXENE) 7.5 MG tablet Take 7.5 mg by mouth 2 times daily.          ALLERGIES    No Known Allergies    SOCIAL & FAMILY HISTORY    Social History     Socioeconomic History    Marital status: Single     Spouse name: Not on file    Number of children: Not on file    Years of education: Not on file    Highest education level: Not on file   Occupational History    Not on file   Social Needs    Financial resource strain: Not on file    Food insecurity     Worry: Not on file     Inability: Not on file    Transportation needs     Medical: Not on file     Non-medical: Not on file   Tobacco Use    Smoking status: Never Smoker    Smokeless tobacco: Never Used   Substance and Sexual Activity    Alcohol use: No    Drug use: No    Sexual activity: Not on file   Lifestyle    Physical activity     Days per week: Not on file     Minutes per session: Not on file    Stress: Not on file   Relationships    Social connections     Talks on phone: Not on file     Gets together: Not on file     Attends Sabianist service: Not on file     Active member of club or organization: Not on file     Attends meetings of clubs or organizations: Not on file     Relationship status: Not on file    Intimate partner violence     Fear of current or ex partner: Not on file     Emotionally abused: Not on file     Physically abused: Not on file     Forced sexual activity: Not on file   Other Topics Concern    Not on file   Social History Narrative    Not on file     Family History   Problem Relation Age of Onset    Heart Disease Mother         atrial fib    High Blood Pressure Mother     Asthma Mother     High Cholesterol Mother     Depression Mother     Migraines Mother     High Blood Pressure Father     Heart Disease Father     Asthma Sister     High Cholesterol Sister     Depression Sister     Migraines Sister        PHYSICAL EXAM    VITAL SIGNS: /74   Pulse 61   Temp 98.2 °F (36.8 °C) (Oral)   Resp 20   SpO2 97%    Constitutional:  Well developed, well nourished, no acute distress   Scalp: No swelling, discoloration. Skin intact  Neck:   No JVD    No swelling or discoloration on inspection. No posterior midline neck tenderness. Eyes: PERRL. EOMI. No nystagmus. HENT:   Left lateral periorbital region there is approximately 1 cm well approximated superficial appearing laceration. No active bleeding. No bony tenderness to palpation. No trismus  No trismus, airway patent. No bleeding from nose ears or mouth. Respiratory:  Lungs Clear, no retractions   Cardiovascular:  Reg rate, no murmurs  GI:  Soft, nontender, normal bowel sounds  Musculoskeletal: No tenderness to upper or lower extremities  Integument: Left lateral periorbital region there is approximate 1 cm well approximated superficial appearing laceration with no active bleeding.   Neurologic:    - Alert & oriented person, place, time, no speech difficulties or slurring.  - Cranial nerves 2-12 grossly intact  - Sensation intact to light touch  Neuro exam limited due to patient's history of cerebral palsy  Psych: Pleasant affect, no hallucinations      IMAGING:  CT CERVICAL SPINE WO CONTRAST   Final Result   No acute traumatic abnormality. CT FACIAL BONES WO CONTRAST   Final Result   No acute traumatic abnormality. CT HEAD WO CONTRAST   Final Result   No acute traumatic abnormality. ED COURSE & MEDICAL DECISION MAKING     Patient presents as above. Wound is cleaned and bandaged. This does not require repair as it appears superficial, no further bleeding and well approximated. Patient is up-to-date on tetanus. Trauma alert was called due to patient head injury and being on blood thinners. Patient denies any other injury. CT head shows no acute intracranial abnormality. CT facial bones and cervical spine showed no acute osseous abnormality. I discussed imaging results with patient. Wound care and head injury instructions provided. Recommend follow-up with primary care provider in 2 days for recheck. Clinical  IMPRESSION    1. Injury of head, initial encounter    2. Facial laceration, initial encounter          Diagnosis and plan discussed in detail with patient who understands and agrees. Return to emergency Department precautions, which included any change in nature or severity of symptoms, development of numbness/tingling, or weakness, or any new symptoms, were discussed in detail with patient who understands and agrees. Comment: Please note this report has been produced using speech recognition software and may contain errors related to that system including errors in grammar, punctuation, and spelling, as well as words and phrases that may be inappropriate. If there are any questions or concerns please feel free to contact the dictating provider for clarification.               Nelli Navarrete PA-C  12/01/20 [id number]

## 2020-12-04 ENCOUNTER — CARE COORDINATION (OUTPATIENT)
Dept: CARE COORDINATION | Age: 65
End: 2020-12-04

## 2020-12-04 NOTE — CARE COORDINATION
[facility] Transitions Follow Up Call    2020    Patient: Judah Melara  Patient : 1955   MRN: <[id number]>  Reason for Admission:   Discharge Date: 20 RARS: Readmission Risk Score: 28         Spoke with: Thalia - Caregiver    Patient's caregiver states patient is weak and tired. Has been having diarrhea. [facility] nurse feels it may be C-Diff. They will test patient tomorrow. No C/O SOB, wheezing, cough, chest pain,  Fever. Patient has all medications is taking medications as directed and has no questions concerning medications at this time. Explained the BPCI-A program and that the patient is followed for 90 days after discharge. Expresses understanding. Will continue to follow. Stephan Arvizu LPN    [phone]  [address] [place] [address] / [address] [place] [address] Transitions Subsequent and Final Call    Subsequent and Final Calls  Do you have any ongoing symptoms?:  Yes  Have your medications changed?:  No  Do you have any questions related to your medications?:  No  Do you currently have any active services?:  Yes  Are you currently active with any services?:  Home Health  Do you have any needs or concerns that I can assist you with?:  No  Identified Barriers:  Impairment  Care Transitions Interventions  Other Interventions: Follow Up  No future appointments.     Stephan Arvizu LPN

## 2020-12-07 ENCOUNTER — APPOINTMENT (OUTPATIENT)
Dept: GENERAL RADIOLOGY | Age: 65
End: 2020-12-07
Payer: MEDICARE

## 2020-12-07 ENCOUNTER — HOSPITAL ENCOUNTER (EMERGENCY)
Age: 65
Discharge: HOME | End: 2020-12-07
Payer: MEDICARE

## 2020-12-07 VITALS
HEART RATE: 88 BPM | RESPIRATION RATE: 18 BRPM | TEMPERATURE: 98.6 F | DIASTOLIC BLOOD PRESSURE: 76 MMHG | SYSTOLIC BLOOD PRESSURE: 132 MMHG | OXYGEN SATURATION: 100 %

## 2020-12-07 LAB
ALBUMIN SERPL-MCNC: 3.3 GM/DL (ref 3.4–5)
ALP BLD-CCNC: 89 IU/L (ref 40–128)
ALT SERPL-CCNC: 71 U/L (ref 10–40)
ANION GAP SERPL CALCULATED.3IONS-SCNC: 8 MMOL/L (ref 4–16)
AST SERPL-CCNC: 17 IU/L (ref 15–37)
BASOPHILS ABSOLUTE: 0 K/CU MM
BASOPHILS RELATIVE PERCENT: 0.4 % (ref 0–1)
BILIRUB SERPL-MCNC: 0.3 MG/DL (ref 0–1)
BUN BLDV-MCNC: 12 MG/DL (ref 6–23)
CALCIUM SERPL-MCNC: 9.1 MG/DL (ref 8.3–10.6)
CHLORIDE BLD-SCNC: 100 MMOL/L (ref 99–110)
CO2: 32 MMOL/L (ref 21–32)
CREAT SERPL-MCNC: 0.7 MG/DL (ref 0.9–1.3)
DIFFERENTIAL TYPE: ABNORMAL
EOSINOPHILS ABSOLUTE: 0.1 K/CU MM
EOSINOPHILS RELATIVE PERCENT: 1.1 % (ref 0–3)
GFR AFRICAN AMERICAN: >60 ML/MIN/1.73M2
GFR NON-AFRICAN AMERICAN: >60 ML/MIN/1.73M2
GLUCOSE BLD-MCNC: 112 MG/DL (ref 70–99)
HCT VFR BLD CALC: 42.1 % (ref 42–52)
HEMOGLOBIN: 14.4 GM/DL (ref 13.5–18)
IMMATURE NEUTROPHIL %: 1.5 % (ref 0–0.43)
LYMPHOCYTES ABSOLUTE: 1.2 K/CU MM
LYMPHOCYTES RELATIVE PERCENT: 12.5 % (ref 24–44)
MCH RBC QN AUTO: 30.1 PG (ref 27–31)
MCHC RBC AUTO-ENTMCNC: 34.2 % (ref 32–36)
MCV RBC AUTO: 87.9 FL (ref 78–100)
MONOCYTES ABSOLUTE: 1.3 K/CU MM
MONOCYTES RELATIVE PERCENT: 13.6 % (ref 0–4)
NUCLEATED RBC %: 0 %
PDW BLD-RTO: 14.1 % (ref 11.7–14.9)
PLATELET # BLD: 252 K/CU MM (ref 140–440)
PMV BLD AUTO: 10.6 FL (ref 7.5–11.1)
POTASSIUM SERPL-SCNC: 3.1 MMOL/L (ref 3.5–5.1)
RBC # BLD: 4.79 M/CU MM (ref 4.6–6.2)
SEGMENTED NEUTROPHILS ABSOLUTE COUNT: 7 K/CU MM
SEGMENTED NEUTROPHILS RELATIVE PERCENT: 70.9 % (ref 36–66)
SODIUM BLD-SCNC: 140 MMOL/L (ref 135–145)
TOTAL IMMATURE NEUTOROPHIL: 0.15 K/CU MM
TOTAL NUCLEATED RBC: 0 K/CU MM
TOTAL PROTEIN: 6.9 GM/DL (ref 6.4–8.2)
WBC # BLD: 9.8 K/CU MM (ref 4–10.5)

## 2020-12-07 PROCEDURE — 36415 COLL VENOUS BLD VENIPUNCTURE: CPT

## 2020-12-07 PROCEDURE — 85025 COMPLETE CBC W/AUTO DIFF WBC: CPT

## 2020-12-07 PROCEDURE — 80053 COMPREHEN METABOLIC PANEL: CPT

## 2020-12-07 PROCEDURE — 99282 EMERGENCY DEPT VISIT SF MDM: CPT | Performed by: PHYSICIAN ASSISTANT

## 2020-12-07 RX ORDER — ACETAMINOPHEN 500 MG
500 TABLET ORAL ONCE
Status: DISCONTINUED | OUTPATIENT
Start: 2020-12-07 | End: 2020-12-09 | Stop reason: HOSPADM

## 2020-12-07 RX ORDER — POTASSIUM CHLORIDE 750 MG/1
40 TABLET, FILM COATED, EXTENDED RELEASE ORAL ONCE
Status: DISCONTINUED | OUTPATIENT
Start: 2020-12-07 | End: 2020-12-09 | Stop reason: HOSPADM

## 2020-12-07 RX ORDER — CLOTRIMAZOLE
POWDER (GRAM) MISCELLANEOUS
Qty: 1 BOTTLE | Refills: 0 | Status: ON HOLD | OUTPATIENT
Start: 2020-12-07 | End: 2021-10-13 | Stop reason: HOSPADM

## 2020-12-07 NOTE — ED PROVIDER NOTES
eMERGENCY dEPARTMENT eNCOUnter      PCP: Johnson Vergara MD    [facility]    Chief Complaint   Patient presents with    Rash    Diarrhea    Other     patient was tested for COVID 19 and + on 11/23       [facility]    Laurie Gil is a 60 y.o. male who presents from skilled nursing facility. According to EMS, patient has had recent diarrhea, rash, recently tested positive for COVID-19 at the end of November. When interviewing patient, he complains of pain in bilateral feet with no known injury or trauma. He denies any nausea, vomiting, diarrhea, abdominal pain. No chest pain or shortness of breath. Denies cough or fevers. Patient was discharged from the hospital on 11/27/2020 on Eliquis, Augmentin. He has since finished his Augmentin course.       REVIEW OF SYSTEMS  Per patient  Constitutional:  Denies fever, chills, weight loss or weakness   HENT:  Denies sore throat or ear pain   Cardiovascular:  Denies chest pain, palpitations   Respiratory:  Denies cough or shortness of breath    GI:  Denies abdominal pain, nausea, vomiting, or diarrhea  :  Denies any urinary symptoms   Musculoskeletal:  Denies back pain,   Skin:  Denies rash  Neurologic:  Denies headache, focal weakness or sensory changes   Endocrine:  Denies polyuria or polydypsia   Lymphatic:  Denies swollen glands     All other review of systems are negative  See HPI and nursing notes for additional information     PAST MEDICAL AND SURGICAL HISTORY    Past Medical History:   Diagnosis Date    Anemia     Aspiration pneumonia ([address]     dx 6/9/2014 with this per ecf/ per old chart dx with pneumonia with admission 9/18/2018    Cerebral palsy ([address]     \"has no feeling on left side of body\"alert but has some dementia but not diagnosed, has good long term but poor short term and wakes up disoriented after a nap\"(per yaneth)(2014)    Depression     Epilepsy ([address] 1961    last seizure 2/2018- hx grand mal    Frequent falls     with phone assess- family stated pt fell this am (7/10/2013\"in the process of trying to find a facility to help build him back up- (pt now at [facility])    Glaucoma     \"has been in treatment for this in the past- no treatment needed at present\"    History of IBS     Hx MRSA infection     + nasal culture 4/2014    Hyperammonemia (HCC)     Hypertension     on Lisinopril    IBS (irritable bowel syndrome)     ulcerative colitis    Kidney stone     for surgery for stent placement 7/11/2013    Mood disorder ([facility]     per staff at [address] [place] [address] Occasional tremors     \"makes it difficult for him to write\"    Pressure injury of contiguous region involving back and right buttock, stage 2 ([facility] 5/27/2020    Pressure injury of left buttock, stage 1 7/22/2020    Prostatitis     hx given per H&P old chart    Thyroid disease     Ulcerative colitis ([facility]     hx given per staff at [facility] 4/13/2015     Past Surgical History:   Procedure Laterality Date    CHOLECYSTECTOMY      COLONOSCOPY  8/19/14, 5/2012 8/19/14: hemorrhoids, 5/2012 at [address] Left 07/11/2013    with stnet placement   [address] [address]      OTHER SURGICAL HISTORY  04/15/2015    Revision of vagal nerve stimulator    UPPER GASTROINTESTINAL ENDOSCOPY N/A 11/13/2018    EGD DILATION BALLOON AND BIOPSY performed by Angélica Duncan MD at [facility]  2002    Has to have bettery changed every 5 yrs.         CURRENT MEDICATIONS    Current Outpatient Rx   Medication Sig Dispense Refill    Clotrimazole POWD Apply to groin twice a day 1 Bottle 0    apixaban (ELIQUIS) 2.5 MG TABS tablet Take 1 tablet by mouth 2 times daily for 21 days 42 tablet 0    acetaminophen (APAP EXTRA STRENGTH) 500 MG tablet Take 1 tablet by mouth every 6 hours as needed for Pain 30 tablet 0    solifenacin (VESICARE) 10 MG tablet Take 10 mg by mouth daily      levothyroxine (SYNTHROID) 125 MCG tablet Take 1 tablet by mouth Daily 30 tablet 3    dicyclomine (BENTYL) 10 MG capsule Take 1 capsule by mouth every 6 hours as needed (cramps) 20 capsule 0    amLODIPine (NORVASC) 5 MG tablet Take 5 mg by mouth daily      clonazePAM (KLONOPIN) 0.5 MG tablet Take 0.25 mg by mouth daily.  divalproex (DEPAKOTE) 500 MG DR tablet Take 1,000 mg by mouth nightly      hydrochlorothiazide (MICROZIDE) 12.5 MG capsule Take 12.5 mg by mouth daily      mirabegron (MYRBETRIQ) 50 MG TB24 Take 50 mg by mouth daily      omeprazole (PRILOSEC) 20 MG delayed release capsule Take 20 mg by mouth 2 times daily (before meals)      primidone (MYSOLINE) 50 MG tablet Take 50 mg by mouth 3 times daily      zonisamide (ZONEGRAN) 100 MG capsule Take 100 mg by mouth every morning      Simethicone (MI-ACID GAS RELIEF PO) Take 1 Container by mouth every 4 hours as needed 10 cc every 4 hrs not to exceed 60 cc in 24 hrs      guaiFENesin (MUCINEX) 600 MG extended release tablet Take 1,200 mg by mouth daily as needed for Congestion      Menthol-Methyl Salicylate (MUSCLE RUB) 10-15 % CREA cream Apply 1 applicator topically as needed      pseudoephedrine (SUDAFED) 30 MG tablet Take 30 mg by mouth every 4 hours as needed for Congestion      lacosamide (VIMPAT) 200 MG tablet Take 200 mg by mouth as needed. Give 1 tab after seizure activity as needed      lacosamide (VIMPAT) 200 MG tablet Take 1 tablet by mouth 2 times daily for 30 days.  60 tablet 0    latanoprost (XALATAN) 0.005 % ophthalmic solution Place 1 drop into both eyes nightly      timolol (TIMOPTIC) 0.5 % ophthalmic solution Place 1 drop into both eyes daily       Lactobacillus (ACIDOPHILUS) CAPS capsule Take 1 capsule by mouth daily      aspirin 81 MG tablet Take 81 mg by mouth daily      Wheat Dextrin (BENEFIBER) POWD Take 4 g by mouth 2 times daily Takes 3 tsp in liquid twice per day       Lactulose Encephalopathy (ENULOSE PO) Take 30 mLs by mouth 3 times daily      folic acid (FOLVITE) 1 MG tablet Take 1 mg by mouth daily      PARoxetine (PAXIL) 20 MG tablet Take 20 mg by mouth every morning      QUEtiapine (SEROQUEL XR) 50 MG extended release tablet Take 50 mg by mouth nightly      tamsulosin (FLOMAX) 0.4 MG capsule Take 1 capsule by mouth daily 30 capsule 0    simvastatin (ZOCOR) 20 MG tablet Take 20 mg by mouth nightly.  Cholecalciferol (VITAMIN D3) 1000 UNITS CAPS Take 1 tablet by mouth daily.  clorazepate (TRANXENE) 7.5 MG tablet Take 7.5 mg by mouth 2 times daily.          ALLERGIES    No Known Allergies    SOCIAL AND FAMILY HISTORY    Social History     Socioeconomic History    Marital status: Single     Spouse name: Not on file    Number of children: Not on file    Years of education: Not on file    Highest education level: Not on file   Occupational History    Not on file   Social Needs    Financial resource strain: Not on file    Food insecurity     Worry: Not on file     Inability: Not on file    Transportation needs     Medical: Not on file     Non-medical: Not on file   Tobacco Use    Smoking status: Never Smoker    Smokeless tobacco: Never Used   Substance and Sexual Activity    Alcohol use: No    Drug use: No    Sexual activity: Not on file   Lifestyle    Physical activity     Days per week: Not on file     Minutes per session: Not on file    Stress: Not on file   Relationships    Social connections     Talks on phone: Not on file     Gets together: Not on file     Attends [employer] service: Not on file     Active member of club or organization: Not on file     Attends meetings of clubs or organizations: Not on file     Relationship status: Not on file    Intimate partner violence     Fear of current or ex partner: Not on file     Emotionally abused: Not on file     Physically abused: Not on file     Forced sexual activity: Not on file   Other Topics Concern    Not on file   Social History Narrative    Not on file     Family History   Problem Relation Age of Onset    Heart Disease Mother         atrial fib    High Blood Pressure Mother     Asthma Mother     High Cholesterol Mother     Depression Mother     Migraines Mother     High Blood Pressure Father     Heart Disease Father     Asthma Sister     High Cholesterol Sister     Depression Sister     Migraines Sister          PHYSICAL EXAM    VITAL SIGNS: /76   Pulse 88   Temp 98.6 °F (37 °C)   Resp 18   SpO2 100%    Constitutional:  Well developed, Well nourished  HENT:  Normocephalic, Atraumatic, PERRL. EOMI. Sclera clear. Conjunctiva normal, No discharge. Neck/Lymphatics: supple, no JVD, no swollen nodes  Cardiovascular:  Normal heart rate, Normal rhythm, No murmurs  Respiratory:  Nonlabored breathing. Normal breath sounds, No wheezing  Abdomen: Bowel sounds normal, Soft, No tenderness, no masses. Musculoskeletal:   Bilateral lower extremities well perfused with brisk capillary refill, dorsalis pedis pulse 2+ bilaterally. Patient reports mild discomfort to palpation along plantar and dorsal aspect of feet bilaterally. Able to wiggle toes. Full range of motion of ankles. Sensation intact throughout. Integument:  Warm, Dry  Erythematous rash noted to inguinal region bilaterally consistent with tinea, no evidence of superimposed bacterial infection  Neurologic: Alert & oriented , No focal deficits noted. Cranial nerves II through XII grossly intact. Normal gross motor coordination & motor strength bilateral upper and lower extremities  Sensation intact.   Psychiatric:  Affect normal, Mood normal.       Labs:  Results for orders placed or performed during the hospital encounter of 12/07/20   CBC auto diff   Result Value Ref Range    WBC 9.8 4.0 - 10.5 K/CU MM    RBC 4.79 4.6 - 6.2 M/CU MM    Hemoglobin 14.4 13.5 - 18.0 GM/DL    Hematocrit 42.1 42 - 52 %    MCV 87.9 78 - 100 FL    MCH 30.1 27 - 31 PG    MCHC 34.2 32.0 - 36.0 %    RDW 14.1 11.7 - 14.9 %    Platelets 810 728 - 943 K/CU MM MPV 10.6 7.5 - 11.1 FL    Differential Type AUTOMATED DIFFERENTIAL     Segs Relative 70.9 (H) 36 - 66 %    Lymphocytes % 12.5 (L) 24 - 44 %    Monocytes % 13.6 (H) 0 - 4 %    Eosinophils % 1.1 0 - 3 %    Basophils % 0.4 0 - 1 %    Segs Absolute 7.0 K/CU MM    Lymphocytes Absolute 1.2 K/CU MM    Monocytes Absolute 1.3 K/CU MM    Eosinophils Absolute 0.1 K/CU MM    Basophils Absolute 0.0 K/CU MM    Nucleated RBC % 0.0 %    Total Nucleated RBC 0.0 K/CU MM    Total Immature Neutrophil 0.15 K/CU MM    Immature Neutrophil % 1.5 (H) 0 - 0.43 %   CMP   Result Value Ref Range    Sodium 140 135 - 145 MMOL/L    Potassium 3.1 (L) 3.5 - 5.1 MMOL/L    Chloride 100 99 - 110 mMol/L    CO2 32 21 - 32 MMOL/L    BUN 12 6 - 23 MG/DL    CREATININE 0.7 (L) 0.9 - 1.3 MG/DL    Glucose 112 (H) 70 - 99 MG/DL    Calcium 9.1 8.3 - 10.6 MG/DL    Alb 3.3 (L) 3.4 - 5.0 GM/DL    Total Protein 6.9 6.4 - 8.2 GM/DL    Total Bilirubin 0.3 0.0 - 1.0 MG/DL    ALT 71 (H) 10 - 40 U/L    AST 17 15 - 37 IU/L    Alkaline Phosphatase 89 40 - 128 IU/L    GFR Non-African American >60 >60 mL/min/1.73m2    GFR African American >60 >60 mL/min/1.73m2    Anion Gap 8 4 - 16           ED COURSE & MEDICAL DECISION MAKING       Vital signs and nursing notes reviewed during ED course. I have independently evaluated this patient . Supervising MD present in the Emergency Department, available for consultation, throughout entirety of  patient care. Disposition and plan discussed at bedside with patient and/or the family today. Patient presents as above. According to EMS nursing staff was concerned for diarrhea and rash. He is hemodynamically stable on arrival, afebrile, not tachycardic and oxygenating at 100% on room air. He is denying any chest pain, shortness of breath, recent cough, fever, abdominal pain. We will plan on obtaining basic labs to check for any electrolyte derangement and imaging of bilateral feet.   He is currently on Eliquis, no clinical findings concerning for DVT as etiology of pain in his feet. When radiology gets patient for x-ray imaging, patient is denying any current pain declines any x-ray imaging or further evaluation. On my subsequent evaluations he is resting comfortably, denying any current symptoms. Lab work without leukocytosis. Does have hypokalemia of 3.1. This is replaced orally in the ED. This patient continues to be asymptomatic, will plan on discharging back to his nursing facility we discussed returning here with any new or worsening symptoms. Will discharge with clotrimazole powder. The patient and/or the family were informed of the results of any tests/labs/imaging, the treatment plan, and time was allotted to answer questions. Clinical  IMPRESSION    1. Hypokalemia    2. Tinea cruris        Comment: Please note this report has been produced using speech recognition software and may contain errors related to that system including errors in grammar, punctuation, and spelling, as well as words and phrases that may be inappropriate. If there are any questions or concerns please feel free to contact the dictating provider for clarification.          CHRIS Perez  12/07/20 1640

## 2020-12-07 NOTE — ED NOTES
Bed: H-08  Expected date:   Expected time:   Means of arrival:   Comments:  merari Magdaleno Srinath  12/07/20 1248

## 2020-12-08 PROCEDURE — 94761 N-INVAS EAR/PLS OXIMETRY MLT: CPT

## 2020-12-08 NOTE — CARE COORDINATION
CM - Pt was sent here from a SNF for some symptoms of diarrhea , rash and a positive COVID test . Pt was seen and treated for symptoms and was sent back to SNF ---Dee Rosas / [facility]

## 2020-12-08 NOTE — CARE COORDINATION
After Visit Summary   9/21/2017    Juan Francisco Booker    MRN: [id number]           Patient Information     Date Of Birth          1959        Visit Information        Provider Department      9/21/2017 11:00 AM Mar Lieberman MD [facility]        Today's Diagnoses     Healthcare maintenance    -  1       Follow-ups after your visit        Your next 10 appointments already scheduled     Oct 26, 2017 11:30 AM CDT   Return Sleep Patient with Wellington Varma MD   [facility] [place], Sleep Study ([facility])    [address] [place] [id number] [phone]            Dec 29, 2017   Procedure with Aracelis Lowe MD   [facility] ([facility])    [address] [place] [address]  [address] [facility] [address] [phone]           Located in the Clinics and Surgery Center at [address] [place] [address].   parking is very convenient and highly recommended.  is a $6 flat rate fee.  Both  and self parkers should enter the main arrival plaza from [facility]; parking attendants will direct you based on your parking preference.            Jan 09, 2018 10:30 AM CST   (Arrive by 10:15 AM)   Post-Op with  [facility] ([facility])    [address] [place] [address]  4th [facility] [address] [phone]              Who to contact     Please call your clinic at [phone] to:    Ask questions about your health    Make or cancel appointments    Discuss your medicines    Learn about your test results    Speak to your doctor   If you have compliments or concerns about an experience at your clinic, or if you wish to file a complaint, please contact [facility] Patient Relations at [phone] or email us at [email or web address]         Additional Information  Pt identified as potential readmission. Last admission 11/26 - 11/27 for Sepsis secondary to Covid infection and possible gram-negative pneumonia    Pt here today for Rash. No acute findings. Readmission was avoided and pt was able to be d/c'd home. About Your Visit        [employer] Information     [employer] gives you secure access to your electronic health record. If you see a primary care provider, you can also send messages to your care team and make appointments. If you have questions, please call your primary care clinic.  If you do not have a primary care provider, please call [phone] and they will assist you.      [employer] is an electronic gateway that provides easy, online access to your medical records. With [employer], you can request a clinic appointment, read your test results, renew a prescription or communicate with your care team.     To access your existing account, please contact your [facility] or call [phone] for assistance.        Care EveryWhere ID     This is your Care EveryWhere ID. This could be used by other organizations to access your [facility] medical records  [id number]        Your Vitals Were     Pulse Temperature Respirations Pulse Oximetry BMI (Body Mass Index)       76 97.8  F (36.6  C) (Oral) 18 96% 44.13 kg/m2        Blood Pressure from Last 3 Encounters:   09/21/17 112/73   06/14/17 132/76   06/07/17 101/63    Weight from Last 3 Encounters:   09/21/17 298 lb 12.8 oz (135.5 kg)   09/12/17 300 lb (136.1 kg)   09/07/17 300 lb (136.1 kg)              We Performed the Following     ADMIN VACCINE, INITIAL     Flu vaccine, quad, preserve-free, 0.5 ml          Today's Medication Changes          These changes are accurate as of: 9/21/17 12:36 PM.  If you have any questions, ask your nurse or doctor.               Stop taking these medicines if you haven't already. Please contact your care team if you have questions.     metoprolol 100 MG 24 hr tablet   Commonly known as:  TOPROL-XL   Stopped by:  Mar Lieberman MD                    Primary Care Provider Office Phone # Fax #    Mar Lieberman [phone]       [address] [address]         Equal Access to Services     [facility] : Hadii yumi avila tammy Sorafi, waaxda luqadaha, qaybta kamichelleisabel badillo, christiano johnson. So [facility] [phone].    ATENCIÓN: Si habla español, tiene a beal disposición servicios gratuitos de asistencia lingüística. Llame al [phone].    We comply with applicable federal civil rights laws and [place] laws. We do not discriminate on the basis of race, color, national origin, age, disability sex, sexual orientation or gender identity.            Thank you!     Thank you for [facility]  [facility] your care. Our goal is always to provide you with excellent care. Hearing back from our patients is one way we can continue to improve our services. Please take a few minutes to complete the written survey that you may receive in the mail after your visit with us. Thank you!             Your Updated Medication List - Protect others around you: Learn how to safely use, store and throw away your medicines at www.disposemymeds.org.          This list is accurate as of: 9/21/17 12:36 PM.  Always use your most recent med list.                   Brand Name Dispense Instructions for use Diagnosis    aspirin 81 MG tablet      Take 1 tablet by mouth daily AM        atenolol 100 MG tablet    TENORMIN    90 tablet    Take 1 tablet (100 mg) by mouth daily AM    Other secondary hypertension       citalopram 40 MG tablet    celeXA    30 tablet    Take 1 tablet (40 mg) by mouth daily 1 tablet (40mg) daily.    Major depressive disorder, recurrent episode, moderate (H)       MULTIVITAMINS Chew      Take 1 tablet by mouth        DAILY MULTIVITAMIN PO      Take 1 tablet by mouth daily AM        DENTA 5000 PLUS 1.1 % Crea   Generic drug:  Sodium Fluoride      APPLY A PEA SIZED AMOUNT TO TOOTHBRUSH, BRUSH ONTO ROOT AREAS THOROUGHLY, THEN SPIT OUT AT BEDTIME.        esomeprazole 20 MG CR capsule    nexIUM    30 capsule    Take 1 capsule (20 mg) by  mouth every morning (before breakfast) Take 30-60 minutes before eating.    Dyspepsia       fluticasone 50 MCG/ACT spray    FLONASE    16 g    Spray 1-2 sprays into both nostrils daily        * gabapentin 300 MG capsule    NEURONTIN    270 capsule    Take 3 capsules (900 mg) by mouth At Bedtime Take 900mg orally each night at 2 am    Restless legs syndrome (RLS)       * gabapentin 300 MG capsule    NEURONTIN    150 capsule    1 in morning, 1 at midday, 3 at night    Peripheral polyneuropathy (H)       hydrochlorothiazide 25 MG tablet    HYDRODIURIL    90 tablet    Take 1 tablet (25 mg) by mouth daily AM    Benign essential hypertension       lisinopril 40 MG tablet    PRINIVIL/ZESTRIL    90 tablet    Take 1 tablet (40 mg) by mouth daily AM    Benign essential hypertension       omega-3 fatty acids 1200 MG capsule      Take 1 capsule by mouth daily AM        order for DME      Use your CPAP device as directed by your provider.        priLOSEC 20 MG CR capsule   Generic drug:  omeprazole      Take 40 mg by mouth At Bedtime HS        simvastatin 40 MG tablet    ZOCOR    30 tablet    Take 1 tablet (40 mg) by mouth At Bedtime    Hyperlipidemia LDL goal <130       TYLENOL 500 MG tablet   Generic drug:  acetaminophen      Take 2 tablets by mouth every 6 hours as needed.        * Notice:  This list has 2 medication(s) that are the same as other medications prescribed for you. Read the directions carefully, and ask your doctor or other care provider to review them with you.

## 2020-12-10 ENCOUNTER — CARE COORDINATION (OUTPATIENT)
Dept: CASE MANAGEMENT | Age: 65
End: 2020-12-10

## 2020-12-10 NOTE — CARE COORDINATION
[facility] Transitions Follow Up Call    12/10/2020    Patient: Migel Cordova  Patient : 1955   MRN: [id number]  Reason for Admission:   Discharge Date: 20 RARS: Readmission Risk Score: 32    Follow Up: [facility]. Left message with contact information and request for call back. No future appointments.     Roel Mckeon RN

## 2020-12-18 ENCOUNTER — CARE COORDINATION (OUTPATIENT)
Dept: CARE COORDINATION | Age: 65
End: 2020-12-18

## 2020-12-30 ENCOUNTER — CARE COORDINATION (OUTPATIENT)
Dept: CASE MANAGEMENT | Age: 65
End: 2020-12-30

## 2020-12-30 NOTE — CARE COORDINATION
[facility] Transitions Follow Up Call    2020    Patient: Beatriz San  Patient : 1955   MRN: <[id number]>  Reason for Admission:   Discharge Date: 20 RARS: Readmission Risk Score: 32      Attempted to reach [address] , regarding patient follow up; BPCI-A. HIPAA compliant message left on voicemail; CTN contact information provided.         Lise Morelos RN

## 2021-01-20 ENCOUNTER — CARE COORDINATION (OUTPATIENT)
Dept: CASE MANAGEMENT | Age: 66
End: 2021-01-20

## 2021-01-28 NOTE — PROGRESS NOTES
1/28/20 - Talked to guardian who stated call the ; called the  who stated \"I don't have any of his information, I'm not at that facility until [address]". [facility] colonoscopy on 2/4/2021 @ [phone], arrival 0715. Follow Dr. Karie Yo instructions for the prep. Have patient take BP and seizure medications prior to arrival. Alexx Larson states already received orders from Dr. Stephani Moran for procedure and medications. List of medications, Hx and covid results to be faxed ASAP. Thalia also stated 2 people need to come with the patient because he is frequently out of control.

## 2021-02-03 ENCOUNTER — ANESTHESIA EVENT (OUTPATIENT)
Dept: ENDOSCOPY | Age: 66
End: 2021-02-03
Payer: MEDICARE

## 2021-02-03 NOTE — ANESTHESIA PRE PROCEDURE
Department of Anesthesiology  Preprocedure Note       Name:  Ector Babinski   Age:  60 y.o.  :  1955                                          MRN:  [id number]         Date:  2/3/2021      Surgeon: Chen Hodges):  Pratima Fitzpatrick MD    Procedure: Procedure(s):  COLONOSCOPY DIAGNOSTIC    Medications prior to admission:   Prior to Admission medications    Medication Sig Start Date End Date Taking? Authorizing Provider   Clotrimazole POWD Apply to groin twice a day 20   CHRIS Page   acetaminophen (APAP EXTRA STRENGTH) 500 MG tablet Take 1 tablet by mouth every 6 hours as needed for Pain 20   CHRIS Page   solifenacin (VESICARE) 10 MG tablet Take 10 mg by mouth daily    Historical Provider, MD   levothyroxine (SYNTHROID) 125 MCG tablet Take 1 tablet by mouth Daily 20   Ricky Carlisle MD   dicyclomine (BENTYL) 10 MG capsule Take 1 capsule by mouth every 6 hours as needed (cramps) 20  Ricky Carlisle MD   amLODIPine (NORVASC) 5 MG tablet Take 5 mg by mouth daily    Historical Provider, MD   clonazePAM (KLONOPIN) 0.5 MG tablet Take 0.25 mg by mouth daily.     Historical Provider, MD   divalproex (DEPAKOTE) 500 MG DR tablet Take 1,000 mg by mouth nightly    Historical Provider, MD   hydrochlorothiazide (MICROZIDE) 12.5 MG capsule Take 12.5 mg by mouth daily    Historical Provider, MD   mirabegron (MYRBETRIQ) 50 MG TB24 Take 50 mg by mouth daily    Historical Provider, MD   omeprazole (PRILOSEC) 20 MG delayed release capsule Take 20 mg by mouth 2 times daily (before meals)    Historical Provider, MD   primidone (MYSOLINE) 50 MG tablet Take 50 mg by mouth 3 times daily    Historical Provider, MD   zonisamide (ZONEGRAN) 100 MG capsule Take 100 mg by mouth every morning    Historical Provider, MD   Simethicone (MI-ACID GAS RELIEF PO) Take 1 Container by mouth every 4 hours as needed 10 cc every 4 hrs not to exceed 60 cc in 24 hrs    Historical Provider, MD guaiFENesin (MUCINEX) 600 MG extended release tablet Take 1,200 mg by mouth daily as needed for Congestion    Historical Provider, MD   Menthol-Methyl Salicylate (MUSCLE RUB) 10-15 % CREA cream Apply 1 applicator topically as needed    Historical Provider, MD   pseudoephedrine (SUDAFED) 30 MG tablet Take 30 mg by mouth every 4 hours as needed for Congestion    Historical Provider, MD   lacosamide (VIMPAT) 200 MG tablet Take 200 mg by mouth as needed. Give 1 tab after seizure activity as needed    Historical Provider, MD   lacosamide (VIMPAT) 200 MG tablet Take 1 tablet by mouth 2 times daily for 30 days. 3/25/20 5/27/20  Louise Rondon DO   latanoprost (XALATAN) 0.005 % ophthalmic solution Place 1 drop into both eyes nightly    Historical Provider, MD   timolol (TIMOPTIC) 0.5 % ophthalmic solution Place 1 drop into both eyes daily     Historical Provider, MD   Lactobacillus (ACIDOPHILUS) CAPS capsule Take 1 capsule by mouth daily    Historical Provider, MD   aspirin 81 MG tablet Take 81 mg by mouth daily    Historical Provider, MD   Wheat Dextrin (BENEFIBER) POWD Take 4 g by mouth 2 times daily Takes 3 tsp in liquid twice per day     Historical Provider, MD   Lactulose Encephalopathy (ENULOSE PO) Take 30 mLs by mouth 3 times daily    Historical Provider, MD   folic acid (FOLVITE) 1 MG tablet Take 1 mg by mouth daily    Historical Provider, MD   PARoxetine (PAXIL) 20 MG tablet Take 20 mg by mouth every morning    Historical Provider, MD   QUEtiapine (SEROQUEL XR) 50 MG extended release tablet Take 50 mg by mouth nightly    Historical Provider, MD   tamsulosin (FLOMAX) 0.4 MG capsule Take 1 capsule by mouth daily 3/18/17   CHRIS Melendez   simvastatin (ZOCOR) 20 MG tablet Take 20 mg by mouth nightly. Historical Provider, MD   Cholecalciferol (VITAMIN D3) 1000 UNITS CAPS Take 1 tablet by mouth daily.     Historical Provider, MD clorazepate (TRANXENE) 7.5 MG tablet Take 7.5 mg by mouth 2 times daily. Historical Provider, MD       Current medications:    No current facility-administered medications for this encounter. Current Outpatient Medications   Medication Sig Dispense Refill    Clotrimazole POWD Apply to groin twice a day 1 Bottle 0    acetaminophen (APAP EXTRA STRENGTH) 500 MG tablet Take 1 tablet by mouth every 6 hours as needed for Pain 30 tablet 0    solifenacin (VESICARE) 10 MG tablet Take 10 mg by mouth daily      levothyroxine (SYNTHROID) 125 MCG tablet Take 1 tablet by mouth Daily 30 tablet 3    dicyclomine (BENTYL) 10 MG capsule Take 1 capsule by mouth every 6 hours as needed (cramps) 20 capsule 0    amLODIPine (NORVASC) 5 MG tablet Take 5 mg by mouth daily      clonazePAM (KLONOPIN) 0.5 MG tablet Take 0.25 mg by mouth daily.  divalproex (DEPAKOTE) 500 MG DR tablet Take 1,000 mg by mouth nightly      hydrochlorothiazide (MICROZIDE) 12.5 MG capsule Take 12.5 mg by mouth daily      mirabegron (MYRBETRIQ) 50 MG TB24 Take 50 mg by mouth daily      omeprazole (PRILOSEC) 20 MG delayed release capsule Take 20 mg by mouth 2 times daily (before meals)      primidone (MYSOLINE) 50 MG tablet Take 50 mg by mouth 3 times daily      zonisamide (ZONEGRAN) 100 MG capsule Take 100 mg by mouth every morning      Simethicone (MI-ACID GAS RELIEF PO) Take 1 Container by mouth every 4 hours as needed 10 cc every 4 hrs not to exceed 60 cc in 24 hrs      guaiFENesin (MUCINEX) 600 MG extended release tablet Take 1,200 mg by mouth daily as needed for Congestion      Menthol-Methyl Salicylate (MUSCLE RUB) 10-15 % CREA cream Apply 1 applicator topically as needed      pseudoephedrine (SUDAFED) 30 MG tablet Take 30 mg by mouth every 4 hours as needed for Congestion      lacosamide (VIMPAT) 200 MG tablet Take 200 mg by mouth as needed.  Give 1 tab after seizure activity as needed  lacosamide (VIMPAT) 200 MG tablet Take 1 tablet by mouth 2 times daily for 30 days. 60 tablet 0    latanoprost (XALATAN) 0.005 % ophthalmic solution Place 1 drop into both eyes nightly      timolol (TIMOPTIC) 0.5 % ophthalmic solution Place 1 drop into both eyes daily       Lactobacillus (ACIDOPHILUS) CAPS capsule Take 1 capsule by mouth daily      aspirin 81 MG tablet Take 81 mg by mouth daily      Wheat Dextrin (BENEFIBER) POWD Take 4 g by mouth 2 times daily Takes 3 tsp in liquid twice per day       Lactulose Encephalopathy (ENULOSE PO) Take 30 mLs by mouth 3 times daily      folic acid (FOLVITE) 1 MG tablet Take 1 mg by mouth daily      PARoxetine (PAXIL) 20 MG tablet Take 20 mg by mouth every morning      QUEtiapine (SEROQUEL XR) 50 MG extended release tablet Take 50 mg by mouth nightly      tamsulosin (FLOMAX) 0.4 MG capsule Take 1 capsule by mouth daily 30 capsule 0    simvastatin (ZOCOR) 20 MG tablet Take 20 mg by mouth nightly.  Cholecalciferol (VITAMIN D3) 1000 UNITS CAPS Take 1 tablet by mouth daily.  clorazepate (TRANXENE) 7.5 MG tablet Take 7.5 mg by mouth 2 times daily.          Allergies:  No Known Allergies    Problem List:    Patient Active Problem List   Diagnosis Code    Cerebral palsy, infantile hemiplegia ([facility]) G80.8    Rosacea, acne L71.9    IBS (irritable bowel syndrome) K58.9    Hypertension I10    Calculus of ureter N20.1    Pyelonephritis N12    Sepsis ([facility] [address] A41.9    Pneumonia J18.9    Increased ammonia level R79.89    Aspiration pneumonia ([facility]) J69.0    Hypokalemia E87.6    Hypomagnesemia E83.42    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    Seizure disorder ([facility]) G40.909    Seizure disorder, grand mal ([facility] [address] G40.409    Sepsis due to undetermined organism with acute respiratory failure ([facility]) A41.9, R65.20, J96.00    Pain of upper abdomen R10.10    Diarrhea of presumed infectious origin R19.7    Abdominal pain R10.9 APTT 25.7 11/27/2020       HCG (If Applicable): No results found for: PREGTESTUR, PREGSERUM, HCG, HCGQUANT     ABGs:   Lab Results   Component Value Date    PO2ART 58 05/02/2014    UAA4EMB 39.0 05/02/2014    ESV0GBS 23.6 05/02/2014    BEART 1 05/02/2014        Type & Screen (If Applicable):  No results found for: LABABO, LABRH    Drug/Infectious Status (If Applicable):  No results found for: HIV, HEPCAB    COVID-19 Screening (If Applicable):   Lab Results   Component Value Date    COVID19 DETECTED 11/23/2020    COVID19 NOT DETECTED 07/10/2020         Anesthesia Evaluation  Patient summary reviewed  Airway:         Dental:          Pulmonary:                              Cardiovascular:    (+) hypertension:, hyperlipidemia         Beta Blocker:  Not on Beta Blocker         Neuro/Psych:   (+) seizures:, psychiatric history:             ROS comment: Cerebral palsy GI/Hepatic/Renal:   (+) GERD:, PUD, bowel prep,           Endo/Other: Negative Endo/Other ROS                    Abdominal:           Vascular: negative vascular ROS. Anesthesia Plan      MAC     ASA 3       Induction: intravenous. Wes Batch, APRN - CRNA   2/3/2021         Pre Anesthesia Assessment complete.  Chart reviewed on 2/3/2021

## 2021-02-04 ENCOUNTER — ANESTHESIA (OUTPATIENT)
Dept: ENDOSCOPY | Age: 66
End: 2021-02-04
Payer: MEDICARE

## 2021-02-04 ENCOUNTER — HOSPITAL ENCOUNTER (OUTPATIENT)
Age: 66
Setting detail: OUTPATIENT SURGERY
Discharge: HOME | End: 2021-02-04
Attending: INTERNAL MEDICINE | Admitting: INTERNAL MEDICINE
Payer: MEDICARE

## 2021-02-04 VITALS
RESPIRATION RATE: 16 BRPM | SYSTOLIC BLOOD PRESSURE: 123 MMHG | WEIGHT: 185 LBS | HEIGHT: 66 IN | OXYGEN SATURATION: 95 % | DIASTOLIC BLOOD PRESSURE: 87 MMHG | HEART RATE: 76 BPM | TEMPERATURE: 97.8 F | BODY MASS INDEX: 29.73 KG/M2

## 2021-02-04 VITALS — OXYGEN SATURATION: 99 % | DIASTOLIC BLOOD PRESSURE: 41 MMHG | SYSTOLIC BLOOD PRESSURE: 98 MMHG

## 2021-02-04 PROCEDURE — 3700000000 HC ANESTHESIA ATTENDED CARE: Performed by: INTERNAL MEDICINE

## 2021-02-04 PROCEDURE — 6360000002 HC RX W HCPCS: Performed by: NURSE ANESTHETIST, CERTIFIED REGISTERED

## 2021-02-04 PROCEDURE — 88305 TISSUE EXAM BY PATHOLOGIST: CPT | Performed by: PATHOLOGY

## 2021-02-04 PROCEDURE — 7100000011 HC PHASE II RECOVERY - ADDTL 15 MIN: Performed by: INTERNAL MEDICINE

## 2021-02-04 PROCEDURE — 2709999900 HC NON-CHARGEABLE SUPPLY: Performed by: INTERNAL MEDICINE

## 2021-02-04 PROCEDURE — 7100000010 HC PHASE II RECOVERY - FIRST 15 MIN: Performed by: INTERNAL MEDICINE

## 2021-02-04 PROCEDURE — 3700000001 HC ADD 15 MINUTES (ANESTHESIA): Performed by: INTERNAL MEDICINE

## 2021-02-04 PROCEDURE — 2500000003 HC RX 250 WO HCPCS: Performed by: NURSE ANESTHETIST, CERTIFIED REGISTERED

## 2021-02-04 PROCEDURE — 2580000003 HC RX 258: Performed by: ANESTHESIOLOGY

## 2021-02-04 PROCEDURE — 3609010600 HC COLONOSCOPY POLYPECTOMY SNARE/COLD BIOPSY: Performed by: INTERNAL MEDICINE

## 2021-02-04 RX ORDER — PROPOFOL 10 MG/ML
INJECTION, EMULSION INTRAVENOUS PRN
Status: DISCONTINUED | OUTPATIENT
Start: 2021-02-04 | End: 2021-02-04 | Stop reason: SDUPTHER

## 2021-02-04 RX ORDER — LIDOCAINE HYDROCHLORIDE 20 MG/ML
INJECTION, SOLUTION EPIDURAL; INFILTRATION; INTRACAUDAL; PERINEURAL PRN
Status: DISCONTINUED | OUTPATIENT
Start: 2021-02-04 | End: 2021-02-04 | Stop reason: SDUPTHER

## 2021-02-04 RX ORDER — SODIUM CHLORIDE, SODIUM LACTATE, POTASSIUM CHLORIDE, CALCIUM CHLORIDE 600; 310; 30; 20 MG/100ML; MG/100ML; MG/100ML; MG/100ML
INJECTION, SOLUTION INTRAVENOUS CONTINUOUS
Status: DISCONTINUED | OUTPATIENT
Start: 2021-02-04 | End: 2021-02-04 | Stop reason: HOSPADM

## 2021-02-04 RX ADMIN — LIDOCAINE HYDROCHLORIDE 100 MG: 20 INJECTION, SOLUTION EPIDURAL; INFILTRATION; INTRACAUDAL; PERINEURAL at 10:18

## 2021-02-04 RX ADMIN — PHENYLEPHRINE HYDROCHLORIDE 100 MCG: 10 INJECTION INTRAVENOUS at 10:26

## 2021-02-04 RX ADMIN — SODIUM CHLORIDE, POTASSIUM CHLORIDE, SODIUM LACTATE AND CALCIUM CHLORIDE: 600; 310; 30; 20 INJECTION, SOLUTION INTRAVENOUS at 08:14

## 2021-02-04 RX ADMIN — PROPOFOL 120 MG: 10 INJECTION, EMULSION INTRAVENOUS at 10:18

## 2021-02-04 NOTE — ANESTHESIA PRE PROCEDURE
Department of Anesthesiology  Preprocedure Note       Name:  Honorio Duty   Age:  60 y.o.  :  1955                                          MRN:  [id number]         Date:  2021      Surgeon: Quincy Narayanan):  Mikaela Song MD    Procedure: Procedure(s):  COLONOSCOPY DIAGNOSTIC    Medications prior to admission:   Prior to Admission medications    Medication Sig Start Date End Date Taking? Authorizing Provider   Clotrimazole POWD Apply to groin twice a day 20  Yes CHRIS Mcgowan   solifenacin (VESICARE) 10 MG tablet Take 10 mg by mouth daily   Yes Historical Provider, MD   levothyroxine (SYNTHROID) 125 MCG tablet Take 1 tablet by mouth Daily 20  Yes Flynn Cole MD   amLODIPine (NORVASC) 5 MG tablet Take 5 mg by mouth daily   Yes Historical Provider, MD   clonazePAM (KLONOPIN) 0.5 MG tablet Take 0.25 mg by mouth daily.    Yes Historical Provider, MD   divalproex (DEPAKOTE) 500 MG DR tablet Take 1,000 mg by mouth nightly   Yes Historical Provider, MD   hydrochlorothiazide (MICROZIDE) 12.5 MG capsule Take 12.5 mg by mouth daily   Yes Historical Provider, MD   mirabegron (MYRBETRIQ) 50 MG TB24 Take 50 mg by mouth daily   Yes Historical Provider, MD   omeprazole (PRILOSEC) 20 MG delayed release capsule Take 20 mg by mouth 2 times daily (before meals)   Yes Historical Provider, MD   primidone (MYSOLINE) 50 MG tablet Take 50 mg by mouth 3 times daily   Yes Historical Provider, MD   zonisamide (ZONEGRAN) 100 MG capsule Take 100 mg by mouth every morning   Yes Historical Provider, MD   guaiFENesin (MUCINEX) 600 MG extended release tablet Take 1,200 mg by mouth daily as needed for Congestion   Yes Historical Provider, MD   latanoprost (XALATAN) 0.005 % ophthalmic solution Place 1 drop into both eyes nightly   Yes Historical Provider, MD   timolol (TIMOPTIC) 0.5 % ophthalmic solution Place 1 drop into both eyes daily    Yes Historical Provider, MD Lactobacillus (ACIDOPHILUS) CAPS capsule Take 1 capsule by mouth daily   Yes Historical Provider, MD   aspirin 81 MG tablet Take 81 mg by mouth daily   Yes Historical Provider, MD   Wheat Dextrin (BENEFIBER) POWD Take 4 g by mouth 2 times daily Takes 3 tsp in liquid twice per day    Yes Historical Provider, MD   Lactulose Encephalopathy (ENULOSE PO) Take 30 mLs by mouth 3 times daily   Yes Historical Provider, MD   folic acid (FOLVITE) 1 MG tablet Take 1 mg by mouth daily   Yes Historical Provider, MD   PARoxetine (PAXIL) 20 MG tablet Take 20 mg by mouth every morning   Yes Historical Provider, MD   QUEtiapine (SEROQUEL XR) 50 MG extended release tablet Take 50 mg by mouth nightly   Yes Historical Provider, MD   tamsulosin (FLOMAX) 0.4 MG capsule Take 1 capsule by mouth daily 3/18/17  Yes Terry Smoker, PA   simvastatin (ZOCOR) 20 MG tablet Take 20 mg by mouth nightly. Yes Historical Provider, MD   Cholecalciferol (VITAMIN D3) 1000 UNITS CAPS Take 1 tablet by mouth daily. Yes Historical Provider, MD   clorazepate (TRANXENE) 7.5 MG tablet Take 7.5 mg by mouth 2 times daily. Yes Historical Provider, MD   acetaminophen (APAP EXTRA STRENGTH) 500 MG tablet Take 1 tablet by mouth every 6 hours as needed for Pain 9/7/20   Terry Smoker, PA   dicyclomine (BENTYL) 10 MG capsule Take 1 capsule by mouth every 6 hours as needed (cramps) 4/13/20 4/18/20  Arleen Johnston MD   Simethicone (MI-ACID GAS RELIEF PO) Take 1 Container by mouth every 4 hours as needed 10 cc every 4 hrs not to exceed 60 cc in 24 hrs    Historical Provider, MD   Menthol-Methyl Salicylate (MUSCLE RUB) 10-15 % CREA cream Apply 1 applicator topically as needed    Historical Provider, MD   pseudoephedrine (SUDAFED) 30 MG tablet Take 30 mg by mouth every 4 hours as needed for Congestion    Historical Provider, MD   lacosamide (VIMPAT) 200 MG tablet Take 200 mg by mouth as needed.  Give 1 tab after seizure activity as needed    Historical Provider, MD lacosamide (VIMPAT) 200 MG tablet Take 1 tablet by mouth 2 times daily for 30 days.  3/25/20 5/27/20  Maxine Vaz DO       Current medications:    Current Facility-Administered Medications   Medication Dose Route Frequency Provider Last Rate Last Admin    lactated ringers infusion   Intravenous Continuous Alen Emanuel  mL/hr at 02/04/21 0814 New Bag at 02/04/21 0814       Allergies:  No Known Allergies    Problem List:    Patient Active Problem List   Diagnosis Code    Cerebral palsy, infantile hemiplegia (HCC) G80.8    Rosacea, acne L71.9    IBS (irritable bowel syndrome) K58.9    Hypertension I10    Calculus of ureter N20.1    Pyelonephritis N12    Sepsis ([facility] A41.9    Pneumonia J18.9    Increased ammonia level R79.89    Aspiration pneumonia ([facility] [address] J69.0    Hypokalemia E87.6    Hypomagnesemia E83.42    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    Seizure disorder ([facility] [address] G40.909    Seizure disorder, grand mal ([facility] [address] G40.409    Sepsis due to undetermined organism with acute respiratory failure ([address] A41.9, R65.20, J96.00    Pain of upper abdomen R10.10    Diarrhea of presumed infectious origin R19.7    Abdominal pain R10.9    Pressure injury of contiguous region involving back and right buttock, stage 2 (HCC) L89.42    Cellulitis, perineum L03.315    Pressure injury of right buttock, stage 2 (HCC) L89.312    Pressure injury of left buttock, stage 1 L89.321    COVID-19 virus detected U07.1    Hyponatremia E87.1    Multifocal pneumonia J18.9       Past Medical History:        Diagnosis Date    Anemia     Aspiration pneumonia (HCC)     dx 6/9/2014 with this per ecf/ per old chart dx with pneumonia with admission 9/18/2018    Cerebral palsy ([facility]     \"has no feeling on left side of body\"alert but has some dementia but not diagnosed, has good long term but poor short term and wakes up disoriented after a nap\"(per yaneth)(2014)    Depression     Epilepsy ([facility] 1962 last seizure 2/2018- hx grand mal    Frequent falls     with phone assess- family stated pt fell this am (7/10/2013\"in the process of trying to find a facility to help build him back up- (pt now at [facility])    Glaucoma     \"has been in treatment for this in the past- no treatment needed at present\"    History of IBS     Hx MRSA infection     + nasal culture 4/2014    Hyperammonemia (HCC)     Hypertension     on Lisinopril    IBS (irritable bowel syndrome)     ulcerative colitis    Kidney stone     for surgery for stent placement 7/11/2013    Mood disorder ([address]     per staff at [address] [facility] Occasional tremors     \"makes it difficult for him to write\"    Pressure injury of contiguous region involving back and right buttock, stage 2 ([address] 5/27/2020    Pressure injury of left buttock, stage 1 7/22/2020    Prostatitis     hx given per H&P old chart    Thyroid disease     Ulcerative colitis ([facility] [address]     hx given per staff at [facility] 4/13/2015       Past Surgical History:        Procedure Laterality Date    CHOLECYSTECTOMY      COLONOSCOPY  8/19/14, 5/2012 8/19/14: hemorrhoids, 5/2012 at [address] Left 07/11/2013    with stnet placement   [address]  2005   [facility]      OTHER SURGICAL HISTORY  04/15/2015    Revision of vagal nerve stimulator    UPPER GASTROINTESTINAL ENDOSCOPY N/A 11/13/2018    EGD DILATION BALLOON AND BIOPSY performed by Aleisha Singh MD at [place] [address]    Has to have bettery changed every 5 yrs. Social History:    Social History     Tobacco Use    Smoking status: Never Smoker    Smokeless tobacco: Never Used   Substance Use Topics    Alcohol use:  No                                Counseling given: Not Answered      Vital Signs (Current):   Vitals:    01/28/21 1519   Weight: 185 lb (83.9 kg)   Height: 5' 6\" (1.676 m) BP Readings from Last 3 Encounters:   12/07/20 132/76   12/01/20 131/74   11/27/20 (!) 142/67       NPO Status: Time of last liquid consumption: 0600                        Time of last solid consumption: 1800                        Date of last liquid consumption: 02/04/21                        Date of last solid food consumption: 02/02/21    BMI:   Wt Readings from Last 3 Encounters:   01/28/21 185 lb (83.9 kg)   11/26/20 200 lb 6.4 oz (90.9 kg)   09/07/20 180 lb (81.6 kg)     Body mass index is 29.86 kg/m². CBC:   Lab Results   Component Value Date    WBC 9.8 12/07/2020    RBC 4.79 12/07/2020    HGB 14.4 12/07/2020    HCT 42.1 12/07/2020    MCV 87.9 12/07/2020    RDW 14.1 12/07/2020     12/07/2020       CMP:   Lab Results   Component Value Date     12/07/2020    K 3.1 12/07/2020     12/07/2020    CO2 32 12/07/2020    BUN 12 12/07/2020    CREATININE 0.7 12/07/2020    GFRAA >60 12/07/2020    LABGLOM >60 12/07/2020    GLUCOSE 112 12/07/2020    PROT 6.9 12/07/2020    CALCIUM 9.1 12/07/2020    BILITOT 0.3 12/07/2020    ALKPHOS 89 12/07/2020    AST 17 12/07/2020    ALT 71 12/07/2020       POC Tests: No results for input(s): POCGLU, POCNA, POCK, POCCL, POCBUN, POCHEMO, POCHCT in the last 72 hours.     Coags:   Lab Results   Component Value Date    PROTIME 10.9 11/27/2020    INR 0.90 11/27/2020    APTT 25.7 11/27/2020       HCG (If Applicable): No results found for: PREGTESTUR, PREGSERUM, HCG, HCGQUANT     ABGs:   Lab Results   Component Value Date    PO2ART 58 05/02/2014    OBY3ZXS 39.0 05/02/2014    NUP1QBM 23.6 05/02/2014    BEART 1 05/02/2014        Type & Screen (If Applicable):  No results found for: LABABO, LABRH    Drug/Infectious Status (If Applicable):  No results found for: HIV, HEPCAB    COVID-19 Screening (If Applicable):   Lab Results   Component Value Date    COVID19 DETECTED 11/23/2020    COVID19 NOT DETECTED 07/10/2020         Anesthesia Evaluation  Patient summary reviewed Airway: Mallampati: II        Dental:    (+) edentulous      Pulmonary:normal exam                               Cardiovascular:    (+) hypertension:, hyperlipidemia         Beta Blocker:  Not on Beta Blocker         Neuro/Psych:   (+) seizures:, psychiatric history:             ROS comment: Cerebral palsy GI/Hepatic/Renal:   (+) GERD:, PUD, bowel prep,           Endo/Other: Negative Endo/Other ROS                    Abdominal:           Vascular: negative vascular ROS. Anesthesia Plan      MAC     ASA 3       Induction: intravenous. MARY Abbott - CRNA   2/4/2021         Pre Anesthesia Assessment complete.  Chart reviewed on 2/4/2021

## 2021-02-04 NOTE — PROGRESS NOTES
Voided 250 cc of clear yellow urine without difficulty. Margie Padmini     1049 Received from OR, family at bedside. Placed on monitor. Denies pain, nausea. Call light in reach. 1100 Repositioned in bed, sitting up. Pt fed pudding. Sister remains at bedside. 1108 Report given to Douglas Anderson RN. Transfer of care.   Margie Washington

## 2021-02-04 NOTE — ANESTHESIA POSTPROCEDURE EVALUATION
Department of Anesthesiology  Postprocedure Note    Patient: Juliocesar Lujan  MRN: [id number]  YOB: 1955  Date of evaluation: 2/4/2021  Time:  10:37 AM     Procedure Summary     Date: 02/04/21 Room / Location: [address]    Anesthesia Start: 1015 Anesthesia Stop: 4511    Procedure: COLONOSCOPY POLYPECTOMY SNARE/COLD BIOPSY (N/A ) Diagnosis: (CONSTIPATION, INCONTINENCE OF FECES , OTHER FECAL ABNORMALITIES, ELEVATED FECAL CALPROTECTIN)    Surgeons: Colin Hoffmann MD Responsible Provider: Chrissy Soriano MD    Anesthesia Type: MAC ASA Status: 3          Anesthesia Type: MAC    Emmanuelle Phase I:      Emmanuelle Phase II:      Last vitals: Reviewed and per EMR flowsheets.        Anesthesia Post Evaluation    Patient location during evaluation: bedside  Patient participation: complete - patient participated  Level of consciousness: awake and alert  Pain score: 0  Airway patency: patent  Nausea & Vomiting: no nausea and no vomiting  Complications: no  Cardiovascular status: blood pressure returned to baseline  Respiratory status: acceptable  Hydration status: euvolemic

## 2021-02-04 NOTE — PROGRESS NOTES
1145 Report received from MARTIN Liriano 106. Care assumed. 1155 Discharge instructions reviewed with patient/family. 1200 Dressing with assistance at bedside. 1202 Voided per urinal.  1214 Discharge to car via wheelchair.   Valerie Florian

## 2021-02-04 NOTE — PROGRESS NOTES
BEDSIDE REPORT GIVEN TO LISANDRA RN IN SAME DAY. PT AWAKE AND RESPONSIVE. VS STABLE. PT SISTER AT BEDSIDE.

## 2021-02-04 NOTE — H&P
[address] [facility] Gastroenterology   Pre-operative History and Physical    Patient: Edwar Mattson  : 1955      History Obtained From:  patient, POA        HISTORY OF PRESENT ILLNESS:                The patient is a 60 y.o. male with significant past medical history as below who presents for C scope    Indication Constipation,     Past Medical History:        Diagnosis Date    Anemia     Aspiration pneumonia ([address]     dx 2014 with this per ecf/ per old chart dx with pneumonia with admission 2018    Cerebral palsy ([address]     \"has no feeling on left side of body\"alert but has some dementia but not diagnosed, has good long term but poor short term and wakes up disoriented after a nap\"(per yaneth)()    Depression     Epilepsy ([address]     last seizure 2018- hx grand mal    Frequent falls     with phone assess- family stated pt fell this am (7/10/2013\"in the process of trying to find a facility to help build him back up- (pt now at [facility])    Glaucoma     \"has been in treatment for this in the past- no treatment needed at present\"    History of IBS     Hx MRSA infection     + nasal culture 2014    Hyperammonemia (HCC)     Hypertension     on Lisinopril    IBS (irritable bowel syndrome)     ulcerative colitis    Kidney stone     for surgery for stent placement 2013    Mood disorder ([address]     per staff at [address] [place] [address] Occasional tremors     \"makes it difficult for him to write\"    Pressure injury of contiguous region involving back and right buttock, stage 2 ([address] 2020    Pressure injury of left buttock, stage 1 2020    Prostatitis     hx given per H&P old chart    Thyroid disease     Ulcerative colitis ([address]     hx given per staff at [facility] 2015       Past Surgical History:        Procedure Laterality Date    CHOLECYSTECTOMY      COLONOSCOPY  14, 14: hemorrhoids, 2012 [facility] CYSTOSCOPY Left 07/11/2013    with stnet placement    GALLBLADDER SURGERY  2005    KIDNEY STONE SURGERY      OTHER SURGICAL HISTORY  04/15/2015    Revision of vagal nerve stimulator    UPPER GASTROINTESTINAL ENDOSCOPY N/A 11/13/2018    EGD DILATION BALLOON AND BIOPSY performed by Mino Corcoran MD at [facility]  2002    Has to have bettery changed every 5 yrs. Current Medications:    Medications    Prior to Admission medications    Medication Sig Start Date End Date Taking? Authorizing Provider   Clotrimazole POWD Apply to groin twice a day 12/7/20  Yes CHRIS Flores   solifenacin (VESICARE) 10 MG tablet Take 10 mg by mouth daily   Yes Historical Provider, MD   levothyroxine (SYNTHROID) 125 MCG tablet Take 1 tablet by mouth Daily 4/14/20  Yes Genette Bowels, MD   amLODIPine (NORVASC) 5 MG tablet Take 5 mg by mouth daily   Yes Historical Provider, MD   clonazePAM (KLONOPIN) 0.5 MG tablet Take 0.25 mg by mouth daily.    Yes Historical Provider, MD   divalproex (DEPAKOTE) 500 MG DR tablet Take 1,000 mg by mouth nightly   Yes Historical Provider, MD   hydrochlorothiazide (MICROZIDE) 12.5 MG capsule Take 12.5 mg by mouth daily   Yes Historical Provider, MD   mirabegron (MYRBETRIQ) 50 MG TB24 Take 50 mg by mouth daily   Yes Historical Provider, MD   omeprazole (PRILOSEC) 20 MG delayed release capsule Take 20 mg by mouth 2 times daily (before meals)   Yes Historical Provider, MD   primidone (MYSOLINE) 50 MG tablet Take 50 mg by mouth 3 times daily   Yes Historical Provider, MD   zonisamide (ZONEGRAN) 100 MG capsule Take 100 mg by mouth every morning   Yes Historical Provider, MD   guaiFENesin (MUCINEX) 600 MG extended release tablet Take 1,200 mg by mouth daily as needed for Congestion   Yes Historical Provider, MD   latanoprost (XALATAN) 0.005 % ophthalmic solution Place 1 drop into both eyes nightly   Yes Historical Provider, MD   timolol (TIMOPTIC) 0.5 % ophthalmic solution Place 1 drop into both eyes daily    Yes Historical Provider, MD   Lactobacillus (ACIDOPHILUS) CAPS capsule Take 1 capsule by mouth daily   Yes Historical Provider, MD   aspirin 81 MG tablet Take 81 mg by mouth daily   Yes Historical Provider, MD   Wheat Dextrin (BENEFIBER) POWD Take 4 g by mouth 2 times daily Takes 3 tsp in liquid twice per day    Yes Historical Provider, MD   Lactulose Encephalopathy (ENULOSE PO) Take 30 mLs by mouth 3 times daily   Yes Historical Provider, MD   folic acid (FOLVITE) 1 MG tablet Take 1 mg by mouth daily   Yes Historical Provider, MD   PARoxetine (PAXIL) 20 MG tablet Take 20 mg by mouth every morning   Yes Historical Provider, MD   QUEtiapine (SEROQUEL XR) 50 MG extended release tablet Take 50 mg by mouth nightly   Yes Historical Provider, MD   tamsulosin (FLOMAX) 0.4 MG capsule Take 1 capsule by mouth daily 3/18/17  Yes CHRIS Collier   simvastatin (ZOCOR) 20 MG tablet Take 20 mg by mouth nightly. Yes Historical Provider, MD   Cholecalciferol (VITAMIN D3) 1000 UNITS CAPS Take 1 tablet by mouth daily. Yes Historical Provider, MD   clorazepate (TRANXENE) 7.5 MG tablet Take 7.5 mg by mouth 2 times daily. Yes Historical Provider, MD   acetaminophen (APAP EXTRA STRENGTH) 500 MG tablet Take 1 tablet by mouth every 6 hours as needed for Pain 9/7/20   CRHIS Collier   dicyclomine (BENTYL) 10 MG capsule Take 1 capsule by mouth every 6 hours as needed (cramps) 4/13/20 4/18/20  Romaine Adams MD   Simethicone (MI-ACID GAS RELIEF PO) Take 1 Container by mouth every 4 hours as needed 10 cc every 4 hrs not to exceed 60 cc in 24 hrs    Historical Provider, MD   Menthol-Methyl Salicylate (MUSCLE RUB) 10-15 % CREA cream Apply 1 applicator topically as needed    Historical Provider, MD   pseudoephedrine (SUDAFED) 30 MG tablet Take 30 mg by mouth every 4 hours as needed for Congestion    Historical Provider, MD   lacosamide (VIMPAT) 200 MG tablet Take 200 mg by mouth as needed. Give 1 tab after seizure activity as needed    Historical Provider, MD   lacosamide (VIMPAT) 200 MG tablet Take 1 tablet by mouth 2 times daily for 30 days. 3/25/20 5/27/20  Reyna Jackman,       Scheduled Medications:   Infusions:    lactated ringers 100 mL/hr at 02/04/21 0814     PRN Medications: Allergies: No Known Allergies      Allergies:  Patient has no known allergies. Social History:   TOBACCO:   reports that he has never smoked. He has never used smokeless tobacco.  ETOH:   reports no history of alcohol use.     Family History:       Problem Relation Age of Onset    Heart Disease Mother         atrial fib    High Blood Pressure Mother     Asthma Mother     High Cholesterol Mother     Depression Mother     Migraines Mother     High Blood Pressure Father     Heart Disease Father     Asthma Sister     High Cholesterol Sister     Depression Sister     Migraines Sister        REVIEW OF SYSTEMS:    Negative except for HPI        PHYSICAL EXAM:      Vitals:    Ht 5' 6\" (1.676 m)   Wt 185 lb (83.9 kg)   BMI 29.86 kg/m²     General Appearance:    Alert, cooperative, no distress, appears stated age   [de-identified]:    NO anemia, No jaundice , no Cyanosis, Supple neck   Neck:   Supple, symmetrical, trachea midline, no adenopathy;     thyroid:    Lungs:     Clear to auscultation bilaterally, respirations unlabored   Chest Wall:    No tenderness or deformity    Heart:    Regular rate and rhythm, S1 and S2 normal, no murmur, rub   or gallop   Abdomen:     Soft, non-tender, bowel sounds active all four quadrants,     no masses, no organomegaly, no ascitis   Rectal:    Planned at time of C scope   Extremities:   Extremities normal, atraumatic, no cyanosis or edema   Pulses:   2+ and symmetric all extremities   Skin:   Skin color, texture, turgor normal, no rashes or lesions   Lymph nodes:   No abnormality   Neurologic:   No focal deficits, moving all four extremities      ASA Grade:  ASA 3 - Patient with moderate systemic disease with functional limitations  [address]:    Prior Anesthesia complication: NILL    ASSESSMENT AND PLAN:    1. Patient is a 60 y.o. male here for colonoscopy with deep sedation  2. Procedure options, risks and benefits reviewed with guardian. Guardian expresses understanding.     Xavier Rodriguez  2/4/2021  10:06 AM

## 2021-02-04 NOTE — PROGRESS NOTES
REPORT RECEIVED FROM Jeffrey Wilson RN. PREOP QUESTIONS, ALLERGIES AND NPO STATUS VERIFIED WITH PT AND PTS SISTER, MORE. PT STATES HE DRANK ALL OF HIS PREP.

## 2021-02-10 ENCOUNTER — CARE COORDINATION (OUTPATIENT)
Dept: CASE MANAGEMENT | Age: 66
End: 2021-02-10

## 2021-02-10 NOTE — CARE COORDINATION
[facility] Transitions Follow Up Call    2/10/2021    Patient: Lakeisha Ball  Patient : 1955   MRN: [id number]  Reason for Admission: PNA  Discharge Date: 21 RARS: Readmission Risk Score: 32         Attempted to contact patient's [address] [place] [address] for BPCI-A call. Contact information left to  requesting call back at the earliest convenience. Follow Up  No future appointments.     Xiao Champagne RN

## 2021-02-24 ENCOUNTER — CARE COORDINATION (OUTPATIENT)
Dept: CASE MANAGEMENT | Age: 66
End: 2021-02-24

## 2021-03-22 NOTE — PROGRESS NOTES
.Surgery 3/24/21  you will be called      with times on 3/23/21 for arrival and procedure times               1. Do not eat or drink anything after midnight - unless instructed by your doctor prior to surgery. This includes                   no water, chewing gum or mints. 2. Follow your directions as prescribed by the doctor for your procedure and medications. 3. Check with your Doctor regarding stopping Plavix, Coumadin, Lovenox,Effient,Pradaxa,Xarelto, Fragmin or other blood thinners and                   follow their instructions. 4. Do not smoke, and do not drink any alcoholic beverages 24 hours prior to surgery. This includes NA Beer. 5. You may brush your teeth and gargle the morning of surgery. DO NOT SWALLOW WATER   6. You MUST make arrangements for a responsible adult to take you home after your surgery and be able to check on you every couple                   hours for the day. You will not be allowed to leave alone or drive yourself home. It is strongly suggested someone stay with you the first 24                   hrs. Your surgery will be cancelled if you do not have a ride home. 7. Please wear simple, loose fitting clothing to the hospital.  Keiko Varghese not bring valuables (money, credit cards, checkbooks, etc.) Do not wear any                   makeup (including no eye makeup) or nail polish on your fingers or toes. 8. DO NOT wear any jewelry or piercings on day of surgery. All body piercing jewelry must be removed. 9. If you have dentures, they will be removed before going to the OR; we will provide you a container. If you wear contact lenses or glasses,                  they will be removed; please bring a case for them. 10. If you  have a Living Will and Durable Power of  for Healthcare, please bring in a copy.            11. Please bring picture ID,  insurance card, paperwork from the doctors office    (H & P, Consent, & card for implantable devices). 12. Take a shower the night before or morning of your procedure with the soaps provided, do not apply any lotion, oil or powder. 13. Wear a mask covering your nose & mouth when entering the hospital. Have your covid-19 test performed within 10 days of your                  surgery. Quarantine yourself after the test until after your surgery.

## 2021-03-23 ENCOUNTER — ANESTHESIA EVENT (OUTPATIENT)
Dept: OPERATING ROOM | Age: 66
End: 2021-03-23
Payer: MEDICARE

## 2021-03-23 NOTE — ANESTHESIA PRE PROCEDURE
Department of Anesthesiology  Preprocedure Note       Name:  Bernice Fajardo   Age:  60 y.o.  :  1955                                          MRN:  [id number]         Date:  3/23/2021      Surgeon: Lucia Corrigan):  Gurjit Xiao MD    Procedure: Procedure(s):  STIMULATOR INSERTION  STIMULATOR INSERTION STAGE 2    Medications prior to admission:   Prior to Admission medications    Medication Sig Start Date End Date Taking? Authorizing Provider   Clotrimazole POWD Apply to groin twice a day 20  Yes CHRIS Francisco   acetaminophen (APAP EXTRA STRENGTH) 500 MG tablet Take 1 tablet by mouth every 6 hours as needed for Pain 20  Yes CHRIS Francisco   solifenacin (VESICARE) 10 MG tablet Take 10 mg by mouth daily   Yes Historical Provider, MD   levothyroxine (SYNTHROID) 125 MCG tablet Take 1 tablet by mouth Daily 20  Yes Sara Quevedo MD   amLODIPine (NORVASC) 5 MG tablet Take 5 mg by mouth daily   Yes Historical Provider, MD   clonazePAM (KLONOPIN) 0.5 MG tablet Take 0.25 mg by mouth daily.    Yes Historical Provider, MD   divalproex (DEPAKOTE) 500 MG DR tablet Take 1,000 mg by mouth nightly   Yes Historical Provider, MD   hydrochlorothiazide (MICROZIDE) 12.5 MG capsule Take 12.5 mg by mouth daily   Yes Historical Provider, MD   mirabegron (MYRBETRIQ) 50 MG TB24 Take 50 mg by mouth daily   Yes Historical Provider, MD   omeprazole (PRILOSEC) 20 MG delayed release capsule Take 20 mg by mouth 2 times daily (before meals)   Yes Historical Provider, MD   primidone (MYSOLINE) 50 MG tablet Take 50 mg by mouth 3 times daily   Yes Historical Provider, MD   zonisamide (ZONEGRAN) 100 MG capsule Take 100 mg by mouth every morning   Yes Historical Provider, MD   Simethicone (MI-ACID GAS RELIEF PO) Take 1 Container by mouth every 4 hours as needed 10 cc every 4 hrs not to exceed 60 cc in 24 hrs   Yes Historical Provider, MD   latanoprost (XALATAN) 0.005 % ophthalmic solution Place 1 drop into both eyes nightly Yes Historical Provider, MD   timolol (TIMOPTIC) 0.5 % ophthalmic solution Place 1 drop into both eyes daily    Yes Historical Provider, MD   Lactobacillus (ACIDOPHILUS) CAPS capsule Take 1 capsule by mouth daily   Yes Historical Provider, MD   aspirin 81 MG tablet Take 81 mg by mouth daily   Yes Historical Provider, MD   Wheat Dextrin (BENEFIBER) POWD Take 4 g by mouth 2 times daily Takes 3 tsp in liquid twice per day    Yes Historical Provider, MD   Lactulose Encephalopathy (ENULOSE PO) Take 30 mLs by mouth 3 times daily   Yes Historical Provider, MD   folic acid (FOLVITE) 1 MG tablet Take 1 mg by mouth daily   Yes Historical Provider, MD   PARoxetine (PAXIL) 20 MG tablet Take 20 mg by mouth every morning   Yes Historical Provider, MD   QUEtiapine (SEROQUEL XR) 50 MG extended release tablet Take 50 mg by mouth nightly   Yes Historical Provider, MD   tamsulosin (FLOMAX) 0.4 MG capsule Take 1 capsule by mouth daily 3/18/17  Yes CHRIS Varma   simvastatin (ZOCOR) 20 MG tablet Take 20 mg by mouth nightly. Yes Historical Provider, MD   Cholecalciferol (VITAMIN D3) 1000 UNITS CAPS Take 1 tablet by mouth daily. Yes Historical Provider, MD   clorazepate (TRANXENE) 7.5 MG tablet Take 7.5 mg by mouth 2 times daily. Yes Historical Provider, MD   dicyclomine (BENTYL) 10 MG capsule Take 1 capsule by mouth every 6 hours as needed (cramps) 4/13/20 4/18/20  Raquel Willingham MD   guaiFENesin (MUCINEX) 600 MG extended release tablet Take 1,200 mg by mouth daily as needed for Congestion    Historical Provider, MD   Menthol-Methyl Salicylate (MUSCLE RUB) 10-15 % CREA cream Apply 1 applicator topically as needed    Historical Provider, MD   pseudoephedrine (SUDAFED) 30 MG tablet Take 30 mg by mouth every 4 hours as needed for Congestion    Historical Provider, MD   lacosamide (VIMPAT) 200 MG tablet Take 200 mg by mouth as needed.  Give 1 tab after seizure activity as needed    Historical Provider, MD   lacosamide (VIMPAT) tablet Take 20 mg by mouth every morning      QUEtiapine (SEROQUEL XR) 50 MG extended release tablet Take 50 mg by mouth nightly      tamsulosin (FLOMAX) 0.4 MG capsule Take 1 capsule by mouth daily 30 capsule 0    simvastatin (ZOCOR) 20 MG tablet Take 20 mg by mouth nightly.  Cholecalciferol (VITAMIN D3) 1000 UNITS CAPS Take 1 tablet by mouth daily.  clorazepate (TRANXENE) 7.5 MG tablet Take 7.5 mg by mouth 2 times daily.  dicyclomine (BENTYL) 10 MG capsule Take 1 capsule by mouth every 6 hours as needed (cramps) 20 capsule 0    guaiFENesin (MUCINEX) 600 MG extended release tablet Take 1,200 mg by mouth daily as needed for Congestion      Menthol-Methyl Salicylate (MUSCLE RUB) 10-15 % CREA cream Apply 1 applicator topically as needed      pseudoephedrine (SUDAFED) 30 MG tablet Take 30 mg by mouth every 4 hours as needed for Congestion      lacosamide (VIMPAT) 200 MG tablet Take 200 mg by mouth as needed. Give 1 tab after seizure activity as needed      lacosamide (VIMPAT) 200 MG tablet Take 1 tablet by mouth 2 times daily for 30 days.  60 tablet 0       Allergies:  No Known Allergies    Problem List:    Patient Active Problem List   Diagnosis Code    Cerebral palsy, infantile hemiplegia ([facility] G80.8    Rosacea, acne L71.9    IBS (irritable bowel syndrome) K58.9    Hypertension I10    Calculus of ureter N20.1    Pyelonephritis N12    Sepsis ([facility] A41.9    Pneumonia J18.9    Increased ammonia level R79.89    Aspiration pneumonia ([facility]) J69.0    Hypokalemia E87.6    Hypomagnesemia E83.42    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    Seizure disorder ([facility]) G40.909    Seizure disorder, grand mal ([facility] G40.409    Sepsis due to undetermined organism with acute respiratory failure ([facility]) A41.9, R65.20, J96.00    Pain of upper abdomen R10.10    Diarrhea of presumed infectious origin R19.7    Abdominal pain R10.9    Pressure injury of contiguous region involving back and right buttock, stage 2 ([facility] L89.42    Cellulitis, perineum L03.315    Pressure injury of right buttock, stage 2 (HCC) L89.312    Pressure injury of left buttock, stage 1 L89.321    COVID-19 virus detected U07.1    Hyponatremia E87.1    Multifocal pneumonia J18.9       Past Medical History:        Diagnosis Date    Anemia     Aspiration pneumonia (HCC)     dx 6/9/2014 with this per ecf/ per old chart dx with pneumonia with admission 9/18/2018    Cerebral palsy ([address]     \"has no feeling on left side of body\"alert but has some dementia but not diagnosed, has good long term but poor short term and wakes up disoriented after a nap\"(per yaneth)(2014)    Depression     Epilepsy ([address] 1962    last seizure 2/2018- hx grand mal    Frequent falls     with phone assess- family stated pt fell this am (7/10/2013\"in the process of trying to find a facility to help build him back up- (pt now at [facility])    Glaucoma     \"has been in treatment for this in the past- no treatment needed at present\"    History of IBS     Hx MRSA infection     + nasal culture 4/2014    Hyperammonemia (HCC)     Hypertension     on Lisinopril    IBS (irritable bowel syndrome)     ulcerative colitis    Kidney stone     for surgery for stent placement 7/11/2013    Mood disorder ([address]     per staff at [address] [place] [address] Occasional tremors     \"makes it difficult for him to write\"    Pressure injury of contiguous region involving back and right buttock, stage 2 ([address] 5/27/2020    Pressure injury of left buttock, stage 1 7/22/2020    Prostatitis     hx given per H&P old chart    Thyroid disease     Ulcerative colitis ([address]     hx given [facility] staff at [facility] 4/13/2015       Past Surgical History:        Procedure Laterality Date    CHOLECYSTECTOMY      COLONOSCOPY  8/19/14, 5/2012 8/19/14: hemorrhoids, 5/2012 at [facility]  02/04/2021    COLONOSCOPY N/A 2/4/2021    COLONOSCOPY POLYPECTOMY SNARE/COLD BIOPSY performed by Pavithra Amanda MD at [facility] ENDOSCOPY    CYSTOSCOPY Left 07/11/2013    with stnet placement    GALLBLADDER SURGERY  2005    KIDNEY STONE SURGERY      OTHER SURGICAL HISTORY  04/15/2015    Revision of vagal nerve stimulator    UPPER GASTROINTESTINAL ENDOSCOPY N/A 11/13/2018    EGD DILATION BALLOON AND BIOPSY performed by Bianka Camacho MD at [facility]  2002    Has to have bettery changed every 5 yrs. Social History:    Social History     Tobacco Use    Smoking status: Never Smoker    Smokeless tobacco: Never Used   Substance Use Topics    Alcohol use: No                                Counseling given: Not Answered      Vital Signs (Current): There were no vitals filed for this visit. BP Readings from Last 3 Encounters:   02/04/21 (!) 98/41   02/04/21 123/87   12/07/20 132/76       NPO Status:                                                                                 BMI:   Wt Readings from Last 3 Encounters:   01/28/21 185 lb (83.9 kg)   11/26/20 200 lb 6.4 oz (90.9 kg)   09/07/20 180 lb (81.6 kg)     There is no height or weight on file to calculate BMI.    CBC:   Lab Results   Component Value Date    WBC 9.8 12/07/2020    RBC 4.79 12/07/2020    HGB 14.4 12/07/2020    HCT 42.1 12/07/2020    MCV 87.9 12/07/2020    RDW 14.1 12/07/2020     12/07/2020       CMP:   Lab Results   Component Value Date     12/07/2020    K 3.1 12/07/2020     12/07/2020    CO2 32 12/07/2020    BUN 12 12/07/2020    CREATININE 0.7 12/07/2020    GFRAA >60 12/07/2020    LABGLOM >60 12/07/2020    GLUCOSE 112 12/07/2020    PROT 6.9 12/07/2020    CALCIUM 9.1 12/07/2020    BILITOT 0.3 12/07/2020    ALKPHOS 89 12/07/2020    AST 17 12/07/2020    ALT 71 12/07/2020       POC Tests: No results for input(s): POCGLU, POCNA, POCK, POCCL, POCBUN, POCHEMO, POCHCT in the last 72 hours.     Coags:   Lab Results   Component Value Date    PROTIME 10.9 11/27/2020    INR 0.90

## 2021-03-24 ENCOUNTER — HOSPITAL ENCOUNTER (OUTPATIENT)
Age: 66
Setting detail: OUTPATIENT SURGERY
Discharge: OTHER INSTITUTION | End: 2021-03-24
Attending: UROLOGY | Admitting: UROLOGY
Payer: MEDICARE

## 2021-03-24 ENCOUNTER — ANESTHESIA (OUTPATIENT)
Dept: OPERATING ROOM | Age: 66
End: 2021-03-24
Payer: MEDICARE

## 2021-03-24 ENCOUNTER — APPOINTMENT (OUTPATIENT)
Dept: GENERAL RADIOLOGY | Age: 66
End: 2021-03-24
Attending: UROLOGY
Payer: MEDICARE

## 2021-03-24 VITALS
OXYGEN SATURATION: 95 % | BODY MASS INDEX: 30.53 KG/M2 | HEART RATE: 55 BPM | RESPIRATION RATE: 16 BRPM | TEMPERATURE: 97 F | WEIGHT: 190 LBS | HEIGHT: 66 IN | DIASTOLIC BLOOD PRESSURE: 66 MMHG | SYSTOLIC BLOOD PRESSURE: 107 MMHG

## 2021-03-24 VITALS
RESPIRATION RATE: 13 BRPM | OXYGEN SATURATION: 99 % | SYSTOLIC BLOOD PRESSURE: 132 MMHG | DIASTOLIC BLOOD PRESSURE: 73 MMHG

## 2021-03-24 LAB
SARS-COV-2, NAAT: NOT DETECTED
SOURCE: NORMAL

## 2021-03-24 PROCEDURE — 3600000003 HC SURGERY LEVEL 3 BASE: Performed by: UROLOGY

## 2021-03-24 PROCEDURE — 6360000002 HC RX W HCPCS: Performed by: NURSE ANESTHETIST, CERTIFIED REGISTERED

## 2021-03-24 PROCEDURE — 2500000003 HC RX 250 WO HCPCS: Performed by: UROLOGY

## 2021-03-24 PROCEDURE — 6360000002 HC RX W HCPCS: Performed by: UROLOGY

## 2021-03-24 PROCEDURE — 7100000000 HC PACU RECOVERY - FIRST 15 MIN: Performed by: UROLOGY

## 2021-03-24 PROCEDURE — 6360000002 HC RX W HCPCS: Performed by: ANESTHESIOLOGY

## 2021-03-24 PROCEDURE — 7100000010 HC PHASE II RECOVERY - FIRST 15 MIN: Performed by: UROLOGY

## 2021-03-24 PROCEDURE — 2709999900 HC NON-CHARGEABLE SUPPLY: Performed by: UROLOGY

## 2021-03-24 PROCEDURE — 76000 FLUOROSCOPY <1 HR PHYS/QHP: CPT

## 2021-03-24 PROCEDURE — C1894 INTRO/SHEATH, NON-LASER: HCPCS | Performed by: UROLOGY

## 2021-03-24 PROCEDURE — 3700000001 HC ADD 15 MINUTES (ANESTHESIA): Performed by: UROLOGY

## 2021-03-24 PROCEDURE — 2580000003 HC RX 258: Performed by: UROLOGY

## 2021-03-24 PROCEDURE — 2580000003 HC RX 258: Performed by: ANESTHESIOLOGY

## 2021-03-24 PROCEDURE — C1778 LEAD, NEUROSTIMULATOR: HCPCS | Performed by: UROLOGY

## 2021-03-24 PROCEDURE — C1767 GENERATOR, NEURO NON-RECHARG: HCPCS | Performed by: UROLOGY

## 2021-03-24 PROCEDURE — 7100000011 HC PHASE II RECOVERY - ADDTL 15 MIN: Performed by: UROLOGY

## 2021-03-24 PROCEDURE — 87635 SARS-COV-2 COVID-19 AMP PRB: CPT

## 2021-03-24 PROCEDURE — 3600000013 HC SURGERY LEVEL 3 ADDTL 15MIN: Performed by: UROLOGY

## 2021-03-24 PROCEDURE — 7100000001 HC PACU RECOVERY - ADDTL 15 MIN: Performed by: UROLOGY

## 2021-03-24 PROCEDURE — 2780000010 HC IMPLANT OTHER: Performed by: UROLOGY

## 2021-03-24 PROCEDURE — 2720000010 HC SURG SUPPLY STERILE: Performed by: UROLOGY

## 2021-03-24 PROCEDURE — 3700000000 HC ANESTHESIA ATTENDED CARE: Performed by: UROLOGY

## 2021-03-24 DEVICE — KIT LEAD SURE SCAN INTERSTIM MRI: Type: IMPLANTABLE DEVICE | Site: BACK | Status: FUNCTIONAL

## 2021-03-24 DEVICE — Z DUP USE 2628873 GENERATOR NEUROSTIMULATOR H1.7XL2IN THK3IN TORQ WRNCH PROD: Type: IMPLANTABLE DEVICE | Site: BACK | Status: FUNCTIONAL

## 2021-03-24 RX ORDER — HYDROMORPHONE HCL 110MG/55ML
0.5 PATIENT CONTROLLED ANALGESIA SYRINGE INTRAVENOUS EVERY 5 MIN PRN
Status: DISCONTINUED | OUTPATIENT
Start: 2021-03-24 | End: 2021-03-24 | Stop reason: HOSPADM

## 2021-03-24 RX ORDER — LABETALOL HYDROCHLORIDE 5 MG/ML
5 INJECTION, SOLUTION INTRAVENOUS EVERY 10 MIN PRN
Status: DISCONTINUED | OUTPATIENT
Start: 2021-03-24 | End: 2021-03-24 | Stop reason: HOSPADM

## 2021-03-24 RX ORDER — ONDANSETRON 2 MG/ML
INJECTION INTRAMUSCULAR; INTRAVENOUS PRN
Status: DISCONTINUED | OUTPATIENT
Start: 2021-03-24 | End: 2021-03-24 | Stop reason: SDUPTHER

## 2021-03-24 RX ORDER — HYDROCODONE BITARTRATE AND ACETAMINOPHEN 5; 325 MG/1; MG/1
1 TABLET ORAL PRN
Status: DISCONTINUED | OUTPATIENT
Start: 2021-03-24 | End: 2021-03-24 | Stop reason: HOSPADM

## 2021-03-24 RX ORDER — ONDANSETRON 2 MG/ML
4 INJECTION INTRAMUSCULAR; INTRAVENOUS
Status: DISCONTINUED | OUTPATIENT
Start: 2021-03-24 | End: 2021-03-24 | Stop reason: HOSPADM

## 2021-03-24 RX ORDER — SUCCINYLCHOLINE/SOD CL,ISO/PF 100 MG/5ML
SYRINGE (ML) INTRAVENOUS PRN
Status: DISCONTINUED | OUTPATIENT
Start: 2021-03-24 | End: 2021-03-24 | Stop reason: SDUPTHER

## 2021-03-24 RX ORDER — SODIUM CHLORIDE, SODIUM LACTATE, POTASSIUM CHLORIDE, CALCIUM CHLORIDE 600; 310; 30; 20 MG/100ML; MG/100ML; MG/100ML; MG/100ML
INJECTION, SOLUTION INTRAVENOUS CONTINUOUS
Status: DISCONTINUED | OUTPATIENT
Start: 2021-03-24 | End: 2021-03-24 | Stop reason: HOSPADM

## 2021-03-24 RX ORDER — PROMETHAZINE HYDROCHLORIDE 25 MG/ML
6.25 INJECTION, SOLUTION INTRAMUSCULAR; INTRAVENOUS
Status: DISCONTINUED | OUTPATIENT
Start: 2021-03-24 | End: 2021-03-24 | Stop reason: HOSPADM

## 2021-03-24 RX ORDER — CIPROFLOXACIN 2 MG/ML
400 INJECTION, SOLUTION INTRAVENOUS ONCE
Status: COMPLETED | OUTPATIENT
Start: 2021-03-24 | End: 2021-03-24

## 2021-03-24 RX ORDER — HYDRALAZINE HYDROCHLORIDE 20 MG/ML
5 INJECTION INTRAMUSCULAR; INTRAVENOUS EVERY 10 MIN PRN
Status: DISCONTINUED | OUTPATIENT
Start: 2021-03-24 | End: 2021-03-24 | Stop reason: HOSPADM

## 2021-03-24 RX ORDER — DIPHENHYDRAMINE HYDROCHLORIDE 50 MG/ML
12.5 INJECTION INTRAMUSCULAR; INTRAVENOUS
Status: DISCONTINUED | OUTPATIENT
Start: 2021-03-24 | End: 2021-03-24 | Stop reason: HOSPADM

## 2021-03-24 RX ORDER — MAGNESIUM HYDROXIDE 1200 MG/15ML
LIQUID ORAL
Status: COMPLETED | OUTPATIENT
Start: 2021-03-24 | End: 2021-03-24

## 2021-03-24 RX ORDER — BUPIVACAINE HYDROCHLORIDE AND EPINEPHRINE 5; 5 MG/ML; UG/ML
INJECTION, SOLUTION EPIDURAL; INTRACAUDAL; PERINEURAL
Status: COMPLETED | OUTPATIENT
Start: 2021-03-24 | End: 2021-03-24

## 2021-03-24 RX ORDER — HYDROCODONE BITARTRATE AND ACETAMINOPHEN 5; 325 MG/1; MG/1
2 TABLET ORAL EVERY 6 HOURS PRN
Status: DISCONTINUED | OUTPATIENT
Start: 2021-03-24 | End: 2021-03-24 | Stop reason: HOSPADM

## 2021-03-24 RX ORDER — PROPOFOL 10 MG/ML
INJECTION, EMULSION INTRAVENOUS CONTINUOUS PRN
Status: DISCONTINUED | OUTPATIENT
Start: 2021-03-24 | End: 2021-03-24 | Stop reason: SDUPTHER

## 2021-03-24 RX ORDER — PROPOFOL 10 MG/ML
INJECTION, EMULSION INTRAVENOUS PRN
Status: DISCONTINUED | OUTPATIENT
Start: 2021-03-24 | End: 2021-03-24 | Stop reason: SDUPTHER

## 2021-03-24 RX ORDER — MEPERIDINE HYDROCHLORIDE 25 MG/ML
12.5 INJECTION INTRAMUSCULAR; INTRAVENOUS; SUBCUTANEOUS EVERY 5 MIN PRN
Status: DISCONTINUED | OUTPATIENT
Start: 2021-03-24 | End: 2021-03-24 | Stop reason: HOSPADM

## 2021-03-24 RX ORDER — FENTANYL CITRATE 50 UG/ML
50 INJECTION, SOLUTION INTRAMUSCULAR; INTRAVENOUS EVERY 5 MIN PRN
Status: DISCONTINUED | OUTPATIENT
Start: 2021-03-24 | End: 2021-03-24 | Stop reason: HOSPADM

## 2021-03-24 RX ORDER — 0.9 % SODIUM CHLORIDE 0.9 %
500 INTRAVENOUS SOLUTION INTRAVENOUS
Status: DISCONTINUED | OUTPATIENT
Start: 2021-03-24 | End: 2021-03-24 | Stop reason: HOSPADM

## 2021-03-24 RX ADMIN — FENTANYL CITRATE 50 MCG: 50 INJECTION, SOLUTION INTRAMUSCULAR; INTRAVENOUS at 15:39

## 2021-03-24 RX ADMIN — SODIUM CHLORIDE, POTASSIUM CHLORIDE, SODIUM LACTATE AND CALCIUM CHLORIDE: 600; 310; 30; 20 INJECTION, SOLUTION INTRAVENOUS at 11:50

## 2021-03-24 RX ADMIN — CIPROFLOXACIN 400 MG: 2 INJECTION, SOLUTION INTRAVENOUS at 14:17

## 2021-03-24 RX ADMIN — PROPOFOL 40 MG: 10 INJECTION, EMULSION INTRAVENOUS at 14:12

## 2021-03-24 RX ADMIN — ONDANSETRON 4 MG: 2 INJECTION INTRAMUSCULAR; INTRAVENOUS at 15:15

## 2021-03-24 RX ADMIN — PROPOFOL 150 MCG/KG/MIN: 10 INJECTION, EMULSION INTRAVENOUS at 14:12

## 2021-03-24 RX ADMIN — Medication 60 MG: at 15:00

## 2021-03-24 RX ADMIN — SODIUM CHLORIDE, POTASSIUM CHLORIDE, SODIUM LACTATE AND CALCIUM CHLORIDE: 600; 310; 30; 20 INJECTION, SOLUTION INTRAVENOUS at 15:21

## 2021-03-24 ASSESSMENT — PULMONARY FUNCTION TESTS
PIF_VALUE: 1
PIF_VALUE: 1
PIF_VALUE: 4
PIF_VALUE: 4
PIF_VALUE: 1
PIF_VALUE: 3
PIF_VALUE: 27
PIF_VALUE: 1
PIF_VALUE: 0
PIF_VALUE: 1
PIF_VALUE: 16
PIF_VALUE: 1
PIF_VALUE: 48
PIF_VALUE: 1
PIF_VALUE: 2
PIF_VALUE: 1
PIF_VALUE: 2
PIF_VALUE: 1
PIF_VALUE: 2
PIF_VALUE: 16
PIF_VALUE: 1
PIF_VALUE: 1
PIF_VALUE: 8
PIF_VALUE: 1
PIF_VALUE: 1
PIF_VALUE: 3
PIF_VALUE: 1
PIF_VALUE: 9
PIF_VALUE: 23
PIF_VALUE: 2
PIF_VALUE: 3
PIF_VALUE: 1
PIF_VALUE: 9
PIF_VALUE: 1

## 2021-03-24 ASSESSMENT — PAIN - FUNCTIONAL ASSESSMENT: PAIN_FUNCTIONAL_ASSESSMENT: 0-10

## 2021-03-24 ASSESSMENT — PAIN SCALES - GENERAL: PAINLEVEL_OUTOF10: 0

## 2021-03-24 NOTE — PROGRESS NOTES
1530: Arrived to PACU from OR. Monitors applied, alarms on. Report obtained from Bernardo Swanson and Lucrecia Cooley. 1539: Medicated for c/o back discomfort, voided 340cc clear yellow urine. 1600: Turned and repositioned in bed, warm blankets on. Nickel size red spot noted left hip, no skin breakdown noted. Mepilex applied. 1620: Transported to [facility].

## 2021-03-24 NOTE — ANESTHESIA POSTPROCEDURE EVALUATION
Department of Anesthesiology  Postprocedure Note    Patient: Bozena Jacobo  MRN: [id number]  YOB: 1955  Date of evaluation: 3/24/2021  Time:  3:43 PM     Procedure Summary     Date: 03/24/21 Room / Location: [address] [place] [address]    Anesthesia Start: 1401 Anesthesia Stop: 5148    Procedures:       STIMULATOR INSERTION (N/A Back)      STIMULATOR INSERTION STAGE 2 (N/A Back) Diagnosis: (Christopher Craig)    Surgeons: Martinez Canales MD Responsible Provider: Sierra Dominguez MD    Anesthesia Type: General ASA Status: 4          Anesthesia Type: General    Emmanuelle Phase I:      Emmanuelle Phase II:      Last vitals: Reviewed and per EMR flowsheets.        Anesthesia Post Evaluation    Patient location during evaluation: PACU  Patient participation: complete - patient participated  Level of consciousness: awake and alert  Pain score: 0  Airway patency: patent  Nausea & Vomiting: no nausea and no vomiting  Complications: no  Cardiovascular status: blood pressure returned to baseline and hemodynamically stable  Respiratory status: acceptable, room air and spontaneous ventilation  Hydration status: euvolemic

## 2021-03-24 NOTE — H&P
Department of Urology   H&P  [facility] 1 2 3 4 5      Date: 3/24/2021   Patient: Edwar Mattson   : 1955   DOA: 3/24/2021   MRN: [id number]   ROOM#: OR/NONE     CHIEF COMPLAINT:  Chronic urge urinary incontinence and urinary frequency    History Obtained From:  Patient, medical records    HISTORY OF PRESENT ILLNESS:                The patient is a 60 y.o. male with significant past medical history refractory urge urinary incontinence and urinary urgency with incontinence symptoms improved > 50% with PNE trial in office 2021. He was \"dry\" (no pad use) with PNE- prior 8-10 pads. POA and patient were very happy with PNE response. Hx cerebral palsy but no changes reported per recent neurology evaluation. History stable left hemiplegia. Although he has cerebral palsy, he has a majority OAB/Urge incontinence that responded well/favorably with PNE trial.  Failed OAB meds and fluid modification. Discussed r, b, at's with patient and sister- poa  They elected to proceed with Interstim Stage I and Stage II  They accept covid risks of surgery and anesthesia    Started antibiotics for lower left buttocks bed sore but no visible bed sore or skin breakdown in right or midline sacral area. Mostly sleeps on back. In wheelchair during day.      Past Medical History:        Diagnosis Date    Anemia     Aspiration pneumonia ([address]     dx 2014 with this per ecf/ per old chart dx with pneumonia with admission 2018    Cerebral palsy ([address]     \"has no feeling on left side of body\"alert but has some dementia but not diagnosed, has good long term but poor short term and wakes up disoriented after a nap\"(per yaneth)()    Depression     Epilepsy ([address]     last seizure 2018- hx grand mal    Frequent falls     with phone assess- family stated pt fell this am (7/10/2013\"in the process of trying to find a facility to help build him back up- ([facility])    Glaucoma     \"has been in treatment for this in the past- no treatment needed at present\"    History of IBS     Hx MRSA infection     + nasal culture 4/2014    Hyperammonemia (HCC)     Hypertension     on Lisinopril    IBS (irritable bowel syndrome)     ulcerative colitis    Kidney stone     for surgery for stent placement 7/11/2013    Mood disorder ([facility]     per staff at [address] [facility] Occasional tremors     \"makes it difficult for him to write\"    Pressure injury of contiguous region involving back and right buttock, stage 2 ([facility] 5/27/2020    Pressure injury of left buttock, stage 1 7/22/2020    Prostatitis     hx given per H&P old chart    Thyroid disease     Ulcerative colitis ([facility]     hx given per staff at [facility] 4/13/2015     Past Surgical History:        Procedure Laterality Date    CHOLECYSTECTOMY      COLONOSCOPY  8/19/14, 5/2012 8/19/14: hemorrhoids, 5/2012 at [facility]  02/04/2021    COLONOSCOPY N/A 2/4/2021    COLONOSCOPY POLYPECTOMY SNARE/COLD BIOPSY performed by Siva Cisse MD at [place] [id number] Left 07/11/2013    with stnet placement   [address] [address]      OTHER SURGICAL HISTORY  04/15/2015    Revision of vagal nerve stimulator    UPPER GASTROINTESTINAL ENDOSCOPY N/A 11/13/2018    EGD DILATION BALLOON AND BIOPSY performed by Doc Wheeler MD at [place] [address]    Has to have bettery changed every 5 yrs. Current Medications:   Tylenol, norvasc, myrbetriq, synthroid, klonopin, depakote, microzide, prilosec, aspirin (held), mysoline, zonegran, paxil, seroquel, lacosamine      Allergies:  Patient has no known allergies. Social History:   TOBACCO:   reports that he has never smoked. He has never used smokeless tobacco.  ETOH:   reports no history of alcohol use. DRUGS:   reports no history of drug use.   MARITAL STATUS:    OCCUPATION:      Family History:         Problem Relation Age of Onset    Heart Disease Mother         atrial fib    High Blood Pressure Mother     Asthma Mother     High Cholesterol Mother     Depression Mother    [place] Migraines Mother     High Blood Pressure Father     Heart Disease Father     Asthma Sister     High Cholesterol Sister     Depression Sister     Migraines Sister        REVIEW OF SYSTEMS:  PHYSICAL EXAM:    VITALS:  BP (!) 157/72   Pulse 55   Temp 97.2 °F (36.2 °C) (Temporal)   Resp 16   Ht 5' 6\" (1.676 m)   Wt 190 lb (86.2 kg)   SpO2 99%   BMI 30.67 kg/m²     TEMPERATURE:  Current - Temp: 97.2 °F (36.2 °C); Max - Temp  Av.2 °F (36.2 °C)  Min: 97.2 °F (36.2 °C)  Max: 97.2 °F (36.2 °C)  24HR BLOOD PRESSURE RANGE:  Systolic (40NIK), COLLINS:756 , Min:157 , JGD:470   ; Diastolic (07IFS), QHF:98, Min:72, Max:72    8HR INTAKE OUTPUT:  No intake/output data recorded. URINARY CATHETER OUTPUT (Abdullahi):     DRAIN/TUBE OUTPUT:        CONSTITUTIONAL:  awake, alert, cooperative, no apparent distress, and appears stated age  CTA B  RRR  Sacral area clean, dry, no skin breakdown, no bedsores in this area.      Data:    WBC:    Lab Results   Component Value Date    WBC 9.8 2020     Hemoglobin/Hematocrit:    Lab Results   Component Value Date    HGB 14.4 2020    HCT 42.1 2020     BMP:    Lab Results   Component Value Date     2020    K 3.1 2020     2020    CO2 32 2020    BUN 12 2020    LABALBU 3.3 2020    CREATININE 0.7 2020    CALCIUM 9.1 2020    GFRAA >60 2020    LABGLOM >60 2020     PT/INR:    Lab Results   Component Value Date    PROTIME 10.9 2020    INR 0.90 2020         Impression: Refractory urge urinary incontinence much improved with PNE trial                        Again using 8-10 pads per day  (no pads with PNE)                        Hx of cerebral palsy and left hemiplegia but major bladder symptom is OAB/urge incontinence and not neurogenic bladder

## 2021-03-24 NOTE — BRIEF OP NOTE
Brief Postoperative Note      Patient: Arlin Astudillo  YOB: 1955  MRN: [id number]    Date of Procedure: 3/24/2021    Pre-Op Diagnosis: URGE INCONTINENCE    Post-Op Diagnosis:  As above       Procedure:    Placement stage I and stage II Interstim Neuromodulator device    Surgeon(s):  Isabel Garcia MD      Anesthesia: Monitor Anesthesia Care converted to LMA General for skin closure    Estimated Blood Loss (mL): 5    Complications:   During skin closure he had desaturation with MAC anesthesia  Wounds were covered and immediately placed in supine position  Had improvement in O2 sats  Anesthesia placed LMA/general       Specimens:   None    Implants:  Implant Name Type Inv. Item Serial No.  Lot No. LRB No. Used Action   GENERATOR NEUROSTIMULATOR H1.7XL2IN THK3IN [employer]OR[employer] PROD  GENERATOR NEUROSTIMULATOR H1.7XL2IN THK3IN TOR[employer] PROD  [facility] NEUROLOGIC [facility] [place], [facility]- [id number] N/A 1 Implanted         Drains: none    Findings:   1. Margoth and toe flex in all four leads on lead check and test needle check  2. Proper impedence in all lead on final check  3. Placed MRI safe lead right S3 pardo  4. Placed non-rechargeable battery right buttocks  5.   Closed with staples due to de sat event and need to close quickly in lateral position    Electronically signed by Isabel Garcia MD on 3/24/2021 at 3:17 PM

## 2021-03-25 NOTE — OP NOTE
[address] [place] [address], [address] [place] [address]                                OPERATIVE REPORT    PATIENT NAME: Sulma Jaimes                      :        1955  MED REC NO:   [id number]                          ROOM:  ACCOUNT NO:   [de-identified]                           ADMIT DATE: 2021  PROVIDER:     Donaldo Plunkett MD    DATE OF PROCEDURE:  2021    PREOPERATIVE DIAGNOSIS:  Refractory urge incontinence. POSTOPERATIVE DIAGNOSIS:  Refractory urge incontinence. PROCEDURE PERFORMED:  Placement of stage I and stage II InterStim  neuromodulator device. SURGEON:  Donaldo Plunkett MD    ANESTHESIA:  MAC converted to LMA general for skin closure. ESTIMATED BLOOD LOSS:  5 mL. COMPLICATIONS:  1. During skin closure, he had desaturation with MAC anesthetic. With  the desaturation, his wounds were immediately covered and he was  immediately placed in the supine position. At that time, the deeper  tissues had been closed but the skin was not. He was emergently flipped  over into a supine position due to desaturation. He had improvement in  his O2 sats. Anesthesia placed an LMA general for skin closure. They  estimate the episode started and stopped in a 3-minute period. SPECIMENS:  None. IMPLANTS:  InterStim neuromodulator device. DRAINS:  None. FINDINGS:  1.  Margoth and toe flex in all four leads on lead check and test needle  check. 2.  Proper impedance in all leads on final check. 3.  Placed MRI safe lead in the right S3 foramen. 4.  Placed nonrechargeable battery in the right upper buttock. 5.  Closed skin with staples due to desaturation event and need to  quickly close in lateral position. DISPOSITION:  Stable to PACU. INDICATIONS FOR PROCEDURE:  The patient is a 43-year-old male with  chronic history of urge incontinence. He requires 8-10 pads per day.    When he had a PNE trial, he was dry and did not use pads. He was very  happy with results of the PNE trial.  The patient and the power of   elected to proceed with InterStim placement given the above  improvement. I discussed the risks, benefits, and alternatives of  therapies. The patient elected to proceed with surgery. The POA and  the patient accepted the COVID risks of surgery and anesthesia. The  patient had failed fluid modification and overactive bladder  medications. OPERATIVE REPORT:  The patient received preoperative antibiotics. He  had compression boots in place. He was brought to the operating room  and carefully placed in a prone position and appropriately padded while  awake. The patient does have contractures. He has a history of  cerebral palsy and hemiparesis on the left side. Once the patient was  comfortable in the prone position, MAC anesthetic was further  administrated by the Anesthesia Service. The patient was then prepped  and draped in a sterile fashion. Chloraprep was used as well as _____. A time-out was performed per protocol. Using fluoroscopy, the left S3  foramen and right S3 foramen were identified. A local anesthetic was  administered in this area. A test needle was used and identified the S3  foramen as confirmed by fluoroscopy. On stimulation of the test needle,  he had selam and right toe flex. A Guidant lead was then placed  followed by a dilator using fluoroscopy. The leads were placed through  the dilator and deployed after being tested. When the leads were  tested, there were selam and toe flex in all four leads. The lead  appeared to have adequate positioning on fluoroscopy. It is an MRI safe  lead. I then identified a place in the right upper buttock as confirmed  by fluoroscopy. Local anesthetic was injected at this time. 15-blade  scalpel was used to cut the skin surface. A pocket was developed in the  right upper buttock as per protocol.   A tunneling device was used

## 2021-05-21 ENCOUNTER — HOSPITAL ENCOUNTER (OUTPATIENT)
Age: 66
Discharge: HOME | End: 2021-05-21
Payer: MEDICARE

## 2021-05-21 LAB
ALBUMIN SERPL-MCNC: 4 GM/DL (ref 3.4–5)
ALP BLD-CCNC: 86 IU/L (ref 40–128)
ALT SERPL-CCNC: 10 U/L (ref 10–40)
AMMONIA: 66 UMOL/L (ref 16–60)
ANION GAP SERPL CALCULATED.3IONS-SCNC: 9 MMOL/L (ref 4–16)
AST SERPL-CCNC: 9 IU/L (ref 15–37)
BASOPHILS ABSOLUTE: 0 K/CU MM
BASOPHILS RELATIVE PERCENT: 0.6 % (ref 0–1)
BILIRUB SERPL-MCNC: 0.2 MG/DL (ref 0–1)
BUN BLDV-MCNC: 7 MG/DL (ref 6–23)
CALCIUM SERPL-MCNC: 9.5 MG/DL (ref 8.3–10.6)
CHLORIDE BLD-SCNC: 99 MMOL/L (ref 99–110)
CHOLESTEROL: 168 MG/DL
CO2: 29 MMOL/L (ref 21–32)
CREAT SERPL-MCNC: 0.7 MG/DL (ref 0.9–1.3)
DIFFERENTIAL TYPE: ABNORMAL
EOSINOPHILS ABSOLUTE: 0.1 K/CU MM
EOSINOPHILS RELATIVE PERCENT: 2.6 % (ref 0–3)
ESTIMATED AVERAGE GLUCOSE: 105 MG/DL
GFR AFRICAN AMERICAN: >60 ML/MIN/1.73M2
GFR NON-AFRICAN AMERICAN: >60 ML/MIN/1.73M2
GLUCOSE BLD-MCNC: 85 MG/DL (ref 70–99)
HBA1C MFR BLD: 5.3 % (ref 4.2–6.3)
HCT VFR BLD CALC: 44.3 % (ref 42–52)
HDLC SERPL-MCNC: 49 MG/DL
HEMOGLOBIN: 14.4 GM/DL (ref 13.5–18)
IMMATURE NEUTROPHIL %: 0.6 % (ref 0–0.43)
IRON: 61 UG/DL (ref 59–158)
LDL CHOLESTEROL DIRECT: 91 MG/DL
LYMPHOCYTES ABSOLUTE: 1.2 K/CU MM
LYMPHOCYTES RELATIVE PERCENT: 21.1 % (ref 24–44)
MCH RBC QN AUTO: 30.6 PG (ref 27–31)
MCHC RBC AUTO-ENTMCNC: 32.5 % (ref 32–36)
MCV RBC AUTO: 94.1 FL (ref 78–100)
MONOCYTES ABSOLUTE: 0.8 K/CU MM
MONOCYTES RELATIVE PERCENT: 14.5 % (ref 0–4)
NUCLEATED RBC %: 0 %
PDW BLD-RTO: 13.5 % (ref 11.7–14.9)
PLATELET # BLD: 172 K/CU MM (ref 140–440)
PMV BLD AUTO: 12 FL (ref 7.5–11.1)
POTASSIUM SERPL-SCNC: 3.8 MMOL/L (ref 3.5–5.1)
RBC # BLD: 4.71 M/CU MM (ref 4.6–6.2)
SEGMENTED NEUTROPHILS ABSOLUTE COUNT: 3.3 K/CU MM
SEGMENTED NEUTROPHILS RELATIVE PERCENT: 60.6 % (ref 36–66)
SODIUM BLD-SCNC: 137 MMOL/L (ref 135–145)
TOTAL IMMATURE NEUTOROPHIL: 0.03 K/CU MM
TOTAL NUCLEATED RBC: 0 K/CU MM
TOTAL PROTEIN: 7.2 GM/DL (ref 6.4–8.2)
TRIGL SERPL-MCNC: 238 MG/DL
TSH HIGH SENSITIVITY: 1.86 UIU/ML (ref 0.27–4.2)
WBC # BLD: 5.5 K/CU MM (ref 4–10.5)

## 2021-05-21 PROCEDURE — 83721 ASSAY OF BLOOD LIPOPROTEIN: CPT

## 2021-05-21 PROCEDURE — 80061 LIPID PANEL: CPT

## 2021-05-21 PROCEDURE — 83036 HEMOGLOBIN GLYCOSYLATED A1C: CPT

## 2021-05-21 PROCEDURE — 83540 ASSAY OF IRON: CPT

## 2021-05-21 PROCEDURE — 36415 COLL VENOUS BLD VENIPUNCTURE: CPT

## 2021-05-21 PROCEDURE — 84443 ASSAY THYROID STIM HORMONE: CPT

## 2021-05-21 PROCEDURE — 82140 ASSAY OF AMMONIA: CPT

## 2021-05-21 PROCEDURE — 80053 COMPREHEN METABOLIC PANEL: CPT

## 2021-05-21 PROCEDURE — 85025 COMPLETE CBC W/AUTO DIFF WBC: CPT

## 2021-06-29 ENCOUNTER — HOSPITAL ENCOUNTER (EMERGENCY)
Age: 66
Discharge: HOME | End: 2021-06-29
Payer: MEDICARE

## 2021-06-29 ENCOUNTER — APPOINTMENT (OUTPATIENT)
Dept: GENERAL RADIOLOGY | Age: 66
End: 2021-06-29
Payer: MEDICARE

## 2021-06-29 VITALS
RESPIRATION RATE: 18 BRPM | OXYGEN SATURATION: 100 % | SYSTOLIC BLOOD PRESSURE: 130 MMHG | BODY MASS INDEX: 30.53 KG/M2 | HEART RATE: 62 BPM | WEIGHT: 190 LBS | DIASTOLIC BLOOD PRESSURE: 74 MMHG | TEMPERATURE: 98.1 F | HEIGHT: 66 IN

## 2021-06-29 DIAGNOSIS — S70.02XA CONTUSION OF LEFT HIP, INITIAL ENCOUNTER: Primary | ICD-10-CM

## 2021-06-29 PROCEDURE — 6370000000 HC RX 637 (ALT 250 FOR IP): Performed by: PHYSICIAN ASSISTANT

## 2021-06-29 PROCEDURE — 99285 EMERGENCY DEPT VISIT HI MDM: CPT

## 2021-06-29 PROCEDURE — 73501 X-RAY EXAM HIP UNI 1 VIEW: CPT

## 2021-06-29 RX ORDER — IBUPROFEN 400 MG/1
400 TABLET ORAL ONCE
Status: COMPLETED | OUTPATIENT
Start: 2021-06-29 | End: 2021-06-29

## 2021-06-29 RX ADMIN — IBUPROFEN 400 MG: 400 TABLET ORAL at 11:54

## 2021-06-29 ASSESSMENT — PAIN SCALES - GENERAL
PAINLEVEL_OUTOF10: 4
PAINLEVEL_OUTOF10: 0
PAINLEVEL_OUTOF10: 4

## 2021-06-29 ASSESSMENT — PAIN DESCRIPTION - DESCRIPTORS: DESCRIPTORS: ACHING

## 2021-06-29 ASSESSMENT — PAIN DESCRIPTION - ORIENTATION: ORIENTATION: LEFT

## 2021-06-29 ASSESSMENT — PAIN DESCRIPTION - PAIN TYPE: TYPE: ACUTE PAIN

## 2021-06-29 ASSESSMENT — PAIN DESCRIPTION - LOCATION: LOCATION: HIP

## 2021-06-29 NOTE — ED PROVIDER NOTES
Patient Identification  Nina Ferrara is a 60 y.o. male    Chief Complaint  Hip Pain (left)      HPI  (History provided by patient)  This is a 60 y.o. male with h/o Cerebral palsy who was brought in by self for chief complaint of hip pain. States he fell 2 days ago and has pain over his left lateral hip. States he has been not walking much since his surgery a few weeks ago, has been able to ambulate somewhat but painful. Has been taking 600 mg ibuprofen at home with some relief, states he thinks this is too strong a dose and would like it decreased. Denies other injuries including head injury from fall. Was wearing helmet when he fell. REVIEW OF SYSTEMS    Constitutional:  Denies fever, chills  Musculoskeletal:  Denies back pain.  + hip pain  Skin:  Denies rash  Neurologic:  Denies focal weakness, or sensory changes     See HPI and nursing notes for additional information     I have reviewed the following nursing documentation:  Allergies: No Known Allergies    Past medical history:  has a past medical history of Anemia, Aspiration pneumonia ([address] Cerebral palsy ([address] Depression, Epilepsy ([address] (1962), Frequent falls, Glaucoma, History of IBS, MRSA infection, Hyperammonemia ([address] Hypertension, IBS (irritable bowel syndrome), Kidney stone, Mood disorder ([address] Occasional tremors, Pressure injury of contiguous region involving back and right buttock, stage 2 ([address] (5/27/2020), Pressure injury of left buttock, stage 1 (7/22/2020), Prostatitis, Thyroid disease, and Ulcerative colitis ([address] Past surgical history:  has a past surgical history that includes Gallbladder surgery (2005); vagal nerve stimulation (2002); Cholecystectomy; Cystoscopy (Left, 07/11/2013); Kidney stone surgery; Colonoscopy (8/19/14, 5/2012); other surgical history (04/15/2015); Upper gastrointestinal endoscopy (N/A, 11/13/2018); Colonoscopy (02/04/2021); Colonoscopy (N/A, 2/4/2021);  Stimulator Surgery (N/A, 3/24/2021); and Stimulator Surgery (N/A, 3/24/2021). Home medications:   Prior to Admission medications    Medication Sig Start Date End Date Taking? Authorizing Provider   Clotrimazole POWD Apply to groin twice a day 12/7/20   CHRIS Dawkins   acetaminophen (APAP EXTRA STRENGTH) 500 MG tablet Take 1 tablet by mouth every 6 hours as needed for Pain 9/7/20   CHRIS Dawkins   levothyroxine (SYNTHROID) 125 MCG tablet Take 1 tablet by mouth Daily 4/14/20   Micheline Simpson MD   dicyclomine (BENTYL) 10 MG capsule Take 1 capsule by mouth every 6 hours as needed (cramps) 4/13/20 4/18/20  Micheline Simpson MD   amLODIPine (NORVASC) 5 MG tablet Take 5 mg by mouth daily    Historical Provider, MD   clonazePAM (KLONOPIN) 0.5 MG tablet Take 0.25 mg by mouth daily. Historical Provider, MD   divalproex (DEPAKOTE) 500 MG DR tablet Take 1,000 mg by mouth nightly    Historical Provider, MD   hydrochlorothiazide (MICROZIDE) 12.5 MG capsule Take 12.5 mg by mouth daily    Historical Provider, MD   omeprazole (PRILOSEC) 20 MG delayed release capsule Take 20 mg by mouth 2 times daily (before meals)    Historical Provider, MD   primidone (MYSOLINE) 50 MG tablet Take 50 mg by mouth 3 times daily    Historical Provider, MD   zonisamide (ZONEGRAN) 100 MG capsule Take 100 mg by mouth every morning    Historical Provider, MD   Simethicone (MI-ACID GAS RELIEF PO) Take 1 Container by mouth every 4 hours as needed 10 cc every 4 hrs not to exceed 60 cc in 24 hrs    Historical Provider, MD   guaiFENesin (MUCINEX) 600 MG extended release tablet Take 1,200 mg by mouth daily as needed for Congestion    Historical Provider, MD   Menthol-Methyl Salicylate (MUSCLE RUB) 10-15 % CREA cream Apply 1 applicator topically as needed    Historical Provider, MD   pseudoephedrine (SUDAFED) 30 MG tablet Take 30 mg by mouth every 4 hours as needed for Congestion    Historical Provider, MD   lacosamide (VIMPAT) 200 MG tablet Take 200 mg by mouth as needed.  Give 1 tab after seizure activity as needed    Historical Provider, MD   lacosamide (VIMPAT) 200 MG tablet Take 1 tablet by mouth 2 times daily for 30 days. 3/25/20 5/27/20  Louise Hill DO   latanoprost (XALATAN) 0.005 % ophthalmic solution Place 1 drop into both eyes nightly    Historical Provider, MD   timolol (TIMOPTIC) 0.5 % ophthalmic solution Place 1 drop into both eyes daily     Historical Provider, MD   Lactobacillus (ACIDOPHILUS) CAPS capsule Take 1 capsule by mouth daily    Historical Provider, MD   aspirin 81 MG tablet Take 81 mg by mouth daily    Historical Provider, MD   Wheat Dextrin (BENEFIBER) POWD Take 4 g by mouth 2 times daily Takes 3 tsp in liquid twice per day     Historical Provider, MD   Lactulose Encephalopathy (ENULOSE PO) Take 30 mLs by mouth 3 times daily    Historical Provider, MD   folic acid (FOLVITE) 1 MG tablet Take 1 mg by mouth daily    Historical Provider, MD   PARoxetine (PAXIL) 20 MG tablet Take 20 mg by mouth every morning    Historical Provider, MD   QUEtiapine (SEROQUEL XR) 50 MG extended release tablet Take 50 mg by mouth nightly    Historical Provider, MD   tamsulosin (FLOMAX) 0.4 MG capsule Take 1 capsule by mouth daily 3/18/17   CHRIS Jamison   simvastatin (ZOCOR) 20 MG tablet Take 20 mg by mouth nightly. Historical Provider, MD   Cholecalciferol (VITAMIN D3) 1000 UNITS CAPS Take 1 tablet by mouth daily. Historical Provider, MD   clorazepate (TRANXENE) 7.5 MG tablet Take 7.5 mg by mouth 2 times daily. Historical Provider, MD       Social history:  reports that he has never smoked. He has never used smokeless tobacco. He reports that he does not drink alcohol and does not use drugs.     Family history:    Family History   Problem Relation Age of Onset    Heart Disease Mother         atrial fib    High Blood Pressure Mother     Asthma Mother     High Cholesterol Mother     Depression Mother     Migraines Mother     High Blood Pressure Father     Heart Disease Father     Asthma Sister     High Cholesterol Sister     Depression Sister     Migraines Sister          Exam  /77   Pulse 60   Temp 98.1 °F (36.7 °C) (Oral)   Resp 16   Ht 5' 6\" (1.676 m)   Wt 190 lb (86.2 kg)   SpO2 97%   BMI 30.67 kg/m²   Nursing note and vitals reviewed. Constitutional: Well developed, well nourished. No acute distress. HENT:      Head: Normocephalic and atraumatic. Ears: External ears normal.      Nose: Nose normal.  Cardiovascular: DP and PT pulses 2+ bilaterally. Pulmonary/Chest: Effort normal. No respiratory distress. Musculoskeletal: Moves all extremities. No gross deformity. Mild tenderness palpation of the left lateral hip. Pelvis stable and nontender. No tenderness palpation of the bilateral upper and lower extremities otherwise. Neurological: Alert and oriented to person, place, and time. Motor and sensation grossly intact, some mild generalized weakness noted that is baseline per patient. Skin: Warm and dry. No rash. Psychiatric: Normal mood and affect. Behavior is normal.    Procedures        Radiographs (if obtained):  [] The following radiograph was interpreted by myself in the absence of a radiologist:   [x] Radiologist's Report Reviewed:  XR HIP 1 VW W PELVIS LEFT   Final Result   Mild bilateral hip osteoarthritis. No acute fracture or hip dislocation. Labs  No results found for this visit on 06/29/21. MDM  Patient presents today with chief complaint of hip pain. Physical exam revealed TTP over lateral hip. Workup included x-ray of left hip which revealed no acute injury. Discussed results with patient. Given 400 mg ibuprofen here. Plan is to discharge. I estimate there is LOW risk for COMPARTMENT SYNDROME, DEEP VENOUS THROMBOSIS, NECROTIZING FASCIITIS, CELLULITIS, MAJOR FRACTURE, OSTEOMYELITIS, SEPTIC ARTHRITIS, TENDON OR NEUROVASCULAR INJURY, thus I consider the discharge disposition reasonable.  Gallo Carrasco and I have discussed the diagnosis and risks, and we agree with discharging home to follow-up with their primary doctor or the referral orthopedist. We also discussed returning to the Emergency Department immediately if new or worsening symptoms occur. We have discussed the symptoms which are most concerning (e.g., changing or worsening pain, numbness, weakness, fever, new rash, discoloration, new or worsening swelling) that necessitate immediate return. I have independently evaluated this patient. Final Impression  1. Contusion of left hip, initial encounter        Blood pressure 134/77, pulse 60, temperature 98.1 °F (36.7 °C), temperature source Oral, resp. rate 16, height 5' 6\" (1.676 m), weight 190 lb (86.2 kg), SpO2 97 %. Disposition:  Discharge to home in stable condition. Patient was given scripts for the following medications. I counseled patient how to take these medications. New Prescriptions    No medications on file       [unfilled]    This chart was generated using the [address] [place] [address] [address] dictation system. I created this record but it may contain dictation errors given the limitations of this technology.        Karolina Montoya PA-C  06/29/21 1415

## 2021-06-29 NOTE — ED TRIAGE NOTES
Patient fell on Saturday. Patient usually has numbness on left and has had increased pain in left hip. Patient states group home did not give him anything for pain during this period,.

## 2021-06-29 NOTE — CARE COORDINATION
CM received consult from primary nurse Geoffrey Ortiz RN for patient in room #18 to assist with discharge transportation. CM placed telephone call to patient's legal guardian Afshan Salas [phone]. No answer. Voicemail message left requesting return call. [phone] CM received return call from [employer] Baires regarding patient status. CM was advised that patient is typically transported home via transport company. [employer] requested Bruce Cannon (house supervisor) be contacted [phone] with discharge ETA. Via Mohan Mcarthur CM to arrange discharge transportation.

## 2021-06-29 NOTE — CARE COORDINATION
Patient requested transport home after receiving permission from guardian. CM called Leisa Lockwood [phone]. Leisa Lockwood can  patient from hospital at 17:45. CM to call his Mari Gutierrez [phone]. \" CM left message for Home . 16:39 CM received information that patient had called his legal guardian and that they would be picking him up and to cancel transport. CM called Legal guardian and confirmed that they were on their way to  patient. CM called [facility] [employer] @ [phone] and cancelled transport.

## 2021-07-20 ENCOUNTER — HOSPITAL ENCOUNTER (OUTPATIENT)
Age: 66
Discharge: HOME | End: 2021-07-20
Payer: MEDICARE

## 2021-07-20 LAB — AMMONIA: 21 UMOL/L (ref 16–60)

## 2021-07-20 PROCEDURE — 84154 ASSAY OF PSA FREE: CPT

## 2021-07-20 PROCEDURE — 36415 COLL VENOUS BLD VENIPUNCTURE: CPT

## 2021-07-20 PROCEDURE — 82140 ASSAY OF AMMONIA: CPT

## 2021-07-20 PROCEDURE — 84153 ASSAY OF PSA TOTAL: CPT

## 2021-07-22 LAB
PROSTATE SPECIFIC ANTIGEN FREE: 0.2 NG/ML
PROSTATE SPECIFIC ANTIGEN PERCENT FREE: 20 %
PROSTATE SPECIFIC ANTIGEN: 1 NG/ML (ref 0–4)

## 2021-08-23 ENCOUNTER — HOSPITAL ENCOUNTER (OUTPATIENT)
Age: 66
Setting detail: SPECIMEN
Discharge: HOME | End: 2021-08-23
Payer: MEDICARE

## 2021-08-23 LAB
ALBUMIN SERPL-MCNC: 4.1 GM/DL (ref 3.4–5)
ALP BLD-CCNC: 99 IU/L (ref 40–129)
ALT SERPL-CCNC: 18 U/L (ref 10–40)
AST SERPL-CCNC: 14 IU/L (ref 15–37)
BILIRUB SERPL-MCNC: 0.2 MG/DL (ref 0–1)
BILIRUBIN DIRECT: 0.2 MG/DL (ref 0–0.3)
BILIRUBIN, INDIRECT: 0 MG/DL (ref 0–0.7)
TOTAL PROTEIN: 7.3 GM/DL (ref 6.4–8.2)

## 2021-08-23 PROCEDURE — 80076 HEPATIC FUNCTION PANEL: CPT

## 2021-08-23 PROCEDURE — 80164 ASSAY DIPROPYLACETIC ACD TOT: CPT

## 2021-08-30 ENCOUNTER — HOSPITAL ENCOUNTER (OUTPATIENT)
Age: 66
Setting detail: SPECIMEN
Discharge: HOME | End: 2021-08-30
Payer: MEDICARE

## 2021-08-30 PROCEDURE — 80165 DIPROPYLACETIC ACID FREE: CPT

## 2021-08-30 PROCEDURE — 80164 ASSAY DIPROPYLACETIC ACD TOT: CPT

## 2021-09-02 LAB
VALPROIC ACID % FREE: ABNORMAL % (ref 5–18)
VALPROIC ACID TOTAL: 47 UG/ML (ref 50–125)
VALPROIC ACID, FREE: <7 UG/ML (ref 7–23)

## 2021-09-07 ENCOUNTER — APPOINTMENT (OUTPATIENT)
Dept: CT IMAGING | Age: 66
End: 2021-09-07
Payer: MEDICARE

## 2021-09-07 ENCOUNTER — HOSPITAL ENCOUNTER (EMERGENCY)
Age: 66
Discharge: HOME | End: 2021-09-07
Attending: EMERGENCY MEDICINE
Payer: MEDICARE

## 2021-09-07 ENCOUNTER — APPOINTMENT (OUTPATIENT)
Dept: GENERAL RADIOLOGY | Age: 66
End: 2021-09-07
Payer: MEDICARE

## 2021-09-07 VITALS
SYSTOLIC BLOOD PRESSURE: 133 MMHG | OXYGEN SATURATION: 99 % | RESPIRATION RATE: 17 BRPM | HEART RATE: 59 BPM | DIASTOLIC BLOOD PRESSURE: 71 MMHG | TEMPERATURE: 98 F

## 2021-09-07 DIAGNOSIS — Z86.69 HISTORY OF CEREBRAL PALSY: ICD-10-CM

## 2021-09-07 DIAGNOSIS — S09.90XA INJURY OF HEAD, INITIAL ENCOUNTER: ICD-10-CM

## 2021-09-07 DIAGNOSIS — M25.552 LEFT HIP PAIN: ICD-10-CM
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
## 2021-09-07 PROCEDURE — 72125 CT NECK SPINE W/O DYE: CPT

## 2021-09-07 PROCEDURE — 70450 CT HEAD/BRAIN W/O DYE: CPT

## 2021-09-07 PROCEDURE — 71045 X-RAY EXAM CHEST 1 VIEW: CPT

## 2021-09-07 PROCEDURE — 73502 X-RAY EXAM HIP UNI 2-3 VIEWS: CPT

## 2021-09-07 PROCEDURE — 99285 EMERGENCY DEPT VISIT HI MDM: CPT

## 2021-09-07 ASSESSMENT — ENCOUNTER SYMPTOMS
EYES NEGATIVE: 1
GASTROINTESTINAL NEGATIVE: 1
ALLERGIC/IMMUNOLOGIC NEGATIVE: 1
RESPIRATORY NEGATIVE: 1

## 2021-09-07 NOTE — ED PROVIDER NOTES
[facility]      TRIAGE CHIEF COMPLAINT:   Fall (hit head with helmet on, on 81mg aspirin)      DONELL:  Jeri Alexandre is a 73 y.o. male that presents complaint of fall, head injury. Patient has a history of cerebral palsy he lives in a group home he states he tripped over a rug he walks with a walker and hit his head against the wall. He wears a helmet all the time there is no trauma to his helmet he has no complaint suggested him here per protocol to get evaluated. He mentions some slight left hip pain but he is ANO x3 did not pass out no neck or back pain no other extremity pain. No chest pain shortness of breath no other preceding symptoms. No other questions or concerns. Kong Shhaid REVIEW OF SYSTEMS:  At least 10 systems reviewed and otherwise acutely negative except as in the [address] Review of Systems   Constitutional: Negative. HENT: Negative. Eyes: Negative. Respiratory: Negative. Cardiovascular: Negative. Gastrointestinal: Negative. Endocrine: Negative. Genitourinary: Negative. Musculoskeletal: Positive for arthralgias and myalgias. Skin: Negative. Allergic/Immunologic: Negative. Neurological: Negative. Hematological: Negative. Psychiatric/Behavioral: Negative. All other systems reviewed and are negative.       Past Medical History:   Diagnosis Date    Anemia     Aspiration pneumonia ([facility] [address]     dx 6/9/2014 with this per ecf/ per old chart dx with pneumonia with admission 9/18/2018    Cerebral palsy ([facility] [address]     \"has no feeling on left side of body\"alert but has some dementia but not diagnosed, has good long term but poor short term and wakes up disoriented after a nap\"(per yaneth)(2014)    Depression     Epilepsy ([facility] [address] 1961    last seizure 2/2018- hx grand mal    Frequent falls     with phone assess- family stated pt fell this am (7/10/2013\"in the process of trying to find a facility to help build him back up- (pt now at [facility])   Luis Medrano Glaucoma     \"has been in treatment for this in the past- no treatment needed at present\"    History of IBS     Hx MRSA infection     + nasal culture 4/2014    Hyperammonemia (HCC)     Hypertension     on Lisinopril    IBS (irritable bowel syndrome)     ulcerative colitis    Kidney stone     for surgery for stent placement 7/11/2013    Mood disorder ([facility] [address]     per staff at [facility] Occasional tremors     \"makes it difficult for him to write\"    Pressure injury of contiguous region involving back and right buttock, stage 2 ([facility] [address] 5/27/2020    Pressure injury of left buttock, stage 1 7/22/2020    Prostatitis     hx given per H&P old chart    Thyroid disease     Ulcerative colitis ([facility] [address]     hx given per staff at [facility] 4/13/2015     Past Surgical History:   Procedure Laterality Date    CHOLECYSTECTOMY      COLONOSCOPY  8/19/14, 5/2012 8/19/14: hemorrhoids, 5/2012 at [facility]  02/04/2021    COLONOSCOPY N/A 2/4/2021    COLONOSCOPY POLYPECTOMY SNARE/COLD BIOPSY performed by Hien Smith MD at [place] [address] Left 07/11/2013    with stnet placement   [address] [address]      OTHER SURGICAL HISTORY  04/15/2015    Revision of vagal nerve stimulator    STIMULATOR SURGERY N/A 3/24/2021    STIMULATOR INSERTION performed by Bianca Bro MD at [place] [address] N/A 3/24/2021    STIMULATOR INSERTION STAGE 2 performed by Bianca Bro MD at [address] [place] [address] 11/13/2018    EGD DILATION BALLOON AND BIOPSY performed by Esthela Torrez MD at [place] [address]    Has to have bettery changed every 5 yrs.       Family History   Problem Relation Age of Onset    Heart Disease Mother         atrial fib    High Blood Pressure Mother     Asthma Mother     High Cholesterol Mother     Depression Mother     Migraines Mother     High Blood Pressure Father     Heart Disease dicyclomine (BENTYL) 10 MG capsule Take 1 capsule by mouth every 6 hours as needed (cramps) 20 capsule 0    amLODIPine (NORVASC) 5 MG tablet Take 5 mg by mouth daily      clonazePAM (KLONOPIN) 0.5 MG tablet Take 0.25 mg by mouth daily.  divalproex (DEPAKOTE) 500 MG DR tablet Take 1,000 mg by mouth nightly      hydrochlorothiazide (MICROZIDE) 12.5 MG capsule Take 12.5 mg by mouth daily      omeprazole (PRILOSEC) 20 MG delayed release capsule Take 20 mg by mouth 2 times daily (before meals)      primidone (MYSOLINE) 50 MG tablet Take 50 mg by mouth 3 times daily      zonisamide (ZONEGRAN) 100 MG capsule Take 100 mg by mouth every morning      Simethicone (MI-ACID GAS RELIEF PO) Take 1 Container by mouth every 4 hours as needed 10 cc every 4 hrs not to exceed 60 cc in 24 hrs      guaiFENesin (MUCINEX) 600 MG extended release tablet Take 1,200 mg by mouth daily as needed for Congestion      Menthol-Methyl Salicylate (MUSCLE RUB) 10-15 % CREA cream Apply 1 applicator topically as needed      pseudoephedrine (SUDAFED) 30 MG tablet Take 30 mg by mouth every 4 hours as needed for Congestion      lacosamide (VIMPAT) 200 MG tablet Take 200 mg by mouth as needed. Give 1 tab after seizure activity as needed      lacosamide (VIMPAT) 200 MG tablet Take 1 tablet by mouth 2 times daily for 30 days.  60 tablet 0    latanoprost (XALATAN) 0.005 % ophthalmic solution Place 1 drop into both eyes nightly      timolol (TIMOPTIC) 0.5 % ophthalmic solution Place 1 drop into both eyes daily       Lactobacillus (ACIDOPHILUS) CAPS capsule Take 1 capsule by mouth daily      aspirin 81 MG tablet Take 81 mg by mouth daily      Wheat Dextrin (BENEFIBER) POWD Take 4 g by mouth 2 times daily Takes 3 tsp in liquid twice per day       Lactulose Encephalopathy (ENULOSE PO) Take 30 mLs by mouth 3 times daily      folic acid (FOLVITE) 1 MG tablet Take 1 mg by mouth daily      PARoxetine (PAXIL) 20 MG tablet Take 20 mg by mouth every morning      QUEtiapine (SEROQUEL XR) 50 MG extended release tablet Take 50 mg by mouth nightly      tamsulosin (FLOMAX) 0.4 MG capsule Take 1 capsule by mouth daily 30 capsule 0    simvastatin (ZOCOR) 20 MG tablet Take 20 mg by mouth nightly.  Cholecalciferol (VITAMIN D3) 1000 UNITS CAPS Take 1 tablet by mouth daily.  clorazepate (TRANXENE) 7.5 MG tablet Take 7.5 mg by mouth 2 times daily. No Known Allergies  No current facility-administered medications for this encounter. Current Outpatient Medications   Medication Sig Dispense Refill    Clotrimazole POWD Apply to groin twice a day 1 Bottle 0    acetaminophen (APAP EXTRA STRENGTH) 500 MG tablet Take 1 tablet by mouth every 6 hours as needed for Pain 30 tablet 0    levothyroxine (SYNTHROID) 125 MCG tablet Take 1 tablet by mouth Daily 30 tablet 3    dicyclomine (BENTYL) 10 MG capsule Take 1 capsule by mouth every 6 hours as needed (cramps) 20 capsule 0    amLODIPine (NORVASC) 5 MG tablet Take 5 mg by mouth daily      clonazePAM (KLONOPIN) 0.5 MG tablet Take 0.25 mg by mouth daily.       divalproex (DEPAKOTE) 500 MG DR tablet Take 1,000 mg by mouth nightly      hydrochlorothiazide (MICROZIDE) 12.5 MG capsule Take 12.5 mg by mouth daily      omeprazole (PRILOSEC) 20 MG delayed release capsule Take 20 mg by mouth 2 times daily (before meals)      primidone (MYSOLINE) 50 MG tablet Take 50 mg by mouth 3 times daily      zonisamide (ZONEGRAN) 100 MG capsule Take 100 mg by mouth every morning      Simethicone (MI-ACID GAS RELIEF PO) Take 1 Container by mouth every 4 hours as needed 10 cc every 4 hrs not to exceed 60 cc in 24 hrs      guaiFENesin (MUCINEX) 600 MG extended release tablet Take 1,200 mg by mouth daily as needed for Congestion      Menthol-Methyl Salicylate (MUSCLE RUB) 10-15 % CREA cream Apply 1 applicator topically as needed      pseudoephedrine (SUDAFED) 30 MG tablet Take 30 mg by mouth every 4 hours as needed for Congestion      lacosamide (VIMPAT) 200 MG tablet Take 200 mg by mouth as needed. Give 1 tab after seizure activity as needed      lacosamide (VIMPAT) 200 MG tablet Take 1 tablet by mouth 2 times daily for 30 days. 60 tablet 0    latanoprost (XALATAN) 0.005 % ophthalmic solution Place 1 drop into both eyes nightly      timolol (TIMOPTIC) 0.5 % ophthalmic solution Place 1 drop into both eyes daily       Lactobacillus (ACIDOPHILUS) CAPS capsule Take 1 capsule by mouth daily      aspirin 81 MG tablet Take 81 mg by mouth daily      Wheat Dextrin (BENEFIBER) POWD Take 4 g by mouth 2 times daily Takes 3 tsp in liquid twice per day       Lactulose Encephalopathy (ENULOSE PO) Take 30 mLs by mouth 3 times daily      folic acid (FOLVITE) 1 MG tablet Take 1 mg by mouth daily      PARoxetine (PAXIL) 20 MG tablet Take 20 mg by mouth every morning      QUEtiapine (SEROQUEL XR) 50 MG extended release tablet Take 50 mg by mouth nightly      tamsulosin (FLOMAX) 0.4 MG capsule Take 1 capsule by mouth daily 30 capsule 0    simvastatin (ZOCOR) 20 MG tablet Take 20 mg by mouth nightly.  Cholecalciferol (VITAMIN D3) 1000 UNITS CAPS Take 1 tablet by mouth daily.  clorazepate (TRANXENE) 7.5 MG tablet Take 7.5 mg by mouth 2 times daily. Nursing Notes Reviewed    VITAL SIGNS:  ED Triage Vitals   Enc Vitals Group      BP       Pulse       Resp       Temp       Temp src       SpO2       Weight       Height       Head Circumference       Peak Flow       Pain Score       Pain Loc       Pain Edu? Excl. in [address]? PHYSICAL EXAM:  Physical Exam  Vitals and nursing note reviewed. Constitutional:       General: He is not in acute distress. Appearance: Normal appearance. He is well-developed and well-groomed. He is not ill-appearing, toxic-appearing or diaphoretic. HENT:      Head: Normocephalic and atraumatic.       Right Ear: External ear normal.      Left Ear: External ear normal.      Nose: No congestion or rhinorrhea. Eyes:      General: No scleral icterus. Right eye: No discharge. Left eye: No discharge. Extraocular Movements: Extraocular movements intact. Conjunctiva/sclera: Conjunctivae normal.   Neck:      Vascular: No JVD. Trachea: Phonation normal.   Cardiovascular:      Rate and Rhythm: Normal rate and regular rhythm. Pulses: Normal pulses. Heart sounds: Normal heart sounds. No murmur heard. No friction rub. No gallop. Pulmonary:      Effort: Pulmonary effort is normal. No respiratory distress. Breath sounds: Normal breath sounds. No stridor. No wheezing, rhonchi or rales. Abdominal:      General: Bowel sounds are normal. There is no distension. Palpations: Abdomen is soft. There is no mass. Tenderness: There is no abdominal tenderness. There is no guarding or rebound. Hernia: No hernia is present. Musculoskeletal:         General: Tenderness present. No swelling, deformity or signs of injury. Normal range of motion. Right shoulder: Normal.      Left shoulder: Normal.      Right upper arm: Normal.      Left upper arm: Normal.      Right elbow: Normal.      Left elbow: Normal.      Right forearm: Normal.      Left forearm: Normal.      Cervical back: Normal, full passive range of motion without pain and normal range of motion. No edema, erythema, signs of trauma, rigidity, torticollis or crepitus. No pain with movement. Normal range of motion. Thoracic back: Normal.      Lumbar back: Normal.      Right hip: Normal.      Left hip: Tenderness present. Right upper leg: Normal.      Left upper leg: Normal.      Right knee: Normal.      Left knee: Normal.      Right lower leg: Normal. No edema. Left lower leg: Normal. No edema. Right ankle: Normal.      Left ankle: Normal.   Skin:     General: Skin is warm. Coloration: Skin is not jaundiced or pale. Findings: No bruising, erythema, lesion or rash. Neurological:      General: No focal deficit present. Mental Status: He is alert and oriented to person, place, and time. GCS: GCS eye subscore is 4. GCS verbal subscore is 5. GCS motor subscore is 6. Cranial Nerves: Cranial nerves are intact. No cranial nerve deficit, dysarthria or facial asymmetry. Sensory: Sensation is intact. No sensory deficit. Motor: Motor function is intact. No weakness, tremor, atrophy, abnormal muscle tone or seizure activity. Coordination: Coordination is intact. Coordination normal.   Psychiatric:         Mood and Affect: Mood normal.         Behavior: Behavior normal. Behavior is cooperative. Thought Content: Thought content normal.         Judgment: Judgment normal.           I have reviewed andinterpreted all of the currently available lab results from this visit (if applicable):    No results found for this visit on 09/07/21. Radiographs (if obtained):  [] The following radiograph was interpreted by myself in the absence of a radiologist:  [x] Radiologist's Report Reviewed:    CT Brain, C spine, CXR, Pelvis, L hip    EKG (if obtained): (All EKG's are interpreted by myself in the absence of a cardiologist)    MDM:    Here with fall, head injury. Again patient has a history of cerebral palsy he lives in a group home he wears a helmet all the time. He states he was walking this walker today when he tripped over a rug and hit his head against the wall. There is no damage to his head or his helmet. He did not pass out no fevers nausea vomiting chest pain shortness of breath no weakness numbness or tingling no other questions or concerns came in by EMS to trauma room 3. He otherwise appears well he did mention some left hip pain mildly but otherwise he has no complaints whatsoever no other preceding symptoms I do not see any signs of trauma. Will get imaging of head neck chest pelvis and left hip.  Otherwise patient appears well holding off on labs this time. Patient recheck doing well imaging negative of head neck chest and pelvis. I think he likely has atelectasis he has no aspiration no cough or shortness of breath. Imaging otherwise negative nothing acute discharge stable. Given return precautions and follow-up information.     CLINICAL IMPRESSION:  Final diagnoses:   Fall, initial encounter   Injury of head, initial encounter   Left hip pain   History of cerebral palsy       (Please note that portions of this note may have been completed with a voice recognition program. Efforts were made to edit the dictations but occasionally words aremis-transcribed.)    DISPOSITION REFERRAL (if applicable):  Jillian Guerrero, APRN - CNP  [address] [facility]  [phone]    Schedule an appointment as soon as possible for a visit       [facility] Department  [place] [id number] [phone]    If symptoms worsen      DISPOSITION MEDICATIONS (if applicable):  New Prescriptions    No medications on file          Gabino Brunner, [facility],   09/07/21 [id number]

## 2021-09-07 NOTE — ED NOTES
Bed: 03TR-03  Expected date:   Expected time:   Means of arrival:   Comments:  Dinora Cartagena Dials  09/07/21 [id number]

## 2021-09-07 NOTE — ED TRIAGE NOTES
Pt to ED per EMS after fall from wheelchair-hit head on wall. Pt did have a helmet on. Pt takes 81mg of aspirin daily. No LOC. No other injuries noted. Pt talking in full and complete sentences.

## 2021-09-08 NOTE — ED NOTES
Patient and guardian given discharge instructions patient assisted wheelchair. Paperwork signed.       Sherrie Mixon RN  09/07/21 0063

## 2021-09-13 ENCOUNTER — APPOINTMENT (OUTPATIENT)
Dept: GENERAL RADIOLOGY | Age: 66
End: 2021-09-13
Payer: MEDICARE

## 2021-09-13 ENCOUNTER — APPOINTMENT (OUTPATIENT)
Dept: CT IMAGING | Age: 66
End: 2021-09-13
Payer: MEDICARE

## 2021-09-13 ENCOUNTER — HOSPITAL ENCOUNTER (EMERGENCY)
Age: 66
Discharge: HOME | End: 2021-09-14
Attending: EMERGENCY MEDICINE
Payer: MEDICARE

## 2021-09-13 VITALS
HEART RATE: 69 BPM | OXYGEN SATURATION: 100 % | DIASTOLIC BLOOD PRESSURE: 61 MMHG | TEMPERATURE: 98.9 F | SYSTOLIC BLOOD PRESSURE: 137 MMHG | RESPIRATION RATE: 15 BRPM
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
## 2021-09-13 DIAGNOSIS — S09.90XA INJURY OF HEAD, INITIAL ENCOUNTER: ICD-10-CM

## 2021-09-13 PROCEDURE — 70450 CT HEAD/BRAIN W/O DYE: CPT

## 2021-09-13 PROCEDURE — 72170 X-RAY EXAM OF PELVIS: CPT

## 2021-09-13 PROCEDURE — 72125 CT NECK SPINE W/O DYE: CPT

## 2021-09-13 PROCEDURE — 71045 X-RAY EXAM CHEST 1 VIEW: CPT

## 2021-09-13 PROCEDURE — 99283 EMERGENCY DEPT VISIT LOW MDM: CPT

## 2021-09-13 ASSESSMENT — ENCOUNTER SYMPTOMS
EYE DISCHARGE: 0
COUGH: 0
VOMITING: 0
EYE PAIN: 0
BACK PAIN: 0
NAUSEA: 0
SORE THROAT: 0
RHINORRHEA: 0
ABDOMINAL PAIN: 0
SHORTNESS OF BREATH: 0

## 2021-09-13 NOTE — ED PROVIDER NOTES
[address] ENCOUNTER      Pt Name: Beatriz San  MRN: [id number]  [place] 1955  Date of evaluation: 9/13/2021  Provider: Maik Waggoner MD    CHIEF COMPLAINT       Chief Complaint   Patient presents with    Fall     hit head without helmet on         HISTORY OF PRESENT ILLNESS      Beatriz San is a 73 y.o. male who presents to the emergency department  for   Chief Complaint   Patient presents with    Fall     hit head without helmet on       60-year-old male with a history of cerebral palsy, impaired ambulation presents for evaluation of head injury after fall. He typically wears a helmet but was not wearing it at the time of the fall. He reports that he tripped over a rug while using his walker and hit the front of his head. No loss of consciousness. He is amnestic about events. He states he did ambulate briefly to the cot for EMS. From view of records, he is on aspirin. Do not see any other antithrombotic or antiplatelet medications. He is brought to the emergency department for evaluation. No significant external signs of trauma. He is moving extremity spontaneously. Denies any remarkable pain complaints. Abdominal pain or chest pain. Denies any headache or neck pain. He is answering questions appropriately, following commands and moving all extremities. He does have history of chronic wounds. States that he has recently had his sacral wounds redressed. Nursing Notes, Triage Notes & Vital Signs were reviewed. REVIEW OF SYSTEMS    (2-9 systems for level 4, 10 or more for level 5)     Review of Systems   Constitutional: Negative for chills and fever. HENT: Negative for congestion, rhinorrhea and sore throat. Eyes: Negative for pain and discharge. Respiratory: Negative for cough and shortness of breath. Cardiovascular: Negative for chest pain and palpitations.    Gastrointestinal: Negative for abdominal pain, nausea and vomiting. Endocrine: Negative for polydipsia and polyuria. Genitourinary: Negative for dysuria and flank pain. Musculoskeletal: Negative for back pain and neck pain. Skin: Positive for wound. Negative for pallor. Neurological: Negative for dizziness, facial asymmetry, light-headedness, numbness and headaches. Psychiatric/Behavioral: Negative for confusion. Except as noted above the remainder of the review of systems was reviewed and negative.        PAST MEDICAL HISTORY     Past Medical History:   Diagnosis Date    Anemia     Aspiration pneumonia ([address]     dx 6/9/2014 with this per ecf/ per old chart dx with pneumonia with admission 9/18/2018    Cerebral palsy ([address]     \"has no feeling on left side of body\"alert but has some dementia but not diagnosed, has good long term but poor short term and wakes up disoriented after a nap\"(per yaneth)(2014)    Depression     Epilepsy ([address] 1961    last seizure 2/2018- hx grand mal    Frequent falls     with phone assess- family stated pt fell this am (7/10/2013\"in the process of trying to find a facility to help build him back up- (pt now [facility])    Glaucoma     \"has been in treatment for this in the past- no treatment needed at present\"    History of IBS     Hx MRSA infection     + nasal culture 4/2014    Hyperammonemia (HCC)     Hypertension     on Lisinopril    IBS (irritable bowel syndrome)     ulcerative colitis    Kidney stone     for surgery for stent placement 7/11/2013    Mood disorder ([address]     per staff at [facility] Occasional tremors     \"makes it difficult for him to write\"    Pressure injury of contiguous region involving back and right buttock, stage 2 ([address] 5/27/2020    Pressure injury of left buttock, stage 1 7/22/2020    Prostatitis     hx given per [facility]&P old chart    Thyroid disease     Ulcerative colitis ([address]     hx given per staff at [facility] 4/13/2015       Prior to Admission medications Medication Sig Start Date End Date Taking? Authorizing Provider   Clotrimazole POWD Apply to groin twice a day 12/7/20   CHRIS Raza   acetaminophen (APAP EXTRA STRENGTH) 500 MG tablet Take 1 tablet by mouth every 6 hours as needed for Pain 9/7/20   CHRIS Raza   levothyroxine (SYNTHROID) 125 MCG tablet Take 1 tablet by mouth Daily 4/14/20   Vinicius Rosales MD   dicyclomine (BENTYL) 10 MG capsule Take 1 capsule by mouth every 6 hours as needed (cramps) 4/13/20 4/18/20  Vinicius Rosales MD   amLODIPine (NORVASC) 5 MG tablet Take 5 mg by mouth daily    Historical Provider, MD   clonazePAM (KLONOPIN) 0.5 MG tablet Take 0.25 mg by mouth daily. Historical Provider, MD   divalproex (DEPAKOTE) 500 MG DR tablet Take 1,000 mg by mouth nightly    Historical Provider, MD   hydrochlorothiazide (MICROZIDE) 12.5 MG capsule Take 12.5 mg by mouth daily    Historical Provider, MD   omeprazole (PRILOSEC) 20 MG delayed release capsule Take 20 mg by mouth 2 times daily (before meals)    Historical Provider, MD   primidone (MYSOLINE) 50 MG tablet Take 50 mg by mouth 3 times daily    Historical Provider, MD   zonisamide (ZONEGRAN) 100 MG capsule Take 100 mg by mouth every morning    Historical Provider, MD   Simethicone (MI-ACID GAS RELIEF PO) Take 1 Container by mouth every 4 hours as needed 10 cc every 4 hrs not to exceed 60 cc in 24 hrs    Historical Provider, MD   guaiFENesin (MUCINEX) 600 MG extended release tablet Take 1,200 mg by mouth daily as needed for Congestion    Historical Provider, MD   Menthol-Methyl Salicylate (MUSCLE RUB) 10-15 % CREA cream Apply 1 applicator topically as needed    Historical Provider, MD   pseudoephedrine (SUDAFED) 30 MG tablet Take 30 mg by mouth every 4 hours as needed for Congestion    Historical Provider, MD   lacosamide (VIMPAT) 200 MG tablet Take 200 mg by mouth as needed.  Give 1 tab after seizure activity as needed    Historical Provider, MD   lacosamide (VIMPAT) 200 MG tablet Take 1 tablet by mouth 2 times daily for 30 days. 3/25/20 5/27/20  Louise Rosario DO   latanoprost (XALATAN) 0.005 % ophthalmic solution Place 1 drop into both eyes nightly    Historical Provider, MD   timolol (TIMOPTIC) 0.5 % ophthalmic solution Place 1 drop into both eyes daily     Historical Provider, MD   Lactobacillus (ACIDOPHILUS) CAPS capsule Take 1 capsule by mouth daily    Historical Provider, MD   aspirin 81 MG tablet Take 81 mg by mouth daily    Historical Provider, MD   Wheat Dextrin (BENEFIBER) POWD Take 4 g by mouth 2 times daily Takes 3 tsp in liquid twice per day     Historical Provider, MD   Lactulose Encephalopathy (ENULOSE PO) Take 30 mLs by mouth 3 times daily    Historical Provider, MD   folic acid (FOLVITE) 1 MG tablet Take 1 mg by mouth daily    Historical Provider, MD   PARoxetine (PAXIL) 20 MG tablet Take 20 mg by mouth every morning    Historical Provider, MD   QUEtiapine (SEROQUEL XR) 50 MG extended release tablet Take 50 mg by mouth nightly    Historical Provider, MD   tamsulosin (FLOMAX) 0.4 MG capsule Take 1 capsule by mouth daily 3/18/17   CHRIS Ivan   simvastatin (ZOCOR) 20 MG tablet Take 20 mg by mouth nightly. Historical Provider, MD   Cholecalciferol (VITAMIN D3) 1000 UNITS CAPS Take 1 tablet by mouth daily. Historical Provider, MD   clorazepate (TRANXENE) 7.5 MG tablet Take 7.5 mg by mouth 2 times daily.     Historical Provider, MD        Patient Active Problem List   Diagnosis    Cerebral palsy, infantile hemiplegia (HCC)    Rosacea, acne    IBS (irritable bowel syndrome)    Hypertension    Calculus of ureter    Pyelonephritis    Sepsis ([address]    Pneumonia    Increased ammonia level    Aspiration pneumonia (HCC)    Hypokalemia    Hypomagnesemia    Hypophosphatasia    Seizure disorder ([address]    Seizure disorder, grand mal ([address]    Sepsis due to undetermined organism with acute respiratory failure ([address]    Pain of upper abdomen    Diarrhea of presumed infectious origin    Abdominal pain    Pressure injury of contiguous region involving back and right buttock, stage 2 (HCC)    Cellulitis, perineum    Pressure injury of right buttock, stage 2 (HCC)    Pressure injury of left buttock, stage 1    COVID-19 virus detected    Hyponatremia    Multifocal pneumonia         SURGICAL HISTORY       Past Surgical History:   Procedure Laterality Date    CHOLECYSTECTOMY      COLONOSCOPY  8/19/14, 5/2012 8/19/14: hemorrhoids, 5/2012 at [place]  02/04/2021    COLONOSCOPY N/A 2/4/2021    COLONOSCOPY POLYPECTOMY SNARE/COLD BIOPSY performed by Kasey Longoria MD at [facility] Left 07/11/2013    with stnet placement   [address]  2005    KIDNEY STONE SURGERY      OTHER SURGICAL HISTORY  04/15/2015    Revision of vagal nerve stimulator    STIMULATOR SURGERY N/A 3/24/2021    STIMULATOR INSERTION performed by Kanchan Rader MD at [address] 3/24/2021    STIMULATOR INSERTION STAGE 2 performed by Kanchan Rader MD at [address] [facility] 11/13/2018     [address] performed by Macrina Sosa MD at [place] [address]    Has to have bettery changed every 5 yrs.           CURRENT MEDICATIONS       Discharge Medication List as of 9/13/2021 11:56 PM      CONTINUE these medications which have NOT CHANGED    Details   Clotrimazole POWD Apply to groin twice a day, Disp-1 Bottle,R-0Print      acetaminophen (APAP EXTRA STRENGTH) 500 MG tablet Take 1 tablet by mouth every 6 hours as needed for Pain, Disp-30 tablet,R-0Print      levothyroxine (SYNTHROID) 125 MCG tablet Take 1 tablet by mouth Daily, Disp-30 tablet, R-3Normal      dicyclomine (BENTYL) 10 MG capsule Take 1 capsule by mouth every 6 hours as needed (cramps), Disp-20 capsule, R-0Normal      amLODIPine (NORVASC) 5 MG tablet Take 5 mg by mouth dailyHistorical Med clonazePAM (KLONOPIN) 0.5 MG tablet Take 0.25 mg by mouth daily. Historical Med      divalproex (DEPAKOTE) 500 MG DR tablet Take 1,000 mg by mouth nightlyHistorical Med      hydrochlorothiazide (MICROZIDE) 12.5 MG capsule Take 12.5 mg by mouth dailyHistorical Med      omeprazole (PRILOSEC) 20 MG delayed release capsule Take 20 mg by mouth 2 times daily (before meals)Historical Med      primidone (MYSOLINE) 50 MG tablet Take 50 mg by mouth 3 times dailyHistorical Med      zonisamide (ZONEGRAN) 100 MG capsule Take 100 mg by mouth every morningHistorical Med      Simethicone (MI-ACID GAS RELIEF PO) Take 1 Container by mouth every 4 hours as needed 10 cc every 4 hrs not to exceed 60 cc in 24 hrsHistorical Med      guaiFENesin (MUCINEX) 600 MG extended release tablet Take 1,200 mg by mouth daily as needed for CongestionHistorical Med      Menthol-Methyl Salicylate (MUSCLE RUB) 10-15 % CREA cream Apply 1 applicator topically as neededHistorical Med      pseudoephedrine (SUDAFED) 30 MG tablet Take 30 mg by mouth every 4 hours as needed for CongestionHistorical Med      lacosamide (VIMPAT) 200 MG tablet Take 200 mg by mouth as needed.  Give 1 tab after seizure activity as neededHistorical Med      latanoprost (XALATAN) 0.005 % ophthalmic solution Place 1 drop into both eyes nightlyHistorical Med      timolol (TIMOPTIC) 0.5 % ophthalmic solution Place 1 drop into both eyes daily Historical Med      Lactobacillus (ACIDOPHILUS) CAPS capsule Take 1 capsule by mouth dailyHistorical Med      aspirin 81 MG tablet Take 81 mg by mouth dailyHistorical Med      Wheat Dextrin (BENEFIBER) POWD Take 4 g by mouth 2 times daily Takes 3 tsp in liquid twice per day Historical Med      Lactulose Encephalopathy (ENULOSE PO) Take 30 mLs by mouth 3 times dailyHistorical Med      folic acid (FOLVITE) 1 MG tablet Take 1 mg by mouth dailyHistorical Med      PARoxetine (PAXIL) 20 MG tablet Take 20 mg by mouth every morningHistorical Med QUEtiapine (SEROQUEL XR) 50 MG extended release tablet Take 50 mg by mouth nightlyHistorical Med      tamsulosin (FLOMAX) 0.4 MG capsule Take 1 capsule by mouth daily, Disp-30 capsule, R-0Print      simvastatin (ZOCOR) 20 MG tablet Take 20 mg by mouth nightly. Cholecalciferol (VITAMIN D3) 1000 UNITS CAPS Take 1 tablet by mouth daily. clorazepate (TRANXENE) 7.5 MG tablet Take 7.5 mg by mouth 2 times daily. ALLERGIES     Patient has no known allergies. FAMILY HISTORY       Family History   Problem Relation Age of Onset    Heart Disease Mother         atrial fib    High Blood Pressure Mother     Asthma Mother     High Cholesterol Mother     Depression Mother     Migraines Mother     High Blood Pressure Father     Heart Disease Father     Asthma Sister     High Cholesterol Sister     Depression Sister     Migraines Sister           SOCIAL HISTORY       Social History     Socioeconomic History    Marital status: Single     Spouse name: None    Number of children: None    Years of education: None    Highest education level: None   Occupational History    None   Tobacco Use    Smoking status: Never Smoker    Smokeless tobacco: Never Used   Vaping Use    Vaping Use: Never used   Substance and Sexual Activity    Alcohol use: No    Drug use: No    Sexual activity: None   Other Topics Concern    None   Social History Narrative    None     Social Determinants of Health     Financial Resource Strain:     Difficulty of Paying Living Expenses:    Food Insecurity:     Worried About Running Out of Food in the Last Year:     Ran Out of Food in the Last Year:    Transportation Needs:     Lack of Transportation (Medical):      Lack of Transportation (Non-Medical):    Physical Activity:     Days of Exercise per Week:     Minutes of Exercise per Session:    Stress:     Feeling of Stress :    Social Connections:     Frequency of Communication with Friends and Family:     Frequency of Social Gatherings with Friends and Family:     Attends Yarsanism Services:     Active Member of Clubs or Organizations:     Attends Club or Organization Meetings:     Marital Status:    Intimate Partner Violence:     Fear of Current or Ex-Partner:     Emotionally Abused:     Physically Abused:     Sexually Abused:        SCREENINGS               PHYSICAL EXAM    (up to 7 for level 4, 8 or more for level 5)     ED Triage Vitals   BP Temp Temp src Pulse Resp SpO2 Height Weight   -- -- -- -- -- -- -- --       Physical Exam  Vitals reviewed. Constitutional:       Appearance: He is not ill-appearing or toxic-appearing. HENT:      Head: Normocephalic and atraumatic. Nose: No congestion or rhinorrhea. Mouth/Throat:      Mouth: Mucous membranes are moist.      Pharynx: No oropharyngeal exudate or posterior oropharyngeal erythema. Eyes:      General:         Right eye: No discharge. Left eye: No discharge. Extraocular Movements: Extraocular movements intact. Pupils: Pupils are equal, round, and reactive to light. Neck:      Comments: No midline cervical spine tenderness to palpation  Cardiovascular:      Rate and Rhythm: Normal rate. Heart sounds: No friction rub. No gallop. Pulmonary:      Effort: Pulmonary effort is normal. No respiratory distress. Comments: B/l breath sounds  No retractions, no increased work of breathing  Chest:      Chest wall: No tenderness. Abdominal:      Palpations: Abdomen is soft. Tenderness: There is no abdominal tenderness. There is no guarding. Musculoskeletal:      Cervical back: Normal range of motion and neck supple. No rigidity or tenderness. Right lower leg: No edema. Left lower leg: No edema. Comments: Extremities atraumatic   Skin:     General: Skin is warm. Capillary Refill: Capillary refill takes less than 2 seconds. Findings: No bruising.    Neurological:      Mental Status: He is alert. Mental status is at baseline. DIAGNOSTIC RESULTS     Labs Reviewed - No data to display     RADIOLOGY:     Non-plain film images such as CT, Ultrasound and MRI are read by the radiologist. Plain radiographic images are visualized and preliminarily interpreted by the emergency physician. Interpretation per the Radiologist below, if available at the time of this note:    XR PELVIS (1-2 VIEWS)   Preliminary Result   1. No acute cardiopulmonary disease. 2. No acute fracture of the pelvis. 3. Moderate bilateral hip osteoarthritis. XR CHEST PORTABLE   Preliminary Result   1. No acute cardiopulmonary disease. 2. No acute fracture of the pelvis. 3. Moderate bilateral hip osteoarthritis. CT HEAD WO CONTRAST   Final Result   1. No acute intracranial abnormality. 2. 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 3. No acute traumatic abnormality involving the cervical spine. 4. 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 CT CERVICAL SPINE WO CONTRAST   Final Result   1. No acute intracranial abnormality. 2. 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 3. No acute traumatic abnormality involving the cervical spine. 4. 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 ED BEDSIDE ULTRASOUND:   Performed by ED Physician Daryl Kim MD       LABS:  Labs Reviewed - No data to display    All other labs were within normal range or not returned as of this dictation.     EMERGENCY DEPARTMENT COURSE and DIFFERENTIAL DIAGNOSIS/MDM:   Vitals:    Vitals:    09/13/21 2052 09/13/21 2200 09/13/21 2345   BP: (!) 143/83 134/69 137/61   Pulse: 63 66 69   Resp: 16 15 15   Temp: 99 °F (37.2 °C)  98.9 °F (37.2 °C)   TempSrc: Oral SpO2: 99% 100% 100%           MDM  Number of Diagnoses or Management Options  Fall, initial encounter  Injury of head, initial encounter  Diagnosis management comments: 70-year-old male presents for evaluation of head injury after fall. He has history of CP and impaired mobility. Does use a walker at baseline. He states he was using his walker when he tripped on a rug and hit his head. Typically wears a helmet but was reportedly not wearing at that time. Did not have loss of consciousness. According to his medication list, he is on daily aspirin. I do not see other antiplatelet antithrombotic medications. He has briefly ambulated since the fall. Presents with no significant external signs of trauma. Vitals upon presentation overall unremarkable. No tachycardia. Blood pressure was within normal limits. Action saturations are 100% on room air. CT head, CT C-spine as well as chest x-ray and pelvis x-ray obtained. Imaging is negative from a traumatic perspective. He does have a p.o. challenge in the emergency department without difficulty. Results are discussed with patient. He is discharged to his facility in stable condition with return precautions. -  Patient seen and evaluated in the emergency department. -  Triage and nursing notes reviewed and incorporated. -  Old chart records reviewed and incorporated. -  Work-up included:  See above  -  Results discussed with patient. CONSULTS:  None    PROCEDURES:  None performed unless otherwise noted below     Procedures        FINAL IMPRESSION      1. Fall, initial encounter    2.  Injury of head, initial encounter          DISPOSITION/PLAN   DISPOSITION Decision To Discharge 09/13/2021 11:27:48 PM      PATIENT REFERRED TO:  MARY Tierney - CNP  [facility]  [phone]      As needed      DISCHARGE MEDICATIONS:  Discharge Medication List as of 9/13/2021 11:56 PM          ED Provider Disposition Time  DISPOSITION Decision To Discharge 09/13/2021 11:27:48 PM      Appropriate personal protective equipment was worn during the patient's evaluation. These included surgical, eye protection, surgical mask or in 95 respirator and gloves. The patient was also placed in a surgical mask for the prevention of possible spread of respiratory viral illnesses. The Patient was instructed to read the package inserts with any medication that was prescribed. Major potential reactions and medication interactions were discussed. The Patient understands that there are numerous possible adverse reactions not covered. The patient was also instructed to arrange follow-up with his or her primary care provider for review of any pending labwork or incidental findings on any radiology results that were obtained. All efforts were made to discuss any incidental findings that require further monitoring. Controlled Substances Monitoring:     RX Monitoring 1/23/2019   Attestation The Prescription Monitoring Report for this patient was reviewed today. Periodic Controlled Substance Monitoring Possible medication side effects, risk of tolerance/dependence & alternative treatments discussed. ;No signs of potential drug abuse or diversion identified.        (Please note that portions of this note were completed with a voice recognition program.  Efforts were made to edit the dictations but occasionally words are mis-transcribed.)    Daryl Kim MD (electronically signed)  Attending Emergency Physician           Daryl Kim MD  09/14/21 [id number]

## 2021-09-14 NOTE — ED NOTES
[facility] arrived to transport pt home, I called Eloy Self and let her know that transport will take the pt home.       Ruby Leal RN  09/14/21 0733

## 2021-09-14 NOTE — ED NOTES
Updated pt's sister and she would like a call when pt is discharged so that she can transport him back to the group home.       Gabriella Gilliam RN  09/13/21 [id number]

## 2021-09-20 ENCOUNTER — HOSPITAL ENCOUNTER (OUTPATIENT)
Age: 66
Setting detail: SPECIMEN
Discharge: HOME | End: 2021-09-20
Payer: MEDICARE

## 2021-09-20 LAB
BACTERIA: NEGATIVE /HPF
BILIRUBIN URINE: NEGATIVE MG/DL
BLOOD, URINE: ABNORMAL
CLARITY: ABNORMAL
COLOR: YELLOW
GLUCOSE, URINE: NEGATIVE MG/DL
KETONES, URINE: NEGATIVE MG/DL
LEUKOCYTE ESTERASE, URINE: ABNORMAL
MUCUS: ABNORMAL HPF
NITRITE URINE, QUANTITATIVE: NEGATIVE
NON SQUAM EPI CELLS: <1 /HPF
PH, URINE: 6 (ref 5–8)
PROTEIN UA: NEGATIVE MG/DL
RBC URINE: 11 /HPF (ref 0–3)
SPECIFIC GRAVITY UA: 1.01 (ref 1–1.03)
SQUAMOUS EPITHELIAL: <1 /HPF
TRICHOMONAS: ABNORMAL /HPF
UROBILINOGEN, URINE: NEGATIVE MG/DL (ref 0.2–1)
WBC UA: 237 /HPF (ref 0–2)
YEAST: ABNORMAL /HPF

## 2021-09-20 PROCEDURE — 81001 URINALYSIS AUTO W/SCOPE: CPT

## 2021-09-20 PROCEDURE — 87086 URINE CULTURE/COLONY COUNT: CPT

## 2021-09-21 LAB
CULTURE: NORMAL
Lab: NORMAL
SPECIMEN: NORMAL

## 2021-09-24 ENCOUNTER — OFFICE VISIT (OUTPATIENT)
Dept: NEUROLOGY | Age: 66
End: 2021-09-24
Payer: MEDICARE

## 2021-09-24 VITALS — DIASTOLIC BLOOD PRESSURE: 70 MMHG | OXYGEN SATURATION: 94 % | SYSTOLIC BLOOD PRESSURE: 120 MMHG | HEART RATE: 60 BPM

## 2021-09-24 DIAGNOSIS — G25.0 ESSENTIAL TREMOR: ICD-10-CM

## 2021-09-24 DIAGNOSIS — I69.354 SPASTIC HEMIPLEGIA OF LEFT NONDOMINANT SIDE AS LATE EFFECT OF CEREBRAL INFARCTION (HCC): ICD-10-CM

## 2021-09-24 DIAGNOSIS — R56.9 SEIZURE (HCC): ICD-10-CM

## 2021-09-24 DIAGNOSIS — R41.3 MEMORY DEFICIT: ICD-10-CM

## 2021-09-24 DIAGNOSIS — R29.6 RECURRENT FALLS: Primary | ICD-10-CM

## 2021-09-24 DIAGNOSIS — G80.9 CEREBRAL PALSY, UNSPECIFIED TYPE (HCC): ICD-10-CM

## 2021-09-24 PROCEDURE — 99213 OFFICE O/P EST LOW 20 MIN: CPT | Performed by: PHYSICIAN ASSISTANT

## 2021-09-24 RX ORDER — LUBIPROSTONE 24 UG/1
CAPSULE, GELATIN COATED ORAL
COMMUNITY
Start: 2021-09-22 | End: 2022-08-20

## 2021-09-24 RX ORDER — SOLIFENACIN SUCCINATE 10 MG/1
10 TABLET, FILM COATED ORAL DAILY
COMMUNITY

## 2021-09-24 NOTE — PROGRESS NOTES
Verbal consent to be seen obtained from [facility]. She will be coming by today to sign all consents to be on file.

## 2021-09-24 NOTE — PROGRESS NOTES
9/24/21    Susana Gottron  1955    Chief Complaint   Patient presents with    Fall     8 Falls since [facility] issues    Medication Refill     Clonazepam        History of Present Illness  Tim Carnes is a 73 y.o. male presenting [facility] office today in follow-up regarding multiple hospital visits with fall and reportedly hitting his head but no noted injury. Patient has history of cerebral palsy, seizures, mood disorder, hypertension, anemia, MRDD residing in a group home and has been previously evaluated by our service at [facility] for management of seizure medication while admitted. Our last note indicates that he was on Vimpat 200 mg twice daily, Depakote ER 1000 mg at bedtime and Zonegran 100 mg in the morning and 300 mg at night for seizure prevention. Per chart review he has been evaluated at the emergency department multiple times for falls 06/29/2021 where his chief complaint was hip pain, as well as 09/07/2021, 09/13/2021 where he had falls reportedly hit his head. Patient wears a helmet at all times and helmet was noted not to be damaged. CT head and cervical spine were completed on both admissions, 09/07/2021 indicated a remote right middle cerebral artery territory infarct with compensatory hypertrophy of the right lateral ventricle and wallerian degeneration, redemonstrated on 9/13/2021. No acute findings with cervical spine, noted anterior ankylosis involving cervical spine and extending into the thoracic spine. On exam today, healthcare professionals accompanying patient tell me that he will try to get up and moving out of his wheelchair without assistance and stumble/fall. They deny any loss of consciousness with falls or evidence of seizure-like activity contributing. They tell me that he has undergone physical therapy multiple times in the past most recently late last year.   He has various exercises on a white board to remind him to complete them daily but there are days when he refuses and they are only allowed to ask him 3 times until they have to stop asking or pushing him to complete these exercises. They tell me he will go for days without walking or getting out of his chair and then suddenly decide that he wants to get up and walk. He additionally has history have peripheral neuropathy which we spent time discussing this would contribute to decreased balance and falls, clinical findings on exam did correlate. He does wear a helmet at all times and they deny any significant injury secondary to recent falls. POA and sister arrived during exam as well did mention progressive memory changes over the last year or 2. Says that his short-term memory seems to be most obviously affected as he will forget events more recent but will be able to recall events that happened a long time ago. He has not undergone any formal cognitive testing, I did explain to patient and sister that we would not be able to directly compare his results to age-matched individuals however in establishing a baseline we could repeat the study later to see if there has been any cognitive decline. Based on cerebral palsy and other comorbidities he is at an increased risk of dementia type process which they verbalized understanding and agreement. Health providers did inquire about refills on medication 1 of which being clonazepam which is prescribed for anxiety disorder. I explained that I am not currently treating him for anxiety and this would have to be obtained from PCP.       Current Outpatient Medications   Medication Sig Dispense Refill    AMITIZA 24 MCG capsule       mirabegron (MYRBETRIQ) 50 MG TB24 Take 50 mg by mouth daily      solifenacin (VESICARE) 10 MG tablet Take 10 mg by mouth daily      Clotrimazole POWD Apply to groin twice a day 1 Bottle 0    acetaminophen (APAP EXTRA STRENGTH) 500 MG tablet Take 1 tablet by mouth every 6 hours as needed for Pain 30 tablet 0    20 mg by mouth nightly.  Cholecalciferol (VITAMIN D3) 1000 UNITS CAPS Take 1 tablet by mouth daily.  clorazepate (TRANXENE) 7.5 MG tablet Take 7.5 mg by mouth 2 times daily.  dicyclomine (BENTYL) 10 MG capsule Take 1 capsule by mouth every 6 hours as needed (cramps) 20 capsule 0    pseudoephedrine (SUDAFED) 30 MG tablet Take 30 mg by mouth every 4 hours as needed for Congestion (Patient not taking: Reported on 9/24/2021)      lacosamide (VIMPAT) 200 MG tablet Take 1 tablet by mouth 2 times daily for 30 days. 60 tablet 0     No current facility-administered medications for this visit. Physical Exam:  Also present during visit: POA and health care professional. (POA is sister)    Mental Status   Orientation: oriented to person and oriented to place ; initially told me the wrong month but after asking second time he was able to tell me September; he was hyper-focused on getting a 4 prong cane   Mood/affectappropriate mood and appropriate affect   Memory/Other: remote memory intact; decreased attention span, recent memory, unable to complete simple calculations, 1/3 word recall  Language  Language: Language, no dysphagia/aphasia, dysarthria mild and tangential speech  Cranial Nerves   Eyes: pupils normal size and reactive to light and visual fields appear full   CN III, IV, VI : extraocular muscle strength normal, normal pursuit, no nystagmus and no ptosis   Facial Motor: normal facial motor   CN XII: tongue protrudes midline  Motor/Coordination Exam   Power: Patient has spastic left upper extremity, noted muscle weakness   Coordination: No bradykinesia, dyskinesia, tremors, myoclonus.   Unable to complete finger-to-nose with left upper extremity  Sensory Exam No Bradykinesia, No Dyskindesia, No Tremor, No myoclonus, Normal strength, Normal Tone Normal bulk and Normal tone     Gait and Stance   Gait/Posture: Patient uses wheelchair on exam today, reportedly has a walker that he uses at facility where he resides        /70 (Site: Right Lower Arm, Position: Sitting, Cuff Size: Medium Adult)   Pulse 60   SpO2 94%     Assessment and Plan     Diagnosis Orders   1. Recurrent falls  Ambulatory referral to Physical Therapy   2. Memory deficit     3. Seizure ([address]     4. Spastic hemiplegia of left nondominant side as late effect of cerebral infarction ([facility] [address]     5. Cerebral palsy, unspecified type ([facility] [address]     6. Essential tremor       Mr. Jaciel Polanco presents in follow-up for seizure disorder, recurrent falls and progressive memory deficits. Main complaint on exam today is regarding recurrent falls, patient has had multiple emergency room visits where he was noted to fall, following chart review no admissions. He also has clinical findings and history of peripheral neuropathy. Will send for formal physical therapy with hopes of increasing strength and balance. I did stress the importance of continuing these exercises once formal therapy is completed in order to maintain any benefit gained. Stressed the importance for fall prevention and he will continue to wear helmet daily. Regarding seizure disorder he has been continued on medications Depakote 1000 mg at bedtime, Vimpat 200 mg twice daily and zonisamide 100 mg in the morning and 300 mg in the evening. He denies any further seizure activity since last visit. He denies that recurrent falls he is having is related to seizure activity. I did review Depakote level which was obtained following last visit, level is 47 which on the low end of therapeutic range however considering he has not had any seizures and is on several AEDs at this time we will not make any changes. Patient sister and Temitope Rushing has noted progressive decline in memory over the last year or 2. He has never had any formal cognitive testing, not on any pharmacological treatment for memory decline/dementia process.   We did discuss based on history and other comorbidities he would be at an increased risk for dementia type process. They would like to pursue in office formal cognitive testing to establish his baseline for which to compare to future evaluations. Order was submitted to [facility] office to schedule cognitive evaluation. Regarding tremor he has been continued on primidone 50 mg 1 tablet 3 times a day. Patient has follow-up scheduled at the [facility] office with Domenico Landry 11/16/2021. Following this visit he and POA can determine if they would prefer to follow-up at [facility], did place note so that all of my documentation in epic today would be transferred to that office for next appointment.     CHRIS Brown

## 2021-09-30 ENCOUNTER — HOSPITAL ENCOUNTER (OUTPATIENT)
Age: 66
Setting detail: SPECIMEN
Discharge: HOME | DRG: 982 | End: 2021-09-30
Payer: MEDICARE

## 2021-09-30 LAB — AMMONIA: 33 UMOL/L (ref 16–60)

## 2021-09-30 PROCEDURE — 82140 ASSAY OF AMMONIA: CPT

## 2021-09-30 PROCEDURE — 87077 CULTURE AEROBIC IDENTIFY: CPT

## 2021-09-30 PROCEDURE — 87086 URINE CULTURE/COLONY COUNT: CPT

## 2021-10-01 ENCOUNTER — APPOINTMENT (OUTPATIENT)
Dept: CT IMAGING | Age: 66
DRG: 982 | End: 2021-10-01
Payer: MEDICARE

## 2021-10-01 ENCOUNTER — HOSPITAL ENCOUNTER (INPATIENT)
Age: 66
LOS: 7 days | Discharge: ANOTHER HOSPITAL | DRG: 982 | End: 2021-10-09
Attending: EMERGENCY MEDICINE | Admitting: INTERNAL MEDICINE
Payer: MEDICARE

## 2021-10-01 ENCOUNTER — APPOINTMENT (OUTPATIENT)
Dept: GENERAL RADIOLOGY | Age: 66
DRG: 982 | End: 2021-10-01
Payer: MEDICARE

## 2021-10-01 DIAGNOSIS — G45.9 TIA (TRANSIENT ISCHEMIC ATTACK): Primary | ICD-10-CM

## 2021-10-01 DIAGNOSIS — I10 HYPERTENSION, UNSPECIFIED TYPE: ICD-10-CM

## 2021-10-01 LAB
BASOPHILS ABSOLUTE: 0 K/CU MM
BASOPHILS RELATIVE PERCENT: 0.3 % (ref 0–1)
CHP ED QC CHECK: NORMAL
CULTURE: ABNORMAL
CULTURE: ABNORMAL
DIFFERENTIAL TYPE: ABNORMAL
EOSINOPHILS ABSOLUTE: 0.1 K/CU MM
EOSINOPHILS RELATIVE PERCENT: 1.1 % (ref 0–3)
GLUCOSE BLD-MCNC: 120 MG/DL
GLUCOSE BLD-MCNC: 120 MG/DL (ref 70–99)
HCT VFR BLD CALC: 47.4 % (ref 42–52)
HEMOGLOBIN: 15.9 GM/DL (ref 13.5–18)
IMMATURE NEUTROPHIL %: 0.6 % (ref 0–0.43)
INR BLD: 0.85 INDEX
LYMPHOCYTES ABSOLUTE: 1.5 K/CU MM
LYMPHOCYTES RELATIVE PERCENT: 17.2 % (ref 24–44)
Lab: ABNORMAL
MCH RBC QN AUTO: 31.1 PG (ref 27–31)
MCHC RBC AUTO-ENTMCNC: 33.5 % (ref 32–36)
MCV RBC AUTO: 92.6 FL (ref 78–100)
MONOCYTES ABSOLUTE: 1.2 K/CU MM
MONOCYTES RELATIVE PERCENT: 13.3 % (ref 0–4)
NUCLEATED RBC %: 0 %
PDW BLD-RTO: 12.5 % (ref 11.7–14.9)
PLATELET # BLD: 202 K/CU MM (ref 140–440)
PMV BLD AUTO: 10.3 FL (ref 7.5–11.1)
PROTHROMBIN TIME: 11 SECONDS (ref 11.7–14.5)
RBC # BLD: 5.12 M/CU MM (ref 4.6–6.2)
SEGMENTED NEUTROPHILS ABSOLUTE COUNT: 5.9 K/CU MM
SEGMENTED NEUTROPHILS RELATIVE PERCENT: 67.5 % (ref 36–66)
SPECIMEN: ABNORMAL
TOTAL IMMATURE NEUTOROPHIL: 0.05 K/CU MM
TOTAL NUCLEATED RBC: 0 K/CU MM
TROPONIN T: <0.01 NG/ML
WBC # BLD: 8.8 K/CU MM (ref 4–10.5)

## 2021-10-01 PROCEDURE — 84484 ASSAY OF TROPONIN QUANT: CPT

## 2021-10-01 PROCEDURE — 2580000003 HC RX 258: Performed by: EMERGENCY MEDICINE

## 2021-10-01 PROCEDURE — 85025 COMPLETE CBC W/AUTO DIFF WBC: CPT

## 2021-10-01 PROCEDURE — 93005 ELECTROCARDIOGRAM TRACING: CPT | Performed by: EMERGENCY MEDICINE

## 2021-10-01 PROCEDURE — 85610 PROTHROMBIN TIME: CPT

## 2021-10-01 PROCEDURE — 82962 GLUCOSE BLOOD TEST: CPT

## 2021-10-01 PROCEDURE — 80053 COMPREHEN METABOLIC PANEL: CPT

## 2021-10-01 PROCEDURE — 83735 ASSAY OF MAGNESIUM: CPT

## 2021-10-01 PROCEDURE — 70496 CT ANGIOGRAPHY HEAD: CPT

## 2021-10-01 PROCEDURE — 70450 CT HEAD/BRAIN W/O DYE: CPT

## 2021-10-01 PROCEDURE — 71045 X-RAY EXAM CHEST 1 VIEW: CPT

## 2021-10-01 PROCEDURE — 99285 EMERGENCY DEPT VISIT HI MDM: CPT

## 2021-10-01 PROCEDURE — 6370000000 HC RX 637 (ALT 250 FOR IP): Performed by: EMERGENCY MEDICINE

## 2021-10-01 RX ORDER — SODIUM CHLORIDE 0.9 % (FLUSH) 0.9 %
5-40 SYRINGE (ML) INJECTION PRN
Status: DISCONTINUED | OUTPATIENT
Start: 2021-10-01 | End: 2021-10-09 | Stop reason: HOSPADM

## 2021-10-01 RX ORDER — SODIUM CHLORIDE 0.9 % (FLUSH) 0.9 %
5-40 SYRINGE (ML) INJECTION EVERY 12 HOURS SCHEDULED
Status: DISCONTINUED | OUTPATIENT
Start: 2021-10-01 | End: 2021-10-09 | Stop reason: HOSPADM

## 2021-10-01 RX ORDER — SODIUM CHLORIDE 9 MG/ML
25 INJECTION, SOLUTION INTRAVENOUS PRN
Status: DISCONTINUED | OUTPATIENT
Start: 2021-10-01 | End: 2021-10-02 | Stop reason: SDUPTHER

## 2021-10-01 RX ORDER — CLORAZEPATE DIPOTASSIUM 3.75 MG/1
7.5 TABLET ORAL ONCE
Status: COMPLETED | OUTPATIENT
Start: 2021-10-01 | End: 2021-10-01

## 2021-10-01 RX ORDER — QUETIAPINE FUMARATE 25 MG/1
50 TABLET, FILM COATED ORAL 2 TIMES DAILY
Status: DISCONTINUED | OUTPATIENT
Start: 2021-10-01 | End: 2021-10-09 | Stop reason: HOSPADM

## 2021-10-01 RX ORDER — LACOSAMIDE 50 MG/1
200 TABLET ORAL 2 TIMES DAILY
Status: DISCONTINUED | OUTPATIENT
Start: 2021-10-01 | End: 2021-10-09 | Stop reason: HOSPADM

## 2021-10-01 RX ORDER — AMLODIPINE BESYLATE 5 MG/1
5 TABLET ORAL ONCE
Status: DISCONTINUED | OUTPATIENT
Start: 2021-10-01 | End: 2021-10-01

## 2021-10-01 RX ORDER — DIVALPROEX SODIUM 500 MG/1
1000 TABLET, EXTENDED RELEASE ORAL DAILY
Status: DISCONTINUED | OUTPATIENT
Start: 2021-10-02 | End: 2021-10-09 | Stop reason: HOSPADM

## 2021-10-01 RX ORDER — TAMSULOSIN HYDROCHLORIDE 0.4 MG/1
0.4 CAPSULE ORAL DAILY
Status: DISCONTINUED | OUTPATIENT
Start: 2021-10-02 | End: 2021-10-09 | Stop reason: HOSPADM

## 2021-10-01 RX ADMIN — SODIUM CHLORIDE, PRESERVATIVE FREE 5 ML: 5 INJECTION INTRAVENOUS at 23:10

## 2021-10-01 RX ADMIN — CLORAZEPATE DIPOTASSIUM 7.5 MG: 3.75 TABLET ORAL at 23:01

## 2021-10-01 RX ADMIN — QUETIAPINE FUMARATE 50 MG: 25 TABLET ORAL at 23:01

## 2021-10-01 RX ADMIN — LACOSAMIDE 200 MG: 50 TABLET, FILM COATED ORAL at 23:02

## 2021-10-02 PROBLEM — G45.9 TIA (TRANSIENT ISCHEMIC ATTACK): Status: ACTIVE | Noted: 2021-10-02

## 2021-10-02 LAB
ALBUMIN SERPL-MCNC: 4.3 GM/DL (ref 3.4–5)
ALP BLD-CCNC: 102 IU/L (ref 40–129)
ALT SERPL-CCNC: 22 U/L (ref 10–40)
ANION GAP SERPL CALCULATED.3IONS-SCNC: 9 MMOL/L (ref 4–16)
AST SERPL-CCNC: 13 IU/L (ref 15–37)
BILIRUB SERPL-MCNC: 0.2 MG/DL (ref 0–1)
BUN BLDV-MCNC: 9 MG/DL (ref 6–23)
CALCIUM SERPL-MCNC: 9.1 MG/DL (ref 8.3–10.6)
CHLORIDE BLD-SCNC: 97 MMOL/L (ref 99–110)
CO2: 29 MMOL/L (ref 21–32)
CREAT SERPL-MCNC: 0.5 MG/DL (ref 0.9–1.3)
GFR AFRICAN AMERICAN: >60 ML/MIN/1.73M2
GFR NON-AFRICAN AMERICAN: >60 ML/MIN/1.73M2
GLUCOSE BLD-MCNC: 128 MG/DL (ref 70–99)
MAGNESIUM: 1.9 MG/DL (ref 1.8–2.4)
POTASSIUM SERPL-SCNC: 3.6 MMOL/L (ref 3.5–5.1)
SODIUM BLD-SCNC: 138 MMOL/L (ref 136–145)
TOTAL PROTEIN: 7.3 GM/DL (ref 6.4–8.2)
TROPONIN T: <0.01 NG/ML

## 2021-10-02 PROCEDURE — 6370000000 HC RX 637 (ALT 250 FOR IP): Performed by: INTERNAL MEDICINE

## 2021-10-02 PROCEDURE — 6370000000 HC RX 637 (ALT 250 FOR IP): Performed by: CLINICAL NURSE SPECIALIST

## 2021-10-02 PROCEDURE — 6360000004 HC RX CONTRAST MEDICATION: Performed by: EMERGENCY MEDICINE

## 2021-10-02 PROCEDURE — 6370000000 HC RX 637 (ALT 250 FOR IP): Performed by: EMERGENCY MEDICINE

## 2021-10-02 PROCEDURE — 2580000003 HC RX 258: Performed by: INTERNAL MEDICINE

## 2021-10-02 PROCEDURE — 84484 ASSAY OF TROPONIN QUANT: CPT

## 2021-10-02 PROCEDURE — 99223 1ST HOSP IP/OBS HIGH 75: CPT | Performed by: PSYCHIATRY & NEUROLOGY

## 2021-10-02 PROCEDURE — 2580000003 HC RX 258: Performed by: EMERGENCY MEDICINE

## 2021-10-02 PROCEDURE — 4A03X5D MEASUREMENT OF ARTERIAL FLOW, INTRACRANIAL, EXTERNAL APPROACH: ICD-10-PCS | Performed by: INTERNAL MEDICINE

## 2021-10-02 PROCEDURE — 92610 EVALUATE SWALLOWING FUNCTION: CPT | Performed by: SPEECH-LANGUAGE PATHOLOGIST

## 2021-10-02 PROCEDURE — 2140000000 HC CCU INTERMEDIATE R&B

## 2021-10-02 PROCEDURE — 6360000002 HC RX W HCPCS: Performed by: INTERNAL MEDICINE

## 2021-10-02 RX ORDER — DICYCLOMINE HYDROCHLORIDE 10 MG/1
10 CAPSULE ORAL EVERY 6 HOURS PRN
Status: DISCONTINUED | OUTPATIENT
Start: 2021-10-02 | End: 2021-10-09 | Stop reason: HOSPADM

## 2021-10-02 RX ORDER — LATANOPROST 50 UG/ML
1 SOLUTION/ DROPS OPHTHALMIC ONCE
Status: COMPLETED | OUTPATIENT
Start: 2021-10-02 | End: 2021-10-02

## 2021-10-02 RX ORDER — LACTOBACILLUS RHAMNOSUS GG 10B CELL
1 CAPSULE ORAL DAILY
Status: DISCONTINUED | OUTPATIENT
Start: 2021-10-02 | End: 2021-10-09 | Stop reason: HOSPADM

## 2021-10-02 RX ORDER — ATORVASTATIN CALCIUM 80 MG/1
80 TABLET, FILM COATED ORAL NIGHTLY
Status: DISCONTINUED | OUTPATIENT
Start: 2021-10-02 | End: 2021-10-09 | Stop reason: HOSPADM

## 2021-10-02 RX ORDER — SIMETHICONE 80 MG
80 TABLET,CHEWABLE ORAL 4 TIMES DAILY PRN
Status: DISCONTINUED | OUTPATIENT
Start: 2021-10-02 | End: 2021-10-09 | Stop reason: HOSPADM

## 2021-10-02 RX ORDER — CLOPIDOGREL BISULFATE 75 MG/1
75 TABLET ORAL DAILY
Status: DISCONTINUED | OUTPATIENT
Start: 2021-10-03 | End: 2021-10-09 | Stop reason: HOSPADM

## 2021-10-02 RX ORDER — ASPIRIN 81 MG/1
81 TABLET, CHEWABLE ORAL DAILY
Status: DISCONTINUED | OUTPATIENT
Start: 2021-10-02 | End: 2021-10-02 | Stop reason: SDUPTHER

## 2021-10-02 RX ORDER — AMLODIPINE BESYLATE 5 MG/1
5 TABLET ORAL DAILY
Status: DISCONTINUED | OUTPATIENT
Start: 2021-10-02 | End: 2021-10-05

## 2021-10-02 RX ORDER — LUBIPROSTONE 24 UG/1
24 CAPSULE, GELATIN COATED ORAL
Status: DISCONTINUED | OUTPATIENT
Start: 2021-10-02 | End: 2021-10-09 | Stop reason: HOSPADM

## 2021-10-02 RX ORDER — PRIMIDONE 50 MG/1
50 TABLET ORAL 3 TIMES DAILY
Status: DISCONTINUED | OUTPATIENT
Start: 2021-10-02 | End: 2021-10-09 | Stop reason: HOSPADM

## 2021-10-02 RX ORDER — ATORVASTATIN CALCIUM 40 MG/1
40 TABLET, FILM COATED ORAL DAILY
Status: DISCONTINUED | OUTPATIENT
Start: 2021-10-02 | End: 2021-10-02 | Stop reason: SDUPTHER

## 2021-10-02 RX ORDER — LACOSAMIDE 50 MG/1
200 TABLET ORAL DAILY PRN
Status: DISCONTINUED | OUTPATIENT
Start: 2021-10-02 | End: 2021-10-09 | Stop reason: HOSPADM

## 2021-10-02 RX ORDER — LABETALOL HYDROCHLORIDE 5 MG/ML
10 INJECTION, SOLUTION INTRAVENOUS EVERY 10 MIN PRN
Status: DISCONTINUED | OUTPATIENT
Start: 2021-10-02 | End: 2021-10-05

## 2021-10-02 RX ORDER — ASPIRIN 81 MG/1
81 TABLET ORAL DAILY
Status: DISCONTINUED | OUTPATIENT
Start: 2021-10-02 | End: 2021-10-09 | Stop reason: HOSPADM

## 2021-10-02 RX ORDER — LACTULOSE 10 G/15ML
30 SOLUTION ORAL 3 TIMES DAILY
Status: DISCONTINUED | OUTPATIENT
Start: 2021-10-02 | End: 2021-10-09 | Stop reason: HOSPADM

## 2021-10-02 RX ORDER — FOLIC ACID 1 MG/1
1 TABLET ORAL DAILY
Status: DISCONTINUED | OUTPATIENT
Start: 2021-10-02 | End: 2021-10-09 | Stop reason: HOSPADM

## 2021-10-02 RX ORDER — SODIUM CHLORIDE 0.9 % (FLUSH) 0.9 %
5-40 SYRINGE (ML) INJECTION PRN
Status: DISCONTINUED | OUTPATIENT
Start: 2021-10-02 | End: 2021-10-09 | Stop reason: HOSPADM

## 2021-10-02 RX ORDER — TAMSULOSIN HYDROCHLORIDE 0.4 MG/1
0.4 CAPSULE ORAL DAILY
Status: DISCONTINUED | OUTPATIENT
Start: 2021-10-02 | End: 2021-10-02

## 2021-10-02 RX ORDER — ASPIRIN 300 MG/1
300 SUPPOSITORY RECTAL DAILY
Status: DISCONTINUED | OUTPATIENT
Start: 2021-10-02 | End: 2021-10-09 | Stop reason: HOSPADM

## 2021-10-02 RX ORDER — DIVALPROEX SODIUM 500 MG/1
1000 TABLET, DELAYED RELEASE ORAL NIGHTLY
Status: DISCONTINUED | OUTPATIENT
Start: 2021-10-02 | End: 2021-10-09 | Stop reason: HOSPADM

## 2021-10-02 RX ORDER — LACOSAMIDE 50 MG/1
200 TABLET ORAL 2 TIMES DAILY
Status: DISCONTINUED | OUTPATIENT
Start: 2021-10-02 | End: 2021-10-02

## 2021-10-02 RX ORDER — POLYETHYLENE GLYCOL 3350 17 G/17G
17 POWDER, FOR SOLUTION ORAL DAILY PRN
Status: DISCONTINUED | OUTPATIENT
Start: 2021-10-02 | End: 2021-10-09 | Stop reason: HOSPADM

## 2021-10-02 RX ORDER — GUAIFENESIN 600 MG/1
1200 TABLET, EXTENDED RELEASE ORAL DAILY PRN
Status: DISCONTINUED | OUTPATIENT
Start: 2021-10-02 | End: 2021-10-09 | Stop reason: HOSPADM

## 2021-10-02 RX ORDER — ASPIRIN 81 MG/1
324 TABLET, CHEWABLE ORAL ONCE
Status: COMPLETED | OUTPATIENT
Start: 2021-10-02 | End: 2021-10-02

## 2021-10-02 RX ORDER — TIMOLOL MALEATE 5 MG/ML
1 SOLUTION/ DROPS OPHTHALMIC DAILY
Status: DISCONTINUED | OUTPATIENT
Start: 2021-10-02 | End: 2021-10-09 | Stop reason: HOSPADM

## 2021-10-02 RX ORDER — PAROXETINE HYDROCHLORIDE 20 MG/1
20 TABLET, FILM COATED ORAL EVERY MORNING
Status: DISCONTINUED | OUTPATIENT
Start: 2021-10-02 | End: 2021-10-09 | Stop reason: HOSPADM

## 2021-10-02 RX ORDER — LATANOPROST 50 UG/ML
1 SOLUTION/ DROPS OPHTHALMIC NIGHTLY
Status: DISCONTINUED | OUTPATIENT
Start: 2021-10-02 | End: 2021-10-09 | Stop reason: HOSPADM

## 2021-10-02 RX ORDER — PANTOPRAZOLE SODIUM 40 MG/1
40 TABLET, DELAYED RELEASE ORAL
Status: DISCONTINUED | OUTPATIENT
Start: 2021-10-02 | End: 2021-10-09 | Stop reason: HOSPADM

## 2021-10-02 RX ORDER — LEVOTHYROXINE SODIUM 0.12 MG/1
125 TABLET ORAL DAILY
Status: DISCONTINUED | OUTPATIENT
Start: 2021-10-02 | End: 2021-10-09 | Stop reason: HOSPADM

## 2021-10-02 RX ORDER — WHEAT DEXTRIN 3 G/3.8 G
4 POWDER (GRAM) ORAL 2 TIMES DAILY
Status: DISCONTINUED | OUTPATIENT
Start: 2021-10-02 | End: 2021-10-02 | Stop reason: CLARIF

## 2021-10-02 RX ORDER — SODIUM CHLORIDE 9 MG/ML
25 INJECTION, SOLUTION INTRAVENOUS PRN
Status: DISCONTINUED | OUTPATIENT
Start: 2021-10-02 | End: 2021-10-09 | Stop reason: HOSPADM

## 2021-10-02 RX ORDER — CLONAZEPAM 0.5 MG/1
0.25 TABLET ORAL DAILY
Status: DISCONTINUED | OUTPATIENT
Start: 2021-10-02 | End: 2021-10-09 | Stop reason: HOSPADM

## 2021-10-02 RX ORDER — PSEUDOEPHEDRINE HYDROCHLORIDE 30 MG/1
30 TABLET ORAL EVERY 4 HOURS PRN
Status: DISCONTINUED | OUTPATIENT
Start: 2021-10-02 | End: 2021-10-09 | Stop reason: HOSPADM

## 2021-10-02 RX ORDER — QUETIAPINE FUMARATE 50 MG/1
50 TABLET, EXTENDED RELEASE ORAL NIGHTLY
Status: DISCONTINUED | OUTPATIENT
Start: 2021-10-02 | End: 2021-10-02

## 2021-10-02 RX ORDER — HYDROCHLOROTHIAZIDE 25 MG/1
12.5 TABLET ORAL DAILY
Status: DISCONTINUED | OUTPATIENT
Start: 2021-10-02 | End: 2021-10-09 | Stop reason: HOSPADM

## 2021-10-02 RX ORDER — ACETAMINOPHEN 500 MG
500 TABLET ORAL EVERY 6 HOURS PRN
Status: DISCONTINUED | OUTPATIENT
Start: 2021-10-02 | End: 2021-10-09 | Stop reason: HOSPADM

## 2021-10-02 RX ORDER — LORAZEPAM 0.5 MG/1
0.5 TABLET ORAL 2 TIMES DAILY
Status: DISCONTINUED | OUTPATIENT
Start: 2021-10-02 | End: 2021-10-09 | Stop reason: HOSPADM

## 2021-10-02 RX ORDER — TROSPIUM CHLORIDE 20 MG/1
20 TABLET, FILM COATED ORAL
Status: DISCONTINUED | OUTPATIENT
Start: 2021-10-02 | End: 2021-10-09 | Stop reason: HOSPADM

## 2021-10-02 RX ORDER — ZONISAMIDE 100 MG/1
100 CAPSULE ORAL EVERY MORNING
Status: DISCONTINUED | OUTPATIENT
Start: 2021-10-02 | End: 2021-10-09 | Stop reason: HOSPADM

## 2021-10-02 RX ORDER — SODIUM CHLORIDE 0.9 % (FLUSH) 0.9 %
5-40 SYRINGE (ML) INJECTION EVERY 12 HOURS SCHEDULED
Status: DISCONTINUED | OUTPATIENT
Start: 2021-10-02 | End: 2021-10-09 | Stop reason: HOSPADM

## 2021-10-02 RX ORDER — CLOPIDOGREL BISULFATE 75 MG/1
300 TABLET ORAL ONCE
Status: COMPLETED | OUTPATIENT
Start: 2021-10-02 | End: 2021-10-02

## 2021-10-02 RX ORDER — ONDANSETRON 2 MG/ML
4 INJECTION INTRAMUSCULAR; INTRAVENOUS EVERY 6 HOURS PRN
Status: DISCONTINUED | OUTPATIENT
Start: 2021-10-02 | End: 2021-10-09 | Stop reason: HOSPADM

## 2021-10-02 RX ORDER — ONDANSETRON 4 MG/1
4 TABLET, ORALLY DISINTEGRATING ORAL EVERY 8 HOURS PRN
Status: DISCONTINUED | OUTPATIENT
Start: 2021-10-02 | End: 2021-10-09 | Stop reason: HOSPADM

## 2021-10-02 RX ADMIN — LACOSAMIDE 200 MG: 50 TABLET, FILM COATED ORAL at 21:19

## 2021-10-02 RX ADMIN — ASPIRIN 324 MG: 81 TABLET, CHEWABLE ORAL at 03:52

## 2021-10-02 RX ADMIN — SODIUM CHLORIDE, PRESERVATIVE FREE 10 ML: 5 INJECTION INTRAVENOUS at 09:00

## 2021-10-02 RX ADMIN — TAMSULOSIN HYDROCHLORIDE 0.4 MG: 0.4 CAPSULE ORAL at 08:58

## 2021-10-02 RX ADMIN — LORAZEPAM 0.5 MG: 0.5 TABLET ORAL at 08:58

## 2021-10-02 RX ADMIN — LACOSAMIDE 200 MG: 50 TABLET, FILM COATED ORAL at 09:40

## 2021-10-02 RX ADMIN — LATANOPROST 1 DROP: 50 SOLUTION/ DROPS OPHTHALMIC at 21:20

## 2021-10-02 RX ADMIN — SODIUM CHLORIDE, PRESERVATIVE FREE 10 ML: 5 INJECTION INTRAVENOUS at 21:20

## 2021-10-02 RX ADMIN — QUETIAPINE FUMARATE 50 MG: 25 TABLET ORAL at 21:19

## 2021-10-02 RX ADMIN — ATORVASTATIN CALCIUM 80 MG: 80 TABLET, FILM COATED ORAL at 21:20

## 2021-10-02 RX ADMIN — Medication 1 PACKET: at 12:26

## 2021-10-02 RX ADMIN — Medication 1 PACKET: at 21:19

## 2021-10-02 RX ADMIN — IOPAMIDOL 75 ML: 755 INJECTION, SOLUTION INTRAVENOUS at 00:08

## 2021-10-02 RX ADMIN — ASPIRIN 81 MG: 81 TABLET, COATED ORAL at 08:58

## 2021-10-02 RX ADMIN — ENOXAPARIN SODIUM 40 MG: 40 INJECTION SUBCUTANEOUS at 08:59

## 2021-10-02 RX ADMIN — CLOPIDOGREL BISULFATE 300 MG: 75 TABLET ORAL at 12:21

## 2021-10-02 RX ADMIN — PRIMIDONE 50 MG: 50 TABLET ORAL at 21:19

## 2021-10-02 RX ADMIN — LORAZEPAM 0.5 MG: 0.5 TABLET ORAL at 21:19

## 2021-10-02 RX ADMIN — LATANOPROST 1 DROP: 50 SOLUTION/ DROPS OPHTHALMIC at 03:53

## 2021-10-02 RX ADMIN — PANTOPRAZOLE SODIUM 40 MG: 40 TABLET, DELAYED RELEASE ORAL at 08:58

## 2021-10-02 RX ADMIN — LACTULOSE 30 G: 10 SOLUTION ORAL at 21:19

## 2021-10-02 RX ADMIN — SODIUM CHLORIDE, PRESERVATIVE FREE 10 ML: 5 INJECTION INTRAVENOUS at 09:41

## 2021-10-02 RX ADMIN — QUETIAPINE FUMARATE 50 MG: 25 TABLET ORAL at 08:58

## 2021-10-02 RX ADMIN — CLONAZEPAM 0.25 MG: 0.5 TABLET ORAL at 08:58

## 2021-10-02 RX ADMIN — FOLIC ACID 1 MG: 1 TABLET ORAL at 08:58

## 2021-10-02 RX ADMIN — PRIMIDONE 50 MG: 50 TABLET ORAL at 12:23

## 2021-10-02 NOTE — CONSULTS
Neurology Service Consult Note  [facility]    Patient Name: Chari Torres  : 1955        Subjective:   Reason for consult: slurred speech and facial droop  73 y.o. - male with history of cerebral palsy, seizures, and frequent falls presenting to [facility] for slurred speech and facial weakness. History is limited and therefore most information obtained from chart. Sister was concerned patient had a stroke and brought him in. By the time he arrived at the ED it was reported his symptoms had resolved. He was noted to have an elevated blood pressure of 182/83. CT of head and CTA of head and neck were non acute. Patient seen and examined this morning. He states he is wanting to go back to the group home. He states he is back to his baseline. He denies any slurred speech.        Past Medical History:   Diagnosis Date    Anemia     Aspiration pneumonia ([address]     dx 2014 with this per ecf/ per old chart dx with pneumonia with admission 2018    Cerebral palsy ([address]     \"has no feeling on left side of body\"alert but has some dementia but not diagnosed, has good long term but poor short term and wakes up disoriented after a nap\"(per yaneth)()    Depression     Epilepsy ([address]     last seizure 2018- hx grand mal    Frequent falls     with phone assess- family stated pt fell this am (7/10/2013\"in the process of trying to find a facility to help build him back up- ([facility] now [facility])    Glaucoma     \"has been in treatment for this in the past- no treatment needed at present\"    History of IBS     Hx MRSA infection     + nasal culture 2014    Hyperammonemia (HCC)     Hypertension     on Lisinopril    IBS (irritable bowel syndrome)     ulcerative colitis    Kidney stone     for surgery for stent placement 2013    Mood disorder ([address]     per staff at [address] [facility] Occasional tremors     \"makes it difficult for him to write\"    Pressure injury of contiguous region involving back and right buttock, stage 2 ([facility] [address] 5/27/2020    Pressure injury of left buttock, stage 1 7/22/2020    Prostatitis     hx given per H&P old chart    Thyroid disease     Ulcerative colitis ([facility] [address]     hx given per staff at [facility] 4/13/2015    :   Past Surgical History:   Procedure Laterality Date    CHOLECYSTECTOMY      COLONOSCOPY  8/19/14, 5/2012 8/19/14: hemorrhoids, 5/2012 at [place]  02/04/2021    COLONOSCOPY N/A 2/4/2021    COLONOSCOPY POLYPECTOMY SNARE/COLD BIOPSY performed by Leena Ovalles MD at [address] [facility] [address] Left 07/11/2013    with stnet placement   [address]  2005    KIDNEY STONE SURGERY      OTHER SURGICAL HISTORY  04/15/2015    Revision of vagal nerve stimulator    STIMULATOR SURGERY N/A 3/24/2021    STIMULATOR INSERTION performed by Babak Armijo MD at [address] [facility] [address] 3/24/2021    STIMULATOR INSERTION STAGE 2 [facility] 11/13/2018    EGD DILATION BALLOON AND BIOPSY performed by Jeanette Davis MD at [facility]  2002    Has to have bettery changed every 5 yrs.       Medications:  Scheduled Meds:   LORazepam  0.5 mg Oral BID    vitamin D  1,000 Units Oral Daily    folic acid  1 mg Oral Daily    PARoxetine  20 mg Oral QAM    lactobacillus  1 capsule Oral Daily    latanoprost  1 drop Both Eyes Nightly    timolol  1 drop Both Eyes Daily    [Held by provider] amLODIPine  5 mg Oral Daily    clonazePAM  0.25 mg Oral Daily    [Held by provider] divalproex  1,000 mg Oral Nightly    [Held by provider] hydroCHLOROthiazide  12.5 mg Oral Daily    pantoprazole  40 mg Oral QAM AC    primidone  50 mg Oral TID    zonisamide  100 mg Oral QAM    levothyroxine  125 mcg Oral Daily    mirabegron  50 mg Oral Daily    trospium  20 mg Oral BID AC    lactulose  30 g Oral TID    lubiprostone  24 mcg Oral Daily with breakfast    sodium chloride flush  5-40 mL IntraVENous 2 times per day    enoxaparin  40 mg SubCUTAneous Daily    aspirin  81 mg Oral Daily    Or    aspirin  300 mg Rectal Daily    atorvastatin  80 mg Oral Nightly    psyllium  1 packet Oral BID    divalproex  1,000 mg Oral Daily    sodium chloride flush  5-40 mL IntraVENous 2 times per day    lacosamide  200 mg Oral BID    QUEtiapine  50 mg Oral BID    tamsulosin  0.4 mg Oral Daily     Continuous Infusions:   sodium chloride       PRN Meds:.simethicone, guaiFENesin, pseudoephedrine, dicyclomine, acetaminophen, [Held by provider] lacosamide, sodium chloride flush, sodium chloride, ondansetron **OR** ondansetron, polyethylene glycol, labetalol, sodium chloride flush    No Known Allergies  Social History     Socioeconomic History    Marital status: Single     Spouse name: Not on file    Number of children: Not on file    Years of education: Not on file    Highest education level: Not on file   Occupational History    Not on file   Tobacco Use    Smoking status: Never Smoker    Smokeless tobacco: Never Used   Vaping Use    Vaping Use: Never used   Substance and Sexual Activity    Alcohol use: No    Drug use: No    Sexual activity: Not on file   Other Topics Concern    Not on file   Social History Narrative    Not on file     Social Determinants of Health     Financial Resource Strain:     Difficulty of Paying Living Expenses:    Food Insecurity:     Worried About Running Out of Food in the Last Year:     Ran Out of Food in the Last Year:    Transportation Needs:     Lack of Transportation (Medical):      Lack of Transportation (Non-Medical):    Physical Activity:     Days of Exercise per Week:     Minutes of Exercise per Session:    Stress:     Feeling of Stress :    Social Connections:     Frequency of Communication with Friends and Family:     Frequency of Social Gatherings with Friends and Family:     Attends Restorationist Services:     Active Member of Clubs or Organizations:     Attends Club or Organization Meetings:     Marital Status:    Intimate Partner Violence:     Fear of Current or Ex-Partner:     Emotionally Abused:     Physically Abused:     Sexually Abused:       Family History   Problem Relation Age of Onset    Heart Disease Mother         atrial fib    High Blood Pressure Mother     Asthma Mother     High Cholesterol Mother     Depression Mother     Migraines Mother     High Blood Pressure Father     Heart Disease Father     Asthma Sister     High Cholesterol Sister     Depression Sister     Migraines Sister          ROS (10 systems)  In addition to that documented in the HPI above, the additional ROS was obtained:  Constitutional: Denies fevers or chills  Eyes: Denies vision changes  ENMT: Denies sore throat  CV: Denies chest pain  Resp: Denies SOB  GI: Denies vomiting or diarrhea  : Denies painful urination  MSK: Denies recent trauma  Skin: Denies new rashes  Neuro: Denies new numbness or tingling or weakness  Endocrine: Denies unexpected weight loss  Heme: Denies bleeding disorders    Physical Exam:         Wt Readings from Last 3 Encounters:   10/01/21 190 lb 0.6 oz (86.2 kg)   06/29/21 190 lb (86.2 kg)   03/24/21 190 lb (86.2 kg)     Temp Readings from Last 3 Encounters:   10/01/21 98.2 °F (36.8 °C) (Oral)   09/13/21 98.9 °F (37.2 °C)   09/07/21 98 °F (36.7 °C) (Oral)     BP Readings from Last 3 Encounters:   10/02/21 (!) 136/94   09/24/21 120/70   09/13/21 137/61     Pulse Readings from Last 3 Encounters:   10/02/21 64   09/24/21 60   09/13/21 69        Gen: A&O x 3, NAD, cooperative  HEENT: NC/AT, EOMI, PERRL, mmm, no carotid bruits, neck supple, no meningeal signs; Heart: RRR, S1S2  Lungs: respirations unlabored  Ext: no edema, no calf tenderness b/l  Psych: normal mood and affect  Skin: multiple abrasions to legs and knees from fall.      NEUROLOGIC EXAM:    Mental Status: A&O to self, location,  NAD, speech mildly dysarthric but unsure if this is his baseline, language fluent, repetition and naming intact, follows commands appropriately    Cranial Nerve Exam:   CN II-XII:  PERRL, VFF, no nystagmus, no gaze paresis, sensation V1-V3 intact b/l, appears to have left facial weakness that innervates with smiling; hearing intact to conversational tone, palate elevates symmetrically, shoulder elevation symmetric and tongue protrudes midline with movement side to side. Motor Exam:       Strength 4/5 UE's/LE's b/l  Tone and bulk normal   No pronator drift    Deep Tendon Reflexes: 1/4 biceps, triceps, brachioradialis, patellar, and achilles b/l; flexor plantar responses b/l    Sensation: Intact light touch UE's/LE's b/l    Coordination/Cerebellum:       Tremors--none      Rapidly alternating movements: no dysdiadochokinesia b/l                Heel-to-Shin:DIRK      Finger-to-Nose: no dysmetria b/l    Gait and stance:      Gait: deferred      LABS:     Recent Labs     10/01/21  2245 10/01/21  2246   WBC 8.8  --      --    K 3.6  --    CL 97*  --    CO2 29  --    BUN 9  --    CREATININE 0.5*  --    GLUCOSE 128* 120   INR 0.85  --    [email or web address] TSH Results,[unfilled],     Last Liver Function Results:  @LABRCNTIP(ALT:3,AST:3,BILITOTAL:3,BILIDIR:3,ALKPHOS:3)@          IMAGING:    CT of head:  No acute intracranial abnormality.  MRI may be obtained if clinically   indicated.       Chronic nonemergent findings as above     CTA of head and neck:  No acute abnormality on this CTA of the head and neck.       Prior left carotid endarterectomy.       Cystic encephalomalacia in the right MCA distribution consistent with old   infarct. Above imaging personally reviewed. ASSESSMENT/PLAN:   This is a 74 y/o male with history of cerebral palsy, seizures, and frequent falls presenting with slurred speech and right facial weakness.  He does have mild dysarthria and left facial weakness on exam that innervates with smiling. Unsure if this is his baseline. He states he feels back to normal.  No other focal findings on exam  1. Slurred speech with right facial weakness secondary to TIA vs hypertensive urgency vs breakthrough seizure. Symptoms have improved  · CT of head as above  · CTA of head and neck as above  · MRI of brain pending. He is stating he does not want further testing. · We will treat him for TIA and initiate DAPT with ASA and Plavix therapy for 21 days then he will switch to monotherapy of ASA thereafter. He will continue with statin therapy  2. History of seizures   · Continue current home AED regimen:  · Depakote 1000 mg nightly  · Zonegran 100 mg daily  · Vimpat 200 mg  · Tranxene 7.5 mg BID    Thank you for allowing us to participate in the care of your patient. If there are any questions regarding evaluation please feel free to contact us. MARY Weinberg - TONIE, 10/2/2021      ------------------------------------    Attending Note:  I have rounded on this patient with TONIE Montesinos. I have reviewed the chart and we have discussed this case in detail. The patient was seen and examined by myself. Pertinent labs and imaging have been personally reviewed. Our findings and impressions were discussed with the patient. I concur with the Nurse Specialist's assessment and plan.     Warden Jax DO 10/2/2021 12:54 PM

## 2021-10-02 NOTE — ED PROVIDER NOTES
every 5 yrs. FAMILY HISTORY:   Family History   Problem Relation Age of Onset    Heart Disease Mother         atrial fib    High Blood Pressure Mother     Asthma Mother     High Cholesterol Mother     Depression Mother    [facility] Migraines Mother     High Blood Pressure Father     Heart Disease Father     Asthma Sister     High Cholesterol Sister     Depression Sister     Migraines Sister        SOCIAL HISTORY:   Social History     Socioeconomic History    Marital status: Single     Spouse name: Not on file    Number of children: Not on file    Years of education: Not on file    Highest education level: Not on file   Occupational History    Not on file   Tobacco Use    Smoking status: Never Smoker    Smokeless tobacco: Never Used   Vaping Use    Vaping Use: Never used   Substance and Sexual Activity    Alcohol use: No    Drug use: No    Sexual activity: Not on file   Other Topics Concern    Not on file   Social History Narrative    Not on file     Social Determinants of Health     Financial Resource Strain:     Difficulty of Paying Living Expenses:    Food Insecurity:     Worried About Running Out of Food in the Last Year:     Ran Out of Food in the Last Year:    Transportation Needs:     Lack of Transportation (Medical):  Lack of Transportation (Non-Medical):    Physical Activity:     Days of Exercise per Week:     Minutes of Exercise per Session:    Stress:     Feeling of Stress :    Social Connections:     Frequency of Communication with Friends and Family:     Frequency of Social Gatherings with Friends and Family:     Attends Oriental orthodox Services:     Active Member of Clubs or Organizations:     Attends Club or Organization Meetings:     Marital Status:    Intimate Partner Violence:     Fear of Current or Ex-Partner:     Emotionally Abused:     Physically Abused:     Sexually Abused: ALLERGIES: Patient has no known allergies.     PHYSICAL EXAM:  VITAL SIGNS: ED Triage Vitals [10/01/21 2101]   Enc Vitals Group      BP (!) 182/83      Pulse 73      Resp 18      Temp 98.2 °F (36.8 °C)      Temp Source Oral      SpO2 100 %      Weight       Height       Head Circumference       Peak Flow       Pain Score       Pain Loc       Pain Edu? Excl. in [address]? Constitutional:  Non-toxic appearance  HENT: Normocephalic, Atraumatic, Bilateral external ears normal, Oropharynx moist, No oral exudates, Nose normal.  Eyes:  PERRL, EOMI, Conjunctiva normal, No discharge. Neck: Normal range of motion, No tenderness, Supple, No stridor, No lymphadenopathy. Cardiovascular:  Normal heart rate, Normal rhythm  Pulmonary/Chest:  Normal breath sounds, No respiratory distress, No wheezing  Abdomen:   Bowel sounds normal, Soft, No tenderness, No masses, No pulsatile masses  Back:  No tenderness, No CVA tenderness  Extremities:  Normal range of motion, Intact distal pulses, No edema, No tenderness  Neurologic:  Alert & oriented x 3, speech clear, cranial nerves II to XII intact, 5 out of 5 strength in all 4 extremities,  Sensation intact to light touch throughout, No focal deficits  Skin:  Warm, Dry, No erythema, No rash      EKG Interpretation  Interpreted by me  Compared to 11/23/2020  Rhythm: normal sinus  Rate: normal 70  Axis: normal  Ectopy: none  Conduction: right bundle branch block  ST Segments: no acute change  T Waves: no acute change  Clinical Impression: Normal sinus rhythm, chronic right bundle branch block, no acute change    Cardiac Monitor Strip Interpretation  Interpreted by me  Monitor strip interpreted for greater than 10 seconds  Rhythm: normal sinus  Rate: normal  Ectopy: none  ST Segments: normal      Radiology / Procedures:      CTA HEAD NECK W CONTRAST (Final result)  Result time 10/02/21 00:39:11  Procedure changed from [facility]  Final result by Zack Pérez (10/02/21 00:39:11)                Impression:    No acute abnormality on this CTA of the head and neck. Prior left carotid endarterectomy. Cystic encephalomalacia in the right MCA distribution consistent with old   infarct. Narrative:    EXAMINATION:   CTA OF THE HEAD AND NECK WITH CONTRAST 10/2/2021 12:07 am:     TECHNIQUE:   CTA of the head and neck was performed with the administration of intravenous   contrast. Multiplanar reformatted images are provided for review.  MIP images   are provided for review. Stenosis of the internal carotid arteries measured   using NASCET criteria. Dose modulation, iterative reconstruction, and/or   weight based adjustment of the mA/kV was utilized to reduce the radiation   dose to as low as reasonably achievable. COMPARISON:   Concurrently performed CT head. HISTORY:   ORDERING SYSTEM PROVIDED HISTORY: Facial droop   TECHNOLOGIST PROVIDED HISTORY:   Reason for exam:->Facial droop   Reason for Exam: Facial droop     FINDINGS:     CTA NECK:     AORTIC ARCH/ARCH VESSELS: No dissection or arterial injury.  No significant   stenosis of the brachiocephalic or subclavian arteries. CAROTID ARTERIES: No dissection, arterial injury, or hemodynamically   significant stenosis by NASCET criteria.  The patient is status post left   carotid endarterectomy. VERTEBRAL ARTERIES: No dissection, arterial injury, or significant stenosis. SOFT TISSUES: The lung apices are clear.  No cervical or superior mediastinal   lymphadenopathy.  The larynx and pharynx are unremarkable.  No acute   abnormality of the salivary and thyroid glands. BONES: No acute osseous abnormality. CTA HEAD:     ANTERIOR CIRCULATION: No significant stenosis of the intracranial internal   carotid, anterior cerebral, or middle cerebral arteries.  There is decreased   perfusion in right MCA distribution compatible with the large old infarct in   this area.  No aneurysm.      POSTERIOR CIRCULATION: No significant stenosis of the vertebral, basilar, or   posterior cerebral TISSUES/SKULL:  No acute abnormality of the visualized skull or soft   tissues.                     XR CHEST PORTABLE (Preliminary result)  Result time 10/01/21 23:17:42  Preliminary result by Anni Roger MD (10/01/21 23:17:42)                Impression:    1. No acute cardiopulmonary disease. Labs Reviewed   CBC WITH AUTO DIFFERENTIAL - Abnormal; Notable for the following components:       Result Value    MCH 31.1 (*)     Segs Relative 67.5 (*)     Lymphocytes % 17.2 (*)     Monocytes % 13.3 (*)     Immature Neutrophil % 0.6 (*)     All other components within normal limits   COMPREHENSIVE METABOLIC PANEL W/ REFLEX TO MG FOR LOW K - Abnormal; Notable for the following components:    Chloride 97 (*)     CREATININE 0.5 (*)     Glucose 128 (*)     AST 13 (*)     All other components within normal limits   PROTIME-INR - Abnormal; Notable for the following components:    Protime 11.0 (*)     All other components within normal limits   POCT GLUCOSE - Abnormal; Notable for the following components:    POC Glucose 120 (*)     All other components within normal limits       ED COURSE & MEDICAL DECISION MAKING:  Pertinent Labs & Imaging studies reviewed. (See chart for details)  On exam, the patient is afebrile and nontoxic appearing. He is hemodynamically stable and neurologically intact. NIH stroke scale is 0. A stroke alert was not called as the patient's symptoms have resolved. EKG shows a normal sinus rhythm with no ST elevation or depression. Labs are obtained and there are no clinically significant lab abnormalities. CT head is negative. CTA head and neck are negative for acute abnormality. The patient was treated with aspirin. He was given his home meds that he was due for. I suspect that the patient had a TIA. I have a low suspicion for persistent neurological deficits, meningitis, encephalitis, intracranial hemorrhage, brain mass or seizure.  I recommended admission to the hospital and the patient was agreeable. I discussed the case with the hospitalist, Dr. Yuli Lopez, who will admit the patient for further treatment and care. The patient is currently in stable condition awaiting admission. Clinical Impression:  1. TIA (transient ischemic attack)    2. Hypertension, unspecified type          Comment: Please note this report has been produced using speech recognition software and may contain errors related to that system including errors in grammar, punctuation, and spelling, as well as words and phrases that may be inappropriate. If there are any questions or concerns please feel free to contact the dictating provider for clarification.         Mony Kennedy MD  10/02/21 3017

## 2021-10-02 NOTE — PROGRESS NOTES
Speech Language Pathology  Facility/Department: [address] [place] [address] EMERGENCY DEPARTMENT   CLINICAL BEDSIDE SWALLOW EVALUATION    NAME: Aleksandr Colón  : 1955  MRN: [id number]    ADMISSION DATE: 10/1/2021  ADMITTING DIAGNOSIS: has Cerebral palsy, infantile hemiplegia ([address] Rosacea, acne; IBS (irritable bowel syndrome); Hypertension; Calculus of ureter; Pyelonephritis; Sepsis ([address] Pneumonia; Increased ammonia level; Aspiration pneumonia ([address] Hypokalemia; Hypomagnesemia; Hypophosphatasia; Seizure disorder ([address] Seizure disorder, grand mal ([address] Sepsis due to undetermined organism with acute respiratory failure ([address] Pain of upper abdomen; Diarrhea of presumed infectious origin; Abdominal pain; Pressure injury of contiguous region involving back and right buttock, stage 2 ([address] Cellulitis, perineum; Pressure injury of right buttock, stage 2 ([address] Pressure injury of left buttock, stage 1; COVID-19 virus detected; Hyponatremia; Multifocal pneumonia; and TIA (transient ischemic attack) on their problem list.     Impression  Dysphagia Diagnosis: Mild to moderate pharyngeal stage dysphagia;Mild to moderate oral stage dysphagia  Dysphagia Outcome Severity Scale: Level 4: Mild moderate dysphagia- Intermittent supervision/cueing. One - two diet consistencies restricted     Aleksandr Colón was seen for a clinical bedside swallow evaluation following admission for stroke-like symptoms including right facial droop and \"slurred\" speech. H/o Cerebral palsy, seizures, and TIA. Pt was seen in the ED (CARLOS for privacy) and was alert, pleasant, and cooperative. He reports that he has no difficulty swallowing and is able to eat a regular diet and thin liquids. Pt's sister was on the line on pt's cell phone and reports he is tolerating a Minced and moist diet and Thin liquids at his group home currently. Pt seen for OP MBSS x 2 at [facility] with the most recent completed on 20. Meds in puree  Recommendations: Dysphagia treatment  Therapeutic Interventions: Diet tolerance monitoring;Patient/Family education    Compensatory Swallowing Strategies  Compensatory Swallowing Strategies: Upright as possible for all oral intake;Eat/Feed slowly; External pacing    Treatment/Goals  Short-term Goals  Timeframe for Short-term Goals: 1 week  Goal 1: Pt will tolerate Minced/moist diet and Thin liquids by Cup with strict aspiration precautions for all PO intake 100%  Goal 2: Careagivers will use strict aspiration precautions for all PO intake 100%  Goal 3: Caregivers will provide cues/supervision for external pacing with liquid intake 100%  Long-term Goals  Timeframe for Long-term Goals: n/a    General  Chart Reviewed: Yes  Behavior/Cognition: Alert; Cooperative;Pleasant mood  Respiratory Status: Room air  O2 Device: None (Room air)  Communication Observation: Functional  Follows Directions: Complex  Dentition: Edentulous  Patient Positioning: Upright in bed  Baseline Vocal Quality: Normal  Volitional Cough:  (strong throat clear, unable to elicit volitional cough)  Volitional Swallow:  (intact)  Prior Dysphagia History: remote h/o silent aspiration thins, MBSS 6/16/20 rx minced/moist and thins  Consistencies Administered: Reg solid; Dysphagia Pureed (Dysphagia I); Thin - cup;Nectar - cup    Vision/Hearing  Vision  Vision: Within Functional Limits  Hearing  Hearing: Within functional limits    Oral Motor Deficits  Oral/Motor  Oral Motor:  Within functional limits    Oral Phase Dysfunction  Oral Phase  Oral Phase: WFL     Indicators of Pharyngeal Phase Dysfunction   Pharyngeal Phase  Pharyngeal Phase: Exceptions    Prognosis  Prognosis  Prognosis for safe diet advancement: good  Barriers to reach goals: cognitive deficits  Individuals consulted  Consulted and agree with results and recommendations: Patient;RN    Education  Patient Education: results and recommendations  Patient Education Response: Irene understanding;Needs reinforcement  Safety Devices in place: Yes  Type of devices: Left in bed; All fall risk precautions in place       Therapy Time  SLP Individual Minutes  Time In: 1000  Time Out: [address]  Minutes: [address] [place], [place]  10/2/2021 10:46 AM

## 2021-10-02 NOTE — H&P
HISTORY AND PHYSICAL  (Hospitalist, Internal Medicine)  IDENTIFYING INFORMATION   PATIENT:  Jena Canales  MRN:  [id number]  ADMIT DATE: 10/1/2021  TIME OF EVALUATION: 10/2/2021 1:42 AM    CHIEF COMPLAINT     Strokelike activity  HISTORY OF PRESENT ILLNESS   Jena Canales is a 73 y.o. male admitted for strokelike activity that occurred yesterday, with a right facial droop and slurred speech in the group home, had resolved by the time patient came to the ED. Sister was worried that patient may have had a stroke, discussed with sister could be a TIA. Pt otherwise has no complaints of CP, SOB, dizziness, N/V/C/D, abdominal pain, dysuria, joint pains, rash/boils, or fevers. However patient is a poor historian, did have to call sister in the morning to get more formation, sister does not have his full med list, unable to do full med reconciliation, will need to consult pharmacy in the morning.       PMH listed below:    PAST MEDICAL, SURGICAL, FAMILY, and SOCIAL HISTORY     Past Medical History:   Diagnosis Date    Anemia     Aspiration pneumonia ([address]     dx 6/9/2014 with this per ecf/ per old chart dx with pneumonia with admission 9/18/2018    Cerebral palsy ([address]     \"has no feeling on left side of body\"alert but has some dementia but not diagnosed, has good long term but poor short term and wakes up disoriented after a nap\"(per yaneth)(2014)    Depression     Epilepsy ([address] 1961    last seizure 2/2018- hx grand mal    Frequent falls     with phone assess- family stated pt fell this am (7/10/2013\"in the process of trying to find a facility to help build him back up- (pt now at [facility], [facility])    Glaucoma     \"has been in treatment for this in the past- no treatment needed at present\"    History of IBS     Hx MRSA infection     + nasal culture 4/2014    Hyperammonemia (HCC)     Hypertension     on Lisinopril    IBS (irritable bowel syndrome)     ulcerative colitis    Kidney stone     for surgery for stent placement 7/11/2013    Mood disorder ([facility]     per staff at [facility] Occasional tremors     \"makes it difficult for him to write\"    Pressure injury of contiguous region involving back and right buttock, stage 2 ([facility] 5/27/2020    Pressure injury of left buttock, stage 1 7/22/2020    Prostatitis     hx given per H&P old chart    Thyroid disease     Ulcerative colitis ([facility]     hx given per staff at [facility] 4/13/2015     Past Surgical History:   Procedure Laterality Date    CHOLECYSTECTOMY      COLONOSCOPY  8/19/14, 5/2012 8/19/14: hemorrhoids, 5/2012 at [place]  02/04/2021    COLONOSCOPY N/A 2/4/2021    COLONOSCOPY POLYPECTOMY SNARE/COLD BIOPSY performed by Rosette Forte MD at [address] 07/11/2013    with stnet placement   [address] [place] [address]      OTHER SURGICAL HISTORY  04/15/2015    Revision of vagal nerve stimulator    STIMULATOR SURGERY N/A 3/24/2021    STIMULATOR INSERTION performed by Chantelle Mrash MD at [address] [place] [address] [address] 3/24/2021    STIMULATOR INSERTION STAGE 2 performed by Chantelle Marsh MD at [address] [place] [address] 11/13/2018    EGD DILATION BALLOON AND BIOPSY performed by Suki Mccullough MD at [place] [address]    Has to have bettery changed every 5 yrs.       Family History   Problem Relation Age of Onset    Heart Disease Mother         atrial fib    High Blood Pressure Mother     Asthma Mother     High Cholesterol Mother     Depression Mother    [employer] Mother     High Blood Pressure Father     Heart Disease Father     Asthma Sister     High Cholesterol Sister     Depression Sister    [employer] Sister      Family Hx of HTN  Family Hx as reviewed above, otherwise non-contributory  Social History     Socioeconomic History    Marital status: Single     Spouse name: None    Number of children: None    Years of education: None    Highest education level: None   Occupational History    None   Tobacco Use    Smoking status: Never Smoker    Smokeless tobacco: Never Used   Vaping Use    Vaping Use: Never used   Substance and Sexual Activity    Alcohol use: No    Drug use: No    Sexual activity: None   Other Topics Concern    None   Social History Narrative    None     Social Determinants of Health     Financial Resource Strain:     Difficulty of Paying Living Expenses:    Food Insecurity:     Worried About Running Out of Food in the Last Year:     Ran Out of Food in the Last Year:    Transportation Needs:     Lack of Transportation (Medical):  Lack of Transportation (Non-Medical):    Physical Activity:     Days of Exercise per Week:     Minutes of Exercise per Session:    Stress:     Feeling of Stress :    Social Connections:     Frequency of Communication with Friends and Family:     Frequency of Social Gatherings with Friends and Family:     Attends Christianity Services:     Active Member of Clubs or Organizations:     Attends Club or Organization Meetings:     Marital Status:    Intimate Partner Violence:     Fear of Current or Ex-Partner:     Emotionally Abused:     Physically Abused:     Sexually Abused:        MEDICATIONS   Medications Prior to Admission  Not in a hospital admission.     Current Medications  Current Facility-Administered Medications   Medication Dose Route Frequency Provider Last Rate Last Admin    aspirin chewable tablet 324 mg  324 mg Oral Once David Allison MD        latanoprost (XALATAN) 0.005 % ophthalmic solution 1 drop  1 drop Both Eyes Once David Allison MD        divalproex (DEPAKOTE ER) extended release tablet 1,000 mg  1,000 mg Oral Daily David Allison MD        sodium chloride flush 0.9 % injection 5-40 mL  5-40 mL IntraVENous 2 times per day David Allison MD   5 mL at 10/01/21 7940    sodium chloride flush 0.9 % injection 5-40 mL  5-40 mL IntraVENous PRN Janice Simms MD Jeremy        0.9 % sodium chloride infusion  25 mL IntraVENous PRN Filiberto Kumari MD        lacosamide (VIMPAT) tablet 200 mg  200 mg Oral BID Filiberto Kumari MD   200 mg at 10/01/21 2302    QUEtiapine (SEROQUEL) tablet 50 mg  50 mg Oral BID Filiberto Kumari MD   50 mg at 10/01/21 2301    tamsulosin (FLOMAX) capsule 0.4 mg  0.4 mg Oral Daily Filiberto Kumari MD         Current Outpatient Medications   Medication Sig Dispense Refill    AMITIZA 24 MCG capsule       mirabegron (MYRBETRIQ) 50 MG TB24 Take 50 mg by mouth daily      solifenacin (VESICARE) 10 MG tablet Take 10 mg by mouth daily      Clotrimazole POWD Apply to groin twice a day 1 Bottle 0    acetaminophen (APAP EXTRA STRENGTH) 500 MG tablet Take 1 tablet by mouth every 6 hours as needed for Pain 30 tablet 0    levothyroxine (SYNTHROID) 125 MCG tablet Take 1 tablet by mouth Daily 30 tablet 3    dicyclomine (BENTYL) 10 MG capsule Take 1 capsule by mouth every 6 hours as needed (cramps) 20 capsule 0    amLODIPine (NORVASC) 5 MG tablet Take 5 mg by mouth daily      clonazePAM (KLONOPIN) 0.5 MG tablet Take 0.25 mg by mouth daily.       divalproex (DEPAKOTE) 500 MG DR tablet Take 1,000 mg by mouth nightly      hydrochlorothiazide (MICROZIDE) 12.5 MG capsule Take 12.5 mg by mouth daily      omeprazole (PRILOSEC) 20 MG delayed release capsule Take 20 mg by mouth 2 times daily (before meals)      primidone (MYSOLINE) 50 MG tablet Take 50 mg by mouth 3 times daily      zonisamide (ZONEGRAN) 100 MG capsule Take 100 mg by mouth every morning      Simethicone (MI-ACID GAS RELIEF PO) Take 1 Container by mouth every 4 hours as needed 10 cc every 4 hrs not to exceed 60 cc in 24 hrs      guaiFENesin (MUCINEX) 600 MG extended release tablet Take 1,200 mg by mouth daily as needed for Congestion      Menthol-Methyl Salicylate (MUSCLE RUB) 10-15 % CREA cream Apply 1 applicator topically as needed      pseudoephedrine (SUDAFED) 30 MG tablet Take 30 mg by mouth every 4 hours as needed for Congestion (Patient not taking: Reported on 9/24/2021)      lacosamide (VIMPAT) 200 MG tablet Take 200 mg by mouth as needed. Give 1 tab after seizure activity as needed      lacosamide (VIMPAT) 200 MG tablet Take 1 tablet by mouth 2 times daily for 30 days. 60 tablet 0    latanoprost (XALATAN) 0.005 % ophthalmic solution Place 1 drop into both eyes nightly      timolol (TIMOPTIC) 0.5 % ophthalmic solution Place 1 drop into both eyes daily       Lactobacillus (ACIDOPHILUS) CAPS capsule Take 1 capsule by mouth daily      aspirin 81 MG tablet Take 81 mg by mouth daily      Wheat Dextrin (BENEFIBER) POWD Take 4 g by mouth 2 times daily Takes 3 tsp in liquid twice per day       Lactulose Encephalopathy (ENULOSE PO) Take 30 mLs by mouth 3 times daily      folic acid (FOLVITE) 1 MG tablet Take 1 mg by mouth daily      PARoxetine (PAXIL) 20 MG tablet Take 20 mg by mouth every morning      QUEtiapine (SEROQUEL XR) 50 MG extended release tablet Take 50 mg by mouth nightly      tamsulosin (FLOMAX) 0.4 MG capsule Take 1 capsule by mouth daily 30 capsule 0    simvastatin (ZOCOR) 20 MG tablet Take 20 mg by mouth nightly.  Cholecalciferol (VITAMIN D3) 1000 UNITS CAPS Take 1 tablet by mouth daily.  clorazepate (TRANXENE) 7.5 MG tablet Take 7.5 mg by mouth 2 times daily. Allergies  No Known Allergies    REVIEW OF SYSTEMS   Within above limitations. 14 point review of systems reviewed. Pertinent positive or negative as per HPI or otherwise negative per 14 point systems review. PHYSICAL EXAM     Wt Readings from Last 3 Encounters:   10/01/21 190 lb 0.6 oz (86.2 kg)   06/29/21 190 lb (86.2 kg)   03/24/21 190 lb (86.2 kg)       Blood pressure (!) 171/82, pulse 72, temperature 98.2 °F (36.8 °C), temperature source Oral, resp. rate 18, height 5' 5.98\" (1.676 m), weight 190 lb 0.6 oz (86.2 kg), SpO2 100 %.     General - AAO x3, day of week, hospital, and self, not to situation, otherwise has poor insight  Psych - Appropriate affect/speech. No agitation  Eyes - Eye lids intact. No scleral icterus  ENT - Lips wnl. External ear clear/dry/intact. No thyromegaly on inspection  Neuro - No gross peripheral or central neuro deficits on inspection, no lateralization, 5 out of 5 flexion at bilateral hips, 5 out of 5 extension bilateral arms, 5 out of 5 flexion at elbows bilaterally, symmetric, no facial droop, tongue not deviated, able to speak in full sentences without slurring  Heart - Sinus. RRR. S1 and S2 present. No added HS/murmurs appreciated. No elevated JVD appreciated  Lung - Adequate air entry b/l, No crackles/wheezes appreciated  GI - Soft. No guarding/rigidity. No hepatosplenomegaly/ascites. BS+   - No CVA/suprapubic tenderness or palpable bladder distension  Skin - Intact. No rash/petechiae/ecchymosis. Warm extremities  MSK - Joints with normal ROM.  No joint swellings    Lines/Drains/Airways/Wounds:  [unfilled]    LABS AND IMAGING   CBC  [unfilled]    Last 3 Hemoglobin  Lab Results   Component Value Date    HGB 15.9 10/01/2021    HGB 14.4 05/21/2021    HGB 14.4 12/07/2020     Last 3 WBC/ANC  Lab Results   Component Value Date    WBC 8.8 10/01/2021    WBC 5.5 05/21/2021    WBC 9.8 12/07/2020     No components found for: GRNLOCTYABS  Last 3 Platelets  No results found for: PLATELET  Chemistry  [unfilled]  [unfilled]  Lab Results   Component Value Date     11/23/2020     Coagulation Studies  Lab Results   Component Value Date    INR 0.85 10/01/2021     Liver Function Studies  Lab Results   Component Value Date    ALT 22 10/01/2021    AST 13 10/01/2021    ALKPHOS 102 10/01/2021       Recent Imaging        Relevant labs and imaging reviewed    ASSESSMENT AND PLAN   Agustin Adan is a 73 y.o. male p/w    Facial droop resolved possibly d/t  TIA/ mini stroke:  Admit under observation on telemetry  NPO until swallow evaluation  MRI of the brain carotid Doppler and echocardiogram  Neuro consult  Neuro check every 4 hours  Permissive hypertension during the first 24 to 48 hours to keep blood pressure systolic 374-863 mmHg  Continue on aspirin statin and Plavix  No TPA due to low NIH score   No focal motor neurological deficit on examination      Chronic Illnesses:         - Cerebral palsy        - seizure disorder - c/w Depakote, Vimpat, seizure precutions        - thyroid disease -on levothyroxine        - hypertension -on Norvasc, holding hydrochlorothiazide due to low Na and K+        - HLD -on Zocor        - GERD -on PPI        - IBS/Ulcerative colitis        - anemia        - depression -on Paxil        - MRSA -contact isolation    Case d/w ED provider    DVT ppx: lovenox  Code status: full    [facility], Internal Medicine  10/2/2021 at 1:42 AM     [phone] Cell for family    Consult pharmacy for med reconciliation

## 2021-10-02 NOTE — ED TRIAGE NOTES
Pt arrived via EMS for hypertension.  Staff reports high blood pressure during routine vital check, pt has no complaints

## 2021-10-02 NOTE — PLAN OF CARE
Date & Location [facility]    Discussed with Nikia Hughes  prognosis, expected outcome with or without ongoing aggressive treatments and the options for de-escalation of care. Assessed patient specific goals and addressed the best way to achieve them. Initially sister stated that she wanted patient to be DNR, however states that she is okay with resuscitation, given that could be reversible conditions. Explained condition such as aspiration pneumonia, large stroke, and other possibilities that could lead to intubation/resuscitation, at this time Ton White states that she wants her brother be full code. She is the power of . Diagnosis(es)  TIA, CP    Prognosis fair    Code Status full    Goals of Care discussion was performed during the course of treatment to decide on type of care right for this patient. Patient/surrogates consented to discussion.      Total Time Spent on the phone from due to family unable to present to hospital) addressing advance care planning with the surrogate decision maker: 18  minutes

## 2021-10-02 NOTE — ED NOTES
Report received from [facility]. Pt denies needs/symptoms.       Kg Patterson RN  10/01/21 7234

## 2021-10-02 NOTE — ED NOTES
[facility], [phone]. Home manager, [phone] [address] [place] [address]  Requests to be notified if admitted, if home meds are given, and if meds are not given, if it can be written on his discharge papers if he can take home night meds once sent back to home.       Madonna Stockton RN  10/01/21 8356

## 2021-10-02 NOTE — PROGRESS NOTES
Patient admitted earlier today for TIA. CT head no acute CVA. Was seen by Neurology earlier today. Patient states he is at his baseline and wants to go home. I discussed with his POA who states patient should stay and continue his work up. He agrees to have MRI brain done. Vitals are stable. /79.

## 2021-10-03 LAB
ALBUMIN SERPL-MCNC: 3.9 GM/DL (ref 3.4–5)
ALP BLD-CCNC: 107 IU/L (ref 40–128)
ALT SERPL-CCNC: 20 U/L (ref 10–40)
ANION GAP SERPL CALCULATED.3IONS-SCNC: 11 MMOL/L (ref 4–16)
AST SERPL-CCNC: 14 IU/L (ref 15–37)
BILIRUB SERPL-MCNC: 0.2 MG/DL (ref 0–1)
BUN BLDV-MCNC: 9 MG/DL (ref 6–23)
CALCIUM SERPL-MCNC: 9.4 MG/DL (ref 8.3–10.6)
CHLORIDE BLD-SCNC: 102 MMOL/L (ref 99–110)
CHOLESTEROL: 172 MG/DL
CO2: 24 MMOL/L (ref 21–32)
CREAT SERPL-MCNC: 0.7 MG/DL (ref 0.9–1.3)
ESTIMATED AVERAGE GLUCOSE: 103 MG/DL
GFR AFRICAN AMERICAN: >60 ML/MIN/1.73M2
GFR NON-AFRICAN AMERICAN: >60 ML/MIN/1.73M2
GLUCOSE BLD-MCNC: 143 MG/DL (ref 70–99)
HBA1C MFR BLD: 5.2 % (ref 4.2–6.3)
HCT VFR BLD CALC: 46.2 % (ref 42–52)
HDLC SERPL-MCNC: 51 MG/DL
HEMOGLOBIN: 15.6 GM/DL (ref 13.5–18)
LDL CHOLESTEROL DIRECT: 86 MG/DL
MCH RBC QN AUTO: 30.6 PG (ref 27–31)
MCHC RBC AUTO-ENTMCNC: 33.8 % (ref 32–36)
MCV RBC AUTO: 90.8 FL (ref 78–100)
PDW BLD-RTO: 12.3 % (ref 11.7–14.9)
PLATELET # BLD: 206 K/CU MM (ref 140–440)
PMV BLD AUTO: 10.5 FL (ref 7.5–11.1)
POTASSIUM SERPL-SCNC: 3.6 MMOL/L (ref 3.5–5.1)
RBC # BLD: 5.09 M/CU MM (ref 4.6–6.2)
SODIUM BLD-SCNC: 137 MMOL/L (ref 135–145)
TOTAL PROTEIN: 7.4 GM/DL (ref 6.4–8.2)
TRIGL SERPL-MCNC: 196 MG/DL
TROPONIN T: <0.01 NG/ML
WBC # BLD: 8.2 K/CU MM (ref 4–10.5)

## 2021-10-03 PROCEDURE — 80061 LIPID PANEL: CPT

## 2021-10-03 PROCEDURE — 94761 N-INVAS EAR/PLS OXIMETRY MLT: CPT

## 2021-10-03 PROCEDURE — 2140000000 HC CCU INTERMEDIATE R&B

## 2021-10-03 PROCEDURE — 97162 PT EVAL MOD COMPLEX 30 MIN: CPT

## 2021-10-03 PROCEDURE — 84484 ASSAY OF TROPONIN QUANT: CPT

## 2021-10-03 PROCEDURE — 83036 HEMOGLOBIN GLYCOSYLATED A1C: CPT

## 2021-10-03 PROCEDURE — 6370000000 HC RX 637 (ALT 250 FOR IP): Performed by: INTERNAL MEDICINE

## 2021-10-03 PROCEDURE — 2580000003 HC RX 258: Performed by: INTERNAL MEDICINE

## 2021-10-03 PROCEDURE — 83721 ASSAY OF BLOOD LIPOPROTEIN: CPT

## 2021-10-03 PROCEDURE — 97112 NEUROMUSCULAR REEDUCATION: CPT

## 2021-10-03 PROCEDURE — 97535 SELF CARE MNGMENT TRAINING: CPT

## 2021-10-03 PROCEDURE — 97166 OT EVAL MOD COMPLEX 45 MIN: CPT

## 2021-10-03 PROCEDURE — 36415 COLL VENOUS BLD VENIPUNCTURE: CPT

## 2021-10-03 PROCEDURE — 80053 COMPREHEN METABOLIC PANEL: CPT

## 2021-10-03 PROCEDURE — 85027 COMPLETE CBC AUTOMATED: CPT

## 2021-10-03 PROCEDURE — 6370000000 HC RX 637 (ALT 250 FOR IP): Performed by: CLINICAL NURSE SPECIALIST

## 2021-10-03 PROCEDURE — 6370000000 HC RX 637 (ALT 250 FOR IP): Performed by: EMERGENCY MEDICINE

## 2021-10-03 PROCEDURE — 6360000002 HC RX W HCPCS: Performed by: INTERNAL MEDICINE

## 2021-10-03 RX ADMIN — QUETIAPINE FUMARATE 50 MG: 25 TABLET ORAL at 20:57

## 2021-10-03 RX ADMIN — Medication 1000 UNITS: at 08:48

## 2021-10-03 RX ADMIN — FOLIC ACID 1 MG: 1 TABLET ORAL at 08:48

## 2021-10-03 RX ADMIN — ATORVASTATIN CALCIUM 80 MG: 80 TABLET, FILM COATED ORAL at 20:57

## 2021-10-03 RX ADMIN — LACTULOSE 30 G: 10 SOLUTION ORAL at 08:48

## 2021-10-03 RX ADMIN — LORAZEPAM 0.5 MG: 0.5 TABLET ORAL at 08:49

## 2021-10-03 RX ADMIN — Medication 1 CAPSULE: at 08:49

## 2021-10-03 RX ADMIN — PRIMIDONE 50 MG: 50 TABLET ORAL at 20:57

## 2021-10-03 RX ADMIN — TIMOLOL MALEATE 1 DROP: 5 SOLUTION OPHTHALMIC at 08:52

## 2021-10-03 RX ADMIN — DIVALPROEX SODIUM 1000 MG: 500 TABLET, FILM COATED, EXTENDED RELEASE ORAL at 08:47

## 2021-10-03 RX ADMIN — TROSPIUM CHLORIDE 20 MG: 20 TABLET ORAL at 15:40

## 2021-10-03 RX ADMIN — LACTULOSE 30 G: 10 SOLUTION ORAL at 15:40

## 2021-10-03 RX ADMIN — CLOPIDOGREL BISULFATE 75 MG: 75 TABLET, FILM COATED ORAL at 08:49

## 2021-10-03 RX ADMIN — CLONAZEPAM 0.25 MG: 0.5 TABLET ORAL at 08:47

## 2021-10-03 RX ADMIN — SODIUM CHLORIDE, PRESERVATIVE FREE 10 ML: 5 INJECTION INTRAVENOUS at 20:58

## 2021-10-03 RX ADMIN — ASPIRIN 81 MG: 81 TABLET, COATED ORAL at 08:48

## 2021-10-03 RX ADMIN — PRIMIDONE 50 MG: 50 TABLET ORAL at 15:42

## 2021-10-03 RX ADMIN — LACOSAMIDE 200 MG: 50 TABLET, FILM COATED ORAL at 08:47

## 2021-10-03 RX ADMIN — LATANOPROST 1 DROP: 50 SOLUTION/ DROPS OPHTHALMIC at 21:04

## 2021-10-03 RX ADMIN — TAMSULOSIN HYDROCHLORIDE 0.4 MG: 0.4 CAPSULE ORAL at 08:49

## 2021-10-03 RX ADMIN — LORAZEPAM 0.5 MG: 0.5 TABLET ORAL at 20:57

## 2021-10-03 RX ADMIN — Medication 1 PACKET: at 08:48

## 2021-10-03 RX ADMIN — Medication 1 PACKET: at 20:57

## 2021-10-03 RX ADMIN — ENOXAPARIN SODIUM 40 MG: 40 INJECTION SUBCUTANEOUS at 08:50

## 2021-10-03 RX ADMIN — LACTULOSE 30 G: 10 SOLUTION ORAL at 20:57

## 2021-10-03 RX ADMIN — PANTOPRAZOLE SODIUM 40 MG: 40 TABLET, DELAYED RELEASE ORAL at 06:12

## 2021-10-03 RX ADMIN — PRIMIDONE 50 MG: 50 TABLET ORAL at 08:49

## 2021-10-03 RX ADMIN — LEVOTHYROXINE SODIUM 125 MCG: 125 TABLET ORAL at 06:12

## 2021-10-03 RX ADMIN — TROSPIUM CHLORIDE 20 MG: 20 TABLET ORAL at 06:12

## 2021-10-03 RX ADMIN — QUETIAPINE FUMARATE 50 MG: 25 TABLET ORAL at 08:48

## 2021-10-03 RX ADMIN — LACOSAMIDE 200 MG: 50 TABLET, FILM COATED ORAL at 20:57

## 2021-10-03 RX ADMIN — PAROXETINE HYDROCHLORIDE 20 MG: 20 TABLET, FILM COATED ORAL at 08:48

## 2021-10-03 RX ADMIN — LUBIPROSTONE 24 MCG: 24 CAPSULE, GELATIN COATED ORAL at 08:48

## 2021-10-03 RX ADMIN — SODIUM CHLORIDE, PRESERVATIVE FREE 10 ML: 5 INJECTION INTRAVENOUS at 08:49

## 2021-10-03 NOTE — PROGRESS NOTES
.        Hospitalist Progress Note      Name:  Aleksandr Colón /Age/Sex: 1955  (73 y.o. male)   MRN & CSN:  [id number] & [id number] Admission Date/Time: 10/1/2021  8:57 PM   Location:  [facility] PCP: MARY Oliver Day: 3    Assessment and Plan:   Aleksandr Colón is a 73 y.o.  male  who presents with <principal problem not specified>    TIA, r/o CVA. Symptoms resolved. MRI of the brain pending. Cerebral palsy  Seizure disorder  Hypertension    Diet ADULT DIET; Dysphagia - Minced and Moist; No Drinking Straws   DVT Prophylaxis [] Lovenox, []  Heparin, [] SCDs, [] Ambulation   GI Prophylaxis [] PPI,  [] H2 Blocker,  [] Carafate,  [] Diet/Tube Feeds   Code Status Full Code   Disposition Patient requires continued admission due to    MDM [] Low, [] Moderate,[]  High  Patient's risk as above due to      History of Present Illness:     Chief Complaint: <principal problem not specified>  Aleksandr Colón is a 73 y.o.  male  who presents with right facial droop and slurred speech. CTA head and neck was negative for acute findings. Patient feels better. His speech is clear, he is back to his baseline. MRI of the brain is pending. Ten point ROS reviewed negative, unless as noted above    Objective:   No intake or output data in the 24 hours ending 10/03/21 1733   Vitals:   Vitals:    10/03/21 1254   BP: (!) 136/100   Pulse: 92   Resp: 21   Temp: 98.3 °F (36.8 °C)   SpO2:      Physical Exam:   GEN Awake male, sitting upright in bed in no apparent distress. Appears given age. EYES Pupils are equally round. No scleral erythema, discharge, or conjunctivitis. HENT Mucous membranes are moist. Oral pharynx without exudates, no evidence of thrush. NECK Supple, no apparent thyromegaly or masses. RESP Clear to auscultation, no wheezes, rales or rhonchi. Symmetric chest movement while on room air. CARDIO/VASC S1/S2 auscultated.  Regular rate without appreciable murmurs, rubs, or gallops. No JVD or carotid bruits. Peripheral pulses equal bilaterally and palpable. No peripheral edema. GI Abdomen is soft without significant tenderness, masses, or guarding. Bowel sounds are normoactive. Rectal exam deferred. HEME/LYMPH No palpable cervical lymphadenopathy and no hepatosplenomegaly. No petechiae or ecchymoses. MSK No gross joint deformities. SKIN Normal coloration, warm, dry. NEURO Cranial nerves appear intact, normal speech. Left-sided weakness from his cerebral palsy. PSYCH Awake, alert, oriented x 3. Affect appropriate.     Medications:   Medications:    LORazepam  0.5 mg Oral BID    vitamin D  1,000 Units Oral Daily    folic acid  1 mg Oral Daily    PARoxetine  20 mg Oral QAM    lactobacillus  1 capsule Oral Daily    latanoprost  1 drop Both Eyes Nightly    timolol  1 drop Both Eyes Daily    [Held by provider] amLODIPine  5 mg Oral Daily    clonazePAM  0.25 mg Oral Daily    [Held by provider] divalproex  1,000 mg Oral Nightly    [Held by provider] hydroCHLOROthiazide  12.5 mg Oral Daily    pantoprazole  40 mg Oral QAM AC    primidone  50 mg Oral TID    zonisamide  100 mg Oral QAM    levothyroxine  125 mcg Oral Daily    mirabegron  50 mg Oral Daily    trospium  20 mg Oral BID AC    lactulose  30 g Oral TID    lubiprostone  24 mcg Oral Daily with breakfast    sodium chloride flush  5-40 mL IntraVENous 2 times per day    enoxaparin  40 mg SubCUTAneous Daily    aspirin  81 mg Oral Daily    Or    aspirin  300 mg Rectal Daily    atorvastatin  80 mg Oral Nightly    psyllium  1 packet Oral BID    clopidogrel  75 mg Oral Daily    divalproex  1,000 mg Oral Daily    sodium chloride flush  5-40 mL IntraVENous 2 times per day    lacosamide  200 mg Oral BID    QUEtiapine  50 mg Oral BID    tamsulosin  0.4 mg Oral Daily      Infusions:    sodium chloride       PRN Meds: simethicone, 80 mg, 4x Daily PRN  guaiFENesin, 1,200 mg, Daily PRN  pseudoephedrine, 30 mg, Q4H PRN  dicyclomine, 10 mg, Q6H PRN  acetaminophen, 500 mg, Q6H PRN  [Held by provider] lacosamide, 200 mg, Daily PRN  sodium chloride flush, 5-40 mL, PRN  sodium chloride, 25 mL, PRN  ondansetron, 4 mg, Q8H PRN   Or  ondansetron, 4 mg, Q6H PRN  polyethylene glycol, 17 g, Daily PRN  labetalol, 10 mg, Q10 Min PRN  sodium chloride flush, 5-40 mL, PRN              Electronically signed by Elyse Nieves MD on 10/3/2021 at 5:33 PM

## 2021-10-03 NOTE — PROGRESS NOTES
d/t CP dx   · Coordination: WFL  · Perception: WNL    Activities of Daily Living (ADLs):  · Feeding: Min A for set up and cueing, Pt on Dysphagia II diet (Minced and Moist)   · Grooming: Min A, Pt performed hand hygiene and grooming task of washing face seated in chair with cues to initiate task and for completion   · UB bathing: Min A, seated for safety, Pt receives supervision for bathing at baseline, anticipate cues for task initiation and sequencing   · LB bathing: Mod A, seated for safety, anticipate physical A for distal LEs for safety and dynamic sitting balance   · UB dressing: Min A, cues for threading BL UEs into new gown seated EOB  · LB dressing: Max A, donning BL socks, doffing/donning brief, Pt required assist to manage brief over hips on L side, Pt stated that he was able to don socks however was unable to follow cues to perform. Pt able to kick out LEs when donning socks and threading into brief  · Toileting: Max A, Pt urinated and had BM in regular toilet and required assist for pant mgmt both directions and praveen care     Cognitive and Psychosocial Functioning:  · Overall cognitive status: decreased cognition, Pt receives assist and supervision at baseline and resides in a group home. Pt oriented to self and place, Pt not oriented to reason for admission or current situation. Pt demonstrated impulsivity upon standing and required mod cues for safety.  Pt able to answer simple questions and follow basic multi-step commands   · Affect: Normal     Balance:   · Sitting: SBA sitting unsupported at EOB  · Standing: CGA with RW     Functional Mobility:  · Bed Mobility: SBA supine to sit EOB   · Transfers: CGA sit to stand from bed with RW, CGA stand to sit <>sit to stand from toilet with RW, CGA stand to sit in chair with cues for safety   · Ambulation: CGA from bed to bathroom with RW and cues for safety       AM-PAC 6 click short form for inpatient daily activity:   How much help from another person does the patient currently need. .. Unable  Dep A Lot  Max A A Lot   Mod A A Little  Min A A Little   CGA  SBA None   Mod I  Indep  Sup   1. Putting on and taking off regular lower body clothing? [] 1    [x] 2   [] 2   [] 3   [] 3   [] 4      2. Bathing (including washing, rinsing, drying)? [] 1   [] 2   [x] 2 [] 3 [] 3 [] 4   3. Toileting, which includes using toilet, bedpan, or urinal? [] 1    [x] 2   [] 2   [] 3   [] 3   [] 4     4. Putting on and taking off regular upper body clothing? [] 1   [] 2   [] 2   [x] 3   [] 3    [] 4      5. Taking care of personal grooming such as brushing teeth? [] 1   [] 2    [] 2 [x] 3    [] 3   [] 4      6. Eating meals? [] 1   [] 2   [] 2   [x] 3   [] 3   [] 4      Raw Score:  15   [24=0% impaired(CH), 23=1-19%(CI), 20-22=20-39%(CJ), 15-19=40-59%(CK), 10-14=60-79%(CL), 7-9=80-99%(CM), 6=100%(CN)]     Treatment:  Self Care Training x2:   Cues were given for safety, sequence, UE/LE placement, visual cues, and balance. Activities performed today included UB/LB dressing, toileting, hand hygiene, and grooming task of washing hands and face. Safety Measures: Gait belt used, Left in chair, Alarm in place, Needs in reach     Assessment:  Pt is a 73year old male with a past medical history of Anemia, Aspiration pneumonia ([address] Cerebral palsy ([address] Depression, Epilepsy ([address] Frequent falls, Glaucoma, History of IBS, Hx MRSA infection, Hyperammonemia ([address] Hypertension, IBS (irritable bowel syndrome), Kidney stone, Mood disorder ([address] Occasional tremors, Pressure injury of contiguous region involving back and right buttock, stage 2 ([address] Pressure injury of left buttock, stage 1, Prostatitis, Thyroid disease, and Ulcerative colitis ([facility] Pt admitted on 10/1 for episode of slurred speech and facial droop with High BP. Pt dx with possibleTIA, CT negative for acute infarct and MRI pending. Pt is from a group home where he receives assist/supervision for all ADLs.  Pt reports utilizing a RW and w/c at baseline. Pt appears to be functioning close to his baseline but with r/o of several recent falls. Pt would benefit from continued acute care OT services as well as [facility] OT. Complexity: Moderate  Prognosis: Good, Fair  Plan: 1x/week      Goals:  1. Pt will complete all aspects of bed mobility for EOB/OOB ADLs ind with HOB flat   2. Pt will complete UB/LB bathing Min A, seated   3. Pt will complete all aspects of LB dressing Min A, seated  4. Pt will complete all functional transfers to and from bed, chair, toilet, shower chair mod I with RW and good safety awareness   5. Pt will ambulate HH distance to bathroom for toileting Mod I with RW and good safety awareness   6. Pt will complete all aspects of toileting task min A   7. Pt will complete oral hygiene/grooming routine in standing at sink Min A  8.  Pt will complete ther ex/ther act with focus on safety awareness, dynamic sitting and standing balance       Time:   Time in: 1033  Time out: 1107  Timed treatment minutes: 25  Total time: 34      Electronically signed by:      ADARSH Slater/L, [id number] [facility], [id number]

## 2021-10-03 NOTE — PROGRESS NOTES
Chart reviewed. Patient not seen. MRI of brain awaiting to be completed. Recommend continuing DAPT with ASA and Plavix for 21 days for prevention of stroke/TIA. Continue statin therapy. Ioana SMITH-CNS  Neurology

## 2021-10-03 NOTE — PROGRESS NOTES
Pt has a vagel nerve stimulator that was implanted in 1995 at [facility] - pt has not had an MRI done anywhere it appears since then in [facility] - we will need to verify make & model before pt could be considered for MRI scan.

## 2021-10-03 NOTE — PROGRESS NOTES
Physical Therapy  [facility] ACUTE [facility] PHYSICAL THERAPY EVALUATION  Eri Jackson, 1955, 3125/3125-A, 10/3/2021    History  [place]:  The primary encounter diagnosis was TIA (transient ischemic attack). A diagnosis of Hypertension, unspecified type was also pertinent to this visit. Patient  has a past medical history of Anemia, Aspiration pneumonia ([address] Cerebral palsy ([address] Depression, Epilepsy ([address] Frequent falls, Glaucoma, History of IBS, Hx MRSA infection, Hyperammonemia ([address] Hypertension, IBS (irritable bowel syndrome), Kidney stone, Mood disorder ([address] Occasional tremors, Pressure injury of contiguous region involving back and right buttock, stage 2 ([address] Pressure injury of left buttock, stage 1, Prostatitis, Thyroid disease, and Ulcerative colitis ([address] Patient  has a past surgical history that includes Gallbladder surgery (2005); vagal nerve stimulation (2002); Cholecystectomy; Cystoscopy (Left, 07/11/2013); Kidney stone surgery; Colonoscopy (8/19/14, 5/2012); other surgical history (04/15/2015); Upper gastrointestinal endoscopy (N/A, 11/13/2018); Colonoscopy (02/04/2021); Colonoscopy (N/A, 2/4/2021); Stimulator Surgery (N/A, 3/24/2021); and Stimulator Surgery (N/A, 3/24/2021). Subjective:  Patient states:  \"I didn't even know they brought breakfast.\"    Pain:  Denies pain. Communication with other providers:  Handoff to RN, co-eval with OT.    Restrictions: fall risk    Home Setup/Prior level of function  Social/Functional History  Lives With: Other (comment) (2 roommates)  Type of Home:  (group home)  Home Layout: One level  Home Access: Stairs to enter without rails, Ramped entrance  Entrance Stairs - Number of Steps: 1  Bathroom Toilet: Standard  Bathroom Accessibility: Accessible  Home Equipment: Wheelchair-manual, Wheelchair-electric, Rolling walker  ADL Assistance: Needs assistance (aide present in case pt needs assistance)  Homemaking Assistance: Needs assistance (aide present in case pt needs assistance)  Homemaking Responsibilities: Yes  Ambulation Assistance: Independent  Transfer Assistance: Independent  Active : No  Additional Comments: Reports needin increased volume, difficulty hearing, difficulty answering questions, requires helpwith walking and small bites for eating per white board notes. Examination of body systems (includes body structures/functions, activity/participation limitations):  · Observation:  Supine in bed upon arrival  · Vision:  [facility]  · Hearing:  Needs inc volume. · Cardiopulmonary:  WFL  · Cognition: oriented x 4, see OT/SLP note for further evaluation. Musculoskeletal  · ROM R/L:  WFL. · Strength R/L:  +4/5,  in function and endurance. · Neuro:  wfl    · Gait pattern: mild forward bending posturing with ambulation and standing balance. Mobility:  · Rolling L/R:  CGA. · Supine to sit:  SBA. · Transfers: CGA. · Sitting balance:  good. · Standing balance:  Good, CGA while performing tasks standing at sink. .    · Gait: ambulating with FWW for 15 ft in room. [facility] 6 Clicks Inpatient Mobility:  AM-PAC Inpatient Mobility Raw Score : 18    Treatment:  Pt ambulating in room with FWW and CGA. Pt performed standing balance with CGA at sink to wash face and hands. Toile ting transfers performed. Safety: patient left in chair, call light within reach, RN notified, gait belt used. Assessment:  Pt is a 73year old male with current complaints of mild muscle weakness and alteration in balance. Pt demonstrates ADL performance with CGA including ambulation with FWW for 15 ft. Pt denies pain throughout session and is able to perform self care activities at bathroom sink with CGA for standing balance. Pt would benefit from continued skilled therapy during hospital admission.  Pt will also benefit form continued therapy upon d/c from hospital in there form of home health and PRN assist.   Complexity: Moderate  Prognosis: Good, no significant barriers to participation at this time. Plan Times per week: 2/week, 1 week  Discharge Recommendations: Home with Home health PT, Home with assist PRN  Equipment: none    Goals:  Short term goals  Short term goal 1: Pt will perform ambulation with FWW for 50 ft mod I. Short term goal 2: Pt will perform bed mobiltiy independently. Short term goal 3: Pt will perform STS transfers with mod I using FWW. Treatment plan:  Bed mobility, transfers, balance, gait, TA, TX. Recommendations for NURSING mobility: CGA with gait belt and FWW.     Time:   Time in: 1033  Time out: 1107  Timed treatment minutes: 25  Total time: 34    Electronically signed by:    Yuni Oscar PT  10/3/2021, 1:19 PM

## 2021-10-04 LAB
EKG ATRIAL RATE: 70 BPM
EKG DIAGNOSIS: NORMAL
EKG P AXIS: 57 DEGREES
EKG P-R INTERVAL: 168 MS
EKG Q-T INTERVAL: 424 MS
EKG QRS DURATION: 162 MS
EKG QTC CALCULATION (BAZETT): 457 MS
EKG R AXIS: 233 DEGREES
EKG T AXIS: -9 DEGREES
EKG VENTRICULAR RATE: 70 BPM

## 2021-10-04 PROCEDURE — 6370000000 HC RX 637 (ALT 250 FOR IP): Performed by: EMERGENCY MEDICINE

## 2021-10-04 PROCEDURE — 2580000003 HC RX 258: Performed by: EMERGENCY MEDICINE

## 2021-10-04 PROCEDURE — 92526 ORAL FUNCTION THERAPY: CPT | Performed by: SPEECH-LANGUAGE PATHOLOGIST

## 2021-10-04 PROCEDURE — 6370000000 HC RX 637 (ALT 250 FOR IP): Performed by: INTERNAL MEDICINE

## 2021-10-04 PROCEDURE — 6360000002 HC RX W HCPCS: Performed by: INTERNAL MEDICINE

## 2021-10-04 PROCEDURE — 6370000000 HC RX 637 (ALT 250 FOR IP): Performed by: CLINICAL NURSE SPECIALIST

## 2021-10-04 PROCEDURE — 94761 N-INVAS EAR/PLS OXIMETRY MLT: CPT

## 2021-10-04 PROCEDURE — 93010 ELECTROCARDIOGRAM REPORT: CPT | Performed by: INTERNAL MEDICINE

## 2021-10-04 PROCEDURE — 2140000000 HC CCU INTERMEDIATE R&B

## 2021-10-04 RX ADMIN — LATANOPROST 1 DROP: 50 SOLUTION/ DROPS OPHTHALMIC at 10:00

## 2021-10-04 RX ADMIN — FOLIC ACID 1 MG: 1 TABLET ORAL at 12:39

## 2021-10-04 RX ADMIN — QUETIAPINE FUMARATE 50 MG: 25 TABLET ORAL at 09:34

## 2021-10-04 RX ADMIN — ATORVASTATIN CALCIUM 80 MG: 80 TABLET, FILM COATED ORAL at 20:50

## 2021-10-04 RX ADMIN — CLONAZEPAM 0.25 MG: 0.5 TABLET ORAL at 09:33

## 2021-10-04 RX ADMIN — Medication 1 PACKET: at 09:37

## 2021-10-04 RX ADMIN — PRIMIDONE 50 MG: 50 TABLET ORAL at 09:34

## 2021-10-04 RX ADMIN — Medication 1 PACKET: at 09:33

## 2021-10-04 RX ADMIN — ENOXAPARIN SODIUM 40 MG: 40 INJECTION SUBCUTANEOUS at 09:35

## 2021-10-04 RX ADMIN — Medication 1 CAPSULE: at 09:35

## 2021-10-04 RX ADMIN — LACOSAMIDE 200 MG: 50 TABLET, FILM COATED ORAL at 20:52

## 2021-10-04 RX ADMIN — QUETIAPINE FUMARATE 50 MG: 25 TABLET ORAL at 20:52

## 2021-10-04 RX ADMIN — DIVALPROEX SODIUM 1000 MG: 500 TABLET, FILM COATED, EXTENDED RELEASE ORAL at 09:35

## 2021-10-04 RX ADMIN — TROSPIUM CHLORIDE 20 MG: 20 TABLET ORAL at 06:02

## 2021-10-04 RX ADMIN — TAMSULOSIN HYDROCHLORIDE 0.4 MG: 0.4 CAPSULE ORAL at 09:34

## 2021-10-04 RX ADMIN — LORAZEPAM 0.5 MG: 0.5 TABLET ORAL at 09:34

## 2021-10-04 RX ADMIN — CLOPIDOGREL BISULFATE 75 MG: 75 TABLET, FILM COATED ORAL at 09:35

## 2021-10-04 RX ADMIN — LORAZEPAM 0.5 MG: 0.5 TABLET ORAL at 20:52

## 2021-10-04 RX ADMIN — TIMOLOL MALEATE 1 DROP: 5 SOLUTION OPHTHALMIC at 10:30

## 2021-10-04 RX ADMIN — PRIMIDONE 50 MG: 50 TABLET ORAL at 20:50

## 2021-10-04 RX ADMIN — SODIUM CHLORIDE, PRESERVATIVE FREE 10 ML: 5 INJECTION INTRAVENOUS at 14:04

## 2021-10-04 RX ADMIN — PANTOPRAZOLE SODIUM 40 MG: 40 TABLET, DELAYED RELEASE ORAL at 06:02

## 2021-10-04 RX ADMIN — ASPIRIN 81 MG: 81 TABLET, COATED ORAL at 09:35

## 2021-10-04 RX ADMIN — TROSPIUM CHLORIDE 20 MG: 20 TABLET ORAL at 17:23

## 2021-10-04 RX ADMIN — ACETAMINOPHEN 500 MG: 500 TABLET ORAL at 21:08

## 2021-10-04 RX ADMIN — ZONISAMIDE 100 MG: 100 CAPSULE ORAL at 09:59

## 2021-10-04 RX ADMIN — LACTULOSE 30 G: 10 SOLUTION ORAL at 20:56

## 2021-10-04 RX ADMIN — PAROXETINE HYDROCHLORIDE 20 MG: 20 TABLET, FILM COATED ORAL at 09:34

## 2021-10-04 RX ADMIN — LATANOPROST 1 DROP: 50 SOLUTION/ DROPS OPHTHALMIC at 20:55

## 2021-10-04 RX ADMIN — Medication 1 PACKET: at 20:53

## 2021-10-04 RX ADMIN — LUBIPROSTONE 24 MCG: 24 CAPSULE, GELATIN COATED ORAL at 09:58

## 2021-10-04 RX ADMIN — LEVOTHYROXINE SODIUM 125 MCG: 125 TABLET ORAL at 06:02

## 2021-10-04 RX ADMIN — LACOSAMIDE 200 MG: 50 TABLET, FILM COATED ORAL at 09:34

## 2021-10-04 RX ADMIN — Medication 1000 UNITS: at 09:35

## 2021-10-04 RX ADMIN — LACTULOSE 30 G: 10 SOLUTION ORAL at 09:38

## 2021-10-04 RX ADMIN — LUBIPROSTONE 24 MCG: 24 CAPSULE, GELATIN COATED ORAL at 10:00

## 2021-10-04 NOTE — PROGRESS NOTES
Mayur Valladares MD, [facility]                Internal Medicine Hospitalist             Daily Progress  Note   Subjective:     Chief Complaint   Patient presents with    Hypertension     174/100 for EMS      Mr. Bo Hughes of generalized weakness. Sister is by his side    Objective:    BP (!) 191/103 Comment: Rn Nicole notifed  Pulse 102   Temp 98.7 °F (37.1 °C) (Oral)   Resp 17   Ht 5' 5.98\" (1.676 m)   Wt 190 lb 0.6 oz (86.2 kg)   SpO2 100%   BMI 30.69 kg/m²    No intake or output data in the 24 hours ending 10/04/21 1605   Physical Exam:  Heart: Distant heart sounds  Lungs: Mostly clear to auscultation, decreased breath sounds at bases. No wheezes appreciated no crackles heard. Abdomen: Soft, non distended. Bowel sounds appreciated. No obvious liver or spleen enlargement. Non tender, no rebound noted. Extremities: Non tender, no swelling noted, strength 5/5 both legs. CNS: Grossly intact. Can move all 4 has good strength. Normal speech.     Labs:  CBC with Differential:    Lab Results   Component Value Date    WBC 8.2 10/03/2021    RBC 5.09 10/03/2021    HGB 15.6 10/03/2021    HCT 46.2 10/03/2021     10/03/2021    MCV 90.8 10/03/2021    MCH 30.6 10/03/2021    MCHC 33.8 10/03/2021    RDW 12.3 10/03/2021    SEGSPCT 67.5 10/01/2021    BANDSPCT 8 07/06/2020    LYMPHOPCT 17.2 10/01/2021    MONOPCT 13.3 10/01/2021    MYELOPCT 2 05/05/2014    BASOPCT 0.3 10/01/2021    MONOSABS 1.2 10/01/2021    LYMPHSABS 1.5 10/01/2021    EOSABS 0.1 10/01/2021    BASOSABS 0.0 10/01/2021    DIFFTYPE AUTOMATED DIFFERENTIAL 10/01/2021     CMP:    Lab Results   Component Value Date     10/03/2021    K 3.6 10/03/2021     10/03/2021    CO2 24 10/03/2021    BUN 9 10/03/2021    CREATININE 0.7 10/03/2021    GFRAA >60 10/03/2021    LABGLOM >60 10/03/2021    GLUCOSE 143 10/03/2021    PROT 7.4 10/03/2021    LABALBU 3.9 10/03/2021    CALCIUM 9.4 10/03/2021    BILITOT 0.2 10/03/2021    ALKPHOS 107 10/03/2021    AST 14 10/03/2021    ALT 20 10/03/2021     Recent Labs     10/01/21  2245 10/02/21  0906 10/03/21  0425   TROPONINT <0.010 <0.010 <0.010     Lab Results   Component Value Date    [facility] 1.860 05/21/2021         sodium chloride        LORazepam  0.5 mg Oral BID    vitamin D  1,000 Units Oral Daily    folic acid  1 mg Oral Daily    PARoxetine  20 mg Oral QAM    lactobacillus  1 capsule Oral Daily    latanoprost  1 drop Both Eyes Nightly    timolol  1 drop Both Eyes Daily    [Held by provider] amLODIPine  5 mg Oral Daily    clonazePAM  0.25 mg Oral Daily    [Held by provider] divalproex  1,000 mg Oral Nightly    [Held by provider] hydroCHLOROthiazide  12.5 mg Oral Daily    pantoprazole  40 mg Oral QAM AC    primidone  50 mg Oral TID    zonisamide  100 mg Oral QAM    levothyroxine  125 mcg Oral Daily    mirabegron  50 mg Oral Daily    trospium  20 mg Oral BID AC    lactulose  30 g Oral TID    lubiprostone  24 mcg Oral Daily with breakfast    sodium chloride flush  5-40 mL IntraVENous 2 times per day    enoxaparin  40 mg SubCUTAneous Daily    aspirin  81 mg Oral Daily    Or    aspirin  300 mg Rectal Daily    atorvastatin  80 mg Oral Nightly    psyllium  1 packet Oral BID    clopidogrel  75 mg Oral Daily    divalproex  1,000 mg Oral Daily    sodium chloride flush  5-40 mL IntraVENous 2 times per day    lacosamide  200 mg Oral BID    QUEtiapine  50 mg Oral BID    tamsulosin  0.4 mg Oral Daily         Assessment:       Patient Active Problem List    Diagnosis Date Noted    Seizure disorder, grand mal ([facility] 04/15/2015    TIA (transient ischemic attack) 10/02/2021    COVID-19 virus detected 11/23/2020    Hyponatremia 11/23/2020    Multifocal pneumonia 11/23/2020    Pressure injury of left buttock, stage 1 07/22/2020    Pressure injury of right buttock, stage 2 ([facility] 07/15/2020    Cellulitis, perineum 07/06/2020    Pressure injury of contiguous region involving back and right buttock, stage 2 generated partly using Dragon dictation system, it may contain dictation errors given the limitations of this technology.      Kalyan Simmons MD, [address]

## 2021-10-04 NOTE — PROGRESS NOTES
PT HAS MRI ORDERED BUT HAS A VAGUS NERVE STIMULATOR AND A BLADDER STIMULATOR. ACCORDING TO THE GUIDELINES FOR THE VNS SYSTEM (SEE BELOW) PT CANNOT HAVE WITH ANY OTHER DEVICE IMPLANTED AND A PHYSICIAN HAS TO SET STIMULATOR BEFORE MRI THEREFORE MRI CANNOT BE COMPLETED SAFELY              2 MRI GUIDELINES  2.1 Pre-MRI Considerations and Preparation  Caution: All VNS patients must have their VNS Therapy System assessed and programmed before an MRI  procedure. Patients should consult with the treating physician prior to MR imaging. The MRI should be  performed at least 2 weeks after implantation or revision surgery of the VNS Therapy System. Safety has not been demonstrated in patients with the VNS Therapy System in combination  with other implanted devices. Until safety has been demonstrated for patients implanted with  the VNS Therapy System and another implanted device, MRI should not be performed . Because of the need to perform diagnostics and change programming parameters, an  appropriate healthcare professional with access to a VNS Therapy programming system must  prepare the VNS device before the patient enters an MR system room.

## 2021-10-04 NOTE — PROGRESS NOTES
[id number] [place] OF SPEECH/LANGUAGE PATHOLOGY  DAILY PROGRESS NOTE  Olman Fernandez  10/4/2021  [id number]  TIA (transient ischemic attack) [G45.9]  Hypertension, unspecified type [I10]  No Known Allergies      Pt was seen this date for dysphagia treatment. IMPRESSION AND RECOMMENDATIONS: Olman Fernandez was seen for ongoing dysphagia treatment and diet tolerance monitoring. Pt was seen seated upright in bed, alert and cooperative. Pt accepted PO trials of thin liquids via cup sips, puree consistency and soft solids. Oral stage continues impaired with prolonged mastication of soft solids, d/t lack of dentition. Pharyngeal stage continues mildly impaired with suspected premature pharyngeal entry with thin liquids. Observed immediate cough in 1/8 trials with thin liquids via cup sips; suspect related to bolus size as a result of impulsivity. Recommend continue minced and moist diet/thin liquids via cup sips with strict aspiration precautions. Supervision for all PO intake. Will monitor briefly for safe diet tolerance and education.      GOALS (current status in bold):  Short-term Goals  Timeframe for Short-term Goals: 1 week  Goal 1: Pt will tolerate Minced/moist diet and Thin liquids by Cup with strict aspiration precautions for all PO intake 100% Ongoing, continue  Goal 2: Careagivers will use strict aspiration precautions for all PO intake 100% Ongoing, continue  Goal 3: Caregivers will provide cues/supervision for external pacing with liquid intake 100% Ongoing, continue          EDUCATION: results/recommendations    PAIN RATING (0-10 Scale): 0  Time in/Time out: SLP Individual Minutes  Time In: 1610  Time Out: 1630  Minutes: 20    Visit number: 2    Atul Macias SLP student  10/4/2021  4:34 PM

## 2021-10-05 LAB
ANION GAP SERPL CALCULATED.3IONS-SCNC: 11 MMOL/L (ref 4–16)
BUN BLDV-MCNC: 11 MG/DL (ref 6–23)
CALCIUM SERPL-MCNC: 9 MG/DL (ref 8.3–10.6)
CHLORIDE BLD-SCNC: 100 MMOL/L (ref 99–110)
CO2: 25 MMOL/L (ref 21–32)
CREAT SERPL-MCNC: 0.4 MG/DL (ref 0.9–1.3)
GFR AFRICAN AMERICAN: >60 ML/MIN/1.73M2
GFR NON-AFRICAN AMERICAN: >60 ML/MIN/1.73M2
GLUCOSE BLD-MCNC: 122 MG/DL (ref 70–99)
POTASSIUM SERPL-SCNC: 3.9 MMOL/L (ref 3.5–5.1)
SODIUM BLD-SCNC: 136 MMOL/L (ref 135–145)

## 2021-10-05 PROCEDURE — 6370000000 HC RX 637 (ALT 250 FOR IP): Performed by: EMERGENCY MEDICINE

## 2021-10-05 PROCEDURE — 6370000000 HC RX 637 (ALT 250 FOR IP): Performed by: CLINICAL NURSE SPECIALIST

## 2021-10-05 PROCEDURE — 6370000000 HC RX 637 (ALT 250 FOR IP): Performed by: INTERNAL MEDICINE

## 2021-10-05 PROCEDURE — 80048 BASIC METABOLIC PNL TOTAL CA: CPT

## 2021-10-05 PROCEDURE — 6360000002 HC RX W HCPCS: Performed by: INTERNAL MEDICINE

## 2021-10-05 PROCEDURE — 2140000000 HC CCU INTERMEDIATE R&B

## 2021-10-05 PROCEDURE — 94761 N-INVAS EAR/PLS OXIMETRY MLT: CPT

## 2021-10-05 PROCEDURE — 6370000000 HC RX 637 (ALT 250 FOR IP): Performed by: NURSE PRACTITIONER

## 2021-10-05 PROCEDURE — 36415 COLL VENOUS BLD VENIPUNCTURE: CPT

## 2021-10-05 RX ORDER — OXYCODONE HYDROCHLORIDE AND ACETAMINOPHEN 5; 325 MG/1; MG/1
1 TABLET ORAL ONCE
Status: COMPLETED | OUTPATIENT
Start: 2021-10-05 | End: 2021-10-05

## 2021-10-05 RX ORDER — AMLODIPINE BESYLATE 10 MG/1
10 TABLET ORAL DAILY
Status: DISCONTINUED | OUTPATIENT
Start: 2021-10-06 | End: 2021-10-09 | Stop reason: HOSPADM

## 2021-10-05 RX ORDER — LABETALOL HYDROCHLORIDE 5 MG/ML
10 INJECTION, SOLUTION INTRAVENOUS EVERY 6 HOURS PRN
Status: DISCONTINUED | OUTPATIENT
Start: 2021-10-05 | End: 2021-10-07

## 2021-10-05 RX ADMIN — LORAZEPAM 0.5 MG: 0.5 TABLET ORAL at 10:55

## 2021-10-05 RX ADMIN — PRIMIDONE 50 MG: 50 TABLET ORAL at 14:47

## 2021-10-05 RX ADMIN — DIVALPROEX SODIUM 1000 MG: 500 TABLET, FILM COATED, EXTENDED RELEASE ORAL at 10:56

## 2021-10-05 RX ADMIN — QUETIAPINE FUMARATE 50 MG: 25 TABLET ORAL at 10:55

## 2021-10-05 RX ADMIN — PRIMIDONE 50 MG: 50 TABLET ORAL at 10:56

## 2021-10-05 RX ADMIN — Medication 1000 UNITS: at 10:55

## 2021-10-05 RX ADMIN — OXYCODONE HYDROCHLORIDE AND ACETAMINOPHEN 1 TABLET: 5; 325 TABLET ORAL at 21:05

## 2021-10-05 RX ADMIN — QUETIAPINE FUMARATE 50 MG: 25 TABLET ORAL at 21:03

## 2021-10-05 RX ADMIN — TROSPIUM CHLORIDE 20 MG: 20 TABLET ORAL at 18:20

## 2021-10-05 RX ADMIN — TAMSULOSIN HYDROCHLORIDE 0.4 MG: 0.4 CAPSULE ORAL at 10:56

## 2021-10-05 RX ADMIN — PRIMIDONE 50 MG: 50 TABLET ORAL at 21:03

## 2021-10-05 RX ADMIN — LACOSAMIDE 200 MG: 50 TABLET, FILM COATED ORAL at 21:03

## 2021-10-05 RX ADMIN — LORAZEPAM 0.5 MG: 0.5 TABLET ORAL at 21:02

## 2021-10-05 RX ADMIN — PAROXETINE HYDROCHLORIDE 20 MG: 20 TABLET, FILM COATED ORAL at 10:55

## 2021-10-05 RX ADMIN — TROSPIUM CHLORIDE 20 MG: 20 TABLET ORAL at 06:42

## 2021-10-05 RX ADMIN — LEVOTHYROXINE SODIUM 125 MCG: 125 TABLET ORAL at 06:42

## 2021-10-05 RX ADMIN — LACOSAMIDE 200 MG: 50 TABLET, FILM COATED ORAL at 10:54

## 2021-10-05 RX ADMIN — Medication 1 CAPSULE: at 10:54

## 2021-10-05 RX ADMIN — ZONISAMIDE 100 MG: 100 CAPSULE ORAL at 11:09

## 2021-10-05 RX ADMIN — LUBIPROSTONE 24 MCG: 24 CAPSULE, GELATIN COATED ORAL at 10:55

## 2021-10-05 RX ADMIN — AMLODIPINE BESYLATE 5 MG: 5 TABLET ORAL at 10:55

## 2021-10-05 RX ADMIN — ENOXAPARIN SODIUM 40 MG: 40 INJECTION SUBCUTANEOUS at 10:56

## 2021-10-05 RX ADMIN — CLOPIDOGREL BISULFATE 75 MG: 75 TABLET, FILM COATED ORAL at 10:57

## 2021-10-05 RX ADMIN — ASPIRIN 81 MG: 81 TABLET, COATED ORAL at 10:55

## 2021-10-05 RX ADMIN — Medication 1 PACKET: at 10:56

## 2021-10-05 RX ADMIN — FOLIC ACID 1 MG: 1 TABLET ORAL at 10:56

## 2021-10-05 RX ADMIN — PANTOPRAZOLE SODIUM 40 MG: 40 TABLET, DELAYED RELEASE ORAL at 06:42

## 2021-10-05 RX ADMIN — CLONAZEPAM 0.25 MG: 0.5 TABLET ORAL at 10:55

## 2021-10-05 RX ADMIN — Medication 1 PACKET: at 21:04

## 2021-10-05 RX ADMIN — LACTULOSE 30 G: 10 SOLUTION ORAL at 21:04

## 2021-10-05 RX ADMIN — ATORVASTATIN CALCIUM 80 MG: 80 TABLET, FILM COATED ORAL at 21:02

## 2021-10-05 RX ADMIN — LACTULOSE 30 G: 10 SOLUTION ORAL at 10:56

## 2021-10-05 ASSESSMENT — PAIN SCALES - GENERAL
PAINLEVEL_OUTOF10: 7
PAINLEVEL_OUTOF10: 7
PAINLEVEL_OUTOF10: 0

## 2021-10-05 NOTE — CARE COORDINATION
CM contacted by Pt sister/guardian Sofie Hatchet who is concerned with therapies recommendation of home with home health citing a recent decline in ADL's along with multiple falls. Pt home  Holley Frey contacted who verified numerous recent falls and decline. Both Sofie Hatchet and Holley Frey wish for the Pt to receive additional therapy at discharge. Sofie Hatchet requested a referral be made to ARU. Pt is also understanding and agreeable to additional therapy. CM call to Farrah/ARU for referral.     Whiteboard to PT/OT requesting updated therapy notes.      CM following

## 2021-10-05 NOTE — PROGRESS NOTES
Chart reviewed. Patient unable to have MRI due to VNS and bladder stimulator. Continue with DAPT as planned. Nothing further from our standpoint. Ioana JOHNSTON  [address] [employer]-CNS  Neurology

## 2021-10-05 NOTE — PROGRESS NOTES
Hospitalist Progress Note      Name:  Rachel Mccoy /Age/Sex: 1955  (73 y.o. male)   MRN & CSN:  [id number] & [id number] Admission Date/Time: 10/1/2021  8:57 PM   [facility] PCP: MARY Orr Day: 5    Assessment and Plan:   Rachel Mccoy is a 73 y.o.  male with PMH of HTN, seizure/epilepsy, IBS and depression who was admitted with strokelike symptoms. #Strokelike symptoms/signs and recurrent falls concerning for possible CVA. -CT head noted without acute ischemic change.  -Cannot undergo MRI due to stimulator. -PT/OT input appreciated.  -I agree with referral to ARU. -Fall precautions.  -Continue aspirin and atorvastatin    #History of epilepsy/seizure on Depakote, Vimpat and clonazepam.  Also Seroquel and primidone also low zonisamide. #Uncontrolled hypertension.  -Increase amlodipine to 10 mg.  -IV labetalol as needed for BP greater than 160/100 mmHg. #Hypothyroidism on levothyroxine. #Depression on paroxetine. Also on Seroquel. Diet ADULT DIET; Dysphagia - Minced and Moist; No Drinking Straws   DVT Prophylaxis [x] Lovenox, []  Heparin, [] SCDs, [] Ambulation   GI Prophylaxis [x] PPI,  [] H2 Blocker,  [] Carafate,  [] Diet/Tube Feeds   Code Status Full Code   Disposition Patient requires continued admission due to pending placement in ARU   MDM [] Low, [] Moderate,[x]  High  Patient's risk as above due to possible CVA     History of Present Illness:     Chief Complaint:   Patient was seen and examined in the morning. Chart reviewed and case discussed with the RN. No adverse event overnight. Patient has no new complaint today. Ten point ROS reviewed negative, unless as noted above    Objective:        Intake/Output Summary (Last 24 hours) at 10/5/2021 1709  Last data filed at 10/5/2021 0100  Gross per 24 hour   Intake --   Output 160 ml   Net -160 ml      Vitals:   Vitals:    10/05/21 1515   BP: (!) 160/91   Pulse: 100   Resp: 15 Temp: 98.6 °F (37 °C)   SpO2:      Physical Exam:   GEN Awake male, lying in bed in no apparent distress. Appears given age. EYES  No scleral erythema, discharge, or conjunctivitis. HENT Mucous membranes are moist. No evidence of thrush. NECK Supple, no apparent thyromegaly or masses. RESP Clear to auscultation, no wheezes, rales or rhonchi. Symmetric chest movement while on room air. CARDIO/VASC S1/S2 auscultated. Regular rate without appreciable murmurs, rubs, or gallops. No JVD or carotid bruits. Peripheral pulses equal bilaterally and palpable. No peripheral edema. GI Abdomen is soft without significant tenderness, masses, or guarding. Bowel sounds are normoactive.  No costovertebral angle tenderness. Abdullahi catheter is not present. HEME/LYMPH No palpable cervical lymphadenopathy and no hepatosplenomegaly. No petechiae or ecchymoses. MSK No gross joint deformities. SKIN Normal coloration, warm, dry. NEURO Cranial nerves appear grossly intact, normal speech, no lateralizing weakness.     Medications:   Medications:    LORazepam  0.5 mg Oral BID    vitamin D  1,000 Units Oral Daily    folic acid  1 mg Oral Daily    PARoxetine  20 mg Oral QAM    lactobacillus  1 capsule Oral Daily    latanoprost  1 drop Both Eyes Nightly    timolol  1 drop Both Eyes Daily    amLODIPine  5 mg Oral Daily    clonazePAM  0.25 mg Oral Daily    [Held by provider] divalproex  1,000 mg Oral Nightly    [Held by provider] hydroCHLOROthiazide  12.5 mg Oral Daily    pantoprazole  40 mg Oral QAM AC    primidone  50 mg Oral TID    zonisamide  100 mg Oral QAM    levothyroxine  125 mcg Oral Daily    mirabegron  50 mg Oral Daily    trospium  20 mg Oral BID AC    lactulose  30 g Oral TID    lubiprostone  24 mcg Oral Daily with breakfast    sodium chloride flush  5-40 mL IntraVENous 2 times per day    enoxaparin  40 mg SubCUTAneous Daily    aspirin  81 mg Oral Daily    Or    aspirin  300 mg Rectal Daily    atorvastatin  80 mg Oral Nightly    psyllium  1 packet Oral BID    clopidogrel  75 mg Oral Daily    divalproex  1,000 mg Oral Daily    sodium chloride flush  5-40 mL IntraVENous 2 times per day    lacosamide  200 mg Oral BID    QUEtiapine  50 mg Oral BID    tamsulosin  0.4 mg Oral Daily      Infusions:    sodium chloride       PRN Meds: simethicone, 80 mg, 4x Daily PRN  guaiFENesin, 1,200 mg, Daily PRN  pseudoephedrine, 30 mg, Q4H PRN  dicyclomine, 10 mg, Q6H PRN  acetaminophen, 500 mg, Q6H PRN  [Held by provider] lacosamide, 200 mg, Daily PRN  sodium chloride flush, 5-40 mL, PRN  sodium chloride, 25 mL, PRN  ondansetron, 4 mg, Q8H PRN   Or  ondansetron, 4 mg, Q6H PRN  polyethylene glycol, 17 g, Daily PRN  labetalol, 10 mg, Q10 Min PRN  sodium chloride flush, 5-40 mL, PRN          Patient is still admitted because of pending ARU placement. The anticipated discharge is in less than 24 hours.      Electronically signed by Meaghan Goodman MD on 10/5/2021 at 5:09 PM

## 2021-10-06 PROCEDURE — 2580000003 HC RX 258: Performed by: INTERNAL MEDICINE

## 2021-10-06 PROCEDURE — 2580000003 HC RX 258: Performed by: EMERGENCY MEDICINE

## 2021-10-06 PROCEDURE — 99211 OFF/OP EST MAY X REQ PHY/QHP: CPT

## 2021-10-06 PROCEDURE — 97164 PT RE-EVAL EST PLAN CARE: CPT

## 2021-10-06 PROCEDURE — 97530 THERAPEUTIC ACTIVITIES: CPT

## 2021-10-06 PROCEDURE — 97535 SELF CARE MNGMENT TRAINING: CPT

## 2021-10-06 PROCEDURE — 6370000000 HC RX 637 (ALT 250 FOR IP): Performed by: CLINICAL NURSE SPECIALIST

## 2021-10-06 PROCEDURE — 6360000002 HC RX W HCPCS: Performed by: INTERNAL MEDICINE

## 2021-10-06 PROCEDURE — 94761 N-INVAS EAR/PLS OXIMETRY MLT: CPT

## 2021-10-06 PROCEDURE — 6370000000 HC RX 637 (ALT 250 FOR IP): Performed by: INTERNAL MEDICINE

## 2021-10-06 PROCEDURE — 2140000000 HC CCU INTERMEDIATE R&B

## 2021-10-06 PROCEDURE — 97110 THERAPEUTIC EXERCISES: CPT

## 2021-10-06 PROCEDURE — 6370000000 HC RX 637 (ALT 250 FOR IP): Performed by: EMERGENCY MEDICINE

## 2021-10-06 RX ORDER — CEFAZOLIN SODIUM 2 G/100ML
2000 INJECTION, SOLUTION INTRAVENOUS EVERY 8 HOURS
Status: DISCONTINUED | OUTPATIENT
Start: 2021-10-06 | End: 2021-10-09 | Stop reason: HOSPADM

## 2021-10-06 RX ADMIN — LORAZEPAM 0.5 MG: 0.5 TABLET ORAL at 22:13

## 2021-10-06 RX ADMIN — QUETIAPINE FUMARATE 50 MG: 25 TABLET ORAL at 22:22

## 2021-10-06 RX ADMIN — PANTOPRAZOLE SODIUM 40 MG: 40 TABLET, DELAYED RELEASE ORAL at 06:52

## 2021-10-06 RX ADMIN — DIVALPROEX SODIUM 1000 MG: 500 TABLET, FILM COATED, EXTENDED RELEASE ORAL at 10:02

## 2021-10-06 RX ADMIN — FOLIC ACID 1 MG: 1 TABLET ORAL at 10:02

## 2021-10-06 RX ADMIN — AMLODIPINE BESYLATE 10 MG: 10 TABLET ORAL at 10:02

## 2021-10-06 RX ADMIN — TIMOLOL MALEATE 1 DROP: 5 SOLUTION OPHTHALMIC at 15:03

## 2021-10-06 RX ADMIN — TROSPIUM CHLORIDE 20 MG: 20 TABLET ORAL at 06:52

## 2021-10-06 RX ADMIN — QUETIAPINE FUMARATE 50 MG: 25 TABLET ORAL at 10:02

## 2021-10-06 RX ADMIN — ENOXAPARIN SODIUM 40 MG: 40 INJECTION SUBCUTANEOUS at 10:04

## 2021-10-06 RX ADMIN — Medication 1 PACKET: at 22:22

## 2021-10-06 RX ADMIN — Medication 1 CAPSULE: at 10:01

## 2021-10-06 RX ADMIN — Medication 1 PACKET: at 10:02

## 2021-10-06 RX ADMIN — PSEUDOEPHEDRINE HCL 30 MG: 30 TABLET, FILM COATED ORAL at 10:00

## 2021-10-06 RX ADMIN — LACTULOSE 30 G: 10 SOLUTION ORAL at 22:13

## 2021-10-06 RX ADMIN — Medication 1000 UNITS: at 10:02

## 2021-10-06 RX ADMIN — TIMOLOL MALEATE 1 DROP: 5 SOLUTION OPHTHALMIC at 22:21

## 2021-10-06 RX ADMIN — SODIUM CHLORIDE, PRESERVATIVE FREE 10 ML: 5 INJECTION INTRAVENOUS at 10:03

## 2021-10-06 RX ADMIN — ASPIRIN 81 MG: 81 TABLET, COATED ORAL at 10:01

## 2021-10-06 RX ADMIN — CEFAZOLIN SODIUM 2000 MG: 2 INJECTION, SOLUTION INTRAVENOUS at 22:19

## 2021-10-06 RX ADMIN — PRIMIDONE 50 MG: 50 TABLET ORAL at 22:13

## 2021-10-06 RX ADMIN — PAROXETINE HYDROCHLORIDE 20 MG: 20 TABLET, FILM COATED ORAL at 10:01

## 2021-10-06 RX ADMIN — PRIMIDONE 50 MG: 50 TABLET ORAL at 10:02

## 2021-10-06 RX ADMIN — LACOSAMIDE 200 MG: 50 TABLET, FILM COATED ORAL at 22:14

## 2021-10-06 RX ADMIN — LUBIPROSTONE 24 MCG: 24 CAPSULE, GELATIN COATED ORAL at 10:01

## 2021-10-06 RX ADMIN — PRIMIDONE 50 MG: 50 TABLET ORAL at 15:03

## 2021-10-06 RX ADMIN — TROSPIUM CHLORIDE 20 MG: 20 TABLET ORAL at 17:19

## 2021-10-06 RX ADMIN — SODIUM CHLORIDE, PRESERVATIVE FREE 10 ML: 5 INJECTION INTRAVENOUS at 22:14

## 2021-10-06 RX ADMIN — CLOPIDOGREL BISULFATE 75 MG: 75 TABLET, FILM COATED ORAL at 10:01

## 2021-10-06 RX ADMIN — LACTULOSE 30 G: 10 SOLUTION ORAL at 10:01

## 2021-10-06 RX ADMIN — LACTULOSE 30 G: 10 SOLUTION ORAL at 15:02

## 2021-10-06 RX ADMIN — CEFAZOLIN SODIUM 2000 MG: 2 INJECTION, SOLUTION INTRAVENOUS at 11:41

## 2021-10-06 RX ADMIN — CLONAZEPAM 0.25 MG: 0.5 TABLET ORAL at 10:01

## 2021-10-06 RX ADMIN — ATORVASTATIN CALCIUM 80 MG: 80 TABLET, FILM COATED ORAL at 22:13

## 2021-10-06 RX ADMIN — LACOSAMIDE 200 MG: 50 TABLET, FILM COATED ORAL at 10:02

## 2021-10-06 RX ADMIN — LORAZEPAM 0.5 MG: 0.5 TABLET ORAL at 10:02

## 2021-10-06 RX ADMIN — LEVOTHYROXINE SODIUM 125 MCG: 125 TABLET ORAL at 06:52

## 2021-10-06 RX ADMIN — TAMSULOSIN HYDROCHLORIDE 0.4 MG: 0.4 CAPSULE ORAL at 10:01

## 2021-10-06 RX ADMIN — ZONISAMIDE 100 MG: 100 CAPSULE ORAL at 10:00

## 2021-10-06 ASSESSMENT — PAIN DESCRIPTION - FREQUENCY: FREQUENCY: INTERMITTENT

## 2021-10-06 ASSESSMENT — PAIN SCALES - GENERAL
PAINLEVEL_OUTOF10: 9
PAINLEVEL_OUTOF10: 0

## 2021-10-06 ASSESSMENT — PAIN DESCRIPTION - ORIENTATION: ORIENTATION: MID

## 2021-10-06 ASSESSMENT — PAIN DESCRIPTION - PAIN TYPE: TYPE: ACUTE PAIN

## 2021-10-06 ASSESSMENT — PAIN DESCRIPTION - LOCATION: LOCATION: FACE

## 2021-10-06 ASSESSMENT — PAIN DESCRIPTION - DESCRIPTORS: DESCRIPTORS: ACHING

## 2021-10-06 NOTE — CONSULTS
[facility] Wound Ostomy Continence Nurse  Consult Note       Rosa Lomas  AGE: 73 y.o. GENDER: male  : 1955  TODAY'S DATE:  10/6/2021    Subjective:     Reason for Evaluation and Assessment: wound care assessment.       Rosa Lomas is a 73 y.o. male referred by:   [x] Physician  [] Nursing  [] Other:     Wound Identification:  Wound Type: traumatic and undetermined  Contributing Factors: none        PAST MEDICAL HISTORY        Diagnosis Date    Anemia     Aspiration pneumonia ([address]     dx 2014 with this per ecf/ per old chart dx with pneumonia with admission 2018    Cerebral palsy ([address]     \"has no feeling on left side of body\"alert but has some dementia but not diagnosed, has good long term but poor short term and wakes up disoriented after a nap\"(per yaneth)()    Depression     Epilepsy ([address]     last seizure 2018- hx grand mal    Frequent falls     with phone assess- family stated pt fell this am (7/10/2013\"in the process of trying to find a facility to help build him back up- (pt now at [facility])    Glaucoma     \"has been in treatment for this in the past- no treatment needed at present\"    History of IBS     Hx MRSA infection     + nasal culture 2014    Hyperammonemia ([address]     Hypertension     on Lisinopril    IBS (irritable bowel syndrome)     ulcerative colitis    Kidney stone     for surgery for stent placement 2013    Mood disorder ([address]     per staff at [address] [facility] Occasional tremors     \"makes it difficult for him to write\"    Pressure injury of contiguous region involving back and right buttock, stage 2 ([address] 2020    Pressure injury of left buttock, stage 1 2020    Prostatitis     hx given per H&P old chart    Thyroid disease     Ulcerative colitis ([address]     hx given per staff at [facility] 2015       PAST SURGICAL HISTORY    Past Surgical History:   Procedure Laterality Date    CHOLECYSTECTOMY      COLONOSCOPY  14, 2012 8/19/14: hemorrhoids, 5/2012 at [place]  02/04/2021    COLONOSCOPY N/A 2/4/2021    COLONOSCOPY POLYPECTOMY SNARE/COLD BIOPSY performed by Zakia Calvo MD at [facility] 33 Left 07/11/2013    with stnet placement   [address]  2005    KIDNEY STONE SURGERY      OTHER SURGICAL HISTORY  04/15/2015    Revision of vagal nerve stimulator    STIMULATOR SURGERY N/A 3/24/2021    STIMULATOR INSERTION performed by Luis Estevez MD at [facility] 38 N/A 3/24/2021    STIMULATOR INSERTION STAGE 2 performed by Luis Estevez MD at [address] [facility] N/A 11/13/2018    EGD DILATION BALLOON AND BIOPSY performed by Gertrude Bennett MD at [place] [address]    Has to have bettery changed every 5 yrs. FAMILY HISTORY    Family History   Problem Relation Age of Onset    Heart Disease Mother         atrial fib    High Blood Pressure Mother     Asthma Mother     High Cholesterol Mother     Depression Mother    [facility] Migraines Mother     High Blood Pressure Father     Heart Disease Father     Asthma Sister     High Cholesterol Sister     Depression Sister     Migraines Sister        SOCIAL HISTORY    Social History     Tobacco Use    Smoking status: Never Smoker    Smokeless tobacco: Never Used   Vaping Use    Vaping Use: Never used   Substance Use Topics    Alcohol use: No    Drug use: No       ALLERGIES    No Known Allergies    MEDICATIONS    No current facility-administered medications on file prior to encounter.      Current Outpatient Medications on File Prior to Encounter   Medication Sig Dispense Refill    AMITIZA 24 MCG capsule       mirabegron (MYRBETRIQ) 50 MG TB24 Take 50 mg by mouth daily      solifenacin (VESICARE) 10 MG tablet Take 10 mg by mouth daily      Clotrimazole POWD Apply to groin twice a day 1 Bottle 0    acetaminophen (APAP EXTRA STRENGTH) 500 MG tablet Take 1 tablet by mouth every 6 hours as needed for Pain 30 tablet 0    levothyroxine (SYNTHROID) 125 MCG tablet Take 1 tablet by mouth Daily 30 tablet 3    dicyclomine (BENTYL) 10 MG capsule Take 1 capsule by mouth every 6 hours as needed (cramps) 20 capsule 0    amLODIPine (NORVASC) 5 MG tablet Take 5 mg by mouth daily      clonazePAM (KLONOPIN) 0.5 MG tablet Take 0.25 mg by mouth daily.  divalproex (DEPAKOTE) 500 MG DR tablet Take 1,000 mg by mouth nightly      hydrochlorothiazide (MICROZIDE) 12.5 MG capsule Take 12.5 mg by mouth daily      omeprazole (PRILOSEC) 20 MG delayed release capsule Take 20 mg by mouth 2 times daily (before meals)      primidone (MYSOLINE) 50 MG tablet Take 50 mg by mouth 3 times daily      zonisamide (ZONEGRAN) 100 MG capsule Take 100 mg by mouth every morning      Simethicone (MI-ACID GAS RELIEF PO) Take 1 Container by mouth every 4 hours as needed 10 cc every 4 hrs not to exceed 60 cc in 24 hrs      guaiFENesin (MUCINEX) 600 MG extended release tablet Take 1,200 mg by mouth daily as needed for Congestion      Menthol-Methyl Salicylate (MUSCLE RUB) 10-15 % CREA cream Apply 1 applicator topically as needed      pseudoephedrine (SUDAFED) 30 MG tablet Take 30 mg by mouth every 4 hours as needed for Congestion (Patient not taking: Reported on 9/24/2021)      lacosamide (VIMPAT) 200 MG tablet Take 200 mg by mouth as needed. Give 1 tab after seizure activity as needed      lacosamide (VIMPAT) 200 MG tablet Take 1 tablet by mouth 2 times daily for 30 days.  60 tablet 0    latanoprost (XALATAN) 0.005 % ophthalmic solution Place 1 drop into both eyes nightly      timolol (TIMOPTIC) 0.5 % ophthalmic solution Place 1 drop into both eyes daily       Lactobacillus (ACIDOPHILUS) CAPS capsule Take 1 capsule by mouth daily      aspirin 81 MG tablet Take 81 mg by mouth daily      Wheat Dextrin (BENEFIBER) POWD Take 4 g by mouth 2 times daily Takes 3 tsp in liquid twice per day       Lactulose Encephalopathy (ENULOSE PO) Take 30 mLs by mouth 3 times daily      folic acid (FOLVITE) 1 MG tablet Take 1 mg by mouth daily      PARoxetine (PAXIL) 20 MG tablet Take 20 mg by mouth every morning      QUEtiapine (SEROQUEL XR) 50 MG extended release tablet Take 50 mg by mouth nightly      tamsulosin (FLOMAX) 0.4 MG capsule Take 1 capsule by mouth daily 30 capsule 0    simvastatin (ZOCOR) 20 MG tablet Take 20 mg by mouth nightly.  Cholecalciferol (VITAMIN D3) 1000 UNITS CAPS Take 1 tablet by mouth daily.  clorazepate (TRANXENE) 7.5 MG tablet Take 7.5 mg by mouth 2 times daily.            Objective:      /85   Pulse 83   Temp 97.7 °F (36.5 °C) (Oral)   Resp 16   Ht 5' 5.98\" (1.676 m)   Wt 189 lb 13.1 oz (86.1 kg)   SpO2 100%   BMI 30.65 kg/m²   Jean Risk Score: Jean Scale Score: 18    LABS    CBC:   Lab Results   Component Value Date    WBC 8.2 10/03/2021    RBC 5.09 10/03/2021    HGB 15.6 10/03/2021    HCT 46.2 10/03/2021    MCV 90.8 10/03/2021    MCH 30.6 10/03/2021    MCHC 33.8 10/03/2021    RDW 12.3 10/03/2021     10/03/2021    MPV 10.5 10/03/2021     CMP:    Lab Results   Component Value Date     10/05/2021    K 3.9 10/05/2021     10/05/2021    CO2 25 10/05/2021    BUN 11 10/05/2021    CREATININE 0.4 10/05/2021    GFRAA >60 10/05/2021    LABGLOM >60 10/05/2021    GLUCOSE 122 10/05/2021    PROT 7.4 10/03/2021    LABALBU 3.9 10/03/2021    CALCIUM 9.0 10/05/2021    BILITOT 0.2 10/03/2021    ALKPHOS 107 10/03/2021    AST 14 10/03/2021    ALT 20 10/03/2021     Albumin:    Lab Results   Component Value Date    LABALBU 3.9 10/03/2021     PT/INR:    Lab Results   Component Value Date    PROTIME 11.0 10/01/2021    INR 0.85 10/01/2021     HgBA1c:    Lab Results   Component Value Date    LABA1C 5.2 10/03/2021         Assessment:     Patient Active Problem List   Diagnosis    Cerebral palsy, infantile hemiplegia (HCC)    Rosacea, acne    IBS (irritable bowel syndrome)    Hypertension    Calculus of ureter    Pyelonephritis    Sepsis ([facility]    Pneumonia    Increased ammonia level    Aspiration pneumonia (HCC)    Hypokalemia    Hypomagnesemia    Hypophosphatasia    Seizure disorder (HCC)    Seizure disorder, grand mal ([facility]    Sepsis due to undetermined organism with acute respiratory failure ([facility])    Pain of upper abdomen    Diarrhea of presumed infectious origin    Abdominal pain    Pressure injury of contiguous region involving back and right buttock, stage 2 (HCC)    Cellulitis, perineum    Pressure injury of right buttock, stage 2 (HCC)    Pressure injury of left buttock, stage 1    COVID-19 virus detected    Hyponatremia    Multifocal pneumonia    TIA (transient ischemic attack)       Measurements:  Wound 10/06/21 Face Left upper lip (Active)   Wound Etiology Other 10/06/21 1005   Wound Cleansed Cleansed with saline 10/06/21 1005   Dressing/Treatment Open to air 10/06/21 1005   Wound Length (cm) 1.5 cm 10/06/21 1005   Wound Width (cm) 1 cm 10/06/21 1005   Wound Depth (cm) 0.1 cm 10/06/21 1005   Wound Surface Area (cm^2) 1.5 cm^2 10/06/21 1005   Wound Volume (cm^3) 0.15 cm^3 10/06/21 1005   Distance Tunneling (cm) 0 cm 10/06/21 1005   Tunneling Position ___ O'Clock 0 10/06/21 1005   Undermining Starts ___ O'Clock 0 10/06/21 1005   Undermining Ends___ O'Clock 0 10/06/21 1005   Undermining Maxium Distance (cm) 0 10/06/21 1005   Drainage Amount Small 10/06/21 1005   Drainage Description Serosanguinous; Yellow 10/06/21 1005   Odor None 10/06/21 1005   Melanie-wound Assessment Dry/flaky 10/06/21 1005   Margins Attached edges 10/06/21 1005   Wound Thickness Description not for Pressure Injury Full thickness 10/06/21 1005   Number of days: 0       Wound 10/06/21 Knee Anterior; Left (Active)   Wound Etiology Traumatic 10/06/21 1005   Wound Cleansed Cleansed with saline 10/06/21 1005   Dressing/Treatment Betadine swabs/povidone iodine 10/06/21 1005   Wound Length (cm) 0.9

## 2021-10-06 NOTE — PROGRESS NOTES
Needs assistance (aide present in case pt needs assistance)  Homemaking Assistance: Needs assistance (aide present in case pt needs assistance)  Homemaking Responsibilities: Yes  Ambulation Assistance: Independent  Transfer Assistance: Independent  Active : No  Additional Comments: Reports needin increased volume, difficulty hearing, difficulty answering questions, requires helpwith walking and small bites for eating per white board notes. [facility] Clicks Inpatient Mobility:  AM-PAC Inpatient Mobility Raw Score : 18     Objective:   PT assesses LE strength and ROM, noted min decreased strength in L DF resulting in min drop foot, PF tone with minimal clonus on the LLE.  MMT: Generally 4/5 except L DF/PF 3/5. Neuro: H/o CP affecting L side    Treatment:    Sitting balance: Pt with fair- sitting balance at edge of chair with back unsupported, able to perform scooting tasks to edge of chair with increased UE support, SBA    STS: Pt performs x1 from recliner to RW with CGA, min increased time to upright, increased time for LUE to RW  d/t chronic CP deficits. Pt with adequate LE ROM and strength to complete transfer without physical assist, however CGA required d/t pt increased sway. Return to seated pt demonstrates fair eccentric descent to chair with UE support on arm rests. Standing balance: Pt with fair- standing balance, requires UE support on RW and CGA d/t increased sway. Pt requires wide DESIREE for stability. Performed x10 MIKE standing marching with RW and CGA, no LOB. Pt tolerates ~2 minutes dynamic standing time. Pt fatigues by end of trial.     Gait: AMB x25 ft in room with RW, d/t CP pt with ataxic gait, L LE with increased hip and foot ER, decreased DF strength resulting in min foot drop, L trunk lean in L stance, LLE circumduction in swing phase. Pt requires CGA d/t increased trunk sway, pt overall steady gait however fatigues quickly.     Seated LE EXS: ADD squeezes x10 reps with 5 sec hold, marching MIKE x10 reps with v/c for improved ROM and activation. Education: Discussed PLOF with pt and pt sister, discussed d/c planning and what to expect with ARU, LE EXS for pt to perform independently in seated. Safety: patient left in recliner with chair alarm, call light within reach, RN notified, gait belt used. Assessment:  Pt would benefit from skilled PT services in order to address endurance and balance impairments, prevent falls, and promote return to PLOF. PT to recommend d/c to ARU for rehab services with expected d/c to group home. Complexity: Moderate  Prognosis: Good, no significant barriers to participation at this time. Plan Times per week: 2/week, 1 week,   Discharge Recommendations: Home with Home health PT, Home with assist PRN  Equipment: Defer    Goals:  Short term goals  Short term goal 1: Pt will perform ambulation with FWW for 50 ft mod I. Short term goal 2: Pt will perform bed mobiltiy independently. Short term goal 3: Pt will perform STS transfers with mod I using FWW.        Time:   Time in: 15:45  Time out: 16:14  Timed treatment minutes: 29  Total time: 29    Electronically signed by:    Faraz Hill, PT  48/4/9883, 2:66 PM  PT Lic #: [id number]

## 2021-10-06 NOTE — CARE COORDINATION
Met with patient and Sister  and discussed ARU. Explained to patient the required 3 hours of therapy a day. Also explained the average length of stay is 11 days, could be longer or shorter depending on recommendations of therapy and Dr. Doreen Pedroza. Patient expresses his understanding and states he's agreeable to admit to ARU. Per patient and sister at the group home he lives at he requires some assistance with ADL's r/t Cerebral palsy, but tries to do everything on his own. Patient and sister both state that patient does walk at home with a walker. Per patients sister patient needs to get stronger prior to returning home. Patient states he knows he needs to participate with therapy to come to ARU and states he will to ensure he doesn't go to SNF. Explained to both patient and sister that OT signed up to work with therapy and need to ensure participation prior to presenting to Dr. Doreen Pedroza. Both expressed understanding. Will await updated therapy note and then will present to Dr. Doreen Pedroza. Discussed with Moreno Kelly.

## 2021-10-06 NOTE — PROGRESS NOTES
Hospitalist Progress Note      Name:  Olman Fernandez /Age/Sex: 1955  (73 y.o. male)   MRN & CSN:  [id number] & [id number] Admission Date/Time: 10/1/2021  8:57 PM   [facility] PCP: MARY Acosta Day: 6    Assessment and Plan:   Olman Fernandez is a 73 y.o.  male with PMH of HTN, seizure/epilepsy, IBS and depression who was admitted with strokelike symptoms.     #Strokelike symptoms/signs and recurrent falls concerning for possible CVA. -CT head noted without acute ischemic change.  -Cannot undergo MRI due to vagal nerve stimulator. -PT/OT input appreciated. -Being considered for ARU admission  -Fall precautions.  -Continue aspirin and atorvastatin    #Facial/upper lips cellulitis.  -Wound care team evaluated. -General surgery consult.  -Started on cefazolin for strep B UTI which will also help this. #Strep B UTI. -Urine culture from  grew strep B  -Start cefazolin.     #History of epilepsy/seizure on Depakote, Vimpat and clonazepam.  Also Seroquel and primidone also low zonisamide.     #Uncontrolled hypertension.  -Improved BP with increasing amlodipine to 10 mg.  -Continue IV labetalol as needed for BP greater than 160/100 mmHg.     #Hypothyroidism on levothyroxine. #Depression on paroxetine. Also on Seroquel.       Diet ADULT DIET; Dysphagia - Minced and Moist; No Drinking Straws   DVT Prophylaxis [x] Lovenox, []  Heparin, [] SCDs, [] Ambulation   GI Prophylaxis [x] PPI,  [] H2 Blocker,  [] Carafate,  [] Diet/Tube Feeds   Code Status Full Code   Disposition Patient requires continued admission due to pending placement in ARU   MDM [] Low, [] Moderate,[x]  High  Patient's risk as above due to possible TIA/CVA, facial cellulitis, UTI, epilepsy and uncontrolled hypertension     History of Present Illness:     Chief Complaint:   Patient was seen and examined in the morning. Chart reviewed and case discussed with the RN.   No adverse event overnight.     Patient relative who is by the bedside reports frequency of urination and urine culture done a week ago for suspected UTI. Ten point ROS reviewed negative, unless as noted above    Objective:   No intake or output data in the 24 hours ending 10/06/21 0930   Vitals:   Vitals:    10/06/21 0756   BP: 133/81   Pulse: 81   Resp: 14   Temp: 97.9 °F (36.6 °C)   SpO2: 100%     Physical Exam:   GEN    Awake male, lying in bed in no apparent distress. Appears given age. EYES  No scleral erythema, discharge, or conjunctivitis. HENT   Upper lip fluctuant swelling with surrounding cellulitic area. Mucous membranes are moist. No evidence of thrush. NECK  Supple, no apparent thyromegaly or masses. RESP  Clear to auscultation, no wheezes, rales or rhonchi. Symmetric chest movement while on room air. CARDIO/VASC           S1/S2 auscultated. Regular rate without appreciable murmurs, rubs, or gallops. No JVD or carotid bruits. Peripheral pulses equal bilaterally and palpable. No peripheral edema. GI        Abdomen is soft without significant tenderness, masses, or guarding. Bowel sounds are normoactive.        No costovertebral angle tenderness. Abdullahi catheter is not present. HEME/LYMPH            No palpable cervical lymphadenopathy and no hepatosplenomegaly. No petechiae or ecchymoses. MSK    No gross joint deformities. SKIN    Normal coloration, warm, dry. NEURO           Cranial nerves appear grossly intact, normal speech, no lateralizing weakness.     Medications:   Medications:    amLODIPine  10 mg Oral Daily    LORazepam  0.5 mg Oral BID    vitamin D  1,000 Units Oral Daily    folic acid  1 mg Oral Daily    PARoxetine  20 mg Oral QAM    lactobacillus  1 capsule Oral Daily    latanoprost  1 drop Both Eyes Nightly    timolol  1 drop Both Eyes Daily    clonazePAM  0.25 mg Oral Daily    [Held by provider] divalproex  1,000 mg Oral Nightly    [Held by provider] hydroCHLOROthiazide 12.5 mg Oral Daily    pantoprazole  40 mg Oral QAM AC    primidone  50 mg Oral TID    zonisamide  100 mg Oral QAM    levothyroxine  125 mcg Oral Daily    mirabegron  50 mg Oral Daily    trospium  20 mg Oral BID AC    lactulose  30 g Oral TID    lubiprostone  24 mcg Oral Daily with breakfast    sodium chloride flush  5-40 mL IntraVENous 2 times per day    enoxaparin  40 mg SubCUTAneous Daily    aspirin  81 mg Oral Daily    Or    aspirin  300 mg Rectal Daily    atorvastatin  80 mg Oral Nightly    psyllium  1 packet Oral BID    clopidogrel  75 mg Oral Daily    divalproex  1,000 mg Oral Daily    sodium chloride flush  5-40 mL IntraVENous 2 times per day    lacosamide  200 mg Oral BID    QUEtiapine  50 mg Oral BID    tamsulosin  0.4 mg Oral Daily      Infusions:    sodium chloride       PRN Meds: labetalol, 10 mg, Q6H PRN  simethicone, 80 mg, 4x Daily PRN  guaiFENesin, 1,200 mg, Daily PRN  pseudoephedrine, 30 mg, Q4H PRN  dicyclomine, 10 mg, Q6H PRN  acetaminophen, 500 mg, Q6H PRN  [Held by provider] lacosamide, 200 mg, Daily PRN  sodium chloride flush, 5-40 mL, PRN  sodium chloride, 25 mL, PRN  ondansetron, 4 mg, Q8H PRN   Or  ondansetron, 4 mg, Q6H PRN  polyethylene glycol, 17 g, Daily PRN  sodium chloride flush, 5-40 mL, PRN          Patient is still admitted because of reasons noted above. Medically ready for discharge.     Electronically signed by Darryle Mealy, MD on 10/6/2021 at 9:30 AM

## 2021-10-06 NOTE — CARE COORDINATION
Presented clinicals and therapy notes to Dr. Melly Reyes. Per Dr. Melly Reyes patient will need to be participating in [address] [place] [address] therapy and not refusing. Notified Monica Gil about above information.

## 2021-10-06 NOTE — PROGRESS NOTES
Occupational Therapy  . Occupational Therapy Treatment Note      Name: Joao Silverman MRN: [id number] :   1955   Date:  10/6/2021   Admission Date: 10/1/2021 Room:  [facility]5/3125[facility]A     Primary Problem:     Restrictions/Precautions:    General precautions, fall risk        Communication with other providers:  Per chart review, patient ok for tx. Subjective:  Patient states:  I need to adjust. Come back later  Pain: no rating but sore on upper lip (location, type, intensity)    Objective:    Observation:  Patient asleep in dark room. Aroused with loud vcs . Did not want to transfer d/t just waking and needing to adjust. Agreeable to bed level therapy. Objective Measures:  tele    Treatment, including education:    ADL activity training was instructed today. Cues were given for safety, sequence, UE/LE placement, visual cues, and balance. Activities performed today included dressing, toileting, hand hygiene, and grooming. Feeding- SET UP. Patient trouble with small packages. But SBA/Min A for feeding with extra time and effort. Patient may benefit from built up handles   LB dressing- DEP to don B socks. Facial hygiene- SBA with extra time and effort. Therapeutic activity training was instructed today. Cues were given for safety, sequence, UE/LE placement, awareness, and balance. Activities performed today included bed mobility training, sup-sit, sit-stand, SPT. Rolling- L-R-SBA with extra effort and time. Plus vcs and tcs for bed rails. Boost up in bed- patient attempted when [facility] was raised by using B feet. vcs to recline Hob into supine. once supine patient Mod A to scoot up in trendelenburg and use of bed rails with tcs to get RUE on bedrail. .     Patient declined OOB/EOB activity this date. Patient educated on role of OT , benefits of OT and rationale for therapeutic intervention. Benefit of eating on EOB or recliner, ambulation, stretching and exercise.      All therapeutic intervention performed c emphasis on BUE strengthening and endurance to  increase strength for functional tasks / transfers. Patient left safely in beds at end of session, with call light in reach, alarm on and nursing aware. Gait belt was used for func transfers / mobility. Assessment / Impression:    Patient's tolerance of treatment: fair  Adverse Reaction: none  Significant change in status and impact:  none  Barriers to improvement: fatigue/lethargy, weakness. Decreased dexterity.   Hard of hearing      Plan for Next Session:    Continue with OT POC      Time in:  910  Time out:  938  Timed treatment minutes:  28  Total treatment time:  28      Electronically signed by:    DAWIT Story COTA/l [id number]  10/6/2021, 9:38 AM

## 2021-10-07 ENCOUNTER — ANESTHESIA EVENT (OUTPATIENT)
Dept: OPERATING ROOM | Age: 66
DRG: 982 | End: 2021-10-07
Payer: MEDICARE

## 2021-10-07 LAB
SARS-COV-2, NAAT: NOT DETECTED
SOURCE: NORMAL

## 2021-10-07 PROCEDURE — 94761 N-INVAS EAR/PLS OXIMETRY MLT: CPT

## 2021-10-07 PROCEDURE — 6370000000 HC RX 637 (ALT 250 FOR IP): Performed by: INTERNAL MEDICINE

## 2021-10-07 PROCEDURE — 6370000000 HC RX 637 (ALT 250 FOR IP): Performed by: EMERGENCY MEDICINE

## 2021-10-07 PROCEDURE — 2580000003 HC RX 258: Performed by: INTERNAL MEDICINE

## 2021-10-07 PROCEDURE — 2140000000 HC CCU INTERMEDIATE R&B

## 2021-10-07 PROCEDURE — 6360000002 HC RX W HCPCS: Performed by: INTERNAL MEDICINE

## 2021-10-07 PROCEDURE — 87635 SARS-COV-2 COVID-19 AMP PRB: CPT

## 2021-10-07 PROCEDURE — 87070 CULTURE OTHR SPECIMN AEROBIC: CPT

## 2021-10-07 PROCEDURE — 87186 SC STD MICRODIL/AGAR DIL: CPT

## 2021-10-07 PROCEDURE — 87077 CULTURE AEROBIC IDENTIFY: CPT

## 2021-10-07 PROCEDURE — 99221 1ST HOSP IP/OBS SF/LOW 40: CPT | Performed by: PHYSICIAN ASSISTANT

## 2021-10-07 PROCEDURE — 87075 CULTR BACTERIA EXCEPT BLOOD: CPT

## 2021-10-07 PROCEDURE — 6370000000 HC RX 637 (ALT 250 FOR IP): Performed by: CLINICAL NURSE SPECIALIST

## 2021-10-07 PROCEDURE — 76937 US GUIDE VASCULAR ACCESS: CPT

## 2021-10-07 RX ADMIN — Medication 1000 UNITS: at 09:07

## 2021-10-07 RX ADMIN — CLOPIDOGREL BISULFATE 75 MG: 75 TABLET, FILM COATED ORAL at 09:07

## 2021-10-07 RX ADMIN — QUETIAPINE FUMARATE 50 MG: 25 TABLET ORAL at 09:06

## 2021-10-07 RX ADMIN — LACOSAMIDE 200 MG: 50 TABLET, FILM COATED ORAL at 22:43

## 2021-10-07 RX ADMIN — ENOXAPARIN SODIUM 40 MG: 40 INJECTION SUBCUTANEOUS at 09:08

## 2021-10-07 RX ADMIN — Medication 1 CAPSULE: at 09:06

## 2021-10-07 RX ADMIN — LEVOTHYROXINE SODIUM 125 MCG: 125 TABLET ORAL at 06:17

## 2021-10-07 RX ADMIN — PRIMIDONE 50 MG: 50 TABLET ORAL at 09:06

## 2021-10-07 RX ADMIN — CEFAZOLIN SODIUM 2000 MG: 2 INJECTION, SOLUTION INTRAVENOUS at 04:57

## 2021-10-07 RX ADMIN — Medication 1 PACKET: at 22:42

## 2021-10-07 RX ADMIN — ATORVASTATIN CALCIUM 80 MG: 80 TABLET, FILM COATED ORAL at 22:43

## 2021-10-07 RX ADMIN — ASPIRIN 81 MG: 81 TABLET, COATED ORAL at 09:07

## 2021-10-07 RX ADMIN — LACTULOSE 30 G: 10 SOLUTION ORAL at 09:06

## 2021-10-07 RX ADMIN — PRIMIDONE 50 MG: 50 TABLET ORAL at 13:20

## 2021-10-07 RX ADMIN — LORAZEPAM 0.5 MG: 0.5 TABLET ORAL at 22:44

## 2021-10-07 RX ADMIN — Medication 1 PACKET: at 09:08

## 2021-10-07 RX ADMIN — ZONISAMIDE 100 MG: 100 CAPSULE ORAL at 09:07

## 2021-10-07 RX ADMIN — TROSPIUM CHLORIDE 20 MG: 20 TABLET ORAL at 16:45

## 2021-10-07 RX ADMIN — QUETIAPINE FUMARATE 50 MG: 25 TABLET ORAL at 22:44

## 2021-10-07 RX ADMIN — CEFAZOLIN SODIUM 2000 MG: 2 INJECTION, SOLUTION INTRAVENOUS at 13:19

## 2021-10-07 RX ADMIN — SODIUM CHLORIDE, PRESERVATIVE FREE 10 ML: 5 INJECTION INTRAVENOUS at 09:09

## 2021-10-07 RX ADMIN — FOLIC ACID 1 MG: 1 TABLET ORAL at 09:08

## 2021-10-07 RX ADMIN — LUBIPROSTONE 24 MCG: 24 CAPSULE, GELATIN COATED ORAL at 09:08

## 2021-10-07 RX ADMIN — AMLODIPINE BESYLATE 10 MG: 10 TABLET ORAL at 09:08

## 2021-10-07 RX ADMIN — TIMOLOL MALEATE 1 DROP: 5 SOLUTION OPHTHALMIC at 09:11

## 2021-10-07 RX ADMIN — PANTOPRAZOLE SODIUM 40 MG: 40 TABLET, DELAYED RELEASE ORAL at 06:17

## 2021-10-07 RX ADMIN — CLONAZEPAM 0.25 MG: 0.5 TABLET ORAL at 09:07

## 2021-10-07 RX ADMIN — LACTULOSE 30 G: 10 SOLUTION ORAL at 22:42

## 2021-10-07 RX ADMIN — LACTULOSE 30 G: 10 SOLUTION ORAL at 13:20

## 2021-10-07 RX ADMIN — DIVALPROEX SODIUM 1000 MG: 500 TABLET, FILM COATED, EXTENDED RELEASE ORAL at 09:08

## 2021-10-07 RX ADMIN — SODIUM CHLORIDE, PRESERVATIVE FREE 10 ML: 5 INJECTION INTRAVENOUS at 22:39

## 2021-10-07 RX ADMIN — TROSPIUM CHLORIDE 20 MG: 20 TABLET ORAL at 06:17

## 2021-10-07 RX ADMIN — PRIMIDONE 50 MG: 50 TABLET ORAL at 22:43

## 2021-10-07 RX ADMIN — CEFAZOLIN SODIUM 2000 MG: 2 INJECTION, SOLUTION INTRAVENOUS at 22:42

## 2021-10-07 RX ADMIN — TAMSULOSIN HYDROCHLORIDE 0.4 MG: 0.4 CAPSULE ORAL at 09:07

## 2021-10-07 RX ADMIN — LACOSAMIDE 200 MG: 50 TABLET, FILM COATED ORAL at 09:08

## 2021-10-07 RX ADMIN — LORAZEPAM 0.5 MG: 0.5 TABLET ORAL at 09:07

## 2021-10-07 RX ADMIN — PAROXETINE HYDROCHLORIDE 20 MG: 20 TABLET, FILM COATED ORAL at 09:07

## 2021-10-07 ASSESSMENT — PAIN SCALES - GENERAL: PAINLEVEL_OUTOF10: 0

## 2021-10-07 NOTE — CARE COORDINATION
CM contacted by [facility], they are able to accept the Pt today if medically ready. Pt will need a rapid covid before dc. PS to Dr. Yamel Mcfarland to update and request rapid covid.       CM following for needs

## 2021-10-07 NOTE — PROGRESS NOTES
Hospitalist Progress Note      Name:  Everette Buitrago /Age/Sex: 1955  (73 y.o. male)   MRN & CSN:  [id number] & [id number] Admission Date/Time: 10/1/2021  8:57 PM   Location:  [facility]-A PCP: MARY Garcia Day: 7    Assessment and Plan:   Rishi Hager a 73 y.o.  male with PMH of HTN, seizure/epilepsy, IBS and depression who was admitted with strokelike symptoms.     #Strokelike symptoms/signs and recurrent falls concerning for possible acute ischemic CVA. -CT head noted without acute ischemic change.  -Cannot undergo MRI due to vagal nerve stimulator. -PT/OT input appreciated. -Fall precautions.  -Continue aspirin and atorvastatin  -Accepted  for ARU admission per case management and awaiting I&D by general surgery Xenia    #Facial/upper lips cellulitis.  -Wound care team evaluated. -General surgery consulted and being planned for I&D tomorrow.  -Started on cefazolin for strep B UTI which will also help this.     #Strep B UTI. -Urine culture from  grew strep B  -Continue cefazolin to complete 7 days     #History of epilepsy/seizure on Depakote, Vimpat and clonazepam.  Also Seroquel and primidone also on zonisamide.     #Uncontrolled hypertension.  -Continue current dose of amlodipine 10 mg.       #Hypothyroidism on levothyroxine. #Depression on paroxetine.  Also on Seroquel. Diet Diet NPO  ADULT DIET;  Dysphagia - Minced and Moist; No Drinking Straws   DVT Prophylaxis [x] Lovenox, []  Heparin, [] SCDs, [] Ambulation   GI Prophylaxis [x] PPI,  [] H2 Blocker,  [] Carafate,  [] Diet/Tube Feeds   Code Status Full Code   Disposition Patient requires continued admission due to pending general surgery input and admission to ARU   MDM [] Low, [x] Moderate,[]  High  Patient's risk as above due to possible TIA/CVA, facial cellulitis, UTI, epilepsy and uncontrolled hypertension     History of Present Illness:     Chief Complaint:   Patient was seen and examined in the morning.  Chart reviewed and case discussed with the RN.  No adverse event overnight.     Patient relative including pneumonia and sister were by the bedside. Ten point ROS reviewed negative, unless as noted above    Objective:   No intake or output data in the 24 hours ending 10/07/21 1417   Vitals:   Vitals:    10/07/21 1355   BP:    Pulse: 81   Resp:    Temp:    SpO2:      Physical Exam:   GEN    Awake male, lying in bed in no apparent distress. Appears given age. EYES  No scleral erythema, discharge, or conjunctivitis. HENT   Upper lip fluctuant and crusted over swelling with surrounding cellulitic area. Mucous membranes are moist. No evidence of thrush. NECK  Supple, no apparent thyromegaly or masses. RESP  Clear to auscultation, no wheezes, rales or rhonchi.  Symmetric chest movement while on room air. CARDIO/VASC           S1/S2 auscultated. Regular rate without appreciable murmurs, rubs, or gallops. No JVD or carotid bruits. Peripheral pulses equal bilaterally and palpable. No peripheral edema. Jerl Lies is soft without significant tenderness, masses, or guarding. Bowel sounds are normoactive.        No costovertebral angle tenderness.  Abdullahi catheter is not present. HEME/LYMPH            No palpable cervical lymphadenopathy and no hepatosplenomegaly. No petechiae or ecchymoses. MSK    No gross joint deformities. SKIN    Normal coloration, warm, dry. NEURO           Cranial nerves appear grossly intact, normal speech, no lateralizing weakness.     Medications:   Medications:    ceFAZolin  2,000 mg IntraVENous Q8H    mirabegron  50 mg Oral Nightly    amLODIPine  10 mg Oral Daily    LORazepam  0.5 mg Oral BID    vitamin D  1,000 Units Oral Daily    folic acid  1 mg Oral Daily    PARoxetine  20 mg Oral QAM    lactobacillus  1 capsule Oral Daily    latanoprost  1 drop Both Eyes Nightly    timolol  1 drop Both Eyes Daily    clonazePAM  0.25 mg Oral Daily    [Held by provider] divalproex  1,000 mg Oral Nightly    [Held by provider] hydroCHLOROthiazide  12.5 mg Oral Daily    pantoprazole  40 mg Oral QAM AC    primidone  50 mg Oral TID    zonisamide  100 mg Oral QAM    levothyroxine  125 mcg Oral Daily    trospium  20 mg Oral BID AC    lactulose  30 g Oral TID    lubiprostone  24 mcg Oral Daily with breakfast    sodium chloride flush  5-40 mL IntraVENous 2 times per day    enoxaparin  40 mg SubCUTAneous Daily    aspirin  81 mg Oral Daily    Or    aspirin  300 mg Rectal Daily    atorvastatin  80 mg Oral Nightly    psyllium  1 packet Oral BID    clopidogrel  75 mg Oral Daily    divalproex  1,000 mg Oral Daily    sodium chloride flush  5-40 mL IntraVENous 2 times per day    lacosamide  200 mg Oral BID    QUEtiapine  50 mg Oral BID    tamsulosin  0.4 mg Oral Daily      Infusions:    sodium chloride       PRN Meds: labetalol, 10 mg, Q6H PRN  simethicone, 80 mg, 4x Daily PRN  guaiFENesin, 1,200 mg, Daily PRN  pseudoephedrine, 30 mg, Q4H PRN  dicyclomine, 10 mg, Q6H PRN  acetaminophen, 500 mg, Q6H PRN  [Held by provider] lacosamide, 200 mg, Daily PRN  sodium chloride flush, 5-40 mL, PRN  sodium chloride, 25 mL, PRN  ondansetron, 4 mg, Q8H PRN   Or  ondansetron, 4 mg, Q6H PRN  polyethylene glycol, 17 g, Daily PRN  sodium chloride flush, 5-40 mL, PRN          Patient is still admitted because of reasons noted above. The anticipated discharge is in less than 24 hours.      Electronically signed by Jonnathan King MD on 10/7/2021 at 2:17 PM

## 2021-10-07 NOTE — CONSULTS
Department of GeneralSurgery   Surgical Service   LULY Rutherford[facility]   Consult Note      Date of Consult: 10/7/21    Reason for Consult: Facial wound  Requesting Physician:  Esther Campos MD    CHIEF COMPLAINT: Facial wound    History Obtained From:   Family member -Jeannie Duran, electronic medical record    HISTORY OF PRESENT ILLNESS:                The patient is a 73 y.o. male who was admitted with TIA symptoms from his group home. It was noticed during this admission of the patient had a wound to the left knee as well as to the face and wound care was consulted who recommended surgical consult for possible debridement for the facial wound. The family at bedside reports that this wound has been present for at least 2 weeks. A couple nights ago, the patient fell and caught his lip on a nurses badge reel (according to patient's mother). Patient had bleeding from the wound at that time that stopped with direct pressure. The patient denies pain unless there is direct pressure to the wound. Pain is described as pressure-like. Pain level is 0/10 unless there is direct pressure when pain increases significantly. Patient has had no fevers or chills. Does have a positive MRSA history.      Past Medical History:    Past Medical History:   Diagnosis Date    Anemia     Aspiration pneumonia ([address]     dx 6/9/2014 with this per ecf/ per old chart dx with pneumonia with admission 9/18/2018    Cerebral palsy ([address]     \"has no feeling on left side of body\"alert but has some dementia but not diagnosed, has good long term but poor short term and wakes up disoriented after a nap\"(per yaneth)(2014)    Depression     Epilepsy ([address] 1961    last seizure 2/2018- hx grand mal    Frequent falls     with phone assess- family stated pt fell this am (7/10/2013\"in the process of trying to find a facility to help build him back up- ([facility] now [facility])    Glaucoma     \"has been in treatment for this in the past- no treatment needed at present\"    History of IBS     Hx MRSA infection     + nasal culture 4/2014    Hyperammonemia (HCC)     Hypertension     on Lisinopril    IBS (irritable bowel syndrome)     ulcerative colitis    Kidney stone     for surgery for stent placement 7/11/2013    Mood disorder ([facility]     per staff at [facility] Occasional tremors     \"makes it difficult for him to write\"    Pressure injury of contiguous region involving back and right buttock, stage 2 ([facility] 5/27/2020    Pressure injury of left buttock, stage 1 7/22/2020    Prostatitis     hx given per H&P old chart    Thyroid disease     Ulcerative colitis ([facility]     hx given per staff at [facility] 4/13/2015       Past Surgical History:    Past Surgical History:   Procedure Laterality Date    CHOLECYSTECTOMY      COLONOSCOPY  8/19/14, 5/2012 8/19/14: hemorrhoids, 5/2012 at [facility]  02/04/2021    COLONOSCOPY N/A 2/4/2021    COLONOSCOPY POLYPECTOMY SNARE/COLD BIOPSY performed by Alexandr Hampton MD at [address] 07/11/2013    with stnet placement   [address] [place] [address]      OTHER SURGICAL HISTORY  04/15/2015    Revision of vagal nerve stimulator    STIMULATOR SURGERY N/A 3/24/2021    STIMULATOR INSERTION performed by Fatemeh Calloway MD at [place] [address] N/A 3/24/2021    STIMULATOR INSERTION STAGE 2 performed by Fatemeh Calloway MD at [address] [place] [address] 11/13/2018    EGD DILATION BALLOON AND BIOPSY performed by Marialuisa Jonas MD at [place] [address]    Has to have bettery changed every 5 yrs.         Current Medications:   Current Facility-Administered Medications   Medication Dose Route Frequency Provider Last Rate Last Admin    ceFAZolin (ANCEF) 2000 mg in dextrose 4 % 100 mL IVPB (premix)  2,000 mg IntraVENous Q8H Radha Mackenzie MD   Stopped at 10/07/21 2778    mirabegron [facility]) extended release tablet 50 mg  50 mg Oral Nightly Biju Engle MD   50 mg at 10/06/21 2214    amLODIPine (NORVASC) tablet 10 mg  10 mg Oral Daily Reji Valadez MD   10 mg at 10/07/21 0908    LORazepam (ATIVAN) tablet 0.5 mg  0.5 mg Oral BID Russel Aki Ellsworth MD   0.5 mg at 10/07/21 0907    vitamin D CAPS 1,000 Units  1,000 Units Oral Daily Russel Aki Ellsworth MD   1,000 Units at 38/11/69 7796    folic acid (FOLVITE) tablet 1 mg  1 mg Oral Daily Russel Aki Ellsworth MD   1 mg at 10/07/21 0908    PARoxetine (PAXIL) tablet 20 mg  20 mg Oral QAM Russel Aki Ellsworth MD   20 mg at 10/07/21 0907    lactobacillus (CULTURELLE) capsule 1 capsule  1 capsule Oral Daily Sarahi HUBBARD MD   1 capsule at 10/07/21 0906    latanoprost (XALATAN) 0.005 % ophthalmic solution 1 drop  1 drop Both Eyes Nightly Russel Cj HUBBARD MD   1 drop at 10/04/21 2055    timolol (TIMOPTIC) 0.5 % ophthalmic solution 1 drop  1 drop Both Eyes Daily Russel Aki Ellsworth MD   1 drop at 10/07/21 0911    clonazePAM (KLONOPIN) tablet 0.25 mg  0.25 mg Oral Daily Russel Aki Ellsworth MD   0.25 mg at 10/07/21 0907    [Held by provider] divalproex (DEPAKOTE) DR tablet 1,000 mg  1,000 mg Oral Nightly Cinthia Contreras MD        [Held by provider] hydroCHLOROthiazide (HYDRODIURIL) tablet 12.5 mg  12.5 mg Oral Daily Russel Aki Ellsworth MD        pantoprazole (PROTONIX) tablet 40 mg  40 mg Oral QAM AC Russel Aki Ellsworth MD   40 mg at 10/07/21 0617    primidone (MYSOLINE) tablet 50 mg  50 mg Oral TID Cinthia Contreras MD   50 mg at 10/07/21 1320    zonisamide (ZONEGRAN) capsule 100 mg  100 mg Oral QAM Russel Aki Ellsworth MD   100 mg at 10/07/21 0907    simethicone (MYLICON) chewable tablet 80 mg  80 mg Oral 4x Daily PRN Russel Aki Ellsworth MD        guaiFENesin [facility]) extended release tablet 1,200 mg  1,200 mg Oral Daily PRN Russel Aki Ellsworth MD        pseudoephedrine (SUDAFED) tablet 30 mg  30 mg Oral Q4H PRN Russel Aki Ellsworth MD   30 mg at 10/06/21 1000    levothyroxine (SYNTHROID) tablet 125 mcg  125 mcg Oral Daily Russel Martin MD   125 mcg at 10/07/21 0617    dicyclomine (BENTYL) capsule 10 mg  10 mg Oral Q6H PRN Russel Martin MD        acetaminophen (TYLENOL) tablet 500 mg  500 mg Oral Q6H PRN Russel Martin MD   500 mg at 10/04/21 2108    trospium (SANCTURA) tablet 20 mg  20 mg Oral BID AC Russel Martin MD   20 mg at 10/07/21 1645    [Held by provider] lacosamide (VIMPAT) tablet 200 mg  200 mg Oral Daily PRN Russel Martin MD        lactulose (CHRONULAC) 10 GM/15ML solution 30 g  30 g Oral TID Yessi HUBBARD MD   30 g at 10/07/21 1320    lubiprostone (AMITIZA) capsule 24 mcg  24 mcg Oral Daily with breakfast Yessi HUBBARD MD   24 mcg at 10/07/21 0908    sodium chloride flush 0.9 % injection 5-40 mL  5-40 mL IntraVENous 2 times per day Reshma Mckeon MD   10 mL at 10/07/21 0909    sodium chloride flush 0.9 % injection 5-40 mL  5-40 mL IntraVENous PRN Russel Martin MD        0.9 % sodium chloride infusion  25 mL IntraVENous PRN Russel Martin MD        ondansetron (ZOFRAN-ODT) disintegrating tablet 4 mg  4 mg Oral Q8H PRN Russel Martin MD        Or    ondansetron (ZOFRAN) injection 4 mg  4 mg IntraVENous Q6H PRN Russel Martin MD        polyethylene glycol (GLYCOLAX) packet 17 g  17 g Oral Daily PRN Russel Martin MD        enoxaparin (LOVENOX) injection 40 mg  40 mg SubCUTAneous Daily Russel Martin MD   40 mg at 10/07/21 0908    aspirin EC tablet 81 mg  81 mg Oral Daily Russel Martin MD   81 mg at 10/07/21 9597    Or    aspirin suppository 300 mg  300 mg Rectal Daily Russel Martin MD        atorvastatin (LIPITOR) tablet 80 mg  80 mg Oral Nightly Russel Martin MD   80 mg at 10/06/21 2213    psyllium (HYDROCIL) 95 % packet 1 packet  1 packet Oral BID Yessi HUBBARD MD   1 packet at 10/07/21 0908    clopidogrel (PLAVIX) tablet 75 mg  75 mg Oral Daily MARY Frost - CNS   75 mg at 10/07/21 0907    divalproex (DEPAKOTE ER) extended release tablet 1,000 mg  1,000 mg Oral Daily Mony Kennedy MD   1,000 mg at 10/07/21 0908    sodium chloride flush 0.9 % injection 5-40 mL  5-40 mL IntraVENous 2 times per day Mony Kennedy MD   10 mL at 10/06/21 1003    sodium chloride flush 0.9 % injection 5-40 mL  5-40 mL IntraVENous PRN Mony Kennedy MD        lacosamide (VIMPAT) tablet 200 mg  200 mg Oral BID Mony Kennedy MD   200 mg at 10/07/21 0908    QUEtiapine (SEROQUEL) tablet 50 mg  50 mg Oral BID Mony Kennedy MD   50 mg at 10/07/21 0906    tamsulosin (FLOMAX) capsule 0.4 mg  0.4 mg Oral Daily Mony Kennedy MD   0.4 mg at 10/07/21 0907       Allergies:  Patient has no known allergies. Social History:   Social History     Socioeconomic History    Marital status: Single     Spouse name: None    Number of children: None    Years of education: None    Highest education level: None   Occupational History    None   Tobacco Use    Smoking status: Never Smoker    Smokeless tobacco: Never Used   Vaping Use    Vaping Use: Never used   Substance and Sexual Activity    Alcohol use: No    Drug use: No    Sexual activity: None   Other Topics Concern    None   Social History Narrative    None     Social Determinants of Health     Financial Resource Strain:     Difficulty of Paying Living Expenses:    Food Insecurity:     Worried About Running Out of Food in the Last Year:     Ran Out of Food in the Last Year:    Transportation Needs:     Lack of Transportation (Medical):      Lack of Transportation (Non-Medical):    Physical Activity:     Days of Exercise per Week:     Minutes of Exercise per Session:    Stress:     Feeling of Stress :    Social Connections:     Frequency of Communication with Friends and Family:     Frequency of Social Gatherings with Friends and Family:     Attends Jain Services:     Active Member of Clubs or Organizations:     Attends Club or Organization Meetings:     Marital Status:    Intimate Partner Violence:     Fear of Current or Ex-Partner:     Emotionally Abused:     Physically Abused:     Sexually Abused:        Family History:   Family History   Problem Relation Age of Onset    Heart Disease Mother         atrial fib    High Blood Pressure Mother     Asthma Mother     High Cholesterol Mother     Depression Mother    Audrey Alu Migraines Mother     High Blood Pressure Father     Heart Disease Father     Asthma Sister     High Cholesterol Sister     Depression Sister     Migraines Sister        REVIEW OFSYSTEMS:    Review of Systems   Unable to perform ROS: Other   Patient at baseline. He is able to answer basic questions, but overall is a poor historian. PHYSICAL EXAM:  Vitals:    10/07/21 0926 10/07/21 1219 10/07/21 1355 10/07/21 1500   BP:  107/75     Pulse:  68 81 69   Resp:  15  17   Temp:  97.9 °F (36.6 °C)     TempSrc:  Oral     SpO2: 100% 100%     Weight:       Height:           Physical Exam  Constitutional:       Appearance: He is well-developed. HENT:      Head: Normocephalic. Comments: Patient with abscess under left nostril. This is completely covered with scab, but there is purulent drainage noted with direct pressure. It is exquisitely tender to palpation. Otherwise, the lips and nose appear normal.  Eyes:      Pupils: Pupils are equal, round, and reactive to light. Cardiovascular:      Rate and Rhythm: Normal rate. Pulmonary:      Effort: Pulmonary effort is normal.   Abdominal:      General: There is no distension. Palpations: Abdomen is soft. There is no mass. Tenderness: There is no abdominal tenderness. There is no guarding or rebound. Musculoskeletal:         General: Normal range of motion. Cervical back: Normal range of motion and neck supple. Legs:       Comments: Wound to left knee. Skin:     General: Skin is warm. Neurological:      Mental Status: He is alert and oriented to person, place, and time.        DATA:    CBC:   Lab Results   Component Value Date    WBC 8.2 10/03/2021    RBC 5.09 10/03/2021    HGB 15.6 10/03/2021    HCT 46.2 10/03/2021    MCV 90.8 10/03/2021    MCH 30.6 10/03/2021    MCHC 33.8 10/03/2021    RDW 12.3 10/03/2021     10/03/2021    MPV 10.5 10/03/2021     CMP:    Lab Results   Component Value Date     10/05/2021    K 3.9 10/05/2021     10/05/2021    CO2 25 10/05/2021    BUN 11 10/05/2021    CREATININE 0.4 10/05/2021    GFRAA >60 10/05/2021    LABGLOM >60 10/05/2021    GLUCOSE 122 10/05/2021    PROT 7.4 10/03/2021    LABALBU 3.9 10/03/2021    CALCIUM 9.0 10/05/2021    BILITOT 0.2 10/03/2021    ALKPHOS 107 10/03/2021    AST 14 10/03/2021    ALT 20 10/03/2021       IMPRESSION:        Patient Active Problem List:     Cerebral palsy, infantile hemiplegia (HCC)     Rosacea, acne     IBS (irritable bowel syndrome)     Hypertension     Calculus of ureter     Pyelonephritis     Sepsis (HCC)     Pneumonia     Increased ammonia level     Aspiration pneumonia (HCC)     Hypokalemia     Hypomagnesemia     Hypophosphatasia     Seizure disorder (HCC)     Seizure disorder, grand mal ([facility]     Sepsis due to undetermined organism with acute respiratory failure (HCC)     Pain of upper abdomen     Diarrhea of presumed infectious origin     Abdominal pain     Pressure injury of contiguous region involving back and right buttock, stage 2 (HCC)     Cellulitis, perineum     Pressure injury of right buttock, stage 2 (HCC)     Pressure injury of left buttock, stage 1     COVID-19 virus detected     Hyponatremia     Multifocal pneumonia     TIA (transient ischemic attack)    Facial abscess    PLAN:    We will plan for I&D of facial abscess tomorrow morning in the OR under MAC anesthesia. Care has been discussed with the family who is at bedside including Tiffanie Guerrero, who has elected to proceed.     The patient was counseled at length about the risks of stalin Covid-19 during their perioperative period and any recovery

## 2021-10-07 NOTE — ANESTHESIA PRE PROCEDURE
Department of Anesthesiology  Preprocedure Note       Name:  Monika Read   Age:  73 y.o.  :  1955                                          MRN:  [id number]         Date:  10/7/2021      Surgeon: Lonna Frankel):  Harshal Cloud MD    Procedure: Procedure(s):  FACIAL INCISION AND DRAINAGE    Medications prior to admission:   Prior to Admission medications    Medication Sig Start Date End Date Taking? Authorizing Provider   AMITIZA 24 MCG capsule  21   Historical Provider, MD   mirabegron (MYRBETRIQ) 50 MG TB24 Take 50 mg by mouth daily    Historical Provider, MD   solifenacin (VESICARE) 10 MG tablet Take 10 mg by mouth daily    Historical Provider, MD   Clotrimazole POWD Apply to groin twice a day 20   CHRIS Olvera   acetaminophen (APAP EXTRA STRENGTH) 500 MG tablet Take 1 tablet by mouth every 6 hours as needed for Pain 20   CHRIS Olvera   levothyroxine (SYNTHROID) 125 MCG tablet Take 1 tablet by mouth Daily 20   Saad Martinez MD   dicyclomine (BENTYL) 10 MG capsule Take 1 capsule by mouth every 6 hours as needed (cramps) 20  Saad Martinez MD   amLODIPine (NORVASC) 5 MG tablet Take 5 mg by mouth daily    Historical Provider, MD   clonazePAM (KLONOPIN) 0.5 MG tablet Take 0.25 mg by mouth daily.     Historical Provider, MD   divalproex (DEPAKOTE) 500 MG DR tablet Take 1,000 mg by mouth nightly    Historical Provider, MD   hydrochlorothiazide (MICROZIDE) 12.5 MG capsule Take 12.5 mg by mouth daily    Historical Provider, MD   omeprazole (PRILOSEC) 20 MG delayed release capsule Take 20 mg by mouth 2 times daily (before meals)    Historical Provider, MD   primidone (MYSOLINE) 50 MG tablet Take 50 mg by mouth 3 times daily    Historical Provider, MD   zonisamide (ZONEGRAN) 100 MG capsule Take 100 mg by mouth every morning    Historical Provider, MD   Simethicone (MI-ACID GAS RELIEF PO) Take 1 Container by mouth every 4 hours as needed 10 cc every 4 hrs not to exceed 60 cc in 24 hrs    Historical Provider, MD   guaiFENesin (MUCINEX) 600 MG extended release tablet Take 1,200 mg by mouth daily as needed for Congestion    Historical Provider, MD   Menthol-Methyl Salicylate (MUSCLE RUB) 10-15 % CREA cream Apply 1 applicator topically as needed    Historical Provider, MD   pseudoephedrine (SUDAFED) 30 MG tablet Take 30 mg by mouth every 4 hours as needed for Congestion  Patient not taking: Reported on 9/24/2021    Historical Provider, MD   lacosamide (VIMPAT) 200 MG tablet Take 200 mg by mouth as needed. Give 1 tab after seizure activity as needed    Historical Provider, MD   lacosamide (VIMPAT) 200 MG tablet Take 1 tablet by mouth 2 times daily for 30 days. 3/25/20 5/27/20  Louise Ordoñez DO   latanoprost (XALATAN) 0.005 % ophthalmic solution Place 1 drop into both eyes nightly    Historical Provider, MD   timolol (TIMOPTIC) 0.5 % ophthalmic solution Place 1 drop into both eyes daily     Historical Provider, MD   Lactobacillus (ACIDOPHILUS) CAPS capsule Take 1 capsule by mouth daily    Historical Provider, MD   aspirin 81 MG tablet Take 81 mg by mouth daily    Historical Provider, MD   Wheat Dextrin (BENEFIBER) POWD Take 4 g by mouth 2 times daily Takes 3 tsp in liquid twice per day     Historical Provider, MD   Lactulose Encephalopathy (ENULOSE PO) Take 30 mLs by mouth 3 times daily    Historical Provider, MD   folic acid (FOLVITE) 1 MG tablet Take 1 mg by mouth daily    Historical Provider, MD   PARoxetine (PAXIL) 20 MG tablet Take 20 mg by mouth every morning    Historical Provider, MD   QUEtiapine (SEROQUEL XR) 50 MG extended release tablet Take 50 mg by mouth nightly    Historical Provider, MD   tamsulosin (FLOMAX) 0.4 MG capsule Take 1 capsule by mouth daily 3/18/17   CHIRS Uribe   simvastatin (ZOCOR) 20 MG tablet Take 20 mg by mouth nightly. Historical Provider, MD   Cholecalciferol (VITAMIN D3) 1000 UNITS CAPS Take 1 tablet by mouth daily.     Historical Provider, MD clorazepate (TRANXENE) 7.5 MG tablet Take 7.5 mg by mouth 2 times daily.     Historical Provider, MD       Current medications:    Current Facility-Administered Medications   Medication Dose Route Frequency Provider Last Rate Last Admin    ceFAZolin (ANCEF) 2000 mg in dextrose 4 % 100 mL IVPB (premix)  2,000 mg IntraVENous Q8H Radha Mackenzie MD   Stopped at 10/07/21 0535    mirabegron (MYRBETRIQ) extended release tablet 50 mg  50 mg Oral Nightly Biju Engle MD   50 mg at 10/06/21 2214    labetalol (NORMODYNE;TRANDATE) injection 10 mg  10 mg IntraVENous Q6H PRN Radha Mackenzie MD        amLODIPine (NORVASC) tablet 10 mg  10 mg Oral Daily Biju Engle MD   10 mg at 10/07/21 0908    LORazepam (ATIVAN) tablet 0.5 mg  0.5 mg Oral BID Russel Sears MD   0.5 mg at 10/07/21 0907    vitamin D CAPS 1,000 Units  1,000 Units Oral Daily Miguel A HUBBARD MD   1,000 Units at 94/44/93 9789    folic acid (FOLVITE) tablet 1 mg  1 mg Oral Daily Miguel A HUBBARD MD   1 mg at 10/07/21 0908    PARoxetine (PAXIL) tablet 20 mg  20 mg Oral QAM Russel Sears MD   20 mg at 10/07/21 0907    lactobacillus (CULTURELLE) capsule 1 capsule  1 capsule Oral Daily Miguel A HUBBARD MD   1 capsule at 10/07/21 0906    latanoprost (XALATAN) 0.005 % ophthalmic solution 1 drop  1 drop Both Eyes Nightly Russel HUBBARD MD   1 drop at 10/04/21 2055    timolol (TIMOPTIC) 0.5 % ophthalmic solution 1 drop  1 drop Both Eyes Daily Miguel A HUBBARD MD   1 drop at 10/07/21 0911    clonazePAM (KLONOPIN) tablet 0.25 mg  0.25 mg Oral Daily Russel Sears MD   0.25 mg at 10/07/21 0907    [Held by provider] divalproex (DEPAKOTE) DR tablet 1,000 mg  1,000 mg Oral Nightly Dona Groves MD        [Held by provider] hydroCHLOROthiazide (HYDRODIURIL) tablet 12.5 mg  12.5 mg Oral Daily Russel Sears MD        pantoprazole (PROTONIX) tablet 40 mg  40 mg Oral QAM AC Russel Sears MD   40 mg at 10/07/21 0617    primidone (MYSOLINE) tablet 50 mg  50 mg Oral TID Chantelle Muniz MD   50 mg at 10/07/21 0906    zonisamide (ZONEGRAN) capsule 100 mg  100 mg Oral QAM Russel Nguyễn MD   100 mg at 10/07/21 0907    simethicone (MYLICON) chewable tablet 80 mg  80 mg Oral 4x Daily PRN Russel Nguyễn MD        guaiFENesin [facility]) extended release tablet 1,200 mg  1,200 mg Oral Daily PRN Russel Nguyễn MD        pseudoephedrine (SUDAFED) tablet 30 mg  30 mg Oral Q4H PRN Russel Nguyễn MD   30 mg at 10/06/21 1000    levothyroxine (SYNTHROID) tablet 125 mcg  125 mcg Oral Daily Chhaya HUBBARD MD   125 mcg at 10/07/21 0617    dicyclomine (BENTYL) capsule 10 mg  10 mg Oral Q6H PRN Russel Nguyễn MD        acetaminophen (TYLENOL) tablet 500 mg  500 mg Oral Q6H PRN Russel Nguyễn MD   500 mg at 10/04/21 2108    trospium (SANCTURA) tablet 20 mg  20 mg Oral BID AC Chhaya HUBBARD MD   20 mg at 10/07/21 0617    [Held by provider] lacosamide (VIMPAT) tablet 200 mg  200 mg Oral Daily PRN Russel Nguyễn MD        lactulose (CHRONULAC) 10 GM/15ML solution 30 g  30 g Oral TID Chantelle Muniz MD   30 g at 10/07/21 0906    lubiprostone (AMITIZA) capsule 24 mcg  24 mcg Oral Daily with breakfast Chhaya HUBBARD MD   24 mcg at 10/07/21 0908    sodium chloride flush 0.9 % injection 5-40 mL  5-40 mL IntraVENous 2 times per day Chantelle Muniz MD   10 mL at 10/07/21 0909    sodium chloride flush 0.9 % injection 5-40 mL  5-40 mL IntraVENous PRN Russel Nguyễn MD        0.9 % sodium chloride infusion  25 mL IntraVENous PRN Russel Nguyễn MD        ondansetron (ZOFRAN-ODT) disintegrating tablet 4 mg  4 mg Oral Q8H PRN Russel Nguyễn MD        Or    ondansetron (ZOFRAN) injection 4 mg  4 mg IntraVENous Q6H PRN Russel Nguyễn MD        polyethylene glycol (GLYCOLAX) packet 17 g  17 g Oral Daily PRN Russel Nguyễn MD        enoxaparin (LOVENOX) injection 40 mg  40 mg SubCUTAneous Daily Russel Nguyễn MD   40 mg at 10/07/21 0908    aspirin EC tablet 81 buttock, stage 2 (HCC) L89.42    Cellulitis, perineum L03.315    Pressure injury of right buttock, stage 2 (HCC) L89.312    Pressure injury of left buttock, stage 1 L89.321    COVID-19 virus detected U07.1    Hyponatremia E87.1    Multifocal pneumonia J18.9    TIA (transient ischemic attack) G45.9       Past Medical History:        Diagnosis Date    Anemia     Aspiration pneumonia (HCC)     dx 6/9/2014 with this per ecf/ per old chart dx with pneumonia with admission 9/18/2018    Cerebral palsy ([address]     \"has no feeling on left side of body\"alert but has some dementia but not diagnosed, has good long term but poor short term and wakes up disoriented after a nap\"(per yaneth)(2014)    Depression     Epilepsy ([address] 1961    last seizure 2/2018- hx grand mal    Frequent falls     with phone assess- family stated pt fell this am (7/10/2013\"in the process of trying to find a facility to help build him back up- (pt now [facility])    Glaucoma     \"has been in treatment for this in the past- no treatment needed at present\"    History of IBS     Hx MRSA infection     + nasal culture 4/2014    Hyperammonemia ([facility])     Hypertension     on Lisinopril    IBS (irritable bowel syndrome)     ulcerative colitis    Kidney stone     for surgery for stent placement 7/11/2013    Mood disorder ([address]     per staff at [facility] Occasional tremors     \"makes it difficult for him to write\"    Pressure injury of contiguous region involving back and right buttock, stage 2 ([address] 5/27/2020    Pressure injury of left buttock, stage 1 7/22/2020    Prostatitis     hx given per H&P old chart    Thyroid disease     Ulcerative colitis ([address]     hx given per staff at [facility] 4/13/2015       Past Surgical History:        Procedure Laterality Date    CHOLECYSTECTOMY      COLONOSCOPY  8/19/14, 5/2012 8/19/14: hemorrhoids, 5/2012 at [facility]  02/04/2021    COLONOSCOPY N/A 2/4/2021    COLONOSCOPY POLYPECTOMY SNARE/COLD BIOPSY performed by Poly Acosta MD at [facility] Left 07/11/2013    with stnet placement   [facility]  2005    KIDNEY STONE SURGERY      OTHER SURGICAL HISTORY  04/15/2015    Revision of vagal nerve stimulator    STIMULATOR SURGERY N/A 3/24/2021    STIMULATOR INSERTION performed by Adeola Hermosillo MD at [place] [address] N/A 3/24/2021    STIMULATOR INSERTION STAGE 2 performed by Adeola Hermosillo MD at [facility] N/A 11/13/2018    EGD DILATION BALLOON AND BIOPSY performed by Ary Means MD at [place] [address]    Has to have bettery changed every 5 yrs. Social History:    Social History     Tobacco Use    Smoking status: Never Smoker    Smokeless tobacco: Never Used   Substance Use Topics    Alcohol use: No                                Counseling given: Not Answered      Vital Signs (Current):   Vitals:    10/07/21 0012 10/07/21 0455 10/07/21 0830 10/07/21 0926   BP: 138/87 (!) 153/79     Pulse: 78 73 75    Resp: 16 12     Temp: 36.4 °C (97.6 °F) 36.6 °C (97.8 °F)     TempSrc: Oral Oral     SpO2: 98% 100%  100%   Weight:  199 lb 11.8 oz (90.6 kg)     Height:                                                  BP Readings from Last 3 Encounters:   10/07/21 (!) 153/79   09/24/21 120/70   09/13/21 137/61       NPO Status:                                                                                 BMI:   Wt Readings from Last 3 Encounters:   10/07/21 199 lb 11.8 oz (90.6 kg)   06/29/21 190 lb (86.2 kg)   03/24/21 190 lb (86.2 kg)     Body mass index is 32.25 kg/m².     CBC:   Lab Results   Component Value Date    WBC 8.2 10/03/2021    RBC 5.09 10/03/2021    HGB 15.6 10/03/2021    HCT 46.2 10/03/2021    MCV 90.8 10/03/2021    RDW 12.3 10/03/2021     10/03/2021       CMP:   Lab Results   Component Value Date     10/05/2021    K 3.9 10/05/2021     10/05/2021    CO2 25 10/05/2021    BUN 11 10/05/2021    CREATININE 0.4 10/05/2021    GFRAA >60 10/05/2021    LABGLOM >60 10/05/2021    GLUCOSE 122 10/05/2021    PROT 7.4 10/03/2021    CALCIUM 9.0 10/05/2021    BILITOT 0.2 10/03/2021    ALKPHOS 107 10/03/2021    AST 14 10/03/2021    ALT 20 10/03/2021       POC Tests: No results for input(s): POCGLU, POCNA, POCK, POCCL, POCBUN, POCHEMO, POCHCT in the last 72 hours. Coags:   Lab Results   Component Value Date    PROTIME 11.0 10/01/2021    INR 0.85 10/01/2021    APTT 25.7 11/27/2020       HCG (If Applicable): No results found for: PREGTESTUR, PREGSERUM, HCG, HCGQUANT     ABGs:   Lab Results   Component Value Date    PO2ART 58 05/02/2014    LZL1GBD 39.0 05/02/2014    HIV0TSM 23.6 05/02/2014    BEART 1 05/02/2014        Type & Screen (If Applicable):  No results found for: LABABO, LABRH    Drug/Infectious Status (If Applicable):  No results found for: HIV, HEPCAB    COVID-19 Screening (If Applicable):   Lab Results   Component Value Date    COVID19 NOT DETECTED 03/24/2021    COVID19 NOT DETECTED 07/10/2020           Anesthesia Evaluation  Patient summary reviewed  Airway: Mallampati: III  TM distance: <3 FB   Neck ROM: full  Mouth opening: > = 3 FB Dental:    (+) edentulous      Pulmonary:normal exam                              ROS comment: CXR 10/1/2021:  Impression  1. No acute cardiopulmonary disease.        Cardiovascular:    (+) hypertension:,          Beta Blocker:  Not on Beta Blocker         Neuro/Psych:   (+) seizures:, TIA, psychiatric history:depression/anxiety              ROS comment: Cerebral palsy, infantile hemiplegia  \"has no feeling on left side of body\"alert but has some dementia but not diagnosed, has good long term but poor short term and wakes up disoriented after a nap\"(per yaneth)(2014)    Mood disorder GI/Hepatic/Renal:   (+) GERD:, PUD, morbid obesity          Endo/Other: Negative Endo/Other ROS                    Abdominal:   (+) obese, Vascular: negative vascular ROS. Other Findings:           Anesthesia Plan      MAC     ASA 3       Induction: intravenous. Anesthetic plan and risks discussed with patient and healthcare power of . Plan discussed with CRNA. MARY Carmona - CRNA   10/7/2021         Pre Anesthesia Assessment complete.  Chart reviewed on 10/7/2021

## 2021-10-07 NOTE — CARE COORDINATION
Reviewed PT note from 10/6 and d/w Dr. Cara Bloch. Met with patient (mother and sister at bedside) and discussed ARU again and reiterated requirements of participating with therapy 3 hours a day. Patient expresses his understanding and agreeable to admit with goal of returning home. Patient meets criteria and is approved to come to ARU. Patient able to admit once medically stable and after ARU Medical Director and  sign the pre-admission screen (PAS), pending rapid COVID results.       Notified Mary Rodriguez of approval and the need for a rapid COVID test.

## 2021-10-08 ENCOUNTER — ANESTHESIA (OUTPATIENT)
Dept: OPERATING ROOM | Age: 66
DRG: 982 | End: 2021-10-08
Payer: MEDICARE

## 2021-10-08 VITALS
RESPIRATION RATE: 16 BRPM | OXYGEN SATURATION: 100 % | SYSTOLIC BLOOD PRESSURE: 153 MMHG | DIASTOLIC BLOOD PRESSURE: 69 MMHG | TEMPERATURE: 97.7 F

## 2021-10-08 PROCEDURE — 97116 GAIT TRAINING THERAPY: CPT

## 2021-10-08 PROCEDURE — 0C90XZZ DRAINAGE OF UPPER LIP, EXTERNAL APPROACH: ICD-10-PCS | Performed by: INTERNAL MEDICINE

## 2021-10-08 PROCEDURE — 97110 THERAPEUTIC EXERCISES: CPT

## 2021-10-08 PROCEDURE — 2709999900 HC NON-CHARGEABLE SUPPLY: Performed by: SURGERY

## 2021-10-08 PROCEDURE — 6360000002 HC RX W HCPCS

## 2021-10-08 PROCEDURE — 3600000012 HC SURGERY LEVEL 2 ADDTL 15MIN: Performed by: SURGERY

## 2021-10-08 PROCEDURE — 2500000003 HC RX 250 WO HCPCS: Performed by: SURGERY

## 2021-10-08 PROCEDURE — 97530 THERAPEUTIC ACTIVITIES: CPT

## 2021-10-08 PROCEDURE — 99232 SBSQ HOSP IP/OBS MODERATE 35: CPT | Performed by: SURGERY

## 2021-10-08 PROCEDURE — 2140000000 HC CCU INTERMEDIATE R&B

## 2021-10-08 PROCEDURE — 6360000002 HC RX W HCPCS: Performed by: INTERNAL MEDICINE

## 2021-10-08 PROCEDURE — 2720000010 HC SURG SUPPLY STERILE: Performed by: SURGERY

## 2021-10-08 PROCEDURE — 3700000000 HC ANESTHESIA ATTENDED CARE: Performed by: SURGERY

## 2021-10-08 PROCEDURE — 87070 CULTURE OTHR SPECIMN AEROBIC: CPT

## 2021-10-08 PROCEDURE — 6360000002 HC RX W HCPCS: Performed by: PHYSICIAN ASSISTANT

## 2021-10-08 PROCEDURE — 94761 N-INVAS EAR/PLS OXIMETRY MLT: CPT

## 2021-10-08 PROCEDURE — 10061 I&D ABSCESS COMP/MULTIPLE: CPT | Performed by: SURGERY

## 2021-10-08 PROCEDURE — 87205 SMEAR GRAM STAIN: CPT

## 2021-10-08 PROCEDURE — 6370000000 HC RX 637 (ALT 250 FOR IP): Performed by: INTERNAL MEDICINE

## 2021-10-08 PROCEDURE — 6370000000 HC RX 637 (ALT 250 FOR IP): Performed by: PHYSICIAN ASSISTANT

## 2021-10-08 PROCEDURE — 2780000010 HC IMPLANT OTHER: Performed by: SURGERY

## 2021-10-08 PROCEDURE — 3700000001 HC ADD 15 MINUTES (ANESTHESIA): Performed by: SURGERY

## 2021-10-08 PROCEDURE — 3600000002 HC SURGERY LEVEL 2 BASE: Performed by: SURGERY

## 2021-10-08 PROCEDURE — 2580000003 HC RX 258: Performed by: ANESTHESIOLOGY

## 2021-10-08 RX ORDER — SODIUM CHLORIDE, SODIUM LACTATE, POTASSIUM CHLORIDE, CALCIUM CHLORIDE 600; 310; 30; 20 MG/100ML; MG/100ML; MG/100ML; MG/100ML
INJECTION, SOLUTION INTRAVENOUS CONTINUOUS
Status: DISCONTINUED | OUTPATIENT
Start: 2021-10-08 | End: 2021-10-09 | Stop reason: HOSPADM

## 2021-10-08 RX ORDER — PROPOFOL 10 MG/ML
INJECTION, EMULSION INTRAVENOUS PRN
Status: DISCONTINUED | OUTPATIENT
Start: 2021-10-08 | End: 2021-10-08 | Stop reason: SDUPTHER

## 2021-10-08 RX ORDER — LIDOCAINE HYDROCHLORIDE 10 MG/ML
INJECTION, SOLUTION INFILTRATION; PERINEURAL
Status: COMPLETED | OUTPATIENT
Start: 2021-10-08 | End: 2021-10-08

## 2021-10-08 RX ADMIN — CEFAZOLIN SODIUM 2000 MG: 2 INJECTION, SOLUTION INTRAVENOUS at 11:52

## 2021-10-08 RX ADMIN — ATORVASTATIN CALCIUM 80 MG: 80 TABLET, FILM COATED ORAL at 21:39

## 2021-10-08 RX ADMIN — TROSPIUM CHLORIDE 20 MG: 20 TABLET ORAL at 17:04

## 2021-10-08 RX ADMIN — LACTULOSE 30 G: 10 SOLUTION ORAL at 21:40

## 2021-10-08 RX ADMIN — QUETIAPINE FUMARATE 50 MG: 25 TABLET ORAL at 21:39

## 2021-10-08 RX ADMIN — CEFAZOLIN SODIUM 2000 MG: 2 INJECTION, SOLUTION INTRAVENOUS at 22:48

## 2021-10-08 RX ADMIN — LORAZEPAM 0.5 MG: 0.5 TABLET ORAL at 21:40

## 2021-10-08 RX ADMIN — PROPOFOL 150 MG: 10 INJECTION, EMULSION INTRAVENOUS at 11:51

## 2021-10-08 RX ADMIN — PRIMIDONE 50 MG: 50 TABLET ORAL at 21:39

## 2021-10-08 RX ADMIN — LACOSAMIDE 200 MG: 50 TABLET, FILM COATED ORAL at 21:39

## 2021-10-08 RX ADMIN — SODIUM CHLORIDE, POTASSIUM CHLORIDE, SODIUM LACTATE AND CALCIUM CHLORIDE: 600; 310; 30; 20 INJECTION, SOLUTION INTRAVENOUS at 11:35

## 2021-10-08 RX ADMIN — LATANOPROST 1 DROP: 50 SOLUTION/ DROPS OPHTHALMIC at 21:48

## 2021-10-08 RX ADMIN — LEVOTHYROXINE SODIUM 125 MCG: 125 TABLET ORAL at 06:46

## 2021-10-08 RX ADMIN — TROSPIUM CHLORIDE 20 MG: 20 TABLET ORAL at 06:46

## 2021-10-08 RX ADMIN — PANTOPRAZOLE SODIUM 40 MG: 40 TABLET, DELAYED RELEASE ORAL at 06:46

## 2021-10-08 RX ADMIN — Medication 1 PACKET: at 21:40

## 2021-10-08 RX ADMIN — TIMOLOL MALEATE 1 DROP: 5 SOLUTION OPHTHALMIC at 21:47

## 2021-10-08 RX ADMIN — CEFAZOLIN SODIUM 2000 MG: 2 INJECTION, SOLUTION INTRAVENOUS at 04:49

## 2021-10-08 ASSESSMENT — PULMONARY FUNCTION TESTS
PIF_VALUE: 1
PIF_VALUE: 0
PIF_VALUE: 1
PIF_VALUE: 0
PIF_VALUE: 1
PIF_VALUE: 0
PIF_VALUE: 1
PIF_VALUE: 0
PIF_VALUE: 1
PIF_VALUE: 0
PIF_VALUE: 0
PIF_VALUE: 1
PIF_VALUE: 0
PIF_VALUE: 1
PIF_VALUE: 0
PIF_VALUE: 1
PIF_VALUE: 0
PIF_VALUE: 1
PIF_VALUE: 0
PIF_VALUE: 1

## 2021-10-08 ASSESSMENT — ENCOUNTER SYMPTOMS
BACK PAIN: 1
PHOTOPHOBIA: 0
EYE REDNESS: 0
RECTAL PAIN: 0
COLOR CHANGE: 0
CONSTIPATION: 0
EYE ITCHING: 0
STRIDOR: 0
CHOKING: 0
ANAL BLEEDING: 0
SORE THROAT: 0
APNEA: 0

## 2021-10-08 ASSESSMENT — PAIN SCALES - GENERAL
PAINLEVEL_OUTOF10: 0
PAINLEVEL_OUTOF10: 0

## 2021-10-08 ASSESSMENT — PAIN - FUNCTIONAL ASSESSMENT: PAIN_FUNCTIONAL_ASSESSMENT: 0-10

## 2021-10-08 NOTE — PROGRESS NOTES
Comprehensive Nutrition Assessment    Type and Reason for Visit:  Initial, RD Nutrition Re-Screen/LOS    Nutrition Recommendations/Plan:   Advance diet as medically able     Nutrition Assessment:  Pt seen for length of stay. Admits with recurrent falls likely r/t TIA. Pt with wounds due to fall during admission. Pt was OOR likely at planned I&D. Noted recent wt gain. Limited po intake data in documentation. Pt was accepted to ARU. Will follow as high nutrition risk    Malnutrition Assessment:  Malnutrition Status:  Insufficient data    Context:  Acute Illness       Estimated Daily Nutrient Needs:  Energy (kcal):  8066-8046 ([address]); Weight Used for Energy Requirements:  Current     Protein (g):  78-91 (1.2-1.4 g/kg); Weight Used for Protein Requirements:  Ideal        Fluid (ml/day):  ~2000; Method Used for Fluid Requirements:  1 ml/kcal      Nutrition Related Findings:  contact isolation r/t mrsa      Wounds:  Multiple, Open Wounds (facial, left knee)       Current Nutrition Therapies:    Diet NPO    Anthropometric Measures:  · Height: 5' 5.98\" (167.6 cm)  · Current Body Weight: 198 lb 10.2 oz (90.1 kg)   · Admission Body Weight: 416 lb 10.7 oz (189 kg)    · Usual Body Weight:       · Ideal Body Weight: 142 lbs; % Ideal Body Weight 139.9 %   · BMI: 32.1  · Adjusted Body Weight:  ; No Adjustment   · BMI Categories: Obese Class 1 (BMI 30.0-34. 9)       Nutrition Diagnosis:   · Inadequate oral intake related to increase demand for energy/nutrients (for healing) as evidenced by NPO or clear liquid status due to medical condition, wounds    Nutrition Interventions:   Food and/or Nutrient Delivery:  Continue NPO, Start Oral Diet (when medically able)  Nutrition Education/Counseling:  No recommendation at this time   Coordination of Nutrition Care:  Continue to monitor while inpatient    Goals:  Pt will advance diet within 24-48 hours       Nutrition Monitoring and Evaluation:   Behavioral-Environmental Outcomes:  None Identified   Food/Nutrient Intake Outcomes:  Diet Advancement/Tolerance  Physical Signs/Symptoms Outcomes:  Biochemical Data, GI Status, Weight, Skin     Discharge Planning:     Too soon to determine     Electronically signed by Mary Tellez RD, LD on 10/8/21 at 1:08 PM EDT    Contact: [phone]

## 2021-10-08 NOTE — PROGRESS NOTES
Physical Therapy    Physical Therapy Treatment Note  Name: Rosa Lomas MRN: [id number] :   1955   Date:  10/8/2021   Admission Date: 10/1/2021 Room:  [address] [place], [place] [address]   Restrictions/Precautions:  General precautions; fall risk; isolation (VRE)  Communication with other providers:  RN clears pt for therapy  Subjective:  Patient states: \"My mom is over cautious, I can do things on my own if people give me time! \"  Pain:   Location, Type, Intensity (0/10 to 10/10):  1-2/10 pain of top lip; acute, surgical pain  Objective:    Observation:  Pt is supine, awake in bed on arrival. Alert, agreeable to therapy. Mother is present. Pulse ox at 100 % at rest; O2 sats drop into low 80's with activity, pt denies SOB or lightheadedness; after pulse oximeter is repositioned on pt forehead sat return to mid to high 90's. Treatment, including education/measures:  Therapeutic Activity Training:   Therapeutic activity training was instructed today. Cues were given for safety, sequence, UE/LE placement, awareness, and balance. Activities performed today included bed mobility training, sup-sit, sit-stand, SPT. Supine <-> sit: min A for completion from elevated (~30*) HOB  Seated balance: good  Sit <-> stand: CGA for steadying assist  Standing balance: fair; BL UE reliance for support to maintain standing balance during functional mobility training  Balance/functional mobility activities totaling ~8 mins  Assisted to and from bathroom; min A for toilet; notes good safety, sequencing and set-up t/o. Gait Training:  Cues were given for safety, sequence, device management, balance, posture, awareness, path. Pt ambulates 30 ft using FWW w/ CGA; pt with CP leading to grossly ataxic gait, L LE with increased hip and foot ER,L foot drop, L trunk lean, LLE circumduction. CGA for steadying assist to attend to postural sway. Endurance cont t be a limiting factor.     Therapeutic Exercise:  Cues were given for technique, safety, transfers with mod I using FWW.        Electronically signed by:    Abbie Zhang PT, DPT  10/8/2021, 6:17 PM

## 2021-10-08 NOTE — PROGRESS NOTES
In room to start IV Ancef. Unable to flush PIV with leaking at site. IV removed. This RN attempted placement x2 without success.  Consult placed with IV team for placement

## 2021-10-08 NOTE — PROGRESS NOTES
Hospitalist Progress Note      Name:  Katheryn Momin /Age/Sex: 1955  (73 y.o. male)   MRN & CSN:  [id number] & [id number] Admission Date/Time: 10/1/2021  8:57 PM   Location:  [facility] PCP: MARY Han Day: 8    Assessment and Plan:   Madeline Combs a 73 y.o.  male with PMH of HTN, seizure/epilepsy, IBS and depression who was admitted with strokelike symptoms.     #Strokelike symptoms/signs and recurrent falls concerning for possible acute ischemic CVA. -CT head noted without acute ischemic change.  -Cannot undergo MRI due to vagal nerve stimulator. -PT/OT input appreciated. -Fall precautions.  -Continue aspirin and atorvastatin  -Accepted  for ARU admission per case management and awaiting I&D by general surgery Xenia     #Facial/upper lips purulent cellulitis.  -Wound care team evaluated. -General surgery consulted and disposed I&D today.  -We will observe tonight and discharge tomorrow.  -Continue cefazolin for now. We will follow up sample culture for antibiotics adjustment     #Strep B UTI. -Urine culture from  grew strep B  -Continue cefazolin to complete 7 days     #History of epilepsy/seizure on Depakote, Vimpat and clonazepam.  Also Seroquel and primidone also on zonisamide.     #Uncontrolled hypertension.  -Continue current dose of amlodipine 10 mg.     #Hypothyroidism on levothyroxine. #Depression on paroxetine.  Also on Seroquel.     Diet Diet NPO   DVT Prophylaxis [x] Lovenox, []  Heparin, [] SCDs, [] Ambulation   GI Prophylaxis [x] PPI,  [] H2 Blocker,  [] Carafate,  [] Diet/Tube Feeds   Code Status Full Code   Disposition Patient requires continued admission due to postop observation   MDM [] Low, [x] Moderate,[]  High  Patient's risk as above due to possible TIA/CVA, facial cellulitis, UTI, epilepsy and uncontrolled hypertension     History of Present Illness:     Chief Complaint:   Patient was seen and examined in the morning.  Chart reviewed and case discussed with the RN.  No adverse event overnight.     Patient relative including pneumonia and wife were by the bedside. Ten point ROS reviewed negative, unless as noted above    Objective: Intake/Output Summary (Last 24 hours) at 10/8/2021 1027  Last data filed at 10/7/2021 1552  Gross per 24 hour   Intake 293.89 ml   Output --   Net 293.89 ml      Vitals:   Vitals:    10/08/21 0856   BP: 139/75   Pulse: 73   Resp: 14   Temp: 98 °F (36.7 °C)   SpO2: 100%     Physical Exam:   GEN    Awake male, lying in bed in no apparent distress. Appears given age. EYES  No scleral erythema, discharge, or conjunctivitis. HENT   Upper lip fluctuant and crusted over swelling with surrounding cellulitic area.  Mucous membranes are moist. No evidence of thrush. NECK  Supple, no apparent thyromegaly or masses. RESP  Clear to auscultation, no wheezes, rales or rhonchi.  Symmetric chest movement while on room air. CARDIO/VASC           S1/S2 auscultated. Regular rate without appreciable murmurs, rubs, or gallops. No JVD or carotid bruits. Peripheral pulses equal bilaterally and palpable. No peripheral edema. Lucas Millie is soft without significant tenderness, masses, or guarding. Bowel sounds are normoactive.        No costovertebral angle tenderness.  Abdullahi catheter is not present. HEME/LYMPH            No palpable cervical lymphadenopathy and no hepatosplenomegaly. No petechiae or ecchymoses. MSK    No gross joint deformities. SKIN    Normal coloration, warm, dry. NEURO           Cranial nerves appear grossly intact, normal speech, no lateralizing weakness.     Medications:   Medications:    ceFAZolin  2,000 mg IntraVENous Q8H    mirabegron  50 mg Oral Nightly    amLODIPine  10 mg Oral Daily    LORazepam  0.5 mg Oral BID    vitamin D  1,000 Units Oral Daily    folic acid  1 mg Oral Daily    PARoxetine  20 mg Oral QAM    lactobacillus  1 capsule Oral Daily    latanoprost  1 drop Both Eyes Nightly    timolol  1 drop Both Eyes Daily    clonazePAM  0.25 mg Oral Daily    [Held by provider] divalproex  1,000 mg Oral Nightly    [Held by provider] hydroCHLOROthiazide  12.5 mg Oral Daily    pantoprazole  40 mg Oral QAM AC    primidone  50 mg Oral TID    zonisamide  100 mg Oral QAM    levothyroxine  125 mcg Oral Daily    trospium  20 mg Oral BID AC    lactulose  30 g Oral TID    lubiprostone  24 mcg Oral Daily with breakfast    sodium chloride flush  5-40 mL IntraVENous 2 times per day    enoxaparin  40 mg SubCUTAneous Daily    aspirin  81 mg Oral Daily    Or    aspirin  300 mg Rectal Daily    atorvastatin  80 mg Oral Nightly    psyllium  1 packet Oral BID    clopidogrel  75 mg Oral Daily    divalproex  1,000 mg Oral Daily    sodium chloride flush  5-40 mL IntraVENous 2 times per day    lacosamide  200 mg Oral BID    QUEtiapine  50 mg Oral BID    tamsulosin  0.4 mg Oral Daily      Infusions:    sodium chloride       PRN Meds: simethicone, 80 mg, 4x Daily PRN  guaiFENesin, 1,200 mg, Daily PRN  pseudoephedrine, 30 mg, Q4H PRN  dicyclomine, 10 mg, Q6H PRN  acetaminophen, 500 mg, Q6H PRN  [Held by provider] lacosamide, 200 mg, Daily PRN  sodium chloride flush, 5-40 mL, PRN  sodium chloride, 25 mL, PRN  ondansetron, 4 mg, Q8H PRN   Or  ondansetron, 4 mg, Q6H PRN  polyethylene glycol, 17 g, Daily PRN  sodium chloride flush, 5-40 mL, PRN          Patient is still admitted because of reasons noted above. The anticipated discharge is in less than 24 hours.      Electronically signed by Jonnathan King MD on 10/8/2021 at 10:27 AM

## 2021-10-08 NOTE — PLAN OF CARE
Nutrition Problem #1: Inadequate oral intake  Intervention: Food and/or Nutrient Delivery: Continue NPO, Start Oral Diet (when medically able)  Nutritional Goals: Pt will advance diet within 24-48 hours

## 2021-10-08 NOTE — ANESTHESIA POSTPROCEDURE EVALUATION
Department of Anesthesiology  Postprocedure Note    Patient: Bridgett Montoya  MRN: [id number]  YOB: 1955  Date of evaluation: 10/8/2021  Time:  12:25 PM     Procedure Summary     Date: 10/08/21 Room / Location: [address]    Anesthesia Start: 5974 Anesthesia Stop: 0195    Procedure: FACIAL INCISION AND DRAINAGE (N/A ) Diagnosis: (facial wound)    Surgeons: Kiran Paul MD Responsible Provider: Chris Martinez MD    Anesthesia Type: MAC ASA Status: 3          Anesthesia Type: MAC    Emmanuelle Phase I:      Emmanuelle Phase II:      Last vitals: Reviewed and per EMR flowsheets.        Anesthesia Post Evaluation    Patient location during evaluation: bedside  Patient participation: complete - patient participated  Level of consciousness: awake  Pain score: 0  Airway patency: patent  Nausea & Vomiting: no nausea and no vomiting  Complications: no  Cardiovascular status: blood pressure returned to baseline  Respiratory status: acceptable and room air  Hydration status: euvolemic

## 2021-10-09 ENCOUNTER — HOSPITAL ENCOUNTER (INPATIENT)
Age: 66
LOS: 5 days | Discharge: HOME HEALTH | DRG: 057 | End: 2021-10-14
Attending: PHYSICAL MEDICINE & REHABILITATION | Admitting: PHYSICAL MEDICINE & REHABILITATION
Payer: MEDICARE

## 2021-10-09 VITALS
HEART RATE: 74 BPM | OXYGEN SATURATION: 100 % | TEMPERATURE: 98.2 F | HEIGHT: 66 IN | DIASTOLIC BLOOD PRESSURE: 56 MMHG | RESPIRATION RATE: 19 BRPM | WEIGHT: 197.2 LBS | SYSTOLIC BLOOD PRESSURE: 134 MMHG | BODY MASS INDEX: 31.69 KG/M2

## 2021-10-09 LAB
GLUCOSE BLD-MCNC: 102 MG/DL (ref 70–99)
GLUCOSE BLD-MCNC: 88 MG/DL (ref 70–99)
SARS-COV-2, NAAT: NOT DETECTED
SOURCE: NORMAL

## 2021-10-09 PROCEDURE — 82962 GLUCOSE BLOOD TEST: CPT

## 2021-10-09 PROCEDURE — 1200000000 HC SEMI PRIVATE

## 2021-10-09 PROCEDURE — 6360000002 HC RX W HCPCS: Performed by: PHYSICIAN ASSISTANT

## 2021-10-09 PROCEDURE — 6370000000 HC RX 637 (ALT 250 FOR IP): Performed by: PHYSICIAN ASSISTANT

## 2021-10-09 PROCEDURE — 2580000003 HC RX 258: Performed by: PHYSICIAN ASSISTANT

## 2021-10-09 PROCEDURE — 87635 SARS-COV-2 COVID-19 AMP PRB: CPT

## 2021-10-09 RX ORDER — CEPHALEXIN 500 MG/1
500 CAPSULE ORAL 4 TIMES DAILY
Qty: 12 CAPSULE | Refills: 0 | Status: ON HOLD | OUTPATIENT
Start: 2021-10-09 | End: 2021-10-13 | Stop reason: HOSPADM

## 2021-10-09 RX ORDER — ASPIRIN 81 MG/1
81 TABLET ORAL DAILY
Qty: 30 TABLET | Refills: 3 | Status: SHIPPED | OUTPATIENT
Start: 2021-10-10

## 2021-10-09 RX ORDER — ATORVASTATIN CALCIUM 80 MG/1
80 TABLET, FILM COATED ORAL NIGHTLY
Qty: 30 TABLET | Refills: 3 | Status: SHIPPED | OUTPATIENT
Start: 2021-10-09

## 2021-10-09 RX ORDER — AMLODIPINE BESYLATE 10 MG/1
10 TABLET ORAL DAILY
Qty: 30 TABLET | Refills: 3 | Status: SHIPPED | OUTPATIENT
Start: 2021-10-10

## 2021-10-09 RX ORDER — CLOPIDOGREL BISULFATE 75 MG/1
75 TABLET ORAL DAILY
Qty: 30 TABLET | Refills: 3 | Status: SHIPPED | OUTPATIENT
Start: 2021-10-10

## 2021-10-09 RX ADMIN — ATORVASTATIN CALCIUM 80 MG: 80 TABLET, FILM COATED ORAL at 20:37

## 2021-10-09 RX ADMIN — CEFAZOLIN SODIUM 2000 MG: 2 INJECTION, SOLUTION INTRAVENOUS at 15:08

## 2021-10-09 RX ADMIN — FOLIC ACID 1 MG: 1 TABLET ORAL at 08:21

## 2021-10-09 RX ADMIN — CLONAZEPAM 0.25 MG: 0.5 TABLET ORAL at 08:23

## 2021-10-09 RX ADMIN — LUBIPROSTONE 24 MCG: 24 CAPSULE, GELATIN COATED ORAL at 08:24

## 2021-10-09 RX ADMIN — ACETAMINOPHEN 500 MG: 500 TABLET ORAL at 00:50

## 2021-10-09 RX ADMIN — Medication 1000 UNITS: at 08:21

## 2021-10-09 RX ADMIN — ZONISAMIDE 100 MG: 100 CAPSULE ORAL at 08:20

## 2021-10-09 RX ADMIN — PRIMIDONE 50 MG: 50 TABLET ORAL at 14:22

## 2021-10-09 RX ADMIN — SODIUM CHLORIDE, POTASSIUM CHLORIDE, SODIUM LACTATE AND CALCIUM CHLORIDE: 600; 310; 30; 20 INJECTION, SOLUTION INTRAVENOUS at 00:41

## 2021-10-09 RX ADMIN — DIVALPROEX SODIUM 1000 MG: 500 TABLET, FILM COATED, EXTENDED RELEASE ORAL at 08:21

## 2021-10-09 RX ADMIN — ENOXAPARIN SODIUM 40 MG: 40 INJECTION SUBCUTANEOUS at 08:22

## 2021-10-09 RX ADMIN — CLOPIDOGREL BISULFATE 75 MG: 75 TABLET, FILM COATED ORAL at 08:21

## 2021-10-09 RX ADMIN — SODIUM CHLORIDE, PRESERVATIVE FREE 10 ML: 5 INJECTION INTRAVENOUS at 20:39

## 2021-10-09 RX ADMIN — LACOSAMIDE 200 MG: 50 TABLET, FILM COATED ORAL at 20:37

## 2021-10-09 RX ADMIN — AMLODIPINE BESYLATE 10 MG: 10 TABLET ORAL at 08:21

## 2021-10-09 RX ADMIN — LACTULOSE 30 G: 10 SOLUTION ORAL at 08:22

## 2021-10-09 RX ADMIN — ACETAMINOPHEN 500 MG: 500 TABLET ORAL at 20:43

## 2021-10-09 RX ADMIN — ASPIRIN 81 MG: 81 TABLET, COATED ORAL at 08:21

## 2021-10-09 RX ADMIN — LEVOTHYROXINE SODIUM 125 MCG: 125 TABLET ORAL at 08:23

## 2021-10-09 RX ADMIN — QUETIAPINE FUMARATE 50 MG: 25 TABLET ORAL at 20:37

## 2021-10-09 RX ADMIN — PANTOPRAZOLE SODIUM 40 MG: 40 TABLET, DELAYED RELEASE ORAL at 08:21

## 2021-10-09 RX ADMIN — DICYCLOMINE HYDROCHLORIDE 10 MG: 10 CAPSULE ORAL at 00:50

## 2021-10-09 RX ADMIN — TROSPIUM CHLORIDE 20 MG: 20 TABLET ORAL at 10:06

## 2021-10-09 RX ADMIN — CEFAZOLIN SODIUM 2000 MG: 2 INJECTION, SOLUTION INTRAVENOUS at 06:27

## 2021-10-09 RX ADMIN — Medication 1 CAPSULE: at 08:20

## 2021-10-09 RX ADMIN — PRIMIDONE 50 MG: 50 TABLET ORAL at 20:37

## 2021-10-09 RX ADMIN — TIMOLOL MALEATE 1 DROP: 5 SOLUTION OPHTHALMIC at 08:24

## 2021-10-09 RX ADMIN — QUETIAPINE FUMARATE 50 MG: 25 TABLET ORAL at 08:24

## 2021-10-09 RX ADMIN — LORAZEPAM 0.5 MG: 0.5 TABLET ORAL at 20:37

## 2021-10-09 RX ADMIN — TAMSULOSIN HYDROCHLORIDE 0.4 MG: 0.4 CAPSULE ORAL at 08:23

## 2021-10-09 RX ADMIN — SODIUM CHLORIDE, POTASSIUM CHLORIDE, SODIUM LACTATE AND CALCIUM CHLORIDE: 600; 310; 30; 20 INJECTION, SOLUTION INTRAVENOUS at 10:48

## 2021-10-09 RX ADMIN — PAROXETINE HYDROCHLORIDE 20 MG: 20 TABLET, FILM COATED ORAL at 08:24

## 2021-10-09 RX ADMIN — PRIMIDONE 50 MG: 50 TABLET ORAL at 08:23

## 2021-10-09 RX ADMIN — LACTULOSE 30 G: 10 SOLUTION ORAL at 14:22

## 2021-10-09 RX ADMIN — LORAZEPAM 0.5 MG: 0.5 TABLET ORAL at 08:24

## 2021-10-09 RX ADMIN — LACOSAMIDE 200 MG: 50 TABLET, FILM COATED ORAL at 08:20

## 2021-10-09 RX ADMIN — Medication 1 PACKET: at 08:20

## 2021-10-09 RX ADMIN — Medication 1 PACKET: at 20:37

## 2021-10-09 RX ADMIN — TROSPIUM CHLORIDE 20 MG: 20 TABLET ORAL at 15:09

## 2021-10-09 ASSESSMENT — PAIN SCALES - GENERAL
PAINLEVEL_OUTOF10: 7
PAINLEVEL_OUTOF10: 6
PAINLEVEL_OUTOF10: 0
PAINLEVEL_OUTOF10: 3

## 2021-10-09 ASSESSMENT — PAIN DESCRIPTION - FREQUENCY: FREQUENCY: INTERMITTENT

## 2021-10-09 ASSESSMENT — PAIN DESCRIPTION - PAIN TYPE: TYPE: ACUTE PAIN

## 2021-10-09 ASSESSMENT — PAIN DESCRIPTION - DESCRIPTORS: DESCRIPTORS: CRAMPING

## 2021-10-09 ASSESSMENT — PAIN DESCRIPTION - LOCATION: LOCATION: ARM

## 2021-10-09 ASSESSMENT — PAIN DESCRIPTION - ORIENTATION: ORIENTATION: LEFT

## 2021-10-09 NOTE — DISCHARGE SUMMARY
Discharge Summary    Name:  Agustin dAan /Age/Sex: 1955  (73 y.o. male)   MRN & CSN:  [id number] & [id number] Admission Date/Time: 10/1/2021  8:57 PM   Attending:  Carlos Quijano MD Discharging Physician: Carlos Quijano MD     Hospital Course:   Aziza Leal is a 73 y.o.  male with PMH of HTN, seizure/epilepsy, IBS and depression who was admitted with strokelike symptoms. CT scan of the head was negative for any ischemic changes. Patient could not undergo MRI due to presence of vagal nerve stimulator. He was evaluated by neurology and was continued on aspirin and atorvastatin. Physical anticoagulant therapy evaluated and recommended discharge to acute care rehab. Of note is that he was also noted to have facial/upper lip purulence cellulitis. General surgery was consulted and patient had I&D under local anesthesia on 10/8/2021. He was also noted to have strep B UTI based on urinary symptoms with frequency of urination and urine culture from an outside clinic on 2021. He did have uncontrolled hypertension for which ED dose of amlodipine has been increased to 10 mg. He will be discharged to ARU. Both the patient and the family are in agreement with discharge planning. All other comorbidities were stable during hospital stay. On the day of discharge, patient did not have any complaint. The patient expressed appropriate understanding of and agreement with the discharge recommendations, medications, and plan. Consults this admission:  IP CONSULT TO PHARMACY  PHARMACY TO CHANGE BASE FLUIDS  IP CONSULT TO HOSPITALIST  IP CONSULT TO NEUROLOGY  IP CONSULT TO GENERAL SURGERY  IP CONSULT TO IV TEAM    Discharge Instruction:   Follow up appointments:   Primary care physician:  within 2 weeks  Neurology:  Follow-up in 2 weeks    Diet:  cardiac diet and low fat, low cholesterol diet   Activity: As recommended by PT and OT  Disposition: Discharged to:   []Home, []HHC, []SNF, [x]Acute Rehab, []Hospice   Condition on discharge: Stable    Discharge Medications:      [facility], [address] [place] [address]  Medication Instructions [id number]    Printed on:10/09/21 4420   Medication Information                      acetaminophen (APAP EXTRA STRENGTH) 500 MG tablet  Take 1 tablet by mouth every 6 hours as needed for Pain             AMITIZA 24 MCG capsule               amLODIPine (NORVASC) 10 MG tablet  Take 1 tablet by mouth daily             aspirin 81 MG EC tablet  Take 1 tablet by mouth daily             atorvastatin (LIPITOR) 80 MG tablet  Take 1 tablet by mouth nightly             cephALEXin (KEFLEX) 500 MG capsule  Take 1 capsule by mouth 4 times daily for 3 days             Cholecalciferol (VITAMIN D3) 1000 UNITS CAPS  Take 1 tablet by mouth daily. clonazePAM (KLONOPIN) 0.5 MG tablet  Take 0.25 mg by mouth daily. clopidogrel (PLAVIX) 75 MG tablet  Take 1 tablet by mouth daily             clorazepate (TRANXENE) 7.5 MG tablet  Take 7.5 mg by mouth 2 times daily. Clotrimazole POWD  Apply to groin twice a day             dicyclomine (BENTYL) 10 MG capsule  Take 1 capsule by mouth every 6 hours as needed (cramps)             divalproex (DEPAKOTE) 500 MG DR tablet  Take 1,000 mg by mouth nightly             folic acid (FOLVITE) 1 MG tablet  Take 1 mg by mouth daily             guaiFENesin (MUCINEX) 600 MG extended release tablet  Take 1,200 mg by mouth daily as needed for Congestion             hydrochlorothiazide (MICROZIDE) 12.5 MG capsule  Take 12.5 mg by mouth daily             lacosamide (VIMPAT) 200 MG tablet  Take 1 tablet by mouth 2 times daily for 30 days. lacosamide (VIMPAT) 200 MG tablet  Take 200 mg by mouth as needed.  Give 1 tab after seizure activity as needed             Lactobacillus (ACIDOPHILUS) CAPS capsule  Take 1 capsule by mouth daily             Lactulose Encephalopathy (ENULOSE PO)  Take 30 mLs by mouth 3 times daily             latanoprost (XALATAN) 0.005 % ophthalmic solution  Place 1 drop into both eyes nightly             levothyroxine (SYNTHROID) 125 MCG tablet  Take 1 tablet by mouth Daily             Menthol-Methyl Salicylate (MUSCLE RUB) 10-15 % CREA cream  Apply 1 applicator topically as needed             mirabegron (MYRBETRIQ) 50 MG TB24  Take 50 mg by mouth daily             omeprazole (PRILOSEC) 20 MG delayed release capsule  Take 20 mg by mouth 2 times daily (before meals)             PARoxetine (PAXIL) 20 MG tablet  Take 20 mg by mouth every morning             primidone (MYSOLINE) 50 MG tablet  Take 50 mg by mouth 3 times daily             pseudoephedrine (SUDAFED) 30 MG tablet  Take 30 mg by mouth every 4 hours as needed for Congestion             psyllium (HYDROCIL) 95 % PACK packet  Take 1 packet by mouth 2 times daily             QUEtiapine (SEROQUEL XR) 50 MG extended release tablet  Take 50 mg by mouth nightly             Simethicone (MI-ACID GAS RELIEF PO)  Take 1 Container by mouth every 4 hours as needed 10 cc every 4 hrs not to exceed 60 cc in 24 hrs             solifenacin (VESICARE) 10 MG tablet  Take 10 mg by mouth daily             tamsulosin (FLOMAX) 0.4 MG capsule  Take 1 capsule by mouth daily             timolol (TIMOPTIC) 0.5 % ophthalmic solution  Place 1 drop into both eyes daily              Wheat Dextrin (BENEFIBER) POWD  Take 4 g by mouth 2 times daily Takes 3 tsp in liquid twice per day              zonisamide (ZONEGRAN) 100 MG capsule  Take 100 mg by mouth every morning                 Objective Findings at Discharge:   /78   Pulse 78   Temp 98.1 °F (36.7 °C) (Oral)   Resp 18   Ht 5' 5.98\" (1.676 m)   Wt 198 lb 10.2 oz (90.1 kg)   SpO2 100%   BMI 32.08 kg/m²            PHYSICAL EXAM   GEN    Awake male, lying in bed in no apparent distress. Appears given age. EYES  No scleral erythema, discharge, or conjunctivitis.   HENT   dressing over the upper lip.  Mucous membranes are moist. No evidence of thrush. NECK  Supple, no apparent thyromegaly or masses. RESP  Clear to auscultation, no wheezes, rales or rhonchi.  Symmetric chest movement while on room air. CARDIO/VASC           S1/S2 auscultated. Regular rate without appreciable murmurs, rubs, or gallops. No JVD or carotid bruits. Peripheral pulses equal bilaterally and palpable. No peripheral edema. Darlys Plunk is soft without significant tenderness, masses, or guarding. Bowel sounds are normoactive.        No costovertebral angle tenderness.  Abdullahi catheter is not present. HEME/LYMPH            No palpable cervical lymphadenopathy and no hepatosplenomegaly. No petechiae or ecchymoses. MSK    No gross joint deformities. SKIN    Normal coloration, warm, dry. NEURO           Cranial nerves appear grossly intact, normal speech, no lateralizing weakness. BMP/CBC  No results for input(s): NA, K, CL, CO2, BUN, CREATININE, WBC, HEMOGLOBIN, HCT, PLT in the last 72 hours. Invalid input(s): GLU    IMAGING:  XR CHEST PORTABLE [[id number]] Collected: 10/01/21 2315      Order Status: Completed Updated: 10/03/21 0420     Narrative:       EXAMINATION:   ONE XRAY VIEW OF THE CHEST     10/1/2021 10:22 pm     COMPARISON:   September 7, 2021.  April 16, 2020. HISTORY:   ORDERING SYSTEM PROVIDED HISTORY: Shotness of breath   TECHNOLOGIST PROVIDED HISTORY:   Reason for exam:->Shotness of breath   Reason for Exam: Shotness of breath   Acuity: Unknown   Type of Exam: Initial   Additional signs and symptoms: unknown   Relevant Medical/Surgical History: asp. pna     FINDINGS:   Left chest wall vagal nerve stimulator device is in place.  Stable   cardiomediastinal silhouette.  The lungs show chronic reticular opacities. No new dense consolidation or pleural effusion.  No pneumothorax. No acute osseous abnormality.  Stable left rib deformities.  Degenerative   changes of the shoulders are seen.      Impression:       1.  No acute cardiopulmonary disease.      MRI brain without contrast [[id number]]      Order Status: Canceled      CTA HEAD NECK W CONTRAST [[id number]] Collected: 10/02/21 0013     Order Status: Completed Updated: 10/02/21 0041     Narrative:       EXAMINATION:   CTA OF THE HEAD AND NECK WITH CONTRAST 10/2/2021 12:07 am:     TECHNIQUE:   CTA of the head and neck was performed with the administration of intravenous   contrast. Multiplanar reformatted images are provided for review.  MIP images   are provided for review. Stenosis of the internal carotid arteries measured   using NASCET criteria. Dose modulation, iterative reconstruction, and/or   weight based adjustment of the mA/kV was utilized to reduce the radiation   dose to as low as reasonably achievable. COMPARISON:   Concurrently performed CT head. HISTORY:   ORDERING SYSTEM PROVIDED HISTORY: Facial droop   TECHNOLOGIST PROVIDED HISTORY:   Reason for exam:->Facial droop   Reason for Exam: Facial droop     FINDINGS:     CTA NECK:     AORTIC ARCH/ARCH VESSELS: No dissection or arterial injury.  No significant   stenosis of the brachiocephalic or subclavian arteries. CAROTID ARTERIES: No dissection, arterial injury, or hemodynamically   significant stenosis by NASCET criteria.  The patient is status post left   carotid endarterectomy. VERTEBRAL ARTERIES: No dissection, arterial injury, or significant stenosis. SOFT TISSUES: The lung apices are clear.  No cervical or superior mediastinal   lymphadenopathy.  The larynx and pharynx are unremarkable.  No acute   abnormality of the salivary and thyroid glands. BONES: No acute osseous abnormality. CTA HEAD:     ANTERIOR CIRCULATION: No significant stenosis of the intracranial internal   carotid, anterior cerebral, or middle cerebral arteries.  There is decreased   perfusion in right MCA distribution compatible with the large old infarct in   this area.  No aneurysm.      POSTERIOR CIRCULATION: No significant stenosis sinuses and mastoid air cells demonstrate   no acute abnormality. SOFT TISSUES/SKULL:  No acute abnormality of the visualized skull or soft   tissues.      Impression:       No acute intracranial abnormality.  MRI may be obtained if clinically   indicated.           Discharge Time of 36 minutes    Electronically signed by Meaghan Goodman MD on 10/9/2021 at 5:11 PM

## 2021-10-09 NOTE — OP NOTE
Incision and Drainage of Procedure Note    Patient ID:  Aleksandr Colón  [id number]  73 y.o.  1955    Indications: Abscess of nasal bridge/upper lip    Pre-operative Diagnosis: Abscess of bridge/upper lip    Post-operative Diagnosis: same    Procedure: Incision and Drainage of bridge/upper lip    Surgeon: Corinne Pringle, MD , MD    Findings: Same    Estimated Blood Loss:  Minimal          Complications:  None; patient tolerated the procedure well. Condition: stable      Procedure Details: The patient was positioned appropriately and the skin over the upper lip nasal bridge was prepped with betadine and draped in a sterile fashion. Local anesthesia was obtained by infiltration using 1% Lidocaine without epinephrine. An incision was then made over the abscess area and approximately 3 cc of thick and tenacious material was expressed. Loculations were broken up using forceps and more of the material was able to be expressed. Wound was cultured with a swab and sent to lab. The drainage cavity was then packed with 1/4 inch iodoform gauze and covered with sterile dressing. Wound care instructions given. The patient tolerated the procedure well.     Complications: None    Corinne Pringle, MD

## 2021-10-10 PROBLEM — I63.9 ACUTE CVA (CEREBROVASCULAR ACCIDENT) (HCC): Status: ACTIVE | Noted: 2021-10-10

## 2021-10-10 PROCEDURE — 1280000000 HC REHAB R&B

## 2021-10-10 PROCEDURE — 94761 N-INVAS EAR/PLS OXIMETRY MLT: CPT

## 2021-10-10 PROCEDURE — 99223 1ST HOSP IP/OBS HIGH 75: CPT | Performed by: PHYSICAL MEDICINE & REHABILITATION

## 2021-10-10 PROCEDURE — 6370000000 HC RX 637 (ALT 250 FOR IP): Performed by: PHYSICAL MEDICINE & REHABILITATION

## 2021-10-10 PROCEDURE — 6360000002 HC RX W HCPCS: Performed by: PHYSICAL MEDICINE & REHABILITATION

## 2021-10-10 RX ORDER — AMLODIPINE BESYLATE 10 MG/1
10 TABLET ORAL DAILY
Status: DISCONTINUED | OUTPATIENT
Start: 2021-10-10 | End: 2021-10-14 | Stop reason: HOSPADM

## 2021-10-10 RX ORDER — PRIMIDONE 50 MG/1
50 TABLET ORAL EVERY 8 HOURS SCHEDULED
Status: DISCONTINUED | OUTPATIENT
Start: 2021-10-10 | End: 2021-10-14 | Stop reason: HOSPADM

## 2021-10-10 RX ORDER — ASPIRIN 81 MG/1
81 TABLET, CHEWABLE ORAL DAILY
Status: DISCONTINUED | OUTPATIENT
Start: 2021-10-10 | End: 2021-10-14 | Stop reason: HOSPADM

## 2021-10-10 RX ORDER — GLIMEPIRIDE 2 MG/1
1 TABLET ORAL 2 TIMES DAILY
Status: DISCONTINUED | OUTPATIENT
Start: 2021-10-10 | End: 2021-10-14 | Stop reason: HOSPADM

## 2021-10-10 RX ORDER — PAROXETINE HYDROCHLORIDE 20 MG/1
20 TABLET, FILM COATED ORAL DAILY
Status: DISCONTINUED | OUTPATIENT
Start: 2021-10-10 | End: 2021-10-14 | Stop reason: HOSPADM

## 2021-10-10 RX ORDER — LORAZEPAM 0.5 MG/1
0.5 TABLET ORAL 2 TIMES DAILY
Status: DISCONTINUED | OUTPATIENT
Start: 2021-10-10 | End: 2021-10-14 | Stop reason: HOSPADM

## 2021-10-10 RX ORDER — LACOSAMIDE 100 MG/1
200 TABLET ORAL 2 TIMES DAILY
Status: DISCONTINUED | OUTPATIENT
Start: 2021-10-10 | End: 2021-10-14 | Stop reason: HOSPADM

## 2021-10-10 RX ORDER — LATANOPROST 50 UG/ML
1 SOLUTION/ DROPS OPHTHALMIC NIGHTLY
Status: DISCONTINUED | OUTPATIENT
Start: 2021-10-11 | End: 2021-10-14 | Stop reason: HOSPADM

## 2021-10-10 RX ORDER — CLONAZEPAM 0.5 MG/1
0.25 TABLET ORAL DAILY
Status: DISCONTINUED | OUTPATIENT
Start: 2021-10-10 | End: 2021-10-14 | Stop reason: HOSPADM

## 2021-10-10 RX ORDER — PANTOPRAZOLE SODIUM 40 MG/1
40 TABLET, DELAYED RELEASE ORAL
Status: DISCONTINUED | OUTPATIENT
Start: 2021-10-10 | End: 2021-10-14 | Stop reason: HOSPADM

## 2021-10-10 RX ORDER — CEFAZOLIN SODIUM 2 G/100ML
2000 INJECTION, SOLUTION INTRAVENOUS EVERY 8 HOURS
Status: DISCONTINUED | OUTPATIENT
Start: 2021-10-10 | End: 2021-10-13

## 2021-10-10 RX ORDER — LACTULOSE 10 G/15ML
20 SOLUTION ORAL 3 TIMES DAILY
Status: DISCONTINUED | OUTPATIENT
Start: 2021-10-10 | End: 2021-10-14 | Stop reason: HOSPADM

## 2021-10-10 RX ORDER — POLYETHYLENE GLYCOL 3350 17 G/17G
17 POWDER, FOR SOLUTION ORAL DAILY PRN
Status: DISCONTINUED | OUTPATIENT
Start: 2021-10-10 | End: 2021-10-14 | Stop reason: HOSPADM

## 2021-10-10 RX ORDER — DIVALPROEX SODIUM 500 MG/1
1000 TABLET, EXTENDED RELEASE ORAL DAILY
Status: DISCONTINUED | OUTPATIENT
Start: 2021-10-10 | End: 2021-10-14 | Stop reason: HOSPADM

## 2021-10-10 RX ORDER — TAMSULOSIN HYDROCHLORIDE 0.4 MG/1
0.4 CAPSULE ORAL DAILY
Status: DISCONTINUED | OUTPATIENT
Start: 2021-10-10 | End: 2021-10-14 | Stop reason: HOSPADM

## 2021-10-10 RX ORDER — TROSPIUM CHLORIDE 20 MG/1
20 TABLET, FILM COATED ORAL
Status: DISCONTINUED | OUTPATIENT
Start: 2021-10-10 | End: 2021-10-14 | Stop reason: HOSPADM

## 2021-10-10 RX ORDER — ATORVASTATIN CALCIUM 80 MG/1
80 TABLET, FILM COATED ORAL NIGHTLY
Status: DISCONTINUED | OUTPATIENT
Start: 2021-10-10 | End: 2021-10-14 | Stop reason: HOSPADM

## 2021-10-10 RX ORDER — ACETAMINOPHEN 325 MG/1
650 TABLET ORAL EVERY 4 HOURS PRN
Status: DISCONTINUED | OUTPATIENT
Start: 2021-10-10 | End: 2021-10-14 | Stop reason: HOSPADM

## 2021-10-10 RX ORDER — FOLIC ACID 1 MG/1
1 TABLET ORAL DAILY
Status: DISCONTINUED | OUTPATIENT
Start: 2021-10-10 | End: 2021-10-14 | Stop reason: HOSPADM

## 2021-10-10 RX ORDER — LUBIPROSTONE 24 UG/1
24 CAPSULE, GELATIN COATED ORAL 2 TIMES DAILY WITH MEALS
Status: DISCONTINUED | OUTPATIENT
Start: 2021-10-10 | End: 2021-10-14 | Stop reason: HOSPADM

## 2021-10-10 RX ORDER — QUETIAPINE FUMARATE 25 MG/1
50 TABLET, FILM COATED ORAL 2 TIMES DAILY
Status: DISCONTINUED | OUTPATIENT
Start: 2021-10-10 | End: 2021-10-14 | Stop reason: HOSPADM

## 2021-10-10 RX ORDER — LEVOTHYROXINE SODIUM 0.12 MG/1
125 TABLET ORAL DAILY
Status: DISCONTINUED | OUTPATIENT
Start: 2021-10-10 | End: 2021-10-14 | Stop reason: HOSPADM

## 2021-10-10 RX ORDER — LACTOBACILLUS RHAMNOSUS GG 10B CELL
1 CAPSULE ORAL DAILY
Status: DISCONTINUED | OUTPATIENT
Start: 2021-10-10 | End: 2021-10-14 | Stop reason: HOSPADM

## 2021-10-10 RX ORDER — CLOPIDOGREL BISULFATE 75 MG/1
75 TABLET ORAL DAILY
Status: DISCONTINUED | OUTPATIENT
Start: 2021-10-10 | End: 2021-10-14 | Stop reason: HOSPADM

## 2021-10-10 RX ORDER — DICYCLOMINE HYDROCHLORIDE 10 MG/1
10 CAPSULE ORAL 3 TIMES DAILY PRN
Status: DISCONTINUED | OUTPATIENT
Start: 2021-10-10 | End: 2021-10-14 | Stop reason: HOSPADM

## 2021-10-10 RX ORDER — ZONISAMIDE 100 MG/1
100 CAPSULE ORAL DAILY
Status: DISCONTINUED | OUTPATIENT
Start: 2021-10-10 | End: 2021-10-14 | Stop reason: HOSPADM

## 2021-10-10 RX ADMIN — TROSPIUM CHLORIDE 20 MG: 20 TABLET, FILM COATED ORAL at 15:21

## 2021-10-10 RX ADMIN — LACTULOSE 20 G: 10 SOLUTION ORAL at 20:10

## 2021-10-10 RX ADMIN — LORAZEPAM 0.5 MG: 0.5 TABLET ORAL at 10:33

## 2021-10-10 RX ADMIN — CEFAZOLIN SODIUM 2000 MG: 2 INJECTION, SOLUTION INTRAVENOUS at 05:51

## 2021-10-10 RX ADMIN — LUBIPROSTONE 24 MCG: 24 CAPSULE, GELATIN COATED ORAL at 10:42

## 2021-10-10 RX ADMIN — Medication 1000 UNITS: at 10:33

## 2021-10-10 RX ADMIN — MIRABEGRON 50 MG: 25 TABLET, FILM COATED, EXTENDED RELEASE ORAL at 10:42

## 2021-10-10 RX ADMIN — LEVOTHYROXINE SODIUM 125 MCG: 125 TABLET ORAL at 05:51

## 2021-10-10 RX ADMIN — PRIMIDONE 50 MG: 50 TABLET ORAL at 15:21

## 2021-10-10 RX ADMIN — ZONISAMIDE 100 MG: 100 CAPSULE ORAL at 10:42

## 2021-10-10 RX ADMIN — ASPIRIN 81 MG: 81 TABLET, CHEWABLE ORAL at 10:32

## 2021-10-10 RX ADMIN — FOLIC ACID 1 MG: 1 TABLET ORAL at 10:36

## 2021-10-10 RX ADMIN — PAROXETINE HYDROCHLORIDE 20 MG: 20 TABLET, FILM COATED ORAL at 10:32

## 2021-10-10 RX ADMIN — TROSPIUM CHLORIDE 20 MG: 20 TABLET, FILM COATED ORAL at 05:51

## 2021-10-10 RX ADMIN — TIMOLOL MALEATE 1 DROP: 2.5 SOLUTION OPHTHALMIC at 10:43

## 2021-10-10 RX ADMIN — QUETIAPINE FUMARATE 50 MG: 25 TABLET ORAL at 20:10

## 2021-10-10 RX ADMIN — CEFAZOLIN SODIUM 2000 MG: 2 INJECTION, SOLUTION INTRAVENOUS at 21:57

## 2021-10-10 RX ADMIN — TAMSULOSIN HYDROCHLORIDE 0.4 MG: 0.4 CAPSULE ORAL at 10:34

## 2021-10-10 RX ADMIN — CLONAZEPAM 0.25 MG: 0.5 TABLET ORAL at 10:33

## 2021-10-10 RX ADMIN — LACTULOSE 20 G: 10 SOLUTION ORAL at 15:27

## 2021-10-10 RX ADMIN — PRIMIDONE 50 MG: 50 TABLET ORAL at 05:51

## 2021-10-10 RX ADMIN — CEFAZOLIN SODIUM 2000 MG: 2 INJECTION, SOLUTION INTRAVENOUS at 15:25

## 2021-10-10 RX ADMIN — PANTOPRAZOLE SODIUM 40 MG: 40 TABLET, DELAYED RELEASE ORAL at 05:51

## 2021-10-10 RX ADMIN — LACOSAMIDE 200 MG: 100 TABLET, FILM COATED ORAL at 20:10

## 2021-10-10 RX ADMIN — DIVALPROEX SODIUM 1000 MG: 500 TABLET, FILM COATED, EXTENDED RELEASE ORAL at 10:32

## 2021-10-10 RX ADMIN — QUETIAPINE FUMARATE 50 MG: 25 TABLET ORAL at 10:33

## 2021-10-10 RX ADMIN — LUBIPROSTONE 24 MCG: 24 CAPSULE, GELATIN COATED ORAL at 17:26

## 2021-10-10 RX ADMIN — LORAZEPAM 0.5 MG: 0.5 TABLET ORAL at 20:10

## 2021-10-10 RX ADMIN — PRIMIDONE 50 MG: 50 TABLET ORAL at 20:10

## 2021-10-10 RX ADMIN — LACTULOSE 20 G: 10 SOLUTION ORAL at 10:32

## 2021-10-10 RX ADMIN — TIMOLOL MALEATE 1 DROP: 2.5 SOLUTION OPHTHALMIC at 20:11

## 2021-10-10 RX ADMIN — LACOSAMIDE 200 MG: 100 TABLET, FILM COATED ORAL at 10:33

## 2021-10-10 RX ADMIN — CLOPIDOGREL BISULFATE 75 MG: 75 TABLET, FILM COATED ORAL at 10:33

## 2021-10-10 RX ADMIN — ATORVASTATIN CALCIUM 80 MG: 80 TABLET, FILM COATED ORAL at 20:11

## 2021-10-10 RX ADMIN — Medication 1 PACKET: at 10:32

## 2021-10-10 RX ADMIN — ENOXAPARIN SODIUM 40 MG: 40 INJECTION SUBCUTANEOUS at 10:34

## 2021-10-10 RX ADMIN — Medication 1 PACKET: at 20:10

## 2021-10-10 RX ADMIN — Medication 1 CAPSULE: at 10:32

## 2021-10-10 ASSESSMENT — PAIN SCALES - GENERAL: PAINLEVEL_OUTOF10: 0

## 2021-10-10 NOTE — CONSULTS
Comprehensive Nutrition Assessment    Type and Reason for Visit:  Initial, Consult (Oral Nutrition Supplements)    Nutrition Recommendations/Plan:   · Continue current diet, modification per SLP   · Start chocolate high protein supplements   · Offer feeding assistance as needed   · Please document po intakes     Nutrition Assessment:  Admit with acute CVA, UTI, recent fall with wounds, underwent recent I&D. PMH: seizure/epilepsy disorder, IBS/UC, Mood disorder. Pt had slurred, slow speech with fatigue and some confusion. Reports sleeping at 2 am, did not eat much breakfast. Observed RN providing fiber supplement. Currently on minced and moist diet. No significant wt loss. Willing to trial chocolate supplements during stay in ARU. Will follow as high nutrition risk. Malnutrition Assessment:  Malnutrition Status:  No malnutrition    Context:  Acute Illness     Findings of the 6 clinical characteristics of malnutrition:  Energy Intake:  Mild decrease in energy intake (Comment)  Weight Loss:  No significant weight loss     Body Fat Loss:  1 - Mild body fat loss Orbital, Buccal region   Muscle Mass Loss:  No significant muscle mass loss    Fluid Accumulation:  No significant fluid accumulation Extremities   Strength:  Not Performed    Estimated Daily Nutrient Needs:  Energy (kcal):  5707-8739 ([place] 1898); Weight Used for Energy Requirements:  Current     Protein (g):  78-91 (1.2-1.4 g/kg); Weight Used for Protein Requirements:  Ideal        Fluid (ml/day):  ~5134-6660; Method Used for Fluid Requirements:  1 ml/kcal      Nutrition Related Findings:  Rx: lactulose, culturelle POC glucose 102      Wounds:  Multiple, Surgical Incision (scab on lip/knee)       Current Nutrition Therapies:    ADULT DIET;  Dysphagia - Minced and Moist; No Drinking Straws    Anthropometric Measures:  · Height: 5' 5.98\" (167.6 cm)  · Current Body Weight: 198 lb 10.2 oz (90.1 kg)   · Admission Body Weight: 198 lb (89.8 kg) · Usual Body Weight: 200 lb (90.7 kg) (november 2020)     · Ideal Body Weight: 142 lbs; % Ideal Body Weight 139.9 %   · BMI: 32.1  · Adjusted Body Weight:  ; No Adjustment   · Adjusted BMI:      · BMI Categories: Obese Class 1 (BMI 30.0-34. 9)       Nutrition Diagnosis:   · Inadequate oral intake related to cognitive or neurological impairment, acute injury/trauma as evidenced by swallow study results (swallow difficulty)    Nutrition Interventions:   Food and/or Nutrient Delivery:  Continue Current Diet, Start Oral Nutrition Supplement  Nutrition Education/Counseling:  No recommendation at this time   Coordination of Nutrition Care:  Continue to monitor while inpatient, Feeding Assistance/Environment Change, Coordination of Community Care    Goals:  Pt will consume at least 50% of meals       Nutrition Monitoring and Evaluation:   Behavioral-Environmental Outcomes:  None Identified   Food/Nutrient Intake Outcomes:  Food and Nutrient Intake, Supplement Intake, Diet Advancement/Tolerance  Physical Signs/Symptoms Outcomes:  Biochemical Data, Chewing or Swallowing, GI Status, Meal Time Behavior, Weight     Discharge Planning:     Too soon to determine     Electronically signed by Lucia Kendall RD, LD on 10/10/21 at 12:06 PM EDT    Contact: [phone]

## 2021-10-10 NOTE — PLAN OF CARE
ARU Interdisciplinary Plan of Care (IPOC)  [facility]  [address]st [address] [facility], [address]  [phone]  Fax: [phone]        Madhu Tucker    : 1955  Acct #: [[id number]]  MRN: [id number]   PHYSICIAN:  Jason Calloway MD  Primary Active Problems:   Active Hospital Problems    Diagnosis Date Noted    Acute CVA (cerebrovascular accident) [facility]) [I63.9] 10/10/2021       Rehabilitation Diagnosis:     TIA (transient ischemic attack) [G45.9]  Acute CVA (cerebrovascular accident) [facility]) [I63.9]       ADMIT DATE:10/9/2021   CARE PLAN     NURSING:  Madhu Tucker while on this unit will:      Bowel and Bladder   [x] Be continent of bowel and bladder      [x] Have an adequate number of bowel movements   [x] Urinate with no urinary retention >300ml in bladder   [] Bladder Scan: (details)   [] Complete bladder protocol with toledo removal   [] Initiate Bladder Program to toilet every ___ hours   [] Initiate Bowel Program to toilet every ___hours   [] Bladder training    [] Bowel training  Pulmonary   [x] Maintain O2 SATs at 92% or greater  Pain Management   [] Have pain managed while on ARU        [] Be pain free by discharge    [x] Medication Management and Education  Maintenance of Skin Integrity/Wound Management   [x] Have no skin breakdown while on ARU   [] Have improved skin integrity via wound measurements   [x] Have no signs/symptoms of infection via infection protection and monitoring at the          wound site  Fall Prevention   [x] Be free from injury during hospitalization via fall prevention measures     [] Disease management and Education  Precautions   [] Weight Bearing Precautions   [] Swallowing Precautions   [x] Monitoring of Risks of Complications   [x] DVT Prophylaxis    [x] Fluid/electrolyte/Nutrition Management    [x] Complete education with patient/family with understanding demonstrated for          in-room safety with transfers to bed, toilet, wheelchair, shower as well as                bathroom activities and hygiene. [] Adjustment   [] Other:   Nursing interventions may include bowel/bladder training, education for medical assistive devices, medication education, O2 saturation management, energy conservation, stress management techniques, fall prevention, alarms protocol, seating and positioning, skin/wound care, pressure relief instruction,dressing changes,  infection protection, DVT prophylaxis, and/or assistance with in room safety with transfers to bed, toilet, wheelchair, shower as well as bathroom activities and hygiene. Patient/caregiver education for:   [x] Disease/sustained injury/management      [x] Medication Use   [] Surgical intervention   [x] Safety/Precautions   [x] Body mechanics and or joint protection   [x] Health maintenance         PHYSICAL THERAPY:  Goals:                               Long term goals  Time Frame for Long term goals : 5-7 days STG=LTG  Long term goal 1: Pt will perform all bed mobility with mod I  Long term goal 2: Pt will perform sit to stand, pivot and car transfers  with cg assist  Long term goal 3: Pt will ambulate 50   feet on level surfaces AND 10' on unlevel surface with CG   assist  using 2ww  Long term goal 4: Pt will ascend/descend curb step with  2ww with min assist    and up to   12  steps with  rail(s) with  CG  assist  Long term goal 5: Pt will retrieve light item from floor with min   assist  using 2ww and reacher  Long term goal 6: Pt will propel w/c in household situation for at least  150   feet with mod I  These goals were reviewed with this patient at the time of assessment and Zia Polanco is in agreement. Plan of Care: Pt to be seen 5 days per week for a minimum of 60 minutes for 5 days.                 Current Treatment Recommendations: Strengthening, Transfer Training, Endurance Training, Neuromuscular Re-education, Patient/Caregiver Education & Training, Wheelchair Mobility Training, Equipment Evaluation, Education, & procurement, Balance Training, Gait Training, Stair training, Functional Mobility Training, Safety Education & Training community reintegration,animal assisted therapy, and concurrent/group therapy.     PT IRF-BRENT scores and goals for initial assessment:   Bed Mobility:   Sit to Lying  Assistance Needed: Supervision or touching assistance  Comment: unsafe technique, supervision with cues  CARE Score: 4  Discharge Goal: Independent  Roll Left and Right  Assistance Needed: Supervision or touching assistance  Comment: cues for technique  CARE Score: 4  Discharge Goal: Independent  Lying to Sitting on Side of Bed  Assistance Needed: Supervision or touching assistance  Comment: supervision  CARE Score: 4  Discharge Goal: Independent    Transfers:    Sit to Stand  Assistance Needed: Partial/moderate assistance  Comment: min assist with some posterior LOB  CARE Score: 3  Discharge Goal: Supervision or touching assistance  Chair/Bed-to-Chair Transfer  Assistance Needed: Supervision or touching assistance  Comment: CG once standing with 2ww  CARE Score: 4  Discharge Goal: Supervision or touching assistance     Car Transfer  Assistance Needed: Partial/moderate assistance  Comment: min assist due to unsteadiness, safety  CARE Score: 3  Discharge Goal: Supervision or touching assistance    Ambulation:    Walking Ability  Does the Patient Walk?: Yes     Walk 10 Feet  Assistance Needed: Partial/moderate assistance  Physical Assistance Level: Less than 25%  Comment: 2ww, decreased LLE step length with decreased dissociation due to tone, decreased L knee extension in stance,  inconsistent step pattern, min assist  CARE Score: 3  Discharge Goal: Supervision or touching assistance     Walk 50 Feet with Two Turns  Comment: 12' max distance  Reason if not Attempted: Not attempted due to medical condition or safety concerns  CARE Score: 88  Discharge Goal: Supervision or touching goals  Time Frame for Long term goals : 5-7 days or until d/c  Long term goal 1: Pt will complete grooming tasks Ind seated  Long term goal 2: Pt will complete total body bathing c S  Long term goal 3: Pt will complete UB dressing c setup  Long term goal 4: Pt will complete LB dressing c supervision  Long term goal 5: Pt will doff/don footwear c supervision  Long term goals 6: Pt will complete toileting c supervision  Long term goal 7: Pt will complete functional transfers (bed, chair, toilet, shower) c DME PRN and S  Long term goal 8: Pt will perform therex/therax to facilitate increased strength/endurance/ax tolerance (c emphasis on dynamic standing balance/tolerance >8 mins and RUE endurance) c SBA :    These goals were reviewed with this patient at the time of assessment and Monika Read is in agreement    Plan of Care:  Pt to be seen 5 days per week for a minimum of 60 minutes for 5 days. Plan  Times per day: Daily  Current Treatment Recommendations: Strengthening, Balance Training, Functional Mobility Training, Endurance Training, Safety Education & Training, Patient/Caregiver Education & Training, Equipment Evaluation, Education, & procurement, Self-Care / ADL         cognitive training, home management, energy conservation training, community reintegration, splint fabrication, patient/caregiver education and training, animal assisted therapy, and concurrent and/or group therapy.       OT IRF-BRENT scores and goals for initial assessment:    ADLs:    Eating: Eating  Assistance Needed: Setup or clean-up assistance  Comment: required assist to open milk containers 2* LUE deficits  CARE Score: 5  Discharge Goal: Set-up or clean-up assistance       Oral Hygiene: Oral Hygiene  Assistance Needed: Setup or clean-up assistance  Comment: seated  CARE Score: 5  Discharge Goal: Independent    UB/LB Bathing: Shower/Bathe Self  Assistance Needed: Supervision or touching assistance  Comment: SBA, performed full assistance in obtaining recommended equipment and other services, and coordination with insurance during ARU stay. Patient Goals: Return to maximum level of independent function. Activities Prior to Admit:   Homemaking Responsibilities: No (Pt will help with cleaning sometimes, staff complete most tasks)  Active : No  Mode of Transportation: Anita Lara  Occupation: On disability  Leisure & Hobbies: Watch tv, play video games, computer, FB, pt does not manage his meds--staff manages         Intensity of Therapy  Zay Spence will be seen a minimum of 3 hours of therapy per day/a minimum of 5 out of 7 days per week. [] In this rare instance due to the nature of this patient's medical involvement, this patient will be seen 15 hours per week (900 minutes within a 7 day period). Treatments may include therapeutic exercises, gait training, neuromuscular re-ed, transfer training, community reintegration, bed mobility, w/c mobility and training, self care, home mgmt, cognitive training, energy conservation,dysphagia tx, speech/language/communication therapy, group therapy, and patient/family education. In addition, dietician/nutritionist may monitor calorie count as well as intake and collaboratively work with SLP on dietary upgrades. Neuropsychology/Psychology may evaluate and provide necessary support. Group therapy as appropriate to facilitate improved endurance, STR, COORD, function, safety, transfers, awareness and insight into deficits, problem solving, memory, and social interaction and engagement.     Medical issues being managed closely and that require 24 hour availability of a physician:   [x] Swallowing Precautions                                     [] Weight bearing precautions   [] Wound Care                             [x] Infection Prevention   [x] DVT Prophylaxis/assessment              [x] Monitoring for complications    [x] Fall Precautions/Prevention                         [x] Fluid/Electrolyte/Nutrition Balance   [x] Voice Protection                           [x] Medication Management   [x] Respiratory                   [x] Pain Mgmt   [x] Bowel/Bladder Fx    Medical Prognosis: [] Good  [x] Fair    [] Guarded   Total expected IRF days 14                                            Physician anticipated functional outcomes:  FWW and [facility] OT/PT and supervision. Rehab Goals:   [] Return to premorbid function of_______________________________.    [] Independent   [] Mod I  [x] Supervision  [] CGA   [] Min A   [] Mod A  Level for ambulation []without assistive device  [x] with assistive device        [] Independent   [] Mod I  [x] Supervision [] CGA   [] Min A   [] Mod A  Level for transfers []without assistive device  [x] with assistive device         [] Independent   [] Mod I  [x] Supervision [] CGA   [] Min A   [] Mod A Level with ADL's []without assistive device   [x] with assistive device     ___________________     Level with cognitive skills requiring [] No assist [x] Supervision  [] Active Assist/Cues     [] Maximize level of mobility and ADL's to decrease burden on caregiver    IPOC brief synthesis of Preadmission Screen, Post-Admission Evaluation, and Therapy Evaluations: Acute inpatient rehabilitation with occupational and physical therapy 180 minutes 5 out of every 7 days. Will address basic and  advancing mobility with self-care instruction and adaptive equipment training. Caregiver education will be offered. Expected length of stay  prior to a supervised level of function for discharge home with a walker and [facility] OT/PT is 2 weeks.     Additional recommendation:     1. Acute CVA with ataxia and gait disturbance: The patient requires daily occupational and physical therapy with speech-language pathology. He is on dual antiplatelet therapy and a statin. He requires blood pressure regulation.   He needs adaptive equipment training, aggressive pulmonary hygiene measures and bowel and bladder retraining. DVT prophylaxis and nutritional support. Treating in a calm and consistent environment to improve retention. Providing written and verbal instructions when possible. Caregiver training with group home staff may be useful before discharge. 2. DVT prophylaxis: Lovenox 40 mg subcu daily. Must monitor his hemoglobin and platelet count periodically while on this medication. Weightbearing activities will be pursued daily. 3. Hypertension: Norvasc and pain control for blood pressure regulation. Target systolic blood pressure is 120140. Vital signs are checked at rest and with activity. 4. Seizure disorder: Klonopin, Depakote, Vimpat and lorazepam.  Treating him in a calm and consistent environment. Monitoring him for seizure activity. 5. Strep B UTI: Cefazolin 2 g IV every 8 hours for a total of 10 days. This will also be covered for his facial abscess. 6. IBS: Culturelle twice daily. Amitiza and lactulose. Mayes diet. 7. Facial abscess: General surgery and wound care monitor. Daily wound care. IV antibiotics. Anticipated discharge destination:    [] Home Independently   [x] Home with supervision    []SNF     [] Other       This plan has been reviewed with me in a language I understand.  I have had the opportunity to include my input with my therapy team.    ________________________________________________   ______________________  Patient/Significant Other      Date    I have reviewed this initial plan of care and agree with its contents:    Title   Name    Date    Time    Physician: Zeynep Jolley MD 10/12/2021 7:30 AM     Case Mgmt: Jessica Pozo MSW, LSW 10/11/21 1500    OT: Charles Davis Sebasti[address] 88, OTR/L 10/11/2021 [address] [place] [address] Dr, PT 10/11/21, 1603  RN: Qiana Walker RN    ST:    Dietician:

## 2021-10-10 NOTE — PROGRESS NOTES
Skin assessment done by this nurse and MEENAKSHI Bradford. Incision with scab noted on upper lip. Scab on left knee and scattered bruising noted.

## 2021-10-11 LAB
CULTURE: ABNORMAL
CULTURE: ABNORMAL
GRAM SMEAR: ABNORMAL
GRAM SMEAR: ABNORMAL
Lab: ABNORMAL
SPECIMEN: ABNORMAL

## 2021-10-11 PROCEDURE — 97116 GAIT TRAINING THERAPY: CPT

## 2021-10-11 PROCEDURE — 6370000000 HC RX 637 (ALT 250 FOR IP): Performed by: PHYSICAL MEDICINE & REHABILITATION

## 2021-10-11 PROCEDURE — 94150 VITAL CAPACITY TEST: CPT

## 2021-10-11 PROCEDURE — 97542 WHEELCHAIR MNGMENT TRAINING: CPT

## 2021-10-11 PROCEDURE — 6360000002 HC RX W HCPCS: Performed by: PHYSICAL MEDICINE & REHABILITATION

## 2021-10-11 PROCEDURE — 94761 N-INVAS EAR/PLS OXIMETRY MLT: CPT

## 2021-10-11 PROCEDURE — 94664 DEMO&/EVAL PT USE INHALER: CPT

## 2021-10-11 PROCEDURE — 97530 THERAPEUTIC ACTIVITIES: CPT

## 2021-10-11 PROCEDURE — 92610 EVALUATE SWALLOWING FUNCTION: CPT

## 2021-10-11 PROCEDURE — 97166 OT EVAL MOD COMPLEX 45 MIN: CPT

## 2021-10-11 PROCEDURE — 97163 PT EVAL HIGH COMPLEX 45 MIN: CPT

## 2021-10-11 PROCEDURE — 92523 SPEECH SOUND LANG COMPREHEN: CPT

## 2021-10-11 PROCEDURE — 99232 SBSQ HOSP IP/OBS MODERATE 35: CPT | Performed by: PHYSICAL MEDICINE & REHABILITATION

## 2021-10-11 PROCEDURE — 1280000000 HC REHAB R&B

## 2021-10-11 PROCEDURE — 97535 SELF CARE MNGMENT TRAINING: CPT

## 2021-10-11 RX ADMIN — MIRABEGRON 50 MG: 25 TABLET, FILM COATED, EXTENDED RELEASE ORAL at 12:08

## 2021-10-11 RX ADMIN — LATANOPROST 1 DROP: 50 SOLUTION/ DROPS OPHTHALMIC at 21:50

## 2021-10-11 RX ADMIN — TIMOLOL MALEATE 1 DROP: 2.5 SOLUTION OPHTHALMIC at 21:50

## 2021-10-11 RX ADMIN — PRIMIDONE 50 MG: 50 TABLET ORAL at 21:49

## 2021-10-11 RX ADMIN — CEFAZOLIN SODIUM 2000 MG: 2 INJECTION, SOLUTION INTRAVENOUS at 14:48

## 2021-10-11 RX ADMIN — AMLODIPINE BESYLATE 10 MG: 10 TABLET ORAL at 08:46

## 2021-10-11 RX ADMIN — ENOXAPARIN SODIUM 40 MG: 40 INJECTION SUBCUTANEOUS at 08:45

## 2021-10-11 RX ADMIN — Medication 1 PACKET: at 08:45

## 2021-10-11 RX ADMIN — CLOPIDOGREL BISULFATE 75 MG: 75 TABLET, FILM COATED ORAL at 08:47

## 2021-10-11 RX ADMIN — LACTULOSE 20 G: 10 SOLUTION ORAL at 14:06

## 2021-10-11 RX ADMIN — Medication 1000 UNITS: at 08:45

## 2021-10-11 RX ADMIN — LACOSAMIDE 200 MG: 100 TABLET, FILM COATED ORAL at 21:49

## 2021-10-11 RX ADMIN — LEVOTHYROXINE SODIUM 125 MCG: 125 TABLET ORAL at 05:57

## 2021-10-11 RX ADMIN — TAMSULOSIN HYDROCHLORIDE 0.4 MG: 0.4 CAPSULE ORAL at 08:47

## 2021-10-11 RX ADMIN — LACTULOSE 20 G: 10 SOLUTION ORAL at 21:49

## 2021-10-11 RX ADMIN — ATORVASTATIN CALCIUM 80 MG: 80 TABLET, FILM COATED ORAL at 21:49

## 2021-10-11 RX ADMIN — PRIMIDONE 50 MG: 50 TABLET ORAL at 14:06

## 2021-10-11 RX ADMIN — LACTULOSE 20 G: 10 SOLUTION ORAL at 08:44

## 2021-10-11 RX ADMIN — CLONAZEPAM 0.25 MG: 0.5 TABLET ORAL at 08:46

## 2021-10-11 RX ADMIN — TROSPIUM CHLORIDE 20 MG: 20 TABLET, FILM COATED ORAL at 05:57

## 2021-10-11 RX ADMIN — DIVALPROEX SODIUM 1000 MG: 500 TABLET, FILM COATED, EXTENDED RELEASE ORAL at 08:45

## 2021-10-11 RX ADMIN — LORAZEPAM 0.5 MG: 0.5 TABLET ORAL at 21:49

## 2021-10-11 RX ADMIN — TIMOLOL MALEATE 1 DROP: 2.5 SOLUTION OPHTHALMIC at 12:08

## 2021-10-11 RX ADMIN — LUBIPROSTONE 24 MCG: 24 CAPSULE, GELATIN COATED ORAL at 16:21

## 2021-10-11 RX ADMIN — ZONISAMIDE 100 MG: 100 CAPSULE ORAL at 12:08

## 2021-10-11 RX ADMIN — PANTOPRAZOLE SODIUM 40 MG: 40 TABLET, DELAYED RELEASE ORAL at 05:57

## 2021-10-11 RX ADMIN — QUETIAPINE FUMARATE 50 MG: 25 TABLET ORAL at 21:49

## 2021-10-11 RX ADMIN — LACOSAMIDE 200 MG: 100 TABLET, FILM COATED ORAL at 08:46

## 2021-10-11 RX ADMIN — QUETIAPINE FUMARATE 50 MG: 25 TABLET ORAL at 08:45

## 2021-10-11 RX ADMIN — TROSPIUM CHLORIDE 20 MG: 20 TABLET, FILM COATED ORAL at 16:21

## 2021-10-11 RX ADMIN — FOLIC ACID 1 MG: 1 TABLET ORAL at 08:46

## 2021-10-11 RX ADMIN — Medication 1 PACKET: at 21:49

## 2021-10-11 RX ADMIN — LUBIPROSTONE 24 MCG: 24 CAPSULE, GELATIN COATED ORAL at 12:07

## 2021-10-11 RX ADMIN — CEFAZOLIN SODIUM 2000 MG: 2 INJECTION, SOLUTION INTRAVENOUS at 22:06

## 2021-10-11 RX ADMIN — Medication 1 CAPSULE: at 08:46

## 2021-10-11 RX ADMIN — CEFAZOLIN SODIUM 2000 MG: 2 INJECTION, SOLUTION INTRAVENOUS at 06:03

## 2021-10-11 RX ADMIN — PAROXETINE HYDROCHLORIDE 20 MG: 20 TABLET, FILM COATED ORAL at 08:47

## 2021-10-11 RX ADMIN — LORAZEPAM 0.5 MG: 0.5 TABLET ORAL at 08:47

## 2021-10-11 RX ADMIN — ASPIRIN 81 MG: 81 TABLET, CHEWABLE ORAL at 08:47

## 2021-10-11 RX ADMIN — PRIMIDONE 50 MG: 50 TABLET ORAL at 05:57

## 2021-10-11 ASSESSMENT — PAIN SCALES - GENERAL
PAINLEVEL_OUTOF10: 2
PAINLEVEL_OUTOF10: 0

## 2021-10-11 NOTE — PROGRESS NOTES
POLYPECTOMY SNARE/COLD BIOPSY performed by Chase Brown MD at [address] Left 07/11/2013    with stnet placement   [facility]  2005    KIDNEY STONE SURGERY      OTHER SURGICAL HISTORY  04/15/2015    Revision of vagal nerve stimulator    STIMULATOR SURGERY N/A 3/24/2021    STIMULATOR INSERTION performed by Latanya Walker MD at [place] [address] N/A 3/24/2021    STIMULATOR INSERTION STAGE 2 performed by Latanya Walker MD at [address] [facility] N/A 11/13/2018    EGD DILATION BALLOON AND BIOPSY performed by Alessandro Braun MD at [facility]  2002    Has to have bettery changed every 5 yrs.       Fall History: at least 2 falls with injury  Social History:  Social/Functional History  Lives With: Other (comment) (2 roommates, group home with 2 staff nearly all times)  Type of Home: House  Home Layout: One level  Home Access: Stairs to enter without rails, Ramped entrance  [address] [facility] [address] [facility] - Number of Steps: ramp on 1 step in garage(main entry for pt), 1 step at front entrance  Bathroom Shower/Tub: Shower chair with back, Walk-in shower  Bathroom Toilet: Standard (has some rails, but pt states they are awkward to use)  Bathroom Equipment: Grab bars in shower, Grab bars around toilet  Bathroom Accessibility: Accessible  Home Equipment: [employer], [address], [address] [facility] bed (Pt has electric w/c but does not use it due to poor control(states he wasn't trained))  Receives Help From: Personal care attendant  ADL Assistance: Needs assistance (aides provide supervision as needed, but pt needed no physical help)  Homemaking Responsibilities: No (Pt will help with cleaning sometimes, staff complete most tasks)  Ambulation Assistance: Needs assistance (SBA of 2 with 2ww, has mostly been usingw/c)  Transfer Assistance: Independent  Active : No  Mode of Transportation: [employer]  Education: High assistance  Chair/Bed-to-Chair Transfer  Assistance Needed: Supervision or touching assistance  Comment: CG once standing with 2ww  CARE Score: 4  Discharge Goal: Supervision or touching assistance     Car Transfer  Assistance Needed: Partial/moderate assistance  Comment: min assist due to unsteadiness, safety  CARE Score: 3  Discharge Goal: Supervision or touching assistance    Ambulation:   Device used PTA: 2ww with assist of 2 people for safety   Walking Ability  Does the Patient Walk?: Yes     Walk 10 Feet  Assistance Needed: Partial/moderate assistance  Physical Assistance Level: Less than 25%  Comment: 2ww, decreased LLE step length with decreased dissociation due to tone, decreased L knee extension in stance,  inconsistent step pattern, min assist  CARE Score: 3  Discharge Goal: Supervision or touching assistance     Walk 50 Feet with Two Turns  Comment: 12' max distance  Reason if not Attempted: Not attempted due to medical condition or safety concerns  CARE Score: 88  Discharge Goal: Supervision or touching assistance     Walk 150 Feet  Comment: per pt report, did not walk longer than household distances  Reason if not Attempted: Not applicable  CARE Score: 9  Discharge Goal: Not Applicable     Walking 10 Feet on Uneven Surfaces  Assistance Needed: Partial/moderate assistance  Comment: mod assist for safety, 2ww  CARE Score: 3  Discharge Goal: Supervision or touching assistance     1 Step (Curb)  Reason if not Attempted: Not attempted due to medical condition or safety concerns  CARE Score: 88  Discharge Goal: Partial/moderate assistance     4 Steps  Assistance Needed: Partial/moderate assistance  Comment: B rails, max cues for sequence  CARE Score: 3  Discharge Goal: Supervision or touching assistance     12 Steps  Comment: pt was fatigued after 6 steps  Reason if not Attempted: Not attempted due to medical condition or safety concerns  CARE Score: 88  Discharge Goal: Partial/moderate assistance    Gait Deviations: []None [x]Slow jany  [] Increased DESIREE  [] Staggers [x]Deviated Path  [x] Decreased step length  [x] Decreased step height  []Decreased arm swing  [] Shuffles  [] Decreased head and trunk rotation  []other:        Wheelchair:  w/c Ability: Wheelchair Ability  Uses a Wheelchair and/or Scooter?: Yes  Wheel 50 Feet with Two Turns  Assistance Needed: Supervision or touching assistance  Comment: supervision  CARE Score: 4  Discharge Goal: Independent  Wheel 150 Feet  Assistance Needed: Supervision or touching assistance  CARE Score: 4  Discharge Goal: Independent          Balance:    Balance  Sitting - Dynamic: Fair, +  Standing - Static: Fair, -  Standing - Dynamic: Poor, +   Object: Picking Up Object  Comment: unsafe to release 2ww in standing  Reason if not Attempted: Not attempted due to medical condition or safety concerns  CARE Score: 88  Discharge Goal: Partial/moderate assistance    Assessment:   The patient is a 73year old male admitted onto ARU after hospitalization for stroke-like symptoms. Pt could not have MRI due to vagal nerve stimulator, but pt dx with TIA/CVA. Most sx had resolved, but pt persists with decline in function from hospitalization and some swallowing issues. Pt has co morbidity of CP which presented with decreased LUE/LE function, decreased cognition. Pt had decreased insight into deficits prior to this incident as pt was complaining that at the group home \"they always want me to wear my helmet or I'll go blind from glaucoma if I hit my head and they always have 2 people with me when I walk. They got me a motorized w/c but I kept running into things so I have to push myself\", suggesting that balance, safety and mobility were not independent as pt initially states. Pt presents this date at CG to min assist for all mobility with gait limited to 10' due to fatigue. Feel pt is near baseline and will benefit from ARU_PT to address deficits as noted to return to supervision/CG level of function.          Body structures, Functions, Activity limitations: Decreased functional mobility , Decreased safe awareness, Decreased balance, Decreased coordination, Decreased ADL status, Decreased cognition, Decreased vision/visual deficit, Decreased ROM, Decreased endurance, Decreased high-level IADLs, Decreased strength, Decreased sensation, Decreased fine motor control, Decreased posture     Prognosis: Good  Decision Making: High Complexity  Clinical Presentation: unpredicatable characteristics      Patient education:   ARU schedule, ARU expectations for participation, plan of care,   Treatment Initiated:  Functional mobility training, gait training, patient education, w/c  Barriers to Improvement:  Cognition, decreased insight into deficits  Discharge Recommendations:  Return to group home with 24 hour assist  Equipment Recommendations:  Pt has w/c and 2ww    Goals:  Patient Goals   Patient goals : return home     Long term goals  Time Frame for Long term goals : 5-7 days STG=LTG  Long term goal 1: Pt will perform all bed mobility with mod I  Long term goal 2: Pt will perform sit to stand, pivot and car transfers  with cg assist  Long term goal 3: Pt will ambulate 50   feet on level surfaces AND 10' on unlevel surface with CG   assist  using 2ww  Long term goal 4: Pt will ascend/descend curb step with  2ww with min assist    and up to   12  steps with  rail(s) with  CG  assist  Long term goal 5: Pt will retrieve light item from floor with min   assist  using 2ww and reacher  Long term goal 6: Pt will propel w/c in household situation for at least  150   feet with mod I  Plan:    REQUIRES PT FOLLOW UP: Yes  Pt will be seen at least 60 minutes per day for a minimum of 5 days per week, plus group therapy as appropriate  Plan  Current Treatment Recommendations: Strengthening, Transfer Training, Endurance Training, Neuromuscular Re-education, Patient/Caregiver Education & Training, Wheelchair Mobility Training, Equipment Evaluation, Education, & procurement, Balance Training, Gait Training, Stair training, Functional Mobility Training, Safety Education & Training    PT Individual Minutes  Time In: 0830  Time Out: 0945  Minutes: 76             PT went over allotted time for evaluation as pt is a questionable historian and was contradicting his answers, then pt difficult to keep on task. Clarification will need to be made with group home.      PT Evaluation 20   Gait Training 15   Therapeutic Exercise    Neuro Re-Ed    Therapeutic Activity 30   Wheelchair Propulsion 10   Group    Other:    TOTAL 75       Electronically signed by:    Jonathan Nichole PT,   10/11/2021,

## 2021-10-11 NOTE — H&P
Madhu Tucker    : 1955  Acct #: [de-identified]  MRN: [id number]              History and physical      Admitting diagnosis: Acute CVA ( [address])    Comorbid diagnoses impacting rehabilitation: Ataxia of gait, dysarthria, dysphagia, essential hypertension, seizure disorder, IBS, depression with anxiety, facial abscess (I&D 10/8/2021), strep B UTI. Chief complaint: Clumsiness and generalized weakness. History of present illness: The patient is a poor historian and the majority of this history was gathered from the EMR. The patient is a 81-year-old right-hand-dominant group home resident who presented to our ED late on 10/1/2021 with sudden difficulty walking and slurred speech. His acute evaluation did not identify any cerebral hemorrhage, mass or edema. More detailed cerebral imaging with MRI was not possible due to and in dwelling vagal stimulator. He continued to have difficulty walking with dysarthria and dysphagia. Neurology was consulted. He was placed on antiplatelet therapy and blood pressure control measures were emphasized. He had leukocytosis and a draining lesion from his upper lip. General surgery was consulted and an I&D of a facial abscess took place on 10/8/2021. He has been placed on antibiotics for the abscess and a strep B UTI. He has been unable to do his own toileting, transfers and self-care and is not safe to return to his home at this time. He requires inpatient rehabilitation at this time. Review of systems: . The remainder of their review of systems was negative except as mentioned in the history of present illness.     Social History: Lives With: Other (comment) (2 roommates)  Type of Home:  (group home)  Home Layout: One level  Home Access: Stairs to enter without rails, Ramped entrance   [facility] [address] - Number of Steps: 1  Bathroom Toilet: Standard  Bathroom Accessibility: Accessible  Home Equipment: [address], Wheelchair-electric, Rolling walker  ADL Assistance: Needs assistance (aide present in case pt needs assistance)  Homemaking Assistance: Needs assistance (aide present in case pt needs assistance)  Homemaking Responsibilities: Yes  Ambulation Assistance: Independent  Transfer Assistance: Independent  Active : No  Additional Comments: Reports needin increased volume, difficulty hearing, difficulty answering questions, requires helpwith walking and small bites for eating per white board notes. He reports that he has never smoked. He has never used smokeless tobacco. He reports that he does not drink alcohol and does not use drugs. Prior (baseline) level of function: Supervision and adaptive equipment for mobility and self-care. Current level of function: Moderate physical assistance for mobility and self-care. Allergies:  Patient has no known allergies.     Past Medical History:   Past Medical History:   Diagnosis Date    Acute CVA (cerebrovascular accident) ([facility] 10/10/2021    Anemia     Aspiration pneumonia ([facility]     dx 6/9/2014 with this per ecf/ per old chart dx with pneumonia with admission 9/18/2018    Cerebral palsy ([facility]     \"has no feeling on left side of body\"alert but has some dementia but not diagnosed, has good long term but poor short term and wakes up disoriented after a nap\"(per yaneth)(2014)    Depression     Epilepsy ([facility] [facility] 1961    last seizure 2/2018- hx grand mal    Frequent falls     with phone assess- family stated pt fell this am (7/10/2013\"in the process of trying to find a facility to help build him back up- (pt now at [facility])    Glaucoma     \"has been in treatment for this in the past- no treatment needed at present\"    History of IBS     Hx MRSA infection     + nasal culture 4/2014    Hyperammonemia (HCC)     Hypertension     on Lisinopril    IBS (irritable bowel syndrome)     ulcerative colitis    Kidney stone     for surgery for stent placement 7/11/2013    Mood disorder ([address]     per staff at [facility] Occasional tremors     \"makes it difficult for him to write\"    Pressure injury of contiguous region involving back and right buttock, stage 2 ([facility] 5/27/2020    Pressure injury of left buttock, stage 1 7/22/2020    Prostatitis     hx given per H&P old chart    Thyroid disease     Ulcerative colitis ([facility] [facility]     hx given per staff at [facility] 4/13/2015        Past Surgical History:     Past Surgical History:   Procedure Laterality Date    CHOLECYSTECTOMY      COLONOSCOPY  8/19/14, 5/2012 8/19/14: hemorrhoids, 5/2012 at [place]  02/04/2021    COLONOSCOPY N/A 2/4/2021    COLONOSCOPY POLYPECTOMY SNARE/COLD BIOPSY performed by Chloe Clark MD at [facility] 07/11/2013    with stnet placement   [address]      OTHER SURGICAL HISTORY  04/15/2015    Revision of vagal nerve stimulator    STIMULATOR SURGERY N/A 3/24/2021    STIMULATOR INSERTION performed by Christian Shelton MD at [address] [place] [address] 160 3/24/2021    STIMULATOR INSERTION STAGE 2 performed by Christian Shelton MD at [facility] 11/13/2018    EGD DILATION BALLOON AND BIOPSY performed by Rosa Caots MD at [place] [address]    Has to have bettery changed every 5 yrs.         Current Medications:     Current Facility-Administered Medications:     amLODIPine (NORVASC) tablet 10 mg, 10 mg, Oral, Daily, C Milinda Boxer, MD    aspirin chewable tablet 81 mg, 81 mg, Oral, Daily, C Milinda Boxer, MD, 81 mg at 10/10/21 1032    atorvastatin (LIPITOR) tablet 80 mg, 80 mg, Oral, Nightly, C Milinda Boxer, MD, 80 mg at 10/10/21 2011    ceFAZolin (ANCEF) 2000 mg in dextrose 4 % 100 mL IVPB (premix), 2,000 mg, IntraVENous, Q8H, C Milinda Boxer, MD, Paused at 10/10/21 2222    clonazePAM (KLONOPIN) tablet 0.25 mg, 0.25 mg, Oral, Daily, C Milinda Boxer, MD, 0.25 mg at 10/10/21 1033    clopidogrel (PLAVIX) tablet 75 mg, 75 mg, Oral, Daily, MONISHA Shipman MD, 75 mg at 10/10/21 1033    divalproex (DEPAKOTE ER) extended release tablet 1,000 mg, 1,000 mg, Oral, Daily, MONISHA Shipman MD, 1,000 mg at 10/10/21 1032    lacosamide (VIMPAT) tablet 200 mg, 200 mg, Oral, BID, MONISHA Shipman MD, 200 mg at 00/23/90 8651    folic acid (FOLVITE) tablet 1 mg, 1 mg, Oral, Daily, MONISHA Shipman MD, 1 mg at 10/10/21 1036    lactulose (CHRONULAC) 10 GM/15ML solution 20 g, 20 g, Oral, TID, MONISHA Shipman MD, 20 g at 10/10/21 2010    lactobacillus (CULTURELLE) capsule 1 capsule, 1 capsule, Oral, Daily, MONISHA Shipman MD, 1 capsule at 10/10/21 1032    [START ON 10/11/2021] latanoprost (XALATAN) 0.005 % ophthalmic solution 1 drop, 1 drop, Both Eyes, Nightly, MONISHA Shipman MD    levothyroxine (SYNTHROID) tablet 125 mcg, 125 mcg, Oral, Daily, MONISHA Shipman MD, 125 mcg at 10/10/21 0551    LORazepam (ATIVAN) tablet 0.5 mg, 0.5 mg, Oral, BID, MONISHA Shipman MD, 0.5 mg at 10/10/21 2010    lubiprostone (AMITIZA) capsule 24 mcg, 24 mcg, Oral, BID WC, MONISHA Shipman MD, 24 mcg at 10/10/21 1726    mirabegron (MYRBETRIQ) extended release tablet 50 mg, 50 mg, Oral, Daily, MONISHA Shipman MD, 50 mg at 10/10/21 1042    pantoprazole (PROTONIX) tablet 40 mg, 40 mg, Oral, QAM AC, MONISHA Shipman MD, 40 mg at 10/10/21 0551    PARoxetine (PAXIL) tablet 20 mg, 20 mg, Oral, Daily, MONISHA Shipman MD, 20 mg at 10/10/21 1032    primidone (MYSOLINE) tablet 50 mg, 50 mg, Oral, 3 times per day, Yuliya De Guzman MD, 50 mg at 10/10/21 2010    psyllium (HYDROCIL) 95 % packet 1 packet, 1 packet, Oral, BID, MONISHA Shipman MD, 1 packet at 10/10/21 2010    QUEtiapine (SEROQUEL) tablet 50 mg, 50 mg, Oral, BID, MONISHA Shipman MD, 50 mg at 10/10/21 2010    tamsulosin [facility]) capsule 0.4 mg, 0.4 mg, Oral, Daily, MONISHA Shipman MD, 0.4 mg at 10/10/21 1034    trospium (SANCTURA) tablet 20 mg, 20 mg, Oral, BID AC, MONISHA Adkins MD, 20 mg at 10/10/21 1521    timolol (TIMOPTIC) 0.25 % ophthalmic solution 1 drop, 1 drop, Both Eyes, BID, MONISHA Adkins MD, 1 drop at 10/10/21 2011    zonisamide (ZONEGRAN) capsule 100 mg, 100 mg, Oral, Daily, MONISHA Adkins MD, 100 mg at 10/10/21 1042    vitamin D CAPS 1,000 Units, 1 capsule, Oral, Daily, MONISHA Adkins MD, 1,000 Units at 10/10/21 1033    dicyclomine (BENTYL) capsule 10 mg, 10 mg, Oral, TID PRN, MONISHA Adkins MD    acetaminophen (TYLENOL) tablet 650 mg, 650 mg, Oral, Q4H PRN, MONISHA Adkins MD    enoxaparin (LOVENOX) injection 40 mg, 40 mg, SubCUTAneous, Daily, MONISHA Adkins MD, 40 mg at 10/10/21 1034    polyethylene glycol (GLYCOLAX) packet 17 g, 17 g, Oral, Daily PRN, MONISHA Adkins MD    magnesium hydroxide (MILK OF MAGNESIA) 400 MG/5ML suspension 30 mL, 30 mL, Oral, Daily PRN, MONISHA Adkins MD    Family History:   Family History   Problem Relation Age of Onset    Heart Disease Mother         atrial fib    High Blood Pressure Mother     Asthma Mother     High Cholesterol Mother     Depression Mother    Babin Migraines Mother     High Blood Pressure Father     Heart Disease Father     Asthma Sister     High Cholesterol Sister     Depression Sister     Migraines Sister        Exam:    Blood pressure 133/75, pulse 71, temperature 96.2 °F (35.7 °C), temperature source Oral, resp. rate 18, height 5' 5.98\" (1.676 m), weight 198 lb 11.2 oz (90.1 kg), SpO2 97 %. General: He was observed sitting up in bed. He was fairly attentive to my visit and get did gaze about the room. Easily distracted. In no distress. HEENT: Slurred speech with mild right facial droop. MMM. No adenopathy or JVP. No signs of trauma to his head or neck. Pulmonary: Unlabored respirations were somewhat blunted due to his girth. No wheezes or rales. Cardiac: Distant heart sounds with a regular rate and rhythm.     Abdomen: Patient's abdomen was soft and nondistended. Bowel sounds were present throughout. There was no rebound, guarding or masses noted. Spinal exam: Moist skin with no breakdown. Guarded trunk rotation. Upper extremities: The patient was able to bring both hands up to meet mine.  was functional but clumsy. Poor dexterity noted. No significant bruising or swelling. Lower extremities: Limited movements across the hips and knees. Able to dorsiflex against resistance bilaterally. Trace edema. Calves are soft. Poor attention during the exam.    Sitting balance was fair. Standing balance was poor. Lab Results   Component Value Date    WBC 8.2 10/03/2021    HGB 15.6 10/03/2021    HCT 46.2 10/03/2021    MCV 90.8 10/03/2021     10/03/2021     Lab Results   Component Value Date    INR 0.85 10/01/2021    INR 0.90 11/27/2020    INR 0.87 11/26/2020    PROTIME 11.0 (L) 10/01/2021    PROTIME 10.9 (L) 11/27/2020    PROTIME 10.5 (L) 11/26/2020     Lab Results   Component Value Date    CREATININE 0.4 (L) 10/05/2021    BUN 11 10/05/2021     10/05/2021    K 3.9 10/05/2021     10/05/2021    CO2 25 10/05/2021     Lab Results   Component Value Date    ALT 20 10/03/2021    AST 14 (L) 10/03/2021    ALKPHOS 107 10/03/2021    BILITOT 0.2 10/03/2021         Impression: 78-year-old male from a group home with a history of a seizure disorder, IBS, hypertension and depression who is developed generalized weakness with ataxia associated with a probable CVA. He has a facial abscess and UTI as well. Strengths for the patient: The supportive staff of the group home, and accessible residence and some experience with adaptive equipment. Limitations/barriers for the patient: His multiple medical comorbidities, his poor attention and limited carryover. Recommendation: Acute inpatient rehabilitation with occupational and physical therapy 180 minutes 5 out of every 7 days.  Will address basic and  advancing mobility with self-care instruction and adaptive equipment training. Caregiver education will be offered. Expected length of stay  prior to a supervised level of function for discharge home with a walker and [facility] OT/PT is 2 weeks. Additional recommendation:    1. Acute CVA with ataxia and gait disturbance: The patient requires daily occupational and physical therapy with speech-language pathology. He is on dual antiplatelet therapy and a statin. He requires blood pressure regulation. He needs adaptive equipment training, aggressive pulmonary hygiene measures and bowel and bladder retraining. DVT prophylaxis and nutritional support. Treating in a calm and consistent environment to improve retention. Providing written and verbal instructions when possible. Caregiver training with group home staff may be useful before discharge. 2. DVT prophylaxis: Lovenox 40 mg subcu daily. Must monitor his hemoglobin and platelet count periodically while on this medication. Weightbearing activities will be pursued daily. 3. Hypertension: Norvasc and pain control for blood pressure regulation. Target systolic blood pressure is 120140. Vital signs are checked at rest and with activity. 4. Seizure disorder: Klonopin, Depakote, Vimpat and lorazepam.  Treating him in a calm and consistent environment. Monitoring him for seizure activity. 5. Strep B UTI: Cefazolin 2 g IV every 8 hours for a total of 10 days. This will also be covered for his facial abscess. 6. IBS: Culturelle twice daily. Amitiza and lactulose. Hobson diet. 7. Facial abscess: General surgery and wound care monitor. Daily wound care. IV antibiotics. I personally performed a history and physical on this patient within 24 hours of admission to the rehab unit. I have reviewed the preadmission screening and concur with its findings without change.  A detailed plan of care will be established by hospital day 4 and I attest the patient is appropriate for inpatient rehabilitation at this time. I have compared the patient's current functional status noted during my history and physical with that of the preadmission screen and I have found no significant differences.

## 2021-10-11 NOTE — PROGRESS NOTES
Occupational Therapy                              [facility] OCCUPATIONAL THERAPY EVALUATION    Chart Review:  Past Medical History:   Diagnosis Date    Acute CVA (cerebrovascular accident) ([address] 10/10/2021    Anemia     Aspiration pneumonia ([address]     dx 6/9/2014 with this per ecf/ per old chart dx with pneumonia with admission 9/18/2018    Cerebral palsy ([address]     \"has no feeling on left side of body\"alert but has some dementia but not diagnosed, has good long term but poor short term and wakes up disoriented after a nap\"(per yaneth)(2014)    Depression     Epilepsy ([address] 1961    last seizure 2/2018- hx grand mal    Frequent falls     with phone assess- family stated pt fell this am (7/10/2013\"in the process of trying to find a facility to help build him back up- ([facility])    Glaucoma     \"has been in treatment for this in the past- no treatment needed at present\"    History of IBS     Hx MRSA infection     + nasal culture 4/2014    Hyperammonemia (HCC)     Hypertension     on Lisinopril    IBS (irritable bowel syndrome)     ulcerative colitis    Kidney stone     for surgery for stent placement 7/11/2013    Mood disorder ([address]     per staff at [facility] (methicillin resistant staph aureus) culture positive 05/02/2014 05/27/20 nasal    MRSA (methicillin resistant staph aureus) culture positive 11/25/2020    Face; 10/8/21    Occasional tremors     \"makes it difficult for him to write\"    Pressure injury of contiguous region involving back and right buttock, stage 2 ([address] 05/27/2020    Pressure injury of left buttock, stage 1 07/22/2020    Prostatitis     hx given per H&P old chart    Thyroid disease     Ulcerative colitis ([address]     hx given [facility] staff at [facility] 4/13/2015     Past Surgical History:   Procedure Laterality Date    CHOLECYSTECTOMY      COLONOSCOPY  8/19/14, 5/2012 8/19/14: hemorrhoids, 5/2012 at [facility]  02/04/2021    COLONOSCOPY N/A 2/4/2021    COLONOSCOPY POLYPECTOMY SNARE/COLD BIOPSY performed by Axel Garcia MD at [address] Left 07/11/2013    with stnet placement   [facility]  2005    KIDNEY STONE SURGERY      OTHER SURGICAL HISTORY  04/15/2015    Revision of vagal nerve stimulator    STIMULATOR SURGERY N/A 3/24/2021    STIMULATOR INSERTION performed by April Peck MD at [place] [address] N/A 3/24/2021    STIMULATOR INSERTION STAGE 2 performed by April Peck MD at [address] [place] [address] N/A 11/13/2018    EGD DILATION BALLOON AND BIOPSY performed by Krystal Calle MD at [facility]  2002    Has to have bettery changed every 5 yrs. Social History:  Social/Functional History  Lives With: Other (comment) (2 roommates, group home with 2 staff nearly all times)  Type of Home: House  Home Layout: One level  Home Access: Stairs to enter without rails, Ramped entrance  [address] [facility] - Number of Steps: ramp on 1 step in garage(main entry for pt), 1 step at front entrance  Bathroom Shower/Tub: Shower chair with back, Walk-in shower  Bathroom Toilet: Standard (has some rails, but pt states they are awkward to use)  Bathroom Equipment: Grab bars in shower, Grab bars around toilet  Bathroom Accessibility: [place]: [place], [address] [facility][[address]], [address] [facility] bed (Pt has electric w/c but does not use it due to poor control(states he wasn't trained))  Receives Help From: Personal care attendant  ADL Assistance: Needs assistance (aides provide supervision as needed, but pt needed no physical help)  Homemaking Responsibilities: No (Pt will help with cleaning sometimes, staff complete most tasks)  Ambulation Assistance: Needs assistance (SBA of 2 with 2ww, has mostly been usingw/c)  Transfer Assistance: Independent  Active : No  Mode of Transportation: [employer]  Education: [employer]  Occupation:  On disability  Type of occupation: [employer] [address]: Watch tv, play video games, computer, FB, pt does not manage his meds--staff manages  Additional Comments: pt sleeps in hospital bed, pt has fallen 2-3 with injury once; reported that he was advised by optometrist to wear helmet when out of bed due to glaucoma and irrepairable damage if he experiences a fall. Restrictions:  Restrictions/Precautions  Restrictions/Precautions: General Precautions, Contact Precautions, Fall Risk, Modified Diet (per pt he wears helmet when up, minced and moist diet)                  Pain Level: 0       Objective:  Observation/Palpation  Observation: Pt in recliner on approach, pleasant and agreeable to evaluation. Exhibits some decreased safety judgement, for example expresses frustration with receiving supervision for ADLs even at baseline  Body Mechanics: Impaired 2* h/o CP affecting L side             Vision  Vision: Impaired  Vision Exceptions:  (glaucoma--wears bike helmet when up)  Vision - Basic Assessment  Visual History: Glaucoma  Patient Visual Report: No visual complaint reported. Hearing  Hearing: Exceptions to [facility]  Hearing Exceptions: Hard of hearing/hearing concerns    ROM:      LUE AROM (degrees)  LUE AROM : Exceptions  L Shoulder Flexion 0-180: ~75*  L Elbow Flexion 0-145: ~120*     Left Hand AROM (degrees)  Left Hand AROM: Exceptions  Left Hand General AROM: Unable to fully extend digits, especially thumb     RUE AROM (degrees)  RUE AROM : WFL     Right Hand AROM (degrees)  Right Hand AROM: WFL    Strength:    LUE Strength  Gross LUE Strength: Exceptions to [facility]  L Shoulder Flex: 2-/5  L Elbow Flex: 2/5  L Hand General: 2/5  RUE Strength  Gross RUE Strength: WFL  R Hand General: 4/5  RUE Strength Comment: 4 to 4+/5 grossly    Quality of Movement:   Tone RUE  RUE Tone: Normotonic  Tone LUE  LUE Tone: Hypertonic  Coordination  Movements Are Fluid And Coordinated: No  Coordination and Movement description: Fine motor impairments, Gross motor impairments, Decreased speed, Decreased accuracy, Left UE  Quality of Movement Other  Comment: Baseline deficits 2* CP    Sensation:    Sensation  Overall Sensation Status: Impaired (decreased sensation throughout L side)     ADLs:  Eating: Eating  Assistance Needed: Setup or clean-up assistance  Comment: required assist to open milk containers 2* LUE deficits  CARE Score: 5  Discharge Goal: Set-up or clean-up assistance       Oral Hygiene: Oral Hygiene  Assistance Needed: Setup or clean-up assistance  Comment: seated  CARE Score: 5  Discharge Goal: Independent    UB/LB Bathing: Shower/Bathe Self  Assistance Needed: Supervision or touching assistance  Comment: SBA, performed full shower seated  CARE Score: 4  Discharge Goal: Supervision or touching assistance    UB Dressing: Upper Body Dressing  Assistance Needed: Setup or clean-up assistance  Comment: to don pullover T shirt  CARE Score: 5  Discharge Goal: Set-up or clean-up assistance         LB Dressing:   Lower Body Dressing  Assistance Needed: Supervision or touching assistance  Comment: able to unthread/thread BLEs from brief and pants; CGA while in stance for pants management with increased time for L side  CARE Score: 4  Discharge Goal: Supervision or touching assistance    Donning and [place] Footwear: Putting On/Taking Off Footwear  Assistance Needed: Supervision or touching assistance  Comment: able to doff/don bilateral socks and shoes via figure 4 positioning  CARE Score: 4  Discharge Goal: Supervision or touching assistance      Toileting:  Toileting Hygiene  Assistance Needed: Supervision or touching assistance  Comment: CGA while in stance  CARE Score: 4  Discharge Goal: Supervision or touching assistance      Bed Mobility:    Bed mobility  Comment: Not performed, see PT flor for details    Transfers:    Transfers  Stand Pivot Transfers: Contact guard assistance  Sit to stand: Contact guard assistance  Stand to sit: Contact guard assistance  Transfer Comments: requires safety cues     Shower Transfers  Shower - Transfer Type: To and From  Shower - Transfer To: Shower seat without back  Shower - Technique: Ambulating (c RW)  Shower Transfers: Contact Guard  Shower Transfers Comments: CGA, poor safety, abandoning RW despite cue     Toilet Transfer  Assistance Needed: Supervision or touching assistance  Comment: CGA  CARE Score: 4  Discharge Goal: Supervision or touching assistance    Functional Mobility:    Balance  Sitting Balance: Supervision  Standing Balance: Contact guard assistance  Standing Balance  Time: Multiple stands ~1 min each  Activity: ADLs  Comment: Pt ranged from using 0-1 UE support to complete ADLs in stance  Functional Mobility  Functional - Mobility Device: Rolling Walker  Activity: To/from bathroom  Assist Level: Contact guard assistance     Cognition:  Cognition  Overall Cognitive Status: Exceptions  Memory: Decreased short term memory  Safety Judgement: Decreased awareness of need for safety  Cognition Comment: Initiated and sequenced BADL's appropriately    Perception:  Perception  Overall Perceptual Status: WFL      Assessment:     Performance deficits / Impairments: Decreased functional mobility , Decreased ADL status, Decreased ROM, Decreased strength, Decreased safe awareness, Decreased cognition, Decreased endurance, Decreased sensation, Decreased balance, Decreased vision/visual deficit, Decreased high-level IADLs, Decreased fine motor control, Decreased coordination, Decreased posture    The patient is a 73year old male admitted onto ARU after hospitalization for slurred speech and R facial droop; CT was negative and MRI could not be completed d/t vasal stimulator. Per neurology consult symptoms were 2* TIA vs hypertensive emergency. Pt was also treated for a UTI and required an I&D on 10/08 for a facial abscess.   PTA, pt lives in a group home where staff provide supervision for ADLs and transfers/mobility. Pt has baseline L sided deficits 2* CP. Today, pt was able to complete all ADLs at a CGA-S level, which per pt's report is his baseline. This will be communicated with interdisciplinary team.  The QI, MMT, and ROM standardized assessments were used this date to determine the above performance deficits, which compromise pt's ability to safely complete ADLs/IADLs/mobility. Pt will benefit from ARU OT services to increase functional performance and return to PLOF. Decision Making: Medium Complexity  Clinical Presentation:  Evolving c changing characteristics (I.e. endurance)  Patient education:   ARU procotol, Role of O.T., O.T. plan of care  []   Patient goal was established and reviewed in Rehab[facility]er with patient and/or family this date.   REQUIRES OT FOLLOW UP: Yes  Discharge Recommendations:  Home c assist from staff  Equipment Recommendations:  none    Goals:     Short term goals  Time Frame for Short term goals: STGs=LTGs  Long term goals  Time Frame for Long term goals : 5-7 days or until d/c  Long term goal 1: Pt will complete grooming tasks Ind seated  Long term goal 2: Pt will complete total body bathing c S  Long term goal 3: Pt will complete UB dressing c setup  Long term goal 4: Pt will complete LB dressing c supervision  Long term goal 5: Pt will doff/don footwear c supervision  Long term goals 6: Pt will complete toileting c supervision  Long term goal 7: Pt will complete functional transfers (bed, chair, toilet, shower) c DME PRN and S  Long term goal 8: Pt will perform therex/therax to facilitate increased strength/endurance/ax tolerance (c emphasis on dynamic standing balance/tolerance >8 mins and RUE endurance) c SBA    Plan:    Pt will be seen at least 60 minutes per day for a minimum of 5 days per week, plus group therapy as appropriate  Current Treatment Recommendations: Strengthening, Balance Training, Functional Mobility Training, Endurance Training, Safety Education & Training, Patient/Caregiver Education & Training, Equipment Evaluation, Education, & procurement, Self-Care / ADL    OT Individual Minutes  Time In: 4853  Time Out: 3359  Minutes: 60                Number of Minutes/Billable Intervention      OT Evaluation 15   Therapeutic Exercise    ADL Self-care 45   Neuro Re-Ed    Therapeutic Activity    Group    Other:    TOTAL 60     Electronically signed by:    Landy Craven MS, OTR/L  License #[id number]  10/11/2021, 3:38 PM

## 2021-10-11 NOTE — PROGRESS NOTES
[facility]   CLINICAL BEDSIDE SWALLOW EVALUATION AND SPEECH/LANGUAGE/COGNITIVE EVALUATIONS    SWALLOW EVAL:    NAME: Arsalan Lomas  : 1955  MRN: [id number]    ADMISSION DATE: 10/9/2021  ADMITTING DIAGNOSIS: has Cerebral palsy, infantile hemiplegia ([address] Rosacea, acne; IBS (irritable bowel syndrome); Hypertension; Calculus of ureter; Pyelonephritis; Sepsis ([address] Pneumonia; Increased ammonia level; Aspiration pneumonia ([address] Hypokalemia; Hypomagnesemia; Hypophosphatasia; Seizure disorder ([address] Seizure disorder, grand mal ([address] Sepsis due to undetermined organism with acute respiratory failure ([address] Pain of upper abdomen; Diarrhea of presumed infectious origin; Abdominal pain; Pressure injury of contiguous region involving back and right buttock, stage 2 ([address] Cellulitis, perineum; Pressure injury of right buttock, stage 2 ([address] Pressure injury of left buttock, stage 1; COVID-19 virus detected; Hyponatremia; Multifocal pneumonia; TIA (transient ischemic attack); and Acute CVA (cerebrovascular accident) ([address] on their problem list.  ONSET DATE: 10/1/21    Recent Chest Xray/CT of Chest:      FINDINGS:   Left chest wall vagal nerve stimulator device is in place.  Stable   cardiomediastinal silhouette.  The lungs show chronic reticular opacities. No new dense consolidation or pleural effusion.  No pneumothorax.       No acute osseous abnormality.  Stable left rib deformities.  Degenerative   changes of the shoulders are seen.           Impression   1. No acute cardiopulmonary disease. MBS 19 with minced and moist diet recommended with thins with a chin tuck recommended. Repeat MBS completed 2020 with choking episodes reported and non-compliance with strategies due to impulsivity and poor self monitoring. Also noted bony protrusion at C3/C4 resulting in reduced closure of laryngeal vestibule and narrowed UES. Pt required cues to follow safety recommendations.      Date of Eval: 10/11/2021  Evaluating Therapist: MAYANK Damon    Current Diet level:  Current Diet : Dysphagia Minced and Moist (Dysphagia II)  Current Liquid Diet : Thin      Primary Complaint regarding swallow  Patient Complaint: Doesn't like wearing his dentures. Pain:  Pain Assessment  Pain Assessment: 0-10  Pain Level: 0  Patient's Stated Pain Goal: No pain    Reason for Referral  Annette Cedeño was referred for a bedside swallow evaluation to assess the efficiency of his swallow function, identify signs and symptoms of aspiration and make recommendations regarding safe dietary consistencies, effective compensatory strategies, and safe eating environment. Per PAS: Annette Cedeño is a 73 y.o. male with h/o cerebral palsy, seizure, and HTN who presented on 10/1 for strokelike activity, with a right facial droop and slurred speech in the group home, had resolved by the time patient came to the ED. Sister was worried that patient may have had a stroke. Upon arrival patient had elevated BP at 170's/100's. CT was negative for acute findings and patient is unable to have MRI done d/t having a vagel nerve stimulator. Neurology was consulted and states d/t unable to have MRI obtained will treat patient with dx of TIA. SLP was consulted and patient was found to have mild-moderate pharyngeal and oral dysphagia. Patient started on Dysphagia II diet with thin liquids. Patient also being medically managed for HTN. During patients admission he fell and caught his lip on a nurses badge reel. On 10/8 he underwent I&D of facial abscess. Patient lives in group home d/t his cerebral palsy with two other roommates. At baseline patient states he requires some assistance with ADL's and Joel with RW for transfers and ambulation. Currently patient is min-maxA with ADL's and Portia with RW for transfers and ambulation.  COVID negative test noted on 10/9/21  Medical/Surgical History   Anemia    Aspiration pneumonia ([address]     dx 6/9/2014 with this per ecf/ per old chart dx with pneumonia with admission 9/18/2018  Cerebral palsy [address]     \"has no feeling on left side of body\"alert but has some dementia but not diagnosed, has good long term but poor short term and wakes up disoriented after a nap\"(per yaneth)(2014)  Depression    Epilepsy ([address] 1961   last seizure 2/2018- hx grand mal  Frequent falls     with phone assess- family stated pt fell this am (7/10/2013\"in the process of trying to find a facility to help build him back up- (pt now at [facility])  Glaucoma     \"has been in treatment for this in the past- no treatment needed at present\"  History of IBS    Hx MRSA infection     + nasal culture 4/2014  Hyperammonemia (HCC)    Hypertension     on Lisinopril  IBS (irritable bowel syndrome)     ulcerative colitis  Kidney stone     for surgery for stent placement 7/11/2013  Mood disorder ([address]     per staff at [address] [place] [address]  Occasional tremors     \"makes it difficult for him to write\"  Pressure injury of contiguous region involving back and right buttock, stage 2 ([address] 5/27/2020  Pressure injury of left buttock, stage 1 7/22/2020  Prostatitis     hx given per H&P old chart  Thyroid disease    Ulcerative colitis ([address]     hx given per staff at [facility] 4/13/2015      Impression  Dysphagia Diagnosis: Swallow function appears grossly intact  Dysphagia Impression : Pt requests re-trialling a more dental soft diet. Will attempt a soft and bite sized diet to see how he does and move from there and level of supervision needed. Dysphagia Outcome Severity Scale: Level 4: Mild moderate dysphagia- Intermittent supervision/cueing. One - two diet consistencies restricted    Oral Phase Dysfunction  Oral Phase  Oral Phase: WFL  Oral Phase  Oral Phase - Comment: WFL but very impulsive. Bottom dentures loose even with adhesive.   Large bites were taken; impulsive     Indicators of Pharyngeal Phase Dysfunction   Pharyngeal Phase  Pharyngeal Phase: thins  Consistencies Administered: Reg solid; Dysphagia Pureed (Dysphagia I); Thin - cup         Vision/Hearing  Vision  Vision: Impaired  Vision Exceptions:  (glaucoma--wears bike helmet when up)  Hearing  Hearing: Exceptions to [facility]  Hearing Exceptions: Hard of hearing/hearing concerns    Prognosis  Prognosis  Prognosis for safe diet advancement: good  Barriers to reach goals: cognitive deficits  Barriers/Prognosis Comment: Impulsivity, reduced self monitoring  Individuals consulted  Consulted and agree with results and recommendations: Patient;RN (PT/OT)    Education  Patient Education: results and recommendations; pt stated, \"I'd just like a chance to try and see how I do with other foods. \"  Patient Education Response: Verbalizes understanding;Needs reinforcement  Safety Devices in place: Yes  Type of devices: Left in bed; All fall risk precautions in place       INITIAL SPEECH/LANGUAGE/COGNITIVE ASSESSMENT    RECENT RESULTS  CT OF HEAD/MRI:   Radiology Findings  CT negative of acute findings MRI unable to be done d/t having a vagel nerve stimulator.      FINDINGS:   BRAIN/VENTRICLES: There is no acute intracranial hemorrhage, mass effect or   midline shift.  No abnormal extra-axial fluid collection.  The gray-white   differentiation is maintained without evidence of an acute infarct.  Cystic   encephalomalacia is noted in the right frontoparietotemporal lobes, MCA   territory.  Associated ex vacuo dilatation of the right lateral ventricle is   noted, unchanged.  Mild diffuse parenchymal volume loss is seen.       ORBITS: The visualized portion of the orbits demonstrate no acute abnormality.       SINUSES: The visualized paranasal sinuses and mastoid air cells demonstrate   no acute abnormality.       SOFT TISSUES/SKULL:  No acute abnormality of the visualized skull or soft   tissues.           Impression   No acute intracranial abnormality.  MRI may be obtained if clinically   indicated.       Chronic nonemergent findings as above. Primary Complaint regarding speech/language/cognition: I think I'm doing pretty well. Assessment:  Cognitive Diagnosis: Mild to moderate cognitive deficits with impulsivity, reduced insight and awareness, and self monitoring. Verbal problem solving is intact but in fx shows breakdown. Delayed processing and initiation. Speech Diagnosis: WFL with occasional slurring without dentures that is eliminated when they are placed. Communication Diagnosis: WFL        Recommendations:  Requires SLP Intervention: Yes  Duration/Frequency of Treatment: 3x/week x1 week 30 mins min for swallowing. LTG: Pt will safely consume least restrictive diet with no audible/visual distress. D/C Recommendations: To be determined  Referral To: Dietician    Plan:   Goals:  Short-term Goals  Timeframe for Short-term Goals: Pt likely at baseline with communication and cognition. Lives in group home with supervision needed due to mentioned cognitive areas. No ST recommended at this time. Patient/family involved in developing goals and treatment plan: Pt in agreemetn    Subjective:   Previous level of function and limitations: Group home with supervision  General  Chart Reviewed: Yes  Subjective  Subjective: alert and sitting up in chair; just finished PT  Social/Functional History  Active : No  Mode of Transportation: Parenting[employer]former  Education: [employer]  Occupation: On disability  Type of occupation: [employer], [address]: Watch tv, play video games, computer, FB, pt does not manage his [employer] manages  Additional Comments: pt sleeps in hospital bed, pt has fallen 2-3 with injury once; reported that he was advised by optometrist to wear helmet when out of bed due to glaucoma and irrepairable damage if he experiences a fall.   Vision  Vision: Impaired  Vision Exceptions:  (glaucoma--wears bike helmet when up)  Hearing  Hearing: Exceptions to [facility]  Hearing Exceptions: Hard of hearing/hearing concerns           Objective:  BDAE administered     Oral/Motor  Oral Motor: Exceptions to [facility]  Lingual Strength: Reduced    Auditory Comprehension  Comprehension: Within Functional Limits    Reading Comprehension  Reading Status: Within functional limits (Needs larger print)    Expression  Primary Mode of Expression: Verbal    Verbal Expression  Verbal Expression: Within functional limits    Written Expression  Dominant Hand: Right  Written Expression: Within Functional Limits    Motor Speech  Motor Speech: Within Functional Limits    Pragmatics/Social Functioning  Pragmatics: Exceptions to [facility]  Topic Maintenance: Mild (Tends to not take social cues and will continue talking until redirected with need to move on)    Cognition:      Orientation  Overall Orientation Status: Within Functional Limits  Attention  Attention: Exceptions to [facility]  Sustained Attention: Mild  Memory  Memory: Exceptions to [facility]  Short-term Memory: Mild  Problem Solving  Problem Solving: Exceptions to [facility]  Simple Functional Tasks: Moderate  Verbal Reasoning Skills: Moderate  Safety/Judgement  Safety/Judgement: Exceptions to [facility]  Routine Tasks: Mild  Insight: Mild    Impulsive: Moderate  Flexibility of Thought: Mild    Additional Assessments: BIMS  Understanding Verbal and Non-Verbal Content: Understands  Expression of Ideas and Wants: Without difficulty  Cognitive Pattern Assessment Used: BIMS  BIMS Summary Score: 15 out of 15 cognitive score   The Kitchen Picture Test (KPT)provides an accurate indication of current judgment skill; however, it is not designed to make definitive cognitive diagnoses. The KPT Practical Judgment Subtest measures judgment as a specific executive function. The subtest score indicates the patient's ability to recognize problems and think through appropriate solutions. The Total KPT score is a broader test that combines practical judgment with communication skills and visual memory.  Because impairment in verbal skills and visual memory are often seen in persons with advanced dementia, the Total KPT score can be useful when there are concerns about larger questions of independent living and safety. Agustin Adan achieved a total score of 19/21    This KPT Practical Judgment Subtest score indicates  judgment. Persons with this score typically indicate if there are  problems with executive functions and could support an underlying dementia. Aziza Leal achieved a total score of 7/8    Judgement for self in fx shows breakdown. Prognosis:  Speech Therapy Prognosis  Prognosis: Fair  Prognosis Considerations: Co-Morbidities; Potential;Participation Level;Previous Level of Function  Additional Comments: Impulsive  Individuals consulted  Consulted and agree with results and recommendations: Patient;RN (PT/OT)    Education:  Patient Education: results and recommendations; pt stated, \"I'd just like a chance to try and see how I do with other foods. \"  Patient Education Response: Verbalizes understanding;Needs reinforcement  Safety Devices in place: Yes  Type of devices: Left in bed; All fall risk precautions in place    Therapy Time:   Individual Individual   Time In 1010 0940   Time Out 1040 1010   Minutes 58 Leticia Guthrie  Speech/lang/cog           MAYANK Vanegas, MOLINA Cruz-SLP  10/11/2021 5:24 PM

## 2021-10-12 LAB
CULTURE: ABNORMAL
CULTURE: ABNORMAL
GLUCOSE BLD-MCNC: 92 MG/DL (ref 70–99)
Lab: ABNORMAL
SPECIMEN: ABNORMAL

## 2021-10-12 PROCEDURE — 94761 N-INVAS EAR/PLS OXIMETRY MLT: CPT

## 2021-10-12 PROCEDURE — 97110 THERAPEUTIC EXERCISES: CPT

## 2021-10-12 PROCEDURE — 99232 SBSQ HOSP IP/OBS MODERATE 35: CPT | Performed by: PHYSICAL MEDICINE & REHABILITATION

## 2021-10-12 PROCEDURE — 97116 GAIT TRAINING THERAPY: CPT

## 2021-10-12 PROCEDURE — 97542 WHEELCHAIR MNGMENT TRAINING: CPT

## 2021-10-12 PROCEDURE — 92526 ORAL FUNCTION THERAPY: CPT

## 2021-10-12 PROCEDURE — 1280000000 HC REHAB R&B

## 2021-10-12 PROCEDURE — 6360000002 HC RX W HCPCS: Performed by: PHYSICAL MEDICINE & REHABILITATION

## 2021-10-12 PROCEDURE — 97535 SELF CARE MNGMENT TRAINING: CPT

## 2021-10-12 PROCEDURE — 97530 THERAPEUTIC ACTIVITIES: CPT

## 2021-10-12 PROCEDURE — 82962 GLUCOSE BLOOD TEST: CPT

## 2021-10-12 PROCEDURE — 6370000000 HC RX 637 (ALT 250 FOR IP): Performed by: PHYSICAL MEDICINE & REHABILITATION

## 2021-10-12 RX ADMIN — Medication 1 PACKET: at 08:31

## 2021-10-12 RX ADMIN — LUBIPROSTONE 24 MCG: 24 CAPSULE, GELATIN COATED ORAL at 17:26

## 2021-10-12 RX ADMIN — Medication 1 PACKET: at 21:54

## 2021-10-12 RX ADMIN — ENOXAPARIN SODIUM 40 MG: 40 INJECTION SUBCUTANEOUS at 08:32

## 2021-10-12 RX ADMIN — QUETIAPINE FUMARATE 50 MG: 25 TABLET ORAL at 08:33

## 2021-10-12 RX ADMIN — CLOPIDOGREL BISULFATE 75 MG: 75 TABLET, FILM COATED ORAL at 08:34

## 2021-10-12 RX ADMIN — PANTOPRAZOLE SODIUM 40 MG: 40 TABLET, DELAYED RELEASE ORAL at 05:47

## 2021-10-12 RX ADMIN — LUBIPROSTONE 24 MCG: 24 CAPSULE, GELATIN COATED ORAL at 08:35

## 2021-10-12 RX ADMIN — LORAZEPAM 0.5 MG: 0.5 TABLET ORAL at 08:34

## 2021-10-12 RX ADMIN — LATANOPROST 1 DROP: 50 SOLUTION/ DROPS OPHTHALMIC at 21:55

## 2021-10-12 RX ADMIN — AMLODIPINE BESYLATE 10 MG: 10 TABLET ORAL at 08:33

## 2021-10-12 RX ADMIN — LACTULOSE 20 G: 10 SOLUTION ORAL at 14:42

## 2021-10-12 RX ADMIN — TAMSULOSIN HYDROCHLORIDE 0.4 MG: 0.4 CAPSULE ORAL at 08:34

## 2021-10-12 RX ADMIN — TIMOLOL MALEATE 1 DROP: 2.5 SOLUTION OPHTHALMIC at 08:32

## 2021-10-12 RX ADMIN — MIRABEGRON 50 MG: 25 TABLET, FILM COATED, EXTENDED RELEASE ORAL at 08:36

## 2021-10-12 RX ADMIN — LACOSAMIDE 200 MG: 100 TABLET, FILM COATED ORAL at 08:33

## 2021-10-12 RX ADMIN — LACTULOSE 20 G: 10 SOLUTION ORAL at 21:54

## 2021-10-12 RX ADMIN — LORAZEPAM 0.5 MG: 0.5 TABLET ORAL at 21:54

## 2021-10-12 RX ADMIN — CLONAZEPAM 0.25 MG: 0.5 TABLET ORAL at 08:35

## 2021-10-12 RX ADMIN — CEFAZOLIN SODIUM 2000 MG: 2 INJECTION, SOLUTION INTRAVENOUS at 14:52

## 2021-10-12 RX ADMIN — LACOSAMIDE 200 MG: 100 TABLET, FILM COATED ORAL at 21:54

## 2021-10-12 RX ADMIN — ASPIRIN 81 MG: 81 TABLET, CHEWABLE ORAL at 08:31

## 2021-10-12 RX ADMIN — Medication 1 CAPSULE: at 08:33

## 2021-10-12 RX ADMIN — TROSPIUM CHLORIDE 20 MG: 20 TABLET, FILM COATED ORAL at 17:26

## 2021-10-12 RX ADMIN — ZONISAMIDE 100 MG: 100 CAPSULE ORAL at 08:36

## 2021-10-12 RX ADMIN — FOLIC ACID 1 MG: 1 TABLET ORAL at 08:35

## 2021-10-12 RX ADMIN — Medication 1000 UNITS: at 08:33

## 2021-10-12 RX ADMIN — DIVALPROEX SODIUM 1000 MG: 500 TABLET, FILM COATED, EXTENDED RELEASE ORAL at 08:33

## 2021-10-12 RX ADMIN — CEFAZOLIN SODIUM 2000 MG: 2 INJECTION, SOLUTION INTRAVENOUS at 22:03

## 2021-10-12 RX ADMIN — PRIMIDONE 50 MG: 50 TABLET ORAL at 14:42

## 2021-10-12 RX ADMIN — LACTULOSE 20 G: 10 SOLUTION ORAL at 08:31

## 2021-10-12 RX ADMIN — TIMOLOL MALEATE 1 DROP: 2.5 SOLUTION OPHTHALMIC at 21:55

## 2021-10-12 RX ADMIN — TROSPIUM CHLORIDE 20 MG: 20 TABLET, FILM COATED ORAL at 05:47

## 2021-10-12 RX ADMIN — CEFAZOLIN SODIUM 2000 MG: 2 INJECTION, SOLUTION INTRAVENOUS at 05:54

## 2021-10-12 RX ADMIN — PAROXETINE HYDROCHLORIDE 20 MG: 20 TABLET, FILM COATED ORAL at 08:34

## 2021-10-12 RX ADMIN — LEVOTHYROXINE SODIUM 125 MCG: 125 TABLET ORAL at 05:47

## 2021-10-12 RX ADMIN — PRIMIDONE 50 MG: 50 TABLET ORAL at 21:54

## 2021-10-12 RX ADMIN — ATORVASTATIN CALCIUM 80 MG: 80 TABLET, FILM COATED ORAL at 21:54

## 2021-10-12 RX ADMIN — PRIMIDONE 50 MG: 50 TABLET ORAL at 05:47

## 2021-10-12 RX ADMIN — QUETIAPINE FUMARATE 50 MG: 25 TABLET ORAL at 21:54

## 2021-10-12 ASSESSMENT — PAIN SCALES - GENERAL
PAINLEVEL_OUTOF10: 0

## 2021-10-12 NOTE — PROGRESS NOTES
Occupational Therapy  Physical Rehabilitation: OCCUPATIONAL THERAPY     [x] daily progress note       [] discharge       Patient Name:  Argentina Schrader   :  1955 MRN: [id number]  [facility] Date of Admission: 10/9/2021  Rehabilitation Diagnosis:   TIA (transient ischemic attack) [G45.9]  Acute CVA (cerebrovascular accident) [facility]) [I63.9]       Date 10/12/2021       Day of ARU Week:  4   Time IN/OUT [id number]   Individual Tx Minutes 60   Group Tx Minutes    Co-Treat Minutes    Concurrent Tx Minutes    TOTAL Tx Time Mins 60   Variance Time    Variance Time []   Refusal due to:     []   Medical hold/reason:    []   Illness   []   Off Unit for test/procedure  []   Extra time needed to complete task  []   Therapeutic need  []   Other (specify):   Restrictions Restrictions/Precautions: General Precautions, Contact Precautions, Fall Risk, Modified Diet (per pt he wears helmet when up, minced and moist diet)         Communication with other providers: [x]   OK to see per nursing:     []   Spoke with team member regarding:      Subjective observations and cognitive status: Received pt from PTA in room. Pt requested to shave. Pt pleasant and agreeable to therapy session.     Pain level/location:    /10       Location:    Discharge recommendations  Anticipated discharge date:  tBD  Destination: []home alone   []home alone w assist prn   [] home w/ family    [] Continuous supervision       []SNF    [] Assisted living     [] Other:   Continued therapy: []HHC OT  []OUTPATIENT  OT   [] No Further OT  Equipment needs: TBD      Toileting:   Declined need      Toilet Transfers:   NA   Device Used:    []   Standard Toilet         []   Grab Bars           []  Bedside Commode       []   Elevated Toilet          []   Other:        Bed Mobility:           [x]   Pt received out of bed   Sit --> Supine:  SBA    Transfers:    Sit--> Stand:  SBA c cues for impulsivity   Stand --> Sit:   SBA  Stand-Pivot:   CGA  Other: Assistive device required for transfer:   RW       Functional Mobility:  364ft c WC using LEs c 1 rest break   Assistance:  NA   Device:   []   Reino Axon     []   Standard Walker []   Wheelchair        []   [employer]       []   4-Wheeled [facility]         []   Cardiac Walker       []   Other:          Additional Therapeutic activities/exercises completed this date:     [x]   ADL Training: Pt completed grooming task seated in WC c mod I: Pt completed oral hygiene in bathroom with min A to apply dental adhesive to lower denture    []   Balance/Postural training     []   Bed/Transfer Training   []   Endurance Training   []   Neuromuscular Re-ed   []   Nu-step:  Time:        Level:         #Steps:       []   Rebounder:    []  Seated     []  Standing        []   Supine Ther Ex (reps/sets):     []   Seated Ther Ex (reps/sets):     []   Standing Ther Ex (reps/sets):     []   Other:      Comments:      Patient/Caregiver Education and Training:   []   [employer] Equipment Use  []   Bed Mobility/Transfer Technique/Safety  []   Energy Conservation Tips  []   Family training  []   Postural Awareness  []   Safety During Functional Activities  []   Reinforced Patient's Precautions   []   Progress was updated and reviewed in Rehabtracker with patient and/or family this         date. Treatment Plan for Next Session: Continue OT POC       Assessment: This pt demonstrated a positive response to today's treatment as evidenced by actively engaged in therapy. The patient is making progress toward established goals as evidenced by QI scores. Ongoing deficits are observed in the areas of [facility], strength, balance and safety and continued focus on this is recommended.        Treatment/Activity Tolerance:   [x] Tolerated treatment with no adverse effects    [] Patient limited by fatigue  [] Patient limited by pain   [] Patient limited by medical complications:    [] Adverse reaction to Tx:   [] Significant change in status    Safety: [x]  bed alarm set    []  chair alarm set    []  Pt refused alarms                []  Telesitter activated      [x]  Gait belt used during tx session      []other:       Number of Minutes/Billable Intervention  Therapeutic Exercise    ADL Self-care 45   Neuro Re-Ed    Therapeutic Activity 15   Group    Other:    TOTAL 60       Social History  Social/Functional History  Lives With: Other (comment) (2 roommates, group home with 2 staff nearly all times)  Type of Home: House  Home Layout: One level  Home Access: Stairs to enter without rails, Ramped entrance  Entrance Stairs - Number of Steps: ramp on 1 step in garage(main entry for pt), 1 step at front entrance  Bathroom Shower/Tub: Shower chair with back, Walk-in shower  Bathroom Toilet: Standard (has some rails, but pt states they are awkward to use)  Bathroom Equipment: Grab bars in shower, Grab bars around toilet  Bathroom Accessibility: Accessible  Home Equipment: [address], Wheelchair-electric, Rolling walker, Hospital bed (Pt has electric w/c but does not use it due to poor control(states he wasn't trained))  Receives Help From: Personal care attendant  ADL Assistance: Needs assistance (aides provide supervision as needed, but pt needed no physical help)  Homemaking Responsibilities: No (Pt will help with cleaning sometimes, staff complete most tasks)  Ambulation Assistance: Needs assistance (SBA of 2 with 2ww, has mostly been usingw/c)  Transfer Assistance: Independent  Active : No  Mode of Transportation: [employer]  Education: "[employer]  Occupation: On disability  Type of occupation: [employer], [address]: Watch tv, play video games, computer, FB, pt does not manage his [employer] manages  Additional Comments: pt sleeps in hospital bed, pt has fallen 2-3 with injury once; reported that he was advised by optometrist to wear helmet when out of bed due to glaucoma and irrepairable damage if he experiences a fall.     Objective Goals:  (Update in navigator)  Short term goals  Time Frame for Short term goals: STGs=LTGs:  Long term goals  Time Frame for Long term goals : 5-7 days or until d/c  Long term goal 1: Pt will complete grooming tasks Ind seated  Long term goal 2: Pt will complete total body bathing c S  Long term goal 3: Pt will complete UB dressing c setup  Long term goal 4: Pt will complete LB dressing c supervision  Long term goal 5: Pt will doff/don footwear c supervision  Long term goals 6: Pt will complete toileting c supervision  Long term goal 7: Pt will complete functional transfers (bed, chair, toilet, shower) c DME PRN and S  Long term goal 8: Pt will perform therex/therax to facilitate increased strength/endurance/ax tolerance (c emphasis on dynamic standing balance/tolerance >8 mins and RUE endurance) c SBA:        Plan of Care                                                                              Times per week: 5 days per week for a minimum of 60 minutes/day plus group as appropriate for 60 minutes.   Treatment to include Plan  Times per day: Daily  Current Treatment Recommendations: Strengthening, Balance Training, Functional Mobility Training, Endurance Training, Safety Education & Training, Patient/Caregiver Education & Training, Equipment Evaluation, Education, & procurement, Self-Care / ADL    Electronically signed by   DAWIT Martins,  10/12/2021, 9:55 AM

## 2021-10-12 NOTE — CARE COORDINATION
LSW informed in Care Conference that patient is doing well and at baseline. Due to this, patient will be ready to discharge on Thursday, 10/14/21. LSW to get things set for discharge. LSW met with patient in room and informed as well. Patient is agreeable to discharge plan. 2:30 PM  LSW attempted to contact patient guardian and sister Ximena Dodd via mobile number ([phone]) and home number ([phone]). Message left on both requesting call back and informing of pending discharge date. LSW awaits call back.

## 2021-10-12 NOTE — PROGRESS NOTES
Physical Therapy      [x] daily progress note       [] discharge       Patient Name:  Shikha Lake   :  1955 MRN: [id number]  Room:  [address] [place], [place] [address] Date of Admission: 10/9/2021  Rehabilitation Diagnosis:   TIA (transient ischemic attack) [G45.9]  Acute CVA (cerebrovascular accident) [facility]) [I63.9]       Date 10/12/2021       Day of ARU Week:  4   Time IN/OUT 6403-4714  5107-1985   Individual Tx Minutes 55+50   TOTAL Tx Time Mins 105   Variance Time +15   Variance Time []   Refusal due to:     []   Medical hold/reason:    []   Illness   []   Off Unit for test/procedure  [x]   Pt having urgency to toilet, unable to wait for assistance from nursing. Pt req extra time to complete task for toileting, removing dentures, and getting back into bed. Nsg called twice but unavailable to assist.   []   Therapeutic need  []   Other (specify):   Restrictions Restrictions/Precautions  Restrictions/Precautions: General Precautions, Contact Precautions, Fall Risk, Modified Diet (per pt he wears helmet when up, minced and moist diet)      Communication with other providers: [x]   OK to see per nursing:     []   Spoke with team member regarding:      Subjective observations and cognitive status: Pt resting in bed finishing breakfast, willing to participate. Pt verbose, req cues for re-direction to task. Pt reluctant to wear gait belt of helmet with safety concerns and encouragement provided  PM: Pt resting in bed, CM in to discuss discharge date of this Thursday. Pt appeared excited about the date, demos increased impulsivity this PM req safety cues, Not so receptive to education this PM in regards to [address] set up due to attempts to park [address] couple steps from commode or bed and attempting to walk couple steps over without AD. Pt easily distracted ana with conversation and req cues to stay on task.     Pain level/location: 0/10       Location:    Discharge recommendations  Anticipated discharge date:  TBD  Destination: []home alone []home with assist PRN     [x] home with continuous supervision     []SNF      [] Assisted living     [] Other:   Continued therapy: []HHC PT  []OUTPATIENT  PT   [] No Further PT  Equipment needs: none, pt has 2WW, manual      Bed Mobility:           []   Pt received out of bed   Rolling R/L:  Indep to R with rail  Scooting:  SBA sit scoot to EOB  Supine --> Sit:  Supervision with bed features  Sit --> Supine:  Supervision    Transfers:    Sit--> Stand:  CGA, no retro balance observed today  Stand --> Sit:   CG-SBA, cues to reach back  Stand-Pivot:   CGA for balance  Assistive device required for transfer:   RW    Gait:    Distance:  706'+122'+08'+17'   Assistance:  CGA  Device:  RW  Gait Quality:   recip pattern with decreased step length L, absent HS, lack of L knee extension in stance; flexed posture but maintains reasonable distance with AD. Req directional cues around unit and to room    Wheelchair Propulsion:  Distance:   219' +365'+107'  Assistance:   Supervision  Extremities Used: B LEs and UEs    Additional Therapeutic activities/exercises completed this date:     []   Nu-step:  Time:        Level:         #Steps:       []   Rebounder:    []  Seated     []  Standing        [x]   Balance training: Sitting balance at EOB for LB dressing SB-Supervision; dresses LB without A of therapist ; Standing balance to pull up pants CGA. []   Postural training    []   Supine ther ex (reps/sets):     [x]   Seated ther ex (reps/sets): B LEs 2# each for hip flexion, LAQ, hip abd/add, knee flexion x 20 each.      []   Standing ther ex (reps/sets):     []   Picked up object from floor                       []   Reacher used   []   Other:   []   Other:   []   Other:      Patient/Caregiver Education and Training:   [x]   Bed Mobility/Transfer technique/safety  [x]   Gait technique/sequencing  [x]   Proper use of assistive device  []   Advanced mobility safety and technique  []   Reinforced patient's precautions with mobility/functional tasks  []   Postural awareness  []   Family training  []   Other:    Treatment Plan for Next Session: dynamic balance, community barriers including outside surfaces, ramp, and curb     Treatment/Activity Tolerance:   [x] Tolerated treatment with no adverse effects    [] Patient limited by fatigue  [] Patient limited by pain   [] Patient limited by medical complications:    [] Adverse reaction to Tx:   [] Significant change in status    Safety:       [x]  bed alarm set    []  chair alarm set    []  Pt refused alarms                []  Telesitter activated      [x]  Gait belt used during tx session      [x]other:  AM: Pt left in c/o OT      Number of Minutes/Billable Intervention  Gait Training 30   Therapeutic Exercise 15   Neuro Re-Ed    Therapeutic Activity 30   Wheelchair Propulsion 30   Group    Other:    TOTAL 105         Social History  Social/Functional History  Lives With: Other (comment) (2 roommates, group home with 2 staff nearly all times)  Type of Home: House  Home Layout: One level  Home Access: Stairs to enter without rails, Ramped entrance  [address] [facility] - Number of Steps: ramp on 1 step in garage(main entry for pt), 1 step at front entrance  Bathroom Shower/Tub: Shower chair with back, Walk-in shower  Bathroom Toilet: Standard (has some rails, but pt states they are awkward to use)  Bathroom Equipment: Grab bars in shower, Grab bars around toilet  Bathroom Accessibility: Accessible  Home Equipment: [employer], Wheelchair-electric, [address] [facility] bed (Pt has electric w/c but does not use it due to poor control(states he wasn't trained))  Receives Help From: Personal care attendant  ADL Assistance: Needs assistance (aides provide supervision as needed, but pt needed no physical help)  Homemaking Responsibilities: No (Pt will help with cleaning sometimes, staff complete most tasks)  Ambulation Assistance: Needs assistance (SBA of 2 with 2ww, has mostly been 8:44 AM

## 2021-10-12 NOTE — PROGRESS NOTES
[address] [facility] Liaison spoke with devin and Jerrell Rubio and both would like c at [place] and pt was active with [address] [facility] prior to being hospitalzed. is agreeable to c at discharge.

## 2021-10-12 NOTE — CARE COORDINATION
Case Management Admission Note      Patient:Jovan Leal      :1955  IYY:[id number]  Rehab Dx/Hx: TIA (transient ischemic attack) [G45.9]  Acute CVA (cerebrovascular accident) ([address] [I63.9]    Chief Complaint:   Past Medical History:   Diagnosis Date    Acute CVA (cerebrovascular accident) ([address] 10/10/2021    Anemia     Aspiration pneumonia ([address]     dx 2014 with this per ecf/ per old chart dx with pneumonia with admission 2018    Cerebral palsy ([address]     \"has no feeling on left side of body\"alert but has some dementia but not diagnosed, has good long term but poor short term and wakes up disoriented after a nap\"(per yaneth)()    Depression     Epilepsy ([address]     last seizure 2018- hx grand mal    Frequent falls     with phone assess- family stated pt fell this am (7/10/2013\"in the process of trying to find a facility to help build him back up- ([facility])    Glaucoma     \"has been in treatment for this in the past- no treatment needed at present\"    History of IBS     Hx MRSA infection     + nasal culture 2014    Hyperammonemia (HCC)     Hypertension     on Lisinopril    IBS (irritable bowel syndrome)     ulcerative colitis    Kidney stone     for surgery for stent placement 2013    Mood disorder ([address]     per staff at [facility] (methicillin resistant staph aureus) culture positive 2014 nasal    MRSA (methicillin resistant staph aureus) culture positive 2020    Face; 10/8/21    Occasional tremors     \"makes it difficult for him to write\"    Pressure injury of contiguous region involving back and right buttock, stage 2 ([address] 2020    Pressure injury of left buttock, stage 1 2020    Prostatitis     hx given per H&P old chart    Thyroid disease     Ulcerative colitis ([address]     hx given per staff at [facility] 2015     Past Surgical History:   Procedure Laterality Date    CHOLECYSTECTOMY      COLONOSCOPY 8/19/14, 5/2012 8/19/14: hemorrhoids, 5/2012 at [place]  02/04/2021    COLONOSCOPY N/A 2/4/2021    COLONOSCOPY POLYPECTOMY SNARE/COLD BIOPSY performed by Cindy Flanagan MD at [facility] 07/11/2013    with stnet placement   [address]  2005    KIDNEY STONE SURGERY      OTHER SURGICAL HISTORY  04/15/2015    Revision of vagal nerve stimulator    STIMULATOR SURGERY N/A 3/24/2021    [facility] N/A 3/24/2021    STIMULATOR INSERTION STAGE 2 performed by Zeina Reyes MD at [address] N/A 11/13/2018    EGD DILATION BALLOON AND BIOPSY performed by Kristi Woodruff MD at [place] [address]    Has to have bettery changed every 5 yrs. No Known Allergies  Precautions: falls    Date of Admit: 10/9/2021  Room #: 2548/2885-Y      Current functional status at time of admit:        Home Living/DME Available:      Type of Home: House  Home Access: Stairs to enter without rails, Ramped entrance  Bathroom Shower/Tub: Shower chair with back, Walk-in shower  Bathroom Toilet: Standard (has some rails, but pt states they are awkward to use)  Bathroom Equipment: Grab bars in shower, Grab bars around toilet  Home Equipment: [employer], Wheelchair-electric, Rolling walker, Hospital bed (Pt has electric w/c but does not use it due to poor control(states he wasn't trained))       IADL Hx:   Homemaking Responsibilities: No (Pt will help with cleaning sometimes, staff complete most tasks)  Active : No  Mode of Transportation: [employer]  Occupation: On disability  Leisure & Hobbies: Watch tv, play video games, computer, FB, pt does not manage his meds--staff manages       Spouse: None. Family: Sister Vic Miramontes. Comments: LSW met with patient for admission assessment.   Patient lives in a 1-level group home in [place] [address] with roommates and a  (Thalia). There is a ramp to enter and no steps inside. Patient reports tub/shower for bathing and bedroom and bathroom are on the main level. Patient has PCP, was dependent in ADLs PTA, and uses [facility] in [facility] for prescriptions. Patient states he has a standard and electric wheelchairs, shower chair, rollator, and troy lift at home and no pre-existing HHC; patient receives continual Aide services [facility] HHC. Patient reports access to food, utilities, and shelter and feels safe in his environment. BIMS completed in initial assessment by . Plan is to D/C back to group home, with Earl Ying and DME as recommended by therapy staff. F/U to be set with Dr. Lenin Deluna ([phone]) and family will transport home.     Tamara Renyolds, MSW, RONALW, 10/12/2021, 11:55 AM

## 2021-10-12 NOTE — PROGRESS NOTES
Terry Bucio    : 1955  Acct #: [de-identified]  MRN: [id number]              PM&R Progress Note      Admitting diagnosis: Acute CVA ( [address])     Comorbid diagnoses impacting rehabilitation: Ataxia of gait, dysarthria, dysphagia, essential hypertension, seizure disorder, IBS, depression with anxiety, facial abscess (I&D 10/8/2021), strep B UTI. Chief complaint: \"People are always telling me to stay put\". Prior (baseline) level of function: Independent.     Current level of function:         Current  IRF-BRENT and Goals:   Occupational Therapy:    Short term goals  Time Frame for Short term goals: STGs=LTGs :   Long term goals  Time Frame for Long term goals : 5-7 days or until d/c  Long term goal 1: Pt will complete grooming tasks Ind seated  Long term goal 2: Pt will complete total body bathing c S  Long term goal 3: Pt will complete UB dressing c setup  Long term goal 4: Pt will complete LB dressing c supervision  Long term goal 5: Pt will doff/don footwear c supervision  Long term goals 6: Pt will complete toileting c supervision  Long term goal 7: Pt will complete functional transfers (bed, chair, toilet, shower) c DME PRN and S  Long term goal 8: Pt will perform therex/therax to facilitate increased strength/endurance/ax tolerance (c emphasis on dynamic standing balance/tolerance >8 mins and RUE endurance) c SBA :                                       Eating: Eating  Assistance Needed: Setup or clean-up assistance  Comment: required assist to open milk containers 2* LUE deficits  CARE Score: 5  Discharge Goal: Set-up or clean-up assistance       Oral Hygiene: Oral Hygiene  Assistance Needed: Setup or clean-up assistance  Comment: seated  CARE Score: 5  Discharge Goal: Independent    UB/LB Bathing: Shower/Bathe Self  Assistance Needed: Supervision or touching assistance  Comment: SBA, performed full shower seated  CARE Score: 4  Discharge Goal: Supervision or touching assistance    UB Dressing: Upper Body Dressing  Assistance Needed: Setup or clean-up assistance  Comment: to don pullover T shirt  CARE Score: 5  Discharge Goal: Set-up or clean-up assistance         LB Dressing: Lower Body Dressing  Assistance Needed: Supervision or touching assistance  Comment: able to unthread/thread BLEs from brief and pants; CGA while in stance for pants management with increased time for L side  CARE Score: 4  Discharge Goal: Supervision or touching assistance    Donning and [place] Footwear: Putting On/Taking Off Footwear  Assistance Needed: Supervision or touching assistance  Comment: able to doff/don bilateral socks and shoes via figure 4 positioning  CARE Score: 4  Discharge Goal: Supervision or touching assistance      Toileting: Toileting Hygiene  Assistance Needed: Supervision or touching assistance  Comment: CGA while in stance  CARE Score: 4  Discharge Goal: Supervision or touching assistance      Toilet Transfers:   Toilet Transfer  Assistance Needed: Supervision or touching assistance  Comment: CGA  CARE Score: 4  Discharge Goal: Supervision or touching assistance    Physical Therapy:         Long term goals  Time Frame for Long term goals : 5-7 days STG=LTG  Long term goal 1: Pt will perform all bed mobility with mod I  Long term goal 2: Pt will perform sit to stand, pivot and car transfers  with cg assist  Long term goal 3: Pt will ambulate 50   feet on level surfaces AND 10' on unlevel surface with CG   assist  using 2ww  Long term goal 4: Pt will ascend/descend curb step with  2ww with min assist    and up to   12  steps with  rail(s) with  CG  assist  Long term goal 5: Pt will retrieve light item from floor with min   assist  using 2ww and reacher  Long term goal 6: Pt will propel w/c in household situation for at least  150   feet with mod I      Bed Mobility:   Sit to Lying  Assistance Needed: Supervision or touching assistance  Comment: unsafe technique, supervision with cues  CARE Score: 4  Discharge Goal: Independent  Roll Left and Right  Assistance Needed: Supervision or touching assistance  Comment: cues for technique  CARE Score: 4  Discharge Goal: Independent  Lying to Sitting on Side of Bed  Assistance Needed: Supervision or touching assistance  Comment: supervision  CARE Score: 4  Discharge Goal: Independent    Transfers:    Sit to Stand  Assistance Needed: Partial/moderate assistance  Comment: min assist with some posterior LOB  CARE Score: 3  Discharge Goal: Supervision or touching assistance  Chair/Bed-to-Chair Transfer  Assistance Needed: Supervision or touching assistance  Comment: CG once standing with 2ww  CARE Score: 4  Discharge Goal: Supervision or touching assistance     Car Transfer  Assistance Needed: Partial/moderate assistance  Comment: min assist due to unsteadiness, safety  CARE Score: 3  Discharge Goal: Supervision or touching assistance    Ambulation:    Walking Ability  Does the Patient Walk?: Yes     Walk 10 Feet  Assistance Needed: Partial/moderate assistance  Physical Assistance Level: Less than 25%  Comment: 2ww, decreased LLE step length with decreased dissociation due to tone, decreased L knee extension in stance,  inconsistent step pattern, min assist  CARE Score: 3  Discharge Goal: Supervision or touching assistance     Walk 50 Feet with Two Turns  Comment: 12' max distance  Reason if not Attempted: Not attempted due to medical condition or safety concerns  CARE Score: 88  Discharge Goal: Supervision or touching assistance     Walk 150 Feet  Comment: per pt report, did not walk longer than household distances  Reason if not Attempted: Not applicable  CARE Score: 9  Discharge Goal: Not Applicable     Walking 10 Feet on Uneven Surfaces  Assistance Needed: Partial/moderate assistance  Comment: mod assist for safety, 2ww  CARE Score: 3  Discharge Goal: Supervision or touching assistance     1 Step (Curb)  Reason if not Attempted: Not attempted due to medical condition or safety concerns  CARE Score: 88  Discharge Goal: Partial/moderate assistance     4 Steps  Assistance Needed: Partial/moderate assistance  Comment: B rails, max cues for sequence  CARE Score: 3  Discharge Goal: Supervision or touching assistance     12 Steps  Comment: pt was fatigued after 6 steps  Reason if not Attempted: Not attempted due to medical condition or safety concerns  CARE Score: 88  Discharge Goal: Partial/moderate assistance       Wheelchair:  w/c Ability: Wheelchair Ability  Uses a Wheelchair and/or Scooter?: Yes  Wheel 50 Feet with Two Turns  Assistance Needed: Supervision or touching assistance  Comment: supervision  CARE Score: 4  Discharge Goal: Independent  Wheel 150 Feet  Assistance Needed: Supervision or touching assistance  CARE Score: 4  Discharge Goal: Independent          Balance:    Balance  Sitting - Dynamic: Fair, +  Standing - Static: Fair, -  Standing - Dynamic: Poor, +   Object: Picking Up Object  Comment: unsafe to release 2ww in standing  Reason if not Attempted: Not attempted due to medical condition or safety concerns  CARE Score: 88  Discharge Goal: Partial/moderate assistance    I      Exam:    Blood pressure (!) 164/62, pulse 62, temperature 97.9 °F (36.6 °C), temperature source Oral, resp. rate 16, height 5' 5.98\" (1.676 m), weight 208 lb 1.8 oz (94.4 kg), SpO2 98 %. General: He was up in a bedside chair. Smiling and talkative. Oriented x3. In no distress. HEENT: Slow and deliberate speech. Full visual fields. Neck supple without any JVD. Upper lip lesion dressed. Pulmonary: Unlabored and clear. Cardiac: Regular rate and rhythm. Abdomen: Patient's abdomen is soft and nondistended. Bowel sounds were present throughout. There was no rebound, guarding or masses noted. Upper extremities: Dystonic left upper limb with limited elbow extension and very weak long finger grasp.   Multiple surgical scars from his childhood tendon transformation surgeries. Lower extremities: Weak and stiff movements across left knee and ankle. Foot drop on the left. No signs of DVT. Heels clear. Sitting balance was fair. Standing balance was poor. Lab Results   Component Value Date    WBC 8.2 10/03/2021    HGB 15.6 10/03/2021    HCT 46.2 10/03/2021    MCV 90.8 10/03/2021     10/03/2021     Lab Results   Component Value Date    INR 0.85 10/01/2021    INR 0.90 11/27/2020    INR 0.87 11/26/2020    PROTIME 11.0 (L) 10/01/2021    PROTIME 10.9 (L) 11/27/2020    PROTIME 10.5 (L) 11/26/2020     Lab Results   Component Value Date    CREATININE 0.4 (L) 10/05/2021    BUN 11 10/05/2021     10/05/2021    K 3.9 10/05/2021     10/05/2021    CO2 25 10/05/2021     Lab Results   Component Value Date    ALT 20 10/03/2021    AST 14 (L) 10/03/2021    ALKPHOS 107 10/03/2021    BILITOT 0.2 10/03/2021       Expected length of stay  prior to a supervised level of function for discharge home with a walker and [facility] OT/PT is 2 weeks. Recommendations:    1. Acute CVA with ataxia and gait disturbance: Developing the routine for his daily occupational and physical therapy with speech-language pathology. Providing him dual antiplatelet therapy and a statin. He requires blood pressure regulation. Ongoing adaptive equipment training, aggressive pulmonary hygiene measures and bowel and bladder retraining. DVT prophylaxis and nutritional support. Treating in a calm and consistent environment to improve retention. Providing written and verbal instructions when possible. Caregiver training with group home staff may be useful before discharge. Significant verbal cues and moderate physical assistance for transfers today. 2. DVT prophylaxis: Lovenox 40 mg subcu daily. Must monitor his hemoglobin and platelet count periodically while on this medication. Weightbearing activities are limited but will be pursued daily.   3. Hypertension: Norvasc and pain control for blood pressure regulation. Target systolic blood pressure is 120140. Vital signs are checked at rest and with activity. Blood pressure is just above target range. Monitoring closely. 4. Seizure disorder: Klonopin, Depakote, Vimpat and lorazepam.  Treating him in a calm and consistent environment. No current evidence of any seizure activity. 5. Strep B UTI: Cefazolin 2 g IV every 8 hours for a total of 10 days. This will also cover his facial abscess. 6. IBS: Culturelle twice daily. Amitiza and lactulose. Dunn diet. 7. Facial abscess: General surgery and wound care monitor. Daily wound care. IV antibiotics for a few more days.

## 2021-10-12 NOTE — PROGRESS NOTES
[facility]  ACUTE REHAB UNIT  SPEECH/LANGUAGE PATHOLOGY      [x] Daily           [] Discharge    Patient:Jovan Leal      :1955  XSJ:[id number]  Rehab Dx/Hx: TIA (transient ischemic attack) [G45.9]  Acute CVA (cerebrovascular accident) ([facility] [address] [I63.9]   No Known Allergies  Precautions: Sit up for all meals and thereafter for 30 minutes, Eat with small bites (1/2 tsp; 1 tsp), Alternate solids with liquids and Check mouth for food residue after snacks and meals;  Restrictions/Precautions: General Precautions, Contact Precautions, Fall Risk, Modified Diet (per pt he wears helmet when up, minced and moist diet)          Home Situation/IADL:   Social/Functional History  Lives With: Other (comment) (2 roommates, group home with 2 staff nearly all times)  Type of Home: House  Home Layout: One level  Home Access: Stairs to enter without rails, Ramped entrance  Entrance Stairs - Number of Steps: ramp on 1 step in garage(main entry for pt), 1 step at front entrance  Bathroom Shower/Tub: Shower chair with back, Walk-in shower  Bathroom Toilet: Standard (has some rails, but pt states they are awkward to use)  Bathroom Equipment: Grab bars in shower, Grab bars around toilet  Bathroom Accessibility: Accessible  Home Equipment: [employer], Wheelchair-electric, Rolling walker, Hospital bed (Pt has electric w/c but does not use it due to poor control(states he wasn't trained))  Receives Help From: Personal care attendant  ADL Assistance: Needs assistance (aides provide supervision as needed, but pt needed no physical help)  Homemaking Responsibilities: No (Pt will help with cleaning sometimes, staff complete most tasks)  Ambulation Assistance: Needs assistance (SBA of 2 with 2ww, has mostly been usingw/c)  Transfer Assistance: Independent  Active : No  Mode of Transportation: [employer]  Education: Corey Dill  Occupation: On disability  Type of occupation: [employer] Hobbies: Watch tv, play video games, computer, FB, pt does not manage his meds--staff manages  Additional Comments: pt sleeps in hospital bed, pt has fallen 2-3 with injury once; reported that he was advised by optometrist to wear helmet when out of bed due to glaucoma and irrepairable damage if he experiences a fall. Date of Admit: 10/9/2021  Room #: 1024/1024-A     ST Number of Minutes/Billable Intervention  Cog/Memory Deficits     Aphasia/Language     Dysarthria/Speech     Apraxia/Speech     Dysphagia/Swallowing 40    Group     Other    TOTAL Minutes Billed  40    Variance    +10 extra time needed      Date: 10/12/2021  Day of ARU Week:  4       SLP Individual Minutes  Time In: 1130  Time Out: 1210  Minutes: 40         Variance/Reason:  [] Refusal due to   [] Medical hold/reason  [] Illness   [] Off Unit for test/procedure  [] Extra time needed to complete task  [] Other (specify)    Activity completed: pt seen for swallowing with PO trial upgrade tray    Pain: denies  Current Diet: ADULT DIET; Dysphagia - Minced and Moist; No Drinking Straws; Dislikes spinach, cauliflower. Likes chocolate milk, sweet tea, strawberries  Subjective: alert and cooperative; requires redirect with lots of conversation and stories. Goals and POC: Co-treats where appropriate with PT or OT to facilitate patient goals in functional tasks. LTG      Timeframe for Long-term Goals: 3x/week x1 week 30 mins min                    Short-term Goals  Timeframe for Short-term Goals: Pt likely at baseline with communication and cognition. Lives in group home with supervision needed due to mentioned cognitive areas. No ST recommended at this time. Short-term Goals  Goal 1: Pt will tolerate Minced/moist diet and Thin liquids by Cup with strict aspiration precautions for all PO intake 100% Meeting goal  Goal 2: Pt will complete therapeutic PO trials with SLP to determine safety for diet advancement and supervision needs.  Trial tray consumed today with chopped burger with gravy, green beans, fresh fruit cup, french fries, and chocolate milk. Pt consumed 60% of tray and c/o feeling full. Pace was good. Dentures were in. Pt talks a lot while eating so this therapist directed pt to pretend like therapist wasn't there and to go ahead and eat like he normally would. Pt cut green beans in half, placed 2 bites of meat on fork, and showed good mastication. No overt problems were observed during the meal.  Advance diet to soft and bite sized. Intermittent supervision to monitor impulsivity and make sure dentures are in. The patient demonstrated a positive response to todays treatment and is making good progress toward established goals as evidenced by improved swallowing and diet advancement. Ongoing deficits are observed in the areas of cognition  and continued focus on fx safety with intake is recommended. Alarm placed: [x]bed []chair   []other:   [] activated        Barriers to progress:   [] Fatigue        [x] Cognitive Deficits   [] Memory Deficits   [x] Reduced Attn   [] Self Limiting Behaviors    [] Reduced insight/awareness     [] Visual Deficits   [] Premorbid Conditions  [x] Impulsivity     [] Other      Education/Interventions used this date: safe swallow protocol via teach, demonstrate, practice and verbalize. []   Progress was updated and reviewed in Rehabtracker with patient and/or family this         date. [] Attending Care Conference for pt this date. See Team Patient Care Conference Note for updates.     Interventions used this date:  [] Speech/Language Treatment    [] Instruction in HEP    Group   [x] Dysphagia Treatment   [] Cognitive Skill Yang    Other:         Assessment / Impression                                                          Treatment/Activity Tolerance:   [x] Tolerated Treatment well:     [] Patient limited by fatigue/pain:       [] Patient limited by medical complications:    [] Adverse Reaction to Tx:   [] Significant change in status:      Electronically Signed by  MAYANK Hurtado, MA, CCC-SLP    10/12/2021  3:58 PM

## 2021-10-13 PROCEDURE — 97530 THERAPEUTIC ACTIVITIES: CPT

## 2021-10-13 PROCEDURE — 6360000002 HC RX W HCPCS: Performed by: PHYSICAL MEDICINE & REHABILITATION

## 2021-10-13 PROCEDURE — 94761 N-INVAS EAR/PLS OXIMETRY MLT: CPT

## 2021-10-13 PROCEDURE — 99232 SBSQ HOSP IP/OBS MODERATE 35: CPT | Performed by: PHYSICAL MEDICINE & REHABILITATION

## 2021-10-13 PROCEDURE — 1280000000 HC REHAB R&B

## 2021-10-13 PROCEDURE — 97535 SELF CARE MNGMENT TRAINING: CPT

## 2021-10-13 PROCEDURE — 6370000000 HC RX 637 (ALT 250 FOR IP): Performed by: PHYSICAL MEDICINE & REHABILITATION

## 2021-10-13 PROCEDURE — 97116 GAIT TRAINING THERAPY: CPT

## 2021-10-13 PROCEDURE — 92526 ORAL FUNCTION THERAPY: CPT

## 2021-10-13 RX ORDER — CEPHALEXIN 500 MG/1
500 CAPSULE ORAL EVERY 8 HOURS SCHEDULED
Status: DISCONTINUED | OUTPATIENT
Start: 2021-10-13 | End: 2021-10-14 | Stop reason: HOSPADM

## 2021-10-13 RX ADMIN — MIRABEGRON 50 MG: 25 TABLET, FILM COATED, EXTENDED RELEASE ORAL at 07:57

## 2021-10-13 RX ADMIN — ATORVASTATIN CALCIUM 80 MG: 80 TABLET, FILM COATED ORAL at 20:32

## 2021-10-13 RX ADMIN — ASPIRIN 81 MG: 81 TABLET, CHEWABLE ORAL at 07:56

## 2021-10-13 RX ADMIN — QUETIAPINE FUMARATE 50 MG: 25 TABLET ORAL at 20:27

## 2021-10-13 RX ADMIN — LACTULOSE 20 G: 10 SOLUTION ORAL at 07:58

## 2021-10-13 RX ADMIN — Medication 1 CAPSULE: at 07:55

## 2021-10-13 RX ADMIN — LEVOTHYROXINE SODIUM 125 MCG: 125 TABLET ORAL at 05:39

## 2021-10-13 RX ADMIN — Medication 1 PACKET: at 20:27

## 2021-10-13 RX ADMIN — LACOSAMIDE 200 MG: 100 TABLET, FILM COATED ORAL at 09:47

## 2021-10-13 RX ADMIN — CLOPIDOGREL BISULFATE 75 MG: 75 TABLET, FILM COATED ORAL at 07:56

## 2021-10-13 RX ADMIN — CEPHALEXIN 500 MG: 500 CAPSULE ORAL at 20:27

## 2021-10-13 RX ADMIN — LUBIPROSTONE 24 MCG: 24 CAPSULE, GELATIN COATED ORAL at 16:51

## 2021-10-13 RX ADMIN — PRIMIDONE 50 MG: 50 TABLET ORAL at 05:39

## 2021-10-13 RX ADMIN — LUBIPROSTONE 24 MCG: 24 CAPSULE, GELATIN COATED ORAL at 07:56

## 2021-10-13 RX ADMIN — TAMSULOSIN HYDROCHLORIDE 0.4 MG: 0.4 CAPSULE ORAL at 07:54

## 2021-10-13 RX ADMIN — TROSPIUM CHLORIDE 20 MG: 20 TABLET, FILM COATED ORAL at 16:44

## 2021-10-13 RX ADMIN — CEFAZOLIN SODIUM 2000 MG: 2 INJECTION, SOLUTION INTRAVENOUS at 05:43

## 2021-10-13 RX ADMIN — LACTULOSE 20 G: 10 SOLUTION ORAL at 15:10

## 2021-10-13 RX ADMIN — AMLODIPINE BESYLATE 10 MG: 10 TABLET ORAL at 07:55

## 2021-10-13 RX ADMIN — QUETIAPINE FUMARATE 50 MG: 25 TABLET ORAL at 07:55

## 2021-10-13 RX ADMIN — TIMOLOL MALEATE 1 DROP: 2.5 SOLUTION OPHTHALMIC at 08:00

## 2021-10-13 RX ADMIN — PANTOPRAZOLE SODIUM 40 MG: 40 TABLET, DELAYED RELEASE ORAL at 05:39

## 2021-10-13 RX ADMIN — CEPHALEXIN 500 MG: 500 CAPSULE ORAL at 16:51

## 2021-10-13 RX ADMIN — LACOSAMIDE 200 MG: 100 TABLET, FILM COATED ORAL at 20:27

## 2021-10-13 RX ADMIN — LORAZEPAM 0.5 MG: 0.5 TABLET ORAL at 20:27

## 2021-10-13 RX ADMIN — PRIMIDONE 50 MG: 50 TABLET ORAL at 15:12

## 2021-10-13 RX ADMIN — PAROXETINE HYDROCHLORIDE 20 MG: 20 TABLET, FILM COATED ORAL at 07:56

## 2021-10-13 RX ADMIN — TIMOLOL MALEATE 1 DROP: 2.5 SOLUTION OPHTHALMIC at 20:32

## 2021-10-13 RX ADMIN — DIVALPROEX SODIUM 1000 MG: 500 TABLET, FILM COATED, EXTENDED RELEASE ORAL at 07:55

## 2021-10-13 RX ADMIN — LORAZEPAM 0.5 MG: 0.5 TABLET ORAL at 07:55

## 2021-10-13 RX ADMIN — LATANOPROST 1 DROP: 50 SOLUTION/ DROPS OPHTHALMIC at 20:32

## 2021-10-13 RX ADMIN — Medication 1000 UNITS: at 08:14

## 2021-10-13 RX ADMIN — ZONISAMIDE 100 MG: 100 CAPSULE ORAL at 07:58

## 2021-10-13 RX ADMIN — ENOXAPARIN SODIUM 40 MG: 40 INJECTION SUBCUTANEOUS at 07:59

## 2021-10-13 RX ADMIN — TROSPIUM CHLORIDE 20 MG: 20 TABLET, FILM COATED ORAL at 05:39

## 2021-10-13 RX ADMIN — FOLIC ACID 1 MG: 1 TABLET ORAL at 07:56

## 2021-10-13 RX ADMIN — Medication 1 PACKET: at 07:59

## 2021-10-13 RX ADMIN — CLONAZEPAM 0.25 MG: 0.5 TABLET ORAL at 07:56

## 2021-10-13 RX ADMIN — LACTULOSE 20 G: 10 SOLUTION ORAL at 20:27

## 2021-10-13 RX ADMIN — PRIMIDONE 50 MG: 50 TABLET ORAL at 20:27

## 2021-10-13 ASSESSMENT — PAIN SCALES - GENERAL
PAINLEVEL_OUTOF10: 0

## 2021-10-13 NOTE — PROGRESS NOTES
Occupational Therapy    Physical Rehabilitation: OCCUPATIONAL THERAPY     [x] daily progress note       [] discharge       Patient Name:  Camilla Cota   :  1955 MRN: [id number]  [facility] Date of Admission: 10/9/2021  Rehabilitation Diagnosis:   TIA (transient ischemic attack) [G45.9]  Acute CVA (cerebrovascular accident) ([facility] [address] [I63.9]       Date 10/13/2021       Day of ARU Week:  5   Time IN/OUT 6531-3661  [id number]   Individual Tx Minutes 65+35   Group Tx Minutes    Co-Treat Minutes    Concurrent Tx Minutes    TOTAL Tx Time Mins 100   Variance Time    Variance Time []   Refusal due to:     []   Medical hold/reason:    []   Illness   []   Off Unit for test/procedure  []   Extra time needed to complete task  []   Therapeutic need  []   Other (specify):   Restrictions Restrictions/Precautions: General Precautions, Contact Precautions, Fall Risk, Modified Diet (per pt he wears helmet when up, minced and moist diet)         Communication with other providers: [x]   OK to see per nursing:     []   Spoke with team member regarding:      Subjective observations and cognitive status: AM: Received pt from PTA in room. Pt pleasant and agreeable to therapy session. PM: Pt finishing lunch in chair on approach. Pt pleasant and agreeable to therapy session.     Pain level/location:    /10       Location:    Discharge recommendations  Anticipated discharge date:  10/14  Destination: []home alone   []home alone w assist prn   [] home w/ family    [x] Continuous supervision       []SNF    [] Assisted living     [] Other:   Continued therapy: []HHC OT  []OUTPATIENT  OT   [] No Further OT  Equipment needs: none        ADLs:    Eating: Eating  Assistance Needed: Setup or clean-up assistance  Comment: X  CARE Score: 5  Discharge Goal: Set-up or clean-up assistance       Oral Hygiene: Oral Hygiene  Assistance Needed: Setup or clean-up assistance  Comment: seated at sink  CARE Score: 5  Discharge Goal: Independent    UB/LB Bathing: Shower/Bathe Self  Assistance Needed: Supervision or touching assistance  Comment: Supervision, performed shower seated  CARE Score: 4  Discharge Goal: Supervision or touching assistance    UB Dressing: Upper Body Dressing  Assistance Needed: Setup or clean-up assistance  Comment: to don pullover shirt  CARE Score: 5  Discharge Goal: Set-up or clean-up assistance         LB Dressing: Lower Body Dressing  Assistance Needed: Supervision or touching assistance  Comment: able to thread BLEs into brief and pants; Supervision in stance to manage over hips  CARE Score: 4  Discharge Goal: Supervision or touching assistance    Donning and [place] Footwear: Putting On/Taking Off Footwear  Assistance Needed: Setup or clean-up assistance  Comment: to doff/don both socks and shoes in figure 4 position  CARE Score: 5  Discharge Goal: Supervision or touching assistance      Toileting: Toileting Hygiene  Assistance Needed: Supervision or touching assistance  Comment: Supervision  CARE Score: 4  Discharge Goal: Supervision or touching assistance      Toilet Transfers:   Supervision Toilet Transfer  Assistance Needed: Supervision or touching assistance  Comment: Supervision  CARE Score: 4  Discharge Goal: Supervision or touching assistance  Device Used:    []   Standard Toilet         []   Grab Bars           []  Bedside Commode       []   Elevated Toilet          []   Other:        Bed Mobility:           []   Pt received out of bed   Sit --> Supine:   Mod I     Transfers:    Sit--> Stand:  Supervision  Stand --> Sit:   Supervision  Stand-Pivot:   SBA  Other:    Assistive device required for transfer:   RW       Functional Mobility:  92ft x2   Assistance:  SBA   Device:   [x]   Rolling Walker     []   Standard Walker []   Wheelchair        []   [employer]       []   4-Wheeled Tamika Amol         []   Cardiac Tamika Amol       []   Other:        Homemaking Tasks:   Pt completed light laundry activity with Sup/SBA for safety and FWW/hand/body placement. Pt required Sup/SBA when standing to perform activity in standing position. Education/training for energy conservation provided. Pt completed activity to increase standing tolerance, dynamic balance, IADL performance, and return to PLOF. Additional Therapeutic activities/exercises completed this date:     [x]   ADL Training   [x]   Balance/Postural training     []   Bed/Transfer Training   [x]   Endurance Training   []   Neuromuscular Re-ed   []   Nu-step:  Time:        Level:         #Steps:       []   Rebounder:    []  Seated     []  Standing        []   Supine Ther Ex (reps/sets):     []   Seated Ther Ex (reps/sets):     []   Standing Ther Ex (reps/sets):     []   Other:      Comments:      Patient/Caregiver Education and Training:   []   [employer] Equipment Use  []   Bed Mobility/Transfer Technique/Safety  []   Energy Conservation Tips  []   Family training  []   Postural Awareness  [x]   Safety During Functional Activities  []   Reinforced Patient's Precautions   []   Progress was updated and reviewed in Rehabtracker with patient and/or family this         date. Treatment Plan for Next Session: Continue OT POC       Assessment: This pt demonstrated a positive response to today's treatment as evidenced by actively engaged in therapy session. The patient is making progress toward established goals as evidenced by QI scores.      Treatment/Activity Tolerance:   [x] Tolerated treatment with no adverse effects    [] Patient limited by fatigue  [] Patient limited by pain   [] Patient limited by medical complications:    [] Adverse reaction to Tx:   [] Significant change in status    Safety:       [x]  bed alarm set    []  chair alarm set    []  Pt refused alarms                []  Telesitter activated      [x]  Gait belt used during tx session      []other:       Number of Minutes/Billable Intervention  Therapeutic Exercise    ADL Self-care 45   Neuro Re-Ed    Therapeutic Activity 20+35   Group    Other:    TOTAL 100       Social History  Social/Functional History  Lives With: Other (comment) (2 roommates, group home with 2 staff nearly all times)  Type of Home: House  Home Layout: One level  Home Access: Stairs to enter without rails, Ramped entrance  Entrance Stairs - Number of Steps: ramp on 1 step in garage(main entry for pt), 1 step at front entrance  Bathroom Shower/Tub: Shower chair with back, Walk-in shower  Bathroom Toilet: Standard (has some rails, but pt states they are awkward to use)  Bathroom Equipment: Grab bars in shower, Grab bars around toilet  Bathroom Accessibility: Accessible  Home Equipment: [address], Wheelchair-electric, Rolling walker, Hospital bed (Pt has electric w/c but does not use it due to poor control(states he wasn't trained))  Receives Help From: Personal care attendant  ADL Assistance: Needs assistance (aides provide supervision as needed, but pt needed no physical help)  Homemaking Responsibilities: No (Pt will help with cleaning sometimes, staff complete most tasks)  Ambulation Assistance: Needs assistance (SBA of 2 with 2ww, has mostly been usingw/c)  Transfer Assistance: Independent  Active : No  Mode of Transportation: [employer]  Education: [employer]  Occupation: On disability  Type of occupation: [employer], [address] [place] [address] Drive: Watch tv, play video games, computer, FB, pt does not manage his [employer] manages  Additional Comments: pt sleeps in hospital bed, pt has fallen 2-3 with injury once; reported that he was advised by optometrist to wear helmet when out of bed due to glaucoma and irrepairable damage if he experiences a fall.     Objective                                                                                    Goals:  (Update in navigator)  Short term goals  Time Frame for Short term goals: STGs=LTGs:  Long term goals  Time Frame for Long term goals : 5-7 days or until d/c  Long term goal 1: Pt will complete grooming tasks Ind seated- met   Long term goal 2: Pt will complete total body bathing c S- met   Long term goal 3: Pt will complete UB dressing c setup- met   Long term goal 4: Pt will complete LB dressing c supervision- met   Long term goal 5: Pt will doff/don footwear c supervision- met   Long term goals 6: Pt will complete toileting c supervision- met   Long term goal 7: Pt will complete functional transfers (bed, chair, toilet, shower) c DME PRN and S- met   Long term goal 8: Pt will perform therex/therax to facilitate increased strength/endurance/ax tolerance (c emphasis on dynamic standing balance/tolerance >8 mins and RUE endurance) c SBA:   Met      Plan of Care                                                                              Times per week: 5 days per week for a minimum of 60 minutes/day plus group as appropriate for 60 minutes.   Treatment to include Plan  Times per day: Daily  Current Treatment Recommendations: Strengthening, Balance Training, Functional Mobility Training, Endurance Training, Safety Education & Training, Patient/Caregiver Education & Training, Equipment Evaluation, Education, & procurement, Self-Care / ADL    Electronically signed by   DAWIT Martines,  10/13/2021, 10:48 AM

## 2021-10-13 NOTE — PROGRESS NOTES
[facility]  ACUTE REHAB UNIT  SPEECH/LANGUAGE PATHOLOGY      [x] Daily           [x] Discharge    Patient:Jovan Leal      UVW:[birth year]  EJR:[id number]  Rehab Dx/Hx: TIA (transient ischemic attack) [G45.9]  Acute CVA (cerebrovascular accident) ([facility] [address] [I63.9]   No Known Allergies  Precautions: Sit up for all meals and thereafter for 30 minutes, Eat with small bites (1/2 tsp; 1 tsp), Drink from a cup only with small sips and Alternate solids with liquids;  Restrictions/Precautions: General Precautions, Contact Precautions, Fall Risk, Modified Diet (per pt he wears helmet when up, minced and moist diet)          Home Situation/IADL:   Social/Functional History  Lives With: Other (comment) (2 roommates, group home with 2 staff nearly all times)  Type of Home: House  Home Layout: One level  Home Access: Stairs to enter without rails, Ramped entrance  Entrance Stairs - Number of Steps: ramp on 1 step in garage(main entry for pt), 1 step at front entrance  Bathroom Shower/Tub: Shower chair with back, Walk-in shower  Bathroom Toilet: Standard (has some rails, but pt states they are awkward to use)  Bathroom Equipment: Grab bars in shower, Grab bars around toilet  Bathroom Accessibility: Accessible  Home Equipment: [employer], Wheelchair-electric, Rolling walker, Hospital bed (Pt has electric w/c but does not use it due to poor control(states he wasn't trained))  Receives Help From: Personal care attendant  ADL Assistance: Needs assistance (aides provide supervision as needed, but pt needed no physical help)  Homemaking Responsibilities: No (Pt will help with cleaning sometimes, staff complete most tasks)  Ambulation Assistance: Needs assistance (SBA of 2 with 2ww, has mostly been usingw/c)  Transfer Assistance: Independent  Active : No  Mode of Transportation: Demetria Denson  Education: [employer]ill  Occupation: On disability  Type of occupation: [employer], [address] [place] [address] Drive: Watch tv, play video games, computer, FB, pt does not manage his meds--staff manages  Additional Comments: pt sleeps in hospital bed, pt has fallen 2-3 with injury once; reported that he was advised by optometrist to wear helmet when out of bed due to glaucoma and irrepairable damage if he experiences a fall. Date of Admit: 10/9/2021  Room #: 1024/1024-A     ST Number of Minutes/Billable Intervention  Cog/Memory Deficits     Aphasia/Language     Dysarthria/Speech     Apraxia/Speech     Dysphagia/Swallowing 30   Group     Other    TOTAL Minutes Billed  30    Variance          Date: 10/13/2021  Day of ARU Week:  5       SLP Individual Minutes  Time In: 1210  Time Out: 1966  Minutes: 30         Variance/Reason:  [] Refusal due to   [] Medical hold/reason  [] Illness   [] Off Unit for test/procedure  [] Extra time needed to complete task  [] Other (specify)    Activity completed: Pt seen for swallowing for diet monitoring and education. Pain: denies  Current Diet: ADULT DIET; Dysphagia - Soft and Bite Sized; No Drinking Straws; Dislikes spinach, cauliflower. Likes chocolate milk, sweet tea, strawberries; please add extra condiments and gravy on tray. Subjective: alert and sitting up in chair. Impulsive moving to get into chair. Wouldn't wait for gait belt or set up. Goals and POC: Co-treats where appropriate with PT or OT to facilitate patient goals in functional tasks. LTG      Timeframe for Long-term Goals: 3x/week x1 week 30 mins min                    Short-term Goals  Timeframe for Short-term Goals: Pt likely at baseline with communication and cognition. Lives in group home with supervision needed due to mentioned cognitive areas. No ST recommended at this time.         Short-term Goals  Goal 1: Pt will tolerate Minced/moist diet and Thin liquids by Cup with strict aspiration precautions for all PO intake 100%  Goal met--discontinue  Goal 2: Pt will complete therapeutic PO trials with SLP to determine safety for diet advancement and supervision needs. Pt advanced to soft and bite sized diet. Pace was slow and bites were small. Pt talks a lot with PO intake but did not impact swallowing. Education completed on diet and allowed foods. Handout provided for group home so pt has more options. Likely can advance beyond if behavior remains stable with slow pace and small bites. Pt discharging to home with no further ST recommended on a soft and bite sized diet with thins. Pills one at a time. Pt in general fx is impulsive with reduced self monitoring but has not been observed during po intake the last 2 days. Education and handouts have been provided on current diet with MBS images reviewed from last year with current concerns if larger bites or faster pace occurs. Pt has been consistent in wearing dentures during meal. Advance diet as long as pt continues to demonstrate good safety and no overt s/s of pharyngeal problems. Prognosis for improvement is good. The patient demonstrated a positive response to todays treatment and is making good progress toward established goals as evidenced by improved swallowing and diet advancement. Ongoing deficits are observed in the areas of cognition and continued focus on fx safety with reduced cues is recommended. Alarm placed: []bed [x]chair   []other:   []IVIS activated        Barriers to progress:   [] Fatigue        [x] Cognitive Deficits   [] Memory Deficits   [] Reduced Attn   [] Self Limiting Behaviors    [] Reduced insight/awareness     [] Visual Deficits   [] Premorbid Conditions  [x] Impulsivity     [] Other      Education/Interventions used this date: see above regarding diet and safe swallow protocol. []   Progress was updated and reviewed in Rehabtracker with patient and/or family this         date. [] Attending Care Conference for pt this date. See Team Patient Care Conference Note for updates.     Interventions used this date:  [] Speech/Language Treatment    [] Instruction in HEP    Group   [x] Dysphagia Treatment   [] Cognitive Skill Yang    Other:         Assessment / Impression                                                          Treatment/Activity Tolerance:   [x] Tolerated Treatment well:     [] Patient limited by fatigue/pain:       [] Patient limited by medical complications:    [] Adverse Reaction to Tx:   [] Significant change in status:      Electronically Signed by  Britt Christie, SLP, MA, CCC-SLP    10/13/2021  1:01 PM

## 2021-10-13 NOTE — CARE COORDINATION
LSW spoke with patient guardian and sister Charoabbie Pickard ([phone]) regarding discharge plan. LSW updated on patient progress and recommendations. Charo Pickard verbalized understanding and agreement with discharge plan. Charo Pickard will pick patient up at 1:30 PM tomorrow to transport home. Charo Pickard states patient also needs a signed letter from Dr. Basim Gar saying he can return to work. LSW to obtain this.

## 2021-10-13 NOTE — CARE COORDINATION
[address] referral for PT, OT, and Nursing given to [facility] with [facility] as patient currently receives services from them and he and family would like to remain with this agency.

## 2021-10-13 NOTE — PROGRESS NOTES
Mis Johnson    : 1955  Acct #: [de-identified]  MRN: [id number]              PM&R Progress Note      Admitting diagnosis: Acute CVA ( [address])     Comorbid diagnoses impacting rehabilitation: Ataxia of gait, dysarthria, dysphagia, essential hypertension, seizure disorder, IBS, depression with anxiety, facial abscess (I&D 10/8/2021), strep B UTI.      Chief complaint: He is quiet but compliant with his therapy program.    Prior (baseline) level of function: Independent.     Current level of function:         Current  IRF-BRENT and Goals:   Occupational Therapy:    Short term goals  Time Frame for Short term goals: STGs=LTGs :   Long term goals  Time Frame for Long term goals : 5-7 days or until d/c  Long term goal 1: Pt will complete grooming tasks Ind seated  Long term goal 2: Pt will complete total body bathing c S  Long term goal 3: Pt will complete UB dressing c setup  Long term goal 4: Pt will complete LB dressing c supervision  Long term goal 5: Pt will doff/don footwear c supervision  Long term goals 6: Pt will complete toileting c supervision  Long term goal 7: Pt will complete functional transfers (bed, chair, toilet, shower) c DME PRN and S  Long term goal 8: Pt will perform therex/therax to facilitate increased strength/endurance/ax tolerance (c emphasis on dynamic standing balance/tolerance >8 mins and RUE endurance) c SBA :                                       Eating: Eating  Assistance Needed: Setup or clean-up assistance  Comment: required assist to open milk containers 2* LUE deficits  CARE Score: 5  Discharge Goal: Set-up or clean-up assistance       Oral Hygiene: Oral Hygiene  Assistance Needed: Setup or clean-up assistance  Comment: seated  CARE Score: 5  Discharge Goal: Independent    UB/LB Bathing: Shower/Bathe Self  Assistance Needed: Supervision or touching assistance  Comment: SBA, performed full shower seated  CARE Score: 4  Discharge Goal: Supervision or touching assistance    UB Dressing: Upper Body Dressing  Assistance Needed: Setup or clean-up assistance  Comment: to don pullover T shirt  CARE Score: 5  Discharge Goal: Set-up or clean-up assistance         LB Dressing: Lower Body Dressing  Assistance Needed: Supervision or touching assistance  Comment: able to unthread/thread BLEs from brief and pants; CGA while in stance for pants management with increased time for L side  CARE Score: 4  Discharge Goal: Supervision or touching assistance    Donning and [place] Footwear: Putting On/Taking Off Footwear  Assistance Needed: Supervision or touching assistance  Comment: able to doff/don bilateral socks and shoes via figure 4 positioning  CARE Score: 4  Discharge Goal: Supervision or touching assistance      Toileting: Toileting Hygiene  Assistance Needed: Supervision or touching assistance  Comment: CGA while in stance  CARE Score: 4  Discharge Goal: Supervision or touching assistance      Toilet Transfers:   Toilet Transfer  Assistance Needed: Supervision or touching assistance  Comment: CGA  CARE Score: 4  Discharge Goal: Supervision or touching assistance    Physical Therapy:         Long term goals  Time Frame for Long term goals : 5-7 days STG=LTG  Long term goal 1: Pt will perform all bed mobility with mod I  Long term goal 2: Pt will perform sit to stand, pivot and car transfers  with cg assist  Long term goal 3: Pt will ambulate 50   feet on level surfaces AND 10' on unlevel surface with CG   assist  using 2ww  Long term goal 4: Pt will ascend/descend curb step with  2ww with min assist    and up to   12  steps with  rail(s) with  CG  assist  Long term goal 5: Pt will retrieve light item from floor with min   assist  using 2ww and reacher  Long term goal 6: Pt will propel w/c in household situation for at least  150   feet with mod I      Bed Mobility:   Sit to Lying  Assistance Needed: Supervision or touching assistance  Comment: unsafe technique, supervision with cues  CARE Score: 4  Discharge Goal: Independent  Roll Left and Right  Assistance Needed: Supervision or touching assistance  Comment: cues for technique  CARE Score: 4  Discharge Goal: Independent  Lying to Sitting on Side of Bed  Assistance Needed: Supervision or touching assistance  Comment: supervision  CARE Score: 4  Discharge Goal: Independent    Transfers:    Sit to Stand  Assistance Needed: Partial/moderate assistance  Comment: min assist with some posterior LOB  CARE Score: 3  Discharge Goal: Supervision or touching assistance  Chair/Bed-to-Chair Transfer  Assistance Needed: Supervision or touching assistance  Comment: CG once standing with 2ww  CARE Score: 4  Discharge Goal: Supervision or touching assistance     Car Transfer  Assistance Needed: Partial/moderate assistance  Comment: min assist due to unsteadiness, safety  CARE Score: 3  Discharge Goal: Supervision or touching assistance    Ambulation:    Walking Ability  Does the Patient Walk?: Yes     Walk 10 Feet  Assistance Needed: Partial/moderate assistance  Physical Assistance Level: Less than 25%  Comment: 3 (deemed usual performance per team huddle)  CARE Score: 3  Discharge Goal: Supervision or touching assistance     Walk 50 Feet with Two Turns  Comment: 12' max distance  Reason if not Attempted: Not attempted due to medical condition or safety concerns  CARE Score: 88  Discharge Goal: Supervision or touching assistance     Walk 150 Feet  Comment: per pt report, did not walk longer than household distances  Reason if not Attempted: Not applicable  CARE Score: 9  Discharge Goal: Not Applicable     Walking 10 Feet on Uneven Surfaces  Assistance Needed: Partial/moderate assistance  Comment: mod assist for safety, 2ww  CARE Score: 3  Discharge Goal: Supervision or touching assistance     1 Step (Curb)  Reason if not Attempted: Not attempted due to medical condition or safety concerns  CARE Score: 88  Discharge Goal: Partial/moderate assistance     4 Steps  Assistance Needed: Partial/moderate assistance  Comment: B rails, max cues for sequence  CARE Score: 3  Discharge Goal: Supervision or touching assistance     12 Steps  Comment: pt was fatigued after 6 steps  Reason if not Attempted: Not attempted due to medical condition or safety concerns  CARE Score: 88  Discharge Goal: Partial/moderate assistance       Wheelchair:  w/c Ability: Wheelchair Ability  Uses a Wheelchair and/or Scooter?: Yes  Wheel 50 Feet with Two Turns  Assistance Needed: Supervision or touching assistance  Comment: supervision  CARE Score: 4  Discharge Goal: Independent  Wheel 150 Feet  Assistance Needed: Supervision or touching assistance  CARE Score: 4  Discharge Goal: Independent          Balance:    Balance  Sitting - Dynamic: Fair, +  Standing - Static: Fair, -  Standing - Dynamic: Poor, +   Object: Picking Up Object  Comment: unsafe to release 2ww in standing  Reason if not Attempted: Not attempted due to medical condition or safety concerns  CARE Score: 88  Discharge Goal: Partial/moderate assistance    I      Exam:    Blood pressure (!) 158/68, pulse 70, temperature 97.2 °F (36.2 °C), temperature source Oral, resp. rate 18, height 5' 5.98\" (1.676 m), weight 205 lb 0.4 oz (93 kg), SpO2 97 %. General: Lying back in bed. Eyes closed. In no distress. HEENT: MMM. No JVD. Gazing right and left. Pulmonary: Shallow respirations without wheezes or rales. Cardiac: RRR. Abdomen: Patient's abdomen is soft and nondistended. Bowel sounds were present throughout. There was no rebound, guarding or masses noted. Upper extremities: Able to bring both hands together in the midline. Clumsy dexterity. No new bruising. Lower extremities: Clumsy movements without focal weakness. Heels clear. Sitting balance was fair.   Standing balance was fair-.    Lab Results   Component Value Date    WBC 8.2 10/03/2021    HGB 15.6 10/03/2021    HCT 46.2 10/03/2021    MCV 90.8 10/03/2021     10/03/2021     Lab Results   Component Value Date    INR 0.85 10/01/2021    INR 0.90 11/27/2020    INR 0.87 11/26/2020    PROTIME 11.0 (L) 10/01/2021    PROTIME 10.9 (L) 11/27/2020    PROTIME 10.5 (L) 11/26/2020     Lab Results   Component Value Date    CREATININE 0.4 (L) 10/05/2021    BUN 11 10/05/2021     10/05/2021    K 3.9 10/05/2021     10/05/2021    CO2 25 10/05/2021     Lab Results   Component Value Date    ALT 20 10/03/2021    AST 14 (L) 10/03/2021    ALKPHOS 107 10/03/2021    BILITOT 0.2 10/03/2021       Expected length of stay  prior to a supervised level of function for discharge home with a walker and [address] OT/PT is 10/14/2021. Recommendations:    1. Acute CVA with ataxia and gait disturbance:  He requires verbal cues to engage in the daily occupational and physical therapy with speech-language pathology.  Seems to be tolerating the dual antiplatelet therapy and a statin.  Practicing adaptive equipment training, aggressive pulmonary hygiene measures and bowel and bladder retraining.  DVT prophylaxis and nutritional support.  Treating in a calm and consistent environment to improve retention.  Providing written and verbal instructions when possible.  Caregiver training with group home staff may be useful before discharge. Significant verbal cues and minimum physical assistance for transfers today. 2. DVT prophylaxis: Lovenox 40 mg subcu daily.  Must monitor his hemoglobin and platelet count periodically while on this medication.  Weightbearing activities are limited but have been slowly improving daily. No signs of acute blood loss. 3. Hypertension: Norvasc and pain control for blood pressure regulation.  Target systolic blood pressure is 120140.  Vital signs are checked at rest and with activity. Blood pressure is just above target range again today. Monitoring closely.   4. Seizure disorder: Klonopin, Depakote, Vimpat and lorazepam.  Treating him in a calm and consistent environment.   No current evidence of any seizure activity. 5. Strep B UTI: Cefazolin 2 g IV every 8 hours for a total of 10 days which will take him up until 10/14/2021.  This will also cover his facial abscess. 6. IBS: Culturelle twice daily.  Amitiza and lactulose.  [place] diet. Fair intake noted.   7. Facial abscess: General surgery and wound care monitoring.  Daily wound care.  IV antibiotics for 2 more days.

## 2021-10-13 NOTE — PROGRESS NOTES
Physical Therapy      [] daily progress note       [x] discharge       Patient Name:  Monika Read   :  1955 MRN: [id number]  Room:  [address] [place], [place] [address] Date of Admission: 10/9/2021  Rehabilitation Diagnosis:   TIA (transient ischemic attack) [G45.9]  Acute CVA (cerebrovascular accident) [facility]) [I63.9]       Date 10/13/2021       Day of ARU Week:  5   Time IN/OUT 3234-3603   Individual Tx Minutes 60   TOTAL Tx Time Mins 60   Variance Time    Variance Time []   Refusal due to:     []   Medical hold/reason:    []   Illness   []   Off Unit for test/procedure  []   Extra time needed to complete task  []   Therapeutic need  []   Other (specify):   Restrictions Restrictions/Precautions  Restrictions/Precautions: General Precautions, Contact Precautions, Fall Risk, Modified Diet (per pt he wears helmet when up, minced and moist diet)      Communication with other providers: [x]   OK to see per nursing:     []   Spoke with team member regarding:      Subjective observations and cognitive status: Pt resting in bed finishing breakfast, agreeable to session. Tx expectations discussed and safety ed provided at beginning of session     Pain level/location:  0  /10       Location:    Discharge recommendations  Anticipated discharge date:  10/14  Destination: []?home alone   []?home with assist PRN     [x]? home with continuous supervision     []? SNF      []? Assisted living     []? Other:   Continued therapy: []?HHC PT  []? OUTPATIENT  PT   []?  No Further PT  Equipment needs: none, pt has 2WW, manual      PT QI     Bed Mobility:     Roll Left and Right  Assistance Needed: Independent  Comment: with bed features as per home set up  CARE Score: 6  Discharge Goal: Independent    Sit to Lying  Assistance Needed: Independent  Comment: with bed features as per home set up  CARE Score: 6  Discharge Goal: Independent    Lying to Sitting on Side of Bed  Assistance Needed: Independent  Comment: with bed features as per home set up  CARE Score: 6  Discharge Goal: Independent    Transfers:    Sit to Stand  Assistance Needed: Supervision or touching assistance  Comment: CGA for balance with RW  CARE Score: 4  Discharge Goal: Supervision or touching assistance    Chair/Bed-to-Chair Transfer  Assistance Needed: Supervision or touching assistance  Comment: CGA-SBA for balance with RW  CARE Score: 4  Discharge Goal: Supervision or touching assistance         Car Transfer  Assistance Needed: Supervision or touching assistance  Comment: CGA for balance with RW for approach, lifts LE in/our of car without A  CARE Score: 4  Discharge Goal: Supervision or touching assistance      Object: Picking Up Object  Assistance Needed: Supervision or touching assistance  Comment: CGA for balance at RW using reacher; maintains  L hand on AD.   CARE Score: 4  Discharge Goal: Partial/moderate assistance    Ambulation:    Walking Ability  Does the Patient Walk?: Yes     Walk 10 Feet  Assistance Needed: Supervision or touching assistance  Comment: CGA for balance with RW  CARE Score: 4  Discharge Goal: Supervision or touching assistance     Walk 50 Feet with Two Turns  Assistance Needed: Supervision or touching assistance  Comment: CGA for balance with RW  CARE Score: 4  Discharge Goal: Supervision or touching assistance     Walk 150 Feet  Assistance Needed: Supervision or touching assistance  Comment: CGA for balance with RW  CARE Score: 4  Discharge Goal: Not Applicable     Walking 10 Feet on Uneven Surfaces  Assistance Needed: Supervision or touching assistance  Comment: CGA for balance with RW, demos safety with obstacle clearance with prompting  CARE Score: 4  Discharge Goal: Supervision or touching assistance     1 Step (Curb)  Assistance Needed: Partial/moderate assistance  Comment: CG-Min A with RW leads with L LE ascending, R LE descending due to tone, mod safety cues for AD management   CARE Score: 3  Discharge Goal: Partial/moderate assistance     4 Steps  Assistance Needed: Supervision or touching assistance  Comment: CGA with B rails, ascends with L LE, descends with R LE  CARE Score: 4  Discharge Goal: Supervision or touching assistance     12 Steps  Assistance Needed: Supervision or touching assistance  Comment: CGA for balance with B rails, non recip pattern  CARE Score: 4  Discharge Goal: Partial/moderate assistance      Wheelchair:  W/C Ability: Wheelchair Ability  Uses a Wheelchair and/or Scooter?: Yes    Wheel 50 Feet with Two Turns  Assistance Needed: Independent  Comment: propels with B LEs  CARE Score: 6  Discharge Goal: Independent    Wheel 150 Feet  Assistance Needed: Independent  Comment: propels with B LEs  CARE Score: 6  Discharge Goal: Independent         Patient/Caregiver Education and Training:   [x]   Bed Mobility/Transfer technique/safety  [x]   Gait technique/sequencing  [x]   Proper use of assistive device  [x]   Advanced mobility safety and technique  []   Reinforced patient's precautions/mobility while maintaining precautions  []   Postural awareness  []   Family training      Treatment Plan for Next Session: Pt to be discharged to group home tomorrow with continuous supervision of staff      Treatment/Activity Tolerance:   [x] Tolerated treatment with no adverse effects    [] Patient limited by fatigue  [] Patient limited by pain   [] Patient limited by medical complications:    [] Adverse reaction to Tx:   [] Significant change in status    Safety:       []  bed alarm set    []  chair alarm set    []  Pt refused alarms                []  Telesitter activated      [x]  Gait belt used during tx session      [x]other:  Pt left in c/o OT       Number of Minutes/Billable Intervention  Gait Training 40   Therapeutic Exercise    Neuro Re-Ed    Therapeutic Activity 20   Wheelchair Propulsion    Group    Other:    TOTAL 60         Social History  Social/Functional History  Lives With: Other (comment) (2 roommates, group home with 2 staff nearly all times)  Type of Home: House  Home Layout: One level  Home Access: Stairs to enter without rails, Ramped entrance  Entrance Stairs - Number of Steps: ramp on 1 step in garage(main entry for pt), 1 step at front entrance  Bathroom Shower/Tub: Shower chair with back, Walk-in shower  Bathroom Toilet: Standard (has some rails, but pt states they are awkward to use)  Bathroom Equipment: Grab bars in shower, Grab bars around toilet  Bathroom Accessibility: Accessible  Home Equipment: [employer], Wheelchair-electric, Rolling walker, Hospital bed (Pt has electric w/c but does not use it due to poor control(states he wasn't trained))  Receives Help From: Personal care attendant  ADL Assistance: Needs assistance (aides provide supervision as needed, but pt needed no physical help)  Homemaking Responsibilities: No (Pt will help with cleaning sometimes, staff complete most tasks)  Ambulation Assistance: Needs assistance (SBA of 2 with 2ww, has mostly been usingw/c)  Transfer Assistance: Independent  Active : No  Mode of Transportation: [employer]  Education: [employer]  Occupation: On disability  Type of occupation: [employer], [facility] [address]: Watch tv, play video games, computer, FB, pt does not manage his [employer] manages  Additional Comments: pt sleeps in hospital bed, pt has fallen 2-3 with injury once; reported that he was advised by optometrist to wear helmet when out of bed due to glaucoma and irrepairable damage if he experiences a fall. Objective                                                                                    Goals:  (Update in navigator)   :   Long term goals  Time Frame for Long term goals : 5-7 days STG=LTG  Long term goal 1: Pt will perform all bed mobility with mod I  Long term goal 2: Pt will perform sit to stand, pivot and car transfers  with cg assist  Long term goal 3: Pt will ambulate 50   feet on level surfaces AND 10' on unlevel surface with CG   assist using 2ww  Long term goal 4: Pt will ascend/descend curb step with  2ww with min assist    and up to   12  steps with  rail(s) with  CG  assist  Long term goal 5: Pt will retrieve light item from floor with min   assist  using 2ww and reacher  Long term goal 6: Pt will propel w/c in household situation for at least  150   feet with mod I:        Plan of Care                                                                              Times per week: 5 days per week for a minimum of 60 minutes/day plus group as appropriate for 60 minutes.   Treatment to include Current Treatment Recommendations: Strengthening, Transfer Training, Endurance Training, Neuromuscular Re-education, Patient/Caregiver Education & Training, Wheelchair Mobility Training, Equipment Evaluation, Education, & procurement, Balance Training, Gait Training, Stair training, Functional Mobility Training, Safety Education & Training    Electronically signed by   Byron Kathleen, PTA #[id number]  10/13/2021, 9:50 AM

## 2021-10-13 NOTE — PATIENT CARE CONFERENCE
coordination, Decreased ADL status, Decreased cognition, Decreased vision/visual deficit, Decreased ROM, Decreased endurance, Decreased high-level IADLs, Decreased strength, Decreased sensation, Decreased fine motor control, Decreased posture     Prognosis: Good  Decision Making: High Complexity  Clinical Presentation: unpredicatable characteristics  Equipment needed at discharge:has needed equipment      PT IRF-BRENT scores since initial assessment  Bed Mobility:   Sit to Lying  Assistance Needed: Independent  Comment: with bed features as per home set up  CARE Score: 6  Discharge Goal: Independent    Roll Left and Right  Assistance Needed: Independent  Comment: with bed features as per home set up  CARE Score: 6  Discharge Goal: Independent    Lying to Sitting on Side of Bed  Assistance Needed: Independent  Comment: with bed features as per home set up  CARE Score: 6  Discharge Goal: Independent    Transfers:    Sit to Stand  Assistance Needed: Supervision or touching assistance  Comment: CGA for balance with RW  CARE Score: 4  Discharge Goal: Supervision or touching assistance    Chair/Bed-to-Chair Transfer  Assistance Needed: Supervision or touching assistance  Comment: CGA-SBA for balance with RW  CARE Score: 4  Discharge Goal: Supervision or touching assistance         Car Transfer  Assistance Needed: Supervision or touching assistance  Comment: CGA for balance with RW for approach, lifts LE in/our of car without A  CARE Score: 4  Discharge Goal: Supervision or touching assistance    Ambulation:    Walking Ability  Does the Patient Walk?: Yes     Walk 10 Feet  Assistance Needed: Supervision or touching assistance  Physical Assistance Level: Less than 25%  Comment: CGA for balance with RW  CARE Score: 4  Discharge Goal: Supervision or touching assistance     Walk 50 Feet with Two Turns  Assistance Needed: Supervision or touching assistance  Comment: CGA for balance with RW  Reason if not Attempted: Not attempted due to medical condition or safety concerns  CARE Score: 4  Discharge Goal: Supervision or touching assistance     Walk 150 Feet  Assistance Needed: Supervision or touching assistance  Comment: CGA for balance with RW  Reason if not Attempted: Not applicable  CARE Score: 4  Discharge Goal: Not Applicable     Walking 10 Feet on Uneven Surfaces  Assistance Needed: Supervision or touching assistance  Comment: CGA for balance with RW, demos safety with obstacle clearance with prompting  CARE Score: 4  Discharge Goal: Supervision or touching assistance     1 Step (Curb)  Assistance Needed: Partial/moderate assistance  Comment: CG-Min A with RW leads with L LE ascending, R LE descending due to tone, mod safety cues for AD management   Reason if not Attempted: Not attempted due to medical condition or safety concerns  CARE Score: 3  Discharge Goal: Partial/moderate assistance     4 Steps  Assistance Needed: Supervision or touching assistance  Comment: CGA with B rails, ascends with L LE, descends with R LE  CARE Score: 4  Discharge Goal: Supervision or touching assistance     12 Steps  Assistance Needed: Supervision or touching assistance  Comment: CGA for balance with B rails, non recip pattern  Reason if not Attempted: Not attempted due to medical condition or safety concerns  CARE Score: 4  Discharge Goal: Partial/moderate assistance           Wheelchair:  w/c Ability: Wheelchair Ability  Uses a Wheelchair and/or Scooter?: Yes    Wheel 50 Feet with Two Turns  Assistance Needed: Independent  Comment: propels with B LEs  CARE Score: 6  Discharge Goal: Independent    Wheel 150 Feet  Assistance Needed: Independent  Comment: propels with B LEs  CARE Score: 6  Discharge Goal: Independent              Balance:    Balance  Sitting - Dynamic: Fair, +  Standing - Static: Fair, -  Standing - Dynamic: Poor, +   Object: Picking Up Object  Assistance Needed: Supervision or touching assistance  Comment: CGA for balance at RW using reacher; maintains  L hand on AD.   Reason if not Attempted: Not attempted due to medical condition or safety concerns  CARE Score: 4  Discharge Goal: Partial/moderate assistance    Fall Risk: [x]  Yes  []  No    OCCUPATIONAL THERAPY  (Updated in QI)  Short term goals  Time Frame for Short term goals: STGs=LTGs :   Long term goals  Time Frame for Long term goals : 5-7 days or until d/c  Long term goal 1: Pt will complete grooming tasks Ind seated  Long term goal 2: Pt will complete total body bathing c S  Long term goal 3: Pt will complete UB dressing c setup  Long term goal 4: Pt will complete LB dressing c supervision  Long term goal 5: Pt will doff/don footwear c supervision  Long term goals 6: Pt will complete toileting c supervision  Long term goal 7: Pt will complete functional transfers (bed, chair, toilet, shower) c DME PRN and S  Long term goal 8: Pt will perform therex/therax to facilitate increased strength/endurance/ax tolerance (c emphasis on dynamic standing balance/tolerance >8 mins and RUE endurance) c SBA :                                       OT IRF-BRENT scores and goals since initial assessment:    ADLs:    Eating: Eating  Assistance Needed: Setup or clean-up assistance  Comment: X  CARE Score: 5  Discharge Goal: Set-up or clean-up assistance       Oral Hygiene: Oral Hygiene  Assistance Needed: Setup or clean-up assistance  Comment: seated at sink  CARE Score: 5  Discharge Goal: Independent    UB/LB Bathing: Shower/Bathe Self  Assistance Needed: Supervision or touching assistance  Comment: Supervision, performed shower seated  CARE Score: 4  Discharge Goal: Supervision or touching assistance    UB Dressing: Upper Body Dressing  Assistance Needed: Setup or clean-up assistance  Comment: to don pullover shirt  CARE Score: 5  Discharge Goal: Set-up or clean-up assistance         LB Dressing: Lower Body Dressing  Assistance Needed: Supervision or touching assistance  Comment: able to thread BLEs into brief and pants; Supervision in stance to manage over hips  CARE Score: 4  Discharge Goal: Supervision or touching assistance    Donning and [place] Footwear: Putting On/Taking Off Footwear  Assistance Needed: Setup or clean-up assistance  Comment: to doff/don both socks and shoes in figure 4 position  CARE Score: 5  Discharge Goal: Supervision or touching assistance      Toileting: Toileting Hygiene  Assistance Needed: Supervision or touching assistance  Comment: Supervision  CARE Score: 4  Discharge Goal: Supervision or touching assistance      Toilet Transfers: Toilet Transfer  Assistance Needed: Supervision or touching assistance  Comment: Supervision  CARE Score: 4  Discharge Goal: Supervision or touching assistance      Impairments/deficits, barriers:  Decreased balance, L side deficits from CP, safety judgement  Assessment  Performance deficits / Impairments: Decreased functional mobility , Decreased ADL status, Decreased ROM, Decreased strength, Decreased safe awareness, Decreased cognition, Decreased endurance, Decreased sensation, Decreased balance, Decreased vision/visual deficit, Decreased high-level IADLs, Decreased fine motor control, Decreased coordination, Decreased posture  Decision Making: Medium Complexity  REQUIRES OT FOLLOW UP: Yes  Equipment needed at discharge:None      COGNITIVE FUNCTION/SPEECH THERAPY (AS INDICATED)  LTG    Timeframe for Long-term Goals: 3x/week x1 week 30 mins min    Duration/Frequency of Treatment  Duration/Frequency of Treatment: 3x/week x1 week 30 mins min for swallowing. LTG: Pt will safely consume least restrictive diet with no audible/visual distress. Short-term Goals  Goal 1: Pt will tolerate soft and bite sized diet and Thin liquids by Cup with strict aspiration precautions for all PO intake 100%  Goal met                  Short-term Goals  Timeframe for Short-term Goals: Pt likely at baseline with communication and cognition.   Lives in group home with participation in tx program  [] Pt will improve communication to get basic needs met on unit  [] Pt will improve swallowing for safe diet advancement with use of strategies  [x]  Plan for discharge to home. Patient Strengths: Motivated, Cooperative and Pleasant    Justification for Continued Stay  Based on my medical assessment of the patient and review of information from the interdisciplinary team as part of this weekly team conference, the patient continues to meet the following criteria for IRF level of care:   The patient requires active and ongoing intervention of multiple therapy disciplines   The patient requires and intensive rehabilitation therapy program   The patient requires continued physician supervision by a rehabilitation physician   The patient requires 24 hours rehab nursing care   The patient requires an intensive and coordinated interdisciplinary team approach to the delivery of rehabilitative care. Assessment/Plan   [x]  The patient is making good progression towards their long term goals and is actively participating in and has a reasonable expectation to continue to benefit from the intensive rehabilitation therapy program   []  The estimated discharge date has been changed from initial team conference due to:   []  The estimated discharge destination has been changed from initial team conference due to:         Ongoing tx following discharge: [x][facility] PT/OT     []OUTPATIENT     [x] No Further Treatment for OT     [] Family/Caregiver Training  []  Transitional Living Arrangement   [] Home Assessment (date  )     [] Family Conference   []  Therapeutic Pass       []  Other: (specify)    Estimated Discharge Date: 10/14/21    Estimated Discharge Destination: []home alone   []home alone with assist prn  []Continuous supervision [x]Return home with s/o/spouse/family   [] Assisted living    []SNF     Team members participating in today's conference.     [x] MONISHA Hunter, Medical Director  [x] Santino Boyer,    [] Nicko Holley, Nurse Mgr [x] Luwana Lesches, Nurse Supervisor   []  Uma Mo, PT   [] Dede Laird, OT   [x]  Abbie Chappell, PT  [x] Nico Hess, OT      [x]  Anshul Mcmillan, SLP    []  Abraham Dai, SLP   [] Ross Harvey, SLP   []  Micheline Nieves, RD     [x] Tee Villagomez,     [x]Rama Young,     [] Gianna Paulson RN[] Dorina Martinez RN     [] Kunal Mcbride RN    [] Mikala Blue RN    [] Andrez Canales RN  [] Carlitos Canales RN    []     I have led this Team Conference and agree with the plan, Pretty Dorado MD, 10/14/2021, 12:57 PM  Goals have been updated to reflect recent status.     Team conference note transcribed this date by: Germaine Arroyo MA, Svetlana Penn, Therapy Coordinator

## 2021-10-14 VITALS
HEART RATE: 68 BPM | SYSTOLIC BLOOD PRESSURE: 119 MMHG | BODY MASS INDEX: 32.28 KG/M2 | RESPIRATION RATE: 16 BRPM | OXYGEN SATURATION: 95 % | TEMPERATURE: 97.5 F | HEIGHT: 66 IN | DIASTOLIC BLOOD PRESSURE: 62 MMHG | WEIGHT: 200.84 LBS

## 2021-10-14 PROCEDURE — 97535 SELF CARE MNGMENT TRAINING: CPT

## 2021-10-14 PROCEDURE — 6370000000 HC RX 637 (ALT 250 FOR IP): Performed by: PHYSICAL MEDICINE & REHABILITATION

## 2021-10-14 PROCEDURE — 99239 HOSP IP/OBS DSCHRG MGMT >30: CPT | Performed by: PHYSICAL MEDICINE & REHABILITATION

## 2021-10-14 PROCEDURE — 94150 VITAL CAPACITY TEST: CPT

## 2021-10-14 PROCEDURE — 97530 THERAPEUTIC ACTIVITIES: CPT

## 2021-10-14 PROCEDURE — 94761 N-INVAS EAR/PLS OXIMETRY MLT: CPT

## 2021-10-14 RX ORDER — CEPHALEXIN 500 MG/1
500 CAPSULE ORAL EVERY 8 HOURS SCHEDULED
Qty: 15 CAPSULE | Refills: 0 | Status: SHIPPED | OUTPATIENT
Start: 2021-10-14 | End: 2021-10-19

## 2021-10-14 RX ADMIN — CEPHALEXIN 500 MG: 500 CAPSULE ORAL at 06:23

## 2021-10-14 RX ADMIN — Medication 1 PACKET: at 09:45

## 2021-10-14 RX ADMIN — LACTULOSE 20 G: 10 SOLUTION ORAL at 13:23

## 2021-10-14 RX ADMIN — TIMOLOL MALEATE 1 DROP: 2.5 SOLUTION OPHTHALMIC at 10:07

## 2021-10-14 RX ADMIN — PAROXETINE HYDROCHLORIDE 20 MG: 20 TABLET, FILM COATED ORAL at 09:45

## 2021-10-14 RX ADMIN — TROSPIUM CHLORIDE 20 MG: 20 TABLET, FILM COATED ORAL at 17:13

## 2021-10-14 RX ADMIN — FOLIC ACID 1 MG: 1 TABLET ORAL at 09:46

## 2021-10-14 RX ADMIN — LACOSAMIDE 200 MG: 100 TABLET, FILM COATED ORAL at 09:45

## 2021-10-14 RX ADMIN — QUETIAPINE FUMARATE 50 MG: 25 TABLET ORAL at 10:05

## 2021-10-14 RX ADMIN — CEPHALEXIN 500 MG: 500 CAPSULE ORAL at 13:23

## 2021-10-14 RX ADMIN — LACTULOSE 20 G: 10 SOLUTION ORAL at 09:45

## 2021-10-14 RX ADMIN — Medication 1000 UNITS: at 09:46

## 2021-10-14 RX ADMIN — LUBIPROSTONE 24 MCG: 24 CAPSULE, GELATIN COATED ORAL at 09:44

## 2021-10-14 RX ADMIN — LORAZEPAM 0.5 MG: 0.5 TABLET ORAL at 09:46

## 2021-10-14 RX ADMIN — ASPIRIN 81 MG: 81 TABLET, CHEWABLE ORAL at 09:44

## 2021-10-14 RX ADMIN — PRIMIDONE 50 MG: 50 TABLET ORAL at 13:23

## 2021-10-14 RX ADMIN — DIVALPROEX SODIUM 1000 MG: 500 TABLET, FILM COATED, EXTENDED RELEASE ORAL at 09:44

## 2021-10-14 RX ADMIN — CLONAZEPAM 0.25 MG: 0.5 TABLET ORAL at 09:46

## 2021-10-14 RX ADMIN — ZONISAMIDE 100 MG: 100 CAPSULE ORAL at 09:43

## 2021-10-14 RX ADMIN — TROSPIUM CHLORIDE 20 MG: 20 TABLET, FILM COATED ORAL at 06:23

## 2021-10-14 RX ADMIN — PRIMIDONE 50 MG: 50 TABLET ORAL at 06:23

## 2021-10-14 RX ADMIN — CLOPIDOGREL BISULFATE 75 MG: 75 TABLET, FILM COATED ORAL at 09:45

## 2021-10-14 RX ADMIN — Medication 1 CAPSULE: at 09:46

## 2021-10-14 RX ADMIN — PANTOPRAZOLE SODIUM 40 MG: 40 TABLET, DELAYED RELEASE ORAL at 06:23

## 2021-10-14 RX ADMIN — AMLODIPINE BESYLATE 10 MG: 10 TABLET ORAL at 09:46

## 2021-10-14 RX ADMIN — MIRABEGRON 50 MG: 25 TABLET, FILM COATED, EXTENDED RELEASE ORAL at 09:44

## 2021-10-14 RX ADMIN — LEVOTHYROXINE SODIUM 125 MCG: 125 TABLET ORAL at 06:23

## 2021-10-14 RX ADMIN — LUBIPROSTONE 24 MCG: 24 CAPSULE, GELATIN COATED ORAL at 17:13

## 2021-10-14 RX ADMIN — TAMSULOSIN HYDROCHLORIDE 0.4 MG: 0.4 CAPSULE ORAL at 09:46

## 2021-10-14 ASSESSMENT — PAIN SCALES - GENERAL
PAINLEVEL_OUTOF10: 0
PAINLEVEL_OUTOF10: 0

## 2021-10-14 NOTE — CARE COORDINATION
Hospital follow-up set with patient's PCP, Solo Woods ([phone]), for 10/20/21 at 2 PM.  Surgical follow-up set with Dr. Omar Velásquez ([phone]) for 10/25/21 at 9:30 AM.    2:00 PM  Patient follow-ups set. Patient has all needed DME. [facility] order sent to [facility] for PT, OT, and Nursing. Caregiver training was not recommended prior to discharge. Patient to discharge to group home with roommates and staff. Patient's sister to provide transport home at 1:30 PM and Nursing is aware. Patient is set for discharge from case management standpoint.

## 2021-10-14 NOTE — PROGRESS NOTES
Therapy Coordinator Note    10/14/2021    Pt was scheduled for discharge this date which was delayed due to unexpected home issues. Pt was not seen for therapy 180 minutes due to expected discharge. Please see CM notes for details.      José Miguel Rubio, Therapy Coordinator  10/14/2021   2:32 PM

## 2021-10-14 NOTE — PROGRESS NOTES
Comprehensive Nutrition Assessment    Type and Reason for Visit:  Reassess    Nutrition Recommendations/Plan:   Continue soft and bite sized diet  Will continue to follow up during stay     Nutrition Assessment:  Meal intake usually %. Able to toelrate soft and bite-sized diet, no straws. Intake improving during stay. Will follow at moderate nutrition risk at this time. Malnutrition Assessment:  Malnutrition Status:  No malnutrition    Context:  Acute Illness       Estimated Daily Nutrient Needs:  Energy (kcal):  1628-4067 ([place] 1897); Weight Used for Energy Requirements:  Current     Protein (g):  78-91 (1.2-1.4 g/kg); Weight Used for Protein Requirements:  Ideal        Fluid (ml/day):  ~2030-3380; Method Used for Fluid Requirements:  1 ml/kcal      Nutrition Related Findings:  resting in chair, contact isolation, plan for discharge on hold today due to isses at home, meds noted: folvite, culturelle, lactulose, vitamin D      Wounds:  Multiple, Surgical Incision (scab on lip/knee)       Current Nutrition Therapies:    ADULT DIET; Dysphagia - Soft and Bite Sized; No Drinking Straws; Dislikes spinach, cauliflower. Likes chocolate milk, sweet tea, strawberries; please add extra condiments and gravy on tray. Anthropometric Measures:  · Height: 5' 5.98\" (167.6 cm)  · Current Body Weight: 200 lb 13.4 oz (91.1 kg)   · Admission Body Weight: 198 lb (89.8 kg)    · Usual Body Weight: 200 lb (90.7 kg) (november 2020)     · Ideal Body Weight: 142 lbs; % Ideal Body Weight 139.9 %   · BMI: 32.4  · Adjusted Body Weight:  ; No Adjustment   · BMI Categories: Obese Class 1 (BMI 30.0-34. 9)       Nutrition Diagnosis:   · Inadequate oral intake related to cognitive or neurological impairment, acute injury/trauma as evidenced by swallow study results (swallow difficulty)      Nutrition Interventions:   Food and/or Nutrient Delivery:  Continue Current Diet, Snacks (Comment)  Nutrition Education/Counseling:  No

## 2021-10-14 NOTE — PROGRESS NOTES
Rachel Mccoy    : 1955  Acct #: [de-identified]  MRN: [id number]              PM&R Progress Note      Admitting diagnosis: Acute CVA ( [address])     Comorbid diagnoses impacting rehabilitation: Ataxia of gait, dysarthria, dysphagia, essential hypertension, seizure disorder, IBS, depression with anxiety, facial abscess (I&D 10/8/2021), strep B UTI. Chief complaint: Engaging little better in his therapy. He denies pain. Prior (baseline) level of function: Independent.     Current level of function:         Current  IRF-BRENT and Goals:   Occupational Therapy:    Short term goals  Time Frame for Short term goals: STGs=LTGs :   Long term goals  Time Frame for Long term goals : 5-7 days or until d/c  Long term goal 1: Pt will complete grooming tasks Ind seated  Long term goal 2: Pt will complete total body bathing c S  Long term goal 3: Pt will complete UB dressing c setup  Long term goal 4: Pt will complete LB dressing c supervision  Long term goal 5: Pt will doff/don footwear c supervision  Long term goals 6: Pt will complete toileting c supervision  Long term goal 7: Pt will complete functional transfers (bed, chair, toilet, shower) c DME PRN and S  Long term goal 8: Pt will perform therex/therax to facilitate increased strength/endurance/ax tolerance (c emphasis on dynamic standing balance/tolerance >8 mins and RUE endurance) c SBA :                                       Eating: Eating  Assistance Needed: Setup or clean-up assistance  Comment: X  CARE Score: 5  Discharge Goal: Set-up or clean-up assistance       Oral Hygiene: Oral Hygiene  Assistance Needed: Setup or clean-up assistance  Comment: seated at sink  CARE Score: 5  Discharge Goal: Independent    UB/LB Bathing: Shower/Bathe Self  Assistance Needed: Supervision or touching assistance  Comment: Supervision, performed shower seated  CARE Score: 4  Discharge Goal: Supervision or touching assistance    UB Dressing: Upper Body Dressing  Assistance Needed: Setup or clean-up assistance  Comment: to don pullover shirt  CARE Score: 5  Discharge Goal: Set-up or clean-up assistance         LB Dressing: Lower Body Dressing  Assistance Needed: Supervision or touching assistance  Comment: able to thread BLEs into brief and pants; Supervision in stance to manage over hips  CARE Score: 4  Discharge Goal: Supervision or touching assistance    Donning and [place] Footwear: Putting On/Taking Off Footwear  Assistance Needed: Setup or clean-up assistance  Comment: to doff/don both socks and shoes in figure 4 position  CARE Score: 5  Discharge Goal: Supervision or touching assistance      Toileting: Toileting Hygiene  Assistance Needed: Supervision or touching assistance  Comment: Supervision  CARE Score: 4  Discharge Goal: Supervision or touching assistance      Toilet Transfers:   Toilet Transfer  Assistance Needed: Supervision or touching assistance  Comment: Supervision  CARE Score: 4  Discharge Goal: Supervision or touching assistance    Physical Therapy:         Long term goals  Time Frame for Long term goals : 5-7 days STG=LTG  Long term goal 1: Pt will perform all bed mobility with mod I  Long term goal 2: Pt will perform sit to stand, pivot and car transfers  with cg assist  Long term goal 3: Pt will ambulate 50   feet on level surfaces AND 10' on unlevel surface with CG   assist  using 2ww  Long term goal 4: Pt will ascend/descend curb step with  2ww with min assist    and up to   12  steps with  rail(s) with  CG  assist  Long term goal 5: Pt will retrieve light item from floor with min   assist  using 2ww and reacher  Long term goal 6: Pt will propel w/c in household situation for at least  150   feet with mod I      Bed Mobility:   Sit to Lying  Assistance Needed: Independent  Comment: with bed features as per home set up  CARE Score: 6  Discharge Goal: Independent  Roll Left and Right  Assistance Needed: Independent  Comment: with bed features as per home set up  CARE Score: 6  Discharge Goal: Independent  Lying to Sitting on Side of Bed  Assistance Needed: Independent  Comment: with bed features as per home set up  CARE Score: 6  Discharge Goal: Independent    Transfers:    Sit to Stand  Assistance Needed: Supervision or touching assistance  Comment: CGA for balance with RW  CARE Score: 4  Discharge Goal: Supervision or touching assistance  Chair/Bed-to-Chair Transfer  Assistance Needed: Supervision or touching assistance  Comment: CGA-SBA for balance with RW  CARE Score: 4  Discharge Goal: Supervision or touching assistance     Car Transfer  Assistance Needed: Supervision or touching assistance  Comment: CGA for balance with RW for approach, lifts LE in/our of car without A  CARE Score: 4  Discharge Goal: Supervision or touching assistance    Ambulation:    Walking Ability  Does the Patient Walk?: Yes     Walk 10 Feet  Assistance Needed: Supervision or touching assistance  Physical Assistance Level: Less than 25%  Comment: CGA for balance with RW  CARE Score: 4  Discharge Goal: Supervision or touching assistance     Walk 50 Feet with Two Turns  Assistance Needed: Supervision or touching assistance  Comment: CGA for balance with RW  Reason if not Attempted: Not attempted due to medical condition or safety concerns  CARE Score: 4  Discharge Goal: Supervision or touching assistance     Walk 150 Feet  Assistance Needed: Supervision or touching assistance  Comment: CGA for balance with RW  Reason if not Attempted: Not applicable  CARE Score: 4  Discharge Goal: Not Applicable     Walking 10 Feet on Uneven Surfaces  Assistance Needed: Supervision or touching assistance  Comment: CGA for balance with RW, demos safety with obstacle clearance with prompting  CARE Score: 4  Discharge Goal: Supervision or touching assistance     1 Step (Curb)  Assistance Needed: Partial/moderate assistance  Comment: CG-Min A with RW leads with L LE ascending, R LE descending due to tone, mod safety cues for AD management   Reason if not Attempted: Not attempted due to medical condition or safety concerns  CARE Score: 3  Discharge Goal: Partial/moderate assistance     4 Steps  Assistance Needed: Supervision or touching assistance  Comment: CGA with B rails, ascends with L LE, descends with R LE  CARE Score: 4  Discharge Goal: Supervision or touching assistance     12 Steps  Assistance Needed: Supervision or touching assistance  Comment: CGA for balance with B rails, non recip pattern  Reason if not Attempted: Not attempted due to medical condition or safety concerns  CARE Score: 4  Discharge Goal: Partial/moderate assistance       Wheelchair:  w/c Ability: Wheelchair Ability  Uses a Wheelchair and/or Scooter?: Yes  Wheel 50 Feet with Two Turns  Assistance Needed: Independent  Comment: propels with B LEs  CARE Score: 6  Discharge Goal: Independent  Wheel 150 Feet  Assistance Needed: Independent  Comment: propels with B LEs  CARE Score: 6  Discharge Goal: Independent          Balance:    Balance  Sitting - Dynamic: Fair, +  Standing - Static: Fair, -  Standing - Dynamic: Poor, +   Object: Picking Up Object  Assistance Needed: Supervision or touching assistance  Comment: CGA for balance at RW using reacher; maintains  L hand on AD. Reason if not Attempted: Not attempted due to medical condition or safety concerns  CARE Score: 4  Discharge Goal: Partial/moderate assistance    I      Exam:    Blood pressure (!) 156/81, pulse 74, temperature 97.7 °F (36.5 °C), temperature source Oral, resp. rate 17, height 5' 5.98\" (1.676 m), weight 201 lb 11.5 oz (91.5 kg), SpO2 99 %. General: Sitting up in the bed. Answers direct questions short phrases. Alert. Easily distracted though. HEENT: MMM. Neck supple. Full visual field. Pulmonary: No wheezes or rales. Cardiac: RRR. Abdomen: Patient's abdomen is soft and nondistended. Bowel sounds were present throughout.   There was no rebound, guarding or masses noted. Upper extremities: Clumsy movements bilaterally but he can bring his hands together in the midline. No new bruises. Lower extremities: No signs of DVT. Sitting balance was fair+. Standing balance was fair-.    Lab Results   Component Value Date    WBC 8.2 10/03/2021    HGB 15.6 10/03/2021    HCT 46.2 10/03/2021    MCV 90.8 10/03/2021     10/03/2021     Lab Results   Component Value Date    INR 0.85 10/01/2021    INR 0.90 11/27/2020    INR 0.87 11/26/2020    PROTIME 11.0 (L) 10/01/2021    PROTIME 10.9 (L) 11/27/2020    PROTIME 10.5 (L) 11/26/2020     Lab Results   Component Value Date    CREATININE 0.4 (L) 10/05/2021    BUN 11 10/05/2021     10/05/2021    K 3.9 10/05/2021     10/05/2021    CO2 25 10/05/2021     Lab Results   Component Value Date    ALT 20 10/03/2021    AST 14 (L) 10/03/2021    ALKPHOS 107 10/03/2021    BILITOT 0.2 10/03/2021       Expected length of stay  prior to a supervised level of function for discharge home with a wheelchair and [facility] OT/PT is 10/14/2021. Recommendations:    1. Acute CVA with ataxia and gait disturbance:  He requires verbal cues and some physical assistance with basic hygiene and all mobility. This is essentially what his baseline was at his group home. He has benefited from the daily therapies but is wanting to be in his group home and that would seem to be appropriate at this point. We have checked with the group home staff and they are in agreement with taking him home tomorrow. He will have completed his antibiotics by then. Home on dual antiplatelet therapy and a statin. Group home staff of declining caregiver training. Verbal cues and  CGAminimum physical assistance for transfers today. 2. DVT prophylaxis: Lovenox 40 mg subcu daily.  Must monitor his hemoglobin and platelet count periodically while on this medication.  Weightbearing activities are limited but have been slowly improving daily.   No new bruising or swelling. We will stop the Lovenox at discharge. 3. Hypertension: Norvasc and pain control for blood pressure regulation.  Target systolic blood pressure is 120140.  Vital signs are checked at rest and with activity.  Blood pressure is just above target range again today.  Monitoring closely. 4. Seizure disorder: Klonopin, Depakote, Vimpat and lorazepam.  Treating him in a calm and consistent environment.    No current evidence of any seizure activity. 5. Strep B UTI: Cefazolin 2 g IV every 8 hours for a total of 10 days which will take him up until 10/14/2021.  This will also cover his facial abscess. 6. IBS: Culturelle twice daily.  Amitiza and lactulose.  [place] diet. Fair intake noted.   7. Facial abscess: General surgery and wound care monitoring.  Daily wound care.  IV antibiotics will be completed tomorrow.

## 2021-10-14 NOTE — PROGRESS NOTES
Occupational Therapy  Physical Rehabilitation: OCCUPATIONAL THERAPY     [x] daily progress note       [] discharge       Patient Name:  Chari Torres   :  1955 MRN: [id number]  Room:  [address] [place], [place] [address] Date of Admission: 10/9/2021  Rehabilitation Diagnosis:   TIA (transient ischemic attack) [G45.9]  Acute CVA (cerebrovascular accident) ([address] [I63.9]       Date 10/14/2021       Day of ARU Week:  6   Time IN/OUT 8258-9900   Individual Tx Minutes 30   Group Tx Minutes    Co-Treat Minutes    Concurrent Tx Minutes    TOTAL Tx Time Mins 30   Variance Time    Variance Time []   Refusal due to:     []   Medical hold/reason:    []   Illness   []   Off Unit for test/procedure  []   Extra time needed to complete task  []   Therapeutic need  []   Other (specify):   Restrictions Restrictions/Precautions: General Precautions, Contact Precautions, Fall Risk, Modified Diet (per pt he wears helmet when up, minced and moist diet)         Communication with other providers: [x]   OK to see per nursing:     []   Spoke with team member regarding:      Subjective observations and cognitive status: Pt sitting in room on approach; Pleasant and agreeable to therapy session. During session, Pt was incontinent of urine. Pain level/location:    /10       Location:    Discharge recommendations  Anticipated discharge date:  10/14  Destination: []?home alone   []?home alone w assist prn   []? home w/ family    [x]? Continuous supervision       []? SNF    []? Assisted living     []? Other:   Continued therapy: []?C OT  []? OUTPATIENT  OT   []?  No Further OT  Equipment needs: none      Toileting:   Supervision for clothing management and BM hygiene        Toilet Transfers:   Supervision   Device Used:    [x]   Standard Toilet         [x]   Grab Bars           []  Bedside Commode       []   Elevated Toilet          []   Other:        Bed Mobility:           [x]   Pt received out of bed       Transfers:    Sit--> Stand:  Supervision   Stand --> Sit:   Supervision   Stand-Pivot:   SBA   Other:    Assistive device required for transfer:   RW       Functional Mobility:  75 ft   Assistance:  SBA   Device:   [x]   Rolling Walker     []   Standard Walker []   Wheelchair        []   [employer]       []   4-Wheeled Dulcie Sicks         []   Cardiac Walker       []   Other:          Additional Therapeutic activities/exercises completed this date:     [x]   ADL Training   [x]   Balance/Postural training     [x]   Bed/Transfer Training   []   Endurance Training   []   Neuromuscular Re-ed   []   Nu-step:  Time:        Level:         #Steps:       []   Rebounder:    []  Seated     []  Standing        []   Supine Ther Ex (reps/sets):     []   Seated Ther Ex (reps/sets):     []   Standing Ther Ex (reps/sets):     []   Other:      Comments:      Patient/Caregiver Education and Training:   []   [employer] Equipment Use  []   Bed Mobility/Transfer Technique/Safety  []   Energy Conservation Tips  []   Family training  []   Postural Awareness  []   Safety During Functional Activities  []   Reinforced Patient's Precautions   []   Progress was updated and reviewed in Rehabtracker with patient and/or family this         date.     Treatment Plan for Next Session: D/C 10/14        Treatment/Activity Tolerance:   [] Tolerated treatment with no adverse effects    [] Patient limited by fatigue  [] Patient limited by pain   [] Patient limited by medical complications:    [] Adverse reaction to Tx:   [] Significant change in status    Safety:       []  bed alarm set    [x]  chair alarm set    []  Pt refused alarms                []  Telesitter activated      [x]  Gait belt used during tx session      []other:       Number of Minutes/Billable Intervention  Therapeutic Exercise    ADL Self-care 15   Neuro Re-Ed    Therapeutic Activity 15   Group    Other:    TOTAL 30       Social History  Social/Functional History  Lives With: Other (comment) (2 roommates, group home with 2 staff nearly all times)  Type of Home: House  Home Layout: One level  Home Access: Stairs to enter without rails, Ramped entrance  Entrance Stairs - Number of Steps: ramp on 1 step in garage(main entry for pt), 1 step at front entrance  Bathroom Shower/Tub: Shower chair with back, Walk-in shower  Bathroom Toilet: Standard (has some rails, but pt states they are awkward to use)  Bathroom Equipment: Grab bars in shower, Grab bars around toilet  Bathroom Accessibility: Accessible  Home Equipment: [address], Wheelchair-electric, Rolling walker, Hospital bed (Pt has electric w/c but does not use it due to poor control(states he wasn't trained))  Receives Help From: Personal care attendant  ADL Assistance: Needs assistance (aides provide supervision as needed, but pt needed no physical help)  Homemaking Responsibilities: No (Pt will help with cleaning sometimes, staff complete most tasks)  Ambulation Assistance: Needs assistance (SBA of 2 with 2ww, has mostly been usingw/c)  Transfer Assistance: Independent  Active : No  Mode of Transportation: [employer]  Education: Arav  Occupation: On disability  Type of occupation: [employer] [facility] [address]: Watch tv, play video games, computer, FB, pt does not manage his [employer] manages  Additional Comments: pt sleeps in hospital bed, pt has fallen 2-3 with injury once; reported that he was advised by optometrist to wear helmet when out of bed due to glaucoma and irrepairable damage if he experiences a fall.     Objective                                                                                    Goals:  (Update in navigator)  Short term goals  Time Frame for Short term goals: STGs=LTGs:  Long term goals  Time Frame for Long term goals : 5-7 days or until d/c  Long term goal 1: Pt will complete grooming tasks Ind seated  Long term goal 2: Pt will complete total body bathing c S  Long term goal 3: Pt will complete UB dressing c setup  Long term goal 4: Pt will complete LB dressing c supervision  Long term goal 5: Pt will doff/don footwear c supervision  Long term goals 6: Pt will complete toileting c supervision  Long term goal 7: Pt will complete functional transfers (bed, chair, toilet, shower) c DME PRN and S  Long term goal 8: Pt will perform therex/therax to facilitate increased strength/endurance/ax tolerance (c emphasis on dynamic standing balance/tolerance >8 mins and RUE endurance) c SBA:        Plan of Care                                                                              Times per week: 5 days per week for a minimum of 60 minutes/day plus group as appropriate for 60 minutes.   Treatment to include Plan  Times per day: Daily  Current Treatment Recommendations: Strengthening, Balance Training, Functional Mobility Training, Endurance Training, Safety Education & Training, Patient/Caregiver Education & Training, Equipment Evaluation, Education, & procurement, Self-Care / ADL    Electronically signed by   DAWIT Agudelo,  10/14/2021, 3:06 PM

## 2021-10-14 NOTE — CARE COORDINATION
LSW met with patient following Care Conference. LSW informed patient that there was no recommended DME. Patient agrees. LSW then informed of recommendation for [facility] PT, OT, and Nursing and patient is agreeable to all.   Patient verbalized understanding and would like to use [facility] as he has in the past.    D/C Plan:  Date:  Oct. 14th  DME:  None needed  HHC:  PT, OT, Nursing [facility])  To:  Group home (family will transport)

## 2021-10-15 NOTE — CARE COORDINATION
Bobo mendez alerted by RN Ghazala Aggarwal that patient's group home is refusing to take him back. Case mgt met with patient's sister Andrews Peña in room and confirmed that there has been a death in Alf's home and they haven't recalled his staff. Bobo mendez spoke with Ruben Farris, home manager. She states she wasn't notified of the patient's discharge. Case mgt reviewed VM left by [facility] 10/12, her conversation with [facility] 10/13, and [facility] 10/13. All these conversations included dc plan. Ruben Farris will speak with her supervisor. 1500:  Received return call from Ruben Farris and they can accept the patient tonight. She states no reports needs to be called in by nursing. Reviewed that he'll come after dinner. Patient's sister Andrews Peña has left for an appointment. Bobo mendez telephoned her and she'll transport patient at 1900. Bobo mendez updated Ghazala Aggarwal RN.

## 2021-11-04 ENCOUNTER — OFFICE VISIT (OUTPATIENT)
Dept: SURGERY | Age: 66
End: 2021-11-04

## 2021-11-04 VITALS — SYSTOLIC BLOOD PRESSURE: 124 MMHG | DIASTOLIC BLOOD PRESSURE: 72 MMHG | HEART RATE: 62 BPM | OXYGEN SATURATION: 98 %

## 2021-11-04 DIAGNOSIS — L02.01 FACIAL ABSCESS: Primary | ICD-10-CM

## 2021-11-04 DIAGNOSIS — Z09 POSTOP CHECK: ICD-10-CM

## 2021-11-04 PROCEDURE — 99024 POSTOP FOLLOW-UP VISIT: CPT | Performed by: PHYSICIAN ASSISTANT

## 2021-11-04 PROCEDURE — APPNB30 APP NON BILLABLE TIME 0-30 MINS: Performed by: PHYSICIAN ASSISTANT

## 2021-11-05 NOTE — PROGRESS NOTES
Chief Complaint   Patient presents with    Post-Op Check     facial incision and drainage upper lip @ [facility] 10/8/21         SUBJECTIVE:  Patient presents today for his 1st post op visit following facial abscess I&D. Pt reports that pain is none. Pt denies any drainage, open wound or fevers/chills. Incisions: well healed. Pt did complete his abx course without any side effects. Past Surgical History:   Procedure Laterality Date    CHOLECYSTECTOMY      COLONOSCOPY  8/19/14, 5/2012 8/19/14: hemorrhoids, 5/2012 at [place]  02/04/2021    COLONOSCOPY N/A 2/4/2021    COLONOSCOPY POLYPECTOMY SNARE/COLD BIOPSY performed by Chinyere Garcia MD at [facility] Left 07/11/2013    with stnet placement    FACIAL SURGERY N/A 10/8/2021    FACIAL INCISION AND DRAINAGE performed by Timo Rivera MD at [address] [facility]  [address]   [address] OTHER SURGICAL HISTORY  04/15/2015    Revision of vagal nerve stimulator    STIMULATOR SURGERY N/A 3/24/2021    STIMULATOR INSERTION performed by Dominique Cortez MD at [address] 3/24/2021    STIMULATOR INSERTION STAGE 2 performed by Dominique Cortez MD at [facility] 14. N/A 11/13/2018     [address] performed by Nancy Jha MD at [place] [address]    Has to have bettery changed every 5 yrs.       Past Medical History:   Diagnosis Date    Acute CVA (cerebrovascular accident) ([address] 10/10/2021    Anemia     Aspiration pneumonia ([facility]     dx 6/9/2014 with this per ecf/ per old chart dx with pneumonia with admission 9/18/2018    Cerebral palsy ([address]     \"has no feeling on left side of body\"alert but has some dementia but not diagnosed, has good long term but poor short term and wakes up disoriented after a nap\"(per yaneth)(2014)    Depression     Epilepsy ([address] 1961    last seizure 2/2018- hx grand mal  Frequent falls     with phone assess- family stated pt fell this am (7/10/2013\"in the process of trying to find a facility to help build him back up- (pt now at [facility])    Glaucoma     \"has been in treatment for this in the past- no treatment needed at present\"    History of IBS     Hx MRSA infection     + nasal culture 4/2014    Hyperammonemia (HCC)     Hypertension     on Lisinopril    IBS (irritable bowel syndrome)     ulcerative colitis    Kidney stone     for surgery for stent placement 7/11/2013    Mood disorder ([address]     per staff at [facility] (methicillin resistant staph aureus) culture positive 05/02/2014 05/27/20 nasal    MRSA (methicillin resistant staph aureus) culture positive 11/25/2020    Face; 10/8/21    Occasional tremors     \"makes it difficult for him to write\"    Pressure injury of contiguous region involving back and right buttock, stage 2 ([address] 05/27/2020    Pressure injury of left buttock, stage 1 07/22/2020    Prostatitis     hx given per H&P old chart    Thyroid disease     Ulcerative colitis ([address]     hx [facility] per staff at [facility] 4/13/2015     Family History   Problem Relation Age of Onset    Heart Disease Mother         atrial fib    High Blood Pressure Mother     Asthma Mother     High Cholesterol Mother     Depression Mother     Migraines Mother     High Blood Pressure Father     Heart Disease Father     Asthma Sister     High Cholesterol Sister     Depression Sister     Migraines Sister      Social History     Socioeconomic History    Marital status: Single     Spouse name: Not on file    Number of children: Not on file    Years of education: Not on file    Highest education level: Not on file   Occupational History    Not on file   Tobacco Use    Smoking status: Never Smoker    Smokeless tobacco: Never Used   Vaping Use    Vaping Use: Never used   Substance and Sexual Activity    Alcohol use: No    Drug use: No    Sexual activity: Not on file   Other Topics Concern    Not on file   Social History Narrative    Not on file     Social Determinants of Health     Financial Resource Strain:     Difficulty of Paying Living Expenses:    Food Insecurity:     Worried About Running Out of Food in the Last Year:     [address] in the Last Year:    Transportation Needs:     Lack of Transportation (Medical):  Lack of Transportation (Non-Medical):    Physical Activity:     Days of Exercise per Week:     Minutes of Exercise per Session:    Stress:     Feeling of Stress :    Social Connections:     Frequency of Communication with Friends and Family:     Frequency of Social Gatherings with Friends and Family:     Attends Faith Services:     Active Member of Clubs or Organizations:     Attends Club or Organization Meetings:     Marital Status:    Intimate Partner Violence:     Fear of Current or Ex-Partner:     Emotionally Abused:     Physically Abused:     Sexually Abused:        OBJECTIVE:  Physical Exam  Constitutional:       Appearance: He is well-developed. Comments: In WC   HENT:      Head: Normocephalic. Comments: Abscess site well healed. There is mild residual inflammation of the tissue, but this is healing well. No wound, fluctuance or drainage. Eyes:      Pupils: Pupils are equal, round, and reactive to light. Cardiovascular:      Rate and Rhythm: Normal rate. Pulmonary:      Effort: Pulmonary effort is normal.   Abdominal:      General: There is no distension. Palpations: Abdomen is soft. There is no mass. Tenderness: There is no abdominal tenderness. There is no guarding or rebound. Musculoskeletal:         General: Normal range of motion. Cervical back: Normal range of motion and neck supple. Skin:     General: Skin is warm. Neurological:      Mental Status: He is alert and oriented to person, place, and time. Mental status is at baseline.            Path reveals: MRSA    ASSESSMENT:  Patient doing well on this post operative check. Wounds well healed. 1. Facial abscess    2. Postop check        PLAN:  No further treatment or wound care needed. Normal activities with no limitations. No orders of the defined types were placed in this encounter. No orders of the defined types were placed in this encounter. Follow Up: Return if symptoms worsen or fail to improve.     Fadia Nguyen PA-C

## 2021-11-08 NOTE — DISCHARGE SUMMARY
Patient Name: Aleksandr Colón  Patient :  1955  Patient MRN:   [id number]      Admission Date:  10/9/2021  Discharge Date: 10/14/2021. Admitting diagnosis: Acute CVA ( [address])     Comorbid diagnoses impacting rehabilitation: Ataxia of gait, dysarthria, dysphagia, essential hypertension, seizure disorder, IBS, depression with anxiety, facial abscess (I&D 10/8/2021), strep B UTI. Discharging diagnosis: Acute CVA ( [address])     Comorbid diagnoses impacting rehabilitation: Ataxia of gait, dysarthria, dysphagia, essential hypertension, seizure disorder, IBS, depression with anxiety, facial abscess (I&D 10/8/2021), strep B UTI. History of present illness: The patient is a poor historian and the majority of this history was gathered from the EMR. The patient is a 52-year-old right-hand-dominant group home resident who presented to our ED late on 10/1/2021 with sudden difficulty walking and slurred speech. His acute evaluation did not identify any cerebral hemorrhage, mass or edema. More detailed cerebral imaging with MRI was not possible due to and in dwelling vagal stimulator. He continued to have difficulty walking with dysarthria and dysphagia. Neurology was consulted. He was placed on antiplatelet therapy and blood pressure control measures were emphasized. He had leukocytosis and a draining lesion from his upper lip. General surgery was consulted and an I&D of a facial abscess took place on 10/8/2021. He was placed on antibiotics for the abscess due and his strep B UTI. Prior (baseline) level of function: Independent.     Current level of function:         Current  IRF-BRENT and Goals:   Occupational Therapy:    Short term goals  Time Frame for Short term goals: STGs=LTGs :   Long term goals  Time Frame for Long term goals : 5-7 days or until d/c  Long term goal 1: Pt will complete grooming tasks Ind seated  Long term goal 2: Pt will complete total body bathing c S  Long term goal 3: Pt will complete UB dressing c setup  Long term goal 4: Pt will complete LB dressing c supervision  Long term goal 5: Pt will doff/don footwear c supervision  Long term goals 6: Pt will complete toileting c supervision  Long term goal 7: Pt will complete functional transfers (bed, chair, toilet, shower) c DME PRN and S  Long term goal 8: Pt will perform therex/therax to facilitate increased strength/endurance/ax tolerance (c emphasis on dynamic standing balance/tolerance >8 mins and RUE endurance) c SBA :                                       Eating: Eating  Assistance Needed: Setup or clean-up assistance  Comment: X  CARE Score: 5  Discharge Goal: Set-up or clean-up assistance       Oral Hygiene: Oral Hygiene  Assistance Needed: Setup or clean-up assistance  Comment: seated at sink  CARE Score: 5  Discharge Goal: Independent    UB/LB Bathing: Shower/Bathe Self  Assistance Needed: Supervision or touching assistance  Comment: Supervision, performed shower seated  CARE Score: 4  Discharge Goal: Supervision or touching assistance    UB Dressing: Upper Body Dressing  Assistance Needed: Setup or clean-up assistance  Comment: to don pullover shirt  CARE Score: 5  Discharge Goal: Set-up or clean-up assistance         LB Dressing: Lower Body Dressing  Assistance Needed: Supervision or touching assistance  Comment: able to thread BLEs into brief and pants; Supervision in stance to manage over hips  CARE Score: 4  Discharge Goal: Supervision or touching assistance    Donning and [place] Footwear: Putting On/Taking Off Footwear  Assistance Needed: Setup or clean-up assistance  Comment: to doff/don both socks and shoes in figure 4 position  CARE Score: 5  Discharge Goal: Supervision or touching assistance      Toileting: Toileting Hygiene  Assistance Needed: Supervision or touching assistance  Comment: Supervision  CARE Score: 4  Discharge Goal: Supervision or touching assistance      Toilet Transfers:   Toilet Transfer  Assistance Independent  Wheel 150 Feet  Assistance Needed: Independent  Comment: propels with B LEs  CARE Score: 6  Discharge Goal: Independent          Balance:    Balance  Sitting - Dynamic: Fair, +  Standing - Static: Fair, -  Standing - Dynamic: Poor, +   Object: Picking Up Object  Assistance Needed: Supervision or touching assistance  Comment: CGA for balance at RW using reacher; maintains  L hand on AD. Reason if not Attempted: Not attempted due to medical condition or safety concerns  CARE Score: 4  Discharge Goal: Partial/moderate assistance    I      Exam:    Blood pressure 119/62, pulse 68, temperature 97.5 °F (36.4 °C), resp. rate 16, height 5' 5.98\" (1.676 m), weight 200 lb 13.4 oz (91.1 kg), SpO2 95 %. General: Up in wheelchair. Talkative. Anxious to get to his home. Alert. Easily distracted though. HEENT: No JVD.  MMM. Neck supple. Full visual field. Pulmonary: Unlabored without wheezes or rales. Cardiac: RRR. Abdomen: Patient's abdomen is soft and nondistended. Bowel sounds were present throughout. There was no rebound, guarding or masses noted. Upper extremities: Clumsy movements bilaterally but he can bring his hands together in the midline. No new bruises. Lower extremities: No signs of DVT.  4 -/5 strength across the knees and ankles. Clumsy. Sitting balance was fair+.   Standing balance was fair-.    Lab Results   Component Value Date    WBC 8.2 10/03/2021    HGB 15.6 10/03/2021    HCT 46.2 10/03/2021    MCV 90.8 10/03/2021     10/03/2021     Lab Results   Component Value Date    INR 0.85 10/01/2021    INR 0.90 11/27/2020    INR 0.87 11/26/2020    PROTIME 11.0 (L) 10/01/2021    PROTIME 10.9 (L) 11/27/2020    PROTIME 10.5 (L) 11/26/2020     Lab Results   Component Value Date    CREATININE 0.4 (L) 10/05/2021    BUN 11 10/05/2021     10/05/2021    K 3.9 10/05/2021     10/05/2021    CO2 25 10/05/2021     Lab Results   Component Value Date    ALT 20

## 2021-11-16 ENCOUNTER — HOSPITAL ENCOUNTER (OUTPATIENT)
Age: 66
Discharge: HOME | End: 2021-11-16
Payer: MEDICARE

## 2021-11-16 LAB
DOSE AMOUNT: NORMAL
DOSE TIME: NORMAL
VALPROIC ACID LEVEL: 74 UG/ML (ref 50–100)

## 2021-11-16 PROCEDURE — 80164 ASSAY DIPROPYLACETIC ACD TOT: CPT

## 2021-11-16 PROCEDURE — 36415 COLL VENOUS BLD VENIPUNCTURE: CPT

## 2021-11-26 ENCOUNTER — HOSPITAL ENCOUNTER (EMERGENCY)
Age: 66
Discharge: HOME | End: 2021-11-26
Attending: EMERGENCY MEDICINE
Payer: MEDICARE

## 2021-11-26 VITALS
RESPIRATION RATE: 16 BRPM | HEART RATE: 70 BPM | DIASTOLIC BLOOD PRESSURE: 56 MMHG | TEMPERATURE: 97.8 F | SYSTOLIC BLOOD PRESSURE: 141 MMHG | OXYGEN SATURATION: 99 %
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
## 2021-11-26 PROCEDURE — 99285 EMERGENCY DEPT VISIT HI MDM: CPT

## 2021-11-26 NOTE — ED PROVIDER NOTES
Emergency Department Encounter    Patient: Bridgett Montoya  MRN: [id number]  : 1955  Date of Evaluation: 2021  ED Provider:  Lexie Wright MD    Triage Chief Complaint:   Fall    [place]:  Bridgett Montoya is a 73 y.o. male that presents from group home with complaint of a fall. He was trying to get from his wheelchair into his bed, staff was not with him. His wheelchair wheels were not locked and it rolled. He reports \"it was more of a scrape\" as he went down. He was wearing his helmet when he fell. He denies striking his head. He scraped his left shoulder on the way down. However is not currently having any pain there anywhere else. No neck or back pain. No chest pain or difficulty breathing. He feels like he is at his baseline. He does have a history of cerebral palsy, his sister is his POA, Arianna Willis. He does believe that she had already been called. He denies any headache or vision changes. He denies all complaints. He states \"I hope I do not have to get any CT scan or x-rays or do any of that\". 0728- called to speak with Arianna Willis, patient's POA, left message. ROS - see HPI, below listed is current ROS at time of my eval:  10 systems reviewed and negative except as above.     Past Medical History:   Diagnosis Date    Acute CVA (cerebrovascular accident) ([address] 10/10/2021    Anemia     Aspiration pneumonia ([address]     dx 2014 with this per ecf/ per old chart dx with pneumonia with admission 2018    Cerebral palsy ([address]     \"has no feeling on left side of body\"alert but has some dementia but not diagnosed, has good long term but poor short term and wakes up disoriented after a nap\"(per yaneth)()    Depression     Epilepsy ([address]     last seizure 2018- hx grand mal    Frequent falls     with phone assess- family stated pt fell this am (7/10/2013\"in the process of trying to find a facility to help build him back up- (pt now at [facility] [place] changed every 5 yrs. Family History   Problem Relation Age of Onset    Heart Disease Mother         atrial fib    High Blood Pressure Mother     Asthma Mother     High Cholesterol Mother     Depression Mother     Migraines Mother     High Blood Pressure Father     Heart Disease Father     Asthma Sister     High Cholesterol Sister     Depression Sister     Migraines Sister      Social History     Socioeconomic History    Marital status: Single     Spouse name: Not on file    Number of children: Not on file    Years of education: Not on file    Highest education level: Not on file   Occupational History    Not on file   Tobacco Use    Smoking status: Never Smoker    Smokeless tobacco: Never Used   Vaping Use    Vaping Use: Never used   Substance and Sexual Activity    Alcohol use: No    Drug use: No    Sexual activity: Not on file   Other Topics Concern    Not on file   Social History Narrative    Not on file     Social Determinants of Health     Financial Resource Strain:     Difficulty of Paying Living Expenses: Not on file   Food Insecurity:     Worried About Running Out of Food in the Last Year: Not on file    Mandie of Food in the Last Year: Not on file   Transportation Needs:     Lack of Transportation (Medical): Not on file    Lack of Transportation (Non-Medical):  Not on file   Physical Activity:     Days of Exercise per Week: Not on file    Minutes of Exercise per Session: Not on file   Stress:     Feeling of Stress : Not on file   Social Connections:     Frequency of Communication with Friends and Family: Not on file    Frequency of Social Gatherings with Friends and Family: Not on file    Attends Jewish Services: Not on file    Active Member of Clubs or Organizations: Not on file    Attends Club or Organization Meetings: Not on file    Marital Status: Not on file   Intimate Partner Violence:     Fear of Current or Ex-Partner: Not on file   Rosanne Blank Emotionally Abused: Not on file    Physically Abused: Not on file    Sexually Abused: Not on file   Housing Stability:     Unable to Pay for Housing in the Last Year: Not on file    Number of Places Lived in the Last Year: Not on file    Unstable Housing in the Last Year: Not on file     No current facility-administered medications for this encounter. Current Outpatient Medications   Medication Sig Dispense Refill    aspirin 81 MG EC tablet Take 1 tablet by mouth daily 30 tablet 3    atorvastatin (LIPITOR) 80 MG tablet Take 1 tablet by mouth nightly 30 tablet 3    psyllium (HYDROCIL) 95 % PACK packet Take 1 packet by mouth 2 times daily 240 each 0    clopidogrel (PLAVIX) 75 MG tablet Take 1 tablet by mouth daily 30 tablet 3    amLODIPine (NORVASC) 10 MG tablet Take 1 tablet by mouth daily 30 tablet 3    AMITIZA 24 MCG capsule       mirabegron (MYRBETRIQ) 50 MG TB24 Take 50 mg by mouth daily      solifenacin (VESICARE) 10 MG tablet Take 10 mg by mouth daily      acetaminophen (APAP EXTRA STRENGTH) 500 MG tablet Take 1 tablet by mouth every 6 hours as needed for Pain 30 tablet 0    levothyroxine (SYNTHROID) 125 MCG tablet Take 1 tablet by mouth Daily 30 tablet 3    dicyclomine (BENTYL) 10 MG capsule Take 1 capsule by mouth every 6 hours as needed (cramps) 20 capsule 0    clonazePAM (KLONOPIN) 0.5 MG tablet Take 0.25 mg by mouth daily.       divalproex (DEPAKOTE) 500 MG DR tablet Take 1,000 mg by mouth nightly      omeprazole (PRILOSEC) 20 MG delayed release capsule Take 20 mg by mouth 2 times daily (before meals)      primidone (MYSOLINE) 50 MG tablet Take 50 mg by mouth 3 times daily      zonisamide (ZONEGRAN) 100 MG capsule Take 100 mg by mouth every morning      Simethicone (MI-ACID GAS RELIEF PO) Take 1 Container by mouth every 4 hours as needed 10 cc every 4 hrs not to exceed 60 cc in 24 hrs      guaiFENesin (MUCINEX) 600 MG extended release tablet Take 1,200 mg by mouth daily as needed for Congestion      Menthol-Methyl Salicylate (MUSCLE RUB) 10-15 % CREA cream Apply 1 applicator topically as needed      pseudoephedrine (SUDAFED) 30 MG tablet Take 30 mg by mouth every 4 hours as needed for Congestion (Patient not taking: Reported on 9/24/2021)      lacosamide (VIMPAT) 200 MG tablet Take 1 tablet by mouth 2 times daily for 30 days. 60 tablet 0    latanoprost (XALATAN) 0.005 % ophthalmic solution Place 1 drop into both eyes nightly      timolol (TIMOPTIC) 0.5 % ophthalmic solution Place 1 drop into both eyes daily       Lactobacillus (ACIDOPHILUS) CAPS capsule Take 1 capsule by mouth daily      Wheat Dextrin (BENEFIBER) POWD Take 4 g by mouth 2 times daily Takes 3 tsp in liquid twice per day       Lactulose Encephalopathy (ENULOSE PO) Take 30 mLs by mouth 3 times daily      folic acid (FOLVITE) 1 MG tablet Take 1 mg by mouth daily      PARoxetine (PAXIL) 20 MG tablet Take 20 mg by mouth every morning      QUEtiapine (SEROQUEL XR) 50 MG extended release tablet Take 50 mg by mouth nightly      tamsulosin (FLOMAX) 0.4 MG capsule Take 1 capsule by mouth daily 30 capsule 0    Cholecalciferol (VITAMIN D3) 1000 UNITS CAPS Take 1 tablet by mouth daily.  clorazepate (TRANXENE) 7.5 MG tablet Take 7.5 mg by mouth 2 times daily. (Patient not taking: Reported on 11/4/2021)       No Known Allergies    Nursing Notes Reviewed    Physical Exam:  Triage VS:    ED Triage Vitals [11/26/21 1617]   Enc Vitals Group      BP (!) 141/56      Pulse 66      Resp 18      Temp 97.8 °F (36.6 °C)      Temp Source Oral      SpO2 96 %      Weight       Height       Head Circumference       Peak Flow       Pain Score       Pain Loc       Pain Edu? Excl. in [address]? My pulse ox interpretation is - normal    Primary exam:  Airway: Intact. Speaking in normal voice. Breathing: Spontaneous. Equal chest rise and breath sounds. Circulation: Heart RRR. Pulses 2+.     Secondary exam:   GENERAL APPEARANCE: Awake and alert. Cooperative. No acute distress. Helmet in place. HEAD: helmet removed, Normocephalic. Atraumatic. No depressed skull fractures, nontender. EYES: EOM's grossly intact. Sclera anicteric. No [place] Eyes. ENT: Oral mucosa moist, Tolerates saliva. No Topete sign. NECK: Trachea midline, no obvious masses  CHEST/LUNGS: Non-tender. Lungs clear to auscultation bilaterally. Respirations nonlabored. HEART: Regular rate and rhythm. ABDOMEN: Soft. Non-distended. Non-tender. No guarding or rebound. Normal bowel sounds. BACK: No cervical thoracic or lumbar midline tenderness to palpation, step-offs or deformities. EXTREMITIES: Upper and lower extremities have no acute deformities and they are non-tender to palpation. Contractures noted. SKIN: Warm and dry. No acute wounds  NEUROLOGICAL: Alert and oriented. No gross facial drooping. Perfusion:  pulses intact and equal in all extremities      I have reviewed and interpreted all of the currently available lab results from this visit (if applicable):  No results found for this visit on 11/26/21. Radiographs (if obtained):  Radiologist's Report Reviewed:  No results found. EKG (if obtained): (All EKG's are interpreted by myself in the absence of a cardiologist)      MDM:  78-year-old male with history as above presents with a fall, he is awake and oriented and recalls what happened. He has no obvious traumatic injury on my evaluation, he is in no distress whatsoever. Blood pressure is mildly elevated, otherwise vitals are completely normal. I did call his POA and left a message, I will call back shortly if we do not hear back from her. At this time I do not believe he would require any emergent imaging based on my exam, and he has 0 complaints. [id number]- spoke with patient's sister Cathi Rossi. We discussed what had occurred, and that patient is well appearing, no traumatic injury.  At this time I do not believe he would require any imaging, she is comfortable with this as is the patient is. He has no obvious trauma whatsoever, we will plan to send him back to his group home. Discharge has been placed. Return precautions given. Patient's sister is willing to pick him up if transport will take too long, I have let nursing team and  know. Clinical Impression:  1. Fall, initial encounter      Disposition referral (if applicable):  Malachi Arora, APRN - CNP  [address] [facility]  [phone]    Schedule an appointment as soon as possible for a visit       Disposition medications (if applicable):  New Prescriptions    No medications on file       Comment: Please note this report has been produced using speech recognition software and may contain errors related to that system including errors in grammar, punctuation, and spelling, as well as words and phrases that may be inappropriate. Efforts were made to edit the dictations.        Pierce Cueva MD  11/26/21 [id number]

## 2021-11-26 NOTE — ED TRIAGE NOTES
Pt presents to ED for unwitnessed fall at group home. Pt states he fell from wheelchair to bed, no head injury or LOC.  Pt denies any injuries

## 2021-12-06 ENCOUNTER — HOSPITAL ENCOUNTER (INPATIENT)
Age: 66
LOS: 1 days | Discharge: HOME | DRG: 193 | End: 2021-12-09
Attending: EMERGENCY MEDICINE | Admitting: STUDENT IN AN ORGANIZED HEALTH CARE EDUCATION/TRAINING PROGRAM
Payer: MEDICARE

## 2021-12-06 ENCOUNTER — APPOINTMENT (OUTPATIENT)
Dept: GENERAL RADIOLOGY | Age: 66
DRG: 193 | End: 2021-12-06
Payer: MEDICARE

## 2021-12-06 DIAGNOSIS — J18.9 PNEUMONIA DUE TO INFECTIOUS ORGANISM, UNSPECIFIED LATERALITY, UNSPECIFIED PART OF LUNG: Primary | ICD-10-CM

## 2021-12-06 DIAGNOSIS — R53.83 FATIGUE, UNSPECIFIED TYPE: ICD-10-CM

## 2021-12-06 DIAGNOSIS — R41.82 ALTERED MENTAL STATUS, UNSPECIFIED ALTERED MENTAL STATUS TYPE: ICD-10-CM

## 2021-12-06 DIAGNOSIS — R05.9 COUGH: ICD-10-CM

## 2021-12-06 LAB
ALBUMIN SERPL-MCNC: 3.8 GM/DL (ref 3.4–5)
ALP BLD-CCNC: 94 IU/L (ref 40–129)
ALT SERPL-CCNC: 26 U/L (ref 10–40)
ANION GAP SERPL CALCULATED.3IONS-SCNC: 9 MMOL/L (ref 4–16)
AST SERPL-CCNC: 21 IU/L (ref 15–37)
BASOPHILS ABSOLUTE: 0 K/CU MM
BASOPHILS RELATIVE PERCENT: 0.2 % (ref 0–1)
BILIRUB SERPL-MCNC: 0.4 MG/DL (ref 0–1)
BUN BLDV-MCNC: 8 MG/DL (ref 6–23)
CALCIUM SERPL-MCNC: 9.6 MG/DL (ref 8.3–10.6)
CHLORIDE BLD-SCNC: 102 MMOL/L (ref 99–110)
CO2: 25 MMOL/L (ref 21–32)
CREAT SERPL-MCNC: 0.6 MG/DL (ref 0.9–1.3)
DIFFERENTIAL TYPE: ABNORMAL
EOSINOPHILS ABSOLUTE: 0.3 K/CU MM
EOSINOPHILS RELATIVE PERCENT: 2.9 % (ref 0–3)
GFR AFRICAN AMERICAN: >60 ML/MIN/1.73M2
GFR NON-AFRICAN AMERICAN: >60 ML/MIN/1.73M2
GLUCOSE BLD-MCNC: 118 MG/DL (ref 70–99)
HCT VFR BLD CALC: 44.1 % (ref 42–52)
HEMOGLOBIN: 14.8 GM/DL (ref 13.5–18)
IMMATURE NEUTROPHIL %: 0.7 % (ref 0–0.43)
LYMPHOCYTES ABSOLUTE: 1.2 K/CU MM
LYMPHOCYTES RELATIVE PERCENT: 12.1 % (ref 24–44)
MCH RBC QN AUTO: 30.8 PG (ref 27–31)
MCHC RBC AUTO-ENTMCNC: 33.6 % (ref 32–36)
MCV RBC AUTO: 91.7 FL (ref 78–100)
MONOCYTES ABSOLUTE: 1.4 K/CU MM
MONOCYTES RELATIVE PERCENT: 14.3 % (ref 0–4)
NUCLEATED RBC %: 0 %
PDW BLD-RTO: 13 % (ref 11.7–14.9)
PLATELET # BLD: 191 K/CU MM (ref 140–440)
PMV BLD AUTO: 10.8 FL (ref 7.5–11.1)
POTASSIUM SERPL-SCNC: 3.5 MMOL/L (ref 3.5–5.1)
RBC # BLD: 4.81 M/CU MM (ref 4.6–6.2)
SEGMENTED NEUTROPHILS ABSOLUTE COUNT: 6.9 K/CU MM
SEGMENTED NEUTROPHILS RELATIVE PERCENT: 69.8 % (ref 36–66)
SODIUM BLD-SCNC: 136 MMOL/L (ref 135–145)
TOTAL IMMATURE NEUTOROPHIL: 0.07 K/CU MM
TOTAL NUCLEATED RBC: 0 K/CU MM
TOTAL PROTEIN: 7.8 GM/DL (ref 6.4–8.2)
WBC # BLD: 9.9 K/CU MM (ref 4–10.5)

## 2021-12-06 PROCEDURE — 85025 COMPLETE CBC W/AUTO DIFF WBC: CPT

## 2021-12-06 PROCEDURE — 6370000000 HC RX 637 (ALT 250 FOR IP): Performed by: EMERGENCY MEDICINE

## 2021-12-06 PROCEDURE — 84484 ASSAY OF TROPONIN QUANT: CPT

## 2021-12-06 PROCEDURE — 87804 INFLUENZA ASSAY W/OPTIC: CPT

## 2021-12-06 PROCEDURE — 99284 EMERGENCY DEPT VISIT MOD MDM: CPT

## 2021-12-06 PROCEDURE — 93005 ELECTROCARDIOGRAM TRACING: CPT | Performed by: EMERGENCY MEDICINE

## 2021-12-06 PROCEDURE — 87635 SARS-COV-2 COVID-19 AMP PRB: CPT

## 2021-12-06 PROCEDURE — 80053 COMPREHEN METABOLIC PANEL: CPT

## 2021-12-06 PROCEDURE — 84145 PROCALCITONIN (PCT): CPT

## 2021-12-06 PROCEDURE — 83880 ASSAY OF NATRIURETIC PEPTIDE: CPT

## 2021-12-06 PROCEDURE — 71045 X-RAY EXAM CHEST 1 VIEW: CPT

## 2021-12-06 RX ORDER — GUAIFENESIN 100 MG/5ML
200 SOLUTION ORAL ONCE
Status: COMPLETED | OUTPATIENT
Start: 2021-12-06 | End: 2021-12-06

## 2021-12-06 RX ORDER — BENZONATATE 100 MG/1
100 CAPSULE ORAL ONCE
Status: COMPLETED | OUTPATIENT
Start: 2021-12-06 | End: 2021-12-06

## 2021-12-06 RX ORDER — ALBUTEROL SULFATE 90 UG/1
2 AEROSOL, METERED RESPIRATORY (INHALATION) ONCE
Status: COMPLETED | OUTPATIENT
Start: 2021-12-06 | End: 2021-12-07

## 2021-12-06 RX ADMIN — BENZONATATE 100 MG: 100 CAPSULE ORAL at 23:02

## 2021-12-06 RX ADMIN — GUAIFENESIN 200 MG: 200 SOLUTION ORAL at 23:02

## 2021-12-06 NOTE — LETTER
[facility] [address]  [address] [place] [address]  Phone: [phone]    No name on file. December 9, 2021     Patient: Hien Zhu   YOB: 1955   Date of Visit: 12/6/2021       To Whom It May Concern: It is my medical opinion that Chon Tellez may return to full duty immediately with no restrictions. If you have any questions or concerns, please don't hesitate to call.     Sincerely,      Alley Caban RN

## 2021-12-07 ENCOUNTER — APPOINTMENT (OUTPATIENT)
Dept: CT IMAGING | Age: 66
DRG: 193 | End: 2021-12-07
Payer: MEDICARE

## 2021-12-07 PROBLEM — G93.40 ENCEPHALOPATHY ACUTE: Status: ACTIVE | Noted: 2021-12-07

## 2021-12-07 LAB
ADENOVIRUS DETECTION BY PCR: NOT DETECTED
AMMONIA: 19 UMOL/L (ref 16–60)
ANION GAP SERPL CALCULATED.3IONS-SCNC: 10 MMOL/L (ref 4–16)
APTT: 25.4 SECONDS (ref 25.1–37.1)
BACTERIA: NEGATIVE /HPF
BILIRUBIN URINE: NEGATIVE MG/DL
BLOOD, URINE: ABNORMAL
BORDETELLA PARAPERTUSSIS BY PCR: NOT DETECTED
BORDETELLA PERTUSSIS PCR: NOT DETECTED
BUN BLDV-MCNC: 9 MG/DL (ref 6–23)
CALCIUM SERPL-MCNC: 9 MG/DL (ref 8.3–10.6)
CHLAMYDOPHILA PNEUMONIA PCR: NOT DETECTED
CHLORIDE BLD-SCNC: 104 MMOL/L (ref 99–110)
CLARITY: CLEAR
CO2: 26 MMOL/L (ref 21–32)
COLOR: ABNORMAL
CORONAVIRUS 229E PCR: NOT DETECTED
CORONAVIRUS HKU1 PCR: NOT DETECTED
CORONAVIRUS NL63 PCR: NOT DETECTED
CORONAVIRUS OC43 PCR: NOT DETECTED
CREAT SERPL-MCNC: 0.5 MG/DL (ref 0.9–1.3)
EKG ATRIAL RATE: 65 BPM
EKG DIAGNOSIS: NORMAL
EKG P AXIS: 47 DEGREES
EKG P-R INTERVAL: 164 MS
EKG Q-T INTERVAL: 446 MS
EKG QRS DURATION: 162 MS
EKG QTC CALCULATION (BAZETT): 463 MS
EKG R AXIS: 233 DEGREES
EKG T AXIS: -29 DEGREES
EKG VENTRICULAR RATE: 65 BPM
GFR AFRICAN AMERICAN: >60 ML/MIN/1.73M2
GFR NON-AFRICAN AMERICAN: >60 ML/MIN/1.73M2
GLUCOSE BLD-MCNC: 122 MG/DL (ref 70–99)
GLUCOSE, URINE: NEGATIVE MG/DL
HCT VFR BLD CALC: 40.8 % (ref 42–52)
HEMOGLOBIN: 13.7 GM/DL (ref 13.5–18)
HUMAN METAPNEUMOVIRUS PCR: NOT DETECTED
INFLUENZA A BY PCR: NOT DETECTED
INFLUENZA A H1 (2009) PCR: NOT DETECTED
INFLUENZA A H1 PANDEMIC PCR: NOT DETECTED
INFLUENZA A H3 PCR: NOT DETECTED
INFLUENZA B BY PCR: NOT DETECTED
INR BLD: 0.95 INDEX
KETONES, URINE: ABNORMAL MG/DL
LEUKOCYTE ESTERASE, URINE: ABNORMAL
MAGNESIUM: 2 MG/DL (ref 1.8–2.4)
MCH RBC QN AUTO: 30.6 PG (ref 27–31)
MCHC RBC AUTO-ENTMCNC: 33.6 % (ref 32–36)
MCV RBC AUTO: 91.3 FL (ref 78–100)
MUCUS: ABNORMAL HPF
MYCOPLASMA PNEUMONIAE PCR: NOT DETECTED
NITRITE URINE, QUANTITATIVE: NEGATIVE
PARAINFLUENZA 1 PCR: NOT DETECTED
PARAINFLUENZA 2 PCR: NOT DETECTED
PARAINFLUENZA 3 PCR: NOT DETECTED
PARAINFLUENZA 4 PCR: NOT DETECTED
PDW BLD-RTO: 13.2 % (ref 11.7–14.9)
PH, URINE: 5 (ref 5–8)
PLATELET # BLD: 171 K/CU MM (ref 140–440)
PMV BLD AUTO: 11.1 FL (ref 7.5–11.1)
POTASSIUM SERPL-SCNC: 3 MMOL/L (ref 3.5–5.1)
PRO-BNP: 43.18 PG/ML
PROCALCITONIN: 0.08
PROTEIN UA: NEGATIVE MG/DL
PROTHROMBIN TIME: 12.2 SECONDS (ref 11.7–14.5)
RAPID INFLUENZA  B AGN: NEGATIVE
RAPID INFLUENZA A AGN: NEGATIVE
RBC # BLD: 4.47 M/CU MM (ref 4.6–6.2)
RBC URINE: 49 /HPF (ref 0–3)
RHINOVIRUS ENTEROVIRUS PCR: NOT DETECTED
RSV PCR: NOT DETECTED
SARS-COV-2, NAAT: NOT DETECTED
SARS-COV-2: NOT DETECTED
SODIUM BLD-SCNC: 140 MMOL/L (ref 135–145)
SOURCE: NORMAL
SPECIFIC GRAVITY UA: 1.02 (ref 1–1.03)
SQUAMOUS EPITHELIAL: 1 /HPF
TRANSITIONAL EPITHELIAL: 2 /HPF
TRICHOMONAS: ABNORMAL /HPF
TROPONIN T: <0.01 NG/ML
UROBILINOGEN, URINE: 2 MG/DL (ref 0.2–1)
WBC # BLD: 7.6 K/CU MM (ref 4–10.5)
WBC UA: 34 /HPF (ref 0–2)

## 2021-12-07 PROCEDURE — 83735 ASSAY OF MAGNESIUM: CPT

## 2021-12-07 PROCEDURE — 85610 PROTHROMBIN TIME: CPT

## 2021-12-07 PROCEDURE — 6370000000 HC RX 637 (ALT 250 FOR IP): Performed by: STUDENT IN AN ORGANIZED HEALTH CARE EDUCATION/TRAINING PROGRAM

## 2021-12-07 PROCEDURE — 94640 AIRWAY INHALATION TREATMENT: CPT

## 2021-12-07 PROCEDURE — 93010 ELECTROCARDIOGRAM REPORT: CPT | Performed by: INTERNAL MEDICINE

## 2021-12-07 PROCEDURE — 96376 TX/PRO/DX INJ SAME DRUG ADON: CPT

## 2021-12-07 PROCEDURE — 83880 ASSAY OF NATRIURETIC PEPTIDE: CPT

## 2021-12-07 PROCEDURE — 82140 ASSAY OF AMMONIA: CPT

## 2021-12-07 PROCEDURE — 6370000000 HC RX 637 (ALT 250 FOR IP): Performed by: FAMILY MEDICINE

## 2021-12-07 PROCEDURE — G0378 HOSPITAL OBSERVATION PER HR: HCPCS

## 2021-12-07 PROCEDURE — 70450 CT HEAD/BRAIN W/O DYE: CPT

## 2021-12-07 PROCEDURE — 96372 THER/PROPH/DIAG INJ SC/IM: CPT

## 2021-12-07 PROCEDURE — 85730 THROMBOPLASTIN TIME PARTIAL: CPT

## 2021-12-07 PROCEDURE — 6360000002 HC RX W HCPCS: Performed by: STUDENT IN AN ORGANIZED HEALTH CARE EDUCATION/TRAINING PROGRAM

## 2021-12-07 PROCEDURE — 81001 URINALYSIS AUTO W/SCOPE: CPT

## 2021-12-07 PROCEDURE — 85027 COMPLETE CBC AUTOMATED: CPT

## 2021-12-07 PROCEDURE — 0202U NFCT DS 22 TRGT SARS-COV-2: CPT

## 2021-12-07 PROCEDURE — 87086 URINE CULTURE/COLONY COUNT: CPT

## 2021-12-07 PROCEDURE — 80048 BASIC METABOLIC PNL TOTAL CA: CPT

## 2021-12-07 PROCEDURE — 87040 BLOOD CULTURE FOR BACTERIA: CPT

## 2021-12-07 PROCEDURE — 2580000003 HC RX 258: Performed by: STUDENT IN AN ORGANIZED HEALTH CARE EDUCATION/TRAINING PROGRAM

## 2021-12-07 PROCEDURE — 2580000003 HC RX 258: Performed by: EMERGENCY MEDICINE

## 2021-12-07 PROCEDURE — 6370000000 HC RX 637 (ALT 250 FOR IP): Performed by: EMERGENCY MEDICINE

## 2021-12-07 PROCEDURE — 6360000002 HC RX W HCPCS: Performed by: EMERGENCY MEDICINE

## 2021-12-07 RX ORDER — AZITHROMYCIN 250 MG/1
250 TABLET, FILM COATED ORAL DAILY
Qty: 4 TABLET | Refills: 0 | Status: SHIPPED | OUTPATIENT
Start: 2021-12-08 | End: 2021-12-12

## 2021-12-07 RX ORDER — GUAIFENESIN/DEXTROMETHORPHAN 100-10MG/5
5 SYRUP ORAL 4 TIMES DAILY PRN
Qty: 120 ML | Refills: 0 | Status: SHIPPED | OUTPATIENT
Start: 2021-12-07 | End: 2021-12-17

## 2021-12-07 RX ORDER — GUAIFENESIN 600 MG/1
600 TABLET, EXTENDED RELEASE ORAL 2 TIMES DAILY
Status: DISCONTINUED | OUTPATIENT
Start: 2021-12-07 | End: 2021-12-09 | Stop reason: HOSPADM

## 2021-12-07 RX ORDER — PAROXETINE HYDROCHLORIDE 20 MG/1
20 TABLET, FILM COATED ORAL EVERY MORNING
Status: DISCONTINUED | OUTPATIENT
Start: 2021-12-07 | End: 2021-12-09 | Stop reason: HOSPADM

## 2021-12-07 RX ORDER — AZITHROMYCIN 250 MG/1
500 TABLET, FILM COATED ORAL DAILY
Status: DISCONTINUED | OUTPATIENT
Start: 2021-12-07 | End: 2021-12-09 | Stop reason: HOSPADM

## 2021-12-07 RX ORDER — ASPIRIN 81 MG/1
81 TABLET ORAL DAILY
Status: DISCONTINUED | OUTPATIENT
Start: 2021-12-07 | End: 2021-12-09 | Stop reason: HOSPADM

## 2021-12-07 RX ORDER — ZONISAMIDE 100 MG/1
100 CAPSULE ORAL EVERY MORNING
Status: DISCONTINUED | OUTPATIENT
Start: 2021-12-07 | End: 2021-12-09 | Stop reason: HOSPADM

## 2021-12-07 RX ORDER — PRIMIDONE 50 MG/1
50 TABLET ORAL 3 TIMES DAILY
Status: DISCONTINUED | OUTPATIENT
Start: 2021-12-07 | End: 2021-12-09 | Stop reason: HOSPADM

## 2021-12-07 RX ORDER — ACETAMINOPHEN 325 MG/1
650 TABLET ORAL EVERY 6 HOURS PRN
Qty: 120 TABLET | Refills: 3 | Status: ON HOLD | OUTPATIENT
Start: 2021-12-07 | End: 2022-08-20 | Stop reason: HOSPADM

## 2021-12-07 RX ORDER — ACETAMINOPHEN 325 MG/1
650 TABLET ORAL EVERY 6 HOURS PRN
Status: DISCONTINUED | OUTPATIENT
Start: 2021-12-07 | End: 2021-12-09 | Stop reason: HOSPADM

## 2021-12-07 RX ORDER — QUETIAPINE FUMARATE 50 MG/1
50 TABLET, EXTENDED RELEASE ORAL NIGHTLY
Status: DISCONTINUED | OUTPATIENT
Start: 2021-12-07 | End: 2021-12-09 | Stop reason: HOSPADM

## 2021-12-07 RX ORDER — TAMSULOSIN HYDROCHLORIDE 0.4 MG/1
0.4 CAPSULE ORAL DAILY
Status: DISCONTINUED | OUTPATIENT
Start: 2021-12-07 | End: 2021-12-09 | Stop reason: HOSPADM

## 2021-12-07 RX ORDER — BENZONATATE 100 MG/1
100 CAPSULE ORAL 3 TIMES DAILY PRN
Qty: 10 CAPSULE | Refills: 0 | Status: SHIPPED | OUTPATIENT
Start: 2021-12-07 | End: 2021-12-14

## 2021-12-07 RX ORDER — TROSPIUM CHLORIDE 20 MG/1
20 TABLET, FILM COATED ORAL
Status: DISCONTINUED | OUTPATIENT
Start: 2021-12-07 | End: 2021-12-09 | Stop reason: HOSPADM

## 2021-12-07 RX ORDER — SODIUM CHLORIDE 0.9 % (FLUSH) 0.9 %
5-40 SYRINGE (ML) INJECTION EVERY 12 HOURS SCHEDULED
Status: DISCONTINUED | OUTPATIENT
Start: 2021-12-07 | End: 2021-12-09 | Stop reason: HOSPADM

## 2021-12-07 RX ORDER — TIMOLOL MALEATE 5 MG/ML
1 SOLUTION/ DROPS OPHTHALMIC DAILY
Status: DISCONTINUED | OUTPATIENT
Start: 2021-12-07 | End: 2021-12-09 | Stop reason: HOSPADM

## 2021-12-07 RX ORDER — LACTULOSE 10 G/15ML
30 SOLUTION ORAL 3 TIMES DAILY
Status: DISCONTINUED | OUTPATIENT
Start: 2021-12-07 | End: 2021-12-09 | Stop reason: HOSPADM

## 2021-12-07 RX ORDER — ACETAMINOPHEN 650 MG/1
650 SUPPOSITORY RECTAL EVERY 6 HOURS PRN
Status: DISCONTINUED | OUTPATIENT
Start: 2021-12-07 | End: 2021-12-09 | Stop reason: HOSPADM

## 2021-12-07 RX ORDER — AZITHROMYCIN 250 MG/1
500 TABLET, FILM COATED ORAL ONCE
Status: COMPLETED | OUTPATIENT
Start: 2021-12-07 | End: 2021-12-07

## 2021-12-07 RX ORDER — SODIUM CHLORIDE 9 MG/ML
25 INJECTION, SOLUTION INTRAVENOUS PRN
Status: DISCONTINUED | OUTPATIENT
Start: 2021-12-07 | End: 2021-12-09 | Stop reason: HOSPADM

## 2021-12-07 RX ORDER — CLOPIDOGREL BISULFATE 75 MG/1
75 TABLET ORAL DAILY
Status: DISCONTINUED | OUTPATIENT
Start: 2021-12-07 | End: 2021-12-09 | Stop reason: HOSPADM

## 2021-12-07 RX ORDER — LATANOPROST 50 UG/ML
1 SOLUTION/ DROPS OPHTHALMIC NIGHTLY
Status: DISCONTINUED | OUTPATIENT
Start: 2021-12-07 | End: 2021-12-09 | Stop reason: HOSPADM

## 2021-12-07 RX ORDER — IPRATROPIUM BROMIDE AND ALBUTEROL SULFATE 2.5; .5 MG/3ML; MG/3ML
1 SOLUTION RESPIRATORY (INHALATION) EVERY 4 HOURS PRN
Status: DISCONTINUED | OUTPATIENT
Start: 2021-12-07 | End: 2021-12-09 | Stop reason: HOSPADM

## 2021-12-07 RX ORDER — BENZONATATE 100 MG/1
100 CAPSULE ORAL 3 TIMES DAILY PRN
Status: DISCONTINUED | OUTPATIENT
Start: 2021-12-07 | End: 2021-12-09 | Stop reason: HOSPADM

## 2021-12-07 RX ORDER — PANTOPRAZOLE SODIUM 40 MG/1
40 TABLET, DELAYED RELEASE ORAL
Status: DISCONTINUED | OUTPATIENT
Start: 2021-12-07 | End: 2021-12-09 | Stop reason: HOSPADM

## 2021-12-07 RX ORDER — LUBIPROSTONE 24 UG/1
24 CAPSULE, GELATIN COATED ORAL
Status: DISCONTINUED | OUTPATIENT
Start: 2021-12-07 | End: 2021-12-09 | Stop reason: HOSPADM

## 2021-12-07 RX ORDER — POLYETHYLENE GLYCOL 3350 17 G/17G
17 POWDER, FOR SOLUTION ORAL DAILY PRN
Status: DISCONTINUED | OUTPATIENT
Start: 2021-12-07 | End: 2021-12-09 | Stop reason: HOSPADM

## 2021-12-07 RX ORDER — CLONAZEPAM 0.5 MG/1
0.25 TABLET ORAL DAILY
Status: DISCONTINUED | OUTPATIENT
Start: 2021-12-07 | End: 2021-12-09 | Stop reason: HOSPADM

## 2021-12-07 RX ORDER — SODIUM CHLORIDE 0.9 % (FLUSH) 0.9 %
5-40 SYRINGE (ML) INJECTION PRN
Status: DISCONTINUED | OUTPATIENT
Start: 2021-12-07 | End: 2021-12-09 | Stop reason: HOSPADM

## 2021-12-07 RX ORDER — PROMETHAZINE HYDROCHLORIDE 25 MG/1
12.5 TABLET ORAL EVERY 6 HOURS PRN
Status: DISCONTINUED | OUTPATIENT
Start: 2021-12-07 | End: 2021-12-09 | Stop reason: HOSPADM

## 2021-12-07 RX ORDER — FOLIC ACID 1 MG/1
1 TABLET ORAL DAILY
Status: DISCONTINUED | OUTPATIENT
Start: 2021-12-07 | End: 2021-12-09 | Stop reason: HOSPADM

## 2021-12-07 RX ORDER — ALBUTEROL SULFATE 90 UG/1
2 AEROSOL, METERED RESPIRATORY (INHALATION) EVERY 4 HOURS PRN
Qty: 18 G | Refills: 1 | Status: SHIPPED | OUTPATIENT
Start: 2021-12-07 | End: 2022-05-09

## 2021-12-07 RX ORDER — DICYCLOMINE HYDROCHLORIDE 10 MG/1
10 CAPSULE ORAL EVERY 6 HOURS PRN
Status: DISCONTINUED | OUTPATIENT
Start: 2021-12-07 | End: 2021-12-09 | Stop reason: HOSPADM

## 2021-12-07 RX ORDER — POTASSIUM CHLORIDE 20 MEQ/1
60 TABLET, EXTENDED RELEASE ORAL 2 TIMES DAILY WITH MEALS
Status: DISCONTINUED | OUTPATIENT
Start: 2021-12-07 | End: 2021-12-08

## 2021-12-07 RX ORDER — ATORVASTATIN CALCIUM 40 MG/1
80 TABLET, FILM COATED ORAL NIGHTLY
Status: DISCONTINUED | OUTPATIENT
Start: 2021-12-07 | End: 2021-12-09 | Stop reason: HOSPADM

## 2021-12-07 RX ORDER — LACTOBACILLUS RHAMNOSUS GG 10B CELL
1 CAPSULE ORAL DAILY
Status: DISCONTINUED | OUTPATIENT
Start: 2021-12-07 | End: 2021-12-09 | Stop reason: HOSPADM

## 2021-12-07 RX ORDER — DIVALPROEX SODIUM 500 MG/1
1000 TABLET, DELAYED RELEASE ORAL NIGHTLY
Status: DISCONTINUED | OUTPATIENT
Start: 2021-12-07 | End: 2021-12-09 | Stop reason: HOSPADM

## 2021-12-07 RX ORDER — AMLODIPINE BESYLATE 5 MG/1
10 TABLET ORAL DAILY
Status: DISCONTINUED | OUTPATIENT
Start: 2021-12-07 | End: 2021-12-09 | Stop reason: HOSPADM

## 2021-12-07 RX ADMIN — ENOXAPARIN SODIUM 40 MG: 100 INJECTION SUBCUTANEOUS at 11:39

## 2021-12-07 RX ADMIN — ALBUTEROL SULFATE 2 PUFF: 90 AEROSOL, METERED RESPIRATORY (INHALATION) at 01:56

## 2021-12-07 RX ADMIN — GUAIFENESIN 600 MG: 600 TABLET, EXTENDED RELEASE ORAL at 11:31

## 2021-12-07 RX ADMIN — TIMOLOL MALEATE 1 DROP: 5 SOLUTION OPHTHALMIC at 11:00

## 2021-12-07 RX ADMIN — LACTULOSE 30 G: 10 SOLUTION ORAL at 11:30

## 2021-12-07 RX ADMIN — ASPIRIN 81 MG: 81 TABLET, COATED ORAL at 11:39

## 2021-12-07 RX ADMIN — AMLODIPINE BESYLATE 10 MG: 5 TABLET ORAL at 11:29

## 2021-12-07 RX ADMIN — TAMSULOSIN HYDROCHLORIDE 0.4 MG: 0.4 CAPSULE ORAL at 11:30

## 2021-12-07 RX ADMIN — DIVALPROEX SODIUM 1000 MG: 500 TABLET, DELAYED RELEASE ORAL at 22:47

## 2021-12-07 RX ADMIN — LATANOPROST 1 DROP: 50 SOLUTION/ DROPS OPHTHALMIC at 23:56

## 2021-12-07 RX ADMIN — Medication 1 CAPSULE: at 11:29

## 2021-12-07 RX ADMIN — CLOPIDOGREL BISULFATE 75 MG: 75 TABLET, FILM COATED ORAL at 11:29

## 2021-12-07 RX ADMIN — PAROXETINE HYDROCHLORIDE 20 MG: 20 TABLET, FILM COATED ORAL at 11:32

## 2021-12-07 RX ADMIN — QUETIAPINE FUMARATE 50 MG: 50 TABLET, EXTENDED RELEASE ORAL at 23:56

## 2021-12-07 RX ADMIN — POTASSIUM CHLORIDE 60 MEQ: 1500 TABLET, EXTENDED RELEASE ORAL at 11:30

## 2021-12-07 RX ADMIN — TROSPIUM CHLORIDE 20 MG: 20 TABLET ORAL at 16:40

## 2021-12-07 RX ADMIN — GUAIFENESIN 600 MG: 600 TABLET, EXTENDED RELEASE ORAL at 22:48

## 2021-12-07 RX ADMIN — PRIMIDONE 50 MG: 50 TABLET ORAL at 11:31

## 2021-12-07 RX ADMIN — Medication 1000 UNITS: at 11:29

## 2021-12-07 RX ADMIN — FOLIC ACID 1 MG: 1 TABLET ORAL at 11:39

## 2021-12-07 RX ADMIN — TROSPIUM CHLORIDE 20 MG: 20 TABLET ORAL at 11:32

## 2021-12-07 RX ADMIN — ATORVASTATIN CALCIUM 80 MG: 40 TABLET, FILM COATED ORAL at 22:47

## 2021-12-07 RX ADMIN — SODIUM CHLORIDE, PRESERVATIVE FREE 10 ML: 5 INJECTION INTRAVENOUS at 22:48

## 2021-12-07 RX ADMIN — CEFTRIAXONE 1000 MG: 1 INJECTION, POWDER, FOR SOLUTION INTRAMUSCULAR; INTRAVENOUS at 04:03

## 2021-12-07 RX ADMIN — SODIUM CHLORIDE, PRESERVATIVE FREE 10 ML: 5 INJECTION INTRAVENOUS at 11:29

## 2021-12-07 RX ADMIN — Medication 2 PUFF: at 01:56

## 2021-12-07 RX ADMIN — PRIMIDONE 50 MG: 50 TABLET ORAL at 22:47

## 2021-12-07 RX ADMIN — LACTULOSE 30 G: 10 SOLUTION ORAL at 22:47

## 2021-12-07 RX ADMIN — LUBIPROSTONE 24 MCG: 24 CAPSULE, GELATIN COATED ORAL at 11:31

## 2021-12-07 RX ADMIN — CLONAZEPAM 0.25 MG: 0.5 TABLET ORAL at 11:39

## 2021-12-07 RX ADMIN — POTASSIUM CHLORIDE 60 MEQ: 1500 TABLET, EXTENDED RELEASE ORAL at 16:39

## 2021-12-07 RX ADMIN — ZONISAMIDE 100 MG: 100 CAPSULE ORAL at 11:31

## 2021-12-07 RX ADMIN — AZITHROMYCIN 500 MG: 250 TABLET, FILM COATED ORAL at 00:39

## 2021-12-07 RX ADMIN — LEVOTHYROXINE SODIUM 125 MCG: 25 TABLET ORAL at 06:32

## 2021-12-07 RX ADMIN — AZITHROMYCIN MONOHYDRATE 500 MG: 250 TABLET ORAL at 22:47

## 2021-12-07 RX ADMIN — PANTOPRAZOLE SODIUM 40 MG: 40 TABLET, DELAYED RELEASE ORAL at 06:32

## 2021-12-07 ASSESSMENT — ENCOUNTER SYMPTOMS
COUGH: 1
SHORTNESS OF BREATH: 1

## 2021-12-07 NOTE — ED PROVIDER NOTES
CARE RECEIVED FROM: Dr. Elodia Lyons  I reviewed the nixon elements of the history, physical exam and initial treatment plan at the bedside. ANCILLARY DATA:  I reviewed the images. Radiologist interpretation:   CT HEAD WO CONTRAST   Final Result   No acute intracranial abnormality. Chronic nonemergent findings as above. XR CHEST PORTABLE   Final Result   1. Bibasilar opacities, differential considerations to include aspiration or   bacterial pneumonia           Labs Reviewed   CBC WITH AUTO DIFFERENTIAL - Abnormal; Notable for the following components:       Result Value    Segs Relative 69.8 (*)     Lymphocytes % 12.1 (*)     Monocytes % 14.3 (*)     Immature Neutrophil % 0.7 (*)     All other components within normal limits   COMPREHENSIVE METABOLIC PANEL - Abnormal; Notable for the following components:    CREATININE 0.6 (*)     Glucose 118 (*)     All other components within normal limits   URINALYSIS - Abnormal; Notable for the following components:    Color, UA GISSELLE (*)     Ketones, Urine MODERATE (*)     Blood, Urine LARGE (*)     Urobilinogen, Urine 2.0 (*)     Leukocyte Esterase, Urine SMALL (*)     RBC, UA 49 (*)     WBC, UA 34 (*)     Mucus, UA MODERATE (*)     All other components within normal limits   COVID-19, RAPID   RAPID INFLUENZA A/B ANTIGENS   CULTURE, BLOOD 1   CULTURE, BLOOD 2   CULTURE, URINE   RESPIRATORY PANEL, MOLECULAR, WITH COVID-19   TROPONIN   PROTIME/INR & PTT   AMMONIA   BRAIN NATRIURETIC PEPTIDE   PROCALCITONIN     MEDICAL DECISION MAKING / PLAN:  This is a 28-year-old male with history of CVA and seizure disorder that lives in a group nursing home who presented initially with reports of cough and fatigue. His initial work-up demonstrated some bibasilar opacities on chest x-ray with negative COVID-19 testing and negative influenza testing. Plan was for discharge back to his facility. Patient had been provided with azithromycin for treatment of bacterial pneumonia.   The patient's sister and power of  arrived and states that he is more confused than usual and has more weakness than usual.  Therefore CT of the head, ammonia level, and urinalysis were ordered and pending at time of signout. CT of the head demonstrates chronic encephalomalacia. Urinalysis shows pyuria without bacteriuria. Ammonia level within normal limits. At this time Rocephin was added for treatment of community acquired pneumonia. At this time, we will work to hospitalize patient for encephalopathy with presumed bacterial pneumonia. I did order respiratory panel as well. Patient discussed with Dr. Ludy Rizzo of hospitalist team who agreed to hospitalize patient for further management. FINAL IMPRESSION:  1. Pneumonia due to infectious organism, unspecified laterality, unspecified part of lung    2. Cough    3. Fatigue, unspecified type    4. Altered mental status, unspecified altered mental status type      ? Electronically signed by:  Keshia Leong DO, 12/7/2021        Keshia Leong DO  12/07/21 4503 [facility],   12/07/21 [id number]

## 2021-12-07 NOTE — ED PROVIDER NOTES
[facility]      TRIAGE CHIEF COMPLAINT:   Fatigue and Cough      [place]:  Bridgett Montoya is a 73 y.o. male that presents arrives from group home with complaint of cough, fatigue, malaise. History of seizures no reported seizures today. Patient on arrival is by himself he does have a history of cerebral palsy has a, and on no reports of fall or trauma. Patient states he has been coughing does not feel well. He denies other questions or concerns to me. Initially no family or other staff present. Again he is from the group home. Has been vaccinated against Covid. No other questions or concerns on arrival.. REVIEW OF SYSTEMS:  At least 10 systems reviewed and otherwise acutely negative except as in the [address] Review of Systems   Unable to perform ROS: Other   Constitutional: Positive for activity change, appetite change, chills and fatigue. Respiratory: Positive for cough and shortness of breath. Neurological: Positive for weakness. All other systems reviewed and are negative.       Past Medical History:   Diagnosis Date    Acute CVA (cerebrovascular accident) ([address] 10/10/2021    Anemia     Aspiration pneumonia ([address]     dx 6/9/2014 with this per ecf/ per old chart dx with pneumonia with admission 9/18/2018    Cerebral palsy ([address]     \"has no feeling on left side of body\"alert but has some dementia but not diagnosed, has good long term but poor short term and wakes up disoriented after a nap\"(per yaneth)(2014)    Depression     Epilepsy ([address] 1961    last seizure 2/2018- hx grand mal    Frequent falls     with phone assess- family stated pt fell this am (7/10/2013\"in the process of trying to find a facility to help build him back up- ([facility] now [facility])    Glaucoma     \"has been in treatment for this in the past- no treatment needed at present\"    History of IBS     Hx MRSA infection     + nasal culture 4/2014    Hyperammonemia ([address]     Hypertension     on Lisinopril    IBS (irritable bowel syndrome)     ulcerative colitis    Kidney stone     for surgery for stent placement 7/11/2013    Mood disorder ([facility]     per staff at [facility] (methicillin resistant staph aureus) culture positive 05/02/2014 05/27/20 nasal    MRSA (methicillin resistant staph aureus) culture positive 11/25/2020    Face; 10/8/21    Occasional tremors     \"makes it difficult for him to write\"    Pressure injury of contiguous region involving back and right buttock, stage 2 ([facility] 05/27/2020    Pressure injury of left buttock, stage 1 07/22/2020    Prostatitis     hx given per H&P old chart    Thyroid disease     Ulcerative colitis ([facility]     hx given per staff at [facility] 4/13/2015     Past Surgical History:   Procedure Laterality Date    CHOLECYSTECTOMY      COLONOSCOPY  8/19/14, 5/2012 8/19/14: hemorrhoids, 5/2012 at [facility]  02/04/2021    COLONOSCOPY N/A 2/4/2021    COLONOSCOPY POLYPECTOMY SNARE/COLD BIOPSY performed by Wesley Lakhani MD at [place] [address] Left 07/11/2013    with stnet placement    FACIAL SURGERY N/A 10/8/2021    FACIAL INCISION AND DRAINAGE performed by Dario Medrano MD at [address] [facility]  2005   1 Govind Holcomb OTHER SURGICAL HISTORY  04/15/2015    Revision of vagal nerve stimulator    STIMULATOR SURGERY N/A 3/24/2021    STIMULATOR INSERTION performed by Karlene Marrero MD at [address] [facility] [address] 3/24/2021    STIMULATOR INSERTION STAGE 2 performed by Karlene Marrero MD at [facility]. N/A 11/13/2018    EGD DILATION BALLOON AND BIOPSY performed by Stacie Hedrick MD at [place] [address]    Has to have bettery changed every 5 yrs.       Family History   Problem Relation Age of Onset    Heart Disease Mother         atrial fib    High Blood Pressure Mother     Asthma Mother     High Cholesterol Mother     Depression Mother     Migraines Mother     High Blood Pressure Father     Heart Disease Father     Asthma Sister     High Cholesterol Sister     Depression Sister     Migraines Sister      Social History     Socioeconomic History    Marital status: Single     Spouse name: Not on file    Number of children: Not on file    Years of education: Not on file    Highest education level: Not on file   Occupational History    Not on file   Tobacco Use    Smoking status: Never Smoker    Smokeless tobacco: Never Used   Vaping Use    Vaping Use: Never used   Substance and Sexual Activity    Alcohol use: No    Drug use: No    Sexual activity: Not on file   Other Topics Concern    Not on file   Social History Narrative    Not on file     Social Determinants of Health     Financial Resource Strain:     Difficulty of Paying Living Expenses: Not on file   Food Insecurity:     Worried About [employer] in the Last Year: Not on file    Mandie of Food in the Last Year: Not on file   Transportation Needs:     Lack of Transportation (Medical): Not on file    Lack of Transportation (Non-Medical):  Not on file   Physical Activity:     Days of Exercise per Week: Not on file    Minutes of Exercise per Session: Not on file   Stress:     Feeling of Stress : Not on file   Social Connections:     Frequency of Communication with Friends and Family: Not on file    Frequency of Social Gatherings with Friends and Family: Not on file    Attends Mormonism Services: Not on file    Active Member of Clubs or Organizations: Not on file    Attends Club or Organization Meetings: Not on file    Marital Status: Not on file   Intimate Partner Violence:     Fear of Current or Ex-Partner: Not on file    Emotionally Abused: Not on file    Physically Abused: Not on file    Sexually Abused: Not on file   Housing Stability:     Unable to Pay for Housing in the Last Year: Not on file    Number of [place] in the Last Year: Not on file    Unstable Housing in the Last Year: Not on file     Current Facility-Administered Medications   Medication Dose Route Frequency Provider Last Rate Last Admin    albuterol sulfate  (90 Base) MCG/ACT inhaler 2 puff  2 puff Inhalation Once Meredith Rump, DO        ipratropium (ATROVENT HFA) 17 MCG/ACT inhaler 2 puff  2 puff Inhalation Once Meredith Rump, DO         Current Outpatient Medications   Medication Sig Dispense Refill    guaiFENesin-dextromethorphan (ROBITUSSIN DM) 100-10 MG/5ML syrup Take 5 mLs by mouth 4 times daily as needed for Cough 120 mL 0    benzonatate (TESSALON PERLES) 100 MG capsule Take 1 capsule by mouth 3 times daily as needed for Cough 10 capsule 0    [START ON 12/8/2021] azithromycin (ZITHROMAX) 250 MG tablet Take 1 tablet by mouth daily for 4 days 4 tablet 0    albuterol sulfate HFA (PROVENTIL HFA) 108 (90 Base) MCG/ACT inhaler Inhale 2 puffs into the lungs every 4 hours as needed for Wheezing or Shortness of Breath With spacer (and mask if indicated). Thanks.  18 g 1    acetaminophen (TYLENOL) 325 MG tablet Take 2 tablets by mouth every 6 hours as needed for Pain 120 tablet 3    aspirin 81 MG EC tablet Take 1 tablet by mouth daily 30 tablet 3    atorvastatin (LIPITOR) 80 MG tablet Take 1 tablet by mouth nightly 30 tablet 3    psyllium (HYDROCIL) 95 % PACK packet Take 1 packet by mouth 2 times daily 240 each 0    clopidogrel (PLAVIX) 75 MG tablet Take 1 tablet by mouth daily 30 tablet 3    amLODIPine (NORVASC) 10 MG tablet Take 1 tablet by mouth daily 30 tablet 3    AMITIZA 24 MCG capsule       mirabegron (MYRBETRIQ) 50 MG TB24 Take 50 mg by mouth daily      solifenacin (VESICARE) 10 MG tablet Take 10 mg by mouth daily      acetaminophen (APAP EXTRA STRENGTH) 500 MG tablet Take 1 tablet by mouth every 6 hours as needed for Pain 30 tablet 0    levothyroxine (SYNTHROID) 125 MCG tablet Take 1 tablet by mouth Daily 30 tablet 3    dicyclomine (BENTYL) 10 MG capsule Take 1 capsule by mouth every 6 hours as needed (cramps) 20 capsule 0    clonazePAM (KLONOPIN) 0.5 MG tablet Take 0.25 mg by mouth daily.  divalproex (DEPAKOTE) 500 MG DR tablet Take 1,000 mg by mouth nightly      omeprazole (PRILOSEC) 20 MG delayed release capsule Take 20 mg by mouth 2 times daily (before meals)      primidone (MYSOLINE) 50 MG tablet Take 50 mg by mouth 3 times daily      zonisamide (ZONEGRAN) 100 MG capsule Take 100 mg by mouth every morning      Simethicone (MI-ACID GAS RELIEF PO) Take 1 Container by mouth every 4 hours as needed 10 cc every 4 hrs not to exceed 60 cc in 24 hrs      guaiFENesin (MUCINEX) 600 MG extended release tablet Take 1,200 mg by mouth daily as needed for Congestion      Menthol-Methyl Salicylate (MUSCLE RUB) 10-15 % CREA cream Apply 1 applicator topically as needed      pseudoephedrine (SUDAFED) 30 MG tablet Take 30 mg by mouth every 4 hours as needed for Congestion (Patient not taking: Reported on 9/24/2021)      lacosamide (VIMPAT) 200 MG tablet Take 1 tablet by mouth 2 times daily for 30 days. 60 tablet 0    latanoprost (XALATAN) 0.005 % ophthalmic solution Place 1 drop into both eyes nightly      timolol (TIMOPTIC) 0.5 % ophthalmic solution Place 1 drop into both eyes daily       Lactobacillus (ACIDOPHILUS) CAPS capsule Take 1 capsule by mouth daily      Wheat Dextrin (BENEFIBER) POWD Take 4 g by mouth 2 times daily Takes 3 tsp in liquid twice per day       Lactulose Encephalopathy (ENULOSE PO) Take 30 mLs by mouth 3 times daily      folic acid (FOLVITE) 1 MG tablet Take 1 mg by mouth daily      PARoxetine (PAXIL) 20 MG tablet Take 20 mg by mouth every morning      QUEtiapine (SEROQUEL XR) 50 MG extended release tablet Take 50 mg by mouth nightly      tamsulosin (FLOMAX) 0.4 MG capsule Take 1 capsule by mouth daily 30 capsule 0    Cholecalciferol (VITAMIN D3) 1000 UNITS CAPS Take 1 tablet by mouth daily.       clorazepate (TRANXENE) 7.5 MG tablet Take 7.5 mg by mouth 2 times daily. (Patient not taking: Reported on 11/4/2021)        No Known Allergies  Current Facility-Administered Medications   Medication Dose Route Frequency Provider Last Rate Last Admin    albuterol sulfate  (90 Base) MCG/ACT inhaler 2 puff  2 puff Inhalation Once Christopher Vinson DO        ipratropium (ATROVENT HFA) 17 MCG/ACT inhaler 2 puff  2 puff Inhalation Once Christopher Vinson DO         Current Outpatient Medications   Medication Sig Dispense Refill    guaiFENesin-dextromethorphan (ROBITUSSIN DM) 100-10 MG/5ML syrup Take 5 mLs by mouth 4 times daily as needed for Cough 120 mL 0    benzonatate (TESSALON PERLES) 100 MG capsule Take 1 capsule by mouth 3 times daily as needed for Cough 10 capsule 0    [START ON 12/8/2021] azithromycin (ZITHROMAX) 250 MG tablet Take 1 tablet by mouth daily for 4 days 4 tablet 0    albuterol sulfate HFA (PROVENTIL HFA) 108 (90 Base) MCG/ACT inhaler Inhale 2 puffs into the lungs every 4 hours as needed for Wheezing or Shortness of Breath With spacer (and mask if indicated). Thanks.  18 g 1    acetaminophen (TYLENOL) 325 MG tablet Take 2 tablets by mouth every 6 hours as needed for Pain 120 tablet 3    aspirin 81 MG EC tablet Take 1 tablet by mouth daily 30 tablet 3    atorvastatin (LIPITOR) 80 MG tablet Take 1 tablet by mouth nightly 30 tablet 3    psyllium (HYDROCIL) 95 % PACK packet Take 1 packet by mouth 2 times daily 240 each 0    clopidogrel (PLAVIX) 75 MG tablet Take 1 tablet by mouth daily 30 tablet 3    amLODIPine (NORVASC) 10 MG tablet Take 1 tablet by mouth daily 30 tablet 3    AMITIZA 24 MCG capsule       mirabegron (MYRBETRIQ) 50 MG TB24 Take 50 mg by mouth daily      solifenacin (VESICARE) 10 MG tablet Take 10 mg by mouth daily      acetaminophen (APAP EXTRA STRENGTH) 500 MG tablet Take 1 tablet by mouth every 6 hours as needed for Pain 30 tablet 0    levothyroxine (SYNTHROID) 125 MCG tablet Take 1 tablet by mouth Daily 30 tablet 3    dicyclomine (BENTYL) 10 MG capsule Take 1 capsule by mouth every 6 hours as needed (cramps) 20 capsule 0    clonazePAM (KLONOPIN) 0.5 MG tablet Take 0.25 mg by mouth daily.  divalproex (DEPAKOTE) 500 MG DR tablet Take 1,000 mg by mouth nightly      omeprazole (PRILOSEC) 20 MG delayed release capsule Take 20 mg by mouth 2 times daily (before meals)      primidone (MYSOLINE) 50 MG tablet Take 50 mg by mouth 3 times daily      zonisamide (ZONEGRAN) 100 MG capsule Take 100 mg by mouth every morning      Simethicone (MI-ACID GAS RELIEF PO) Take 1 Container by mouth every 4 hours as needed 10 cc every 4 hrs not to exceed 60 cc in 24 hrs      guaiFENesin (MUCINEX) 600 MG extended release tablet Take 1,200 mg by mouth daily as needed for Congestion      Menthol-Methyl Salicylate (MUSCLE RUB) 10-15 % CREA cream Apply 1 applicator topically as needed      pseudoephedrine (SUDAFED) 30 MG tablet Take 30 mg by mouth every 4 hours as needed for Congestion (Patient not taking: Reported on 9/24/2021)      lacosamide (VIMPAT) 200 MG tablet Take 1 tablet by mouth 2 times daily for 30 days.  60 tablet 0    latanoprost (XALATAN) 0.005 % ophthalmic solution Place 1 drop into both eyes nightly      timolol (TIMOPTIC) 0.5 % ophthalmic solution Place 1 drop into both eyes daily       Lactobacillus (ACIDOPHILUS) CAPS capsule Take 1 capsule by mouth daily      Wheat Dextrin (BENEFIBER) POWD Take 4 g by mouth 2 times daily Takes 3 tsp in liquid twice per day       Lactulose Encephalopathy (ENULOSE PO) Take 30 mLs by mouth 3 times daily      folic acid (FOLVITE) 1 MG tablet Take 1 mg by mouth daily      PARoxetine (PAXIL) 20 MG tablet Take 20 mg by mouth every morning      QUEtiapine (SEROQUEL XR) 50 MG extended release tablet Take 50 mg by mouth nightly      tamsulosin (FLOMAX) 0.4 MG capsule Take 1 capsule by mouth daily 30 capsule 0    Cholecalciferol (VITAMIN D3) 1000 UNITS CAPS Take 1 tablet by mouth daily.  clorazepate (TRANXENE) 7.5 MG tablet Take 7.5 mg by mouth 2 times daily. (Patient not taking: Reported on 11/4/2021)         Nursing Notes Reviewed    VITAL SIGNS:  ED Triage Vitals [12/06/21 2151]   Enc Vitals Group      /62      Pulse 68      Resp 16      Temp 97.8 °F (36.6 °C)      Temp Source Oral      SpO2 94 %      Weight       Height       Head Circumference       Peak Flow       Pain Score       Pain Loc       Pain Edu? Excl. in [address]? PHYSICAL EXAM:  Physical Exam  Vitals and nursing note reviewed. Constitutional:       General: He is not in acute distress. Appearance: Normal appearance. He is well-developed and well-groomed. He is not ill-appearing, toxic-appearing or diaphoretic. HENT:      Head: Normocephalic and atraumatic. Right Ear: External ear normal.      Left Ear: External ear normal.      Nose: Congestion present. No rhinorrhea. Eyes:      General: No scleral icterus. Right eye: No discharge. Left eye: No discharge. Extraocular Movements: Extraocular movements intact. Conjunctiva/sclera: Conjunctivae normal.   Neck:      Vascular: No JVD. Trachea: Phonation normal.   Cardiovascular:      Rate and Rhythm: Normal rate and regular rhythm. Pulses: Normal pulses. Heart sounds: Normal heart sounds. No murmur heard. No friction rub. No gallop. Pulmonary:      Effort: Pulmonary effort is normal. No respiratory distress. Breath sounds: Normal breath sounds. No stridor. No wheezing, rhonchi or rales. Comments: Cough present  Abdominal:      General: Bowel sounds are normal. There is no distension. Palpations: Abdomen is soft. There is no mass. Tenderness: There is no abdominal tenderness. There is no guarding or rebound. Negative signs include Nicole's sign, Rovsing's sign and McBurney's sign. Hernia: No hernia is present.    Musculoskeletal:         General: No swelling, tenderness, deformity or signs of injury. Normal range of motion. Cervical back: Full passive range of motion without pain and normal range of motion. No edema, erythema, signs of trauma, rigidity, torticollis or crepitus. No pain with movement. Normal range of motion. Right lower leg: No edema. Left lower leg: No edema. Skin:     General: Skin is warm. Coloration: Skin is not jaundiced or pale. Findings: No bruising, erythema, lesion or rash. Neurological:      General: No focal deficit present. Mental Status: He is alert. He is disoriented and confused. GCS: GCS eye subscore is 4. GCS verbal subscore is 4. GCS motor subscore is 6. Cranial Nerves: Cranial nerves are intact. No cranial nerve deficit, dysarthria or facial asymmetry. Sensory: Sensation is intact. No sensory deficit. Motor: Motor function is intact. No weakness, tremor, atrophy, abnormal muscle tone or seizure activity. Coordination: Coordination normal.   Psychiatric:         Mood and Affect: Mood normal.         Behavior: Behavior normal. Behavior is cooperative. Thought Content:  Thought content normal.         Judgment: Judgment normal.           I have reviewed andinterpreted all of the currently available lab results from this visit (if applicable):    Results for orders placed or performed during the hospital encounter of 12/06/21   COVID-19, Rapid    Specimen: Nasopharyngeal   Result Value Ref Range    Source THROAT     SARS-CoV-2, NAAT NOT DETECTED NOT DETECTED   Rapid Flu Swab    Specimen: Nasopharyngeal   Result Value Ref Range    Rapid Influenza A Ag NEGATIVE NEGATIVE    Rapid Influenza B Ag NEGATIVE NEGATIVE   CBC Auto Differential   Result Value Ref Range    WBC 9.9 4.0 - 10.5 K/CU MM    RBC 4.81 4.6 - 6.2 M/CU MM    Hemoglobin 14.8 13.5 - 18.0 GM/DL    Hematocrit 44.1 42 - 52 %    MCV 91.7 78 - 100 FL    MCH 30.8 27 - 31 PG    MCHC 33.6 32.0 - 36.0 %    RDW 13.0 11.7 - 14.9 % Platelets 692 567 - 799 K/CU MM    MPV 10.8 7.5 - 11.1 FL    Differential Type AUTOMATED DIFFERENTIAL     Segs Relative 69.8 (H) 36 - 66 %    Lymphocytes % 12.1 (L) 24 - 44 %    Monocytes % 14.3 (H) 0 - 4 %    Eosinophils % 2.9 0 - 3 %    Basophils % 0.2 0 - 1 %    Segs Absolute 6.9 K/CU MM    Lymphocytes Absolute 1.2 K/CU MM    Monocytes Absolute 1.4 K/CU MM    Eosinophils Absolute 0.3 K/CU MM    Basophils Absolute 0.0 K/CU MM    Nucleated RBC % 0.0 %    Total Nucleated RBC 0.0 K/CU MM    Total Immature Neutrophil 0.07 K/CU MM    Immature Neutrophil % 0.7 (H) 0 - 0.43 %   CMP   Result Value Ref Range    Sodium 136 135 - 145 MMOL/L    Potassium 3.5 3.5 - 5.1 MMOL/L    Chloride 102 99 - 110 mMol/L    CO2 25 21 - 32 MMOL/L    BUN 8 6 - 23 MG/DL    CREATININE 0.6 (L) 0.9 - 1.3 MG/DL    Glucose 118 (H) 70 - 99 MG/DL    Calcium 9.6 8.3 - 10.6 MG/DL    Albumin 3.8 3.4 - 5.0 GM/DL    Total Protein 7.8 6.4 - 8.2 GM/DL    Total Bilirubin 0.4 0.0 - 1.0 MG/DL    ALT 26 10 - 40 U/L    AST 21 15 - 37 IU/L    Alkaline Phosphatase 94 40 - 129 IU/L    GFR Non-African American >60 >60 mL/min/1.73m2    GFR African American >60 >60 mL/min/1.73m2    Anion Gap 9 4 - 16   EKG 12 Lead   Result Value Ref Range    Ventricular Rate 65 BPM    Atrial Rate 65 BPM    P-R Interval 164 ms    QRS Duration 162 ms    Q-T Interval 446 ms    QTc Calculation (Bazett) 463 ms    P Axis 47 degrees    R Axis 233 degrees    T Axis -29 degrees    Diagnosis       Normal sinus rhythm  Right bundle branch block  Abnormal ECG  When compared with ECG of 01-OCT-2021 22:28,  No significant change was found          Radiographs (if obtained):  [] The following radiograph was interpreted by myself in the absence of a radiologist:  [x] Radiologist's Report Reviewed:    CXR, CT Brain    EKG (if obtained): (All EKG's are interpreted by myself in the absence of a cardiologist)    12 lead EKG per my interpretation:  Normal Sinus Rhythm 65  RBBB  Axis is   Normal  QTc is 463  There is specific T wave changes appreciated. Inverted T wave III, V2  There is no specific ST wave changes appreciated. Prior EKG to compare with was not available     MDM:    Patient with complaint of cough, URI symptoms, fatigue malaise. Again from a group home came by EMS. History of cerebral palsy history of stroke history of seizures no history of seizures today. Has been vaccinated for COVID-19. Again on arrival he is in no distress vital signs are stable. He does have a cough present. I did do labs and imaging. X-ray shows pneumonia starting rapid Covid and flu are negative. Vital signs remained stable. I gave him cough medicine, azithromycin. Labs otherwise so far unremarkable. He has no urinary complaints. I was going to discharge patient and I went to check on him back to group home however his sister, power of  is here she states she is worried about a stroke as he has some slurred speech and the group home is worried about a stroke no one mention this to me earlier. They could I do not see any signs of unilateral weakness or paresthesias or slurred speech or facial droop. Sister, power of  states that whenever he has his dentures out he has slurred speech. Blood sugar was normal on arrival with blood work. Also do not have a urinalysis. Sister, power of  also worried about increasing confusion, stroke symptoms. She would also like an ammonia level checked as he is on lactulose. Sister like patient admitted for stroke work-up, altered mental status work-up in addition to the pneumonia that he has. Again I was not aware that he had these symptoms prior to her involvement. I did order a CT brain and ammonia level. Patient likely need admission for altered mental status although I doubt he was at baseline on arrival.  Apparently not per sister. Patient will be signed out pending imaging and further lab work will likely need admitted.  Patient will be signed out to Dr. Jareth Hernandez. CLINICAL IMPRESSION:  Final diagnoses:   Cough   Fatigue, unspecified type   Pneumonia due to infectious organism, unspecified laterality, unspecified part of lung   Altered mental status, unspecified altered mental status type       (Please note that portions of this note may have been completed with a voice recognition program. Efforts were made to edit the dictations but occasionally words aremis-transcribed.)    DISPOSITION REFERRAL (if applicable):  Jonathon Vann, APRN - CNP  [address] [place] [address]  [phone]    Schedule an appointment as soon as possible for a visit in 1 day      [facility] Emergency Department  [facility] [id number] [phone]    If symptoms worsen      DISPOSITION MEDICATIONS (if applicable):  New Prescriptions    ACETAMINOPHEN (TYLENOL) 325 MG TABLET    Take 2 tablets by mouth every 6 hours as needed for Pain    ALBUTEROL SULFATE HFA (PROVENTIL HFA) 108 (90 BASE) MCG/ACT INHALER    Inhale 2 puffs into the lungs every 4 hours as needed for Wheezing or Shortness of Breath With spacer (and mask if indicated). Thanks.     AZITHROMYCIN (ZITHROMAX) 250 MG TABLET    Take 1 tablet by mouth daily for 4 days    BENZONATATE (TESSALON PERLES) 100 MG CAPSULE    Take 1 capsule by mouth 3 times daily as needed for Cough    GUAIFENESIN-DEXTROMETHORPHAN (ROBITUSSIN DM) 100-10 MG/5ML SYRUP    Take 5 mLs by mouth 4 times daily as needed for Cough          DO Mirza Whalen DO  12/07/21 9146

## 2021-12-07 NOTE — ED TRIAGE NOTES
Pt presents from group home with c/o weakness and cough this week. Pt denies any pain, Poor historian.

## 2021-12-07 NOTE — PROGRESS NOTES
Hospitalist Progress Note      Name:  Annette Cedeño /Age/Sex: 1955  (73 y.o. male)   MRN & CSN:  [id number] & [id number] Admission Date/Time: 2021  9:43 PM   Location:  [facility] PCP: Huan Alves 112 Day: 2    Assessment and Plan:   Annette Cedeño is a 73 y.o.  male  who presents with Encephalopathy acute    1. Community acquired pneumonia        Acute encephalopathy~improving          1. XR () shows bibasilar opacities        2. Ct head () shows no acute intracranial abnormality        3. No leukocytosis        4. Blood Cx's pending          5. Procalcitonin () 0.076        6. covid negative, influenza negative         7. Respiratory panel negative         8. Azithromycin and Rocephin ordered     2. Hypokalemia        1. K () 3.0        2. KDur ordered        3. Monitor     3. UTI        1. UA () shows large blood, urobilinogen, small leuks, RBC, WBC, mucus        2. Urine CX pending         3. On Rocephin for CAP     4. Essential hypertension        1. Continue amlodipine    Diet ADULT DIET; Regular   DVT Prophylaxis [x] Lovenox, []  Heparin, [] SCDs, [] Ambulation   GI Prophylaxis [] PPI,  [] H2 Blocker,  [] Carafate,  [x] Diet/Tube Feeds   Code Status Full Code   Disposition Patient requires continued admission due to CAP          History of Present Illness:     Chief Complaint: Encephalopathy acute  Annette Cedeño is a 73 y.o.  male  who presents with confusion and found to have pneumonia. Pt has cerebral palsy and is slow to answer questions. Pt is oriented to person, place and time. Pt states that he is ready to get out of here. Pt denies any pain or shortness of breath.         Ten point ROS reviewed negative, unless as noted above    Objective:   No intake or output data in the 24 hours ending 21 1456   Vitals:   Vitals:    21 1115   BP: (!) 145/66   Pulse: 62   Resp: 18   Temp: 97.3 °F (36.3 °C)   SpO2: 97% Physical Exam:   GEN Awake male, sitting upright in bed in no apparent distress. Appears given age. EYES Pupils are equally round. No scleral erythema, discharge, or conjunctivitis. HENT Mucous membranes are moist. Oral pharynx without exudates, no evidence of thrush. NECK Supple, no apparent thyromegaly or masses. RESP Clear to auscultation, no wheezes, rales or rhonchi. Symmetric chest movement while on room air. CARDIO/VASC S1/S2 auscultated. Regular rate without appreciable murmurs, rubs, or gallops. No JVD or carotid bruits. Peripheral pulses equal bilaterally and palpable. No peripheral edema. GI Abdomen is soft without significant tenderness, masses, or guarding. Bowel sounds are normoactive. Rectal exam deferred.  No costovertebral angle tenderness. Normal appearing external genitalia. Abdullahi catheter is not present. HEME/LYMPH No palpable cervical lymphadenopathy and no hepatosplenomegaly. No petechiae or ecchymoses. MSK No gross joint deformities. SKIN Normal coloration, warm, dry. NEURO Cranial nerves appear grossly intact, normal speech, no lateralizing weakness. PSYCH Awake, alert, oriented x 4. Affect appropriate.     Medications:   Medications:    azithromycin  500 mg Oral Daily    [START ON 12/8/2021] cefTRIAXone (ROCEPHIN) IV  1,000 mg IntraVENous Q24H    guaiFENesin  600 mg Oral BID    lubiprostone  24 mcg Oral Daily with breakfast    amLODIPine  10 mg Oral Daily    aspirin  81 mg Oral Daily    atorvastatin  80 mg Oral Nightly    vitamin D  1,000 Units Oral Daily    clonazePAM  0.25 mg Oral Daily    clopidogrel  75 mg Oral Daily    divalproex  1,000 mg Oral Nightly    folic acid  1 mg Oral Daily    lactobacillus  1 capsule Oral Daily    lactulose  30 g Oral TID    latanoprost  1 drop Both Eyes Nightly    levothyroxine  125 mcg Oral Daily    pantoprazole  40 mg Oral QAM AC    PARoxetine  20 mg Oral QAM    primidone  50 mg Oral TID    QUEtiapine  50 mg Oral

## 2021-12-07 NOTE — H&P
History and Physical      Name:  Camilla Cota /Age/Sex: 1955  (73 y.o. male)   MRN & CSN:  [id number] & [id number] Admission Date/Time: 2021  9:43 PM   Location:  [place] [address] [facility]    Assessment and Plan:   Camilla Cota is a 73 y.o.  male  who presents with Encephalopathy acute    1. Community acquired pneumonia  2. Encephalopathy, metabolic, improving  -Admit to observation services with continuous pulse ox  -Report noted patient to be mildly changed from baseline. Patient has cerebral palsy and only answers his name to ED provider. When I talked to patient he was able to tell me his name and that he is coughing. Seems to be improving and likely back to baseline.  -Blood cultures and urine cultures drawn in ED  -COVID-19 negative, rapid flu negative, and respiratory disease panel negative  -Nasal cannula oxygen prn  -CXR shows bibasilar opacities  -Duonebs q4 while awake  -Mucinex daily   -Tylenol prn for fever  -Antibiotics: Started on Rocephin and azithromycin in the ED and we can continue these    Other chronic medical conditions:   Continue all home meds except stated above or contraindicated.  Seizures   HTN   History of TIA   Depression with anxiety   GERD   Chronic constipation   Bladder spasms   Hypothyroidism   IBS   History of hepatic dysfunction on lactulose with normal ammonia today   BPH    Diet ADULT DIET; Regular   DVT Prophylaxis [x] Lovenox, []  Heparin, [] SCDs, [] Ambulation   GI Prophylaxis [x] PPI,  [] H2 Blocker,  [] Carafate,  [] Diet/Tube Feeds   Code Status Full Code   Disposition Patient requires continued admission due to Encephalopathy acute   MDM [] Low, [x] Moderate,[]  High  Patient's risk as above due to acuity of condition with potential for decompensation.      History of Present Illness:     Chief Complaint: Encephalopathy acute    Camilla Cota is a 73 y.o.  male with a reviewed and a noncontributory family history and a PMH as stated above, who presents from group home for change in mental status from baseline as well as coughing overall malaise. According to ED provider who discussed with group home patient is not at his baseline and is only able to state his name today. Per group home he is usually able to answer simple questions. On my examination patient's encephalopathy is improving as patient is able to tell me his name, that he has been coughing, and he is laughing during discussion. Patient still does appear to be fatigued as he does fall asleep if not prompted for questions. Patient denies any pain or any other issues. Other than above patient has no other complaints. Please see ED providers HPI for additional details. Discussed case with ED provider. ROS:   Review of Systems   Unable to perform ROS: Mental status change   Respiratory: Positive for cough. Objective:   No intake or output data in the 24 hours ending 12/07/21 0413   Vitals:   Vitals:    12/07/21 0148   BP: 120/64   Pulse: 63   Resp: 18   Temp: 97.7 °F (36.5 °C)   SpO2: 93%     /64   Pulse 63   Temp 97.7 °F (36.5 °C) (Oral)   Resp 18   SpO2 93%   Physical Exam:   Physical Exam  Vitals and nursing note reviewed. Constitutional:       General: He is sleeping. He is not in acute distress. Appearance: Normal appearance. He is obese. He is not ill-appearing, toxic-appearing or diaphoretic. Interventions: He is not intubated. HENT:      Head: Atraumatic. Right Ear: External ear normal.      Left Ear: External ear normal.      Nose: Nose normal. No rhinorrhea. Eyes:      Extraocular Movements: Extraocular movements intact. Pupils: Pupils are equal, round, and reactive to light. Cardiovascular:      Rate and Rhythm: Normal rate and regular rhythm. Pulses: Normal pulses. Heart sounds: Normal heart sounds. No murmur heard. No gallop.     Pulmonary:      Effort: Pulmonary effort is normal. No tachypnea, accessory muscle usage or prolonged expiration. He is not intubated. Breath sounds: Wheezing and rhonchi present. No rales. Abdominal:      General: Abdomen is protuberant. Bowel sounds are normal. There is no distension. Palpations: Abdomen is soft. Tenderness: There is no abdominal tenderness. There is no guarding or rebound. Negative signs include Nicole's sign and Rovsing's sign. Musculoskeletal:         General: Normal range of motion. Cervical back: Neck supple. Right lower leg: No edema. Left lower leg: No edema. Skin:     General: Skin is warm and dry. Capillary Refill: Capillary refill takes less than 2 seconds. Neurological:      Mental Status: He is easily aroused. He is disoriented and confused. Cranial Nerves: No dysarthria or facial asymmetry. Motor: No tremor or seizure activity. Comments: Patient who resides in a group home who normally only notices name and can respond to simple questions does participate slightly in exam.  Moving all 4 extremities equally, states his name and states he is coughing, no facial asymmetry noted, not dysarthric when talking, no seizure-like activity, no seizure-like activity noted, PERRLA, and EOM. Very limited exam but grossly no deficits noted. Psychiatric:         Attention and Perception: He is attentive. Mood and Affect: Mood is not anxious. Speech: He is communicative. Speech is not slurred. Cognition and Memory: Cognition is impaired.          Past Medical History:      Past Medical History:   Diagnosis Date    Acute CVA (cerebrovascular accident) ([facility] [facility] 10/10/2021    Anemia     Aspiration pneumonia ([facility] [address] [facility]     dx 6/9/2014 with this per ecf/ per old chart dx with pneumonia with admission 9/18/2018    Cerebral palsy ([facility] [address]     \"has no feeling on left side of body\"alert but has some dementia but not diagnosed, has good long term but poor short term and wakes up disoriented after a nap\"(per yaneth)(2014)    Depression     Epilepsy ([facility] [address] 1962    last seizure 2/2018- hx grand mal    Frequent falls     with phone assess- family stated pt fell this am (7/10/2013\"in the process of trying to find a facility to help build him back up- ([facility])    Glaucoma     \"has been in treatment for this in the past- no treatment needed at present\"    History of IBS     Hx MRSA infection     + nasal culture 4/2014    Hyperammonemia (HCC)     Hypertension     on Lisinopril    IBS (irritable bowel syndrome)     ulcerative colitis    Kidney stone     for surgery for stent placement 7/11/2013    Mood disorder ([address]     per staff at [facility] (methicillin resistant staph aureus) culture positive 05/02/2014 05/27/20 nasal    MRSA (methicillin resistant staph aureus) culture positive 11/25/2020    Face; 10/8/21    Occasional tremors     \"makes it difficult for him to write\"    Pressure injury of contiguous region involving back and right buttock, stage 2 ([address] 05/27/2020    Pressure injury of left buttock, stage 1 07/22/2020    Prostatitis     hx given per H&P old chart    Thyroid disease     Ulcerative colitis ([facility] [address]     hx given per staff at [facility] 4/13/2015     PSHX:  has a past surgical history that includes Gallbladder surgery (2005); vagal nerve stimulation (2002); Cholecystectomy; Cystoscopy (Left, 07/11/2013); Kidney stone surgery; Colonoscopy (8/19/14, 5/2012); other surgical history (04/15/2015); Upper gastrointestinal endoscopy (N/A, 11/13/2018); Colonoscopy (02/04/2021); Colonoscopy (N/A, 2/4/2021); Stimulator Surgery (N/A, 3/24/2021); Stimulator Surgery (N/A, 3/24/2021); and Facial Surgery (N/A, 10/8/2021). Allergies: No Known Allergies    FAM HX: family history includes Asthma in his mother and sister; Depression in his mother and sister;  Heart Disease in his father and mother; High Blood Pressure in his father and mother; High Cholesterol in his mother and sister; Migraines in his mother and sister. Soc HX:   Social History     Socioeconomic History    Marital status: Single     Spouse name: None    Number of children: None    Years of education: None    Highest education level: None   Occupational History    None   Tobacco Use    Smoking status: Never Smoker    Smokeless tobacco: Never Used   Vaping Use    Vaping Use: Never used   Substance and Sexual Activity    Alcohol use: No    Drug use: No    Sexual activity: None   Other Topics Concern    None   Social History Narrative    None     Social Determinants of Health     Financial Resource Strain:     Difficulty of Paying Living Expenses: Not on file   Food Insecurity:     Worried About Running Out of Food in the Last Year: Not on file    Mandie of Food in the Last Year: Not on file   Transportation Needs:     Lack of Transportation (Medical): Not on file    Lack of Transportation (Non-Medical):  Not on file   Physical Activity:     Days of Exercise per Week: Not on file    Minutes of Exercise per Session: Not on file   Stress:     Feeling of Stress : Not on file   Social Connections:     Frequency of Communication with Friends and Family: Not on file    Frequency of Social Gatherings with Friends and Family: Not on file    Attends Zoroastrian Services: Not on file    Active Member of [address] [place] [address] [employer] or Organizations: Not on file    Attends Club or Organization Meetings: Not on file    Marital Status: Not on file   Intimate Partner Violence:     Fear of Current or Ex-Partner: Not on file    Emotionally Abused: Not on file    Physically Abused: Not on file    Sexually Abused: Not on file   Housing Stability:     Unable to Pay for Housing in the Last Year: Not on file    Number of [place] in the Last Year: Not on file    Unstable Housing in the Last Year: Not on file       Data:   CBC with Differential:    Lab Results   Component Value Date    WBC 9.9 12/06/2021    RBC 4.81 12/06/2021    HGB 14.8 12/06/2021    HCT 44.1 12/06/2021     12/06/2021    MCV 91.7 12/06/2021    MCH 30.8 12/06/2021    MCHC 33.6 12/06/2021    RDW 13.0 12/06/2021    SEGSPCT 69.8 12/06/2021    BANDSPCT 8 07/06/2020    LYMPHOPCT 12.1 12/06/2021    MONOPCT 14.3 12/06/2021    MYELOPCT 2 05/05/2014    BASOPCT 0.2 12/06/2021    MONOSABS 1.4 12/06/2021    LYMPHSABS 1.2 12/06/2021    EOSABS 0.3 12/06/2021    BASOSABS 0.0 12/06/2021    DIFFTYPE AUTOMATED DIFFERENTIAL 12/06/2021       CMP:     Lab Results   Component Value Date     12/06/2021    K 3.5 12/06/2021     12/06/2021    CO2 25 12/06/2021    BUN 8 12/06/2021    CREATININE 0.6 12/06/2021    GFRAA >60 12/06/2021    LABGLOM >60 12/06/2021    GLUCOSE 118 12/06/2021    PROT 7.8 12/06/2021    LABALBU 3.8 12/06/2021    CALCIUM 9.6 12/06/2021    BILITOT 0.4 12/06/2021    ALKPHOS 94 12/06/2021    AST 21 12/06/2021    ALT 26 12/06/2021       Troponin:  Lab Results   Component Value Date    TROPONINT <0.010 12/06/2021       U/A:    Lab Results   Component Value Date    COLORU GISSELLE 12/07/2021    PROTEINU NEGATIVE 12/07/2021    PHUR 6.0 05/10/2013    WBCUA 34 12/07/2021    RBCUA 49 12/07/2021    MUCUS MODERATE 12/07/2021    TRICHOMONAS NONE SEEN 12/07/2021    YEAST OCCASIONAL 09/20/2021    BACTERIA NEGATIVE 12/07/2021    CLARITYU CLEAR 12/07/2021    SPECGRAV 1.020 12/07/2021    LEUKOCYTESUR SMALL 12/07/2021    UROBILINOGEN 2.0 12/07/2021    BILIRUBINUR NEGATIVE 12/07/2021    BLOODU LARGE 12/07/2021    GLUCOSEU NEGATIVE 05/10/2013    AMORPHOUS OCCASIONAL 11/25/2020     Radiology results:  CT HEAD WO CONTRAST   Final Result   No acute intracranial abnormality. Chronic nonemergent findings as above. XR CHEST PORTABLE   Final Result   1.  Bibasilar opacities, differential considerations to include aspiration or   bacterial pneumonia                Medications:   Home Medications:   Prior to Admission medications Medication Sig Start Date End Date Taking? Authorizing Provider   guaiFENesin-dextromethorphan (ROBITUSSIN DM) 100-10 MG/5ML syrup Take 5 mLs by mouth 4 times daily as needed for Cough 12/7/21 12/17/21 Yes Christopher Vinson DO   benzonatate (TESSALON PERLES) 100 MG capsule Take 1 capsule by mouth 3 times daily as needed for Cough 12/7/21 12/14/21 Yes Christopher Vinson DO   azithromycin (ZITHROMAX) 250 MG tablet Take 1 tablet by mouth daily for 4 days 12/8/21 12/12/21 Yes Anibal Barr, DO   albuterol sulfate HFA (PROVENTIL HFA) 108 (90 Base) MCG/ACT inhaler Inhale 2 puffs into the lungs every 4 hours as needed for Wheezing or Shortness of Breath With spacer (and mask if indicated). Thanks.  12/7/21 1/6/22 Yes Anibal Barr DO   acetaminophen (TYLENOL) 325 MG tablet Take 2 tablets by mouth every 6 hours as needed for Pain 12/7/21  Yes Christopher Vinson DO   aspirin 81 MG EC tablet Take 1 tablet by mouth daily 10/10/21   Annette Small MD   atorvastatin (LIPITOR) 80 MG tablet Take 1 tablet by mouth nightly 10/9/21   Annette Small MD   psyllium (HYDROCIL) 95 % PACK packet Take 1 packet by mouth 2 times daily 10/9/21   Annette Small MD   clopidogrel (PLAVIX) 75 MG tablet Take 1 tablet by mouth daily 10/10/21   Annette Small MD   amLODIPine (NORVASC) 10 MG tablet Take 1 tablet by mouth daily 10/10/21   Annette Small MD   AMITIZA 24 MCG capsule  9/22/21   Historical Provider, MD   mirabegron (MYRBETRIQ) 50 MG TB24 Take 50 mg by mouth daily    Historical Provider, MD   solifenacin (VESICARE) 10 MG tablet Take 10 mg by mouth daily    Historical Provider, MD   acetaminophen (APAP EXTRA STRENGTH) 500 MG tablet Take 1 tablet by mouth every 6 hours as needed for Pain 9/7/20   CHRIS Piper   levothyroxine (SYNTHROID) 125 MCG tablet Take 1 tablet by mouth Daily 4/14/20   Ellis Bustamante MD   dicyclomine (BENTYL) 10 MG capsule Take 1 capsule by mouth every 6 hours as needed (cramps) 4/13/20 4/18/20 Ellis Bustamante MD   clonazePAM (KLONOPIN) 0.5 MG tablet Take 0.25 mg by mouth daily. Historical Provider, MD   divalproex (DEPAKOTE) 500 MG DR tablet Take 1,000 mg by mouth nightly    Historical Provider, MD   omeprazole (PRILOSEC) 20 MG delayed release capsule Take 20 mg by mouth 2 times daily (before meals)    Historical Provider, MD   primidone (MYSOLINE) 50 MG tablet Take 50 mg by mouth 3 times daily    Historical Provider, MD   zonisamide (ZONEGRAN) 100 MG capsule Take 100 mg by mouth every morning    Historical Provider, MD   Simethicone (MI-ACID GAS RELIEF PO) Take 1 Container by mouth every 4 hours as needed 10 cc every 4 hrs not to exceed 60 cc in 24 hrs    Historical Provider, MD   guaiFENesin (MUCINEX) 600 MG extended release tablet Take 1,200 mg by mouth daily as needed for Congestion    Historical Provider, MD   Menthol-Methyl Salicylate (MUSCLE RUB) 10-15 % CREA cream Apply 1 applicator topically as needed    Historical Provider, MD   pseudoephedrine (SUDAFED) 30 MG tablet Take 30 mg by mouth every 4 hours as needed for Congestion  Patient not taking: Reported on 9/24/2021    Historical Provider, MD   lacosamide (VIMPAT) 200 MG tablet Take 1 tablet by mouth 2 times daily for 30 days.  3/25/20 5/27/20  Louise Norma Plants, DO   latanoprost (XALATAN) 0.005 % ophthalmic solution Place 1 drop into both eyes nightly    Historical Provider, MD   timolol (TIMOPTIC) 0.5 % ophthalmic solution Place 1 drop into both eyes daily     Historical Provider, MD   Lactobacillus (ACIDOPHILUS) CAPS capsule Take 1 capsule by mouth daily    Historical Provider, MD   Wheat Dextrin (BENEFIBER) POWD Take 4 g by mouth 2 times daily Takes 3 tsp in liquid twice per day     Historical Provider, MD   Lactulose Encephalopathy (ENULOSE PO) Take 30 mLs by mouth 3 times daily    Historical Provider, MD   folic acid (FOLVITE) 1 MG tablet Take 1 mg by mouth daily    Historical Provider, MD   PARoxetine (PAXIL) 20 MG tablet Take 20 mg by mouth every morning    Historical Provider, MD   QUEtiapine (SEROQUEL XR) 50 MG extended release tablet Take 50 mg by mouth nightly    Historical Provider, MD   tamsulosin (FLOMAX) 0.4 MG capsule Take 1 capsule by mouth daily 3/18/17   CHRIS Johnson   Cholecalciferol (VITAMIN D3) 1000 UNITS CAPS Take 1 tablet by mouth daily. Historical Provider, MD   clorazepate (TRANXENE) 7.5 MG tablet Take 7.5 mg by mouth 2 times daily.   Patient not taking: Reported on 11/4/2021    Historical Provider, MD     Medications:   Jerald Murdock ON 12/8/2021] azithromycin  500 mg Oral Daily    [START ON 12/8/2021] cefTRIAXone (ROCEPHIN) IV  1,000 mg IntraVENous Q24H    guaiFENesin  600 mg Oral BID    lubiprostone  24 mcg Oral Daily with breakfast    amLODIPine  10 mg Oral Daily    aspirin  81 mg Oral Daily    atorvastatin  80 mg Oral Nightly    Vitamin D3  1 tablet Oral Daily    clonazePAM  0.25 mg Oral Daily    clopidogrel  75 mg Oral Daily    divalproex  1,000 mg Oral Nightly    folic acid  1 mg Oral Daily    acidophilus  1 capsule Oral Daily    lactulose encephalopathy  30 g Oral TID    latanoprost  1 drop Both Eyes Nightly    levothyroxine  125 mcg Oral Daily    pantoprazole  40 mg Oral QAM AC    PARoxetine  20 mg Oral QAM    primidone  50 mg Oral TID    QUEtiapine  50 mg Oral Nightly    trospium  20 mg Oral BID AC    timolol  1 drop Both Eyes Daily    tamsulosin  0.4 mg Oral Daily    zonisamide  100 mg Oral QAM    sodium chloride flush  5-40 mL IntraVENous 2 times per day    enoxaparin  40 mg SubCUTAneous Daily      Infusions:    sodium chloride       PRN Meds: benzonatate, 100 mg, TID PRN  dicyclomine, 10 mg, Q6H PRN  sodium chloride flush, 5-40 mL, PRN  sodium chloride, 25 mL, PRN  polyethylene glycol, 17 g, Daily PRN  acetaminophen, 650 mg, Q6H PRN   Or  acetaminophen, 650 mg, Q6H PRN  promethazine, 12.5 mg, Q6H PRN  ipratropium-albuterol, 1 ampule, Q4H PRN        Electronically signed by Fahad Sanders [address] [place] [id number] on 12/7/2021 at 4:13 AM      This dictation was created with voice recognition software. While attempts have been made to review the dictation as it is transcribed, on occasion the spoken word can be misinterpreted by the technology leading to omissions or inappropriate words, phrases or sentences.

## 2021-12-07 NOTE — PROGRESS NOTES
CHOICES IN COMMUNITY LIVING ORDERS  \"when making daniel's meals, staff will ensure that he has a minced and moist diet with gravy, broth, or other liquids added. Food should be the width of a fork prong. Food should hold it shapes and be scooped and shaped into a ball. No skin on meats or gristle on bones. Reminders to slow down and sit at 90 degrees. No dentures means the food shall be pureed. No dark sodas due to high ammonia levels. meds administered in pudding. Staff should be within close distance visual range when eating.  \"    acidophilis daily 1 tablet  amitza 24 mcg capsules two times a day  Amlodipine 10 mg daily  Aspirin low dose 81 mg daily  Atorvastatin 80 mg nightly   Cefdinir 300 gm capsule twice daily for 10 days- starting 12/6 for upper respiratory infection

## 2021-12-08 PROBLEM — J18.9 PNA (PNEUMONIA): Status: ACTIVE | Noted: 2021-12-08

## 2021-12-08 LAB
ANION GAP SERPL CALCULATED.3IONS-SCNC: 8 MMOL/L (ref 4–16)
BUN BLDV-MCNC: 12 MG/DL (ref 6–23)
CALCIUM SERPL-MCNC: 8.9 MG/DL (ref 8.3–10.6)
CHLORIDE BLD-SCNC: 107 MMOL/L (ref 99–110)
CO2: 24 MMOL/L (ref 21–32)
CREAT SERPL-MCNC: 0.6 MG/DL (ref 0.9–1.3)
CULTURE: NORMAL
GFR AFRICAN AMERICAN: >60 ML/MIN/1.73M2
GFR NON-AFRICAN AMERICAN: >60 ML/MIN/1.73M2
GLUCOSE BLD-MCNC: 102 MG/DL (ref 70–99)
Lab: NORMAL
MAGNESIUM: 2 MG/DL (ref 1.8–2.4)
POTASSIUM SERPL-SCNC: 4.3 MMOL/L (ref 3.5–5.1)
SODIUM BLD-SCNC: 139 MMOL/L (ref 135–145)
SPECIMEN: NORMAL

## 2021-12-08 PROCEDURE — 6370000000 HC RX 637 (ALT 250 FOR IP): Performed by: FAMILY MEDICINE

## 2021-12-08 PROCEDURE — 96365 THER/PROPH/DIAG IV INF INIT: CPT

## 2021-12-08 PROCEDURE — 96372 THER/PROPH/DIAG INJ SC/IM: CPT

## 2021-12-08 PROCEDURE — 36415 COLL VENOUS BLD VENIPUNCTURE: CPT

## 2021-12-08 PROCEDURE — 1200000000 HC SEMI PRIVATE

## 2021-12-08 PROCEDURE — 6370000000 HC RX 637 (ALT 250 FOR IP): Performed by: STUDENT IN AN ORGANIZED HEALTH CARE EDUCATION/TRAINING PROGRAM

## 2021-12-08 PROCEDURE — 6360000002 HC RX W HCPCS: Performed by: STUDENT IN AN ORGANIZED HEALTH CARE EDUCATION/TRAINING PROGRAM

## 2021-12-08 PROCEDURE — G0378 HOSPITAL OBSERVATION PER HR: HCPCS

## 2021-12-08 PROCEDURE — 94761 N-INVAS EAR/PLS OXIMETRY MLT: CPT

## 2021-12-08 PROCEDURE — 2580000003 HC RX 258: Performed by: STUDENT IN AN ORGANIZED HEALTH CARE EDUCATION/TRAINING PROGRAM

## 2021-12-08 PROCEDURE — 83735 ASSAY OF MAGNESIUM: CPT

## 2021-12-08 PROCEDURE — 80048 BASIC METABOLIC PNL TOTAL CA: CPT

## 2021-12-08 RX ADMIN — LACTULOSE 30 G: 10 SOLUTION ORAL at 21:47

## 2021-12-08 RX ADMIN — SODIUM CHLORIDE, PRESERVATIVE FREE 10 ML: 5 INJECTION INTRAVENOUS at 09:37

## 2021-12-08 RX ADMIN — DIVALPROEX SODIUM 1000 MG: 500 TABLET, DELAYED RELEASE ORAL at 21:48

## 2021-12-08 RX ADMIN — PRIMIDONE 50 MG: 50 TABLET ORAL at 21:48

## 2021-12-08 RX ADMIN — ZONISAMIDE 100 MG: 100 CAPSULE ORAL at 09:35

## 2021-12-08 RX ADMIN — TAMSULOSIN HYDROCHLORIDE 0.4 MG: 0.4 CAPSULE ORAL at 09:35

## 2021-12-08 RX ADMIN — FOLIC ACID 1 MG: 1 TABLET ORAL at 09:34

## 2021-12-08 RX ADMIN — PRIMIDONE 50 MG: 50 TABLET ORAL at 09:35

## 2021-12-08 RX ADMIN — POTASSIUM CHLORIDE 60 MEQ: 1500 TABLET, EXTENDED RELEASE ORAL at 09:35

## 2021-12-08 RX ADMIN — LACTULOSE 30 G: 10 SOLUTION ORAL at 09:34

## 2021-12-08 RX ADMIN — TROSPIUM CHLORIDE 20 MG: 20 TABLET ORAL at 16:05

## 2021-12-08 RX ADMIN — AZITHROMYCIN MONOHYDRATE 500 MG: 250 TABLET ORAL at 21:47

## 2021-12-08 RX ADMIN — PANTOPRAZOLE SODIUM 40 MG: 40 TABLET, DELAYED RELEASE ORAL at 07:05

## 2021-12-08 RX ADMIN — CLONAZEPAM 0.25 MG: 0.5 TABLET ORAL at 09:35

## 2021-12-08 RX ADMIN — LEVOTHYROXINE SODIUM 125 MCG: 25 TABLET ORAL at 07:05

## 2021-12-08 RX ADMIN — GUAIFENESIN 600 MG: 600 TABLET, EXTENDED RELEASE ORAL at 21:48

## 2021-12-08 RX ADMIN — CEFTRIAXONE SODIUM 1000 MG: 1 INJECTION, POWDER, FOR SOLUTION INTRAMUSCULAR; INTRAVENOUS at 03:10

## 2021-12-08 RX ADMIN — ATORVASTATIN CALCIUM 80 MG: 40 TABLET, FILM COATED ORAL at 21:48

## 2021-12-08 RX ADMIN — LACTULOSE 30 G: 10 SOLUTION ORAL at 14:09

## 2021-12-08 RX ADMIN — GUAIFENESIN 600 MG: 600 TABLET, EXTENDED RELEASE ORAL at 09:35

## 2021-12-08 RX ADMIN — PRIMIDONE 50 MG: 50 TABLET ORAL at 14:10

## 2021-12-08 RX ADMIN — CLOPIDOGREL BISULFATE 75 MG: 75 TABLET, FILM COATED ORAL at 09:35

## 2021-12-08 RX ADMIN — Medication 1 CAPSULE: at 09:35

## 2021-12-08 RX ADMIN — ASPIRIN 81 MG: 81 TABLET, COATED ORAL at 09:35

## 2021-12-08 RX ADMIN — AMLODIPINE BESYLATE 10 MG: 5 TABLET ORAL at 09:35

## 2021-12-08 RX ADMIN — PAROXETINE HYDROCHLORIDE 20 MG: 20 TABLET, FILM COATED ORAL at 09:35

## 2021-12-08 RX ADMIN — QUETIAPINE FUMARATE 50 MG: 50 TABLET, EXTENDED RELEASE ORAL at 21:48

## 2021-12-08 RX ADMIN — TROSPIUM CHLORIDE 20 MG: 20 TABLET ORAL at 07:05

## 2021-12-08 RX ADMIN — ENOXAPARIN SODIUM 40 MG: 100 INJECTION SUBCUTANEOUS at 09:35

## 2021-12-08 RX ADMIN — Medication 1000 UNITS: at 09:35

## 2021-12-08 RX ADMIN — LUBIPROSTONE 24 MCG: 24 CAPSULE, GELATIN COATED ORAL at 09:34

## 2021-12-08 NOTE — PROGRESS NOTES
Hospitalist Progress Note      Name:  Eri Jackson /Age/Sex: 1955  (73 y.o. male)   MRN & CSN:  [id number] & [id number] Admission Date/Time: 2021  9:43 PM   Location:  [address] [place] [address]- PCP: MARY Zamarripa Day: 3    Assessment and Plan:   Eri Jackson is a 73 y.o.  male with past medical history of cerebral palsy, seizures, hypothyroidism, IBS, BPH, hypertension, hyperlipidemia TIA, disability presents from group home for mental status change. He was found community acquired pneumonia.       Community acquired pneumonia  Encephalopathy, metabolic, improving  -Patient is alert and oriented today, no cough, no shortness of breath, no fever, on room air  -Continue antibiotics Rocephin and azithromycin  -Monitor 1 more day will discontinue tomorrow if no worsening symptoms    Hypokalemia  -Potassium was 3.0 in admission  -Received  Supplement  -Potassium 4.3 today, DC potassium    Other chronic medical conditions:   Continue all home meds except stated above or contraindicated. · Seizures  · HTN  · History of TIA  · Depression with anxiety  · GERD  · Chronic constipation  · Bladder spasms  · Hypothyroidism  · IBS  · History of hepatic dysfunction on lactulose with normal ammonia today  · BPH      Diet ADULT DIET; Dysphagia - Minced and Moist   DVT Prophylaxis [x] Lovenox, []  Heparin, [] SCDs, [] Ambulation   GI Prophylaxis [x] PPI,  [] H2 Blocker,  [] Carafate,  [] Diet/Tube Feeds   Code Status Full Code   Disposition Patient requires continued admission due to medical condition     History of Present Illness:     Chief Complaint: Encephalopathy acute     Eri Jackson is a 73 y.o.  male with history of cerebral palsy, seizures, hypertension, GERD, TIA, chronic constipation, hypothyroidism, IBS, BPH presents from group home for mental status change and cough. Patient was found community-acquired pneumonia. Patient has been treated with IV antibiotics.   Patient returned alert and oriented today. Mild cough. No shortness of breath. He is on room air. Ten point ROS reviewed negative, unless as noted above    Objective: Intake/Output Summary (Last 24 hours) at 12/8/2021 1436  Last data filed at 12/8/2021 0427  Gross per 24 hour   Intake 360 ml   Output --   Net 360 ml      Vitals:   Vitals:    12/08/21 1422   BP: 136/75   Pulse: 77   Resp: 18   Temp: 98.1 °F (36.7 °C)   SpO2: 95%     Physical Exam:   GEN Awake male, sitting upright in bed in no apparent distress. Appears given age. EYES Pupils are equally round. No scleral erythema, discharge, or conjunctivitis. HENT Mucous membranes are moist. Oral pharynx without exudates, no evidence of thrush. NECK Supple, no apparent thyromegaly or masses. RESP lung sounds clear, no wheezing or crackles. CARDIO/VASC S1/S2 auscultated. Regular rate without appreciable murmurs, rubs, or gallops. No JVD or carotid bruits. Peripheral pulses equal bilaterally and palpable. No peripheral edema. GI Abdomen is soft without significant tenderness, masses, or guarding. Bowel sounds are normoactive. Rectal exam deferred.  No costovertebral angle tenderness. Normal appearing external genitalia. Abdullahi catheter is not present. HEME/LYMPH No palpable cervical lymphadenopathy and no hepatosplenomegaly. No petechiae or ecchymoses. MSK No gross joint deformities. SKIN intact, no wound. NEURO Cranial nerves appear grossly intact, normal speech, no lateralizing weakness. PSYCH Awake, alert, oriented x 4. Affect appropriate.     Medications:   Medications:    azithromycin  500 mg Oral Daily    cefTRIAXone (ROCEPHIN) IV  1,000 mg IntraVENous Q24H    guaiFENesin  600 mg Oral BID    lubiprostone  24 mcg Oral Daily with breakfast    amLODIPine  10 mg Oral Daily    aspirin  81 mg Oral Daily    atorvastatin  80 mg Oral Nightly    vitamin D  1,000 Units Oral Daily    clonazePAM  0.25 mg Oral Daily    clopidogrel  75 mg Oral Daily    divalproex  1,000 mg Oral Nightly    folic acid  1 mg Oral Daily    lactobacillus  1 capsule Oral Daily    lactulose  30 g Oral TID    latanoprost  1 drop Both Eyes Nightly    levothyroxine  125 mcg Oral Daily    pantoprazole  40 mg Oral QAM AC    PARoxetine  20 mg Oral QAM    primidone  50 mg Oral TID    QUEtiapine  50 mg Oral Nightly    trospium  20 mg Oral BID AC    timolol  1 drop Both Eyes Daily    tamsulosin  0.4 mg Oral Daily    zonisamide  100 mg Oral QAM    sodium chloride flush  5-40 mL IntraVENous 2 times per day    enoxaparin  40 mg SubCUTAneous Daily      Infusions:    sodium chloride       PRN Meds: benzonatate, 100 mg, TID PRN  dicyclomine, 10 mg, Q6H PRN  sodium chloride flush, 5-40 mL, PRN  sodium chloride, 25 mL, PRN  polyethylene glycol, 17 g, Daily PRN  acetaminophen, 650 mg, Q6H PRN   Or  acetaminophen, 650 mg, Q6H PRN  promethazine, 12.5 mg, Q6H PRN  ipratropium-albuterol, 1 ampule, Q4H PRN          Patient is still admitted because medical condition.  The anticipated discharge is in 24 hours  Electronically signed by Dinah Becerra on 12/8/2021 at 2:36 PM

## 2021-12-08 NOTE — PLAN OF CARE
Problem: Skin Integrity:  Goal: Will show no infection signs and symptoms  Description: Will show no infection signs and symptoms  12/8/2021 1552 by Humberto Oliveira RN  Outcome: Ongoing  12/8/2021 1550 by Humberto Oliveira RN  Outcome: Ongoing  Goal: Absence of new skin breakdown  Description: Absence of new skin breakdown  12/8/2021 1552 by Humberto Oliveira RN  Outcome: Ongoing  12/8/2021 1550 by Humberto Oliveiar RN  Outcome: Ongoing     Problem: Falls - Risk of:  Goal: Will remain free from falls  Description: Will remain free from falls  12/8/2021 1552 by Humberto Oliveira RN  Outcome: Ongoing  12/8/2021 1550 by Humberto Oliveira RN  Outcome: Ongoing  Goal: Absence of physical injury  Description: Absence of physical injury  12/8/2021 1552 by Humberto Oliveira RN  Outcome: Ongoing  12/8/2021 1550 by Humberto Oliveira RN  Outcome: Ongoing

## 2021-12-09 VITALS
HEART RATE: 65 BPM | RESPIRATION RATE: 16 BRPM | OXYGEN SATURATION: 96 % | SYSTOLIC BLOOD PRESSURE: 143 MMHG | TEMPERATURE: 97.5 F | DIASTOLIC BLOOD PRESSURE: 67 MMHG

## 2021-12-09 LAB
ANION GAP SERPL CALCULATED.3IONS-SCNC: 9 MMOL/L (ref 4–16)
BASOPHILS ABSOLUTE: 0.1 K/CU MM
BASOPHILS RELATIVE PERCENT: 1.1 % (ref 0–1)
BUN BLDV-MCNC: 12 MG/DL (ref 6–23)
CALCIUM SERPL-MCNC: 9.3 MG/DL (ref 8.3–10.6)
CHLORIDE BLD-SCNC: 106 MMOL/L (ref 99–110)
CO2: 23 MMOL/L (ref 21–32)
CREAT SERPL-MCNC: 0.5 MG/DL (ref 0.9–1.3)
DIFFERENTIAL TYPE: ABNORMAL
EOSINOPHILS ABSOLUTE: 0.2 K/CU MM
EOSINOPHILS RELATIVE PERCENT: 3.9 % (ref 0–3)
GFR AFRICAN AMERICAN: >60 ML/MIN/1.73M2
GFR NON-AFRICAN AMERICAN: >60 ML/MIN/1.73M2
GLUCOSE BLD-MCNC: 106 MG/DL (ref 70–99)
HCT VFR BLD CALC: 45.7 % (ref 42–52)
HEMOGLOBIN: 14.7 GM/DL (ref 13.5–18)
IMMATURE NEUTROPHIL %: 2 % (ref 0–0.43)
LYMPHOCYTES ABSOLUTE: 1.4 K/CU MM
LYMPHOCYTES RELATIVE PERCENT: 25.2 % (ref 24–44)
MAGNESIUM: 2.1 MG/DL (ref 1.8–2.4)
MCH RBC QN AUTO: 30.3 PG (ref 27–31)
MCHC RBC AUTO-ENTMCNC: 32.2 % (ref 32–36)
MCV RBC AUTO: 94.2 FL (ref 78–100)
MONOCYTES ABSOLUTE: 0.9 K/CU MM
MONOCYTES RELATIVE PERCENT: 16.8 % (ref 0–4)
NUCLEATED RBC %: 0 %
PDW BLD-RTO: 13.6 % (ref 11.7–14.9)
PLATELET # BLD: 237 K/CU MM (ref 140–440)
PMV BLD AUTO: 10.3 FL (ref 7.5–11.1)
POTASSIUM SERPL-SCNC: 4.3 MMOL/L (ref 3.5–5.1)
RBC # BLD: 4.85 M/CU MM (ref 4.6–6.2)
SEGMENTED NEUTROPHILS ABSOLUTE COUNT: 2.8 K/CU MM
SEGMENTED NEUTROPHILS RELATIVE PERCENT: 51 % (ref 36–66)
SODIUM BLD-SCNC: 138 MMOL/L (ref 135–145)
TOTAL IMMATURE NEUTOROPHIL: 0.11 K/CU MM
TOTAL NUCLEATED RBC: 0 K/CU MM
WBC # BLD: 5.4 K/CU MM (ref 4–10.5)

## 2021-12-09 PROCEDURE — 36415 COLL VENOUS BLD VENIPUNCTURE: CPT

## 2021-12-09 PROCEDURE — 97116 GAIT TRAINING THERAPY: CPT

## 2021-12-09 PROCEDURE — 97166 OT EVAL MOD COMPLEX 45 MIN: CPT

## 2021-12-09 PROCEDURE — 97162 PT EVAL MOD COMPLEX 30 MIN: CPT

## 2021-12-09 PROCEDURE — 6360000002 HC RX W HCPCS: Performed by: STUDENT IN AN ORGANIZED HEALTH CARE EDUCATION/TRAINING PROGRAM

## 2021-12-09 PROCEDURE — 80048 BASIC METABOLIC PNL TOTAL CA: CPT

## 2021-12-09 PROCEDURE — 85025 COMPLETE CBC W/AUTO DIFF WBC: CPT

## 2021-12-09 PROCEDURE — 2580000003 HC RX 258: Performed by: STUDENT IN AN ORGANIZED HEALTH CARE EDUCATION/TRAINING PROGRAM

## 2021-12-09 PROCEDURE — 97535 SELF CARE MNGMENT TRAINING: CPT

## 2021-12-09 PROCEDURE — 83735 ASSAY OF MAGNESIUM: CPT

## 2021-12-09 PROCEDURE — 6370000000 HC RX 637 (ALT 250 FOR IP): Performed by: STUDENT IN AN ORGANIZED HEALTH CARE EDUCATION/TRAINING PROGRAM

## 2021-12-09 RX ORDER — CEFDINIR 300 MG/1
300 CAPSULE ORAL 2 TIMES DAILY
Qty: 10 CAPSULE | Refills: 0 | Status: SHIPPED | OUTPATIENT
Start: 2021-12-09 | End: 2021-12-14

## 2021-12-09 RX ADMIN — ZONISAMIDE 100 MG: 100 CAPSULE ORAL at 09:25

## 2021-12-09 RX ADMIN — TIMOLOL MALEATE 1 DROP: 5 SOLUTION OPHTHALMIC at 09:26

## 2021-12-09 RX ADMIN — TAMSULOSIN HYDROCHLORIDE 0.4 MG: 0.4 CAPSULE ORAL at 09:26

## 2021-12-09 RX ADMIN — PAROXETINE HYDROCHLORIDE 20 MG: 20 TABLET, FILM COATED ORAL at 09:25

## 2021-12-09 RX ADMIN — PRIMIDONE 50 MG: 50 TABLET ORAL at 09:26

## 2021-12-09 RX ADMIN — Medication 1000 UNITS: at 09:25

## 2021-12-09 RX ADMIN — CLOPIDOGREL BISULFATE 75 MG: 75 TABLET, FILM COATED ORAL at 09:26

## 2021-12-09 RX ADMIN — CEFTRIAXONE SODIUM 1000 MG: 1 INJECTION, POWDER, FOR SOLUTION INTRAMUSCULAR; INTRAVENOUS at 02:45

## 2021-12-09 RX ADMIN — LEVOTHYROXINE SODIUM 125 MCG: 25 TABLET ORAL at 09:25

## 2021-12-09 RX ADMIN — AMLODIPINE BESYLATE 10 MG: 5 TABLET ORAL at 09:25

## 2021-12-09 RX ADMIN — GUAIFENESIN 600 MG: 600 TABLET, EXTENDED RELEASE ORAL at 09:26

## 2021-12-09 RX ADMIN — ENOXAPARIN SODIUM 40 MG: 100 INJECTION SUBCUTANEOUS at 09:26

## 2021-12-09 RX ADMIN — Medication 1 CAPSULE: at 09:25

## 2021-12-09 RX ADMIN — ASPIRIN 81 MG: 81 TABLET, COATED ORAL at 09:25

## 2021-12-09 RX ADMIN — LACTULOSE 30 G: 10 SOLUTION ORAL at 09:26

## 2021-12-09 RX ADMIN — FOLIC ACID 1 MG: 1 TABLET ORAL at 09:25

## 2021-12-09 RX ADMIN — PANTOPRAZOLE SODIUM 40 MG: 40 TABLET, DELAYED RELEASE ORAL at 09:26

## 2021-12-09 RX ADMIN — CLONAZEPAM 0.25 MG: 0.5 TABLET ORAL at 09:25

## 2021-12-09 NOTE — CONSULTS
[address] [facility] E Blue, 1955, 4116/4116-A, 12/9/2021    History  [place]:  The primary encounter diagnosis was Pneumonia due to infectious organism, unspecified laterality, unspecified part of lung. Diagnoses of Cough, Fatigue, unspecified type, and Altered mental status, unspecified altered mental status type were also pertinent to this visit. Patient  has a past medical history of Acute CVA (cerebrovascular accident) ([address] Anemia, Aspiration pneumonia ([address] Cerebral palsy ([address] Depression, Epilepsy ([address] Frequent falls, Glaucoma, History of IBS, Hx MRSA infection, Hyperammonemia ([address] Hypertension, IBS (irritable bowel syndrome), Kidney stone, Mood disorder ([address] MRSA (methicillin resistant staph aureus) culture positive, MRSA (methicillin resistant staph aureus) culture positive, Occasional tremors, Pressure injury of contiguous region involving back and right buttock, stage 2 ([address] Pressure injury of left buttock, stage 1, Prostatitis, Thyroid disease, and Ulcerative colitis ([address] Patient  has a past surgical history that includes Gallbladder surgery (2005); vagal nerve stimulation (2002); Cholecystectomy; Cystoscopy (Left, 07/11/2013); Kidney stone surgery; Colonoscopy (8/19/14, 5/2012); other surgical history (04/15/2015); Upper gastrointestinal endoscopy (N/A, 11/13/2018); Colonoscopy (02/04/2021); Colonoscopy (N/A, 2/4/2021); Stimulator Surgery (N/A, 3/24/2021); Stimulator Surgery (N/A, 3/24/2021); and Facial Surgery (N/A, 10/8/2021). Subjective:  Patient states:  \"I might go home soon! \"    Pain:  Denies pain. Communication with other providers:  Handoff to RN, OT  Restrictions: general precautions, fall risk    Home Setup/Prior level of function   Pt lives in group home with no stairs. Pt typically utilizes RW at baseline.     Examination of body systems (includes body structures/functions, activity/participation limitations):  · Observation:  Pt supine in bed upon arrival and agreeable to therapy  · Vision:  [facility]  · Hearing:  [facility]  · Cardiopulmonary:  No O2 needs  · Cognition: WFL, see OT/SLP note for further evaluation. Musculoskeletal  · ROM R/L:  WFL. · Strength R/L:  4+/5, minimal impairment in function and endurance. · Neuro:  [facility]      Mobility:  · Rolling L/R:  Mod I  · Supine <-> sit:  Mod I  · Transfers: Pt completed STS to/from bed and commode SBA with safe sequencing  · Sitting balance:  good. · Standing balance:  Fair+. · Gait: Pt ambulated 15' x2 with RW CGA progressing to SBA with decreased jany and narrow DESIREE but no LOB, SOB, or dizziness throughout. Pt seems to be ambulating at baseline     [facility] 6 Clicks Inpatient Mobility:  AM-PAC Inpatient Mobility Raw Score : 21    Safety: patient left supine in bed with alarm on, call light within reach, RN notified, gait belt used. Assessment:  Pt is a 73 y.o. male admitted to the hospital for acute encephalopathy. Pt is typically ambulating short distances with a Rollator, otherwise uses a WC. Pt is currently performing bed mobility mod I, transferring SBA, and ambulating SBA with RW. Pt seems to be functioning close to baseline and does not require any further acute care PT at this time. Pt will be discharged from caseload. Complexity: moderate    Prognosis: Good, no significant barriers to participation at this time.      Equipment: none, pt owns all necessary equipment    Recommendations for NURSING mobility: amb with gait belt and RW    Time:   Time in: 1042  Time out: 1105  Timed treatment minutes: 10  Total time: 23    Electronically signed by:    Patrica Lassiter, PT  12/9/2021, 1:10 PM

## 2021-12-09 NOTE — DISCHARGE INSTR - COC
Continuity of Care Form    Patient Name: Aleksandr Colón   :  1955  MRN:  [id number]    Admit date:  2021  Discharge date:  ***    Code Status Order: Full Code   Advance Directives:      Admitting Physician:  Nora Lopes DO  PCP: Dolores Payne, MARY - CNP    Discharging Nurse: [facility] Unit/Room#: 4116/4116-A  Discharging Unit Phone Number: ***    Emergency Contact:   Extended Emergency Contact Information  Primary Emergency Contact: Lea Lawrence           [place], [address] Phone: [phone]  Mobile Phone: [phone]  Relation: Brother/Sister  Secondary Emergency Contact: Arsalan Webster  [place] Phone: [phone]  Relation: Legal Guardian    Past Surgical History:  Past Surgical History:   Procedure Laterality Date    CHOLECYSTECTOMY      COLONOSCOPY  14, 14: hemorrhoids, 2012 at [facility]  2021    COLONOSCOPY N/A 2021    COLONOSCOPY POLYPECTOMY SNARE/COLD BIOPSY performed by Gorge Hardy MD at [address] Left 2013    with stnet placement    FACIAL SURGERY N/A 10/8/2021    FACIAL INCISION AND DRAINAGE performed by Obdulia Randolph MD at [address] [facility]      OTHER SURGICAL HISTORY  04/15/2015    Revision of vagal nerve stimulator    STIMULATOR SURGERY N/A 3/24/2021    STIMULATOR INSERTION performed by Jennifer Murphy MD at [address] 3/24/2021    [address] performed by Jennifer Murphy MD at [address] [facility] 2018    EGD DILATION BALLOON AND BIOPSY performed by Jeff Beebe MD at [place] [address]  2002    Has to have bettery changed every 5 yrs.         Immunization History:   Immunization History   Administered Date(s) Administered    Influenza, Quadv, 6 mo and older, IM, PF (Flulaval, Fluarix) 2018    Tdap (Boostrix, Adacel) 11/03/2016    Tetanus 11/30/1976, 12/10/1980, 05/24/1984, 07/11/1990, 08/21/1999       Active Problems:  Patient Active Problem List   Diagnosis Code    Cerebral palsy, infantile hemiplegia ([facility]) G80.8    Rosacea, acne L71.9    IBS (irritable bowel syndrome) K58.9    Essential hypertension I10    Calculus of ureter N20.1    Pyelonephritis N12    Sepsis ([address] A41.9    Pneumonia J18.9    Increased ammonia level R79.89    Aspiration pneumonia ([facility]) J69.0    Hypokalemia E87.6    Hypomagnesemia E83.42    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    Seizure disorder ([facility]) G40.909    Seizure disorder, grand mal ([address] G40.409    Sepsis due to undetermined organism with acute respiratory failure ([facility]) A41.9, R65.20, J96.00    Pain of upper abdomen R10.10    Diarrhea of presumed infectious origin R19.7    Abdominal pain R10.9    Pressure injury of contiguous region involving back and right buttock, stage 2 ([facility]) L89.42    Cellulitis, perineum L03.315    Pressure injury of right buttock, stage 2 ([facility]) L89.312    Pressure injury of left buttock, stage 1 L89.321    COVID-19 virus detected U07.1    Hyponatremia E87.1    Multifocal pneumonia J18.9    TIA (transient ischemic attack) G45.9    Acute CVA (cerebrovascular accident) ([address] I63.9    Ataxia R27.0    Dysarthria R47.1    Dysphagia due to recent stroke I69.391    Depression with anxiety F41.8    Facial abscess L02.01    Group B streptococcal UTI N39.0, B95.5    History of cerebral palsy Z86.69    Encephalopathy acute G93.40    PNA (pneumonia) J18.9       Isolation/Infection:   Isolation            Contact          Patient Infection Status       Infection Onset Added Last Indicated Last Indicated By Review Planned Expiration Resolved Resolved By    MRSA 11/25/20 10/04/21 10/08/21 Culture, Body Fluid        Resolved    COVID-19 (Rule Out) 12/07/21 12/07/21 12/07/21 Respiratory Panel, Molecular, with COVID-19 (Restricted: peds pts or suitable admitted adults) (Ordered) 21 Rule-Out Test Resulted    COVID-19 (Rule Out) 21 COVID-19, Rapid (Ordered)   21 Rule-Out Test Resulted    COVID-19 20 COVID-19   20     COVID-19 (Rule Out) 20 COVID-19 (Ordered)   20 Rule-Out Test Resulted    COVID-19 (Rule Out) 07/10/20 07/10/20 07/10/20 Covid-19 Ambulatory (Ordered)   20 Rule-Out Test Resulted    MRSA 20 Culture, MRSA, Screening   10/04/21 Caryle Saltness, RN    C-diff Rule Out 04/10/20 04/10/20 04/10/20 C difficile Molecular/PCR (Ordered)   20 Rule-Out Test Resulted    COVID-19 (Rule Out) 20 COVID-19 (Ordered)   20 Rule-Out Test Resulted    MRSA  14 Adis Hansen RN   05/21/15 Charisse ignacio 14            Nurse Assessment:  Last Vital Signs: BP (!) 143/67   Pulse 65   Temp 97.5 °F (36.4 °C) (Oral)   Resp 16   SpO2 96%     Last documented pain score (0-10 scale):    Last Weight:   Wt Readings from Last 1 Encounters:   10/14/21 200 lb 13.4 oz (91.1 kg)     Mental Status:  alert    IV Access:  - None    Nursing Mobility/ADLs:  Walking   Assisted  Transfer  Dependent  Bathing  Dependent  Dressing  Assisted  Toileting  Assisted  Feeding  Assisted  Med Admin  Assisted  Med Delivery   whole and prefers mixed with applesauce    Wound Care Documentation and Therapy:  Wound 10/06/21 Face Left upper lip (Active)   Number of days: 63       Wound 10/06/21 Knee Anterior; Left (Active)   Number of days: 63        Elimination:  Continence: Bowel: No  Bladder: No  Urinary Catheter: None   Colostomy/Ileostomy/Ileal Conduit: No       Date of Last BM: 21  No intake or output data in the 24 hours ending 21 0937  No intake/output data recorded. Safety Concerns:      At Risk for Falls    Impairments/Disabilities:      CP    Nutrition Therapy:  Current Nutrition Therapy:   Oral:pureed    Routes of Feeding: Oral  Liquids: No Restrictions  Daily Fluid Restriction: no  Last Modified Barium Swallow with Video (Video Swallowing Test): not done    Treatments at the Time of Hospital Discharge:   Respiratory Treatments: ***  Oxygen Therapy:  is not on home oxygen therapy. Ventilator:    - No ventilator support    Rehab Therapies: Physical Therapy and Occupational Therapy  Weight Bearing Status/Restrictions: No weight bearing restirctions  Other Medical Equipment (for information only, NOT a DME order):  {EQUIPMENT:466099693}  Other Treatments: ***    Patient's personal belongings (please select all that are sent with patient):  None    RN SIGNATURE:  Electronically signed by Amisha Savage RN on 12/9/21 at 9:40 AM EST    CASE MANAGEMENT/SOCIAL WORK SECTION    Inpatient Status Date: ***    Readmission Risk Assessment Score:  Readmission Risk              Risk of Unplanned Readmission:  33           Discharging to Facility/ Agency   Name:   Address:  Phone:  Fax:    Dialysis Facility (if applicable)   Name:  Address:  Dialysis Schedule:  Phone:  Fax:    / signature: {Esignature:[id number]}    PHYSICIAN SECTION    Prognosis: {Prognosis:1167276123}    Condition at Discharge: [address] Patient Condition:[id number]}    Rehab Potential (if transferring to Rehab): {Prognosis:9130502151}    Recommended Labs or Other Treatments After Discharge: ***    Physician Certification: I certify the above information and transfer of Ceci Ponce  is necessary for the continuing treatment of the diagnosis listed and that he requires {Admit to Appropriate Level of Care:83390} for {GREATER/LESS:955738319} 30 days.      Update Admission H&P: {CHP DME Changes in RPWYB:587025624}    PHYSICIAN SIGNATURE:  {Esignature:[id number]}

## 2021-12-09 NOTE — PROGRESS NOTES
Occupational Therapy  [facility] OCCUPATIONAL THERAPY EVALUATION    History  "[employer]:  The primary encounter diagnosis was Pneumonia due to infectious organism, unspecified laterality, unspecified part of lung. Diagnoses of Cough, Fatigue, unspecified type, and Altered mental status, unspecified altered mental status type were also pertinent to this visit. Restrictions:                           Communication with other providers: RNCM, attending RN    Subjective:  Patient states:  \"ok\"  Pain:  none  Patient goal:  home    Occupational profile (relevant social history and personal factors):         Examination of body systems (includes body structures/functions, activity/participation limitations):  · Orientation: WFL   · Cognition:  WFL , DD per chart but good historian  And follows all commands  · Observation:  Received pt in bed. Alert and cooperative. · [facility]   · Hearing:  [place]  · ROM:  RUE: WFL. LUE: impaired, contracted, Cerebal Palsy  · Strength: RUE: WFL. LUE: WFL but weaker than RUE, reduced function for ADL due to contractures  · Sensation: not tested    ADLs  Feeding: BRENNAN    Grooming: BRENNAN sinkside    Dressing: UB SBA in seated LB SBA, donned socks seated EOB, threaded depends seated on commode, hiked depends c 1 UE alternating on walker for balance support. Inc time and effort due to L side impairments    Bathing: UB SBA in seated LB SBA    Toileting: SBA, low commode    *Some ADL determined per observation of actual ADL performance, functional mobility, balance, activity tolerance, and cognition.      AM-PAC 6 click short form for inpatient daily activity:  Raw Score: 20  24/24 = unimpaired  23/24 = 1-20% impaired   20/24-22/24 = 21-40% impaired  15/24-19/24 = 41-59% impaired   10/24-14/24 = 60%-79% impaired  7/24-9/24 = 80%-99% impaired  6/24 = 100% impaired    Functional Mobility  Bed mobility:   Supine to sit: BRENNAN  Sit to supine: BRENNAN    Sitting balance: SBA    Transfers:   Sit to stand: SBA  Stand to sit: SBA    Standing balance: SBA-CGA    Ambulation:  SBA-CGA c RW to/from bathroom. Activity tolerance  Good, near or at baseline    Assessment:  Assessment  Performance deficits / Impairments: Decreased balance, Decreased high-level IADLs, Decreased functional mobility , Decreased ADL status (baseline impairments)  Treatment Diagnosis: PNA, hx of DD and CP (L side deficits)  Prognosis: Good  Decision Making: Medium Complexity  REQUIRES OT FOLLOW UP: No (pt is near or at baseline)      Goals:  By d/c or goals met:     n/a    Plan:  Plan  Times per week: n/a      Recommendation for activity with nursing staff:  ambulate to bathroom with RW for toileting    Treatment today:    Self Care Training:   Self care training was performed today. Cues were given for safety, sequence, UE/LE placement, visual cues, and balance. Activities performed today included dressing, toileting, hand hygiene, and grooming. Education: Role of OT, OT POC, d/c needs, home safety    Safety: Left in bed with all needs in reach. Gait belt used for transfer and mobility. Time in:  1042  Time out:  1105  Timed treatment minutes:  12  Total treatment time:  23    Electronically signed by:    [address], OTMARTIN/L, [place]   [id number]   1:25 PM, 12/9/2021

## 2021-12-09 NOTE — PROGRESS NOTES
Outpatient Pharmacy Progress Note for Meds-to-Beds    Total number of Prescriptions Filled: 1  The following medications were delivered to the patient or nursing unit during the discharge process:   Cefdinir 300mg    Additional Documentation:   Patient's co-pay is $0.00. Thank you for letting us serve your patients.   [address]    [address], [address] [facility]    Phone: [phone]    Fax: [phone]

## 2021-12-09 NOTE — DISCHARGE SUMMARY
Discharge Summary    Name:  Lizbeth Mendoza /Age/Sex: 1955  (73 y.o. male)   MRN & CSN:  [id number] & [id number] Admission Date/Time: 2021  9:43 PM   Attending:  Eliane Lennox, MD Discharging Physician: Regulo Wright Course:   Lizbeth Mendoza is a 73 y.o.  male with past medical history of cerebral palsy, seizures, hypothyroidism, IBS, BPH, hypertension, hyperlipidemia TIA, disability presents from group home for mental status change. He was found community acquired pneumonia. Pt has been treated with IV antibiotics in hospital. Pt has been doing well. He is alert and oriented. No cough. No shortness of breath. He is on room air. Pt will be discharged home today with prescription of oral antibiotics. Community acquired pneumonia  Encephalopathy, metabolic, improving  -Patient is alert and oriented today, no cough, no shortness of breath, no fever, on room air  -on rocephin and azithromycin  -pt is doing well this morning. Pt will be discharged home with oral antibiotics. Follow up with primary care physician in 3-7 days, return to ER for worsening symptoms or other concerns     Hypokalemia  -Potassium was 3.0 in admission  -Received  Supplement  -Potassium 4.3 today, DC potassium     Other chronic medical conditions:   Continue all home meds except stated above or contraindicated. Seizures  HTN  History of TIA  Depression with anxiety  GERD  Chronic constipation  Bladder spasms  Hypothyroidism  IBS  History of hepatic dysfunction on lactulose with normal ammonia today  BPH      The patient expressed appropriate understanding of and agreement with the discharge recommendations, medications, and plan.      Consults this admission:  IP CONSULT TO HOSPITALIST    Discharge Instruction:   Follow up appointments: pcp  Primary care physician:  within 2 weeks    Diet:  regular diet   Activity: activity as tolerated  Disposition: Discharged to:   [x]Home, []HHC, []SNF, []Acute Rehab, []Hospice Condition on discharge: Stable    Discharge Medications:        Medication List      START taking these medications    albuterol sulfate  (90 Base) MCG/ACT inhaler  Commonly known as: Proventil HFA  Inhale 2 puffs into the lungs every 4 hours as needed for Wheezing or Shortness of Breath With spacer (and mask if indicated). Thanks. azithromycin 250 MG tablet  Commonly known as: ZITHROMAX  Take 1 tablet by mouth daily for 4 days     benzonatate 100 MG capsule  Commonly known as: Tessalon Perles  Take 1 capsule by mouth 3 times daily as needed for Cough     cefdinir 300 MG capsule  Commonly known as: OMNICEF  Take 1 capsule by mouth 2 times daily for 5 days     guaiFENesin-dextromethorphan 100-10 MG/5ML syrup  Commonly known as: ROBITUSSIN DM  Take 5 mLs by mouth 4 times daily as needed for Cough        CHANGE how you take these medications    * acetaminophen 500 MG tablet  Commonly known as: APAP Extra Strength  Take 1 tablet by mouth every 6 hours as needed for Pain  What changed: Another medication with the same name was added. Make sure you understand how and when to take each. * acetaminophen 325 MG tablet  Commonly known as: Tylenol  Take 2 tablets by mouth every 6 hours as needed for Pain  What changed: You were already taking a medication with the same name, and this prescription was added. Make sure you understand how and when to take each. * This list has 2 medication(s) that are the same as other medications prescribed for you. Read the directions carefully, and ask your doctor or other care provider to review them with you.             CONTINUE taking these medications    acidophilus Caps capsule     Amitiza 24 MCG capsule  Generic drug: lubiprostone     amLODIPine 10 MG tablet  Commonly known as: NORVASC  Take 1 tablet by mouth daily     aspirin 81 MG EC tablet  Take 1 tablet by mouth daily     atorvastatin 80 MG tablet  Commonly known as: LIPITOR  Take 1 tablet by mouth nightly Benefiber Powd     clonazePAM 0.5 MG tablet  Commonly known as: KLONOPIN     clopidogrel 75 MG tablet  Commonly known as: PLAVIX  Take 1 tablet by mouth daily     dicyclomine 10 MG capsule  Commonly known as: BENTYL  Take 1 capsule by mouth every 6 hours as needed (cramps)     divalproex 500 MG DR tablet  Commonly known as: DEPAKOTE     ENULOSE PO     folic acid 1 MG tablet  Commonly known as: FOLVITE     lacosamide 200 MG tablet  Commonly known as: VIMPAT  Take 1 tablet by mouth 2 times daily for 30 days. latanoprost 0.005 % ophthalmic solution  Commonly known as: XALATAN     levothyroxine 125 MCG tablet  Commonly known as: SYNTHROID  Take 1 tablet by mouth Daily     MI-ACID GAS RELIEF PO     mirabegron 50 MG Tb24  Commonly known as: MYRBETRIQ     Mucinex 600 MG extended release tablet  Generic drug: guaiFENesin     muscle rub 10-15 % Crea cream     omeprazole 20 MG delayed release capsule  Commonly known as: PRILOSEC     PARoxetine 20 MG tablet  Commonly known as: PAXIL     primidone 50 MG tablet  Commonly known as:  MYSOLINE     pseudoephedrine 30 MG tablet  Commonly known as: SUDAFED     psyllium 95 % Pack packet  Commonly known as: HYDROCIL  Take 1 packet by mouth 2 times daily     QUEtiapine 50 MG extended release tablet  Commonly known as: SEROQUEL XR     solifenacin 10 MG tablet  Commonly known as: VESICARE     tamsulosin 0.4 MG capsule  Commonly known as: FLOMAX  Take 1 capsule by mouth daily     timolol 0.5 % ophthalmic solution  Commonly known as: TIMOPTIC     Vitamin D3 25 MCG (1000 UT) Caps     zonisamide 100 MG capsule  Commonly known as: ZONEGRAN        STOP taking these medications    clorazepate 7.5 MG tablet  Commonly known as: TRANXENE           Where to Get Your Medications      These medications were sent to [address] [place] [address] 25, [address] [facility] [address] [id number]    Phone: [phone] cefdinir 300 MG capsule     You can get these medications from any pharmacy    Bring a paper prescription for each of these medications  acetaminophen 325 MG tablet  albuterol sulfate  (90 Base) MCG/ACT inhaler  azithromycin 250 MG tablet  benzonatate 100 MG capsule  guaiFENesin-dextromethorphan 100-10 MG/5ML syrup         Objective Findings at Discharge:   BP (!) 143/67   Pulse 65   Temp 97.5 °F (36.4 °C) (Oral)   Resp 16   SpO2 96%            PHYSICAL EXAM 12/09  GEN Awake male, sitting upright in bed in no apparent distress. Appears given age. EYES Pupils are equally round. No scleral erythema, discharge, or conjunctivitis. HENT Mucous membranes are moist. Oral pharynx without exudates, no evidence of thrush. NECK Supple, no apparent thyromegaly or masses. RESP Bilateral lungs are clear, no wheezing  CARDIO/VASC S1/S2 auscultated. Regular rate without appreciable murmurs, rubs, or gallops. No JVD or carotid bruits. Peripheral pulses equal bilaterally and palpable. No peripheral edema. GI Abdomen is soft without significant tenderness, masses, or guarding. Bowel sounds are normoactive. Rectal exam deferred.  No costovertebral angle tenderness. Normal appearing external genitalia. Abdullahi catheter is not present. HEME/LYMPH No palpable cervical lymphadenopathy and no hepatosplenomegaly. No petechiae or ecchymoses. MSK No gross joint deformities. SKIN Normal coloration, warm, dry. NEURO Cranial nerves appear grossly intact, normal speech, no lateralizing weakness. PSYCH Awake, alert, oriented x 4. Affect appropriate. BMP/CBC  Recent Labs     12/06/21  2244 12/07/21  0445 12/08/21  0816    140 139   K 3.5 3.0* 4.3    104 107   CO2 25 26 24   BUN 8 9 12   CREATININE 0.6* 0.5* 0.6*   WBC 9.9 7.6  --    HCT 44.1 40.8*  --     171  --        IMAGING:  Head CT on 12/07  No acute intracranial abnormality.       Chronic nonemergent findings as above.      Chest xray on 12/06  1.  Bibasilar opacities, differential considerations to include aspiration or   bacterial pneumonia       Discharge Time of 35 minutes    Electronically signed by Blanca Marie on 12/9/2021 at 9:13 AM

## 2021-12-10 NOTE — PROGRESS NOTES
Physician Progress Note      Laly PIERRE #:                  [id number]  :                       1955  ADMIT DATE:       2021 9:43 PM  [address] [place] DATE:        2021 2:48 PM  RESPONDING  PROVIDER #:        Doreen Bradley MD          QUERY TEXT:    Dear hospitalist,    Pt admitted with CAP. Pt noted to have cough, wheezing, rhonchi, and SOB. If   possible, please document in the progress notes and discharge summary if you   are evaluating and/or treating any of the following:    Note: CAP and HCAP indicate where the pneumonia was acquired, not a specific   type. The medical record reflects the following:  Risk Factors: hx: CVA, seizure disorder, cerebral palsy, aspiration pneumonia. Clinical Indicators: CXR: Bibasilar opacities, differential considerations to   include aspiration or bacterial pneumonia,  ED note:2021: patient given   azithromycin for treatment of bacterial pneumonia. H&P: patient noted to have   coughing, wheezing, rhonchi and SOB, is disoriented and confused. Treatment: Zithromax, Rocephin, guaifenesin-dextromethorphan, albuterol   inhaler, CXR, Diet: Dysphagia-minced and moist.    Thank you  Jennifer Bishop. Hilario Askew. Antonio Jules, LATOSHAN, RN  Phone: [phone]  Options provided:  -- Aspiration pneumonia  -- Gram negative pneumonia  -- Gram positive pneumonia  -- Other - I will add my own diagnosis  -- Disagree - Not applicable / Not valid  -- Disagree - Clinically unable to determine / Unknown  -- Refer to Clinical Documentation Reviewer    PROVIDER RESPONSE TEXT:    Provider is clinically unable to determine a response to this query.     Query created by: Reynaldo Patterson on 2021 1:36 PM      Electronically signed by:  Doreen Bradley MD 12/10/2021 1:33 PM

## 2021-12-12 LAB
CULTURE: NORMAL
CULTURE: NORMAL
Lab: NORMAL
Lab: NORMAL
SPECIMEN: NORMAL
SPECIMEN: NORMAL

## 2021-12-17 DIAGNOSIS — G25.0 ESSENTIAL TREMOR: ICD-10-CM

## 2021-12-17 DIAGNOSIS — R56.9 SEIZURE (HCC): Primary | ICD-10-CM

## 2021-12-17 NOTE — TELEPHONE ENCOUNTER
Received fax from main [address] [place] [address] office stating this pt is due for refills and we need to send in new prescriptions.

## 2021-12-23 DIAGNOSIS — G80.8 CEREBRAL PALSY, INFANTILE HEMIPLEGIA (HCC): ICD-10-CM

## 2021-12-23 RX ORDER — LACOSAMIDE 200 MG/1
TABLET ORAL
COMMUNITY
Start: 2021-07-12 | End: 2021-12-28

## 2021-12-23 NOTE — TELEPHONE ENCOUNTER
Requested Prescriptions     Pending Prescriptions Disp Refills    lacosamide (VIMPAT) 200 MG tablet 60 tablet 0     Sig: Take 1 tablet by mouth 2 times daily for 30 days.

## 2021-12-28 DIAGNOSIS — G80.8 CEREBRAL PALSY, INFANTILE HEMIPLEGIA (HCC): ICD-10-CM

## 2021-12-28 RX ORDER — LACOSAMIDE 200 MG/1
200 TABLET ORAL 2 TIMES DAILY
Qty: 60 TABLET | Refills: 0 | Status: SHIPPED | OUTPATIENT
Start: 2021-12-28 | End: 2022-01-27 | Stop reason: SDUPTHER

## 2021-12-28 RX ORDER — DIVALPROEX SODIUM 500 MG/1
TABLET, EXTENDED RELEASE ORAL
Qty: 20 TABLET | Refills: 3 | Status: SHIPPED | OUTPATIENT
Start: 2021-12-28 | End: 2022-01-03 | Stop reason: CLARIF

## 2021-12-28 RX ORDER — ZONISAMIDE 100 MG/1
CAPSULE ORAL
Qty: 120 CAPSULE | Refills: 3 | Status: SHIPPED | OUTPATIENT
Start: 2021-12-28 | End: 2022-04-14

## 2021-12-28 RX ORDER — LACOSAMIDE 200 MG/1
200 TABLET ORAL 2 TIMES DAILY
Qty: 60 TABLET | Refills: 0 | Status: CANCELLED | OUTPATIENT
Start: 2021-12-28 | End: 2022-01-27

## 2021-12-28 RX ORDER — LACOSAMIDE 200 MG/1
200 TABLET ORAL 2 TIMES DAILY
Qty: 60 TABLET | Refills: 3 | Status: CANCELLED | OUTPATIENT
Start: 2021-12-28 | End: 2022-01-27

## 2021-12-29 NOTE — TELEPHONE ENCOUNTER
This does look like it was supposed to be \"120\" tablets not \"20\" tablets. Please clarify this to the pharmacy. If they are in need of a new prescription please let me know.

## 2022-01-03 DIAGNOSIS — R56.9 SEIZURE (HCC): Primary | ICD-10-CM

## 2022-01-03 RX ORDER — DIVALPROEX SODIUM 500 MG/1
TABLET, DELAYED RELEASE ORAL
Qty: 120 TABLET | Refills: 1 | Status: ON HOLD | OUTPATIENT
Start: 2022-01-03 | End: 2022-08-20 | Stop reason: HOSPADM

## 2022-01-03 NOTE — TELEPHONE ENCOUNTER
Resent Depakote prescription with correct tablet number. Discontinued prior prescription if the pharmacy calls to ask for clarification.

## 2022-01-18 ENCOUNTER — HOSPITAL ENCOUNTER (OUTPATIENT)
Dept: CT IMAGING | Age: 67
Discharge: HOME | End: 2022-01-18
Payer: MEDICARE

## 2022-01-18 ENCOUNTER — HOSPITAL ENCOUNTER (OUTPATIENT)
Dept: NON INVASIVE DIAGNOSTICS | Age: 67
Discharge: HOME | End: 2022-01-18
Payer: MEDICARE

## 2022-01-18 DIAGNOSIS — R06.02 SHORTNESS OF BREATH: ICD-10-CM

## 2022-01-18 DIAGNOSIS — G80.9 CEREBRAL PALSY, UNSPECIFIED TYPE (HCC): ICD-10-CM

## 2022-01-18 DIAGNOSIS — S09.90XD RECENT HEAD TRAUMA, SUBSEQUENT ENCOUNTER: ICD-10-CM

## 2022-01-18 DIAGNOSIS — I10 ESSENTIAL (PRIMARY) HYPERTENSION: ICD-10-CM

## 2022-01-18 LAB
LV EF: 58 %
LVEF MODALITY: NORMAL

## 2022-01-18 PROCEDURE — 70450 CT HEAD/BRAIN W/O DYE: CPT

## 2022-01-18 PROCEDURE — 93306 TTE W/DOPPLER COMPLETE: CPT

## 2022-01-27 DIAGNOSIS — G80.8 CEREBRAL PALSY, INFANTILE HEMIPLEGIA (HCC): ICD-10-CM

## 2022-01-27 RX ORDER — LACOSAMIDE 200 MG/1
200 TABLET ORAL 2 TIMES DAILY
Qty: 60 TABLET | Refills: 3 | Status: SHIPPED | OUTPATIENT
Start: 2022-01-27 | End: 2022-02-09 | Stop reason: SDUPTHER

## 2022-02-04 NOTE — PROGRESS NOTES
2/9/22    Mary Ellen Agosto  1955    Chief Complaint   Patient presents with    Neurologic Problem     aggressive behavior, sundowner issues, stroke history     Results     2nd cognitive test results       History of Present Illness  Afia Wright is a 73 y.o. male presenting [facility] office today in follow-up regarding cerebral palsy with seizure disorder, tremor, left sided spacticity. Patient has history of cerebral palsy, seizures, mood disorder, hypertension, anemia, MRDD residing in a group home and has been previously evaluated by our service at [facility] for management of seizure medication while admitted. At last visit, he was continued on Vimpat 200 mg twice daily, Depakote ER 1000 mg at bedtime and Zonegran 100 mg in the morning and 300 mg at night for seizure prevention. He has continued history of falls, wearing a helmet at at my last visit with him I sent referral for physical therapy. He was noting cognitive changes and completed CNS vitals 11/17/2021 for which all domains were invalid so he was further referred for cognitive EP testing. We reviewed those results on exam today as there was noted to be low suspicion for dementia type process, possibly more in line with MCI. Regarding tremor he has been continued on primidone 50 mg 1 tablet 3 times a day. On exam today, main concern is primarily behavioral issues that they have been having particularly in the last few months. They note increased agitation particularly in the evenings to the point where he actually got physical with one of the employees at the facility where he resides. Patient tells me he feels \"bipolar Ericka Servant will be fine 1 minute and then gets extremely upset and angry very quickly. Patient is pleasant on my exam, able to converse about his history and we discussed medications that he had been on in the past for seizures.  He is already on 3 medications for seizure activity and is not endorsing any breakthrough seizures. Additionally he is already on Seroquel 50 mg twice daily as well, which can help with sleep as well as behavioral disturbances. Healthcare provider accompanying him on exam today did tell me that they increase clonazepam by PCP last week to 0.5 mg in the evening and at night and discontinued the clorazepate. Discussed the case with Dr. Meg Hooks we had considered increasing the Depakote to 500 mg in the morning and 1000 mg at bedtime as his ammonia and Depakote serum levels were within normal limits. Discussing with patient and healthcare provider it sounds like we had previously tried to have Depakote 500 mg twice daily but he did not tolerate this well, made him too lethargic in the morning. I discussed with him I really am limited on any other medication changes and do not want to keep adding medications. I did recommend following a more regimented sleep-wake cycle to see if this helped. We also discussed MRI of the brain, I did explain/discussed with them that this would not help us further obtain a diagnosis of dementia or further explain if this was a component of sundowning as neurodegenerative processes are not diagnosed based on imaging. We will still try to obtain imaging however, will have to look into if this can be completed based on stimulator placement. Current Outpatient Medications   Medication Sig Dispense Refill    lacosamide (VIMPAT) 200 MG tablet Take 1 tablet by mouth 2 times daily for 30 days. 60 tablet 3    divalproex (DEPAKOTE) 500 MG DR tablet Take 2 tablets by mouth once daily at bedtime 120 tablet 1    zonisamide (ZONEGRAN) 100 MG capsule Take one capsule by mouth every morning and take 3 capsules by mouth every night 120 capsule 3    albuterol sulfate HFA (PROVENTIL HFA) 108 (90 Base) MCG/ACT inhaler Inhale 2 puffs into the lungs every 4 hours as needed for Wheezing or Shortness of Breath With spacer (and mask if indicated). Thanks.  18 g 1    acetaminophen (TYLENOL) 325 MG tablet Take 2 tablets by mouth every 6 hours as needed for Pain 120 tablet 3    aspirin 81 MG EC tablet Take 1 tablet by mouth daily 30 tablet 3    atorvastatin (LIPITOR) 80 MG tablet Take 1 tablet by mouth nightly 30 tablet 3    psyllium (HYDROCIL) 95 % PACK packet Take 1 packet by mouth 2 times daily 240 each 0    clopidogrel (PLAVIX) 75 MG tablet Take 1 tablet by mouth daily 30 tablet 3    amLODIPine (NORVASC) 10 MG tablet Take 1 tablet by mouth daily 30 tablet 3    AMITIZA 24 MCG capsule       mirabegron (MYRBETRIQ) 50 MG TB24 Take 50 mg by mouth daily      solifenacin (VESICARE) 10 MG tablet Take 10 mg by mouth daily      acetaminophen (APAP EXTRA STRENGTH) 500 MG tablet Take 1 tablet by mouth every 6 hours as needed for Pain 30 tablet 0    levothyroxine (SYNTHROID) 125 MCG tablet Take 1 tablet by mouth Daily 30 tablet 3    dicyclomine (BENTYL) 10 MG capsule Take 1 capsule by mouth every 6 hours as needed (cramps) 20 capsule 0    clonazePAM (KLONOPIN) 0.5 MG tablet Take 0.25 mg by mouth daily.       divalproex (DEPAKOTE) 500 MG DR tablet Take 1,000 mg by mouth nightly      omeprazole (PRILOSEC) 20 MG delayed release capsule Take 20 mg by mouth 2 times daily (before meals)      primidone (MYSOLINE) 50 MG tablet Take 50 mg by mouth 3 times daily      zonisamide (ZONEGRAN) 100 MG capsule Take 100 mg by mouth every morning      Simethicone (MI-ACID GAS RELIEF PO) Take 1 Container by mouth every 4 hours as needed 10 cc every 4 hrs not to exceed 60 cc in 24 hrs      guaiFENesin (MUCINEX) 600 MG extended release tablet Take 1,200 mg by mouth daily as needed for Congestion      Menthol-Methyl Salicylate (MUSCLE RUB) 10-15 % CREA cream Apply 1 applicator topically as needed      pseudoephedrine (SUDAFED) 30 MG tablet Take 30 mg by mouth every 4 hours as needed for Congestion (Patient not taking: Reported on 9/24/2021)      latanoprost (XALATAN) 0.005 % ophthalmic solution Place 1 drop into both eyes nightly      timolol (TIMOPTIC) 0.5 % ophthalmic solution Place 1 drop into both eyes daily       Lactobacillus (ACIDOPHILUS) CAPS capsule Take 1 capsule by mouth daily      Wheat Dextrin (BENEFIBER) POWD Take 4 g by mouth 2 times daily Takes 3 tsp in liquid twice per day       Lactulose Encephalopathy (ENULOSE PO) Take 30 mLs by mouth 3 times daily      folic acid (FOLVITE) 1 MG tablet Take 1 mg by mouth daily      PARoxetine (PAXIL) 20 MG tablet Take 20 mg by mouth every morning      QUEtiapine (SEROQUEL XR) 50 MG extended release tablet Take 50 mg by mouth nightly      tamsulosin (FLOMAX) 0.4 MG capsule Take 1 capsule by mouth daily 30 capsule 0    Cholecalciferol (VITAMIN D3) 1000 UNITS CAPS Take 1 tablet by mouth daily. No current facility-administered medications for this visit. Physical Exam:  Also present during visit: POA and health care professional. (POA is sister)    Mental Status   Orientation: oriented to person and oriented to place ; initially told me the wrong month but after asking second time he was able to tell me September; he was hyper-focused on getting a 4 prong cane   Mood/affectappropriate mood and appropriate affect   Memory/Other: remote memory intact; decreased attention span, recent memory, unable to complete simple calculations, 1/3 word recall  Language  Language: Language, no dysphagia/aphasia, dysarthria mild and tangential speech  Cranial Nerves   Eyes: pupils normal size and reactive to light and visual fields appear full   CN III, IV, VI : extraocular muscle strength normal, normal pursuit, no nystagmus and no ptosis   Facial Motor: normal facial motor   CN XII: tongue protrudes midline  Motor/Coordination Exam   Power: Patient has spastic left upper extremity, noted muscle weakness   Coordination: No bradykinesia, dyskinesia, tremors, myoclonus.   Unable to complete finger-to-nose with left upper extremity  Sensory Exam No Bradykinesia, No Dyskindesia, No Tremor, No myoclonus, Normal strength, Normal Tone Normal bulk and Normal tone     Gait and Stance   Gait/Posture: Patient uses wheelchair on exam today, reportedly has a walker that he uses at facility where he resides        /64 (Site: Left Upper Arm, Position: Sitting, Cuff Size: Large Adult)   Pulse 64   Ht 5' (1.524 m)   Wt 200 lb (90.7 kg)   SpO2 97%   BMI 39.06 kg/m²     Assessment and Plan     Diagnosis Orders   1. Seizure ([address]     2. 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 Cerebral palsy, unspecified type ([address]     4. Essential tremor       Mr. Simon Kendrick presents in follow-up for seizure disorder, recurrent falls and progressive memory deficits with new concern regarding behavioral changes. His main complaint on exam today is regarding behavioral changes that they have noticed in the last 2 months, seem to occur particularly at night with concern for possible sundowning component. He notes increased agitation, and he has even gotten physical with one of the employees at the facility where he resides. We had a very long discussion on possible pharmacological changes that could possibly help the agitation that he is having. He is already on Seroquel 50 mg twice daily, only recommendation would be to possibly add 500 mg of Depakote in the morning as this can help with mood. However, patient has previously stated this made him too lethargic throughout the day so while we can trial it I have low suspicion that this would be a long-term solution. They deny any breakthrough seizure activity and he will continue on current regimen including Depakote 1000 mg at bedtime, Vimpat 200 mg twice daily and zonisamide 100 mg in the morning and 300 mg in the evening. Regarding cognitive decline which at our last visit his sister had noted progressive decline over the last 2 years.   He did follow-up on cognitive testing since last visit with CNS vitals, unfortunately all domains were invalid. EP testing was completed and this was more indicative of MCI than a dementia type process. I also discussed possibility of an MRI brain without contrast, unsure if we would be able to complete this secondary to spinal stimulator placement but will place order and see if we can look into further whether this would be an option. However, I did explain to patient that we do not diagnose dementia process based on imaging and that even if we are able to obtain an MRI which would give us a better image it likely would not further elucidate the behavioral changes themselves. Regarding tremor he has been continued on primidone 50 mg 1 tablet 3 times a day. I anticipate that the patient will return to the office for follow-up visit with the PA/KYLIE in 3 months. Thank you for allowing us to participate in the care of your patient. If we can be of further assistance or any questions arise, please do not hesitate to contact us.     CHRIS Mayberry

## 2022-02-09 ENCOUNTER — OFFICE VISIT (OUTPATIENT)
Dept: NEUROLOGY | Age: 67
End: 2022-02-09
Payer: MEDICARE

## 2022-02-09 VITALS
WEIGHT: 200 LBS | HEART RATE: 64 BPM | OXYGEN SATURATION: 97 % | SYSTOLIC BLOOD PRESSURE: 136 MMHG | BODY MASS INDEX: 39.27 KG/M2 | DIASTOLIC BLOOD PRESSURE: 64 MMHG | HEIGHT: 60 IN

## 2022-02-09 DIAGNOSIS — G25.0 ESSENTIAL TREMOR: ICD-10-CM

## 2022-02-09 DIAGNOSIS — G80.9 CEREBRAL PALSY, UNSPECIFIED TYPE (HCC): ICD-10-CM

## 2022-02-09 DIAGNOSIS — I69.152: ICD-10-CM

## 2022-02-09 DIAGNOSIS — R56.9 SEIZURE (HCC): Primary | ICD-10-CM

## 2022-02-09 DIAGNOSIS — G80.8 CEREBRAL PALSY, INFANTILE HEMIPLEGIA (HCC): ICD-10-CM

## 2022-02-09 PROCEDURE — G8417 CALC BMI ABV UP PARAM F/U: HCPCS | Performed by: PHYSICIAN ASSISTANT

## 2022-02-09 PROCEDURE — 4040F PNEUMOC VAC/ADMIN/RCVD: CPT | Performed by: PHYSICIAN ASSISTANT

## 2022-02-09 PROCEDURE — G8484 FLU IMMUNIZE NO ADMIN: HCPCS | Performed by: PHYSICIAN ASSISTANT

## 2022-02-09 PROCEDURE — G8427 DOCREV CUR MEDS BY ELIG CLIN: HCPCS | Performed by: PHYSICIAN ASSISTANT

## 2022-02-09 PROCEDURE — 1036F TOBACCO NON-USER: CPT | Performed by: PHYSICIAN ASSISTANT

## 2022-02-09 PROCEDURE — 3017F COLORECTAL CA SCREEN DOC REV: CPT | Performed by: PHYSICIAN ASSISTANT

## 2022-02-09 PROCEDURE — 1123F ACP DISCUSS/DSCN MKR DOCD: CPT | Performed by: PHYSICIAN ASSISTANT

## 2022-02-09 PROCEDURE — 99214 OFFICE O/P EST MOD 30 MIN: CPT | Performed by: PHYSICIAN ASSISTANT

## 2022-02-09 RX ORDER — LACOSAMIDE 200 MG/1
200 TABLET ORAL 2 TIMES DAILY
Qty: 60 TABLET | Refills: 3 | Status: SHIPPED | OUTPATIENT
Start: 2022-02-09 | End: 2022-06-08 | Stop reason: SDUPTHER

## 2022-03-07 ENCOUNTER — TELEPHONE (OUTPATIENT)
Dept: NEUROLOGY | Age: 67
End: 2022-03-07

## 2022-03-07 RX ORDER — DIVALPROEX SODIUM 500 MG/1
TABLET, DELAYED RELEASE ORAL
Qty: 93 TABLET | Refills: 10 | Status: ON HOLD | OUTPATIENT
Start: 2022-03-07 | End: 2022-08-20 | Stop reason: HOSPADM

## 2022-03-07 NOTE — TELEPHONE ENCOUNTER
Requested Prescriptions     Pending Prescriptions Disp Refills    divalproex (DEPAKOTE) 500 MG DR tablet [Pharmacy Med Name: Divalproex Sodium 500 MG Tablet delayed release] 93 tablet 10     Sig: TAKE 1 TABLET BY MOUTH EVERY MORNING;TAKE 2 TABLETS (1000MG) BY MOUTH EVERY NIGHT AT BEDTIME

## 2022-04-12 DIAGNOSIS — R56.9 SEIZURE (HCC): ICD-10-CM

## 2022-04-12 NOTE — TELEPHONE ENCOUNTER
Facility faxed us requesting a refill on this medication for this patient. Please advise.     Requested Prescriptions     Pending Prescriptions Disp Refills    zonisamide (ZONEGRAN) 100 MG capsule 120 capsule 3     Sig: Take one capsule by mouth every morning and take 3 capsules by mouth every night

## 2022-04-13 RX ORDER — ZONISAMIDE 100 MG/1
CAPSULE ORAL
Qty: 120 CAPSULE | Refills: 3 | OUTPATIENT
Start: 2022-04-13

## 2022-04-14 DIAGNOSIS — R56.9 SEIZURE (HCC): Primary | ICD-10-CM

## 2022-04-14 RX ORDER — ZONISAMIDE 100 MG/1
100 CAPSULE ORAL EVERY MORNING
Qty: 30 CAPSULE | Refills: 5 | Status: SHIPPED | OUTPATIENT
Start: 2022-04-14 | End: 2022-04-20 | Stop reason: SDUPTHER

## 2022-04-14 NOTE — TELEPHONE ENCOUNTER
Pharmacy received script for patient of Zonisamide. It appears we changed the script from one capsule in the am and 3 at bedtime, is there a reason we made this change ?

## 2022-04-20 RX ORDER — ZONISAMIDE 100 MG/1
CAPSULE ORAL
Qty: 120 CAPSULE | Refills: 5 | Status: SHIPPED | OUTPATIENT
Start: 2022-04-20

## 2022-04-20 NOTE — TELEPHONE ENCOUNTER
This was an error. I will send in the prescription for zonisamide 100 mg in the morning and 300 mg in the evening. This is what the DCND chart had him on since the beginning of my care with him in September 2020.

## 2022-04-21 NOTE — TELEPHONE ENCOUNTER
Called the pharmacy, and it was sent into the wrong pharmacy. The facility he is at is using a new pharmacy. Spoke with a pharmacist Soila Garcia and was able to give a verbal over the phone to authorize the correct dosing.

## 2022-05-09 ENCOUNTER — OFFICE VISIT (OUTPATIENT)
Dept: NEUROLOGY | Age: 67
End: 2022-05-09
Payer: MEDICARE

## 2022-05-09 VITALS
SYSTOLIC BLOOD PRESSURE: 128 MMHG | DIASTOLIC BLOOD PRESSURE: 70 MMHG | HEIGHT: 60 IN | WEIGHT: 200 LBS | HEART RATE: 63 BPM | BODY MASS INDEX: 39.27 KG/M2 | OXYGEN SATURATION: 95 %

## 2022-05-09 DIAGNOSIS — R41.3 MEMORY DEFICIT: ICD-10-CM

## 2022-05-09 DIAGNOSIS — R56.9 SEIZURE (HCC): Primary | ICD-10-CM

## 2022-05-09 DIAGNOSIS — G80.9 CEREBRAL PALSY, UNSPECIFIED TYPE (HCC): ICD-10-CM

## 2022-05-09 DIAGNOSIS — I69.152: ICD-10-CM

## 2022-05-09 DIAGNOSIS — G25.0 ESSENTIAL TREMOR: ICD-10-CM

## 2022-05-09 PROCEDURE — 99214 OFFICE O/P EST MOD 30 MIN: CPT | Performed by: NURSE PRACTITIONER

## 2022-05-09 PROCEDURE — 3017F COLORECTAL CA SCREEN DOC REV: CPT | Performed by: NURSE PRACTITIONER

## 2022-05-09 PROCEDURE — 1036F TOBACCO NON-USER: CPT | Performed by: NURSE PRACTITIONER

## 2022-05-09 PROCEDURE — 1123F ACP DISCUSS/DSCN MKR DOCD: CPT | Performed by: NURSE PRACTITIONER

## 2022-05-09 PROCEDURE — G8417 CALC BMI ABV UP PARAM F/U: HCPCS | Performed by: NURSE PRACTITIONER

## 2022-05-09 PROCEDURE — G8427 DOCREV CUR MEDS BY ELIG CLIN: HCPCS | Performed by: NURSE PRACTITIONER

## 2022-05-09 PROCEDURE — 4040F PNEUMOC VAC/ADMIN/RCVD: CPT | Performed by: NURSE PRACTITIONER

## 2022-05-09 NOTE — PROGRESS NOTES
5/9/22    Gallo Carrasco  1955    Chief Complaint   Patient presents with    Altered Mental Status     pt presents for f/u cognitive issues and sun downers issues, pt's caregiver states he is some what better with sundowning       History of Present Illness  Dorian Britton is a 73 y.o. male presenting today for follow-up of: Palsy with seizure disorder, tremor, left-sided spasticity. April Burnette, caregivers. Dorian Britton has history of cerebral palsy, seizure, mood disorder. He remains on Vimpat 200 mg twice daily, Depakote ER 1000 mg at bedtime and Zonegran 100 mg in the morning and 300 mg at bedtime for seizure management. Caregivers deny any breakthrough seizures, staring spells, automatisms, awakening having bitten his tongue during sleep. Dorian Britton does have history of falls for which he wears a helmet. On last visit he was referred to physical therapy. He did complete therapy, he does occasional home exercises given to him. Dorian Britton and caregivers agree that therapy was beneficial.  Dorian Brtiton is able to ambulate on his own as long as caregivers are close by and able to hold onto gait belt for Jovan's safety. There is a plan in place to help improve Jovan's gait and balance with scheduled increases in activity working towards complete independent ambulation. Recent diagnosis of MCI after having cognitive changes with CNS vitals testing as well as cognitive EP testing, low suspicion for dementia type process. For tremor he remains on primidone 50 mg 1 tablet 3 times daily. He is doing well in regard to tremor. Main concern on last visit was behavioral issues as they had been increasing over the past few months. Patient had increased agitation, mainly in the evening where he was getting physical with employees at his facility. He is on Seroquel 50 mg twice daily increasing Depakote dosing was discussed however this apparently had been attempted previously and caused patient to become too lethargic.  He does follow with psychiatry, this is who prescribes his Seroquel, Paxil. Caregivers tell me that psychiatrist may initiate counseling as well. Caregivers report that Duy Mcnair will still have times where he seems impatient and easily aggravated, Duy Mcnair will continue to follow with psychiatry. Current Outpatient Medications   Medication Sig Dispense Refill    zonisamide (ZONEGRAN) 100 MG capsule Take 1 capsule in the morning and 3 capsules at bedtime 120 capsule 5    divalproex (DEPAKOTE) 500 MG DR tablet TAKE 1 TABLET BY MOUTH EVERY MORNING;TAKE 2 TABLETS (1000MG) BY MOUTH EVERY NIGHT AT BEDTIME 93 tablet 10    lacosamide (VIMPAT) 200 MG tablet Take 1 tablet by mouth 2 times daily for 30 days. 60 tablet 3    magnesium hydroxide (MILK OF MAGNESIA) 400 MG/5ML suspension Take by mouth daily as needed for Constipation      divalproex (DEPAKOTE) 500 MG DR tablet Take 2 tablets by mouth once daily at bedtime 120 tablet 1    albuterol sulfate HFA (PROVENTIL HFA) 108 (90 Base) MCG/ACT inhaler Inhale 2 puffs into the lungs every 4 hours as needed for Wheezing or Shortness of Breath With spacer (and mask if indicated). Thanks.  18 g 1    acetaminophen (TYLENOL) 325 MG tablet Take 2 tablets by mouth every 6 hours as needed for Pain 120 tablet 3    aspirin 81 MG EC tablet Take 1 tablet by mouth daily 30 tablet 3    atorvastatin (LIPITOR) 80 MG tablet Take 1 tablet by mouth nightly 30 tablet 3    psyllium (HYDROCIL) 95 % PACK packet Take 1 packet by mouth 2 times daily 240 each 0    clopidogrel (PLAVIX) 75 MG tablet Take 1 tablet by mouth daily 30 tablet 3    amLODIPine (NORVASC) 10 MG tablet Take 1 tablet by mouth daily 30 tablet 3    AMITIZA 24 MCG capsule       mirabegron (MYRBETRIQ) 50 MG TB24 Take 50 mg by mouth daily      solifenacin (VESICARE) 10 MG tablet Take 10 mg by mouth daily      acetaminophen (APAP EXTRA STRENGTH) 500 MG tablet Take 1 tablet by mouth every 6 hours as needed for Pain 30 tablet 0    levothyroxine (SYNTHROID) 125 MCG tablet Take 1 tablet by mouth Daily 30 tablet 3    dicyclomine (BENTYL) 10 MG capsule Take 1 capsule by mouth every 6 hours as needed (cramps) 20 capsule 0    clonazePAM (KLONOPIN) 0.5 MG tablet Take 0.25 mg by mouth daily. (Patient not taking: Reported on 2/9/2022)      divalproex (DEPAKOTE) 500 MG DR tablet Take 1,000 mg by mouth nightly      omeprazole (PRILOSEC) 20 MG delayed release capsule Take 20 mg by mouth 2 times daily (before meals)      primidone (MYSOLINE) 50 MG tablet Take 50 mg by mouth 3 times daily      Simethicone (MI-ACID GAS RELIEF PO) Take 1 Container by mouth every 4 hours as needed 10 cc every 4 hrs not to exceed 60 cc in 24 hrs      guaiFENesin (MUCINEX) 600 MG extended release tablet Take 1,200 mg by mouth daily as needed for Congestion      Menthol-Methyl Salicylate (MUSCLE RUB) 10-15 % CREA cream Apply 1 applicator topically as needed      pseudoephedrine (SUDAFED) 30 MG tablet Take 30 mg by mouth every 4 hours as needed for Congestion (Patient not taking: Reported on 9/24/2021)      latanoprost (XALATAN) 0.005 % ophthalmic solution Place 1 drop into both eyes nightly      timolol (TIMOPTIC) 0.5 % ophthalmic solution Place 1 drop into both eyes daily       Lactobacillus (ACIDOPHILUS) CAPS capsule Take 1 capsule by mouth daily      Wheat Dextrin (BENEFIBER) POWD Take 4 g by mouth 2 times daily Takes 3 tsp in liquid twice per day       Lactulose Encephalopathy (ENULOSE PO) Take 30 mLs by mouth 3 times daily      folic acid (FOLVITE) 1 MG tablet Take 1 mg by mouth daily      PARoxetine (PAXIL) 20 MG tablet Take 20 mg by mouth every morning      QUEtiapine (SEROQUEL XR) 50 MG extended release tablet Take 50 mg by mouth nightly      tamsulosin (FLOMAX) 0.4 MG capsule Take 1 capsule by mouth daily 30 capsule 0    Cholecalciferol (VITAMIN D3) 1000 UNITS CAPS Take 1 tablet by mouth daily.        No current facility-administered medications for this visit. Physical Exam:  Also present during visit: POA and health care professional. (POA is sister)     Mental Status              Orientation: oriented to person and oriented to place           ; initially told me the wrong month but after asking second time he was able to tell me September; he was hyper-focused on getting a 4 prong cane              Mood/affectappropriate mood and appropriate affect              Memory/Other: remote memory intact; decreased attention span, recent memory, unable to complete simple calculations, 1/3 word recall  Language  Language: Language, no dysphagia/aphasia, dysarthria mild and tangential speech  Cranial Nerves              Eyes: pupils normal size and reactive to light and visual fields appear full              CN III, IV, VI : extraocular muscle strength normal, normal pursuit, no nystagmus and no ptosis              Facial Motor: normal facial motor              CN XII: tongue protrudes midline  Motor/Coordination Exam              Power: Patient has spastic left upper extremity, noted muscle weakness              Coordination: No bradykinesia, dyskinesia, tremors, myoclonus. Unable to complete finger-to-nose with left upper extremity  Sensory Exam No Bradykinesia, No Dyskindesia, No Tremor, No myoclonus, Normal strength, Normal Tone Normal bulk                 Gait and Stance              Gait/Posture: Patient uses wheelchair on exam today, reportedly has a walker that he uses at facility where he resides. Able to ambulate independently with gait belt and staff assistance for safety. /70 (Site: Right Upper Arm, Position: Sitting, Cuff Size: Large Adult)   Pulse 63   Ht 5' (1.524 m)   Wt 200 lb (90.7 kg)   SpO2 95%   BMI 39.06 kg/m²     Assessment and Plan     Diagnosis Orders   1.  Seizure ([address]     2. 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 Cerebral palsy, unspecified type ([facility] [address]     4. Essential tremor     5. Memory deficit       Afia Mendez is doing well in regard to seizure management, I will not make any changes to current medication regimen. Seizure precautions discussed including no driving, tub baths, climbing ladders or operating dangerous equipment until event/seizure free for 3 months. Patient will notify in the event of any breakthrough seizure or seizure-like activity including staring spells, automatisms such as lip smacking, eye blinking, recurring episodes of déjà vu, awakening having bitten tongue during sleep. Afia Mendez will continue to follow with psychiatry for management of his underlying mental health conditions. We will leave medication management up to psychiatry. We discussed the importance of continuing to do home exercises daily and continue working on ambulation schedule to help improve his ability to ambulate independently. In regard to memory we discussed the importance of managing his underlying mental health issues as this could cloud ones ability to focus and process information. We discussed the importance of getting proper rest, eating a healthy diet, and keeping his mind challenged and stimulated on a daily basis. We did discuss the possibility of Botox therapy for his left upper extremity spasticity. Staff/POA will reach out if this is something they decide would be beneficial and they would like to move forward with. I will plan on following with Afia Mendez again in 6 months, sooner if the need arises. Return in about 6 months (around 11/9/2022).     MARY Traore - ANUEL

## 2022-06-01 DIAGNOSIS — R56.9 SEIZURE (HCC): ICD-10-CM

## 2022-06-08 RX ORDER — LACOSAMIDE 200 MG/1
200 TABLET ORAL 2 TIMES DAILY
Qty: 60 TABLET | Refills: 3 | Status: SHIPPED | OUTPATIENT
Start: 2022-06-08 | End: 2022-09-08

## 2022-08-10 ENCOUNTER — HOSPITAL ENCOUNTER (EMERGENCY)
Age: 67
Discharge: HOME | End: 2022-08-10
Payer: MEDICARE

## 2022-08-10 ENCOUNTER — APPOINTMENT (OUTPATIENT)
Dept: CT IMAGING | Age: 67
End: 2022-08-10
Payer: MEDICARE

## 2022-08-10 VITALS
TEMPERATURE: 98 F | HEART RATE: 60 BPM | WEIGHT: 200 LBS | OXYGEN SATURATION: 99 % | RESPIRATION RATE: 18 BRPM | DIASTOLIC BLOOD PRESSURE: 83 MMHG | HEIGHT: 78 IN | SYSTOLIC BLOOD PRESSURE: 150 MMHG | BODY MASS INDEX: 23.14 KG/M2

## 2022-08-10 DIAGNOSIS — K52.9 ENTERITIS: Primary | ICD-10-CM

## 2022-08-10 DIAGNOSIS — R19.7 DIARRHEA, UNSPECIFIED TYPE: ICD-10-CM

## 2022-08-10 DIAGNOSIS — E87.6 HYPOKALEMIA: ICD-10-CM

## 2022-08-10 LAB
ALBUMIN SERPL-MCNC: 4.4 GM/DL (ref 3.4–5)
ALP BLD-CCNC: 190 IU/L (ref 40–129)
ALT SERPL-CCNC: 90 U/L (ref 10–40)
AMMONIA: 26 UMOL/L (ref 16–60)
ANION GAP SERPL CALCULATED.3IONS-SCNC: 11 MMOL/L (ref 4–16)
AST SERPL-CCNC: 57 IU/L (ref 15–37)
BACTERIA: NEGATIVE /HPF
BASOPHILS ABSOLUTE: 0 K/CU MM
BASOPHILS RELATIVE PERCENT: 0.5 % (ref 0–1)
BILIRUB SERPL-MCNC: 0.3 MG/DL (ref 0–1)
BILIRUBIN URINE: NEGATIVE MG/DL
BLOOD, URINE: NEGATIVE
BUN BLDV-MCNC: 11 MG/DL (ref 6–23)
CALCIUM SERPL-MCNC: 9.4 MG/DL (ref 8.3–10.6)
CHLORIDE BLD-SCNC: 110 MMOL/L (ref 99–110)
CLARITY: CLEAR
CO2: 24 MMOL/L (ref 21–32)
COLOR: YELLOW
CREAT SERPL-MCNC: 0.6 MG/DL (ref 0.9–1.3)
DIFFERENTIAL TYPE: ABNORMAL
EOSINOPHILS ABSOLUTE: 0.1 K/CU MM
EOSINOPHILS RELATIVE PERCENT: 1.8 % (ref 0–3)
GFR AFRICAN AMERICAN: >60 ML/MIN/1.73M2
GFR NON-AFRICAN AMERICAN: >60 ML/MIN/1.73M2
GLUCOSE BLD-MCNC: 98 MG/DL (ref 70–99)
GLUCOSE, URINE: NEGATIVE MG/DL
HCT VFR BLD CALC: 49.2 % (ref 42–52)
HEMOGLOBIN: 16 GM/DL (ref 13.5–18)
IMMATURE NEUTROPHIL %: 0.7 % (ref 0–0.43)
KETONES, URINE: NEGATIVE MG/DL
LACTATE: 0.9 MMOL/L (ref 0.4–2)
LEUKOCYTE ESTERASE, URINE: NEGATIVE
LIPASE: 17 IU/L (ref 13–60)
LYMPHOCYTES ABSOLUTE: 1.3 K/CU MM
LYMPHOCYTES RELATIVE PERCENT: 21.1 % (ref 24–44)
MAGNESIUM: 2.1 MG/DL (ref 1.8–2.4)
MCH RBC QN AUTO: 30.8 PG (ref 27–31)
MCHC RBC AUTO-ENTMCNC: 32.5 % (ref 32–36)
MCV RBC AUTO: 94.8 FL (ref 78–100)
MONOCYTES ABSOLUTE: 0.8 K/CU MM
MONOCYTES RELATIVE PERCENT: 12.6 % (ref 0–4)
NITRITE URINE, QUANTITATIVE: NEGATIVE
NUCLEATED RBC %: 0 %
PDW BLD-RTO: 13.2 % (ref 11.7–14.9)
PH, URINE: 7 (ref 5–8)
PLATELET # BLD: 180 K/CU MM (ref 140–440)
PMV BLD AUTO: 10.9 FL (ref 7.5–11.1)
POTASSIUM SERPL-SCNC: 3.4 MMOL/L (ref 3.5–5.1)
PROTEIN UA: NEGATIVE MG/DL
RBC # BLD: 5.19 M/CU MM (ref 4.6–6.2)
RBC URINE: 1 /HPF (ref 0–3)
SEGMENTED NEUTROPHILS ABSOLUTE COUNT: 3.8 K/CU MM
SEGMENTED NEUTROPHILS RELATIVE PERCENT: 63.3 % (ref 36–66)
SODIUM BLD-SCNC: 145 MMOL/L (ref 135–145)
SPECIFIC GRAVITY UA: 1.01 (ref 1–1.03)
SQUAMOUS EPITHELIAL: <1 /HPF
TOTAL IMMATURE NEUTOROPHIL: 0.04 K/CU MM
TOTAL NUCLEATED RBC: 0 K/CU MM
TOTAL PROTEIN: 8.1 GM/DL (ref 6.4–8.2)
TRICHOMONAS: NORMAL /HPF
UROBILINOGEN, URINE: 0.2 MG/DL (ref 0.2–1)
WBC # BLD: 6 K/CU MM (ref 4–10.5)
WBC UA: <1 /HPF (ref 0–2)

## 2022-08-10 PROCEDURE — 6370000000 HC RX 637 (ALT 250 FOR IP): Performed by: PHYSICIAN ASSISTANT

## 2022-08-10 PROCEDURE — 83735 ASSAY OF MAGNESIUM: CPT

## 2022-08-10 PROCEDURE — 2580000003 HC RX 258: Performed by: PHYSICIAN ASSISTANT

## 2022-08-10 PROCEDURE — 96374 THER/PROPH/DIAG INJ IV PUSH: CPT

## 2022-08-10 PROCEDURE — 99285 EMERGENCY DEPT VISIT HI MDM: CPT

## 2022-08-10 PROCEDURE — 74177 CT ABD & PELVIS W/CONTRAST: CPT

## 2022-08-10 PROCEDURE — 96375 TX/PRO/DX INJ NEW DRUG ADDON: CPT

## 2022-08-10 PROCEDURE — 2500000003 HC RX 250 WO HCPCS: Performed by: PHYSICIAN ASSISTANT

## 2022-08-10 PROCEDURE — A4216 STERILE WATER/SALINE, 10 ML: HCPCS | Performed by: PHYSICIAN ASSISTANT

## 2022-08-10 PROCEDURE — 93005 ELECTROCARDIOGRAM TRACING: CPT | Performed by: PHYSICIAN ASSISTANT

## 2022-08-10 PROCEDURE — 83690 ASSAY OF LIPASE: CPT

## 2022-08-10 PROCEDURE — 80053 COMPREHEN METABOLIC PANEL: CPT

## 2022-08-10 PROCEDURE — 6360000004 HC RX CONTRAST MEDICATION: Performed by: PHYSICIAN ASSISTANT

## 2022-08-10 PROCEDURE — 6360000002 HC RX W HCPCS: Performed by: PHYSICIAN ASSISTANT

## 2022-08-10 PROCEDURE — 82140 ASSAY OF AMMONIA: CPT

## 2022-08-10 PROCEDURE — 81001 URINALYSIS AUTO W/SCOPE: CPT

## 2022-08-10 PROCEDURE — 83605 ASSAY OF LACTIC ACID: CPT

## 2022-08-10 PROCEDURE — 85025 COMPLETE CBC W/AUTO DIFF WBC: CPT

## 2022-08-10 RX ORDER — DICYCLOMINE HCL 20 MG
20 TABLET ORAL ONCE
Status: COMPLETED | OUTPATIENT
Start: 2022-08-10 | End: 2022-08-10

## 2022-08-10 RX ORDER — ONDANSETRON 2 MG/ML
4 INJECTION INTRAMUSCULAR; INTRAVENOUS EVERY 30 MIN PRN
Status: DISCONTINUED | OUTPATIENT
Start: 2022-08-10 | End: 2022-08-11 | Stop reason: HOSPADM

## 2022-08-10 RX ORDER — VANCOMYCIN HYDROCHLORIDE 125 MG/1
125 CAPSULE ORAL 4 TIMES DAILY
Qty: 40 CAPSULE | Refills: 0 | Status: ON HOLD | OUTPATIENT
Start: 2022-08-10 | End: 2022-08-20 | Stop reason: HOSPADM

## 2022-08-10 RX ORDER — DICYCLOMINE HYDROCHLORIDE 10 MG/1
10 CAPSULE ORAL
Qty: 20 CAPSULE | Refills: 0 | Status: SHIPPED | OUTPATIENT
Start: 2022-08-10

## 2022-08-10 RX ORDER — 0.9 % SODIUM CHLORIDE 0.9 %
1000 INTRAVENOUS SOLUTION INTRAVENOUS ONCE
Status: COMPLETED | OUTPATIENT
Start: 2022-08-10 | End: 2022-08-10

## 2022-08-10 RX ADMIN — SODIUM CHLORIDE 20 MG: 9 INJECTION INTRAMUSCULAR; INTRAVENOUS; SUBCUTANEOUS at 16:54

## 2022-08-10 RX ADMIN — DICYCLOMINE HYDROCHLORIDE 20 MG: 20 TABLET ORAL at 16:54

## 2022-08-10 RX ADMIN — IOPAMIDOL 75 ML: 755 INJECTION, SOLUTION INTRAVENOUS at 19:58

## 2022-08-10 RX ADMIN — SODIUM CHLORIDE 1000 ML: 9 INJECTION, SOLUTION INTRAVENOUS at 16:49

## 2022-08-10 RX ADMIN — ONDANSETRON 4 MG: 2 INJECTION INTRAMUSCULAR; INTRAVENOUS at 16:54

## 2022-08-10 NOTE — ED NOTES
Called and obtained Verbal Consent to treat from POA - Sister Tonya Rodriguez @ 17:05.  [phone] Cell - Primary Method of Contact  [phone] Home - Secondary Method of Contact     Holger High  08/10/22 2942

## 2022-08-10 NOTE — ED PROVIDER NOTES
eMERGENCY dEPARTMENT eNCOUnter         [address] [facility]     PCP: Naomi Webster PA-C    CHIEF COMPLAINT    Chief Complaint   Patient presents with    Abdominal Pain     Abd pain and vomiting and diarrhea last 9 days       HPI    Monika Read is a 78 y.o. male who presents with diarrhea and intermittent abdominal pain. Onset was prior to arrival, x9 days ago. Context is patient has a history of CP, seizure disorder, somewhat of a difficult/poor historian given history but no other family at bedside during initial assessment. States for the last 9 days, he is having up to 3 episodes of loose brown nonbilious nonbloody stools every day. Had endorsed to triage vomiting however is denying any vomiting episodes to me. Has also had a normal appetite. Does feel a \"sick feeling\" and nauseated sensation to the abdomen. No active abdominal pain at this time. Patient states he has had gallbladder removal surgery as well as kidney stone removal in the past but no other underlying history for GI disorders. No hematemesis or coffee-ground emesis. No fever or chills. Denying any chest pain or shortness of breath. Has otherwise had normal urination. No flank pain. Prior to onset of diarrhea, states that he was around close contacts with similar symptoms but their symptoms have since resolved. He denies any recent travel new foods medications or antibiotics. No history of C. difficile. Patient is endorsing generalized fatigue given multiple episodes of diarrhea        REVIEW OF SYSTEMS    Constitutional:  Denies fever, chills, weight loss or weakness   HENT:  Denies sore throat or ear pain   Cardiovascular:  Denies chest pain, palpitations or swelling   Respiratory:  Denies cough or shortness of breath   GI:  See HPI above  : No hematuria or dysuria. Musculoskeletal:  Denies back pain or groin pain or masses. No pain or swelling of extremities.   Skin: Denies rash  Neurologic:  Denies headache, focal weakness or sensory changes   Endocrine:  Denies polyuria or polydypsia   Lymphatic:  Denies swollen glands     All other review of systems are negative  See HPI and nursing notes for additional information     PAST MEDICAL & SURGICAL HISTORY    Past Medical History:   Diagnosis Date    Acute CVA (cerebrovascular accident) ([address] 10/10/2021    Anemia     Aspiration pneumonia ([address]     dx 6/9/2014 with this per ecf/ per old chart dx with pneumonia with admission 9/18/2018    Cerebral palsy ([address]     \"has no feeling on left side of body\"alert but has some dementia but not diagnosed, has good long term but poor short term and wakes up disoriented after a nap\"(per yaneth)(2014)    Depression     Epilepsy ([address] 1961    last seizure 2/2018- hx grand mal    Frequent falls     with phone assess- family stated pt fell this am (7/10/2013\"in the process of trying to find a facility to help build him back up- (pt now at [facility], [facility])    Glaucoma     \"has been in treatment for this in the past- no treatment needed at present\"    History of IBS     Hx MRSA infection     + nasal culture 4/2014    Hyperammonemia (HCC)     Hypertension     on Lisinopril    IBS (irritable bowel syndrome)     ulcerative colitis    Kidney stone     for surgery for stent placement 7/11/2013    Mood disorder ([address]     per staff at [address] [place] [address] Blvd (methicillin resistant staph aureus) culture positive 05/02/2014 05/27/20 nasal    MRSA (methicillin resistant staph aureus) culture positive 11/25/2020    Face; 10/8/21    Occasional tremors     \"makes it difficult for him to write\"    Pressure injury of contiguous region involving back and right buttock, stage 2 ([address] 05/27/2020    Pressure injury of left buttock, stage 1 07/22/2020    Prostatitis     hx given per H&P old chart    Thyroid disease     Ulcerative colitis ([address]     hx given per staff at [facility] 4/13/2015     Past Surgical History:   Procedure tablet 1    albuterol sulfate HFA (PROVENTIL HFA) 108 (90 Base) MCG/ACT inhaler Inhale 2 puffs into the lungs every 4 hours as needed for Wheezing or Shortness of Breath With spacer (and mask if indicated). Thanks. 18 g 1    acetaminophen (TYLENOL) 325 MG tablet Take 2 tablets by mouth every 6 hours as needed for Pain 120 tablet 3    aspirin 81 MG EC tablet Take 1 tablet by mouth daily 30 tablet 3    atorvastatin (LIPITOR) 80 MG tablet Take 1 tablet by mouth nightly 30 tablet 3    psyllium (HYDROCIL) 95 % PACK packet Take 1 packet by mouth 2 times daily 240 each 0    clopidogrel (PLAVIX) 75 MG tablet Take 1 tablet by mouth daily 30 tablet 3    amLODIPine (NORVASC) 10 MG tablet Take 1 tablet by mouth daily 30 tablet 3    AMITIZA 24 MCG capsule       mirabegron (MYRBETRIQ) 50 MG TB24 Take 50 mg by mouth daily      solifenacin (VESICARE) 10 MG tablet Take 10 mg by mouth daily      acetaminophen (APAP EXTRA STRENGTH) 500 MG tablet Take 1 tablet by mouth every 6 hours as needed for Pain 30 tablet 0    levothyroxine (SYNTHROID) 125 MCG tablet Take 1 tablet by mouth Daily 30 tablet 3    clonazePAM (KLONOPIN) 0.5 MG tablet Take 0.25 mg by mouth daily.        divalproex (DEPAKOTE) 500 MG DR tablet Take 1,000 mg by mouth nightly      omeprazole (PRILOSEC) 20 MG delayed release capsule Take 20 mg by mouth 2 times daily (before meals)      primidone (MYSOLINE) 50 MG tablet Take 50 mg by mouth 3 times daily      Simethicone (MI-ACID GAS RELIEF PO) Take 1 Container by mouth every 4 hours as needed 10 cc every 4 hrs not to exceed 60 cc in 24 hrs      guaiFENesin (MUCINEX) 600 MG extended release tablet Take 1,200 mg by mouth daily as needed for Congestion      Menthol-Methyl Salicylate (MUSCLE RUB) 10-15 % CREA cream Apply 1 applicator topically as needed      pseudoephedrine (SUDAFED) 30 MG tablet Take 30 mg by mouth every 4 hours as needed for Congestion (Patient not taking: Reported on 9/24/2021)      latanoprost (XALATAN) 0.005 % ophthalmic solution Place 1 drop into both eyes nightly      timolol (TIMOPTIC) 0.5 % ophthalmic solution Place 1 drop into both eyes daily       Lactobacillus (ACIDOPHILUS) CAPS capsule Take 1 capsule by mouth daily      Wheat Dextrin (BENEFIBER) POWD Take 4 g by mouth 2 times daily Takes 3 tsp in liquid twice per day       Lactulose Encephalopathy (ENULOSE PO) Take 30 mLs by mouth 3 times daily      folic acid (FOLVITE) 1 MG tablet Take 1 mg by mouth daily      PARoxetine (PAXIL) 20 MG tablet Take 20 mg by mouth every morning      QUEtiapine (SEROQUEL XR) 50 MG extended release tablet Take 50 mg by mouth nightly      tamsulosin (FLOMAX) 0.4 MG capsule Take 1 capsule by mouth daily 30 capsule 0    Cholecalciferol (VITAMIN D3) 1000 UNITS CAPS Take 1 tablet by mouth daily.          ALLERGIES    No Known Allergies    SOCIAL AND FAMILY HISTORY    Social History     Socioeconomic History    Marital status: Single     Spouse name: None    Number of children: None    Years of education: None    Highest education level: None   Tobacco Use    Smoking status: Never    Smokeless tobacco: Never   Vaping Use    Vaping Use: Never used   Substance and Sexual Activity    Alcohol use: No    Drug use: No     Family History   Problem Relation Age of Onset    Heart Disease Mother         atrial fib    High Blood Pressure Mother     Asthma Mother     High Cholesterol Mother     Depression Mother     Migraines Mother     High Blood Pressure Father     Heart Disease Father     Asthma Sister     High Cholesterol Sister     Depression Sister     Migraines Sister        PHYSICAL EXAM    VITAL SIGNS: BP (!) 150/83   Pulse 62   Temp 98.1 °F (36.7 °C) (Oral)   Resp 16   Ht 6' 6\" (1.981 m)   Wt 200 lb (90.7 kg)   SpO2 100%   BMI 23.11 kg/m²   General:  Well developed, well nourished, In no acute distress  Eyes:  Sclera nonicteric, Conjunctiva moist, No discharge  Head:  Normocephalic, Atramautic  Neck/Lymphatics: Supple, no JVD, no swollen nodes  Respiratory:  Clear to ausculation bilaterally, No retractions, Non labored breathing  Cardiovascular:  RRR, No murmurs/gallops/thrills  GI:   No gross discoloration. Bowel sounds present in all quadrants, No audible bruits. Soft,  Nondistended. Self reducing midline ventral hernia with Valsalva maneuvers without evidence of incarceration or strangulation. No other localized abdominal tenderness rebound or guarding. No palpable pulsatile masses   No McBurney's point tenderness  Negative Rovsing sign   Back:  No CVA tenderness to percussion.   Musculoskeletal:  No edema, No deformity  Peripheral Vascular: Distal pulses 2+ equal bilaterally  Integument: No rash, Normal turgor  Neurologic:  Alert & oriented, Normal speech  Psychiatric: Cooperative, pleasant affect       I have reviewed and interpreted all of the currently available lab results from this visit (if applicable):  Results for orders placed or performed during the hospital encounter of 08/10/22   CBC with Auto Differential   Result Value Ref Range    WBC 6.0 4.0 - 10.5 K/CU MM    RBC 5.19 4.6 - 6.2 M/CU MM    Hemoglobin 16.0 13.5 - 18.0 GM/DL    Hematocrit 49.2 42 - 52 %    MCV 94.8 78 - 100 FL    MCH 30.8 27 - 31 PG    MCHC 32.5 32.0 - 36.0 %    RDW 13.2 11.7 - 14.9 %    Platelets 525 845 - 233 K/CU MM    MPV 10.9 7.5 - 11.1 FL    Differential Type AUTOMATED DIFFERENTIAL     Segs Relative 63.3 36 - 66 %    Lymphocytes % 21.1 (L) 24 - 44 %    Monocytes % 12.6 (H) 0 - 4 %    Eosinophils % 1.8 0 - 3 %    Basophils % 0.5 0 - 1 %    Segs Absolute 3.8 K/CU MM    Lymphocytes Absolute 1.3 K/CU MM    Monocytes Absolute 0.8 K/CU MM    Eosinophils Absolute 0.1 K/CU MM    Basophils Absolute 0.0 K/CU MM    Nucleated RBC % 0.0 %    Total Nucleated RBC 0.0 K/CU MM    Total Immature Neutrophil 0.04 K/CU MM    Immature Neutrophil % 0.7 (H) 0 - 0.43 %   Comprehensive Metabolic Panel   Result Value Ref Range    Sodium 145 135 - 145 MMOL/L    Potassium 3.4 (L) 3.5 - 5.1 MMOL/L    Chloride 110 99 - 110 mMol/L    CO2 24 21 - 32 MMOL/L    BUN 11 6 - 23 MG/DL    Creatinine 0.6 (L) 0.9 - 1.3 MG/DL    Glucose 98 70 - 99 MG/DL    Calcium 9.4 8.3 - 10.6 MG/DL    Albumin 4.4 3.4 - 5.0 GM/DL    Total Protein 8.1 6.4 - 8.2 GM/DL    Total Bilirubin 0.3 0.0 - 1.0 MG/DL    ALT 90 (H) 10 - 40 U/L    AST 57 (H) 15 - 37 IU/L    Alkaline Phosphatase 190 (H) 40 - 129 IU/L    GFR Non-African American >60 >60 mL/min/1.73m2    GFR African American >60 >60 mL/min/1.73m2    Anion Gap 11 4 - 16   Lipase   Result Value Ref Range    Lipase 17 13 - 60 IU/L   Urinalysis   Result Value Ref Range    Color, UA YELLOW YELLOW    Clarity, UA CLEAR CLEAR    Glucose, Urine NEGATIVE NEGATIVE MG/DL    Bilirubin Urine NEGATIVE NEGATIVE MG/DL    Ketones, Urine NEGATIVE NEGATIVE MG/DL    Specific Gravity, UA 1.010 1.001 - 1.035    Blood, Urine NEGATIVE NEGATIVE    pH, Urine 7.0 5.0 - 8.0    Protein, UA NEGATIVE NEGATIVE MG/DL    Urobilinogen, Urine 0.2 0.2 - 1.0 MG/DL    Nitrite Urine, Quantitative NEGATIVE NEGATIVE    Leukocyte Esterase, Urine NEGATIVE NEGATIVE    RBC, UA 1 0 - 3 /HPF    WBC, UA <1 0 - 2 /HPF    Bacteria, UA NEGATIVE NEGATIVE /HPF    Squam Epithel, UA <1 /HPF    Trichomonas, UA NONE SEEN NONE SEEN /HPF   Lactic Acid   Result Value Ref Range    Lactate 0.9 0.4 - 2.0 mMOL/L   Magnesium   Result Value Ref Range    Magnesium 2.1 1.8 - 2.4 mg/dl   Ammonia   Result Value Ref Range    Ammonia 26 16 - 60 UMOL/L        RADIOLOGY/PROCEDURES            CT ABDOMEN PELVIS W IV CONTRAST Additional Contrast? None (Final result)  Result time 08/10/22 21:13:57  Final result by Pepe De Anda DO (08/10/22 21:13:57)                Impression:    1. Air-fluid levels throughout the colon suggest enteritis. No   intraperitoneal free air or abscess. 2.  No bowel obstruction. Normal appendix. 3.  Trace right posterior pleural fluid versus pleural thickening.    Visualized lung bases demonstrate bilateral heterogeneous opacities which may   represent atelectasis/scarring or a developing infectious/inflammatory   process. Narrative:    EXAMINATION:   CT OF THE ABDOMEN AND PELVIS WITH CONTRAST 8/10/2022 7:55 pm     TECHNIQUE:   CT of the abdomen and pelvis was performed with the administration of   intravenous contrast. Multiplanar reformatted images are provided for review. Automated exposure control, iterative reconstruction, and/or weight based   adjustment of the mA/kV was utilized to reduce the radiation dose to as low   as reasonably achievable. COMPARISON:   None. HISTORY:   ORDERING SYSTEM PROVIDED HISTORY: Generalized abdominal pain nausea vomiting   diarrhea   TECHNOLOGIST PROVIDED HISTORY:   Reason for exam:->Generalized abdominal pain nausea vomiting diarrhea   Additional Contrast?->None   Decision Support Exception - unselect if not a suspected or confirmed   emergency medical condition->Emergency Medical Condition (MA)   Reason for Exam: Generalized abdominal pain nausea vomiting diarrhea     FINDINGS:   Lower Chest: Trace right pleural fluid versus pleural thickening. Visualized   lung bases demonstrate respiratory motion and bilateral heterogeneous   opacities. Liver: Normal.     Gallbladder and Bile Ducts: Prior cholecystectomy. Spleen: Normal.     Adrenal Glands: Normal.     Pancreas: Normal.     Genitourinary: Multiple sub below appearing left renal cysts are similar to   the prior study suggesting benign cyst, no imaging follow-up recommended. No   suspicious renal mass. No urinary stones or hydronephrosis. Both ureters   are normal in course and caliber. The urinary bladder is unremarkable. Bowel: No bowel obstruction. The stomach is incompletely distended and not   well evaluated. Diffusely distended but not pathologically dilated colon   with air-fluid levels throughout. No significant diverticular disease. Normal appendix. No pneumatosis. Vasculature: Atherosclerosis. Normal caliber abdominal aorta. Patent portal   vein. No definite portal venous gas. .     Bones and Soft Tissues: Degenerative changes in the visualized spine and   pelvis. Right sacral nerve stimulator device. Multilevel bridging marginal   osteophytes throughout the visualized spine. Osseous fusion across the   sacroiliac joints. Retroperitoneum/Mesentery: No intraperitoneal free air, ascites or fluid   collection. No lymphadenopathy in the abdomen or pelvis. EKG Interpretation  Please see ED physician's note - Dr. Dallin Adam - for EKG interpretation      ED COURSE & MEDICAL DECISION MAKING      Vital signs and nursing notes reviewed during ED course. I have independently evaluated this patient . Supervising MD - Dr Dallin Adam - present in the Emergency Department, available for consultation, throughout entirety of  patient care. All pertinent Lab data and radiographic results reviewed with patient at bedside. The patient and / or the family were informed of the results of any tests, a time was given to answer questions, a plan was proposed and they agreed with plan. Differential diagnosis: Abdominal Aortic Aneurysm, Ischemic Bowel, Bowel Obstruction, Acute Cholecystitis, Acute Appendicitis, other    Clinical  IMPRESSION    1. Enteritis    2. Diarrhea, unspecified type    3. Hypokalemia        Patient presents with 9-day history of intermittent abdominal pain and diarrhea. On exam, pleasant nontoxic 72-year-old male, vitals are stable, is afebrile. No acute respiratory distress. Unremarkable cardiopulmonary exam.  Abdomen exam is nonsurgical.  No localized rebound or guarding. No CVA tenderness. There is a self reducing ventral midline hernia without evidence of incarceration or strangulation with Valsalva maneuvers. Patient start IV fluids, Zofran Bentyl and Pepcid. Labs are reassuring.   CBC, CMP without significant derangement other than mild hypokalemia 3.4 and mild transaminitis with ALT/AST of  90/57. Normal bilirubin. White count is normal.  Lipase, lactic and magnesium within normal range. UA is unremarkable. CT abdomen pelvis with IV contrast reveals air-fluid levels throughout the colon suggestive of enteritis without evidence of free air or abscess. No other evidence of bowel obstruction, normal-appearing appendix. Also incidental findings for trace right posterior pleural effusion versus pleural thickening as well as heterogeneous opacities which may resent atelectasis versus developing infectious/inflammatory process however patient is denying any respiratory complaints concerning for pneumonia. On serial reassessments, patient has no episodes of diarrhea while in the ED. Patient's sister/power of  Nel Lawrence is now in the ER. She states that facility was concerned that patient was acting confused earlier today. He does appear that patient has been on a week of antibiotics of unknown name after being diagnosed with a UTI at urology office last week. Family states that diarrhea started after starting this medication and was told by group home that the stool look like \"yellow urine coming out continuously. \"  No history for C. difficile in the past.  Per sister, patient is acting at normal baseline mental status. She is also requesting ammonia be checked as patient is on multiple seizure medications that can cause hyperammonia level but is taking lactulose regularly. Ammonia level  is normal.  At this time, do feel patient is appropriate discharge home with continued supportive symptomatic care. Per return he does endorse concern for possible C. difficile however still not able to obtain stool sample. Will be given a standing discharge order for C. difficile collection as well as sample specimen cup and discussed with power of  to return stool at any time for testing.   Will be given written

## 2022-08-10 NOTE — ED NOTES
Pt had episode of incontinence at this time. Pt cleaned, bed linen changed, and new brief applied.       Fuad Noe RN  08/10/22 [id number]

## 2022-08-11 LAB
EKG ATRIAL RATE: 62 BPM
EKG DIAGNOSIS: NORMAL
EKG P AXIS: 74 DEGREES
EKG P-R INTERVAL: 172 MS
EKG Q-T INTERVAL: 452 MS
EKG QRS DURATION: 164 MS
EKG QTC CALCULATION (BAZETT): 458 MS
EKG R AXIS: 170 DEGREES
EKG T AXIS: -12 DEGREES
EKG VENTRICULAR RATE: 62 BPM

## 2022-08-11 PROCEDURE — 93010 ELECTROCARDIOGRAM REPORT: CPT | Performed by: INTERNAL MEDICINE

## 2022-08-11 NOTE — ED PROVIDER NOTES
EKG normal sinus rhythm, right bundle branch block, rate of 62, UT interval 172, QRS duration 164, QT/QTc 452/458, no ST elevation noted.      Lexi Duncan,   08/10/22 2041

## 2022-08-11 NOTE — ED NOTES
CT ABDOMEN PELVIS W IV CONTRAST Additional Contrast? None [[id number]]    Status: Final result       Order Providers    Authorizing Billing   NIELS Alejandra DO            Signed by    Signed Date/Time Phone Pager   CINDA, [address] 8/10/2022  9:13 [phone]      Reading Providers    Read Date Phone Pager   Girish Castellanos Wed Aug 10, 2022  9:13 [phone]        CT ABDOMEN PELVIS W IV CONTRAST Additional Contrast? None: Patient Communication     Released  Not seen     Radiation Dose Estimates    No radiation information found for this patient  Narrative   EXAMINATION:   CT OF THE ABDOMEN AND PELVIS WITH CONTRAST 8/10/2022 7:55 pm       TECHNIQUE:   CT of the abdomen and pelvis was performed with the administration of   intravenous contrast. Multiplanar reformatted images are provided for review.    Automated exposure control, iterative reconstruction, and/or weight based   adjustment of the mA/kV was utilized to reduce the radiation dose to as low   as reasonably achievable.       COMPARISON:   None.       HISTORY:   ORDERING SYSTEM PROVIDED HISTORY: Generalized abdominal pain nausea vomiting   diarrhea   TECHNOLOGIST PROVIDED HISTORY:   Reason for exam:->Generalized abdominal pain nausea vomiting diarrhea   Additional Contrast?->None   Decision Support Exception - unselect if not a suspected or confirmed   emergency medical condition->Emergency Medical Condition (MA)   Reason for Exam: Generalized abdominal pain nausea vomiting diarrhea       FINDINGS:   Lower Chest: Trace right pleural fluid versus pleural thickening.  Visualized   lung bases demonstrate respiratory motion and bilateral heterogeneous   opacities.       Liver: Normal.       Gallbladder and Bile Ducts: Prior cholecystectomy.       Spleen: Normal.       Adrenal Glands: Normal.       Pancreas: Normal.       Genitourinary: Multiple sub below appearing left renal cysts are similar to   the prior study suggesting benign cyst, no imaging follow-up recommended.  No   suspicious renal mass.  No urinary stones or hydronephrosis.  Both ureters   are normal in course and caliber.  The urinary bladder is unremarkable.       Bowel: No bowel obstruction.  The stomach is incompletely distended and not   well evaluated.  Diffusely distended but not pathologically dilated colon   with air-fluid levels throughout.  No significant diverticular disease. Normal appendix.  No pneumatosis.       Vasculature: Atherosclerosis.  Normal caliber abdominal aorta.  Patent portal   vein.  No definite portal venous gas. .       Bones and Soft Tissues: Degenerative changes in the visualized spine and   pelvis.  Right sacral nerve stimulator device.  Multilevel bridging marginal   osteophytes throughout the visualized spine.  Osseous fusion across the   sacroiliac joints.       Retroperitoneum/Mesentery: No intraperitoneal free air, ascites or fluid   collection. No lymphadenopathy in the abdomen or pelvis.           Impression   1.  Air-fluid levels throughout the colon suggest enteritis.  No   intraperitoneal free air or abscess.       2.  No bowel obstruction.  Normal appendix.       3.  Trace right posterior pleural fluid versus pleural thickening.    Visualized lung bases demonstrate bilateral heterogeneous opacities which may   represent atelectasis/scarring or a developing infectious/inflammatory   process.              Marialuisa Mattson RN  08/10/22 [id number]

## 2022-08-12 ENCOUNTER — HOSPITAL ENCOUNTER (OUTPATIENT)
Age: 67
Setting detail: SPECIMEN
Discharge: HOME | End: 2022-08-12
Payer: MEDICARE

## 2022-08-12 LAB — AMMONIA: 45 UMOL/L (ref 16–60)

## 2022-08-12 PROCEDURE — 82140 ASSAY OF AMMONIA: CPT

## 2022-08-16 ENCOUNTER — APPOINTMENT (OUTPATIENT)
Dept: CT IMAGING | Age: 67
DRG: 101 | End: 2022-08-16
Payer: MEDICARE

## 2022-08-16 ENCOUNTER — APPOINTMENT (OUTPATIENT)
Dept: GENERAL RADIOLOGY | Age: 67
DRG: 101 | End: 2022-08-16
Payer: MEDICARE

## 2022-08-16 ENCOUNTER — HOSPITAL ENCOUNTER (INPATIENT)
Age: 67
LOS: 3 days | Discharge: HOME HEALTH | DRG: 101 | End: 2022-08-21
Attending: EMERGENCY MEDICINE | Admitting: INTERNAL MEDICINE
Payer: MEDICARE

## 2022-08-16 DIAGNOSIS — R41.0 INTERMITTENT CONFUSION: Primary | ICD-10-CM

## 2022-08-16 DIAGNOSIS — E87.1 HYPONATREMIA: ICD-10-CM

## 2022-08-16 DIAGNOSIS — E87.6 HYPOKALEMIA: ICD-10-CM

## 2022-08-16 DIAGNOSIS — R53.1 GENERALIZED WEAKNESS: ICD-10-CM

## 2022-08-16 PROBLEM — G93.40 ACUTE ENCEPHALOPATHY: Status: ACTIVE | Noted: 2022-08-16

## 2022-08-16 LAB
ALBUMIN SERPL-MCNC: 3.9 GM/DL (ref 3.4–5)
ALP BLD-CCNC: 138 IU/L (ref 40–129)
ALT SERPL-CCNC: 27 U/L (ref 10–40)
AMMONIA: 31 UMOL/L (ref 16–60)
ANION GAP SERPL CALCULATED.3IONS-SCNC: 10 MMOL/L (ref 4–16)
AST SERPL-CCNC: 16 IU/L (ref 15–37)
BACTERIA: ABNORMAL /HPF
BASOPHILS ABSOLUTE: 0 K/CU MM
BASOPHILS RELATIVE PERCENT: 0.4 % (ref 0–1)
BILIRUB SERPL-MCNC: 0.3 MG/DL (ref 0–1)
BILIRUBIN URINE: NEGATIVE MG/DL
BLOOD, URINE: NEGATIVE
BUN BLDV-MCNC: 13 MG/DL (ref 6–23)
CALCIUM SERPL-MCNC: 9.2 MG/DL (ref 8.3–10.6)
CHLORIDE BLD-SCNC: 105 MMOL/L (ref 99–110)
CLARITY: CLEAR
CO2: 26 MMOL/L (ref 21–32)
COLOR: YELLOW
CREAT SERPL-MCNC: 0.6 MG/DL (ref 0.9–1.3)
DIFFERENTIAL TYPE: ABNORMAL
DOSE AMOUNT: ABNORMAL
DOSE TIME: ABNORMAL
EKG ATRIAL RATE: 61 BPM
EKG DIAGNOSIS: NORMAL
EKG P AXIS: 50 DEGREES
EKG P-R INTERVAL: 182 MS
EKG Q-T INTERVAL: 450 MS
EKG QRS DURATION: 162 MS
EKG QTC CALCULATION (BAZETT): 453 MS
EKG R AXIS: -71 DEGREES
EKG T AXIS: -20 DEGREES
EKG VENTRICULAR RATE: 61 BPM
EOSINOPHILS ABSOLUTE: 0.1 K/CU MM
EOSINOPHILS RELATIVE PERCENT: 1.6 % (ref 0–3)
GFR AFRICAN AMERICAN: >60 ML/MIN/1.73M2
GFR NON-AFRICAN AMERICAN: >60 ML/MIN/1.73M2
GLUCOSE BLD-MCNC: 108 MG/DL (ref 70–99)
GLUCOSE BLD-MCNC: 114 MG/DL (ref 70–99)
GLUCOSE, URINE: NEGATIVE MG/DL
HCT VFR BLD CALC: 45.2 % (ref 42–52)
HEMOGLOBIN: 15.4 GM/DL (ref 13.5–18)
IMMATURE NEUTROPHIL %: 0.3 % (ref 0–0.43)
KETONES, URINE: NEGATIVE MG/DL
LEUKOCYTE ESTERASE, URINE: ABNORMAL
LYMPHOCYTES ABSOLUTE: 1.2 K/CU MM
LYMPHOCYTES RELATIVE PERCENT: 15.8 % (ref 24–44)
MAGNESIUM: 2.2 MG/DL (ref 1.8–2.4)
MCH RBC QN AUTO: 31.3 PG (ref 27–31)
MCHC RBC AUTO-ENTMCNC: 34.1 % (ref 32–36)
MCV RBC AUTO: 91.9 FL (ref 78–100)
MONOCYTES ABSOLUTE: 0.7 K/CU MM
MONOCYTES RELATIVE PERCENT: 9.5 % (ref 0–4)
MUCUS: ABNORMAL HPF
NITRITE URINE, QUANTITATIVE: NEGATIVE
NUCLEATED RBC %: 0 %
PDW BLD-RTO: 13.4 % (ref 11.7–14.9)
PH, URINE: 7.5 (ref 5–8)
PLATELET # BLD: 200 K/CU MM (ref 140–440)
PMV BLD AUTO: 10.9 FL (ref 7.5–11.1)
POTASSIUM SERPL-SCNC: 3.5 MMOL/L (ref 3.5–5.1)
PROTEIN UA: NEGATIVE MG/DL
RBC # BLD: 4.92 M/CU MM (ref 4.6–6.2)
RBC URINE: ABNORMAL /HPF (ref 0–3)
SEGMENTED NEUTROPHILS ABSOLUTE COUNT: 5.4 K/CU MM
SEGMENTED NEUTROPHILS RELATIVE PERCENT: 72.4 % (ref 36–66)
SODIUM BLD-SCNC: 141 MMOL/L (ref 135–145)
SPECIFIC GRAVITY UA: 1.01 (ref 1–1.03)
SQUAMOUS EPITHELIAL: <1 /HPF
TOTAL IMMATURE NEUTOROPHIL: 0.02 K/CU MM
TOTAL NUCLEATED RBC: 0 K/CU MM
TOTAL PROTEIN: 7.2 GM/DL (ref 6.4–8.2)
TRICHOMONAS: ABNORMAL /HPF
TROPONIN T: <0.01 NG/ML
TSH HIGH SENSITIVITY: 0.89 UIU/ML (ref 0.27–4.2)
UROBILINOGEN, URINE: 0.2 MG/DL (ref 0.2–1)
VALPROIC ACID LEVEL: 101.1 UG/ML (ref 50–100)
WBC # BLD: 7.5 K/CU MM (ref 4–10.5)
WBC UA: 11 /HPF (ref 0–2)

## 2022-08-16 PROCEDURE — 84484 ASSAY OF TROPONIN QUANT: CPT

## 2022-08-16 PROCEDURE — 82962 GLUCOSE BLOOD TEST: CPT

## 2022-08-16 PROCEDURE — 80164 ASSAY DIPROPYLACETIC ACD TOT: CPT

## 2022-08-16 PROCEDURE — 36415 COLL VENOUS BLD VENIPUNCTURE: CPT

## 2022-08-16 PROCEDURE — 99285 EMERGENCY DEPT VISIT HI MDM: CPT

## 2022-08-16 PROCEDURE — 70450 CT HEAD/BRAIN W/O DYE: CPT

## 2022-08-16 PROCEDURE — 83735 ASSAY OF MAGNESIUM: CPT

## 2022-08-16 PROCEDURE — 93010 ELECTROCARDIOGRAM REPORT: CPT | Performed by: INTERNAL MEDICINE

## 2022-08-16 PROCEDURE — G0378 HOSPITAL OBSERVATION PER HR: HCPCS

## 2022-08-16 PROCEDURE — 93005 ELECTROCARDIOGRAM TRACING: CPT | Performed by: EMERGENCY MEDICINE

## 2022-08-16 PROCEDURE — 85025 COMPLETE CBC W/AUTO DIFF WBC: CPT

## 2022-08-16 PROCEDURE — 80053 COMPREHEN METABOLIC PANEL: CPT

## 2022-08-16 PROCEDURE — 82140 ASSAY OF AMMONIA: CPT

## 2022-08-16 PROCEDURE — 71045 X-RAY EXAM CHEST 1 VIEW: CPT

## 2022-08-16 PROCEDURE — 81001 URINALYSIS AUTO W/SCOPE: CPT

## 2022-08-16 PROCEDURE — 84443 ASSAY THYROID STIM HORMONE: CPT

## 2022-08-16 RX ORDER — CLONAZEPAM 0.5 MG/1
0.25 TABLET ORAL DAILY
Status: DISCONTINUED | OUTPATIENT
Start: 2022-08-16 | End: 2022-08-21 | Stop reason: HOSPADM

## 2022-08-16 RX ORDER — PAROXETINE HYDROCHLORIDE 20 MG/1
20 TABLET, FILM COATED ORAL EVERY MORNING
Status: DISCONTINUED | OUTPATIENT
Start: 2022-08-17 | End: 2022-08-21 | Stop reason: HOSPADM

## 2022-08-16 RX ORDER — DICYCLOMINE HYDROCHLORIDE 10 MG/1
10 CAPSULE ORAL
Status: DISCONTINUED | OUTPATIENT
Start: 2022-08-16 | End: 2022-08-21 | Stop reason: HOSPADM

## 2022-08-16 RX ORDER — ONDANSETRON 2 MG/ML
4 INJECTION INTRAMUSCULAR; INTRAVENOUS EVERY 6 HOURS PRN
Status: DISCONTINUED | OUTPATIENT
Start: 2022-08-16 | End: 2022-08-21 | Stop reason: HOSPADM

## 2022-08-16 RX ORDER — SODIUM CHLORIDE 9 MG/ML
INJECTION, SOLUTION INTRAVENOUS PRN
Status: DISCONTINUED | OUTPATIENT
Start: 2022-08-16 | End: 2022-08-21 | Stop reason: HOSPADM

## 2022-08-16 RX ORDER — CLOPIDOGREL BISULFATE 75 MG/1
75 TABLET ORAL DAILY
Status: DISCONTINUED | OUTPATIENT
Start: 2022-08-16 | End: 2022-08-21 | Stop reason: HOSPADM

## 2022-08-16 RX ORDER — QUETIAPINE FUMARATE 50 MG/1
50 TABLET, EXTENDED RELEASE ORAL NIGHTLY
Status: DISCONTINUED | OUTPATIENT
Start: 2022-08-16 | End: 2022-08-21 | Stop reason: HOSPADM

## 2022-08-16 RX ORDER — POLYETHYLENE GLYCOL 3350 17 G
2 POWDER IN PACKET (EA) ORAL
Status: DISCONTINUED | OUTPATIENT
Start: 2022-08-16 | End: 2022-08-21 | Stop reason: HOSPADM

## 2022-08-16 RX ORDER — POLYETHYLENE GLYCOL 3350 17 G/17G
17 POWDER, FOR SOLUTION ORAL DAILY PRN
Status: DISCONTINUED | OUTPATIENT
Start: 2022-08-16 | End: 2022-08-21 | Stop reason: HOSPADM

## 2022-08-16 RX ORDER — SODIUM CHLORIDE 0.9 % (FLUSH) 0.9 %
5-40 SYRINGE (ML) INJECTION EVERY 12 HOURS SCHEDULED
Status: DISCONTINUED | OUTPATIENT
Start: 2022-08-16 | End: 2022-08-21 | Stop reason: HOSPADM

## 2022-08-16 RX ORDER — ENOXAPARIN SODIUM 100 MG/ML
40 INJECTION SUBCUTANEOUS DAILY
Status: DISCONTINUED | OUTPATIENT
Start: 2022-08-16 | End: 2022-08-21 | Stop reason: HOSPADM

## 2022-08-16 RX ORDER — DIVALPROEX SODIUM 500 MG/1
500 TABLET, DELAYED RELEASE ORAL DAILY
Status: DISCONTINUED | OUTPATIENT
Start: 2022-08-17 | End: 2022-08-21 | Stop reason: HOSPADM

## 2022-08-16 RX ORDER — LACTULOSE 10 G/15ML
20 SOLUTION ORAL 3 TIMES DAILY
Status: DISCONTINUED | OUTPATIENT
Start: 2022-08-16 | End: 2022-08-21 | Stop reason: HOSPADM

## 2022-08-16 RX ORDER — PRIMIDONE 50 MG/1
50 TABLET ORAL 3 TIMES DAILY
Status: DISCONTINUED | OUTPATIENT
Start: 2022-08-16 | End: 2022-08-21 | Stop reason: HOSPADM

## 2022-08-16 RX ORDER — ACETAMINOPHEN 325 MG/1
650 TABLET ORAL EVERY 6 HOURS PRN
Status: DISCONTINUED | OUTPATIENT
Start: 2022-08-16 | End: 2022-08-21 | Stop reason: HOSPADM

## 2022-08-16 RX ORDER — ZONISAMIDE 100 MG/1
300 CAPSULE ORAL NIGHTLY
Status: DISCONTINUED | OUTPATIENT
Start: 2022-08-16 | End: 2022-08-21 | Stop reason: HOSPADM

## 2022-08-16 RX ORDER — ONDANSETRON 4 MG/1
4 TABLET, ORALLY DISINTEGRATING ORAL EVERY 8 HOURS PRN
Status: DISCONTINUED | OUTPATIENT
Start: 2022-08-16 | End: 2022-08-21 | Stop reason: HOSPADM

## 2022-08-16 RX ORDER — ZONISAMIDE 100 MG/1
100 CAPSULE ORAL DAILY
Status: DISCONTINUED | OUTPATIENT
Start: 2022-08-17 | End: 2022-08-21 | Stop reason: HOSPADM

## 2022-08-16 RX ORDER — TAMSULOSIN HYDROCHLORIDE 0.4 MG/1
0.4 CAPSULE ORAL DAILY
Status: DISCONTINUED | OUTPATIENT
Start: 2022-08-16 | End: 2022-08-21 | Stop reason: HOSPADM

## 2022-08-16 RX ORDER — SODIUM CHLORIDE 0.9 % (FLUSH) 0.9 %
10 SYRINGE (ML) INJECTION PRN
Status: DISCONTINUED | OUTPATIENT
Start: 2022-08-16 | End: 2022-08-21 | Stop reason: HOSPADM

## 2022-08-16 RX ORDER — DIVALPROEX SODIUM 500 MG/1
1000 TABLET, DELAYED RELEASE ORAL NIGHTLY
Status: DISCONTINUED | OUTPATIENT
Start: 2022-08-16 | End: 2022-08-21 | Stop reason: HOSPADM

## 2022-08-16 RX ORDER — AMLODIPINE BESYLATE 10 MG/1
10 TABLET ORAL DAILY
Status: DISCONTINUED | OUTPATIENT
Start: 2022-08-16 | End: 2022-08-21 | Stop reason: HOSPADM

## 2022-08-16 RX ORDER — LACOSAMIDE 100 MG/1
200 TABLET ORAL 2 TIMES DAILY
Status: DISCONTINUED | OUTPATIENT
Start: 2022-08-16 | End: 2022-08-21 | Stop reason: HOSPADM

## 2022-08-16 RX ORDER — LACTULOSE 10 G/15ML
30 SOLUTION ORAL 3 TIMES DAILY
Status: DISCONTINUED | OUTPATIENT
Start: 2022-08-16 | End: 2022-08-16

## 2022-08-16 RX ORDER — ASPIRIN 81 MG/1
81 TABLET ORAL DAILY
Status: DISCONTINUED | OUTPATIENT
Start: 2022-08-16 | End: 2022-08-21 | Stop reason: HOSPADM

## 2022-08-16 RX ORDER — TIMOLOL MALEATE 5 MG/ML
1 SOLUTION/ DROPS OPHTHALMIC DAILY
Status: DISCONTINUED | OUTPATIENT
Start: 2022-08-17 | End: 2022-08-21 | Stop reason: HOSPADM

## 2022-08-16 RX ORDER — LEVOTHYROXINE SODIUM 0.12 MG/1
125 TABLET ORAL DAILY
Status: DISCONTINUED | OUTPATIENT
Start: 2022-08-17 | End: 2022-08-21 | Stop reason: HOSPADM

## 2022-08-16 RX ORDER — FOLIC ACID 1 MG/1
1 TABLET ORAL DAILY
Status: DISCONTINUED | OUTPATIENT
Start: 2022-08-16 | End: 2022-08-21 | Stop reason: HOSPADM

## 2022-08-16 RX ORDER — ATORVASTATIN CALCIUM 40 MG/1
80 TABLET, FILM COATED ORAL NIGHTLY
Status: DISCONTINUED | OUTPATIENT
Start: 2022-08-16 | End: 2022-08-21 | Stop reason: HOSPADM

## 2022-08-16 ASSESSMENT — ENCOUNTER SYMPTOMS
RESPIRATORY NEGATIVE: 1
GASTROINTESTINAL NEGATIVE: 1
EYES NEGATIVE: 1
ALLERGIC/IMMUNOLOGIC NEGATIVE: 1

## 2022-08-16 ASSESSMENT — PAIN - FUNCTIONAL ASSESSMENT: PAIN_FUNCTIONAL_ASSESSMENT: NONE - DENIES PAIN

## 2022-08-16 NOTE — ED NOTES
Pt had episode of incontinence, complete bed change and brief change performed.  External male catheter put in place for UA      Montez Sanchez RN  08/16/22 [id number]

## 2022-08-16 NOTE — ED NOTES
[id number] [facility]  [place] [facility]  08/16/22 1701

## 2022-08-16 NOTE — ED PROVIDER NOTES
EMERGENCY DEPARTMENT ENCOUNTER    Patient: Cassidy Raymond  MRN: [id number]  : 1955  Date of Evaluation: 2022  ED Provider:  Chrissy Nesbitt MD    CHIEF COMPLAINT  Chief Complaint   Patient presents with    Altered Mental Status     Since yesterday        HPI  Cassidy Raymond is a 78 y.o. male with history of mental retardation with cerebral palsy epilepsy, and previous stroke which left him with some left-sided weakness who from his group home for concern for altered mental status. He was reportedly found to be poorly responsive this morning and he had allegedly not been eating or drinking much and had been incontinent of urine. Is unknown if he had a seizure or not. It was reported from the group home that he was last seen normal yesterday. Here in the emergency department, patient is able to tell me his name and date of birth and where he is. He is not certain of the month or why he is here however. He denies any pain anywhere or any other symptoms or complaints or concerns. Patient also states, \"I am hungry. I want something to eat. What is on the menu? \"    Per one of the nurses, Rc Leonardo, who is present here, she states that she saw this patient about 5 days ago here in the emergency department and he is currently acting like how he did at that time. Of note, the patient was diagnosed with a UTI a week ago and was started on antibiotics for this.       REVIEW OF SYSTEMS    Constitutional: negative for fever, chills  Neurological: negative for HA, focal weakness, loss of sensation  Ophthalmic: negative for vision change  ENT: negative for congestion, rhinorrhea  Cardiovascular: negative for chest pain  Respiratory: negative for SOB, cough  GI: negative for abdominal pain, nausea, vomiting, diarrhea, constipation  : negative for dysuria, hematuria  Musculoskeletal: negative for myalgias, decreased ROM, joint swelling  Dermatological: negative for rash, wounds  Heme: Negative for bleeding, COLONOSCOPY POLYPECTOMY SNARE/COLD BIOPSY performed by Gorge Hardy MD at [address] Left 07/11/2013    with stnet placement    FACIAL SURGERY N/A 10/8/2021    FACIAL INCISION AND DRAINAGE performed by Obdulia Randolph MD at [address]      OTHER SURGICAL HISTORY  04/15/2015    Revision of vagal nerve stimulator    STIMULATOR SURGERY N/A 3/24/2021    STIMULATOR INSERTION performed by Jennifer Murphy MD at [address] 3/24/2021    STIMULATOR INSERTION STAGE 2 performed by Jennifer Murphy MD at [address] [facility] N/A 11/13/2018    EGD DILATION BALLOON AND BIOPSY performed by Jeff Beebe MD at [place] [address]  2002    Has to have bettery changed every 5 yrs.         FAMILY HISTORY  Family History   Problem Relation Age of Onset    Heart Disease Mother         atrial fib    High Blood Pressure Mother     Asthma Mother     High Cholesterol Mother     Depression Mother     Migraines Mother     High Blood Pressure Father     Heart Disease Father     Asthma Sister     High Cholesterol Sister     Depression Sister     Migraines Sister        SOCIAL HISTORY  Social History     Socioeconomic History    Marital status: Single     Spouse name: None    Number of children: None    Years of education: None    Highest education level: None   Tobacco Use    Smoking status: Never    Smokeless tobacco: Never   Vaping Use    Vaping Use: Never used   Substance and Sexual Activity    Alcohol use: No    Drug use: No         **Past medical, family and social histories, and nursing notes reviewed and verified by me**      PHYSICAL EXAM  VITAL SIGNS:   ED Triage Vitals [08/16/22 1304]   Enc Vitals Group      BP (!) 146/65      Heart Rate 60      Resp 16      Temp 98.2 °F (36.8 °C)      Temp Source Oral      SpO2 99 %      Weight       Height       Head Circumference       Peak Flow       Pain Score Pain Loc       Pain Edu? Excl. in [address]? Vitals during ED course were reviewed and are as charted. Constitutional: Minimal distress, Non-toxic appearance  Eyes: Conjunctiva normal, No discharge, PERRL, EOMI  HENT: Normocephalic, Atraumatic, bilateral external ears normal, posterior oropharynx is nonerythematous and without exudate, uvula is midline, oropharynx moist  Neck: Supple, no stridor, no grossly visible or palpable masses  Cardiovascular: Regular rate and rhythm, No murmurs, No rubs, No gallops  Pulmonary/Chest: Normal breath sounds, No respiratory distress or accessory muscle use, No wheezing, crackles or rhonchi. Abdomen: Soft, nondistended and nonrigid, No tenderness or peritoneal signs, No masses, normal bowel sounds  Back: No midline point tenderness, No paraspinous muscle tenderness. No CVA tenderness  Extremities:  No gross deformities, no edema, no tenderness  Neurologic: Cranial nerves II through XII grossly intact as tested and no facial asymmetry, normal fluid and fluent speech, some contracture of the left thumb and 4 out of 5 strength throughout the left upper extremity, otherwise 5 out of 5 strength elsewhere in all the remaining extremities.   Skin: Warm, Dry, No erythema, No rash, No cyanosis, No mottling  Lymphatic: No lymphadenopathy in the following location(s): cervical  Psychiatric: Alert and oriented x3, Affect normal          EKG Interpretation    Interpreted by emergency department physician    Rhythm: normal sinus   Rate: normal  Axis: left  Ectopy: none  Conduction: right bundle branch block (complete)  ST Segments: normal  T Waves: normal  Q Waves: none    Clinical Impression: Sinus rhythm with left axis deviation and right bundle branch block          RADIOLOGY/PROCEDURES/LABS/MEDICATIONS ADMINISTERED:    I have reviewed and interpreted all of the currently available lab results from this visit (if applicable):  Results for orders placed or performed during the IMAGING STUDIES ORDERED:  STRAIGHT CATH  CT HEAD WO CONTRAST    I have personally viewed the imaging studies. The radiologist interpretation is:   CT Head WO Contrast   Final Result   No acute intracranial abnormality. Evidence of prior infarct in the   territory of the right middle cerebral artery. MEDICATIONS ADMINISTERED:  Medications - No data to display      [address]  Last vitals: BP (!) 146/65   Pulse 60   Temp 98.2 °F (36.8 °C) (Oral)   Resp 16   [id number]     69-year-old male who reportedly had some altered mental status and unresponsive episode earlier this morning who appears to currently be at baseline mental status on arrival to the emergency department. He is noted to have some left upper extremity weakness which is reported to be chronic from previous stroke but otherwise no evidence for any additional acute neurological deficits. Vital signs reassuring and stable. Work-up still pending at the end of my shift and the patient was signed out to Dr. Kelly Mayo who will determine final disposition. Clinical Impression:  1. Intermittent confusion    2. Generalized weakness          Disposition referral (if applicable):  No follow-up provider specified. Disposition medications (if applicable):  New Prescriptions    No medications on file       ED Provider Disposition Time  DISPOSITION            Electronically signed by: Tommie Felix M.D., 8/16/2022 2:50 PM      This dictation was created with voice recognition software. While attempts have been made to review the dictation as it is transcribed, on occasion the spoken word can be misinterpreted by the technology leading to omissions or inappropriate words, phrases or sentences.         Becki Dubin, MD  08/18/22 [id number]

## 2022-08-16 NOTE — ED NOTES
CT Head WO Contrast [[id number]]    Status: Final result       Order Providers    Authorizing Billing   MD Hiwot Gutierrez MD            Signed by    Signed Date/Time Phone Pager   Matthew Saucedo 8/16/2022  2:09 [phone]      Reading Providers    Read Date Phone Pager   Hien Oconnor Aug 16, 2022  2:09 [phone]        CT Head WO Contrast: Patient Communication     Released  Not seen     Radiation Dose Estimates    No radiation information found for this patient  Narrative   EXAMINATION:   CT OF THE HEAD WITHOUT CONTRAST  8/16/2022 1:38 pm       TECHNIQUE:   CT of the head was performed without the administration of intravenous   contrast. Automated exposure control, iterative reconstruction, and/or weight   based adjustment of the mA/kV was utilized to reduce the radiation dose to as   low as reasonably achievable.       COMPARISON:   None.       HISTORY:   ORDERING SYSTEM PROVIDED HISTORY: altered mental status   TECHNOLOGIST PROVIDED HISTORY:   Reason for exam:->altered mental status   Has a \"code stroke\" or \"stroke alert\" been called? ->No   Decision Support Exception - unselect if not a suspected or confirmed   emergency medical condition->Emergency Medical Condition (MA)   Reason for Exam: altered mental status       FINDINGS:   BRAIN/VENTRICLES: There is a stable old right frontal, parietal and temporal   infarct, with associated dilatation of the right lateral ventricle there is   no acute intracranial hemorrhage, mass effect or midline shift.  No abnormal   extra-axial fluid collection.  The gray-white differentiation is maintained   without evidence of an acute infarct.  There is no evidence of hydrocephalus.       ORBITS: The visualized portion of the orbits demonstrate no acute abnormality.       SINUSES: The visualized paranasal sinuses and mastoid air cells demonstrate   no acute abnormality.       SOFT TISSUES/SKULL:  No acute abnormality of the visualized skull or soft tissues.           Impression   No acute intracranial abnormality.  Evidence of prior infarct in the   territory of the right middle cerebral artery.           Juan Reece RN  08/16/22 [id number]

## 2022-08-16 NOTE — H&P
V2.0  History and Physical      Name:  Argentina Schraedr /Age/Sex: 1955  [facility]78 y.o. male)   MRN & CSN:  [id number] & [id number] Encounter Date/Time: 2022 5:47 PM EDT   Location:  ED33/ED-33 PCP: Raymond Oneil PA-C       Hospital Day: 1    Assessment and Plan:   Argentina Schrader is a 78 y.o. male with a pmh of cerebral palsy, left-sided weakness, MRDD, CVA, hypertension, glaucoma, hyperammonia, IBS, seizures, depression who presents with Acute encephalopathy    Hospital Problems             Last Modified POA    * (Principal) Acute encephalopathy 2022 Yes     Acute encephalopathy  -Intermittent confusion for the past few days  -UA trace leukocyte, negative nitrite and blood, urine culture  -CT head no acute abnormality, evidence of a prior infarct in the territory of the right middle cerebral artery  -Admit to observation  -Pending chest x-ray and ammonia level  -Neuro check every 4 hours  -Neurology consult  -SLP consult  -Continue lactulose    Debility  -Not able to get up for a week  -Declining ability to take care of himself  -PT OT evaluation  -May need higher level of care    History of seizure  -Continue Keppra and Vimpat and zonisamide  -Seizure precaution, seizure precaution, aspiration precaution    Hypertension  CVA  -Continue amlodipine   -Plavix and aspirin, statin    Glucoma  -Continue eyedrops    Hypothyroidism  -Continue Synthroid  -Update TSH    Depression and anxiety  -Continue Klonopin, Paxil, Seroquel    DVT prophylaxis  -Lovenox    Turn patient every 2 hours, skin assessment every shift.      Disposition:   Current Living situation: Group home  Expected Disposition: SNF  Estimated D/C: 1 to 2 days    Diet No diet orders on file   DVT Prophylaxis [x] Lovenox, []  Heparin, [] SCDs, [] Ambulation,  [] Eliquis, [] Xarelto   Code Status Prior   Surrogate Decision Maker/ POA Sister: Dede Richardson     History from:     patient, family member -sister    History of Present Illness: Chief Complaint: Acute encephalopathy  Rosanna Parks is a 78 y.o. male with pmh as listed above who presents with mental status change. Patient's sister stated patient had diarrhea in the past 10 days. Patient visited the ED last Wednesday. Patient was discharged home with negative C. difficile. Sister stated patient has been intermittent confusion ever since. He keep repeated her name and some other names this afternoon which was unusual for him. Patient used to be able to get up and wear cloth by himself. patient could not do it recently. Patient has chronic incontinency of urinating for years. Patient's sister noticed patient's buttock turned red due to bed bounded and incontinency of urinating. Sister concerns that patient may need higher level of care now. Patient was able to tell me his name and date of birth. Patient told me he messed up stuff lately. Sister stated there is no concerning of seizure. Sister stated patient may not able to do MRI due to vagal nerve stimulant in chest.  Patient's lab work were unremarkable as so far. Pending ammonia level and chest x-ray. Patient's head CT showed chronic infarct. Review of Systems: Need 10 Elements   Review of Systems   Constitutional:  Positive for activity change. HENT: Negative. Eyes: Negative. Respiratory: Negative. Cardiovascular: Negative. Gastrointestinal: Negative. Endocrine: Negative. Genitourinary:         Chronic incontinency of urinating   Musculoskeletal: Negative. Skin:  Positive for rash. Redness in buttock and scrotum   Allergic/Immunologic: Negative. Neurological: Negative. Hematological: Negative. Psychiatric/Behavioral: Negative.          Objective:   No intake or output data in the 24 hours ending 08/16/22 1813   Vitals:   Vitals:    08/16/22 1304   BP: (!) 146/65   Pulse: 60   Resp: 16   Temp: 98.2 °F (36.8 °C)   TempSrc: Oral   SpO2: 99%       Medications Prior to Admission Prior to Admission medications    Medication Sig Start Date End Date Taking? Authorizing Provider   dicyclomine (BENTYL) 10 MG capsule Take 1 capsule by mouth 4 times daily (before meals and nightly) 8/10/22   Korinne Lusterio, PA-C   vancomycin (VANCOCIN) 125 MG capsule Take 1 capsule by mouth in the morning and 1 capsule at noon and 1 capsule in the evening and 1 capsule before bedtime. Do all this for 10 days. 8/10/22 8/20/22  Mayra Yarbrough PA-C   lacosamide (VIMPAT) 200 MG tablet Take 1 tablet by mouth 2 times daily for 120 days. 6/8/22 10/6/22  MARY Mckeon NP   zonisamide (ZONEGRAN) 100 MG capsule Take 1 capsule in the morning and 3 capsules at bedtime 4/20/22   Elbert Almeida DO   divalproex (DEPAKOTE) 500 MG DR tablet TAKE 1 TABLET BY MOUTH EVERY MORNING;TAKE 2 TABLETS (1000MG) BY MOUTH EVERY NIGHT AT BEDTIME 3/7/22   Ashley Sheridan DO   magnesium hydroxide (MILK OF MAGNESIA) 400 MG/5ML suspension Take by mouth daily as needed for Constipation    Historical Provider, MD   divalproex (DEPAKOTE) 500 MG DR tablet Take 2 tablets by mouth once daily at bedtime 1/3/22   CHRIS Puente   albuterol sulfate HFA (PROVENTIL HFA) 108 (90 Base) MCG/ACT inhaler Inhale 2 puffs into the lungs every 4 hours as needed for Wheezing or Shortness of Breath With spacer (and mask if indicated). Thanks.  12/7/21 5/9/22  Eliecer Zaragoza DO   acetaminophen (TYLENOL) 325 MG tablet Take 2 tablets by mouth every 6 hours as needed for Pain 12/7/21   Eliecer Zaragoza DO   aspirin 81 MG EC tablet Take 1 tablet by mouth daily 10/10/21   Carolina Mendoza MD   atorvastatin (LIPITOR) 80 MG tablet Take 1 tablet by mouth nightly 10/9/21   Carolina Mendoza MD   psyllium (HYDROCIL) 95 % PACK packet Take 1 packet by mouth 2 times daily 10/9/21   Carolina Mendoza MD   clopidogrel (PLAVIX) 75 MG tablet Take 1 tablet by mouth daily 10/10/21   Carolina Mendoza MD   amLODIPine (NORVASC) 10 MG tablet Take 1 tablet by mouth daily 10/10/21   Elisha Faustin MD   AMITIZA 24 MCG capsule  9/22/21   Historical Provider, MD   mirabegron (MYRBETRIQ) 50 MG TB24 Take 50 mg by mouth daily    Historical Provider, MD   solifenacin (VESICARE) 10 MG tablet Take 10 mg by mouth daily    Historical Provider, MD   acetaminophen (APAP EXTRA STRENGTH) 500 MG tablet Take 1 tablet by mouth every 6 hours as needed for Pain 9/7/20   CHRIS Bryant   levothyroxine (SYNTHROID) 125 MCG tablet Take 1 tablet by mouth Daily 4/14/20   Aissatou Castaneda MD   clonazePAM (KLONOPIN) 0.5 MG tablet Take 0.25 mg by mouth daily.      Historical Provider, MD   divalproex (DEPAKOTE) 500 MG DR tablet Take 1,000 mg by mouth nightly    Historical Provider, MD   omeprazole (PRILOSEC) 20 MG delayed release capsule Take 20 mg by mouth 2 times daily (before meals)    Historical Provider, MD   primidone (MYSOLINE) 50 MG tablet Take 50 mg by mouth 3 times daily    Historical Provider, MD   Simethicone (MI-ACID GAS RELIEF PO) Take 1 Container by mouth every 4 hours as needed 10 cc every 4 hrs not to exceed 60 cc in 24 hrs    Historical Provider, MD   guaiFENesin (MUCINEX) 600 MG extended release tablet Take 1,200 mg by mouth daily as needed for Congestion    Historical Provider, MD   Menthol-Methyl Salicylate (MUSCLE RUB) 10-15 % CREA cream Apply 1 applicator topically as needed    Historical Provider, MD   pseudoephedrine (SUDAFED) 30 MG tablet Take 30 mg by mouth every 4 hours as needed for Congestion  Patient not taking: Reported on 9/24/2021    Historical Provider, MD   latanoprost (XALATAN) 0.005 % ophthalmic solution Place 1 drop into both eyes nightly    Historical Provider, MD   timolol (TIMOPTIC) 0.5 % ophthalmic solution Place 1 drop into both eyes daily     Historical Provider, MD   Lactobacillus (ACIDOPHILUS) CAPS capsule Take 1 capsule by mouth daily    Historical Provider, MD   Wheat Dextrin (BENEFIBER) POWD Take 4 g by mouth 2 times daily Takes 3 tsp in liquid twice per day     Historical Provider, MD   Lactulose Encephalopathy (ENULOSE PO) Take 30 mLs by mouth 3 times daily    Historical Provider, MD   folic acid (FOLVITE) 1 MG tablet Take 1 mg by mouth daily    Historical Provider, MD   PARoxetine (PAXIL) 20 MG tablet Take 20 mg by mouth every morning    Historical Provider, MD   QUEtiapine (SEROQUEL XR) 50 MG extended release tablet Take 50 mg by mouth nightly    Historical Provider, MD   tamsulosin (FLOMAX) 0.4 MG capsule Take 1 capsule by mouth daily 3/18/17   CHRIS Mena   Cholecalciferol (VITAMIN D3) 1000 UNITS CAPS Take 1 tablet by mouth daily. Historical Provider, MD       Physical Exam: Need 8 Elements   Physical Exam  HENT:      Head: Normocephalic. Nose: Nose normal.      Mouth/Throat:      Mouth: Mucous membranes are moist.   Eyes:      Pupils: Pupils are equal, round, and reactive to light. Cardiovascular:      Rate and Rhythm: Normal rate. Pulses: Normal pulses. Pulmonary:      Effort: Pulmonary effort is normal.   Abdominal:      General: Abdomen is flat. Musculoskeletal:         General: Normal range of motion. Cervical back: Normal range of motion. Skin:     Capillary Refill: Capillary refill takes less than 2 seconds. Findings: Erythema present. Comments: Erythematous in buttock and scrotum area   Neurological:      General: No focal deficit present. Mental Status: He is alert and oriented to person, place, and time. Mental status is at baseline.       Comments: Slightly weakness in left arm (baseline)   Psychiatric:         Mood and Affect: Mood normal.          Past Medical History:   PMHx   Past Medical History:   Diagnosis Date    Acute CVA (cerebrovascular accident) ([facility] 10/10/2021    Anemia     Aspiration pneumonia ([facility]     dx 6/9/2014 with this per ecf/ per old chart dx with pneumonia with admission 9/18/2018    Cerebral palsy ([facility]     \"has no feeling on left side of body\"alert but has some dementia but not diagnosed, has good long term but poor short term and wakes up disoriented after a nap\"(per yaneth)(2014)    Depression     Epilepsy ([facility] 1962    last seizure 2/2018- hx grand mal    Frequent falls     with phone assess- family stated pt fell this am (7/10/2013\"in the process of trying to find a facility to help build him back up- (pt now at [facility])    Glaucoma     \"has been in treatment for this in the past- no treatment needed at present\"    History of IBS     Hx MRSA infection     + nasal culture 4/2014    Hyperammonemia (HCC)     Hypertension     on Lisinopril    IBS (irritable bowel syndrome)     ulcerative colitis    Kidney stone     for surgery for stent placement 7/11/2013    Mood disorder ([facility]     per staff at [facility] (methicillin resistant staph aureus) culture positive 05/02/2014 05/27/20 nasal    MRSA (methicillin resistant staph aureus) culture positive 11/25/2020    Face; 10/8/21    Occasional tremors     \"makes it difficult for him to write\"    Pressure injury of contiguous region involving back and right buttock, stage 2 ([facility] 05/27/2020    Pressure injury of left buttock, stage 1 07/22/2020    Prostatitis     hx given per H&P old chart    Thyroid disease     Ulcerative colitis ([facility]     hx given per staff at [facility] 4/13/2015     PSHX:  has a past surgical history that includes Gallbladder surgery (2005); Vagus nerve stimulator insertion (2002); Cholecystectomy; Cystoscopy (Left, 07/11/2013); Kidney stone surgery; Colonoscopy (8/19/14, 5/2012); other surgical history (04/15/2015); Upper gastrointestinal endoscopy (N/A, 11/13/2018); Colonoscopy (02/04/2021); Colonoscopy (N/A, 2/4/2021); Stimulator Surgery (N/A, 3/24/2021); Stimulator Surgery (N/A, 3/24/2021); and Facial Surgery (N/A, 10/8/2021). Allergies: No Known Allergies  Fam HX: family history includes Asthma in his mother and sister; Depression in his mother and sister;  Heart Disease in his father and mother; High Blood Pressure in his father and mother; High Cholesterol in his mother and sister; Migraines in his mother and sister. Soc HX:   Social History     Socioeconomic History    Marital status: Single     Spouse name: None    Number of children: None    Years of education: None    Highest education level: None   Tobacco Use    Smoking status: Never    Smokeless tobacco: Never   Vaping Use    Vaping Use: Never used   Substance and Sexual Activity    Alcohol use: No    Drug use: No       Medications:   Medications:    Infusions:   PRN Meds:     Labs      CBC:   Recent Labs     08/16/22  1259   WBC 7.5   HGB 15.4        BMP:    Recent Labs     08/16/22  1259      K 3.5      CO2 26   BUN 13   CREATININE 0.6*   GLUCOSE 114*     Hepatic:   Recent Labs     08/16/22  1259   AST 16   ALT 27   BILITOT 0.3   ALKPHOS 138*     Lipids:   Lab Results   Component Value Date/Time    CHOL 172 10/03/2021 04:25 AM    CHOL 216 10/07/2015 12:19 PM    HDL 51 10/03/2021 04:25 AM    TRIG 196 10/03/2021 04:25 AM     Hemoglobin A1C:   Lab Results   Component Value Date/Time    LABA1C 5.2 10/03/2021 04:25 AM     TSH:   Lab Results   Component Value Date/Time    TSH 1.82 10/07/2015 12:19 PM     Troponin:   Lab Results   Component Value Date/Time    TROPONINT <0.010 08/16/2022 12:59 PM    TROPONINT <0.010 12/06/2021 10:44 PM    TROPONINT <0.010 10/03/2021 04:25 AM     Lactic Acid: No results for input(s): LACTA in the last 72 hours. BNP: No results for input(s): PROBNP in the last 72 hours.   UA:  Lab Results   Component Value Date/Time    NITRU NEGATIVE 08/16/2022 03:06 PM    NITRU NEGATIVE 05/10/2013 02:51 PM    COLORU YELLOW 08/16/2022 03:06 PM    PHUR 6.0 05/10/2013 02:51 PM    WBCUA 11 08/16/2022 03:06 PM    RBCUA NONE SEEN 08/16/2022 03:06 PM    MUCUS RARE 08/16/2022 03:06 PM    TRICHOMONAS NONE SEEN 08/16/2022 03:06 PM    YEAST OCCASIONAL 09/20/2021 05:00 PM    BACTERIA RARE 08/16/2022 03:06 PM CLARITYU CLEAR 08/16/2022 03:06 PM    SPECGRAV 1.015 08/16/2022 03:06 PM    LEUKOCYTESUR TRACE 08/16/2022 03:06 PM    UROBILINOGEN 0.2 08/16/2022 03:06 PM    BILIRUBINUR NEGATIVE 08/16/2022 03:06 PM    BLOODU NEGATIVE 08/16/2022 03:06 PM    GLUCOSEU NEGATIVE 05/10/2013 02:51 PM    KETUA NEGATIVE 08/16/2022 03:06 PM    AMORPHOUS OCCASIONAL 11/25/2020 01:15 PM     Urine Cultures: No results found for: Martha Hint  Blood Cultures: No results found for: BC  No results found for: BLOODCULT2  Organism:   Lab Results   Component Value Date/Time    ORG ENC 07/11/2013 11:22 AM       Imaging/Diagnostics Last 24 Hours   CT Head WO Contrast    Result Date: 8/16/2022  EXAMINATION: CT OF THE HEAD WITHOUT CONTRAST  8/16/2022 1:38 pm TECHNIQUE: CT of the head was performed without the administration of intravenous contrast. Automated exposure control, iterative reconstruction, and/or weight based adjustment of the mA/kV was utilized to reduce the radiation dose to as low as reasonably achievable. COMPARISON: None. HISTORY: ORDERING SYSTEM PROVIDED HISTORY: altered mental status TECHNOLOGIST PROVIDED HISTORY: Reason for exam:->altered mental status Has a \"code stroke\" or \"stroke alert\" been called? ->No Decision Support Exception - unselect if not a suspected or confirmed emergency medical condition->Emergency Medical Condition (MA) Reason for Exam: altered mental status FINDINGS: BRAIN/VENTRICLES: There is a stable old right frontal, parietal and temporal infarct, with associated dilatation of the right lateral ventricle there is no acute intracranial hemorrhage, mass effect or midline shift. No abnormal extra-axial fluid collection. The gray-white differentiation is maintained without evidence of an acute infarct. There is no evidence of hydrocephalus. ORBITS: The visualized portion of the orbits demonstrate no acute abnormality. SINUSES: The visualized paranasal sinuses and mastoid air cells demonstrate no acute abnormality.  SOFT disease. Normal appendix. No pneumatosis. Vasculature: Atherosclerosis. Normal caliber abdominal aorta. Patent portal vein. No definite portal venous gas. . Bones and Soft Tissues: Degenerative changes in the visualized spine and pelvis. Right sacral nerve stimulator device. Multilevel bridging marginal osteophytes throughout the visualized spine. Osseous fusion across the sacroiliac joints. Retroperitoneum/Mesentery: No intraperitoneal free air, ascites or fluid collection. No lymphadenopathy in the abdomen or pelvis. 1.  Air-fluid levels throughout the colon suggest enteritis. No intraperitoneal free air or abscess. 2.  No bowel obstruction. Normal appendix. 3.  Trace right posterior pleural fluid versus pleural thickening. Visualized lung bases demonstrate bilateral heterogeneous opacities which may represent atelectasis/scarring or a developing infectious/inflammatory process.        Personally reviewed Lab Studies, Imaging, and discussed case with dr. Mary Eisenberg    Electronically signed by Felisha Voss CNP on 8/16/2022 at 6:13 PM

## 2022-08-16 NOTE — ED PROVIDER NOTES
Emergency Department Encounter  Location: [address] [place] [address]    Patient: Arsalan Lomas  MRN: [id number]  : 1955  Date of evaluation: 2022  ED Provider: Ki Bates DO    Arsalan Lomas was checked out to me by Dr. Randy Odell. Please see his/her initial documentation for details of the patient's initial ED presentation, physical exam and completed studies. In brief, Arsalan Lomas is a 78 y.o. male that presented to the emergency department with history of CP, seizures, and baseline left sided weakness. Currently at baseline neurologically. Recent dx of UTI and completed antibiotics for that. Lives in group home. Per sister, has had periods of intermittent confusion over the past several days which is unusual for the patient. She describes that he seems generally weaker and appears to be having a difficult time caring for himself.     I have reviewed and interpreted all of the currently available lab results and diagnostics from this visit:  Results for orders placed or performed during the hospital encounter of 22   CBC with Auto Differential   Result Value Ref Range    WBC 7.5 4.0 - 10.5 K/CU MM    RBC 4.92 4.6 - 6.2 M/CU MM    Hemoglobin 15.4 13.5 - 18.0 GM/DL    Hematocrit 45.2 42 - 52 %    MCV 91.9 78 - 100 FL    MCH 31.3 (H) 27 - 31 PG    MCHC 34.1 32.0 - 36.0 %    RDW 13.4 11.7 - 14.9 %    Platelets 225 246 - 684 K/CU MM    MPV 10.9 7.5 - 11.1 FL    Differential Type AUTOMATED DIFFERENTIAL     Segs Relative 72.4 (H) 36 - 66 %    Lymphocytes % 15.8 (L) 24 - 44 %    Monocytes % 9.5 (H) 0 - 4 %    Eosinophils % 1.6 0 - 3 %    Basophils % 0.4 0 - 1 %    Segs Absolute 5.4 K/CU MM    Lymphocytes Absolute 1.2 K/CU MM    Monocytes Absolute 0.7 K/CU MM    Eosinophils Absolute 0.1 K/CU MM    Basophils Absolute 0.0 K/CU MM    Nucleated RBC % 0.0 %    Total Nucleated RBC 0.0 K/CU MM    Total Immature Neutrophil 0.02 K/CU MM    Immature Neutrophil % 0.3 0 - 0.43 %   Comprehensive Metabolic Panel w/ Reflex to MG Creatinine 0.5 (L) 0.9 - 1.3 MG/DL    Glucose 94 70 - 99 MG/DL    Calcium 9.2 8.3 - 10.6 MG/DL    GFR Non-African American >60 >60 mL/min/1.73m2    GFR African American >60 >60 mL/min/1.73m2   TSH   Result Value Ref Range    TSH, High Sensitivity 0.886 0.270 - 4.20 uIu/ml   Magnesium   Result Value Ref Range    Magnesium 2.0 1.8 - 2.4 mg/dl   Basic Metabolic Panel w/ Reflex to MG   Result Value Ref Range    Sodium 144 135 - 145 MMOL/L    Potassium 4.0 3.5 - 5.1 MMOL/L    Chloride 109 99 - 110 mMol/L    CO2 26 21 - 32 MMOL/L    Anion Gap 9 4 - 16    BUN 15 6 - 23 MG/DL    Creatinine 0.4 (L) 0.9 - 1.3 MG/DL    Glucose 121 (H) 70 - 99 MG/DL    Calcium 9.1 8.3 - 10.6 MG/DL    GFR Non-African American >60 >60 mL/min/1.73m2    GFR African American >60 >60 mL/min/1.73m2   CBC with Auto Differential   Result Value Ref Range    WBC 6.0 4.0 - 10.5 K/CU MM    RBC 4.59 (L) 4.6 - 6.2 M/CU MM    Hemoglobin 14.1 13.5 - 18.0 GM/DL    Hematocrit 42.8 42 - 52 %    MCV 93.2 78 - 100 FL    MCH 30.7 27 - 31 PG    MCHC 32.9 32.0 - 36.0 %    RDW 13.3 11.7 - 14.9 %    Platelets 205 199 - 059 K/CU MM    MPV 10.9 7.5 - 11.1 FL    Differential Type AUTOMATED DIFFERENTIAL     Segs Relative 55.5 36 - 66 %    Lymphocytes % 26.5 24 - 44 %    Monocytes % 15.1 (H) 0 - 4 %    Eosinophils % 1.7 0 - 3 %    Basophils % 0.7 0 - 1 %    Segs Absolute 3.4 K/CU MM    Lymphocytes Absolute 1.6 K/CU MM    Monocytes Absolute 0.9 K/CU MM    Eosinophils Absolute 0.1 K/CU MM    Basophils Absolute 0.0 K/CU MM    Nucleated RBC % 0.0 %    Total Nucleated RBC 0.0 K/CU MM    Total Immature Neutrophil 0.03 K/CU MM    Immature Neutrophil % 0.5 (H) 0 - 0.43 %   Basic Metabolic Panel w/ Reflex to MG   Result Value Ref Range    Sodium 139 135 - 145 MMOL/L    Potassium 4.3 3.5 - 5.1 MMOL/L    Chloride 107 99 - 110 mMol/L    CO2 23 21 - 32 MMOL/L    Anion Gap 9 4 - 16    BUN 13 6 - 23 MG/DL    Creatinine 0.4 (L) 0.9 - 1.3 MG/DL    Glucose 100 (H) 70 - 99 MG/DL    Calcium 8.9 8.3 - 10.6 MG/DL    GFR Non-African American >60 >60 mL/min/1.73m2    GFR African American >60 >60 mL/min/1.73m2   POCT Glucose   Result Value Ref Range    POC Glucose 108 (H) 70 - 99 MG/DL   EKG 12 Lead   Result Value Ref Range    Ventricular Rate 61 BPM    Atrial Rate 61 BPM    P-R Interval 182 ms    QRS Duration 162 ms    Q-T Interval 450 ms    QTc Calculation (Bazett) 453 ms    P Axis 50 degrees    R Axis -71 degrees    T Axis -20 degrees    Diagnosis       Normal sinus rhythm  Left axis deviation  Right bundle branch block  Abnormal ECG  When compared with ECG of 10-AUG-2022 16:41,  No significant change was found  Confirmed by KIM Logan ([id number]) on 8/16/2022 6:18:55 PM     EKG 12 Lead   Result Value Ref Range    Ventricular Rate 61 BPM    Atrial Rate 61 BPM    P-R Interval 178 ms    QRS Duration 164 ms    Q-T Interval 458 ms    QTc Calculation (Bazett) 461 ms    P Axis 101 degrees    R Axis -74 degrees    T Axis -18 degrees    Diagnosis       Normal sinus rhythm  Right bundle branch block  Left anterior fascicular block  * Bifascicular block *  Abnormal ECG  When compared with ECG of 16-AUG-2022 13:57,  No significant change was found  Confirmed by KIM Logan ([id number]) on 8/17/2022 7:41:32 PM       No results found. Final ED Course and MDM:  Patient alert and at neuro baseline here. Additional workup is essentially nonacute. No clear etiology of intermittent confusion, although subclinical seizures are of concern. Will plan to admit for further evaluation and care. ED Medication Orders (From admission, onward)      None            Final Impression      1. Intermittent confusion    2. Generalized weakness    3. Hyponatremia    4.  Hypokalemia        DISPOSITION Admitted 08/16/2022 05:46:40 PM     (Please note that portions of this note may have been completed with a voice recognition program. Efforts were made to edit the dictations but occasionally words are mis-transcribed.)    Emily DAVIS Paulo, [address],   08/24/22 1155

## 2022-08-16 NOTE — ED TRIAGE NOTES
PT presents from group home via [facility] for altered mental status and an episode of incontinence. Pt has a hx of CVA in 2021 with baseline L sided weakness. Pt has a UTI aprox 7 days ago, followed by a fall the next day. PT LKW yesterday morning. Seen PCP this morning and was recommended to be seen. Pt alert to location, person, and time.

## 2022-08-17 LAB
ANION GAP SERPL CALCULATED.3IONS-SCNC: 10 MMOL/L (ref 4–16)
BUN BLDV-MCNC: 11 MG/DL (ref 6–23)
CALCIUM SERPL-MCNC: 9.2 MG/DL (ref 8.3–10.6)
CHLORIDE BLD-SCNC: 106 MMOL/L (ref 99–110)
CO2: 27 MMOL/L (ref 21–32)
CREAT SERPL-MCNC: 0.5 MG/DL (ref 0.9–1.3)
EKG ATRIAL RATE: 61 BPM
EKG DIAGNOSIS: NORMAL
EKG P AXIS: 101 DEGREES
EKG P-R INTERVAL: 178 MS
EKG Q-T INTERVAL: 458 MS
EKG QRS DURATION: 164 MS
EKG QTC CALCULATION (BAZETT): 461 MS
EKG R AXIS: -74 DEGREES
EKG T AXIS: -18 DEGREES
EKG VENTRICULAR RATE: 61 BPM
GFR AFRICAN AMERICAN: >60 ML/MIN/1.73M2
GFR NON-AFRICAN AMERICAN: >60 ML/MIN/1.73M2
GLUCOSE BLD-MCNC: 94 MG/DL (ref 70–99)
MAGNESIUM: 2 MG/DL (ref 1.8–2.4)
POTASSIUM SERPL-SCNC: 3.4 MMOL/L (ref 3.5–5.1)
SODIUM BLD-SCNC: 143 MMOL/L (ref 135–145)

## 2022-08-17 PROCEDURE — 83735 ASSAY OF MAGNESIUM: CPT

## 2022-08-17 PROCEDURE — 97166 OT EVAL MOD COMPLEX 45 MIN: CPT

## 2022-08-17 PROCEDURE — 6370000000 HC RX 637 (ALT 250 FOR IP): Performed by: NURSE PRACTITIONER

## 2022-08-17 PROCEDURE — 97535 SELF CARE MNGMENT TRAINING: CPT

## 2022-08-17 PROCEDURE — 6370000000 HC RX 637 (ALT 250 FOR IP): Performed by: INTERNAL MEDICINE

## 2022-08-17 PROCEDURE — 99211 OFF/OP EST MAY X REQ PHY/QHP: CPT

## 2022-08-17 PROCEDURE — 2580000003 HC RX 258: Performed by: NURSE PRACTITIONER

## 2022-08-17 PROCEDURE — 97530 THERAPEUTIC ACTIVITIES: CPT

## 2022-08-17 PROCEDURE — 96372 THER/PROPH/DIAG INJ SC/IM: CPT

## 2022-08-17 PROCEDURE — 93010 ELECTROCARDIOGRAM REPORT: CPT | Performed by: INTERNAL MEDICINE

## 2022-08-17 PROCEDURE — 97162 PT EVAL MOD COMPLEX 30 MIN: CPT

## 2022-08-17 PROCEDURE — 6360000002 HC RX W HCPCS: Performed by: NURSE PRACTITIONER

## 2022-08-17 PROCEDURE — G0378 HOSPITAL OBSERVATION PER HR: HCPCS

## 2022-08-17 PROCEDURE — 97116 GAIT TRAINING THERAPY: CPT

## 2022-08-17 PROCEDURE — 80048 BASIC METABOLIC PNL TOTAL CA: CPT

## 2022-08-17 PROCEDURE — 36415 COLL VENOUS BLD VENIPUNCTURE: CPT

## 2022-08-17 PROCEDURE — 92610 EVALUATE SWALLOWING FUNCTION: CPT

## 2022-08-17 PROCEDURE — 99205 OFFICE O/P NEW HI 60 MIN: CPT | Performed by: PSYCHIATRY & NEUROLOGY

## 2022-08-17 PROCEDURE — 94761 N-INVAS EAR/PLS OXIMETRY MLT: CPT

## 2022-08-17 RX ADMIN — DICYCLOMINE HYDROCHLORIDE 10 MG: 10 CAPSULE ORAL at 06:51

## 2022-08-17 RX ADMIN — FOLIC ACID 1 MG: 1 TABLET ORAL at 00:19

## 2022-08-17 RX ADMIN — ZONISAMIDE 300 MG: 100 CAPSULE ORAL at 00:17

## 2022-08-17 RX ADMIN — DICYCLOMINE HYDROCHLORIDE 10 MG: 10 CAPSULE ORAL at 23:43

## 2022-08-17 RX ADMIN — AMLODIPINE BESYLATE 10 MG: 10 TABLET ORAL at 11:36

## 2022-08-17 RX ADMIN — PRIMIDONE 50 MG: 50 TABLET ORAL at 11:36

## 2022-08-17 RX ADMIN — LACOSAMIDE 200 MG: 100 TABLET, FILM COATED ORAL at 00:22

## 2022-08-17 RX ADMIN — QUETIAPINE 50 MG: 50 TABLET, EXTENDED RELEASE ORAL at 00:18

## 2022-08-17 RX ADMIN — ATORVASTATIN CALCIUM 80 MG: 40 TABLET, FILM COATED ORAL at 23:42

## 2022-08-17 RX ADMIN — SODIUM CHLORIDE, PRESERVATIVE FREE 10 ML: 5 INJECTION INTRAVENOUS at 23:42

## 2022-08-17 RX ADMIN — SODIUM CHLORIDE, PRESERVATIVE FREE 10 ML: 5 INJECTION INTRAVENOUS at 00:26

## 2022-08-17 RX ADMIN — AMLODIPINE BESYLATE 10 MG: 10 TABLET ORAL at 00:17

## 2022-08-17 RX ADMIN — ASPIRIN 81 MG: 81 TABLET, COATED ORAL at 11:37

## 2022-08-17 RX ADMIN — DIVALPROEX SODIUM 1000 MG: 500 TABLET, DELAYED RELEASE ORAL at 23:43

## 2022-08-17 RX ADMIN — TAMSULOSIN HYDROCHLORIDE 0.4 MG: 0.4 CAPSULE ORAL at 00:19

## 2022-08-17 RX ADMIN — ZONISAMIDE 300 MG: 100 CAPSULE ORAL at 23:42

## 2022-08-17 RX ADMIN — LACTULOSE 20 G: 10 SOLUTION ORAL at 15:38

## 2022-08-17 RX ADMIN — DIVALPROEX SODIUM 1000 MG: 500 TABLET, DELAYED RELEASE ORAL at 00:19

## 2022-08-17 RX ADMIN — FOLIC ACID 1 MG: 1 TABLET ORAL at 11:37

## 2022-08-17 RX ADMIN — PRIMIDONE 50 MG: 50 TABLET ORAL at 00:26

## 2022-08-17 RX ADMIN — ENOXAPARIN SODIUM 40 MG: 100 INJECTION SUBCUTANEOUS at 00:18

## 2022-08-17 RX ADMIN — ASPIRIN 81 MG: 81 TABLET, COATED ORAL at 00:18

## 2022-08-17 RX ADMIN — ENOXAPARIN SODIUM 40 MG: 100 INJECTION SUBCUTANEOUS at 11:45

## 2022-08-17 RX ADMIN — DICYCLOMINE HYDROCHLORIDE 10 MG: 10 CAPSULE ORAL at 18:18

## 2022-08-17 RX ADMIN — DICYCLOMINE HYDROCHLORIDE 10 MG: 10 CAPSULE ORAL at 00:18

## 2022-08-17 RX ADMIN — PRIMIDONE 50 MG: 50 TABLET ORAL at 15:38

## 2022-08-17 RX ADMIN — CLONAZEPAM 0.25 MG: 0.5 TABLET ORAL at 11:36

## 2022-08-17 RX ADMIN — DICYCLOMINE HYDROCHLORIDE 10 MG: 10 CAPSULE ORAL at 11:37

## 2022-08-17 RX ADMIN — ZONISAMIDE 100 MG: 100 CAPSULE ORAL at 11:37

## 2022-08-17 RX ADMIN — LACTULOSE 20 G: 10 SOLUTION ORAL at 00:26

## 2022-08-17 RX ADMIN — CLONAZEPAM 0.25 MG: 0.5 TABLET ORAL at 00:19

## 2022-08-17 RX ADMIN — PAROXETINE HYDROCHLORIDE 20 MG: 20 TABLET, FILM COATED ORAL at 11:37

## 2022-08-17 RX ADMIN — QUETIAPINE 50 MG: 50 TABLET, EXTENDED RELEASE ORAL at 23:42

## 2022-08-17 RX ADMIN — LACOSAMIDE 200 MG: 100 TABLET, FILM COATED ORAL at 11:37

## 2022-08-17 RX ADMIN — CLOPIDOGREL BISULFATE 75 MG: 75 TABLET ORAL at 00:17

## 2022-08-17 RX ADMIN — PRIMIDONE 50 MG: 50 TABLET ORAL at 23:43

## 2022-08-17 RX ADMIN — LACTULOSE 20 G: 10 SOLUTION ORAL at 23:42

## 2022-08-17 RX ADMIN — TAMSULOSIN HYDROCHLORIDE 0.4 MG: 0.4 CAPSULE ORAL at 11:37

## 2022-08-17 RX ADMIN — CLOPIDOGREL BISULFATE 75 MG: 75 TABLET ORAL at 11:37

## 2022-08-17 RX ADMIN — ATORVASTATIN CALCIUM 80 MG: 40 TABLET, FILM COATED ORAL at 00:18

## 2022-08-17 RX ADMIN — DIVALPROEX SODIUM 500 MG: 500 TABLET, DELAYED RELEASE ORAL at 11:37

## 2022-08-17 RX ADMIN — SODIUM CHLORIDE, PRESERVATIVE FREE 10 ML: 5 INJECTION INTRAVENOUS at 11:37

## 2022-08-17 RX ADMIN — LEVOTHYROXINE SODIUM 125 MCG: 0.12 TABLET ORAL at 06:51

## 2022-08-17 RX ADMIN — LACOSAMIDE 200 MG: 100 TABLET, FILM COATED ORAL at 23:42

## 2022-08-17 ASSESSMENT — PAIN DESCRIPTION - LOCATION
LOCATION: BUTTOCKS
LOCATION: ELBOW

## 2022-08-17 ASSESSMENT — PAIN SCALES - GENERAL
PAINLEVEL_OUTOF10: 4
PAINLEVEL_OUTOF10: 4
PAINLEVEL_OUTOF10: 0

## 2022-08-17 ASSESSMENT — PAIN DESCRIPTION - ORIENTATION: ORIENTATION: RIGHT

## 2022-08-17 ASSESSMENT — PAIN DESCRIPTION - DESCRIPTORS
DESCRIPTORS: DISCOMFORT;ACHING
DESCRIPTORS: DISCOMFORT

## 2022-08-17 NOTE — PROGRESS NOTES
Speech Language Pathology  Facility/Department: [address] [place] [address]   CLINICAL BEDSIDE SWALLOW EVALUATION    NAME: Camilla Cota  : 1955  MRN: [id number]    IMPRESSIONS AND RECOMMENDATIONS: Camilla Cota was referred for a bedside swallow evaluation following admission to [facility] with acute encephalopathy, debility. Medical hx includes seizures, R MCA CVA, cerebral palsy, HTN. Pt has been seen by SLP at [facility] previously; last seen 2021 and recommended soft/bite-sized diet and thin liquids. Pt seen for evaluation seated upright in bed, alert, pleasant, cooperative given cues. Oral mechanism examination WFL. He is edentulous but declined to place his dentures for participation. Baseline vocal quality WFL. He was seen with his breakfast tray including regular solids, soft solids, and thin liquids via cup/straw. He was impulsive with self-feeding, taking very large bites at a rapid rate. Oral stage is mild-moderately impaired, characterized by adequate labial seal, prolonged mastication with reduced bolus formation, and suspected premature pharyngeal entry with liquids. Suspect baseline pharyngeal dysphagia c/w prior MBSS, however, no overt s/s aspiration were identified across all trials. Recommend soft and bite-sized diet, thin liquids, with aspiration precautions. Ensure pt is upright for all PO and cue for appropriate rate of intake as needed. SLP will follow briefly. Results/recommendations d/w pt. ADMISSION DATE: 2022  ADMITTING DIAGNOSIS: has Cerebral palsy, infantile hemiplegia ([address] Rosacea, acne; IBS (irritable bowel syndrome); Essential hypertension; Calculus of ureter; Pyelonephritis; Sepsis ([address] Pneumonia; Increased ammonia level; Aspiration pneumonia ([address] Hypokalemia; Hypomagnesemia; Hypophosphatasia; Seizure disorder ([address] Seizure disorder, grand mal ([address] Sepsis due to undetermined organism with acute respiratory failure ([address]  Pain of upper abdomen; Diarrhea of presumed monitoring;Patient/Family education; Therapeutic PO trials with SLP;Other (comment) (compensatory strategy training)    Compensatory Swallowing Strategies  Compensatory Swallowing Strategies : Upright as possible for all oral intake;Eat/Feed slowly; Small bites/sips    Treatment/Goals  Short-term Goals  Timeframe for Short-term Goals: length of admission  Goal 1: Pt will tolerate soft and bite-sized diet/thin liquids with adequate oral manipulation/clearance and no s/s aspiration. Goal 2: Pt will follow recommended precautions 90% given min cues. Goal 3: Caregivers will indicate understanding of all recommendations. General  Chart Reviewed: Yes  Behavior/Cognition: Alert; Cooperative;Pleasant mood; Impulsive  Communication Observation: Functional (baseline intellectual disability)  Follows Directions: Simple  Dentition: Edentulous  Patient Positioning: Upright in bed  Baseline Vocal Quality: Normal  Prior Dysphagia History: yes; last seen by SLP October 2021, recommended soft/bite-sized diet and thin liquids  Consistencies Administered: Regular; Soft and Bite-Sized; Thin - cup; Thin - straw           Vision/Hearing  Hearing  Hearing: Within functional limits    Oral Motor Deficits  Oral/Motor  Oral Hygiene: Moist;Clean    Oral Phase Dysfunction  Oral Phase  Oral Phase: Exceptions     Indicators of Pharyngeal Phase Dysfunction        Prognosis  Individuals consulted  Consulted and agree with results and recommendations: Patient    Education  Patient Education: results, recommendations, plan  Patient Education Response: Verbalizes understanding;Needs reinforcement  Safety Devices in place: Yes  Type of devices: All fall risk precautions in place; Left in bed       Therapy Time  SLP Individual Minutes  Time In: 2566  Time Out: 3984  Minutes: [facility], [address] [place] [address]  8/17/2022 10:39 AM

## 2022-08-17 NOTE — CONSULTS
Neurology Service Consult Note  [facility]  Patient Name: Monika Read  : 1955        Subjective:   Reason for consult: Acute encephalopathy  78 y.o. male with history of stroke 10/2021 with residual left sided weakness, CP, Depression, seizure on Vimpat, Depakote and zonisamide, frequent falls, HTN presenting to [facility] from group home with altered mental status and incontinence. Patient was found to have UTI about 7 days ago followed by diarrhea. Patient's sister reports they were attempting to test for c diff at group home but have been unsuccessful. He has decreased ability to provide self care at group home as of late. Chart was reviewed in detail. Patient is seen and assessed today. He is alert, oriented to self, place and year and cooperative. He is sitting up in bed eating breakfast. He states that he has not felt well for over a week. He was treated for UTI and has had subsequent diarrhea with episodes of incontinence making it difficult for him to care for himself as he usually does. He states that yesterday he feels he had a seizure that others felt was a \"tremor\". He then states that when he has seizures he can \"stop them\". He denies  headache or vision change. He has weakness at baseline L>R which he feels is unchanged.        Past Medical History:   Diagnosis Date    Acute CVA (cerebrovascular accident) ([address] 10/10/2021    Anemia     Aspiration pneumonia ([address]     dx 2014 with this per ecf/ per old chart dx with pneumonia with admission 2018    Cerebral palsy ([address]     \"has no feeling on left side of body\"alert but has some dementia but not diagnosed, has good long term but poor short term and wakes up disoriented after a nap\"(per yaneth)()    Depression     Epilepsy ([address]     last seizure 2018- hx grand mal    Frequent falls     with phone assess- family stated pt fell this am (7/10/2013\"in the process of trying to find a facility to help build him back up- (pt now at [facility], [facility])    Glaucoma     \"has been in treatment for this in the past- no treatment needed at present\"    History of IBS     Hx MRSA infection     + nasal culture 4/2014    Hyperammonemia (HCC)     Hypertension     on Lisinopril    IBS (irritable bowel syndrome)     ulcerative colitis    Kidney stone     for surgery for stent placement 7/11/2013    Mood disorder ([facility]     per staff at [facility]    MRSA (methicillin resistant staph aureus) culture positive 05/02/2014 05/27/20 nasal    MRSA (methicillin resistant staph aureus) culture positive 11/25/2020    Face; 10/8/21    Occasional tremors     \"makes it difficult for him to write\"    Pressure injury of contiguous region involving back and right buttock, stage 2 ([facility] 05/27/2020    Pressure injury of left buttock, stage 1 07/22/2020    Prostatitis     hx given per H&P old chart    Thyroid disease     Ulcerative colitis ([facility]     hx given per staff at [address] [place] [address] 4/13/2015    :   Past Surgical History:   Procedure Laterality Date    CHOLECYSTECTOMY      COLONOSCOPY  8/19/14, 5/2012 8/19/14: hemorrhoids, 5/2012 at [facility]  02/04/2021    COLONOSCOPY N/A 2/4/2021    COLONOSCOPY POLYPECTOMY SNARE/COLD BIOPSY performed by Chinyere Garcia MD at [address] Left 07/11/2013    with stnet placement    FACIAL SURGERY N/A 10/8/2021    FACIAL INCISION AND DRAINAGE performed by Timo Rivera MD at [facility]  2005    KIDNEY STONE SURGERY      OTHER SURGICAL HISTORY  04/15/2015    Revision of vagal nerve stimulator    STIMULATOR SURGERY N/A 3/24/2021    STIMULATOR INSERTION performed by Dominique Cortez MD at [address] 3/24/2021    STIMULATOR INSERTION STAGE 2 performed by Dominique Cortez MD at [facility] 11/13/2018    EGD DILATION BALLOON AND BIOPSY performed by Nancy Jha MD at [place] [address]  2002    Has to have bettery changed every 5 yrs.       Medications:  Scheduled Meds:   amLODIPine  10 mg Oral Daily    aspirin  81 mg Oral Daily    atorvastatin  80 mg Oral Nightly    clonazePAM  0.25 mg Oral Daily    clopidogrel  75 mg Oral Daily    dicyclomine  10 mg Oral 4x Daily AC & HS    divalproex  500 mg Oral Daily    divalproex  1,000 mg Oral Nightly    folic acid  1 mg Oral Daily    lacosamide  200 mg Oral BID    levothyroxine  125 mcg Oral Daily    PARoxetine  20 mg Oral QAM    primidone  50 mg Oral TID    QUEtiapine  50 mg Oral Nightly    tamsulosin  0.4 mg Oral Daily    timolol  1 drop Both Eyes Daily    zonisamide  100 mg Oral Daily    zonisamide  300 mg Oral Nightly    sodium chloride flush  5-40 mL IntraVENous 2 times per day    enoxaparin  40 mg SubCUTAneous Daily    lactulose  20 g Oral TID     Continuous Infusions:   sodium chloride       PRN Meds:.sodium chloride flush, sodium chloride, ondansetron **OR** ondansetron, polyethylene glycol, nicotine polacrilex, acetaminophen **OR** acetaminophen    No Known Allergies  Social History     Socioeconomic History    Marital status: Single     Spouse name: Not on file    Number of children: Not on file    Years of education: Not on file    Highest education level: Not on file   Occupational History    Not on file   Tobacco Use    Smoking status: Never    Smokeless tobacco: Never   Vaping Use    Vaping Use: Never used   Substance and Sexual Activity    Alcohol use: No    Drug use: No    Sexual activity: Not on file   Other Topics Concern    Not on file   Social History Narrative    Not on file     Social Determinants of Health     Financial Resource Strain: Not on file   Food Insecurity: Not on file   Transportation Needs: Not on file   Physical Activity: Not on file   Stress: Not on file   Social Connections: Not on file   Intimate Partner Violence: Not on file   Housing Stability: Not on file      Family History   Problem Relation Age of Onset    Heart Disease Mother atrial fib    High Blood Pressure Mother     Asthma Mother     High Cholesterol Mother     Depression Mother     Migraines Mother     High Blood Pressure Father     Heart Disease Father     Asthma Sister     High Cholesterol Sister     Depression Sister     Migraines Sister          ROS (10 systems)  In addition to that documented in the HPI above, the additional ROS was obtained:  Constitutional: Denies fevers or chills  Eyes: Denies vision changes  ENMT: Denies sore throat  CV: Denies chest pain  Resp: Denies SOB  GI: Recent diarrhea  : Denies painful urination  MSK: Denies recent trauma  Skin: Denies new rashes  Neuro: Left weakness at baseline.    Endocrine: Denies unexpected weight loss  Heme: Denies bleeding disorders    Physical Exam:      Wt Readings from Last 3 Encounters:   08/17/22 176 lb 5.9 oz (80 kg)   08/10/22 200 lb (90.7 kg)   05/09/22 200 lb (90.7 kg)     Temp Readings from Last 3 Encounters:   08/17/22 98 °F (36.7 °C)   08/10/22 98 °F (36.7 °C) (Oral)   12/09/21 97.5 °F (36.4 °C) (Oral)     BP Readings from Last 3 Encounters:   08/17/22 (!) 142/65   08/10/22 (!) 150/83   05/09/22 128/70     Pulse Readings from Last 3 Encounters:   08/17/22 55   08/10/22 60   05/09/22 63        Gen: A&O x 4, NAD, cooperative  HEENT: NC/AT, EOMI, PERRL, mmm, no carotid bruits, neck supple, no meningeal signs  Heart: NSR/SB  Lungs: Respirations even and unlabored  Ext: no edema, no calf tenderness b/l  Psych: normal mood and affect  Skin: no rashes or lesions    NEUROLOGIC EXAM:    Mental Status: A&O to self, location, month and year, NAD, speech clear, language fluent, repetition and naming intact, follows commands appropriately    Cranial Nerve Exam:   CN II-XII: PERRL, VFF, no nystagmus, no gaze paresis, sensation V1-V3 intact b/l, muscles of facial expression symmetric; hearing intact to conversational tone, palate elevates symmetrically, shoulder elevation symmetric and tongue protrudes midline with movement side to side. Motor Exam:       Strength 5/5 UE's/LE's b/l, 3-4/5 UE's/LE's b/l  Spasticity to left upper and lower extremities   Tone and bulk normal   No pronator drift    Deep Tendon Reflexes: 2/4 biceps, triceps, brachioradialis, patellar, and achilles b/l; flexor plantar responses b/l    Sensation: Intact light touch/pinprick/vibration UE's/LE's b/l    Coordination/Cerebellum:       Tremors--none      Rapidly alternating movements: dysdiadochokinesia left               Heel-to-Shin: dysmetria left      Finger-to-Nose: no dysmetria left    Gait and stance:      Gait: deferred      LABS:     Recent Labs     08/16/22  1259 08/17/22  0408   WBC 7.5  --     143   K 3.5 3.4*    106   CO2 26 27   BUN 13 11   CREATININE 0.6* 0.5*   GLUCOSE 114* 94       IMAGING:      CT head w/o contrast:  Impression   No acute intracranial abnormality. Evidence of prior infarct in the   territory of the right middle cerebral artery. All imaging was personally reviewed    ASSESSMENT/PLAN:   78year old male with history of stroke with residual left sided weakness, CP, Seizure presents with altered mental status and incontinence. Patient has recently been treated for UTI and had subsequent diarrhea. He states that because of this he had some episodes of incontinence which made it more difficult to care for himself. He is alert and oriented x 3-4, pleasant and cooperative. He has weakness and spasticity of the left upper and lower extremity due to stroke and CP. Speech is clear, language is fluent. Patient does report feeling as if he had a seizure yesterday that others may have just seen as \"tremor\" in his arm. Altered mental status suspect secondary to infection, dehydration with recent history of diarrhea and can not rule out possibility of seizure refractory to the above. Patients mental status change could represent post ictal state given improvement noted today.    Neuro imaging as above non acute  Continue lacosamide, zonisamide, divalproex  Valproic acid level elevated at 101.1,   Continue Aspirin and atorvastatin for secondary stroke prevention given history of stroke. PT/OT as recommended    > 60 minutes of time spent included chart review, obtaining history, patient examination, developing plan of care, and documentation. Thank you for allowing us to participate in the care of your patient. If there are any questions regarding evaluation please feel free to contact us. MARY Larios CNP, 8/17/2022       ------------------------------------    Attending Note:  I have rounded on this patient with Chantel Ibarra CNP. I have reviewed the chart and we have discussed this case in detail. The patient was seen and examined by myself. Pertinent labs and imaging have been personally reviewed. Our findings and impressions were discussed with the patient. I concur with the Nurse Practioner's assessment and plan. Patient presents with possible seizure. Unclear if he did suffer a seizure. The seizure he describes are brief spasming of the fingers on the left hand without any alteration in consciousness or generalized convulsions. Certainly could be a simple partial seizure. He is on several antiepileptic medications including Vimpat, zonisamide, Depakote, and also primidone (which she is prescribed for tremors). Depakote level was slightly elevated at 101.1. Okay to maintain medication at current dosing. He may benefit to get an ambulatory EEG on an outpatient basis to try to capture some of his possible seizure spells to see if they are more sought representation of extremity spasm rather than a true epileptic event.     Darci Wei DO 8/17/2022 4:44 PM

## 2022-08-17 NOTE — CONSULTS
[address] [facility] [employer] [address] [employer], 1955, 4007/4007-A, 8/17/2022    History  [place]:  The primary encounter diagnosis was Intermittent confusion. A diagnosis of Generalized weakness was also pertinent to this visit. Patient  has a past medical history of Acute CVA (cerebrovascular accident) ([address] Anemia, Aspiration pneumonia ([address] Cerebral palsy ([address] Depression, Epilepsy ([address] Frequent falls, Glaucoma, History of IBS, Hx MRSA infection, Hyperammonemia ([address] Hypertension, IBS (irritable bowel syndrome), Kidney stone, Mood disorder ([address] MRSA (methicillin resistant staph aureus) culture positive, MRSA (methicillin resistant staph aureus) culture positive, Occasional tremors, Pressure injury of contiguous region involving back and right buttock, stage 2 ([address] Pressure injury of left buttock, stage 1, Prostatitis, Thyroid disease, and Ulcerative colitis ([address] Patient  has a past surgical history that includes Gallbladder surgery (2005); Vagus nerve stimulator insertion (2002); Cholecystectomy; Cystoscopy (Left, 07/11/2013); Kidney stone surgery; Colonoscopy (8/19/14, 5/2012); other surgical history (04/15/2015); Upper gastrointestinal endoscopy (N/A, 11/13/2018); Colonoscopy (02/04/2021); Colonoscopy (N/A, 2/4/2021); Stimulator Surgery (N/A, 3/24/2021); Stimulator Surgery (N/A, 3/24/2021); and Facial Surgery (N/A, 10/8/2021). Subjective:  Patient states:  \"I have to use the bathroom. \"    Pain:  denies pain. Communication with other providers:  Handoff to RN, OT  Restrictions: general precautions, fall risk, contact isolation    Home Setup/Prior level of function   Pt is typically mod I with RW or manual WC. Per pt, pt is typically independent with dressing, toileting, and bathing. Per chart review, pt lives at group home with 24/7 supervision.     Examination of body systems (includes body structures/functions, activity/participation limitations):  Observation:  Pt up in chair upon arrival and agreeable to therapy  Vision:  [facility]  Hearing:  [facility]  Cardiopulmonary:  WFL  Cognition: A&O x3, see OT/SLP note for further evaluation. Musculoskeletal  ROM R/L:  WFL. Strength R/L:  4/5, minimal impairment in function and endurance. Neuro:  WFL      Mobility:  Rolling L/R:  mod I  Supine to sit:  SBA  Transfers: Pt completed STS from chair SBA, to/from commode CGA, and to bed SBA. Cues provided for safety  Sitting balance:  good. Standing balance:  fair+. Gait: pt ambulated 10' + 10' without a device SBA with decreased jany, wide DESIREE, and L toe out. Cues provided for pathway. Pt seems to be ambulating at baseline    [facility] 6 Clicks Inpatient Mobility:  AM-PAC Inpatient Mobility Raw Score : 19    Safety: patient left in bed with alarm on, call light within reach, RN notified, gait belt used. Assessment:  Pt is a 78 y.o. male admitted to the hospital for acute encephalopathy. Pt is typically mod I with RW or manual WC for mobility. Pt is currently performing bed mobility SBA, transferring CGA, and ambulating 10' without an AD SBA. Pt is presenting with decreased endurance, impaired balance, impaired gait. Pt would benefit from continued acute care PT [facility] to continue to address impairments. Complexity: moderate    Prognosis: Good, no significant barriers to participation at this time.      Plan: 2-3 times per week/week     Equipment: none, pt owns all necessary equipment    Goals:  Short Term Goals  Time Frame for Short term goals: 1 week  Short term goal 1: Pt to complete all bed mobility mod I  Short term goal 2: Pt to complete all STS transfers to/from bed, commode, and chair mod I  Short term goal 3: Pt to ambulate 48' with LRAD and SBA       Treatment plan:  Bed mobility, transfers, balance, gait, TA, TX    Recommendations for NURSING mobility: amb with gait belt    Time:   Time in: 1340  Time out: 1415  Timed treatment minutes: 25  Total time: 35    Electronically signed by:    Mai Armstrong, PT  8/17/2022, 2:03 PM

## 2022-08-17 NOTE — CONSULTS
[address] [facility], 1955, [id number], 8/17/2022      Discharge Recommendation: Home with 24/7 [facility] and [facility] OT    History:  [place]:  The primary encounter diagnosis was Intermittent confusion. A diagnosis of Generalized weakness was also pertinent to this visit. Pt  has a past medical history of Acute CVA (cerebrovascular accident) ([address] Anemia, Aspiration pneumonia ([address] Cerebral palsy ([address] Depression, Epilepsy ([address] Frequent falls, Glaucoma, History of IBS, Hx MRSA infection, Hyperammonemia ([address] Hypertension, IBS (irritable bowel syndrome), Kidney stone, Mood disorder ([address] MRSA (methicillin resistant staph aureus) culture positive, MRSA (methicillin resistant staph aureus) culture positive, Occasional tremors, Pressure injury of contiguous region involving back and right buttock, stage 2 ([address] Pressure injury of left buttock, stage 1, Prostatitis, Thyroid disease, and Ulcerative colitis ([address] Subjective:  Patient states: \"Shit happens. \"  Pain: no c/o  Communication with other providers: Coeval with PT for pt safety and tolerance, RN handoff   Restrictions: general precautions, fall risk, Contact ISO, pocket tele, male purwick   PCA at bedside    Home Setup/Prior level of function:  Social/Functional History  Lives With: Other (comment) (2 roommates, group home with 2 staff nearly all times)  Type of Home: House  Home Layout: One level  Home Access: Stairs to enter without rails, Ramped entrance  Entrance Stairs - Number of Steps: ramp on 1 step in garage(main entry for pt), 1 step at front entrance  Bathroom Shower/Tub: Shower chair with back, Walk-in shower  Bathroom Toilet: Standard (has some rails, but pt states they are awkward to use)  Bathroom Equipment: Grab bars in shower, Grab bars around toilet  Bathroom Accessibility: [facility] [place] [address], Wheelchair-electric, Rolling walker, Hospital bed (Pt has electric w/c but does not use it due to poor control(states he wasn't trained))  Receives Help From: Personal care attendant  ADL Assistance: Needs assistance (aides provide supervision as needed, but pt needed no physical help)  Homemaking Responsibilities: No (Pt will help with cleaning sometimes, staff complete most tasks)  Ambulation Assistance: Needs assistance (SBA of 2 with 2ww, has mostly been usingw/c)  Transfer Assistance: Independent  Active : No  Mode of Transportation: [employer]  Education: [employer]  Occupation: On disability  Type of occupation: [employer] [address]: Watch tv, play video games, computer, FB, pt does not manage his [employer] manages  Additional Comments: pt sleeps in hospital bed, pt has fallen 2-3 with injury once; reported that he was advised by optometrist to wear helmet when out of bed due to glaucoma and irrepairable damage if he experiences a fall. *Taken from previous eval on 10/11/21. Confirmed still same with pt. Pt is typically mod I with RW or manual WC. Per pt, pt is typically independent with dressing, toileting, and bathing. Per chart review, pt lives at group home with 24/7 supervision.     Examination:  Observation: Pt was seated in recliner upon arrival, agreeable to session  Vision: [facility]  Hearing: WFL  Objective Measures: Vitals appeared to remain stable throughout     Body Systems and functions:  ROM: WFL in BUE, with exception to L digit 1 & 2 contractures limiting mobility   Strength: 4/5 in BUE  Sensation: WFL  Tone: normotonic in BUE  Coordination: movements fluid and coordinated  Posture: normal posture  Activity Tolerance: Good     Activities of Daily Living (ADLs):  Feeding: SetupA   Grooming: SetupA (seated EOB and CGA for standing)   Toileting: Lv (SBA for transfer)   UB dressing: Lv (seated EOB)  LB dressing: SetupA  UB bathing: Lv   LB bathing: Lv       *pt ADL function inferred from gross functional assessment of mobility, balance, posture, safety awareness, activity tolerance (unless otherwise indicated)    Cognitive and Psychosocial Functioning:  Overall cognitive status: WFL   Affect: normal     Balance:   Sitting: SBA  Standing: CGA-SBA    Functional Mobility:  Rolling Laterally in Bed: NT  Sit to supine: SBA  Sit to Stand: SBA    Ambulation: SBA using no device       AM-PAC 6 click short form for inpatient daily activity:   How much help from another person does the patient currently need. .. Unable  Dep A Lot  Max A A Lot   Mod A A Little  Min A A Little   CGA  SBA None   Mod I  Indep  Sup   1. Putting on and taking off regular lower body clothing? [] 1    [] 2   [] 2   [] 3   [x] 3   [] 4      2. Bathing (including washing, rinsing, drying)? [] 1   [] 2   [] 2 [x] 3 [] 3 [] 4   3. Toileting, which includes using toilet, bedpan, or urinal? [] 1    [] 2   [] 2   [x] 3   [] 3   [] 4     4. Putting on and taking off regular upper body clothing? [] 1   [] 2   [] 2   [x] 3   [] 3    [] 4      5. Taking care of personal grooming such as brushing teeth? [] 1   [] 2    [] 2 [] 3    [x] 3   [] 4      6. Eating meals? [] 1   [] 2   [] 2   [] 3   [x] 3   [] 4        Raw Score:  18      24/24 = unimpaired  23/24 = 1-20% impaired   20/24-22/24 = 21-40% impaired  15/24-19/24 = 41-59% impaired   10/24-14/24 = 60%-79% impaired  7/24-9/24 = 80%-99% impaired  6/24 = 100% impaired     Treatment:  Therapeutic Activity Training:   Therapeutic activity training was instructed today. Cues were given for safety, sequence, UE/LE placement, visual cues, and balance. Activities performed today included:    STS:  Pt completed STS from recliner with SBA. Pt completed from standard commode with SBA. Pt required x2 trials to be successful d/t low commode. Pt reported to have higher commode at home. Ambulation:  Pt ambulated from recliner to bathroom and to EOB with SBA and without device.  Pt benefited from UE support and may benefit from a walker for inc stability. Will trial with next session, pt did report to utilize [facility] or walker for home mobility. Standing:  Pt tolerated standing sinkside ~5mins to engage in ADLs with SBA. Pt demo'd no evidence of LOB with wide DESIREE. Bed mobility:  Pt tolerated EOB well without evidence of lean or LOB and provided SBA. Pt completed sit>sup with SBA. Self Care Training:   Self care training was performed today. Cues were given for safety, sequence, UE/LE placement, visual cues, and balance. Activities performed today included: Toileting: Pt completed over standard commode for BM. Pt completed bowel perineal care with Lv for thoroughness. Pt required inc time for extensive cleanup. LB dressing: Pt doffed depends seated on commode with cleanupA and pt donned new depends with SEtupA. UB dressing: Pt doffed prior to ambulating to toilet d/t spilling food on gown with cleanupA. Pt donned new gown while seated EOB with Lv to navigate lines and tie gown. Hand hygiene: Pt completed extensive hand hygiene after BM while standing sinkside with SBA for safety. Pt engaged in wiping down sink s/p hand hygiene with SBA. Education: Role of OT, OT POC, discharge needs, safety, benefits of EOB/OOB activity, AE needs  Safety Measures: Gait belt used for safety of pt and therapist, Left in bed per pt request, Alarm in place, call light and phone within reach, lines managed, needs met     Assessment:  Pt is a 78 yr old male from home who presents with Acute encephalopathy. Prior to admission, pt was grossly independent with ADLs and receiving 24/7 Sup in a group home. Pt currently appeared to be functioning near baseline. Pt has 24/7 support at home for inc safety. Pt would benefit from [address] OT to address improving independence, addressing falls prevention, home safety evaluation for any needed modifications, and improve overall endurance.  Pt would benefit from continued IP OT services during their stay, and discharge to Home with 24/7 Sup and [facility] OT. Complexity: Moderate   Prognosis: Good   Barriers: Strength, endurance, coordination, standing balance     Plan:  Plan: 3+/week    Treatment to include: Strengthening, ROM, Balance Training, Functional Mobility Training, Endurance Training, Gait Training, Pain Management, Safety Education and Training, Patient+Caregiver Education and Training, Equipment Evaluation Education and Procurement, Positioning, Self Care Training, Home Management Training, Coordination Training    Pt would benefit from continued edu on: Continue to [employer] Equipment Recommendations: Continue to monitor/defer to next LOC.     Goals:  Time frame for goals: 2 weeks  Pt will complete feeding tasks with independence at seated level   Pt will complete grooming tasks with Supervision at standing level with chair nearby for breaks PRN   Pt will complete toileting tasks with Supervision using elevated commode and grab bars   Pt will complete UB dressing tasks with independence seated EOB   Pt will complete LB dressing tasks with independence seated EOB   Pt will complete UB bathing tasks with Supervision while seated and AE PRN  Pt will complete LB bathing tasks with Supervision while seated and AE PRN  Pt will complete therapeutic exercise/activity to increase independence in ADL/IADL function  Pt will practice functional transfers and mobility with AD for increased safety and independence    Time:   Time in: 1328  Time out: 1415  Treatment Minutes: 37  Evaluation Minutes: 10  Total time: 47    Electronically signed by:    Lio Giles OTR/L   License: [id number]  5/50/5872, 2:23 PM

## 2022-08-17 NOTE — CONSULTS
[address] Wound Ostomy Continence Nurse  Consult Note       Chari Torres  AGE: 78 y.o. GENDER: male  : 1955  TODAY'S DATE:  2022    Subjective:     Reason for Evaluation and Assessment: wound care eval buttocks.       Chari Torres is a 78 y.o. male referred by:   [x] Physician  [] Nursing  [] Other:     Wound Identification:  Wound Type: none  Contributing Factors: chronic pressure and decreased mobility        PAST MEDICAL HISTORY        Diagnosis Date    Acute CVA (cerebrovascular accident) ([facility] 10/10/2021    Anemia     Aspiration pneumonia ([address]     dx 2014 with this per ecf/ per old chart dx with pneumonia with admission 2018    Cerebral palsy ([facility]     \"has no feeling on left side of body\"alert but has some dementia but not diagnosed, has good long term but poor short term and wakes up disoriented after a nap\"(per yaneth)()    Depression     Epilepsy ([facility]     last seizure 2018- hx grand mal    Frequent falls     with phone assess- family stated pt fell this am (7/10/2013\"in the process of trying to find a facility to help build him back up- (pt now at [facility])    Glaucoma     \"has been in treatment for this in the past- no treatment needed at present\"    History of IBS     Hx MRSA infection     + nasal culture 2014    Hyperammonemia (HCC)     Hypertension     on Lisinopril    IBS (irritable bowel syndrome)     ulcerative colitis    Kidney stone     for surgery for stent placement 2013    Mood disorder ([facility]     per staff at [facility]    MRSA (methicillin resistant staph aureus) culture positive 2014 nasal    MRSA (methicillin resistant staph aureus) culture positive 2020    Face; 10/8/21    Occasional tremors     \"makes it difficult for him to write\"    Pressure injury of contiguous region involving back and right buttock, stage 2 ([facility] 2020    Pressure injury of left buttock, stage 1 2020    Prostatitis     hx given per H&P old chart    Thyroid disease     Ulcerative colitis ([address]     hx given per staff at [address] [place] [address] 4/13/2015       PAST SURGICAL HISTORY    Past Surgical History:   Procedure Laterality Date    CHOLECYSTECTOMY      COLONOSCOPY  8/19/14, 5/2012 8/19/14: hemorrhoids, 5/2012 at [facility]  02/04/2021    COLONOSCOPY N/A 2/4/2021    COLONOSCOPY POLYPECTOMY SNARE/COLD BIOPSY performed by Poly Acosta MD at [address] Left 07/11/2013    with stnet placement    FACIAL SURGERY N/A 10/8/2021    FACIAL INCISION AND DRAINAGE performed by Kiran Paul MD at [address]      OTHER SURGICAL HISTORY  04/15/2015    Revision of vagal nerve stimulator    STIMULATOR SURGERY N/A 3/24/2021    STIMULATOR INSERTION performed by Adeola Hermosillo MD at [facility]. 3/24/2021    STIMULATOR INSERTION STAGE 2 performed by Adeola Hermosillo MD at [facility] 11/13/2018    EGD DILATION BALLOON AND BIOPSY performed by Ary Means MD at [place] [address]  2002    Has to have bettery changed every 5 yrs. FAMILY HISTORY    Family History   Problem Relation Age of Onset    Heart Disease Mother         atrial fib    High Blood Pressure Mother     Asthma Mother     High Cholesterol Mother     Depression Mother     Migraines Mother     High Blood Pressure Father     Heart Disease Father     Asthma Sister     High Cholesterol Sister     Depression Sister     Migraines Sister        SOCIAL HISTORY    Social History     Tobacco Use    Smoking status: Never    Smokeless tobacco: Never   Vaping Use    Vaping Use: Never used   Substance Use Topics    Alcohol use: No    Drug use: No       ALLERGIES    No Known Allergies    MEDICATIONS    No current facility-administered medications on file prior to encounter.      Current Outpatient Medications on File Prior to Encounter   Medication Sig Dispense tablet by mouth Daily 30 tablet 3    clonazePAM (KLONOPIN) 0.5 MG tablet Take 0.25 mg by mouth daily. divalproex (DEPAKOTE) 500 MG DR tablet Take 1,000 mg by mouth nightly      omeprazole (PRILOSEC) 20 MG delayed release capsule Take 20 mg by mouth 2 times daily (before meals)      primidone (MYSOLINE) 50 MG tablet Take 50 mg by mouth 3 times daily      Simethicone (MI-ACID GAS RELIEF PO) Take 1 Container by mouth every 4 hours as needed 10 cc every 4 hrs not to exceed 60 cc in 24 hrs      guaiFENesin (MUCINEX) 600 MG extended release tablet Take 1,200 mg by mouth daily as needed for Congestion      Menthol-Methyl Salicylate (MUSCLE RUB) 10-15 % CREA cream Apply 1 applicator topically as needed      pseudoephedrine (SUDAFED) 30 MG tablet Take 30 mg by mouth every 4 hours as needed for Congestion (Patient not taking: Reported on 9/24/2021)      latanoprost (XALATAN) 0.005 % ophthalmic solution Place 1 drop into both eyes nightly      timolol (TIMOPTIC) 0.5 % ophthalmic solution Place 1 drop into both eyes daily       Lactobacillus (ACIDOPHILUS) CAPS capsule Take 1 capsule by mouth daily      Wheat Dextrin (BENEFIBER) POWD Take 4 g by mouth 2 times daily Takes 3 tsp in liquid twice per day       Lactulose Encephalopathy (ENULOSE PO) Take 30 mLs by mouth 3 times daily      folic acid (FOLVITE) 1 MG tablet Take 1 mg by mouth daily      PARoxetine (PAXIL) 20 MG tablet Take 20 mg by mouth every morning      QUEtiapine (SEROQUEL XR) 50 MG extended release tablet Take 50 mg by mouth nightly      tamsulosin (FLOMAX) 0.4 MG capsule Take 1 capsule by mouth daily 30 capsule 0    Cholecalciferol (VITAMIN D3) 1000 UNITS CAPS Take 1 tablet by mouth daily.            Objective:      BP (!) 142/65   Pulse 55   Temp 98 °F (36.7 °C)   Resp 18   Wt 176 lb 5.9 oz (80 kg)   SpO2 96%   BMI 20.38 kg/m²   Jean Risk Score: Jean Scale Score: 16    LABS    CBC:   Lab Results   Component Value Date/Time    WBC 7.5 08/16/2022 12:59 PM    RBC 4.92 08/16/2022 12:59 PM    HGB 15.4 08/16/2022 12:59 PM    HCT 45.2 08/16/2022 12:59 PM    MCV 91.9 08/16/2022 12:59 PM    MCH 31.3 08/16/2022 12:59 PM    MCHC 34.1 08/16/2022 12:59 PM    RDW 13.4 08/16/2022 12:59 PM     08/16/2022 12:59 PM    MPV 10.9 08/16/2022 12:59 PM     CMP:    Lab Results   Component Value Date/Time     08/17/2022 04:08 AM    K 3.4 08/17/2022 04:08 AM     08/17/2022 04:08 AM    CO2 27 08/17/2022 04:08 AM    BUN 11 08/17/2022 04:08 AM    CREATININE 0.5 08/17/2022 04:08 AM    GFRAA >60 08/17/2022 04:08 AM    LABGLOM >60 08/17/2022 04:08 AM    GLUCOSE 94 08/17/2022 04:08 AM    PROT 7.2 08/16/2022 12:59 PM    LABALBU 3.9 08/16/2022 12:59 PM    CALCIUM 9.2 08/17/2022 04:08 AM    BILITOT 0.3 08/16/2022 12:59 PM    ALKPHOS 138 08/16/2022 12:59 PM    AST 16 08/16/2022 12:59 PM    ALT 27 08/16/2022 12:59 PM     Albumin:    Lab Results   Component Value Date/Time    LABALBU 3.9 08/16/2022 12:59 PM     PT/INR:    Lab Results   Component Value Date/Time    PROTIME 12.2 12/07/2021 12:53 AM    INR 0.95 12/07/2021 12:53 AM     HgBA1c:    Lab Results   Component Value Date/Time    LABA1C 5.2 10/03/2021 04:25 AM         Assessment:     Patient Active Problem List   Diagnosis    Cerebral palsy, infantile hemiplegia (HCC)    Rosacea, acne    IBS (irritable bowel syndrome)    Essential hypertension    Calculus of ureter    Pyelonephritis    Sepsis (HCC)    Pneumonia    Increased ammonia level    Aspiration pneumonia (HCC)    Hypokalemia    Hypomagnesemia    Hypophosphatasia    Seizure disorder ([facility]    Seizure disorder, grand mal ([facility]    Sepsis due to undetermined organism with acute respiratory failure ([facility]    Pain of upper abdomen    Diarrhea of presumed infectious origin    Abdominal pain    Pressure injury of contiguous region involving back and right buttock, stage 2 (HCC)    Cellulitis, perineum    Pressure injury of right buttock, stage 2 (HCC)    Pressure injury of left buttock, stage 1    COVID-19 virus detected    Hyponatremia    Multifocal pneumonia    TIA (transient ischemic attack)    Acute CVA (cerebrovascular accident) ([facility]    Ataxia    Dysarthria    Dysphagia due to recent stroke    Depression with anxiety    Facial abscess    Group B streptococcal UTI    History of cerebral palsy    Encephalopathy acute    PNA (pneumonia)    Acute encephalopathy       Measurements:  Wound 10/06/21 Face Left upper lip (Active)   Number of days: 315       Wound 10/06/21 Knee Anterior; Left (Active)   Number of days: 315       Response to treatment:  Well tolerated by patient. Pain Assessment:  Severity:  none  Quality of pain:   Wound Pain Timing/Severity:   Premedicated:     Plan:     Plan of Care:      Patient in bed agreeable to wound care eval buttocks redness. Pt has blanchable erythema to buttocks. Pt turned to lt side with pillow support. Heels intact and floated. Atmos air pump placed to the bed. Pt is at mild risk for skin breakdown AEB sandra. Follow sandra orders. Wound care to d/c consult there are no wounds. Specialty Bed Required : yes  [] Low Air Loss   [x] Pressure Redistribution  [] Fluid Immersion  [] Bariatric  [] Total Pressure Relief  [] Other:     Discharge Plan:  Placement for patient upon discharge: tbd  Hospice Care: no  Patient appropriate for Outpatient [address]: no    Patient/Caregiver Teaching:  Level of patient/caregiver understanding able to: pt voiced understanding. Electronically signed by Chloe Rodriguez.  MEENAKSHI Steven,  on 8/17/2022 at 12:55 PM

## 2022-08-17 NOTE — CARE COORDINATION
This RN CM spoke with pt's Jez Mcghee. Pt lives at a group home through [address] He has 24 hour supervision, there is not a nurse at group home 24 hours a day. Sister states that pt was very confused yesterday morning and unable to perform basic ADLs and he was brought in. She states pt is normally able to perform ADLs and does not have confusion. She states pt was in [facility] last week due to UTI and diarrhea. She states he did well back at home until yesterday. Pt Has done SNF in the past at [facility] , now known as [facility]. Pt has insurance and PCP. Sister is unsure of DC plan at this time as she would like to see what they conclude is causing confusion. This RN CM will touch base with sister again tomorrow to discuss further.

## 2022-08-17 NOTE — PROGRESS NOTES
V2.0  [facility] Hospitalist Progress Note      Name:  Zhanna Thomas /Age/Sex: 1955  (78 y.o. male)   MRN & CSN:  [id number] & [id number] Encounter Date/Time: 2022 2:51 PM EDT    Location:  0788/7669-F PCP: Connor Nichole, [facility] Day: 2    Assessment and Plan:   Zhanna Thomas is a 78 y.o. male   with past medical history of CVA, cerebral palsy, epilepsy, depression, frequent falls, IBS who presents with Acute encephalopathy. In the ER patient/sister reported intermittent confusion spells. Patient was oriented x3 in the ER      Plan:  Acute encephalopathy. With intermittent confusion for the last few days. CT head nonacute due to old infarct. Neurology following. Rule out seizures as patient has history of epilepsy per records. On my examination patient was sitting upright having lunch. Oriented x3. Able to move all 4 extremities  Debility. Due to generalized weakness. Declining ability to care for himself. Family requesting higher level of care  History of seizure. On Keppra and Vimpat and zonisamide. Seizure precaution. Will check with neurology if work-up is needed for intermittent confusion spells  Hypertension blood pressure well controlled. History of ischemic stroke. On aspirin Plavix statin. Glaucoma. Continue eyedrops  Hypothyroidism. On Synthroid  Depression and anxiety. Klonopin Paxil and Seroquel  History of cerebral palsy per records  History IBS. Recently tested negative for C. difficile last week. Supportive care Imodium as needed. Doubt infectious diarrhea      Diet ADULT DIET;  Dysphagia - Soft and Bite Sized   DVT Prophylaxis [x] Lovenox, []  Heparin, [] SCDs, [] Ambulation,  [] Eliquis, [] Xarelto  [] Coumadin   Code Status Full Code   Disposition From: Home   Expected Disposition: ECF  Estimated Date of Discharge:   Patient requires continued admission due to consultation, workup    Surrogate Decision Maker/ POA      Subjective:     Patient seen and examined at bedside. Sitting upright not in acute distress but having lunch. Review of Systems:    Negative except above    Objective: Intake/Output Summary (Last 24 hours) at 8/17/2022 1451  Last data filed at 8/17/2022 1200  Gross per 24 hour   Intake 480 ml   Output 300 ml   Net 180 ml        Vitals:   Vitals:    08/17/22 1200   BP: 115/65   Pulse: 60   Resp: 19   Temp: 98 °F (36.7 °C)   SpO2: 99%       Physical Exam:     General: NAD  Eyes: EOMI  ENT: neck supple  Cardiovascular: Regular rate. Respiratory: Clear to auscultation  Gastrointestinal: Soft, non tender  Genitourinary: no suprapubic tenderness  Musculoskeletal: No edema  Skin: warm, dry  Neuro: Alert. Psych: Mood appropriate.      Medications:   Medications:    amLODIPine  10 mg Oral Daily    aspirin  81 mg Oral Daily    atorvastatin  80 mg Oral Nightly    clonazePAM  0.25 mg Oral Daily    clopidogrel  75 mg Oral Daily    dicyclomine  10 mg Oral 4x Daily AC & HS    divalproex  500 mg Oral Daily    divalproex  1,000 mg Oral Nightly    folic acid  1 mg Oral Daily    lacosamide  200 mg Oral BID    levothyroxine  125 mcg Oral Daily    PARoxetine  20 mg Oral QAM    primidone  50 mg Oral TID    QUEtiapine  50 mg Oral Nightly    tamsulosin  0.4 mg Oral Daily    timolol  1 drop Both Eyes Daily    zonisamide  100 mg Oral Daily    zonisamide  300 mg Oral Nightly    sodium chloride flush  5-40 mL IntraVENous 2 times per day    enoxaparin  40 mg SubCUTAneous Daily    lactulose  20 g Oral TID      Infusions:    sodium chloride       PRN Meds: sodium chloride flush, 10 mL, PRN  sodium chloride, , PRN  ondansetron, 4 mg, Q8H PRN   Or  ondansetron, 4 mg, Q6H PRN  polyethylene glycol, 17 g, Daily PRN  nicotine polacrilex, 2 mg, Q1H PRN  acetaminophen, 650 mg, Q6H PRN   Or  acetaminophen, 650 mg, Q6H PRN        Labs      Recent Results (from the past 24 hour(s))   Urinalysis with Microscopic    Collection Time: 08/16/22  3:06 PM   Result Value Ref Range Color, UA YELLOW YELLOW    Clarity, UA CLEAR CLEAR    Glucose, Urine NEGATIVE NEGATIVE MG/DL    Bilirubin Urine NEGATIVE NEGATIVE MG/DL    Ketones, Urine NEGATIVE NEGATIVE MG/DL    Specific Gravity, UA 1.015 1.001 - 1.035    Blood, Urine NEGATIVE NEGATIVE    pH, Urine 7.5 5.0 - 8.0    Protein, UA NEGATIVE NEGATIVE MG/DL    Urobilinogen, Urine 0.2 0.2 - 1.0 MG/DL    Nitrite Urine, Quantitative NEGATIVE NEGATIVE    Leukocyte Esterase, Urine TRACE (A) NEGATIVE    RBC, UA NONE SEEN 0 - 3 /HPF    WBC, UA 11 (H) 0 - 2 /HPF    Bacteria, UA RARE (A) NEGATIVE /HPF    Squam Epithel, UA <1 /HPF    Mucus, UA RARE (A) NEGATIVE HPF    Trichomonas, UA NONE SEEN NONE SEEN /HPF   Ammonia    Collection Time: 08/16/22  6:18 PM   Result Value Ref Range    Ammonia 31 16 - 60 UMOL/L   TSH    Collection Time: 08/16/22  9:07 PM   Result Value Ref Range    TSH, High Sensitivity 0.886 0.270 - 4.20 uIu/ml   Basic Metabolic Panel w/ Reflex to MG    Collection Time: 08/17/22  4:08 AM   Result Value Ref Range    Sodium 143 135 - 145 MMOL/L    Potassium 3.4 (L) 3.5 - 5.1 MMOL/L    Chloride 106 99 - 110 mMol/L    CO2 27 21 - 32 MMOL/L    Anion Gap 10 4 - 16    BUN 11 6 - 23 MG/DL    Creatinine 0.5 (L) 0.9 - 1.3 MG/DL    Glucose 94 70 - 99 MG/DL    Calcium 9.2 8.3 - 10.6 MG/DL    GFR Non-African American >60 >60 mL/min/1.73m2    GFR African American >60 >60 mL/min/1.73m2   Magnesium    Collection Time: 08/17/22  4:08 AM   Result Value Ref Range    Magnesium 2.0 1.8 - 2.4 mg/dl        Imaging/Diagnostics Last 24 Hours   CT Head WO Contrast    Result Date: 8/16/2022  EXAMINATION: CT OF THE HEAD WITHOUT CONTRAST  8/16/2022 1:38 pm TECHNIQUE: CT of the head was performed without the administration of intravenous contrast. Automated exposure control, iterative reconstruction, and/or weight based adjustment of the mA/kV was utilized to reduce the radiation dose to as low as reasonably achievable. COMPARISON: None.  HISTORY: ORDERING SYSTEM PROVIDED HISTORY: altered mental status TECHNOLOGIST PROVIDED HISTORY: Reason for exam:->altered mental status Has a \"code stroke\" or \"stroke alert\" been called? ->No Decision Support Exception - unselect if not a suspected or confirmed emergency medical condition->Emergency Medical Condition (MA) Reason for Exam: altered mental status FINDINGS: BRAIN/VENTRICLES: There is a stable old right frontal, parietal and temporal infarct, with associated dilatation of the right lateral ventricle there is no acute intracranial hemorrhage, mass effect or midline shift. No abnormal extra-axial fluid collection. The gray-white differentiation is maintained without evidence of an acute infarct. There is no evidence of hydrocephalus. ORBITS: The visualized portion of the orbits demonstrate no acute abnormality. SINUSES: The visualized paranasal sinuses and mastoid air cells demonstrate no acute abnormality. SOFT TISSUES/SKULL:  No acute abnormality of the visualized skull or soft tissues. No acute intracranial abnormality. Evidence of prior infarct in the territory of the right middle cerebral artery. XR CHEST PORTABLE    Result Date: 8/16/2022  EXAMINATION: ONE XRAY VIEW OF THE CHEST 8/16/2022 7:52 pm COMPARISON: 12/06/2021 HISTORY: ORDERING SYSTEM PROVIDED HISTORY: Acute encephalopathy TECHNOLOGIST PROVIDED HISTORY: Reason for exam:->Acute encephalopathy Reason for Exam: Acute encephalopathy Additional signs and symptoms: NA Relevant Medical/Surgical History: NA Initial evaluation FINDINGS: Monitor wires overlie the chest.  The patient has a stimulator pack overlying the left chest with the lead in the left side of the neck. The trachea is midline. The cardiac silhouette is unremarkable. The left lung is clear. There is pleural thickening or fluid along the right lung. Minimal scarring in the right lung base. No obvious superimposed acute process. Suspected scarring in the right lung base.   Pleural thickening versus mild pleural fluid. No other abnormality in the lung fields.        Electronically signed by Loreto Garcia MD on 8/17/2022 at 2:51 PM

## 2022-08-18 LAB
ANION GAP SERPL CALCULATED.3IONS-SCNC: 9 MMOL/L (ref 4–16)
BUN BLDV-MCNC: 15 MG/DL (ref 6–23)
CALCIUM SERPL-MCNC: 9.1 MG/DL (ref 8.3–10.6)
CHLORIDE BLD-SCNC: 109 MMOL/L (ref 99–110)
CO2: 26 MMOL/L (ref 21–32)
CREAT SERPL-MCNC: 0.4 MG/DL (ref 0.9–1.3)
GFR AFRICAN AMERICAN: >60 ML/MIN/1.73M2
GFR NON-AFRICAN AMERICAN: >60 ML/MIN/1.73M2
GLUCOSE BLD-MCNC: 121 MG/DL (ref 70–99)
POTASSIUM SERPL-SCNC: 4 MMOL/L (ref 3.5–5.1)
SODIUM BLD-SCNC: 144 MMOL/L (ref 135–145)

## 2022-08-18 PROCEDURE — 6370000000 HC RX 637 (ALT 250 FOR IP): Performed by: NURSE PRACTITIONER

## 2022-08-18 PROCEDURE — 36415 COLL VENOUS BLD VENIPUNCTURE: CPT

## 2022-08-18 PROCEDURE — 1200000000 HC SEMI PRIVATE

## 2022-08-18 PROCEDURE — 6370000000 HC RX 637 (ALT 250 FOR IP): Performed by: INTERNAL MEDICINE

## 2022-08-18 PROCEDURE — G0378 HOSPITAL OBSERVATION PER HR: HCPCS

## 2022-08-18 PROCEDURE — 94761 N-INVAS EAR/PLS OXIMETRY MLT: CPT

## 2022-08-18 PROCEDURE — 2580000003 HC RX 258: Performed by: NURSE PRACTITIONER

## 2022-08-18 PROCEDURE — 96372 THER/PROPH/DIAG INJ SC/IM: CPT

## 2022-08-18 PROCEDURE — 80048 BASIC METABOLIC PNL TOTAL CA: CPT

## 2022-08-18 PROCEDURE — 6360000002 HC RX W HCPCS: Performed by: NURSE PRACTITIONER

## 2022-08-18 RX ADMIN — TAMSULOSIN HYDROCHLORIDE 0.4 MG: 0.4 CAPSULE ORAL at 08:45

## 2022-08-18 RX ADMIN — ASPIRIN 81 MG: 81 TABLET, COATED ORAL at 08:45

## 2022-08-18 RX ADMIN — PRIMIDONE 50 MG: 50 TABLET ORAL at 08:46

## 2022-08-18 RX ADMIN — PRIMIDONE 50 MG: 50 TABLET ORAL at 13:42

## 2022-08-18 RX ADMIN — DICYCLOMINE HYDROCHLORIDE 10 MG: 10 CAPSULE ORAL at 22:53

## 2022-08-18 RX ADMIN — DIVALPROEX SODIUM 1000 MG: 500 TABLET, DELAYED RELEASE ORAL at 22:53

## 2022-08-18 RX ADMIN — QUETIAPINE 50 MG: 50 TABLET, EXTENDED RELEASE ORAL at 22:54

## 2022-08-18 RX ADMIN — LACOSAMIDE 200 MG: 100 TABLET, FILM COATED ORAL at 22:54

## 2022-08-18 RX ADMIN — LACTULOSE 20 G: 10 SOLUTION ORAL at 22:53

## 2022-08-18 RX ADMIN — ATORVASTATIN CALCIUM 80 MG: 40 TABLET, FILM COATED ORAL at 22:54

## 2022-08-18 RX ADMIN — DIVALPROEX SODIUM 500 MG: 500 TABLET, DELAYED RELEASE ORAL at 08:46

## 2022-08-18 RX ADMIN — LACOSAMIDE 200 MG: 100 TABLET, FILM COATED ORAL at 08:45

## 2022-08-18 RX ADMIN — ZONISAMIDE 300 MG: 100 CAPSULE ORAL at 22:53

## 2022-08-18 RX ADMIN — DICYCLOMINE HYDROCHLORIDE 10 MG: 10 CAPSULE ORAL at 16:52

## 2022-08-18 RX ADMIN — SODIUM CHLORIDE, PRESERVATIVE FREE 10 ML: 5 INJECTION INTRAVENOUS at 08:47

## 2022-08-18 RX ADMIN — TIMOLOL MALEATE 1 DROP: 5 SOLUTION OPHTHALMIC at 08:48

## 2022-08-18 RX ADMIN — CLOPIDOGREL BISULFATE 75 MG: 75 TABLET ORAL at 08:45

## 2022-08-18 RX ADMIN — DICYCLOMINE HYDROCHLORIDE 10 MG: 10 CAPSULE ORAL at 13:43

## 2022-08-18 RX ADMIN — DICYCLOMINE HYDROCHLORIDE 10 MG: 10 CAPSULE ORAL at 06:02

## 2022-08-18 RX ADMIN — LEVOTHYROXINE SODIUM 125 MCG: 0.12 TABLET ORAL at 06:02

## 2022-08-18 RX ADMIN — SODIUM CHLORIDE, PRESERVATIVE FREE 10 ML: 5 INJECTION INTRAVENOUS at 22:55

## 2022-08-18 RX ADMIN — PRIMIDONE 50 MG: 50 TABLET ORAL at 22:53

## 2022-08-18 RX ADMIN — PAROXETINE HYDROCHLORIDE 20 MG: 20 TABLET, FILM COATED ORAL at 08:47

## 2022-08-18 RX ADMIN — ZONISAMIDE 100 MG: 100 CAPSULE ORAL at 08:46

## 2022-08-18 RX ADMIN — FOLIC ACID 1 MG: 1 TABLET ORAL at 08:45

## 2022-08-18 RX ADMIN — AMLODIPINE BESYLATE 10 MG: 10 TABLET ORAL at 08:46

## 2022-08-18 RX ADMIN — LACTULOSE 20 G: 10 SOLUTION ORAL at 08:45

## 2022-08-18 RX ADMIN — CLONAZEPAM 0.25 MG: 0.5 TABLET ORAL at 08:45

## 2022-08-18 RX ADMIN — ENOXAPARIN SODIUM 40 MG: 100 INJECTION SUBCUTANEOUS at 08:45

## 2022-08-18 RX ADMIN — LACTULOSE 20 G: 10 SOLUTION ORAL at 13:43

## 2022-08-18 ASSESSMENT — PAIN SCALES - GENERAL
PAINLEVEL_OUTOF10: 0
PAINLEVEL_OUTOF10: 0

## 2022-08-18 NOTE — PLAN OF CARE
Problem: Discharge Planning  Goal: Discharge to home or other facility with appropriate resources  8/18/2022 0854 by Anjali Vasquez RN  Outcome: Progressing  Flowsheets (Taken 8/18/2022 6223)  Discharge to home or other facility with appropriate resources:   Identify barriers to discharge with patient and caregiver   Arrange for needed discharge resources and transportation as appropriate   Identify discharge learning needs (meds, wound care, etc)   Arrange for interpreters to assist at discharge as needed   Refer to discharge planning if patient needs post-hospital services based on physician order or complex needs related to functional status, cognitive ability or social support system  8/18/2022 0307 by Anurag Mota RN  Outcome: Progressing     Problem: Safety - Adult  Goal: Free from fall injury  8/18/2022 0854 by Anjali Vasquez RN  Outcome: Progressing  8/18/2022 0307 by Anurag Mota RN  Outcome: Progressing     Problem: Skin/Tissue Integrity  Goal: Absence of new skin breakdown  Description: 1. Monitor for areas of redness and/or skin breakdown  2. Assess vascular access sites hourly  3. Every 4-6 hours minimum:  Change oxygen saturation probe site  4. Every 4-6 hours:  If on nasal continuous positive airway pressure, respiratory therapy assess nares and determine need for appliance change or resting period.   8/18/2022 0854 by Anjali Vasquez RN  Outcome: Progressing  8/18/2022 0307 by Anurag Mota RN  Outcome: Progressing     Problem: Chronic Conditions and Co-morbidities  Goal: Patient's chronic conditions and co-morbidity symptoms are monitored and maintained or improved  8/18/2022 0854 by Anjali Vasquez RN  Outcome: Progressing  Flowsheets (Taken 8/18/2022 0841)  Care Plan - Patient's Chronic Conditions and Co-Morbidity Symptoms are Monitored and Maintained or Improved:   Monitor and assess patient's chronic conditions and comorbid symptoms for stability, deterioration, or improvement   Collaborate with multidisciplinary team to address chronic and comorbid conditions and prevent exacerbation or deterioration   Update acute care plan with appropriate goals if chronic or comorbid symptoms are exacerbated and prevent overall improvement and discharge  8/18/2022 0307 by Sapphire Aguero RN  Outcome: Progressing

## 2022-08-18 NOTE — PROGRESS NOTES
V2.0  [facility] Hospitalist Progress Note      Name:  Agustin Adan /Age/Sex: 1955  (78 y.o. male)   MRN & CSN:  [id number] & [id number] Encounter Date/Time: 2022 1:22 PM EDT    Location:  [address] [place] [address] PCP: Rica Lazaro PA-C       Hospital Day: 3    Assessment and Plan:     Agustin Adan is a 78 y.o. male   with past medical history of CVA, cerebral palsy, epilepsy, depression, frequent falls, IBS who presents with Acute encephalopathy. In the ER patient/sister reported intermittent confusion spells. Patient was oriented x3 in the ER        Plan:  Acute encephalopathy, with intermittent confusion for the last few days along with spasms of left hand and fingers. CT head nonacute due to old infarct. Neurology following. Rule out seizures as patient has history of epilepsy per records. Today patient is oriented x3. Able to move all 4 extremities, appreciate neurology input, outpatient EEG. Continue current antiseizure medication  Debility. Due to generalized weakness. Declining ability to care for himself. Family requesting higher level of care, currently living in a group home. Discussed with RN that patient may need placement. PT OT following  History of seizure. On Keppra and Vimpat and zonisamide. Seizure precaution. Appreciate input  Hypertension blood pressure well controlled. History of ischemic stroke. On aspirin Plavix statin. Glaucoma. Continue eyedrops  Hypothyroidism. On Synthroid  Depression and anxiety. Klonopin Paxil and Seroquel  History of cerebral palsy per records  History IBS. Recently tested negative for C. difficile last week per report. No diarrhea documented, discussed with RN yesterday and again today at bedside. Likely self resolved. On lactulose as well. Diet ADULT DIET;  Dysphagia - Soft and Bite Sized   DVT Prophylaxis [x] Lovenox, []  Heparin, [] SCDs, [] Ambulation,  [] Eliquis, [] Xarelto  [] Coumadin   Code Status Full Code   Disposition From: Group home  Expected Disposition: new [facility] or higher level of care per sister request  Estimated Date of Discharge: 8/19- 8/20  Patient requires continued admission -medically ready, POA to decide disposition place. Told RN that patient is medically ready and will be discharged once family has decided about disposition place. Surrogate Decision Maker/ POA      Subjective:     Laying in bed not in acute distress. Awake alert nonfocal.  Unchanged mentation since yesterday. No diarrhea reported. Tolerating p.o. diet    Review of Systems:      Negative except above  Objective:   No intake or output data in the 24 hours ending 08/18/22 1322     Vitals:   Vitals:    08/18/22 0841   BP:    Pulse: 67   Resp:    Temp:    SpO2:        Physical Exam:     General: NAD  Eyes: EOMI  ENT: neck supple  Cardiovascular: Regular rate. Respiratory: Clear to auscultation  Gastrointestinal: Soft, non tender  Genitourinary: no suprapubic tenderness  Musculoskeletal: No edema  Skin: warm, dry  Neuro: Alert. Altered mentation due to CP but able to answer simple questions. Following all commands  Psych: Mood appropriate.      Medications:   Medications:    amLODIPine  10 mg Oral Daily    aspirin  81 mg Oral Daily    atorvastatin  80 mg Oral Nightly    clonazePAM  0.25 mg Oral Daily    clopidogrel  75 mg Oral Daily    dicyclomine  10 mg Oral 4x Daily AC & HS    divalproex  500 mg Oral Daily    divalproex  1,000 mg Oral Nightly    folic acid  1 mg Oral Daily    lacosamide  200 mg Oral BID    levothyroxine  125 mcg Oral Daily    PARoxetine  20 mg Oral QAM    primidone  50 mg Oral TID    QUEtiapine  50 mg Oral Nightly    tamsulosin  0.4 mg Oral Daily    timolol  1 drop Both Eyes Daily    zonisamide  100 mg Oral Daily    zonisamide  300 mg Oral Nightly    sodium chloride flush  5-40 mL IntraVENous 2 times per day    enoxaparin  40 mg SubCUTAneous Daily    lactulose  20 g Oral TID      Infusions:    sodium chloride       PRN Meds: sodium chloride flush, 10 mL, PRN  sodium chloride, , PRN  ondansetron, 4 mg, Q8H PRN   Or  ondansetron, 4 mg, Q6H PRN  polyethylene glycol, 17 g, Daily PRN  nicotine polacrilex, 2 mg, Q1H PRN  acetaminophen, 650 mg, Q6H PRN   Or  acetaminophen, 650 mg, Q6H PRN        Labs    No results found for this or any previous visit (from the past 24 hour(s)). Imaging/Diagnostics Last 24 Hours   CT Head WO Contrast    Result Date: 8/16/2022  EXAMINATION: CT OF THE HEAD WITHOUT CONTRAST  8/16/2022 1:38 pm TECHNIQUE: CT of the head was performed without the administration of intravenous contrast. Automated exposure control, iterative reconstruction, and/or weight based adjustment of the mA/kV was utilized to reduce the radiation dose to as low as reasonably achievable. COMPARISON: None. HISTORY: ORDERING SYSTEM PROVIDED HISTORY: altered mental status TECHNOLOGIST PROVIDED HISTORY: Reason for exam:->altered mental status Has a \"code stroke\" or \"stroke alert\" been called? ->No Decision Support Exception - unselect if not a suspected or confirmed emergency medical condition->Emergency Medical Condition (MA) Reason for Exam: altered mental status FINDINGS: BRAIN/VENTRICLES: There is a stable old right frontal, parietal and temporal infarct, with associated dilatation of the right lateral ventricle there is no acute intracranial hemorrhage, mass effect or midline shift. No abnormal extra-axial fluid collection. The gray-white differentiation is maintained without evidence of an acute infarct. There is no evidence of hydrocephalus. ORBITS: The visualized portion of the orbits demonstrate no acute abnormality. SINUSES: The visualized paranasal sinuses and mastoid air cells demonstrate no acute abnormality. SOFT TISSUES/SKULL:  No acute abnormality of the visualized skull or soft tissues. No acute intracranial abnormality. Evidence of prior infarct in the territory of the right middle cerebral artery.      XR CHEST PORTABLE    Result Date: 8/16/2022  EXAMINATION: ONE XRAY VIEW OF THE CHEST 8/16/2022 7:52 pm COMPARISON: 12/06/2021 HISTORY: ORDERING SYSTEM PROVIDED HISTORY: Acute encephalopathy TECHNOLOGIST PROVIDED HISTORY: Reason for exam:->Acute encephalopathy Reason for Exam: Acute encephalopathy Additional signs and symptoms: NA Relevant Medical/Surgical History: NA Initial evaluation FINDINGS: Monitor wires overlie the chest.  The patient has a stimulator pack overlying the left chest with the lead in the left side of the neck. The trachea is midline. The cardiac silhouette is unremarkable. The left lung is clear. There is pleural thickening or fluid along the right lung. Minimal scarring in the right lung base. No obvious superimposed acute process. Suspected scarring in the right lung base. Pleural thickening versus mild pleural fluid. No other abnormality in the lung fields.        Electronically signed by Daina Aase, MD on 8/18/2022 at 1:22 PM

## 2022-08-19 LAB
ANION GAP SERPL CALCULATED.3IONS-SCNC: 9 MMOL/L (ref 4–16)
BASOPHILS ABSOLUTE: 0 K/CU MM
BASOPHILS RELATIVE PERCENT: 0.7 % (ref 0–1)
BUN BLDV-MCNC: 13 MG/DL (ref 6–23)
CALCIUM SERPL-MCNC: 8.9 MG/DL (ref 8.3–10.6)
CHLORIDE BLD-SCNC: 107 MMOL/L (ref 99–110)
CO2: 23 MMOL/L (ref 21–32)
CREAT SERPL-MCNC: 0.4 MG/DL (ref 0.9–1.3)
DIFFERENTIAL TYPE: ABNORMAL
EOSINOPHILS ABSOLUTE: 0.1 K/CU MM
EOSINOPHILS RELATIVE PERCENT: 1.7 % (ref 0–3)
GFR AFRICAN AMERICAN: >60 ML/MIN/1.73M2
GFR NON-AFRICAN AMERICAN: >60 ML/MIN/1.73M2
GLUCOSE BLD-MCNC: 100 MG/DL (ref 70–99)
HCT VFR BLD CALC: 42.8 % (ref 42–52)
HEMOGLOBIN: 14.1 GM/DL (ref 13.5–18)
IMMATURE NEUTROPHIL %: 0.5 % (ref 0–0.43)
LYMPHOCYTES ABSOLUTE: 1.6 K/CU MM
LYMPHOCYTES RELATIVE PERCENT: 26.5 % (ref 24–44)
MCH RBC QN AUTO: 30.7 PG (ref 27–31)
MCHC RBC AUTO-ENTMCNC: 32.9 % (ref 32–36)
MCV RBC AUTO: 93.2 FL (ref 78–100)
MONOCYTES ABSOLUTE: 0.9 K/CU MM
MONOCYTES RELATIVE PERCENT: 15.1 % (ref 0–4)
NUCLEATED RBC %: 0 %
PDW BLD-RTO: 13.3 % (ref 11.7–14.9)
PLATELET # BLD: 180 K/CU MM (ref 140–440)
PMV BLD AUTO: 10.9 FL (ref 7.5–11.1)
POTASSIUM SERPL-SCNC: 4.3 MMOL/L (ref 3.5–5.1)
RBC # BLD: 4.59 M/CU MM (ref 4.6–6.2)
SEGMENTED NEUTROPHILS ABSOLUTE COUNT: 3.4 K/CU MM
SEGMENTED NEUTROPHILS RELATIVE PERCENT: 55.5 % (ref 36–66)
SODIUM BLD-SCNC: 139 MMOL/L (ref 135–145)
TOTAL IMMATURE NEUTOROPHIL: 0.03 K/CU MM
TOTAL NUCLEATED RBC: 0 K/CU MM
WBC # BLD: 6 K/CU MM (ref 4–10.5)

## 2022-08-19 PROCEDURE — 6360000002 HC RX W HCPCS: Performed by: NURSE PRACTITIONER

## 2022-08-19 PROCEDURE — 6370000000 HC RX 637 (ALT 250 FOR IP): Performed by: NURSE PRACTITIONER

## 2022-08-19 PROCEDURE — 92526 ORAL FUNCTION THERAPY: CPT

## 2022-08-19 PROCEDURE — 94761 N-INVAS EAR/PLS OXIMETRY MLT: CPT

## 2022-08-19 PROCEDURE — 36415 COLL VENOUS BLD VENIPUNCTURE: CPT

## 2022-08-19 PROCEDURE — 2580000003 HC RX 258: Performed by: NURSE PRACTITIONER

## 2022-08-19 PROCEDURE — 80048 BASIC METABOLIC PNL TOTAL CA: CPT

## 2022-08-19 PROCEDURE — 6370000000 HC RX 637 (ALT 250 FOR IP): Performed by: INTERNAL MEDICINE

## 2022-08-19 PROCEDURE — 85025 COMPLETE CBC W/AUTO DIFF WBC: CPT

## 2022-08-19 PROCEDURE — 1200000000 HC SEMI PRIVATE

## 2022-08-19 RX ADMIN — AMLODIPINE BESYLATE 10 MG: 10 TABLET ORAL at 08:32

## 2022-08-19 RX ADMIN — ATORVASTATIN CALCIUM 80 MG: 40 TABLET, FILM COATED ORAL at 21:15

## 2022-08-19 RX ADMIN — DICYCLOMINE HYDROCHLORIDE 10 MG: 10 CAPSULE ORAL at 21:15

## 2022-08-19 RX ADMIN — DIVALPROEX SODIUM 500 MG: 500 TABLET, DELAYED RELEASE ORAL at 08:32

## 2022-08-19 RX ADMIN — LACTULOSE 20 G: 10 SOLUTION ORAL at 21:15

## 2022-08-19 RX ADMIN — CLOPIDOGREL BISULFATE 75 MG: 75 TABLET ORAL at 08:33

## 2022-08-19 RX ADMIN — CLONAZEPAM 0.25 MG: 0.5 TABLET ORAL at 08:32

## 2022-08-19 RX ADMIN — LACOSAMIDE 200 MG: 100 TABLET, FILM COATED ORAL at 08:32

## 2022-08-19 RX ADMIN — TAMSULOSIN HYDROCHLORIDE 0.4 MG: 0.4 CAPSULE ORAL at 08:33

## 2022-08-19 RX ADMIN — FOLIC ACID 1 MG: 1 TABLET ORAL at 08:33

## 2022-08-19 RX ADMIN — DICYCLOMINE HYDROCHLORIDE 10 MG: 10 CAPSULE ORAL at 12:06

## 2022-08-19 RX ADMIN — LEVOTHYROXINE SODIUM 125 MCG: 0.12 TABLET ORAL at 05:20

## 2022-08-19 RX ADMIN — PRIMIDONE 50 MG: 50 TABLET ORAL at 21:15

## 2022-08-19 RX ADMIN — DICYCLOMINE HYDROCHLORIDE 10 MG: 10 CAPSULE ORAL at 18:07

## 2022-08-19 RX ADMIN — ZONISAMIDE 300 MG: 100 CAPSULE ORAL at 21:15

## 2022-08-19 RX ADMIN — QUETIAPINE 50 MG: 50 TABLET, EXTENDED RELEASE ORAL at 21:16

## 2022-08-19 RX ADMIN — PRIMIDONE 50 MG: 50 TABLET ORAL at 08:32

## 2022-08-19 RX ADMIN — PAROXETINE HYDROCHLORIDE 20 MG: 20 TABLET, FILM COATED ORAL at 08:32

## 2022-08-19 RX ADMIN — LACOSAMIDE 200 MG: 100 TABLET, FILM COATED ORAL at 21:15

## 2022-08-19 RX ADMIN — ASPIRIN 81 MG: 81 TABLET, COATED ORAL at 08:32

## 2022-08-19 RX ADMIN — LACTULOSE 20 G: 10 SOLUTION ORAL at 08:31

## 2022-08-19 RX ADMIN — DIVALPROEX SODIUM 1000 MG: 500 TABLET, DELAYED RELEASE ORAL at 21:15

## 2022-08-19 RX ADMIN — DICYCLOMINE HYDROCHLORIDE 10 MG: 10 CAPSULE ORAL at 05:20

## 2022-08-19 RX ADMIN — ZONISAMIDE 100 MG: 100 CAPSULE ORAL at 08:32

## 2022-08-19 RX ADMIN — ENOXAPARIN SODIUM 40 MG: 100 INJECTION SUBCUTANEOUS at 08:31

## 2022-08-19 RX ADMIN — PRIMIDONE 50 MG: 50 TABLET ORAL at 15:04

## 2022-08-19 RX ADMIN — TIMOLOL MALEATE 1 DROP: 5 SOLUTION OPHTHALMIC at 08:35

## 2022-08-19 RX ADMIN — SODIUM CHLORIDE, PRESERVATIVE FREE 10 ML: 5 INJECTION INTRAVENOUS at 08:33

## 2022-08-19 ASSESSMENT — PAIN SCALES - GENERAL
PAINLEVEL_OUTOF10: 0

## 2022-08-19 ASSESSMENT — PAIN - FUNCTIONAL ASSESSMENT
PAIN_FUNCTIONAL_ASSESSMENT: ACTIVITIES ARE NOT PREVENTED
PAIN_FUNCTIONAL_ASSESSMENT: ACTIVITIES ARE NOT PREVENTED

## 2022-08-19 ASSESSMENT — PAIN DESCRIPTION - ORIENTATION
ORIENTATION: OTHER (COMMENT)
ORIENTATION: OTHER (COMMENT)

## 2022-08-19 ASSESSMENT — PAIN DESCRIPTION - LOCATION
LOCATION: OTHER (COMMENT)
LOCATION: OTHER (COMMENT)

## 2022-08-19 ASSESSMENT — PAIN SCALES - WONG BAKER: WONGBAKER_NUMERICALRESPONSE: 0

## 2022-08-19 ASSESSMENT — PAIN DESCRIPTION - DESCRIPTORS
DESCRIPTORS: OTHER (COMMENT)
DESCRIPTORS: OTHER (COMMENT)

## 2022-08-19 NOTE — PROGRESS NOTES
[id number] [place] OF SPEECH/LANGUAGE PATHOLOGY  DAILY PROGRESS NOTE  Mis Johnson  8/19/2022  [id number]  Generalized weakness [R53.1]  Acute encephalopathy [G93.40]  Intermittent confusion [R41.0]  No Known Allergies      Pt was seen this date for dysphagia treatment. IMPRESSION AND RECOMMENDATIONS: Mis Johnson was seen for dysphagia follow-up seated upright in bed, alert, cooperative. Dentures placed for participation. Reviewed aspiration precautions/strategies and pt verbalized understanding. He accepted PO trials of thin liquids via cup/straw and bite-sized solids. Oral stage characterized by intact labial seal, slow/adequate mastication, adequate clearance. Mastication is greatly improved with denture placement. Pharyngeal stage appears at baseline without s/s aspiration. No cues required for pt to implement recommended strategies. Recommend soft and bite-sized diet and thin liquids. Ensure dentures are placed for meals, position him upright, and cue for small bites/slow rate of intake as needed. No further acute SLP needs identified as pt appears at baseline. GOALS (current status in bold):  Short-term Goals  Timeframe for Short-term Goals: length of admission  Goal 1: Pt will tolerate soft and bite-sized diet/thin liquids with adequate oral manipulation/clearance and no s/s aspiration. Goal met  Goal 2: Pt will follow recommended precautions 90% given min cues. Goal met  Goal 3: Caregivers will indicate understanding of all recommendations.  Meeting      EDUCATION: recommendations, plan    PAIN RATING (0-10 Scale): 0/denies  Time in/Time out: SLP Individual Minutes  Time In: 0900  Time Out: 2808  Minutes: 15    Visit number: [id number] [facility]  8/19/2022  9:46 AM

## 2022-08-19 NOTE — PROGRESS NOTES
home  Expected Disposition: new [facility] or higher level of care per sister request  Estimated Date of Discharge: 8/19- 8/20  Patient requires continued admission -medically ready, Group home need a day to get staff. Surrogate Decision Maker/ POA      Subjective:     Laying in bed not in acute distress. Awake alert nonfocal.  Reported one large BM today. Tolerating p.o. diet. Complains of abd pain. Spoke to sister about this status. Explained that pt is medically stable and that his diarrhea has improved. Explained that it is normal with lactulose to have BM. Family has concern for stroke but has no new focal deficits, no dizziness and no n/v or vision changes. Unable to get MRI as pt has stimulator. Per neurology office notes, pt has been having worsening cognitive and behavior issues for the past 1-2 years. Denies lightheadedness, dizziness, fever, night sweats, chills, chest pain, cough, dyspnea, palpitations, abd pain, nausea, vomiting,  dysuria. Review of Systems:      Negative except above  Objective:   No intake or output data in the 24 hours ending 08/19/22 1633     Vitals:   Vitals:    08/19/22 0238   BP: 134/63   Pulse: 59   Resp:    Temp: 97.5 °F (36.4 °C)   SpO2: 98%       Physical Exam:     General: NAD  Eyes: EOMI  ENT: neck supple  Cardiovascular: Regular rate. Respiratory: Clear to auscultation  Gastrointestinal: Soft, non tender  Genitourinary: no suprapubic tenderness  Musculoskeletal: No edema  Skin: warm, dry  Neuro: Alert. Left side weaker than right chronic. Altered mentation due to CP but able to answer simple questions. Following all commands  Psych: Mood appropriate.      Medications:   Medications:    amLODIPine  10 mg Oral Daily    aspirin  81 mg Oral Daily    atorvastatin  80 mg Oral Nightly    clonazePAM  0.25 mg Oral Daily    clopidogrel  75 mg Oral Daily    dicyclomine  10 mg Oral 4x Daily AC & HS    divalproex  500 mg Oral Daily    divalproex  1,000 mg Oral Nightly    folic acid  1 mg Oral Daily    lacosamide  200 mg Oral BID    levothyroxine  125 mcg Oral Daily    PARoxetine  20 mg Oral QAM    primidone  50 mg Oral TID    QUEtiapine  50 mg Oral Nightly    tamsulosin  0.4 mg Oral Daily    timolol  1 drop Both Eyes Daily    zonisamide  100 mg Oral Daily    zonisamide  300 mg Oral Nightly    sodium chloride flush  5-40 mL IntraVENous 2 times per day    enoxaparin  40 mg SubCUTAneous Daily    lactulose  20 g Oral TID      Infusions:    sodium chloride       PRN Meds: sodium chloride flush, 10 mL, PRN  sodium chloride, , PRN  ondansetron, 4 mg, Q8H PRN   Or  ondansetron, 4 mg, Q6H PRN  polyethylene glycol, 17 g, Daily PRN  nicotine polacrilex, 2 mg, Q1H PRN  acetaminophen, 650 mg, Q6H PRN   Or  acetaminophen, 650 mg, Q6H PRN      Labs      Recent Results (from the past 24 hour(s))   CBC with Auto Differential    Collection Time: 08/19/22 10:43 AM   Result Value Ref Range    WBC 6.0 4.0 - 10.5 K/CU MM    RBC 4.59 (L) 4.6 - 6.2 M/CU MM    Hemoglobin 14.1 13.5 - 18.0 GM/DL    Hematocrit 42.8 42 - 52 %    MCV 93.2 78 - 100 FL    MCH 30.7 27 - 31 PG    MCHC 32.9 32.0 - 36.0 %    RDW 13.3 11.7 - 14.9 %    Platelets 217 608 - 264 K/CU MM    MPV 10.9 7.5 - 11.1 FL    Differential Type AUTOMATED DIFFERENTIAL     Segs Relative 55.5 36 - 66 %    Lymphocytes % 26.5 24 - 44 %    Monocytes % 15.1 (H) 0 - 4 %    Eosinophils % 1.7 0 - 3 %    Basophils % 0.7 0 - 1 %    Segs Absolute 3.4 K/CU MM    Lymphocytes Absolute 1.6 K/CU MM    Monocytes Absolute 0.9 K/CU MM    Eosinophils Absolute 0.1 K/CU MM    Basophils Absolute 0.0 K/CU MM    Nucleated RBC % 0.0 %    Total Nucleated RBC 0.0 K/CU MM    Total Immature Neutrophil 0.03 K/CU MM    Immature Neutrophil % 0.5 (H) 0 - 0.43 %   Basic Metabolic Panel w/ Reflex to MG    Collection Time: 08/19/22 10:43 AM   Result Value Ref Range    Sodium 139 135 - 145 MMOL/L    Potassium 4.3 3.5 - 5.1 MMOL/L    Chloride 107 99 - 110 mMol/L    CO2 23 21 - 32 MMOL/L    Anion Gap 9 4 - 16    BUN 13 6 - 23 MG/DL    Creatinine 0.4 (L) 0.9 - 1.3 MG/DL    Glucose 100 (H) 70 - 99 MG/DL    Calcium 8.9 8.3 - 10.6 MG/DL    GFR Non-African American >60 >60 mL/min/1.73m2    GFR African American >60 >60 mL/min/1.73m2        Imaging/Diagnostics Last 24 Hours   CT Head WO Contrast    Result Date: 8/16/2022  EXAMINATION: CT OF THE HEAD WITHOUT CONTRAST  8/16/2022 1:38 pm TECHNIQUE: CT of the head was performed without the administration of intravenous contrast. Automated exposure control, iterative reconstruction, and/or weight based adjustment of the mA/kV was utilized to reduce the radiation dose to as low as reasonably achievable. COMPARISON: None. HISTORY: ORDERING SYSTEM PROVIDED HISTORY: altered mental status TECHNOLOGIST PROVIDED HISTORY: Reason for exam:->altered mental status Has a \"code stroke\" or \"stroke alert\" been called? ->No Decision Support Exception - unselect if not a suspected or confirmed emergency medical condition->Emergency Medical Condition (MA) Reason for Exam: altered mental status FINDINGS: BRAIN/VENTRICLES: There is a stable old right frontal, parietal and temporal infarct, with associated dilatation of the right lateral ventricle there is no acute intracranial hemorrhage, mass effect or midline shift. No abnormal extra-axial fluid collection. The gray-white differentiation is maintained without evidence of an acute infarct. There is no evidence of hydrocephalus. ORBITS: The visualized portion of the orbits demonstrate no acute abnormality. SINUSES: The visualized paranasal sinuses and mastoid air cells demonstrate no acute abnormality. SOFT TISSUES/SKULL:  No acute abnormality of the visualized skull or soft tissues. No acute intracranial abnormality. Evidence of prior infarct in the territory of the right middle cerebral artery.      XR CHEST PORTABLE    Result Date: 8/16/2022  EXAMINATION: ONE XRAY VIEW OF THE CHEST 8/16/2022 7:52 pm COMPARISON: 12/06/2021 HISTORY: ORDERING SYSTEM PROVIDED HISTORY: Acute encephalopathy TECHNOLOGIST PROVIDED HISTORY: Reason for exam:->Acute encephalopathy Reason for Exam: Acute encephalopathy Additional signs and symptoms: NA Relevant Medical/Surgical History: NA Initial evaluation FINDINGS: Monitor wires overlie the chest.  The patient has a stimulator pack overlying the left chest with the lead in the left side of the neck. The trachea is midline. The cardiac silhouette is unremarkable. The left lung is clear. There is pleural thickening or fluid along the right lung. Minimal scarring in the right lung base. No obvious superimposed acute process. Suspected scarring in the right lung base. Pleural thickening versus mild pleural fluid. No other abnormality in the lung fields.        Electronically signed by Cathie Martel MD on 8/19/2022 at 4:33 PM

## 2022-08-20 VITALS
WEIGHT: 196.2 LBS | SYSTOLIC BLOOD PRESSURE: 122 MMHG | RESPIRATION RATE: 18 BRPM | BODY MASS INDEX: 22.7 KG/M2 | HEART RATE: 60 BPM | OXYGEN SATURATION: 99 % | DIASTOLIC BLOOD PRESSURE: 61 MMHG | TEMPERATURE: 97.7 F | HEIGHT: 78 IN

## 2022-08-20 PROCEDURE — 6360000002 HC RX W HCPCS: Performed by: NURSE PRACTITIONER

## 2022-08-20 PROCEDURE — 94761 N-INVAS EAR/PLS OXIMETRY MLT: CPT

## 2022-08-20 PROCEDURE — 6370000000 HC RX 637 (ALT 250 FOR IP): Performed by: NURSE PRACTITIONER

## 2022-08-20 PROCEDURE — 6370000000 HC RX 637 (ALT 250 FOR IP): Performed by: INTERNAL MEDICINE

## 2022-08-20 PROCEDURE — 1200000000 HC SEMI PRIVATE

## 2022-08-20 RX ORDER — DIVALPROEX SODIUM 500 MG/1
1000 TABLET, DELAYED RELEASE ORAL NIGHTLY
Qty: 90 TABLET | Refills: 3 | Status: SHIPPED | OUTPATIENT
Start: 2022-08-20

## 2022-08-20 RX ORDER — DIVALPROEX SODIUM 500 MG/1
500 TABLET, DELAYED RELEASE ORAL DAILY
Qty: 90 TABLET | Refills: 3 | Status: SHIPPED | OUTPATIENT
Start: 2022-08-21

## 2022-08-20 RX ADMIN — PAROXETINE HYDROCHLORIDE 20 MG: 20 TABLET, FILM COATED ORAL at 09:13

## 2022-08-20 RX ADMIN — LACTULOSE 20 G: 10 SOLUTION ORAL at 09:14

## 2022-08-20 RX ADMIN — ZONISAMIDE 100 MG: 100 CAPSULE ORAL at 09:15

## 2022-08-20 RX ADMIN — DICYCLOMINE HYDROCHLORIDE 10 MG: 10 CAPSULE ORAL at 11:36

## 2022-08-20 RX ADMIN — FOLIC ACID 1 MG: 1 TABLET ORAL at 11:36

## 2022-08-20 RX ADMIN — CLOPIDOGREL BISULFATE 75 MG: 75 TABLET ORAL at 09:16

## 2022-08-20 RX ADMIN — ASPIRIN 81 MG: 81 TABLET, COATED ORAL at 09:15

## 2022-08-20 RX ADMIN — LEVOTHYROXINE SODIUM 125 MCG: 0.12 TABLET ORAL at 06:10

## 2022-08-20 RX ADMIN — DIVALPROEX SODIUM 500 MG: 500 TABLET, DELAYED RELEASE ORAL at 09:15

## 2022-08-20 RX ADMIN — DICYCLOMINE HYDROCHLORIDE 10 MG: 10 CAPSULE ORAL at 06:10

## 2022-08-20 RX ADMIN — CLONAZEPAM 0.25 MG: 0.5 TABLET ORAL at 09:15

## 2022-08-20 RX ADMIN — AMLODIPINE BESYLATE 10 MG: 10 TABLET ORAL at 09:16

## 2022-08-20 RX ADMIN — TIMOLOL MALEATE 1 DROP: 5 SOLUTION OPHTHALMIC at 09:16

## 2022-08-20 RX ADMIN — ENOXAPARIN SODIUM 40 MG: 100 INJECTION SUBCUTANEOUS at 09:12

## 2022-08-20 RX ADMIN — LACOSAMIDE 200 MG: 100 TABLET, FILM COATED ORAL at 09:13

## 2022-08-20 RX ADMIN — TAMSULOSIN HYDROCHLORIDE 0.4 MG: 0.4 CAPSULE ORAL at 09:15

## 2022-08-20 RX ADMIN — PRIMIDONE 50 MG: 50 TABLET ORAL at 09:14

## 2022-08-20 ASSESSMENT — PAIN SCALES - GENERAL: PAINLEVEL_OUTOF10: 0

## 2022-08-20 NOTE — PROGRESS NOTES
V2.0  [facility]      Name:  Lizbeth Mendoza /Age/Sex: 1955  (78 y.o. male)   MRN & CSN:  [id number] & [id number] Encounter Date/Time: 2022 1:22 PM EDT    Location:  [id number]/7499-G PCP: Alexandre Carlos, [facility] Day: 5    Assessment and Plan:     Lizbeth Mendoza is a 78 y.o. male   with past medical history of CVA, cerebral palsy, epilepsy, depression, frequent falls, IBS who presents with Acute encephalopathy. In the ER patient/sister reported intermittent confusion spells. Patient was oriented x3 in the ER        Plan:  Acute encephalopathy - improving, with intermittent confusion for the last few days along with spasms of left hand and fingers. CT head nonacute due to old infarct. Neurology following. Rule out seizures as patient has history of epilepsy per records. Today patient is oriented x3. Able to move all 4 extremities, appreciate neurology input, outpatient EEG. Continue current antiseizure medication  Debility. Due to generalized weakness. Declining ability to care for himself. Family requesting higher level of care, currently living in a group home. Plan for Home health. PT/OT following  History of seizure. On Keppra and Vimpat and zonisamide. Seizure precaution. Neurology consulted; no new recs. Hypertension blood pressure well controlled. History of ischemic stroke  On aspirin Plavix statin. Glaucoma. Continue eyedrops  Hypothyroidism. On Synthroid  Depression and anxiety. Klonopin Paxil and Seroquel  History of cerebral palsy per records  History IBS. Recently tested negative for C. difficile last week per report. No diarrhea documented, discussed with RN yesterday and again today at bedside. Likely self resolved. On lactulose as well. Diet ADULT DIET;  Dysphagia - Soft and Bite Sized   DVT Prophylaxis [x] Lovenox, []  Heparin, [] SCDs, [] Ambulation,  [] Eliquis, [] Xarelto  [] Coumadin   Code Status Full Code   Disposition From: Group home  Expected Disposition: new [address] or higher level of care per sister request  Estimated Date of Discharge: 8/19- 8/20  Patient requires continued admission -medically ready ; awaiting placement   Surrogate Decision Maker/ POA      Subjective:     Pt has no complaints or concerns. Denies lightheadedness, dizziness, fever, night sweats, chills, chest pain, cough, dyspnea, palpitations, abd pain, nausea, vomiting, dysuria. Review of Systems:      Negative except above  Objective: Intake/Output Summary (Last 24 hours) at 8/20/2022 1151  Last data filed at 8/20/2022 0355  Gross per 24 hour   Intake --   Output 920 ml   Net -920 ml        Vitals:   Vitals:    08/20/22 0200   BP: 119/64   Pulse: 53   Resp: 18   Temp: 97.2 °F (36.2 °C)   SpO2: 97%       Physical Exam:     General: NAD  Eyes: EOMI  ENT: neck supple  Cardiovascular: Regular rate. Respiratory: Clear to auscultation  Gastrointestinal: Soft, non tender  Genitourinary: no suprapubic tenderness  Musculoskeletal: No edema  Skin: warm, dry  Neuro: Alert. Left side weaker than right chronic. Following all commands  Psych: Mood appropriate.      Medications:   Medications:    amLODIPine  10 mg Oral Daily    aspirin  81 mg Oral Daily    atorvastatin  80 mg Oral Nightly    clonazePAM  0.25 mg Oral Daily    clopidogrel  75 mg Oral Daily    dicyclomine  10 mg Oral 4x Daily AC & HS    divalproex  500 mg Oral Daily    divalproex  1,000 mg Oral Nightly    folic acid  1 mg Oral Daily    lacosamide  200 mg Oral BID    levothyroxine  125 mcg Oral Daily    PARoxetine  20 mg Oral QAM    primidone  50 mg Oral TID    QUEtiapine  50 mg Oral Nightly    tamsulosin  0.4 mg Oral Daily    timolol  1 drop Both Eyes Daily    zonisamide  100 mg Oral Daily    zonisamide  300 mg Oral Nightly    sodium chloride flush  5-40 mL IntraVENous 2 times per day    enoxaparin  40 mg SubCUTAneous Daily    lactulose  20 g Oral TID      Infusions:    sodium chloride       PRN Meds: sodium chloride flush, 10 mL, PRN  sodium chloride, , PRN  ondansetron, 4 mg, Q8H PRN   Or  ondansetron, 4 mg, Q6H PRN  polyethylene glycol, 17 g, Daily PRN  nicotine polacrilex, 2 mg, Q1H PRN  acetaminophen, 650 mg, Q6H PRN   Or  acetaminophen, 650 mg, Q6H PRN      Labs      No results found for this or any previous visit (from the past 24 hour(s)). Imaging/Diagnostics Last 24 Hours   CT Head WO Contrast    Result Date: 8/16/2022  EXAMINATION: CT OF THE HEAD WITHOUT CONTRAST  8/16/2022 1:38 pm TECHNIQUE: CT of the head was performed without the administration of intravenous contrast. Automated exposure control, iterative reconstruction, and/or weight based adjustment of the mA/kV was utilized to reduce the radiation dose to as low as reasonably achievable. COMPARISON: None. HISTORY: ORDERING SYSTEM PROVIDED HISTORY: altered mental status TECHNOLOGIST PROVIDED HISTORY: Reason for exam:->altered mental status Has a \"code stroke\" or \"stroke alert\" been called? ->No Decision Support Exception - unselect if not a suspected or confirmed emergency medical condition->Emergency Medical Condition (MA) Reason for Exam: altered mental status FINDINGS: BRAIN/VENTRICLES: There is a stable old right frontal, parietal and temporal infarct, with associated dilatation of the right lateral ventricle there is no acute intracranial hemorrhage, mass effect or midline shift. No abnormal extra-axial fluid collection. The gray-white differentiation is maintained without evidence of an acute infarct. There is no evidence of hydrocephalus. ORBITS: The visualized portion of the orbits demonstrate no acute abnormality. SINUSES: The visualized paranasal sinuses and mastoid air cells demonstrate no acute abnormality. SOFT TISSUES/SKULL:  No acute abnormality of the visualized skull or soft tissues. No acute intracranial abnormality. Evidence of prior infarct in the territory of the right middle cerebral artery.      XR CHEST PORTABLE    Result Date: 8/16/2022  EXAMINATION: ONE XRAY VIEW OF THE CHEST 8/16/2022 7:52 pm COMPARISON: 12/06/2021 HISTORY: ORDERING SYSTEM PROVIDED HISTORY: Acute encephalopathy TECHNOLOGIST PROVIDED HISTORY: Reason for exam:->Acute encephalopathy Reason for Exam: Acute encephalopathy Additional signs and symptoms: NA Relevant Medical/Surgical History: NA Initial evaluation FINDINGS: Monitor wires overlie the chest.  The patient has a stimulator pack overlying the left chest with the lead in the left side of the neck. The trachea is midline. The cardiac silhouette is unremarkable. The left lung is clear. There is pleural thickening or fluid along the right lung. Minimal scarring in the right lung base. No obvious superimposed acute process. Suspected scarring in the right lung base. Pleural thickening versus mild pleural fluid. No other abnormality in the lung fields.        Electronically signed by River Carranza MD on 8/20/2022 at 11:51 AM

## 2022-08-20 NOTE — PROGRESS NOTES
Discharge instructions reviewed with pt. and pt's mother including medications and follow up appointments. Instructions explained in detail. Report called to Thalia at the group home where patient lives. Pt left with belongings. Pt was escorted to [facility] where sister was awaiting to take pt to group home. Sister had questions so RN brought pt back to floor. Pt's RN was involved with another pt so charge nurse spoke with pt's sister and answered all questions. Pt escorted back to [facility].

## 2022-08-20 NOTE — DISCHARGE SUMMARY
Discharge Summary    Name:  Monika Read /Age/Sex: 1955  [facility]78 [facility] male)   MRN & CSN:  [id number] & [id number] Admission Date/Time: 2022 12:56 PM   Attending:  No att. providers found Discharging Physician: Claudeen Brill, MD     Hospital Course:   Monika Read is a 78 y.o. male   with past medical history of CVA, cerebral palsy, epilepsy, depression, frequent falls, IBS who presents with Acute encephalopathy. In the ER patient/sister reported intermittent confusion spells. Patient was oriented x3 in the ER     The following problems have been addressed during this hospitalization. Acute encephalopathy - improving, with intermittent confusion for the few days prior to admission along with spasms of left hand and fingers. CT head nonacute due to old infarct. Neurology was following. Patient is oriented x3. Able to move all 4 extremities, appreciate neurology input, outpatient EEG. Continue current antiseizure medication  Debility. Due to generalized weakness. Declining ability to care for himself. Family requesting higher level of care, currently living in a group home. Plan for Home health. PT/OT was consulted  History of seizure. On Keppra and Vimpat and zonisamide. Seizure precaution. Neurology consulted; no new recs; no medication changes. History IBS. Recently tested negative for C. difficile last week per report.  has 2 loose BM daily but pt is on laculose     Physical Exam  General: NAD  Eyes: EOMI  ENT: neck supple  Cardiovascular: Regular rate. Respiratory: Clear to auscultation  Gastrointestinal: Soft, non tender  Genitourinary: no suprapubic tenderness  Musculoskeletal: No edema  Skin: warm, dry  Neuro: Alert. Left side weaker than right chronic. Following all commands  Psych: Mood appropriate. The patient expressed appropriate understanding of and agreement with the discharge recommendations, medications, and plan.      Consults this admission:  IP CONSULT TO HOSPITALIST  IP CONSULT TO NEUROLOGY  IP CONSULT TO HOME CARE NEEDS      Discharge Instruction:   Handoff to PCP:     Follow up appointments: neurology, PCP    Primary care physician: Abiel Plummer PA-C      Diet:  regular diet   Activity: activity as tolerated  Disposition: Discharged to:   []Home, []HHC, []SNF, []Acute Rehab, [x]Group Home  Condition on discharge: Stable    Discharge Medications:        Medication List        CHANGE how you take these medications      acetaminophen 500 MG tablet  Commonly known as: APAP Extra Strength  Take 1 tablet by mouth every 6 hours as needed for Pain  What changed: Another medication with the same name was removed. Continue taking this medication, and follow the directions you see here. * divalproex 500 MG DR tablet  Commonly known as: DEPAKOTE  Take 2 tablets by mouth nightly  What changed:   Another medication with the same name was changed. Make sure you understand how and when to take each. Another medication with the same name was removed. Continue taking this medication, and follow the directions you see here. * divalproex 500 MG DR tablet  Commonly known as: DEPAKOTE  Take 1 tablet by mouth daily  What changed:   See the new instructions. Another medication with the same name was removed. Continue taking this medication, and follow the directions you see here. * This list has 2 medication(s) that are the same as other medications prescribed for you. Read the directions carefully, and ask your doctor or other care provider to review them with you. CONTINUE taking these medications      acidophilus Caps capsule     albuterol sulfate  (90 Base) MCG/ACT inhaler  Commonly known as: Proventil HFA  Inhale 2 puffs into the lungs every 4 hours as needed for Wheezing or Shortness of Breath With spacer (and mask if indicated). Thanks.      amLODIPine 10 MG tablet  Commonly known as: NORVASC  Take 1 tablet by mouth daily     aspirin 81 MG EC tablet  Take 1 tablet by mouth daily     atorvastatin 80 MG tablet  Commonly known as: LIPITOR  Take 1 tablet by mouth nightly     Benefiber Powd     clonazePAM 0.5 MG tablet  Commonly known as: KLONOPIN     clopidogrel 75 MG tablet  Commonly known as: PLAVIX  Take 1 tablet by mouth daily     dicyclomine 10 MG capsule  Commonly known as: Bentyl  Take 1 capsule by mouth 4 times daily (before meals and nightly)     ENULOSE PO     folic acid 1 MG tablet  Commonly known as: FOLVITE     lacosamide 200 MG tablet  Commonly known as: VIMPAT  Take 1 tablet by mouth 2 times daily for 120 days. latanoprost 0.005 % ophthalmic solution  Commonly known as: XALATAN     levothyroxine 125 MCG tablet  Commonly known as: SYNTHROID  Take 1 tablet by mouth Daily     MI-ACID GAS RELIEF PO     mirabegron 50 MG Tb24  Commonly known as: MYRBETRIQ     Mucinex 600 MG extended release tablet  Generic drug: guaiFENesin     muscle rub 10-15 % Crea cream     omeprazole 20 MG delayed release capsule  Commonly known as: PRILOSEC     PARoxetine 20 MG tablet  Commonly known as: PAXIL     primidone 50 MG tablet  Commonly known as:  MYSOLINE     psyllium 95 % Pack packet  Commonly known as: HYDROCIL  Take 1 packet by mouth 2 times daily     QUEtiapine 50 MG extended release tablet  Commonly known as: SEROQUEL XR     solifenacin 10 MG tablet  Commonly known as: VESICARE     tamsulosin 0.4 MG capsule  Commonly known as: FLOMAX  Take 1 capsule by mouth daily     timolol 0.5 % ophthalmic solution  Commonly known as: TIMOPTIC     Vitamin D3 25 MCG (1000 UT) Caps     zonisamide 100 MG capsule  Commonly known as: ZONEGRAN  Take 1 capsule in the morning and 3 capsules at bedtime            STOP taking these medications      Amitiza 24 MCG capsule  Generic drug: lubiprostone     magnesium hydroxide 400 MG/5ML suspension  Commonly known as: MILK OF MAGNESIA     pseudoephedrine 30 MG tablet  Commonly known as: SUDAFED     vancomycin 125 MG capsule  Commonly known as: VANCOCIN               Where to Get Your Medications        You can get these medications from any pharmacy    Bring a paper prescription for each of these medications  divalproex 500 MG DR tablet  divalproex 500 MG DR tablet         Objective Findings at Discharge:       BMP/CBC  No results for input(s): NA, K, CL, CO2, BUN, CREATININE, GLU, WBC, HEMOGLOBIN, HCT, PLT in the last 72 hours. Additional Information: Patient seen and examined day of discharge.  For more information regarding patient's care please contact [facility] records [phone]    Discharge Time of 35 minutes    Electronically signed by Danielle Varghese MD on 8/20/2022 at 8:50 PM

## 2022-08-20 NOTE — CARE COORDINATION
Patient placed on the weekend follow up list. Reviewed patient medical record. Therapy is recommending home with home care. Call to Patient legal guardian Rey Kumar. Patient discharge plan is to return to the group home and she will provide transportation. Call to the group home house supervisor and left a message. LSW notified that patient is medically stable for discharge. Call to the [place] [address] [facility].  informed [place] [address] that the doctor is ready to discharge the patient with home care. LSW faxed discharge paperwork to [address].

## 2022-08-24 NOTE — PROGRESS NOTES
Physician Progress Note      Jessica Tristan  CSN #:                  [id number]  :                       1955  ADMIT DATE:       2022 12:56 PM  [address] DATE:        2022 2:01 AM  RESPONDING  PROVIDER #:        Stanislav Sam MD          QUERY TEXT:    Pt admitted with seizure. Pt noted to have HX of CVA. If possible, please   document in progress notes and discharge summary if you are evaluating and/or   treating any of the following: The medical record reflects the following:  Risk Factors: H/O CVA  Clinical Indicators: Neurology documented \"Patients mental status change could   represent post ictal state given improvement noted today. \" Patient has past   H/O CVA and is on several antiepileptic medications. Treatment: Neurology consult, Head CT, Labs    Thank you,  Deborah Arguelles RN CDS Supervisor  [phone]  Options provided:  -- Seizure as a sequela of prior CVA  -- Other - I will add my own diagnosis  -- Disagree - Not applicable / Not valid  -- Disagree - Clinically unable to determine / Unknown  -- Refer to Clinical Documentation Reviewer    PROVIDER RESPONSE TEXT:    Provider is clinically unable to determine a response to this query.     Query created by: Rashad Calderon on 2022 7:23 AM      Electronically signed by:  Stanislav Sam MD 2022 7:26 AM

## 2022-08-31 NOTE — PROGRESS NOTES
Physician Progress Note      Lucy Camargo  CSN #:                  [id number]  :                       1955  ADMIT DATE:       2022 12:56 PM  [address] DATE:        2022 2:01 AM  RESPONDING  PROVIDER #:        Landy Emery MD          QUERY TEXT:    Pt admitted with acute encephalopathy. Pt noted to have HX of Seizures. If   possible, please document in progress notes and discharge summary the   relationship, if any, between acute encephalopathy and seizures. The medical record reflects the following:  Risk Factors: HX Seizures  Clinical Indicators: Documented intermittent confusion spells for a few days,   at group home was not following commands and answer questions appropriately,   Neurology documents that AMS could represent postictal state given improvement  Treatment: Keppra, vimpat, zonisamide, Neurology consult    Thank you,  Cristino Hook RN CDS Supervisor  [phone]  Options provided:  -- acute encephalopathy due to seizure  -- acute encephalopathy unrelated to seizure  -- Other - I will add my own diagnosis  -- Disagree - Not applicable / Not valid  -- Disagree - Clinically unable to determine / Unknown  -- Refer to Clinical Documentation Reviewer    PROVIDER RESPONSE TEXT:    Unknown etiology. Unable to determine. Please refer to neurology consult note.     Query created by: Vicky Mccain on 2022 7:03 AM      Electronically signed by:  Landy Emery MD 2022 9:26 AM

## 2022-09-06 ENCOUNTER — TELEPHONE (OUTPATIENT)
Dept: NEUROLOGY | Age: 67
End: 2022-09-06

## 2022-09-06 DIAGNOSIS — R56.9 SEIZURE (HCC): Primary | ICD-10-CM

## 2022-09-06 NOTE — TELEPHONE ENCOUNTER
Received call from pt caregiver Dino Worrell stating she needs to schedule hospital follow up and also that pt was supposed to have EEG. Consult note stated pt may benefit from ambulatory EEG. Did you want pt to have EEG then come in for office visit or just have follow up then determine whether to do EEG? Please advise.

## 2022-09-08 ENCOUNTER — HOSPITAL ENCOUNTER (OUTPATIENT)
Age: 67
Setting detail: SPECIMEN
Discharge: HOME | End: 2022-09-08
Payer: MEDICARE

## 2022-09-08 DIAGNOSIS — R56.9 SEIZURE (HCC): ICD-10-CM

## 2022-09-08 LAB — AMMONIA: 93 UMOL/L (ref 16–60)

## 2022-09-08 PROCEDURE — 82140 ASSAY OF AMMONIA: CPT

## 2022-09-08 RX ORDER — LACOSAMIDE 200 MG/1
TABLET ORAL
Qty: 60 TABLET | Refills: 1 | Status: SHIPPED | OUTPATIENT
Start: 2022-09-08 | End: 2022-10-08

## 2022-09-08 NOTE — TELEPHONE ENCOUNTER
I will put in an order for ambulatory EEG. It may be of benefit for him to have the appointment following the ambulatory EEG.

## 2022-09-08 NOTE — TELEPHONE ENCOUNTER
Patient is filling his last refill of this med, will need a new script put on file for October with refills.     Requested Prescriptions     Pending Prescriptions Disp Refills    lacosamide (VIMPAT) 200 MG tablet [Pharmacy Med Name: Lacosamide 200 MG Tablet] 60 tablet 5     Sig: (CONTROL CYCLE) TAKE 1 TABLET BY MOUTH TWICE DAILY FOR EPILEPSY

## 2022-09-21 NOTE — TELEPHONE ENCOUNTER
Patient's caregiver and facility prefers [facility] for all EEG procedures due to issues with [facility]. Sending both the routine and ambulatory EEG to that location.

## 2022-09-27 ENCOUNTER — HOSPITAL ENCOUNTER (OUTPATIENT)
Age: 67
Discharge: HOME | End: 2022-09-27
Payer: MEDICARE

## 2022-09-27 LAB
ALBUMIN SERPL-MCNC: 3.8 GM/DL (ref 3.4–5)
ALP BLD-CCNC: 115 IU/L (ref 40–129)
ALT SERPL-CCNC: 16 U/L (ref 10–40)
ANION GAP SERPL CALCULATED.3IONS-SCNC: 7 MMOL/L (ref 4–16)
AST SERPL-CCNC: 12 IU/L (ref 15–37)
BASOPHILS ABSOLUTE: 0 K/CU MM
BASOPHILS RELATIVE PERCENT: 0.5 % (ref 0–1)
BILIRUB SERPL-MCNC: 0.2 MG/DL (ref 0–1)
BUN BLDV-MCNC: 16 MG/DL (ref 6–23)
CALCIUM SERPL-MCNC: 9.2 MG/DL (ref 8.3–10.6)
CHLORIDE BLD-SCNC: 105 MMOL/L (ref 99–110)
CO2: 27 MMOL/L (ref 21–32)
CREAT SERPL-MCNC: 0.6 MG/DL (ref 0.9–1.3)
DIFFERENTIAL TYPE: ABNORMAL
EOSINOPHILS ABSOLUTE: 0.1 K/CU MM
EOSINOPHILS RELATIVE PERCENT: 1.5 % (ref 0–3)
FERRITIN: 59 NG/ML (ref 30–400)
GFR AFRICAN AMERICAN: >60 ML/MIN/1.73M2
GFR NON-AFRICAN AMERICAN: >60 ML/MIN/1.73M2
GLUCOSE FASTING: 89 MG/DL (ref 70–99)
HAV IGM SER IA-ACNC: NON REACTIVE
HCT VFR BLD CALC: 45.7 % (ref 42–52)
HEMOGLOBIN: 14.5 GM/DL (ref 13.5–18)
HEPATITIS B SURFACE ANTIGEN: NON REACTIVE
HEPATITIS C ANTIBODY: NON REACTIVE
IGA: 234 MG/DL (ref 69–382)
IGG,SERUM: 1463 MG/DL (ref 723–1685)
IGM,SERUM: 35 MG/DL (ref 62–277)
IMMATURE NEUTROPHIL %: 0.7 % (ref 0–0.43)
INR BLD: 0.93 INDEX
IRON: 72 UG/DL (ref 59–158)
LYMPHOCYTES ABSOLUTE: 1.2 K/CU MM
LYMPHOCYTES RELATIVE PERCENT: 19.6 % (ref 24–44)
MCH RBC QN AUTO: 30.9 PG (ref 27–31)
MCHC RBC AUTO-ENTMCNC: 31.7 % (ref 32–36)
MCV RBC AUTO: 97.4 FL (ref 78–100)
MONOCYTES ABSOLUTE: 0.8 K/CU MM
MONOCYTES RELATIVE PERCENT: 13.3 % (ref 0–4)
NUCLEATED RBC %: 0 %
PCT TRANSFERRIN: 28 % (ref 10–44)
PDW BLD-RTO: 13.7 % (ref 11.7–14.9)
PLATELET # BLD: 168 K/CU MM (ref 140–440)
PMV BLD AUTO: 12.1 FL (ref 7.5–11.1)
POTASSIUM SERPL-SCNC: 4.5 MMOL/L (ref 3.5–5.1)
PROTHROMBIN TIME: 12 SECONDS (ref 11.7–14.5)
RBC # BLD: 4.69 M/CU MM (ref 4.6–6.2)
SEGMENTED NEUTROPHILS ABSOLUTE COUNT: 3.9 K/CU MM
SEGMENTED NEUTROPHILS RELATIVE PERCENT: 64.4 % (ref 36–66)
SODIUM BLD-SCNC: 139 MMOL/L (ref 135–145)
TOTAL IMMATURE NEUTOROPHIL: 0.04 K/CU MM
TOTAL IRON BINDING CAPACITY: 253 UG/DL (ref 250–450)
TOTAL NUCLEATED RBC: 0 K/CU MM
TOTAL PROTEIN: 6.5 GM/DL (ref 6.4–8.2)
UNSATURATED IRON BINDING CAPACITY: 181 UG/DL (ref 110–370)
WBC # BLD: 6.1 K/CU MM (ref 4–10.5)

## 2022-09-27 PROCEDURE — 86803 HEPATITIS C AB TEST: CPT

## 2022-09-27 PROCEDURE — 86709 HEPATITIS A IGM ANTIBODY: CPT

## 2022-09-27 PROCEDURE — 83550 IRON BINDING TEST: CPT

## 2022-09-27 PROCEDURE — 87340 HEPATITIS B SURFACE AG IA: CPT

## 2022-09-27 PROCEDURE — 86038 ANTINUCLEAR ANTIBODIES: CPT

## 2022-09-27 PROCEDURE — 80053 COMPREHEN METABOLIC PANEL: CPT

## 2022-09-27 PROCEDURE — 82784 ASSAY IGA/IGD/IGG/IGM EACH: CPT

## 2022-09-27 PROCEDURE — 83540 ASSAY OF IRON: CPT

## 2022-09-27 PROCEDURE — 83516 IMMUNOASSAY NONANTIBODY: CPT

## 2022-09-27 PROCEDURE — 82104 ALPHA-1-ANTITRYPSIN PHENO: CPT

## 2022-09-27 PROCEDURE — 82728 ASSAY OF FERRITIN: CPT

## 2022-09-27 PROCEDURE — 36415 COLL VENOUS BLD VENIPUNCTURE: CPT

## 2022-09-27 PROCEDURE — 86256 FLUORESCENT ANTIBODY TITER: CPT

## 2022-09-27 PROCEDURE — 85610 PROTHROMBIN TIME: CPT

## 2022-09-27 PROCEDURE — 85025 COMPLETE CBC W/AUTO DIFF WBC: CPT

## 2022-09-27 PROCEDURE — 82390 ASSAY OF CERULOPLASMIN: CPT

## 2022-09-27 PROCEDURE — 82105 ALPHA-FETOPROTEIN SERUM: CPT

## 2022-09-28 ENCOUNTER — HOSPITAL ENCOUNTER (OUTPATIENT)
Age: 67
Setting detail: SPECIMEN
Discharge: HOME | End: 2022-09-28
Payer: MEDICARE

## 2022-09-28 LAB — AMMONIA: 32 UMOL/L (ref 16–60)

## 2022-09-28 PROCEDURE — 82140 ASSAY OF AMMONIA: CPT

## 2022-09-29 LAB
ANTI-MITOCHON TITER: 3.2 UNITS (ref 0–24.9)
ANTI-NUCLEAR ANTIBODY (ANA): NORMAL
F-ACTIN AB, IGG: 11 UNITS (ref 0–19)

## 2022-10-02 LAB
CERULOPLASMIN: 22 MG/DL (ref 15–30)
MS ALPHA-FETOPROTEIN: 3 NG/ML (ref 0–9)

## 2022-10-03 ENCOUNTER — HOSPITAL ENCOUNTER (OUTPATIENT)
Dept: ULTRASOUND IMAGING | Age: 67
Discharge: HOME | End: 2022-10-03
Payer: MEDICARE

## 2022-10-03 DIAGNOSIS — R79.89 ELEVATED LIVER FUNCTION TESTS: ICD-10-CM

## 2022-10-03 DIAGNOSIS — G80.9 CEREBRAL PALSY, UNSPECIFIED TYPE (HCC): ICD-10-CM

## 2022-10-03 LAB
ALPHA-1 ANTITRYPSIN PHENOTYPE: NORMAL
ALPHA-1 ANTITRYPSIN: 142 MG/DL (ref 90–200)

## 2022-10-03 PROCEDURE — 76705 ECHO EXAM OF ABDOMEN: CPT

## 2022-11-07 DIAGNOSIS — R56.9 SEIZURE (HCC): ICD-10-CM

## 2022-11-07 NOTE — TELEPHONE ENCOUNTER
Patient scheduled to come in 12/6, please refill med to at least hold them over.      Requested Prescriptions     Pending Prescriptions Disp Refills    lacosamide (VIMPAT) 200 MG tablet 60 tablet 1     Sig: (CONTROL CYCLE) TAKE 1 TABLET BY MOUTH TWICE DAILY FOR EPILEPSY

## 2022-11-09 RX ORDER — LACOSAMIDE 200 MG/1
TABLET ORAL
Qty: 60 TABLET | Refills: 1 | Status: SHIPPED | OUTPATIENT
Start: 2022-11-09 | End: 2023-11-09

## 2022-11-22 ENCOUNTER — HOSPITAL ENCOUNTER (OUTPATIENT)
Age: 67
Discharge: HOME | End: 2022-11-22
Payer: MEDICARE

## 2022-11-22 LAB — PROSTATE SPECIFIC ANTIGEN: 2.4 NG/ML (ref 0–4)

## 2022-11-22 PROCEDURE — G0103 PSA SCREENING: HCPCS

## 2022-11-22 PROCEDURE — 36415 COLL VENOUS BLD VENIPUNCTURE: CPT

## 2023-01-09 ENCOUNTER — OFFICE VISIT (OUTPATIENT)
Dept: NEUROLOGY | Age: 68
End: 2023-01-09
Payer: MEDICARE

## 2023-01-09 VITALS
OXYGEN SATURATION: 97 % | HEIGHT: 78 IN | BODY MASS INDEX: 22.7 KG/M2 | HEART RATE: 63 BPM | WEIGHT: 196.2 LBS | SYSTOLIC BLOOD PRESSURE: 118 MMHG | DIASTOLIC BLOOD PRESSURE: 66 MMHG

## 2023-01-09 DIAGNOSIS — G80.9 CEREBRAL PALSY, UNSPECIFIED TYPE (HCC): ICD-10-CM

## 2023-01-09 DIAGNOSIS — G25.0 ESSENTIAL TREMOR: ICD-10-CM

## 2023-01-09 DIAGNOSIS — R56.9 SEIZURE (HCC): Primary | ICD-10-CM

## 2023-01-09 DIAGNOSIS — I69.354 SPASTIC HEMIPLEGIA OF LEFT NONDOMINANT SIDE AS LATE EFFECT OF CEREBRAL INFARCTION (HCC): ICD-10-CM

## 2023-01-09 PROCEDURE — 3074F SYST BP LT 130 MM HG: CPT | Performed by: NURSE PRACTITIONER

## 2023-01-09 PROCEDURE — G8420 CALC BMI NORM PARAMETERS: HCPCS | Performed by: NURSE PRACTITIONER

## 2023-01-09 PROCEDURE — 3017F COLORECTAL CA SCREEN DOC REV: CPT | Performed by: NURSE PRACTITIONER

## 2023-01-09 PROCEDURE — 1123F ACP DISCUSS/DSCN MKR DOCD: CPT | Performed by: NURSE PRACTITIONER

## 2023-01-09 PROCEDURE — 3078F DIAST BP <80 MM HG: CPT | Performed by: NURSE PRACTITIONER

## 2023-01-09 PROCEDURE — G8484 FLU IMMUNIZE NO ADMIN: HCPCS | Performed by: NURSE PRACTITIONER

## 2023-01-09 PROCEDURE — G8427 DOCREV CUR MEDS BY ELIG CLIN: HCPCS | Performed by: NURSE PRACTITIONER

## 2023-01-09 PROCEDURE — 99215 OFFICE O/P EST HI 40 MIN: CPT | Performed by: NURSE PRACTITIONER

## 2023-01-09 PROCEDURE — 1036F TOBACCO NON-USER: CPT | Performed by: NURSE PRACTITIONER

## 2023-01-09 RX ORDER — CEPHALEXIN 500 MG/1
CAPSULE ORAL
COMMUNITY
Start: 2022-10-31

## 2023-01-09 RX ORDER — LACOSAMIDE 200 MG/1
TABLET ORAL
Qty: 60 TABLET | Refills: 1 | Status: SHIPPED | OUTPATIENT
Start: 2023-01-09 | End: 2024-01-09

## 2023-01-09 NOTE — PATIENT INSTRUCTIONS
Please contact our office around 3/8/2023 to get your follow up appointment made. Our scheduling phone number is [phone]. Thank you!

## 2023-01-09 NOTE — PROGRESS NOTES
1/9/23    Zhanna William  1955    Chief Complaint   Patient presents with    Seizures     Pt presents for f/u from ED trip and seizure, pt states he cannot stop shaking        History of Present Illness  Stan Jacobs is a 78 y.o. male presenting today for hospital follow-up of: Altered mental status suspect secondary to infection however cannot rule out possibility of seizure. He remains on lacosamide, zonisamide, and Depakote. Valproic acid level elevated at 101. 1. He remains on aspirin and statin for secondary stroke prevention. Today, he is accompanied by his caregivers. He tells me that he has \"shaking\" mostly in his right upper extremity. He notices that it gets worse with eating or drinking. He uses his left arm to stabilize it. He does not have any alteration in consciousness or generalized convulsions. He is concerned that he is having seizures. He has not completed a recent EEG. CBC and CMP on 9/27/2022.       Current Outpatient Medications   Medication Sig Dispense Refill    lacosamide (VIMPAT) 200 MG tablet (CONTROL CYCLE) TAKE 1 TABLET BY MOUTH TWICE DAILY FOR EPILEPSY 60 tablet 1    cephALEXin (KEFLEX) 500 MG capsule TAKE 1 CAPSULE BY MOUTH THREE TIMES A DAY FOR 7 DAYS      divalproex (DEPAKOTE) 500 MG DR tablet Take 2 tablets by mouth nightly 90 tablet 3    divalproex (DEPAKOTE) 500 MG DR tablet Take 1 tablet by mouth daily 90 tablet 3    zonisamide (ZONEGRAN) 100 MG capsule Take 1 capsule in the morning and 3 capsules at bedtime 120 capsule 5    aspirin 81 MG EC tablet Take 1 tablet by mouth daily 30 tablet 3    atorvastatin (LIPITOR) 80 MG tablet Take 1 tablet by mouth nightly 30 tablet 3    clopidogrel (PLAVIX) 75 MG tablet Take 1 tablet by mouth daily 30 tablet 3    amLODIPine (NORVASC) 10 MG tablet Take 1 tablet by mouth daily 30 tablet 3    mirabegron (MYRBETRIQ) 50 MG TB24 Take 50 mg by mouth daily      acetaminophen (APAP EXTRA STRENGTH) 500 MG tablet Take 1 tablet by mouth every 6 hours as needed for Pain 30 tablet 0    levothyroxine (SYNTHROID) 125 MCG tablet Take 1 tablet by mouth Daily 30 tablet 3    clonazePAM (KLONOPIN) 0.5 MG tablet Take 0.25 mg by mouth daily. omeprazole (PRILOSEC) 20 MG delayed release capsule Take 20 mg by mouth 2 times daily (before meals)      latanoprost (XALATAN) 0.005 % ophthalmic solution Place 1 drop into both eyes nightly      timolol (TIMOPTIC) 0.5 % ophthalmic solution Place 1 drop into both eyes daily       Lactobacillus (ACIDOPHILUS) CAPS capsule Take 1 capsule by mouth daily      Wheat Dextrin (BENEFIBER) POWD Take 4 g by mouth 2 times daily Takes 3 tsp in liquid twice per day       Lactulose Encephalopathy (ENULOSE PO) Take 30 mLs by mouth 3 times daily      folic acid (FOLVITE) 1 MG tablet Take 1 mg by mouth daily      PARoxetine (PAXIL) 20 MG tablet Take 20 mg by mouth every morning      QUEtiapine (SEROQUEL XR) 50 MG extended release tablet Take 50 mg by mouth nightly      tamsulosin (FLOMAX) 0.4 MG capsule Take 1 capsule by mouth daily 30 capsule 0    Cholecalciferol (VITAMIN D3) 1000 UNITS CAPS Take 1 tablet by mouth daily. dicyclomine (BENTYL) 10 MG capsule Take 1 capsule by mouth 4 times daily (before meals and nightly) (Patient not taking: Reported on 1/9/2023) 20 capsule 0    albuterol sulfate HFA (PROVENTIL HFA) 108 (90 Base) MCG/ACT inhaler Inhale 2 puffs into the lungs every 4 hours as needed for Wheezing or Shortness of Breath With spacer (and mask if indicated). Thanks.  18 g 1    psyllium (HYDROCIL) 95 % PACK packet Take 1 packet by mouth 2 times daily (Patient not taking: Reported on 1/9/2023) 240 each 0    solifenacin (VESICARE) 10 MG tablet Take 10 mg by mouth daily (Patient not taking: Reported on 1/9/2023)      primidone (MYSOLINE) 50 MG tablet Take 50 mg by mouth 3 times daily (Patient not taking: Reported on 1/9/2023)      Simethicone (MI-ACID GAS RELIEF PO) Take 1 Container by mouth every 4 hours as needed 10 cc every 4 hrs not to exceed 60 cc in 24 hrs (Patient not taking: Reported on 1/9/2023)      guaiFENesin (MUCINEX) 600 MG extended release tablet Take 1,200 mg by mouth daily as needed for Congestion (Patient not taking: Reported on 1/9/2023)      Menthol-Methyl Salicylate (MUSCLE RUB) 10-15 % CREA cream Apply 1 applicator topically as needed (Patient not taking: Reported on 1/9/2023)       No current facility-administered medications for this visit. Physical Exam:  Also present during visit: health care professional.    Gen: A&O x 4, NAD, cooperative  HEENT: NC/AT, EOMI, PERRL, mmm, no carotid bruits, neck supple, no meningeal signs  Heart: NSR/SB  Lungs: Respirations even and unlabored  Ext: no edema, no calf tenderness b/l  Psych: normal mood and affect  Skin: no rashes or lesions     NEUROLOGIC EXAM:     Mental Status: A&O to self, location, month and year, NAD, speech clear, language fluent, repetition and naming intact, follows commands appropriately     Cranial Nerve Exam:   CN II-XII: PERRL, VFF, no nystagmus, no gaze paresis, sensation V1-V3 intact b/l, muscles of facial expression symmetric; hearing intact to conversational tone, palate elevates symmetrically, shoulder elevation symmetric and tongue protrudes midline with movement side to side.      Motor Exam:       Strength 5/5 UE's/LE's b/l, 3-4/5 UE's/LE's b/l  Spasticity to left upper and lower extremities   Tone and bulk normal   No pronator drift     Deep Tendon Reflexes: 2/4 biceps, triceps, brachioradialis, patellar, and achilles b/l; flexor plantar responses b/l     Sensation: Intact light touch/pinprick/vibration UE's/LE's b/l     Coordination/Cerebellum:       Tremors--right upper extremity tremor      Rapidly alternating movements: dysdiadochokinesia left               Heel-to-Shin: dysmetria left      Finger-to-Nose: no dysmetria left     Gait and stance:      Gait: wheelchair    /66 (Site: Right Upper Arm, Position: Sitting, Cuff Size: Medium Adult)   Pulse 63   Ht 6' 6\" (1.981 m)   Wt 196 lb 3.2 oz (89 kg)   SpO2 97%   BMI 22.67 kg/m²     Assessment and Plan     Diagnosis Orders   1. Seizure ([address]  lacosamide (VIMPAT) 200 MG tablet    EEG      2. Essential tremor        3. Cerebral palsy, unspecified type ([address]        4. Spastic hemiplegia of left nondominant side as late effect of cerebral infarction [facility])          Axel Fisher was seen in neurological follow up in regards to seizure, tremor. Axel Fisher will remain on lacosamide, zonisamide, and Depakote. Axel Fisher is concerned that his movement in his right upper extremity is seizure activity. He has not completed his EEG from his last hospital visit to evaluate this so I reordered a routine EEG. On exam, his symptoms are most consistent with his essential tremor. I will not change his AEDs unless EEG shows seizure activity. His symptoms do bother him, so if the EEG is negative we can discuss optimizing management of his tremor. Will check labs at next visit including valproic acid, CMP, CBC. Return in about 6 months (around 7/9/2023).     Shalom Rojas, MARY - CNP

## 2023-01-10 ENCOUNTER — HOSPITAL ENCOUNTER (OUTPATIENT)
Age: 68
Discharge: HOME | End: 2023-01-10
Payer: MEDICARE

## 2023-01-10 LAB
ALBUMIN SERPL-MCNC: 3.8 GM/DL (ref 3.4–5)
ALP BLD-CCNC: 96 IU/L (ref 40–128)
ALT SERPL-CCNC: 10 U/L (ref 10–40)
ANION GAP SERPL CALCULATED.3IONS-SCNC: 10 MMOL/L (ref 4–16)
AST SERPL-CCNC: 9 IU/L (ref 15–37)
BASOPHILS ABSOLUTE: 0 K/CU MM
BASOPHILS RELATIVE PERCENT: 0.7 % (ref 0–1)
BILIRUB SERPL-MCNC: 0.2 MG/DL (ref 0–1)
BUN BLDV-MCNC: 12 MG/DL (ref 6–23)
CALCIUM SERPL-MCNC: 9.3 MG/DL (ref 8.3–10.6)
CHLORIDE BLD-SCNC: 103 MMOL/L (ref 99–110)
CHOLESTEROL: 131 MG/DL
CO2: 27 MMOL/L (ref 21–32)
CREAT SERPL-MCNC: 0.6 MG/DL (ref 0.9–1.3)
CREATININE URINE: 54.8 MG/DL (ref 39–259)
CREATININE URINE: 55.1 MG/DL (ref 39–259)
DIFFERENTIAL TYPE: ABNORMAL
EOSINOPHILS ABSOLUTE: 0.2 K/CU MM
EOSINOPHILS RELATIVE PERCENT: 3.6 % (ref 0–3)
GFR SERPL CREATININE-BSD FRML MDRD: >60 ML/MIN/1.73M2
GLUCOSE BLD-MCNC: 95 MG/DL (ref 70–99)
HCT VFR BLD CALC: 42.3 % (ref 42–52)
HDLC SERPL-MCNC: 45 MG/DL
HEMOGLOBIN: 13.7 GM/DL (ref 13.5–18)
IMMATURE NEUTROPHIL %: 0.5 % (ref 0–0.43)
LDL CHOLESTEROL CALCULATED: 55 MG/DL
LYMPHOCYTES ABSOLUTE: 1.6 K/CU MM
LYMPHOCYTES RELATIVE PERCENT: 28.5 % (ref 24–44)
MCH RBC QN AUTO: 31.1 PG (ref 27–31)
MCHC RBC AUTO-ENTMCNC: 32.4 % (ref 32–36)
MCV RBC AUTO: 95.9 FL (ref 78–100)
MICROALBUMIN/CREAT 24H UR: <1.2 MG/DL
MICROALBUMIN/CREAT UR-RTO: NORMAL MG/G CREAT (ref 0–30)
MONOCYTES ABSOLUTE: 0.8 K/CU MM
MONOCYTES RELATIVE PERCENT: 13.4 % (ref 0–4)
NUCLEATED RBC %: 0 %
PDW BLD-RTO: 13.9 % (ref 11.7–14.9)
PLATELET # BLD: 145 K/CU MM (ref 140–440)
PMV BLD AUTO: 11.8 FL (ref 7.5–11.1)
POTASSIUM SERPL-SCNC: 4.2 MMOL/L (ref 3.5–5.1)
PROT/CREAT RATIO, UR: 0.2
RBC # BLD: 4.41 M/CU MM (ref 4.6–6.2)
SEGMENTED NEUTROPHILS ABSOLUTE COUNT: 3.1 K/CU MM
SEGMENTED NEUTROPHILS RELATIVE PERCENT: 53.3 % (ref 36–66)
SODIUM BLD-SCNC: 140 MMOL/L (ref 135–145)
TOTAL IMMATURE NEUTOROPHIL: 0.03 K/CU MM
TOTAL NUCLEATED RBC: 0 K/CU MM
TOTAL PROTEIN: 6.3 GM/DL (ref 6.4–8.2)
TRIGL SERPL-MCNC: 157 MG/DL
TSH HIGH SENSITIVITY: 1.89 UIU/ML (ref 0.27–4.2)
URINE TOTAL PROTEIN: 9.8 MG/DL
WBC # BLD: 5.8 K/CU MM (ref 4–10.5)

## 2023-01-10 PROCEDURE — 82570 ASSAY OF URINE CREATININE: CPT

## 2023-01-10 PROCEDURE — 82306 VITAMIN D 25 HYDROXY: CPT

## 2023-01-10 PROCEDURE — 84443 ASSAY THYROID STIM HORMONE: CPT

## 2023-01-10 PROCEDURE — 85025 COMPLETE CBC W/AUTO DIFF WBC: CPT

## 2023-01-10 PROCEDURE — 80053 COMPREHEN METABOLIC PANEL: CPT

## 2023-01-10 PROCEDURE — 82043 UR ALBUMIN QUANTITATIVE: CPT

## 2023-01-10 PROCEDURE — 82652 VIT D 1 25-DIHYDROXY: CPT

## 2023-01-10 PROCEDURE — 84156 ASSAY OF PROTEIN URINE: CPT

## 2023-01-10 PROCEDURE — 36415 COLL VENOUS BLD VENIPUNCTURE: CPT

## 2023-01-10 PROCEDURE — 80061 LIPID PANEL: CPT

## 2023-01-10 PROCEDURE — 82040 ASSAY OF SERUM ALBUMIN: CPT

## 2023-01-10 NOTE — ED NOTES
Waiting bed     Desiree Ardon.  Delio Bedoya  08/16/22 1948 Normal volume, rate, productivity, spontaneity and articulation

## 2023-01-19 ENCOUNTER — HOSPITAL ENCOUNTER (OUTPATIENT)
Dept: SLEEP CENTER | Age: 68
Discharge: HOME | End: 2023-01-19
Payer: MEDICARE

## 2023-01-19 DIAGNOSIS — R56.9 SEIZURE (HCC): ICD-10-CM

## 2023-01-19 PROCEDURE — 95819 EEG AWAKE AND ASLEEP: CPT

## 2023-01-30 ENCOUNTER — PATIENT MESSAGE (OUTPATIENT)
Dept: NEUROLOGY | Age: 68
End: 2023-01-30

## 2023-03-07 DIAGNOSIS — R56.9 SEIZURE (HCC): ICD-10-CM

## 2023-03-08 RX ORDER — LACOSAMIDE 200 MG/1
TABLET ORAL
Qty: 60 TABLET | Refills: 5 | Status: SHIPPED | OUTPATIENT
Start: 2023-03-08 | End: 2024-03-06

## 2023-03-23 ENCOUNTER — HOSPITAL ENCOUNTER (OUTPATIENT)
Dept: GENERAL RADIOLOGY | Age: 68
Discharge: HOME | End: 2023-03-23
Payer: MEDICARE

## 2023-03-23 ENCOUNTER — HOSPITAL ENCOUNTER (OUTPATIENT)
Age: 68
Discharge: HOME | End: 2023-03-23
Payer: MEDICARE

## 2023-03-23 DIAGNOSIS — M25.572 LEFT ANKLE PAIN, UNSPECIFIED CHRONICITY: ICD-10-CM

## 2023-03-23 DIAGNOSIS — Z60.8 OTHER PROBLEMS RELATED TO SOCIAL ENVIRONMENT: ICD-10-CM

## 2023-03-23 DIAGNOSIS — S93.492A SPRAIN OF OTHER LIGAMENT OF LEFT ANKLE, INITIAL ENCOUNTER: ICD-10-CM
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
## 2023-03-23 PROCEDURE — 73600 X-RAY EXAM OF ANKLE: CPT

## 2023-03-23 PROCEDURE — 73620 X-RAY EXAM OF FOOT: CPT

## 2023-03-23 SDOH — SOCIAL STABILITY - SOCIAL INSECURITY: OTHER PROBLEMS RELATED TO SOCIAL ENVIRONMENT: Z60.8

## 2023-05-22 NOTE — TELEPHONE ENCOUNTER
Pts caregiver left a voicemail asking for EEG results. She states okay to leave them on VM if she doesn't answer. Called her back @ 11:52am and spoke to [place] and let her know the provider reviewed the EEG results and she states his routine EEG did not show epileptic activity. No changes will be made to his meds. She states verbal understanding.
Detail Level: Zone
Detail Level: Detailed

## 2023-06-27 DIAGNOSIS — R56.9 SEIZURE (HCC): ICD-10-CM

## 2023-06-28 RX ORDER — ZONISAMIDE 100 MG/1
CAPSULE ORAL
Qty: 120 CAPSULE | Refills: 5 | Status: SHIPPED | OUTPATIENT
Start: 2023-06-28

## 2023-07-08 ENCOUNTER — HOSPITAL ENCOUNTER (OUTPATIENT)
Age: 68
Setting detail: SPECIMEN
Discharge: HOME | End: 2023-07-08
Payer: MEDICARE

## 2023-07-08 PROCEDURE — 81001 URINALYSIS AUTO W/SCOPE: CPT

## 2023-07-08 PROCEDURE — 82140 ASSAY OF AMMONIA: CPT

## 2023-07-11 ENCOUNTER — OFFICE VISIT (OUTPATIENT)
Dept: NEUROLOGY | Age: 68
End: 2023-07-11
Payer: MEDICARE

## 2023-07-11 VITALS
BODY MASS INDEX: 22.79 KG/M2 | HEIGHT: 78 IN | WEIGHT: 197 LBS | SYSTOLIC BLOOD PRESSURE: 134 MMHG | DIASTOLIC BLOOD PRESSURE: 70 MMHG | HEART RATE: 63 BPM | OXYGEN SATURATION: 95 %

## 2023-07-11 DIAGNOSIS — R56.9 SEIZURE (HCC): Primary | ICD-10-CM

## 2023-07-11 DIAGNOSIS — G25.0 ESSENTIAL TREMOR: ICD-10-CM

## 2023-07-11 DIAGNOSIS — G80.9 CEREBRAL PALSY, UNSPECIFIED TYPE (HCC): ICD-10-CM

## 2023-07-11 DIAGNOSIS — I69.354 SPASTIC HEMIPLEGIA OF LEFT NONDOMINANT SIDE AS LATE EFFECT OF CEREBRAL INFARCTION (HCC): ICD-10-CM

## 2023-07-11 LAB
AMMONIA: 87 UMOL/L (ref 16–60)
BACTERIA: NEGATIVE /HPF
BILIRUBIN URINE: NEGATIVE MG/DL
BLOOD, URINE: NEGATIVE
CLARITY: CLEAR
COLOR: YELLOW
GLUCOSE, URINE: NEGATIVE MG/DL
KETONES, URINE: ABNORMAL MG/DL
LEUKOCYTE ESTERASE, URINE: ABNORMAL
MUCUS: ABNORMAL HPF
NITRITE URINE, QUANTITATIVE: NEGATIVE
PH, URINE: 7 (ref 5–8)
PROTEIN UA: NEGATIVE MG/DL
RBC URINE: 1 /HPF (ref 0–3)
SPECIFIC GRAVITY UA: 1.01 (ref 1–1.03)
SQUAMOUS EPITHELIAL: 1 /HPF
TRICHOMONAS: ABNORMAL /HPF
UROBILINOGEN, URINE: 1 MG/DL (ref 0.2–1)
WBC UA: 30 /HPF (ref 0–2)

## 2023-07-11 PROCEDURE — G8427 DOCREV CUR MEDS BY ELIG CLIN: HCPCS | Performed by: NURSE PRACTITIONER

## 2023-07-11 PROCEDURE — G8420 CALC BMI NORM PARAMETERS: HCPCS | Performed by: NURSE PRACTITIONER

## 2023-07-11 PROCEDURE — 3075F SYST BP GE 130 - 139MM HG: CPT | Performed by: NURSE PRACTITIONER

## 2023-07-11 PROCEDURE — 3017F COLORECTAL CA SCREEN DOC REV: CPT | Performed by: NURSE PRACTITIONER

## 2023-07-11 PROCEDURE — 1036F TOBACCO NON-USER: CPT | Performed by: NURSE PRACTITIONER

## 2023-07-11 PROCEDURE — 1123F ACP DISCUSS/DSCN MKR DOCD: CPT | Performed by: NURSE PRACTITIONER

## 2023-07-11 PROCEDURE — 3078F DIAST BP <80 MM HG: CPT | Performed by: NURSE PRACTITIONER

## 2023-07-11 PROCEDURE — 99214 OFFICE O/P EST MOD 30 MIN: CPT | Performed by: NURSE PRACTITIONER

## 2023-07-11 RX ORDER — NITROFURANTOIN MACROCRYSTALS 50 MG/1
CAPSULE ORAL
COMMUNITY
Start: 2023-06-15

## 2023-07-11 NOTE — PROGRESS NOTES
7[facility]11/23    [facility]  1955    Chief Complaint   Patient presents with    Seizures     Pt presents for seizure f/u, pt has had no recent seizure activity        History of Present Illness  Blu Hernandez is a 78 y.o. male presenting today for follow-up of: seizure. He remains on lacosamide, zonisamide, and Depakote. Valproic acid level elevated at 101. 1. He remains on aspirin and statin for secondary stroke prevention. Routine EEG was without epileptic changes, this was ordered to evaluate his unwanted upper extremity uncontrollable movements. He did not complete ambulatory EEG. Today, he is still having unwanted uncontrollable movements of his upper extremity. He is also having feelings of inner tremulousness mostly in his abdomen. We will have him complete his ambulatory EEG to further evaluate this before making any medication changes. Recent CMP CBC normal on 1/10/2023.     Current Outpatient Medications   Medication Sig Dispense Refill    nitrofurantoin (MACRODANTIN) 50 MG capsule       zonisamide (ZONEGRAN) 100 MG capsule Take 1 capsule in the morning and 3 capsules at bedtime 120 capsule 5    lacosamide (VIMPAT) 200 MG tablet (CONTROL CYCLE) TAKE 1 TABLET BY MOUTH TWICE DAILY FOR EPILEPSY 60 tablet 5    divalproex (DEPAKOTE) 500 MG DR tablet Take 2 tablets by mouth nightly 90 tablet 3    divalproex (DEPAKOTE) 500 MG DR tablet Take 1 tablet by mouth daily 90 tablet 3    aspirin 81 MG EC tablet Take 1 tablet by mouth daily 30 tablet 3    atorvastatin (LIPITOR) 80 MG tablet Take 1 tablet by mouth nightly 30 tablet 3    clopidogrel (PLAVIX) 75 MG tablet Take 1 tablet by mouth daily 30 tablet 3    amLODIPine (NORVASC) 10 MG tablet Take 1 tablet by mouth daily 30 tablet 3    mirabegron (MYRBETRIQ) 50 MG TB24 Take 50 mg by mouth daily      acetaminophen (APAP EXTRA STRENGTH) 500 MG tablet Take 1 tablet by mouth every 6 hours as needed for Pain 30 tablet 0    levothyroxine (SYNTHROID) 125 MCG tablet Take

## 2023-07-26 ENCOUNTER — HOSPITAL ENCOUNTER (OUTPATIENT)
Age: 68
Setting detail: SPECIMEN
Discharge: HOME | End: 2023-07-26
Payer: MEDICARE

## 2023-07-26 LAB — AMMONIA: 25 UMOL/L (ref 16–60)

## 2023-07-26 PROCEDURE — 82140 ASSAY OF AMMONIA: CPT

## 2023-07-27 ENCOUNTER — HOSPITAL ENCOUNTER (OUTPATIENT)
Age: 68
Setting detail: SPECIMEN
Discharge: HOME | End: 2023-07-27
Payer: MEDICARE

## 2023-07-27 LAB
BILIRUBIN URINE: NEGATIVE MG/DL
BLOOD, URINE: NEGATIVE
CLARITY: CLEAR
COLOR: YELLOW
COMMENT UA: ABNORMAL
GLUCOSE, URINE: NEGATIVE MG/DL
KETONES, URINE: ABNORMAL MG/DL
LEUKOCYTE ESTERASE, URINE: NEGATIVE
NITRITE URINE, QUANTITATIVE: NEGATIVE
PH, URINE: 6.5 (ref 5–8)
PROTEIN UA: NEGATIVE MG/DL
SPECIFIC GRAVITY UA: 1.01 (ref 1–1.03)
UROBILINOGEN, URINE: 0.2 MG/DL (ref 0.2–1)

## 2023-07-27 PROCEDURE — 81003 URINALYSIS AUTO W/O SCOPE: CPT

## 2023-08-23 ENCOUNTER — HOSPITAL ENCOUNTER (OUTPATIENT)
Age: 68
Setting detail: SPECIMEN
Discharge: HOME | End: 2023-08-23
Payer: MEDICARE

## 2023-08-23 LAB — AMMONIA: 89 UMOL/L (ref 16–60)

## 2023-08-23 PROCEDURE — 82140 ASSAY OF AMMONIA: CPT

## 2023-09-06 DIAGNOSIS — R56.9 SEIZURE (HCC): ICD-10-CM

## 2023-09-06 NOTE — TELEPHONE ENCOUNTER
[facility]. Pt has not completed 48 hr EEG and needs new order placed. LM for pt caregiver Nataly Ayala to call back regarding 48 hr EEG. Please refill rx until next ov 10/10/23 and place new order for 48 hr EEG since previous has .        Requested Prescriptions     Pending Prescriptions Disp Refills    lacosamide (VIMPAT) 200 MG tablet 60 tablet 1     Sig: (CONTROL CYCLE) TAKE 1 TABLET BY MOUTH TWICE DAILY FOR EPILEPSY
[] : right short arm cast

## 2023-09-07 RX ORDER — LACOSAMIDE 200 MG/1
TABLET ORAL
Qty: 60 TABLET | Refills: 1 | Status: SHIPPED | OUTPATIENT
Start: 2023-09-07 | End: 2024-09-04

## 2023-09-20 ENCOUNTER — HOSPITAL ENCOUNTER (OUTPATIENT)
Age: 68
Setting detail: SPECIMEN
Discharge: HOME | End: 2023-09-20
Payer: MEDICARE

## 2023-09-20 LAB — AMMONIA: 55 UMOL/L (ref 16–60)

## 2023-09-20 PROCEDURE — 82140 ASSAY OF AMMONIA: CPT

## 2023-09-28 ENCOUNTER — HOSPITAL ENCOUNTER (OUTPATIENT)
Age: 68
Discharge: HOME | End: 2023-09-28
Payer: MEDICARE

## 2023-09-28 LAB
ALBUMIN SERPL-MCNC: 3.8 GM/DL (ref 3.4–5)
ALP BLD-CCNC: 92 IU/L (ref 40–129)
ALT SERPL-CCNC: 15 U/L (ref 10–40)
AMMONIA: 38 UMOL/L (ref 16–60)
ANION GAP SERPL CALCULATED.3IONS-SCNC: 6 MMOL/L (ref 4–16)
APTT: 26.2 SECONDS (ref 25.1–37.1)
AST SERPL-CCNC: 11 IU/L (ref 15–37)
BASOPHILS ABSOLUTE: 0 K/CU MM
BASOPHILS RELATIVE PERCENT: 0.5 % (ref 0–1)
BILIRUB SERPL-MCNC: 0.3 MG/DL (ref 0–1)
BUN SERPL-MCNC: 12 MG/DL (ref 6–23)
CALCIUM SERPL-MCNC: 9.3 MG/DL (ref 8.3–10.6)
CHLORIDE BLD-SCNC: 106 MMOL/L (ref 99–110)
CO2: 27 MMOL/L (ref 21–32)
CREAT SERPL-MCNC: 0.6 MG/DL (ref 0.9–1.3)
DIFFERENTIAL TYPE: ABNORMAL
EOSINOPHILS ABSOLUTE: 0.1 K/CU MM
EOSINOPHILS RELATIVE PERCENT: 2.2 % (ref 0–3)
GFR SERPL CREATININE-BSD FRML MDRD: >60 ML/MIN/1.73M2
GLUCOSE SERPL-MCNC: 94 MG/DL (ref 70–99)
HCT VFR BLD CALC: 43.8 % (ref 42–52)
HEMOGLOBIN: 14.2 GM/DL (ref 13.5–18)
IMMATURE NEUTROPHIL %: 0.9 % (ref 0–0.43)
INR BLD: 1 INDEX
LYMPHOCYTES ABSOLUTE: 1.3 K/CU MM
LYMPHOCYTES RELATIVE PERCENT: 23.9 % (ref 24–44)
MCH RBC QN AUTO: 31.5 PG (ref 27–31)
MCHC RBC AUTO-ENTMCNC: 32.4 % (ref 32–36)
MCV RBC AUTO: 97.1 FL (ref 78–100)
MONOCYTES ABSOLUTE: 0.8 K/CU MM
MONOCYTES RELATIVE PERCENT: 14.1 % (ref 0–4)
NUCLEATED RBC %: 0 %
PDW BLD-RTO: 13.2 % (ref 11.7–14.9)
PLATELET # BLD: 142 K/CU MM (ref 140–440)
PMV BLD AUTO: 11.8 FL (ref 7.5–11.1)
POTASSIUM SERPL-SCNC: 4.5 MMOL/L (ref 3.5–5.1)
PROTHROMBIN TIME: 13.2 SECONDS (ref 11.7–14.5)
RBC # BLD: 4.51 M/CU MM (ref 4.6–6.2)
SEGMENTED NEUTROPHILS ABSOLUTE COUNT: 3.2 K/CU MM
SEGMENTED NEUTROPHILS RELATIVE PERCENT: 58.4 % (ref 36–66)
SODIUM BLD-SCNC: 139 MMOL/L (ref 135–145)
TOTAL IMMATURE NEUTOROPHIL: 0.05 K/CU MM
TOTAL NUCLEATED RBC: 0 K/CU MM
TOTAL PROTEIN: 6.6 GM/DL (ref 6.4–8.2)
WBC # BLD: 5.5 K/CU MM (ref 4–10.5)

## 2023-09-28 PROCEDURE — 85610 PROTHROMBIN TIME: CPT

## 2023-09-28 PROCEDURE — 36415 COLL VENOUS BLD VENIPUNCTURE: CPT

## 2023-09-28 PROCEDURE — 85025 COMPLETE CBC W/AUTO DIFF WBC: CPT

## 2023-09-28 PROCEDURE — 80053 COMPREHEN METABOLIC PANEL: CPT

## 2023-09-28 PROCEDURE — 82140 ASSAY OF AMMONIA: CPT

## 2023-09-28 PROCEDURE — 85730 THROMBOPLASTIN TIME PARTIAL: CPT

## 2023-10-11 ENCOUNTER — TRANSCRIBE ORDERS (OUTPATIENT)
Dept: ADMINISTRATIVE | Age: 68
End: 2023-10-11

## 2023-10-11 DIAGNOSIS — R56.9 SEIZURE (HCC): ICD-10-CM

## 2023-10-11 DIAGNOSIS — E72.20 SERUM AMMONIA INCREASED (HCC): Primary | ICD-10-CM

## 2023-10-11 DIAGNOSIS — G80.9 CEREBRAL PALSY, UNSPECIFIED TYPE (HCC): ICD-10-CM

## 2023-10-24 ENCOUNTER — HOSPITAL ENCOUNTER (OUTPATIENT)
Dept: ULTRASOUND IMAGING | Age: 68
Discharge: HOME | End: 2023-10-24
Payer: MEDICARE

## 2023-10-24 DIAGNOSIS — G80.9 CEREBRAL PALSY, UNSPECIFIED TYPE (HCC): ICD-10-CM

## 2023-10-24 DIAGNOSIS — E72.20 SERUM AMMONIA INCREASED (HCC): ICD-10-CM

## 2023-10-24 DIAGNOSIS — R56.9 SEIZURE (HCC): ICD-10-CM

## 2023-10-24 PROCEDURE — 76705 ECHO EXAM OF ABDOMEN: CPT

## 2023-10-29 NOTE — PROGRESS NOTES
Patient refused morning medications and was incontinent 4x tonight. Patient stated he would attempt to take medication later in morning advised charge.  Will assess and report You may treat your menstrual cramping symptoms with heating pad to help ease the pain.  You may also use over-the-counter ibuprofen and/or naproxen.   [Negative] : Heme/Lymph

## 2023-10-31 DIAGNOSIS — R56.9 SEIZURE (HCC): ICD-10-CM

## 2023-10-31 NOTE — TELEPHONE ENCOUNTER
Received refill request for Zonisamide 100mg. Left msg for legal guardian Sharon Syed to call back to schedule follow up. Rx pending.      Requested Prescriptions     Pending Prescriptions Disp Refills    zonisamide (ZONEGRAN) 100 MG capsule 120 capsule 1     Sig: Take 1 capsule in the morning and 3 capsules at bedtime

## 2023-11-01 RX ORDER — ZONISAMIDE 100 MG/1
CAPSULE ORAL
Qty: 120 CAPSULE | Refills: 1 | Status: ON HOLD | OUTPATIENT
Start: 2023-11-01

## 2023-11-02 DIAGNOSIS — R56.9 SEIZURE (HCC): ICD-10-CM

## 2023-11-02 NOTE — TELEPHONE ENCOUNTER
Received refill request for Lacosamide. Pt scheduled for f/u 11/7/23. Rx pending.    Requested Prescriptions     Pending Prescriptions Disp Refills    lacosamide (VIMPAT) 200 MG tablet 60 tablet 1     Sig: (CONTROL CYCLE) TAKE 1 TABLET BY MOUTH TWICE DAILY FOR EPILEPSY

## 2023-11-05 ENCOUNTER — HOSPITAL ENCOUNTER (INPATIENT)
Age: 68
LOS: 4 days | Discharge: NURSING, REHAB OR LONG-TERM CARE | DRG: 177 | End: 2023-11-09
Attending: STUDENT IN AN ORGANIZED HEALTH CARE EDUCATION/TRAINING PROGRAM | Admitting: STUDENT IN AN ORGANIZED HEALTH CARE EDUCATION/TRAINING PROGRAM
Payer: MEDICARE

## 2023-11-05 ENCOUNTER — APPOINTMENT (OUTPATIENT)
Dept: CT IMAGING | Age: 68
DRG: 177 | End: 2023-11-05
Payer: MEDICARE

## 2023-11-05 ENCOUNTER — APPOINTMENT (OUTPATIENT)
Dept: GENERAL RADIOLOGY | Age: 68
DRG: 177 | End: 2023-11-05
Payer: MEDICARE

## 2023-11-05 DIAGNOSIS — J18.9 PNEUMONIA OF BOTH LUNGS DUE TO INFECTIOUS ORGANISM, UNSPECIFIED PART OF LUNG: Primary | ICD-10-CM

## 2023-11-05 DIAGNOSIS — R41.82 ALTERED MENTAL STATUS, UNSPECIFIED ALTERED MENTAL STATUS TYPE: ICD-10-CM

## 2023-11-05 LAB
ALBUMIN SERPL-MCNC: 3.4 GM/DL (ref 3.4–5)
ALP BLD-CCNC: 101 IU/L (ref 40–129)
ALT SERPL-CCNC: 18 U/L (ref 10–40)
AMMONIA: 23 UMOL/L (ref 16–60)
ANION GAP SERPL CALCULATED.3IONS-SCNC: 13 MMOL/L (ref 4–16)
AST SERPL-CCNC: 27 IU/L (ref 15–37)
BASOPHILS ABSOLUTE: 0.1 K/CU MM
BASOPHILS RELATIVE PERCENT: 0.6 % (ref 0–1)
BILIRUB SERPL-MCNC: 0.4 MG/DL (ref 0–1)
BUN SERPL-MCNC: 12 MG/DL (ref 6–23)
CALCIUM SERPL-MCNC: 9.3 MG/DL (ref 8.3–10.6)
CHLORIDE BLD-SCNC: 103 MMOL/L (ref 99–110)
CHP ED QC CHECK: 109
CO2: 20 MMOL/L (ref 21–32)
CREAT SERPL-MCNC: 0.6 MG/DL (ref 0.9–1.3)
DIFFERENTIAL TYPE: ABNORMAL
EOSINOPHILS ABSOLUTE: 0.1 K/CU MM
EOSINOPHILS RELATIVE PERCENT: 0.7 % (ref 0–3)
GFR SERPL CREATININE-BSD FRML MDRD: >60 ML/MIN/1.73M2
GLUCOSE BLD-MCNC: 109 MG/DL (ref 70–99)
GLUCOSE SERPL-MCNC: 125 MG/DL (ref 70–99)
HCT VFR BLD CALC: 47.6 % (ref 42–52)
HEMOGLOBIN: 16.3 GM/DL (ref 13.5–18)
IMMATURE NEUTROPHIL %: 1.3 % (ref 0–0.43)
LACTATE: 1 MMOL/L (ref 0.5–1.9)
LIPASE: 13 IU/L (ref 13–60)
LYMPHOCYTES ABSOLUTE: 1.2 K/CU MM
LYMPHOCYTES RELATIVE PERCENT: 14.4 % (ref 24–44)
MCH RBC QN AUTO: 34.1 PG (ref 27–31)
MCHC RBC AUTO-ENTMCNC: 34.2 % (ref 32–36)
MCV RBC AUTO: 99.6 FL (ref 78–100)
MONOCYTES ABSOLUTE: 1.3 K/CU MM
MONOCYTES RELATIVE PERCENT: 15.7 % (ref 0–4)
NUCLEATED RBC %: 0 %
PDW BLD-RTO: 13.3 % (ref 11.7–14.9)
PLATELET # BLD: 113 K/CU MM (ref 140–440)
PMV BLD AUTO: 10.9 FL (ref 7.5–11.1)
POTASSIUM SERPL-SCNC: 4.4 MMOL/L (ref 3.5–5.1)
RBC # BLD: 4.78 M/CU MM (ref 4.6–6.2)
SEGMENTED NEUTROPHILS ABSOLUTE COUNT: 5.5 K/CU MM
SEGMENTED NEUTROPHILS RELATIVE PERCENT: 67.3 % (ref 36–66)
SODIUM BLD-SCNC: 136 MMOL/L (ref 135–145)
TOTAL IMMATURE NEUTOROPHIL: 0.11 K/CU MM
TOTAL NUCLEATED RBC: 0 K/CU MM
TOTAL PROTEIN: 7.8 GM/DL (ref 6.4–8.2)
TSH SERPL DL<=0.005 MIU/L-ACNC: 1.13 UIU/ML (ref 0.27–4.2)
WBC # BLD: 8.2 K/CU MM (ref 4–10.5)

## 2023-11-05 PROCEDURE — 99285 EMERGENCY DEPT VISIT HI MDM: CPT

## 2023-11-05 PROCEDURE — 6360000002 HC RX W HCPCS: Performed by: STUDENT IN AN ORGANIZED HEALTH CARE EDUCATION/TRAINING PROGRAM

## 2023-11-05 PROCEDURE — 71045 X-RAY EXAM CHEST 1 VIEW: CPT

## 2023-11-05 PROCEDURE — 1200000000 HC SEMI PRIVATE

## 2023-11-05 PROCEDURE — 82140 ASSAY OF AMMONIA: CPT

## 2023-11-05 PROCEDURE — 96375 TX/PRO/DX INJ NEW DRUG ADDON: CPT

## 2023-11-05 PROCEDURE — 96374 THER/PROPH/DIAG INJ IV PUSH: CPT

## 2023-11-05 PROCEDURE — 85025 COMPLETE CBC W/AUTO DIFF WBC: CPT

## 2023-11-05 PROCEDURE — 82962 GLUCOSE BLOOD TEST: CPT

## 2023-11-05 PROCEDURE — 83605 ASSAY OF LACTIC ACID: CPT

## 2023-11-05 PROCEDURE — 80053 COMPREHEN METABOLIC PANEL: CPT

## 2023-11-05 PROCEDURE — 2580000003 HC RX 258: Performed by: STUDENT IN AN ORGANIZED HEALTH CARE EDUCATION/TRAINING PROGRAM

## 2023-11-05 PROCEDURE — 84443 ASSAY THYROID STIM HORMONE: CPT

## 2023-11-05 PROCEDURE — 93005 ELECTROCARDIOGRAM TRACING: CPT | Performed by: STUDENT IN AN ORGANIZED HEALTH CARE EDUCATION/TRAINING PROGRAM

## 2023-11-05 PROCEDURE — 70450 CT HEAD/BRAIN W/O DYE: CPT

## 2023-11-05 PROCEDURE — 83690 ASSAY OF LIPASE: CPT

## 2023-11-05 RX ORDER — CLONAZEPAM 0.5 MG/1
0.25 TABLET ORAL DAILY
Status: DISCONTINUED | OUTPATIENT
Start: 2023-11-06 | End: 2023-11-09 | Stop reason: HOSPADM

## 2023-11-05 RX ADMIN — AZITHROMYCIN MONOHYDRATE 500 MG: 500 INJECTION, POWDER, LYOPHILIZED, FOR SOLUTION INTRAVENOUS at 21:56

## 2023-11-05 RX ADMIN — CEFTRIAXONE 1000 MG: 1 INJECTION, POWDER, FOR SOLUTION INTRAMUSCULAR; INTRAVENOUS at 21:46

## 2023-11-05 NOTE — ED TRIAGE NOTES
Per EMS, group home reported that patient has had diarrhea stools for the past few days several times a day.

## 2023-11-05 NOTE — ACP (ADVANCE CARE PLANNING)
Legal Guardianship paperwork is loaded into [facility] for patient. Patients' legal guardians are: Tanesha Palacio and Ashutosh Ho.

## 2023-11-05 NOTE — ED PROVIDER NOTES
Record. Clinical Impression:  1. Pneumonia of both lungs due to infectious organism, unspecified part of lung    2. Altered mental status, unspecified altered mental status type      Disposition referral (if applicable):  No follow-up provider specified. Disposition medications (if applicable):  New Prescriptions    No medications on file     ED Provider Disposition Time  DISPOSITION Decision To Admit 11/05/2023 09:55:15 PM      Comment: Please note this report has been produced using speech recognition software and may contain errors related to that system including errors in grammar, punctuation, and spelling, as well as words and phrases that may be inappropriate. Efforts were made to edit the dictations.         George Collins MD  11/05/23 [id number]

## 2023-11-06 ENCOUNTER — TELEPHONE (OUTPATIENT)
Dept: NEUROLOGY | Age: 68
End: 2023-11-06

## 2023-11-06 LAB
ALBUMIN SERPL-MCNC: 3.4 GM/DL (ref 3.4–5)
ALP BLD-CCNC: 99 IU/L (ref 40–128)
ALT SERPL-CCNC: 15 U/L (ref 10–40)
ANION GAP SERPL CALCULATED.3IONS-SCNC: 11 MMOL/L (ref 4–16)
AST SERPL-CCNC: 14 IU/L (ref 15–37)
BASOPHILS ABSOLUTE: 0 K/CU MM
BASOPHILS RELATIVE PERCENT: 0.3 % (ref 0–1)
BILIRUB SERPL-MCNC: 0.3 MG/DL (ref 0–1)
BUN SERPL-MCNC: 13 MG/DL (ref 6–23)
CALCIUM SERPL-MCNC: 10 MG/DL (ref 8.3–10.6)
CHLORIDE BLD-SCNC: 108 MMOL/L (ref 99–110)
CO2: 24 MMOL/L (ref 21–32)
CREAT SERPL-MCNC: 0.6 MG/DL (ref 0.9–1.3)
DIFFERENTIAL TYPE: ABNORMAL
EOSINOPHILS ABSOLUTE: 0.1 K/CU MM
EOSINOPHILS RELATIVE PERCENT: 1 % (ref 0–3)
GFR SERPL CREATININE-BSD FRML MDRD: >60 ML/MIN/1.73M2
GLUCOSE BLD-MCNC: 89 MG/DL (ref 70–99)
GLUCOSE SERPL-MCNC: 98 MG/DL (ref 70–99)
HCT VFR BLD CALC: 45.6 % (ref 42–52)
HEMOGLOBIN: 15.4 GM/DL (ref 13.5–18)
IMMATURE NEUTROPHIL %: 1.1 % (ref 0–0.43)
INR BLD: 1.1 INDEX
LYMPHOCYTES ABSOLUTE: 1.7 K/CU MM
LYMPHOCYTES RELATIVE PERCENT: 19.2 % (ref 24–44)
MCH RBC QN AUTO: 31.5 PG (ref 27–31)
MCHC RBC AUTO-ENTMCNC: 33.8 % (ref 32–36)
MCV RBC AUTO: 93.3 FL (ref 78–100)
MONOCYTES ABSOLUTE: 1.6 K/CU MM
MONOCYTES RELATIVE PERCENT: 17.5 % (ref 0–4)
NUCLEATED RBC %: 0 %
PDW BLD-RTO: 13 % (ref 11.7–14.9)
PLATELET # BLD: 231 K/CU MM (ref 140–440)
PMV BLD AUTO: 10.1 FL (ref 7.5–11.1)
POTASSIUM SERPL-SCNC: 4.1 MMOL/L (ref 3.5–5.1)
PROTHROMBIN TIME: 14.1 SECONDS (ref 11.7–14.5)
RBC # BLD: 4.89 M/CU MM (ref 4.6–6.2)
SEGMENTED NEUTROPHILS ABSOLUTE COUNT: 5.5 K/CU MM
SEGMENTED NEUTROPHILS RELATIVE PERCENT: 60.9 % (ref 36–66)
SODIUM BLD-SCNC: 143 MMOL/L (ref 135–145)
TOTAL IMMATURE NEUTOROPHIL: 0.1 K/CU MM
TOTAL NUCLEATED RBC: 0 K/CU MM
TOTAL PROTEIN: 6.7 GM/DL (ref 6.4–8.2)
WBC # BLD: 9 K/CU MM (ref 4–10.5)

## 2023-11-06 PROCEDURE — 36415 COLL VENOUS BLD VENIPUNCTURE: CPT

## 2023-11-06 PROCEDURE — 82962 GLUCOSE BLOOD TEST: CPT

## 2023-11-06 PROCEDURE — 6370000000 HC RX 637 (ALT 250 FOR IP): Performed by: STUDENT IN AN ORGANIZED HEALTH CARE EDUCATION/TRAINING PROGRAM

## 2023-11-06 PROCEDURE — 1200000000 HC SEMI PRIVATE

## 2023-11-06 PROCEDURE — 80053 COMPREHEN METABOLIC PANEL: CPT

## 2023-11-06 PROCEDURE — 85610 PROTHROMBIN TIME: CPT

## 2023-11-06 PROCEDURE — 2580000003 HC RX 258: Performed by: STUDENT IN AN ORGANIZED HEALTH CARE EDUCATION/TRAINING PROGRAM

## 2023-11-06 PROCEDURE — 6360000002 HC RX W HCPCS: Performed by: STUDENT IN AN ORGANIZED HEALTH CARE EDUCATION/TRAINING PROGRAM

## 2023-11-06 PROCEDURE — 94761 N-INVAS EAR/PLS OXIMETRY MLT: CPT

## 2023-11-06 PROCEDURE — 85025 COMPLETE CBC W/AUTO DIFF WBC: CPT

## 2023-11-06 PROCEDURE — 92610 EVALUATE SWALLOWING FUNCTION: CPT | Performed by: SPEECH-LANGUAGE PATHOLOGIST

## 2023-11-06 RX ORDER — SODIUM CHLORIDE 0.9 % (FLUSH) 0.9 %
5-40 SYRINGE (ML) INJECTION EVERY 12 HOURS SCHEDULED
Status: DISCONTINUED | OUTPATIENT
Start: 2023-11-06 | End: 2023-11-09 | Stop reason: HOSPADM

## 2023-11-06 RX ORDER — ENOXAPARIN SODIUM 100 MG/ML
40 INJECTION SUBCUTANEOUS EVERY EVENING
Status: DISCONTINUED | OUTPATIENT
Start: 2023-11-06 | End: 2023-11-09 | Stop reason: HOSPADM

## 2023-11-06 RX ORDER — LACTULOSE 10 G/15ML
30 SOLUTION ORAL 3 TIMES DAILY
Status: DISCONTINUED | OUTPATIENT
Start: 2023-11-06 | End: 2023-11-09 | Stop reason: HOSPADM

## 2023-11-06 RX ORDER — LACOSAMIDE 200 MG/1
TABLET ORAL
Qty: 60 TABLET | Refills: 1 | Status: ON HOLD | OUTPATIENT
Start: 2023-11-06 | End: 2024-10-30

## 2023-11-06 RX ORDER — ZONISAMIDE 100 MG/1
100 CAPSULE ORAL DAILY
Status: DISCONTINUED | OUTPATIENT
Start: 2023-11-06 | End: 2023-11-09 | Stop reason: HOSPADM

## 2023-11-06 RX ORDER — TAMSULOSIN HYDROCHLORIDE 0.4 MG/1
0.4 CAPSULE ORAL DAILY
Status: DISCONTINUED | OUTPATIENT
Start: 2023-11-06 | End: 2023-11-09 | Stop reason: HOSPADM

## 2023-11-06 RX ORDER — LEVOTHYROXINE SODIUM 0.12 MG/1
125 TABLET ORAL DAILY
Status: DISCONTINUED | OUTPATIENT
Start: 2023-11-06 | End: 2023-11-09 | Stop reason: HOSPADM

## 2023-11-06 RX ORDER — DIVALPROEX SODIUM 250 MG/1
1000 TABLET, DELAYED RELEASE ORAL NIGHTLY
Status: DISCONTINUED | OUTPATIENT
Start: 2023-11-06 | End: 2023-11-09 | Stop reason: HOSPADM

## 2023-11-06 RX ORDER — CLOPIDOGREL BISULFATE 75 MG/1
75 TABLET ORAL DAILY
Status: DISCONTINUED | OUTPATIENT
Start: 2023-11-06 | End: 2023-11-09 | Stop reason: HOSPADM

## 2023-11-06 RX ORDER — POTASSIUM CHLORIDE 7.45 MG/ML
10 INJECTION INTRAVENOUS PRN
Status: DISCONTINUED | OUTPATIENT
Start: 2023-11-06 | End: 2023-11-09 | Stop reason: HOSPADM

## 2023-11-06 RX ORDER — POLYETHYLENE GLYCOL 3350 17 G/17G
17 POWDER, FOR SOLUTION ORAL DAILY PRN
Status: DISCONTINUED | OUTPATIENT
Start: 2023-11-06 | End: 2023-11-09 | Stop reason: HOSPADM

## 2023-11-06 RX ORDER — LATANOPROST 50 UG/ML
1 SOLUTION/ DROPS OPHTHALMIC NIGHTLY
Status: DISCONTINUED | OUTPATIENT
Start: 2023-11-06 | End: 2023-11-09 | Stop reason: HOSPADM

## 2023-11-06 RX ORDER — ONDANSETRON 2 MG/ML
4 INJECTION INTRAMUSCULAR; INTRAVENOUS EVERY 6 HOURS PRN
Status: DISCONTINUED | OUTPATIENT
Start: 2023-11-06 | End: 2023-11-09 | Stop reason: HOSPADM

## 2023-11-06 RX ORDER — ZONISAMIDE 100 MG/1
300 CAPSULE ORAL
Status: DISCONTINUED | OUTPATIENT
Start: 2023-11-06 | End: 2023-11-09 | Stop reason: HOSPADM

## 2023-11-06 RX ORDER — ONDANSETRON 4 MG/1
4 TABLET, ORALLY DISINTEGRATING ORAL EVERY 8 HOURS PRN
Status: DISCONTINUED | OUTPATIENT
Start: 2023-11-06 | End: 2023-11-09 | Stop reason: HOSPADM

## 2023-11-06 RX ORDER — LACOSAMIDE 100 MG/1
200 TABLET ORAL 2 TIMES DAILY
Status: DISCONTINUED | OUTPATIENT
Start: 2023-11-06 | End: 2023-11-09 | Stop reason: HOSPADM

## 2023-11-06 RX ORDER — ACETAMINOPHEN 325 MG/1
650 TABLET ORAL EVERY 6 HOURS PRN
Status: DISCONTINUED | OUTPATIENT
Start: 2023-11-06 | End: 2023-11-09 | Stop reason: HOSPADM

## 2023-11-06 RX ORDER — ATORVASTATIN CALCIUM 40 MG/1
80 TABLET, FILM COATED ORAL NIGHTLY
Status: DISCONTINUED | OUTPATIENT
Start: 2023-11-06 | End: 2023-11-09 | Stop reason: HOSPADM

## 2023-11-06 RX ORDER — SODIUM CHLORIDE 0.9 % (FLUSH) 0.9 %
5-40 SYRINGE (ML) INJECTION PRN
Status: DISCONTINUED | OUTPATIENT
Start: 2023-11-06 | End: 2023-11-09 | Stop reason: HOSPADM

## 2023-11-06 RX ORDER — DIVALPROEX SODIUM 250 MG/1
500 TABLET, DELAYED RELEASE ORAL DAILY
Status: DISCONTINUED | OUTPATIENT
Start: 2023-11-06 | End: 2023-11-09 | Stop reason: HOSPADM

## 2023-11-06 RX ORDER — SODIUM CHLORIDE 9 MG/ML
INJECTION, SOLUTION INTRAVENOUS PRN
Status: DISCONTINUED | OUTPATIENT
Start: 2023-11-06 | End: 2023-11-09 | Stop reason: HOSPADM

## 2023-11-06 RX ORDER — MAGNESIUM SULFATE IN WATER 40 MG/ML
2000 INJECTION, SOLUTION INTRAVENOUS PRN
Status: DISCONTINUED | OUTPATIENT
Start: 2023-11-06 | End: 2023-11-09 | Stop reason: HOSPADM

## 2023-11-06 RX ORDER — TIMOLOL MALEATE 5 MG/ML
1 SOLUTION/ DROPS OPHTHALMIC DAILY
Status: DISCONTINUED | OUTPATIENT
Start: 2023-11-06 | End: 2023-11-09 | Stop reason: HOSPADM

## 2023-11-06 RX ORDER — PAROXETINE HYDROCHLORIDE 20 MG/1
20 TABLET, FILM COATED ORAL EVERY MORNING
Status: DISCONTINUED | OUTPATIENT
Start: 2023-11-06 | End: 2023-11-09 | Stop reason: HOSPADM

## 2023-11-06 RX ORDER — ASPIRIN 81 MG/1
81 TABLET, CHEWABLE ORAL DAILY
Status: DISCONTINUED | OUTPATIENT
Start: 2023-11-06 | End: 2023-11-09 | Stop reason: HOSPADM

## 2023-11-06 RX ORDER — PRIMIDONE 50 MG/1
50 TABLET ORAL 3 TIMES DAILY
Status: DISCONTINUED | OUTPATIENT
Start: 2023-11-06 | End: 2023-11-09 | Stop reason: HOSPADM

## 2023-11-06 RX ORDER — SODIUM CHLORIDE, SODIUM LACTATE, POTASSIUM CHLORIDE, CALCIUM CHLORIDE 600; 310; 30; 20 MG/100ML; MG/100ML; MG/100ML; MG/100ML
INJECTION, SOLUTION INTRAVENOUS CONTINUOUS
Status: DISCONTINUED | OUTPATIENT
Start: 2023-11-06 | End: 2023-11-06

## 2023-11-06 RX ORDER — QUETIAPINE FUMARATE 50 MG/1
50 TABLET, EXTENDED RELEASE ORAL NIGHTLY
Status: DISCONTINUED | OUTPATIENT
Start: 2023-11-06 | End: 2023-11-09 | Stop reason: HOSPADM

## 2023-11-06 RX ORDER — ACETAMINOPHEN 650 MG/1
650 SUPPOSITORY RECTAL EVERY 6 HOURS PRN
Status: DISCONTINUED | OUTPATIENT
Start: 2023-11-06 | End: 2023-11-09 | Stop reason: HOSPADM

## 2023-11-06 RX ORDER — AMLODIPINE BESYLATE 10 MG/1
10 TABLET ORAL DAILY
Status: DISCONTINUED | OUTPATIENT
Start: 2023-11-06 | End: 2023-11-09 | Stop reason: HOSPADM

## 2023-11-06 RX ORDER — PANTOPRAZOLE SODIUM 20 MG/1
20 TABLET, DELAYED RELEASE ORAL
Status: DISCONTINUED | OUTPATIENT
Start: 2023-11-06 | End: 2023-11-09 | Stop reason: HOSPADM

## 2023-11-06 RX ADMIN — LACOSAMIDE 200 MG: 100 TABLET, FILM COATED ORAL at 09:34

## 2023-11-06 RX ADMIN — ZONISAMIDE 300 MG: 100 CAPSULE ORAL at 00:56

## 2023-11-06 RX ADMIN — LACTULOSE 30 G: 20 SOLUTION ORAL at 13:47

## 2023-11-06 RX ADMIN — SODIUM CHLORIDE, PRESERVATIVE FREE 10 ML: 5 INJECTION INTRAVENOUS at 09:33

## 2023-11-06 RX ADMIN — CLOPIDOGREL BISULFATE 75 MG: 75 TABLET ORAL at 09:34

## 2023-11-06 RX ADMIN — CLONAZEPAM 0.25 MG: 0.5 TABLET ORAL at 09:33

## 2023-11-06 RX ADMIN — PANTOPRAZOLE 20 MG: 20 TABLET, DELAYED RELEASE ORAL at 17:12

## 2023-11-06 RX ADMIN — PAROXETINE HYDROCHLORIDE 20 MG: 20 TABLET, FILM COATED ORAL at 09:34

## 2023-11-06 RX ADMIN — PRIMIDONE 50 MG: 50 TABLET ORAL at 20:56

## 2023-11-06 RX ADMIN — ASPIRIN 81 MG CHEWABLE TABLET 81 MG: 81 TABLET CHEWABLE at 09:34

## 2023-11-06 RX ADMIN — PRIMIDONE 50 MG: 50 TABLET ORAL at 09:34

## 2023-11-06 RX ADMIN — QUETIAPINE FUMARATE 50 MG: 50 TABLET, EXTENDED RELEASE ORAL at 20:56

## 2023-11-06 RX ADMIN — LATANOPROST 1 DROP: 50 SOLUTION OPHTHALMIC at 01:06

## 2023-11-06 RX ADMIN — ZONISAMIDE 100 MG: 100 CAPSULE ORAL at 09:34

## 2023-11-06 RX ADMIN — ATORVASTATIN CALCIUM 80 MG: 40 TABLET, FILM COATED ORAL at 20:56

## 2023-11-06 RX ADMIN — AZITHROMYCIN MONOHYDRATE 500 MG: 500 INJECTION, POWDER, LYOPHILIZED, FOR SOLUTION INTRAVENOUS at 22:00

## 2023-11-06 RX ADMIN — CEFTRIAXONE 1000 MG: 1 INJECTION, POWDER, FOR SOLUTION INTRAMUSCULAR; INTRAVENOUS at 21:08

## 2023-11-06 RX ADMIN — PRIMIDONE 50 MG: 50 TABLET ORAL at 13:47

## 2023-11-06 RX ADMIN — ZONISAMIDE 300 MG: 100 CAPSULE ORAL at 20:56

## 2023-11-06 RX ADMIN — LACOSAMIDE 200 MG: 100 TABLET, FILM COATED ORAL at 00:56

## 2023-11-06 RX ADMIN — LACTULOSE 30 G: 20 SOLUTION ORAL at 20:56

## 2023-11-06 RX ADMIN — LACTULOSE 30 G: 20 SOLUTION ORAL at 09:33

## 2023-11-06 RX ADMIN — TAMSULOSIN HYDROCHLORIDE 0.4 MG: 0.4 CAPSULE ORAL at 09:34

## 2023-11-06 RX ADMIN — SODIUM CHLORIDE, POTASSIUM CHLORIDE, SODIUM LACTATE AND CALCIUM CHLORIDE: 600; 310; 30; 20 INJECTION, SOLUTION INTRAVENOUS at 00:54

## 2023-11-06 RX ADMIN — DIVALPROEX SODIUM 1000 MG: 250 TABLET, DELAYED RELEASE ORAL at 00:57

## 2023-11-06 RX ADMIN — LATANOPROST 1 DROP: 50 SOLUTION OPHTHALMIC at 20:57

## 2023-11-06 RX ADMIN — LEVOTHYROXINE SODIUM 125 MCG: 0.12 TABLET ORAL at 05:16

## 2023-11-06 RX ADMIN — PANTOPRAZOLE 20 MG: 20 TABLET, DELAYED RELEASE ORAL at 05:16

## 2023-11-06 RX ADMIN — DIVALPROEX SODIUM 1000 MG: 250 TABLET, DELAYED RELEASE ORAL at 20:56

## 2023-11-06 RX ADMIN — ENOXAPARIN SODIUM 40 MG: 100 INJECTION SUBCUTANEOUS at 17:12

## 2023-11-06 RX ADMIN — QUETIAPINE FUMARATE 50 MG: 50 TABLET, EXTENDED RELEASE ORAL at 00:58

## 2023-11-06 RX ADMIN — ATORVASTATIN CALCIUM 80 MG: 40 TABLET, FILM COATED ORAL at 00:56

## 2023-11-06 RX ADMIN — LACOSAMIDE 200 MG: 100 TABLET, FILM COATED ORAL at 20:56

## 2023-11-06 RX ADMIN — DIVALPROEX SODIUM 500 MG: 250 TABLET, DELAYED RELEASE ORAL at 09:34

## 2023-11-06 NOTE — H&P
1/9/2023 10/9/21   Sharon Can MD   clopidogrel (PLAVIX) 75 MG tablet Take 1 tablet by mouth daily 10/10/21   Sharon Can MD   amLODIPine (NORVASC) 10 MG tablet Take 1 tablet by mouth daily 10/10/21   Sharon Can MD   mirabegron (MYRBETRIQ) 50 MG TB24 Take 50 mg by mouth daily    Chriss Buckner MD   solifenacin (VESICARE) 10 MG tablet Take 10 mg by mouth daily  Patient not taking: Reported on 1/9/2023    Chriss Buckner MD   AMITIZA 24 MCG capsule  9/22/21 8/20/22  Chriss Buckner MD   acetaminophen (APAP EXTRA STRENGTH) 500 MG tablet Take 1 tablet by mouth every 6 hours as needed for Pain 9/7/20   CHRIS Chairez   levothyroxine (SYNTHROID) 125 MCG tablet Take 1 tablet by mouth Daily 4/14/20   Loni Ochoa MD   clonazePAM (KLONOPIN) 0.5 MG tablet Take 0.5 tablets by mouth daily.     Chriss Buckner MD   omeprazole (PRILOSEC) 20 MG delayed release capsule Take 1 capsule by mouth 2 times daily (before meals)    Chriss Buckner MD   primidone (MYSOLINE) 50 MG tablet Take 1 tablet by mouth 3 times daily    Chriss Buckner MD   Simethicone (MI-ACID GAS RELIEF PO) Take 1 Container by mouth every 4 hours as needed 10 cc every 4 hrs not to exceed 60 cc in 24 hrs  Patient not taking: Reported on 1/9/2023    Chriss Buckner MD   guaiFENesin (MUCINEX) 600 MG extended release tablet Take 1,200 mg by mouth daily as needed for Congestion  Patient not taking: Reported on 1/9/2023    Chriss Buckner MD   Menthol-Methyl Salicylate (MUSCLE RUB) 10-15 % CREA cream Apply 1 applicator topically as needed  Patient not taking: Reported on 1/9/2023    Chriss Buckner MD   latanoprost (XALATAN) 0.005 % ophthalmic solution Place 1 drop into both eyes nightly    Chriss Buckner MD   timolol (TIMOPTIC) 0.5 % ophthalmic solution Place 1 drop into both eyes daily    Chriss Buckner MD   Lactobacillus (ACIDOPHILUS) CAPS capsule Take 1 capsule by

## 2023-11-06 NOTE — ED NOTES
ED TO INPATIENT SBAR HANDOFF    Patient Name: Rosalio Betts   :  1955  80 y.o. Preferred Name  [place]  Family/Caregiver Present yes   Restraints no   C-SSRS: Risk of Suicide: No Risk  Sitter no   Sepsis Risk Score Sepsis Risk Score: 1.23      Situation  Chief Complaint   Patient presents with    Diarrhea     For past couple days     Brief Description of Patient's Condition: 80 y.o. male with past medical history of CVA, cerebral palsy, epilepsy, IBS that presents with altered mental status, shortness of breath, diarrhea. According to his legal guardian patient has not been acting like himself for the last week or so, he is confused, not oriented to where he lives, taking much more time to talk, he usually speaks normally. He has been getting out of bed without assistance and having falls due to these, this is unusual for him, patient has been evaluated in the ED for this. In addition he has been having some shortness of breath and cough plus a few days of diarrhea. He denies fevers, chest pain, abdominal pain, dysuria, hematuria, lower extremity pain. Mental Status: oriented, alert, and coherent  Arrived from: group home    Imaging:   XR CHEST PORTABLE   Final Result   New bilateral infrahilar consolidative opacities concerning for pneumonia. CT HEAD WO CONTRAST   Final Result   No acute intracranial abnormality.            Abnormal labs:   Abnormal Labs Reviewed   CBC WITH AUTO DIFFERENTIAL - Abnormal; Notable for the following components:       Result Value    MCH 34.1 (*)     Platelets 271 (*)     Segs Relative 67.3 (*)     Lymphocytes % 14.4 (*)     Monocytes % 15.7 (*)     Immature Neutrophil % 1.3 (*)     All other components within normal limits   COMPREHENSIVE METABOLIC PANEL - Abnormal; Notable for the following components:    CO2 20 (*)     Glucose 125 (*)     Creatinine 0.6 (*)     All other components within normal limits   POCT GLUCOSE - Abnormal; Notable for the following components:

## 2023-11-06 NOTE — TELEPHONE ENCOUNTER
[facility] called stated order for  EEG 48 Hour Ambulatory   Need new order sent to [facility] stating if with or without video  and type of monitoring

## 2023-11-06 NOTE — PLAN OF CARE
Problem: Discharge Planning  Goal: Discharge to home or other facility with appropriate resources  Outcome: Progressing     Problem: Skin/Tissue Integrity  Goal: Absence of new skin breakdown  Description: 1. Monitor for areas of redness and/or skin breakdown  2. Assess vascular access sites hourly  3. Every 4-6 hours minimum:  Change oxygen saturation probe site  4. Every 4-6 hours:  If on nasal continuous positive airway pressure, respiratory therapy assess nares and determine need for appliance change or resting period.   Outcome: Progressing     Problem: ABCDS Injury Assessment  Goal: Absence of physical injury  Outcome: Progressing     Problem: Safety - Adult  Goal: Free from fall injury  Outcome: Progressing     Problem: Chronic Conditions and Co-morbidities  Goal: Patient's chronic conditions and co-morbidity symptoms are monitored and maintained or improved  Outcome: Progressing     Problem: Neurosensory - Adult  Goal: Achieves stable or improved neurological status  Outcome: Progressing     Problem: Skin/Tissue Integrity - Adult  Goal: Skin integrity remains intact  Outcome: Progressing     Problem: Musculoskeletal - Adult  Goal: Return mobility to safest level of function  Outcome: Progressing  Goal: Return ADL status to a safe level of function  Outcome: Progressing     Problem: Gastrointestinal - Adult  Goal: Maintains adequate nutritional intake  Outcome: Progressing     Problem: Infection - Adult  Goal: Absence of infection at discharge  Outcome: Progressing     Problem: Metabolic/Fluid and Electrolytes - Adult  Goal: Electrolytes maintained within normal limits  Outcome: Progressing     Problem: Respiratory - Adult  Goal: Achieves optimal ventilation and oxygenation  Outcome: Progressing

## 2023-11-07 LAB
ANION GAP SERPL CALCULATED.3IONS-SCNC: 8 MMOL/L (ref 4–16)
BUN SERPL-MCNC: 12 MG/DL (ref 6–23)
CALCIUM SERPL-MCNC: 9.2 MG/DL (ref 8.3–10.6)
CHLORIDE BLD-SCNC: 112 MMOL/L (ref 99–110)
CO2: 23 MMOL/L (ref 21–32)
CREAT SERPL-MCNC: 0.6 MG/DL (ref 0.9–1.3)
EKG ATRIAL RATE: 68 BPM
EKG DIAGNOSIS: NORMAL
EKG P AXIS: 36 DEGREES
EKG P-R INTERVAL: 154 MS
EKG Q-T INTERVAL: 432 MS
EKG QRS DURATION: 168 MS
EKG QTC CALCULATION (BAZETT): 459 MS
EKG R AXIS: 230 DEGREES
EKG T AXIS: -18 DEGREES
EKG VENTRICULAR RATE: 68 BPM
GFR SERPL CREATININE-BSD FRML MDRD: >60 ML/MIN/1.73M2
GLUCOSE SERPL-MCNC: 132 MG/DL (ref 70–99)
HCT VFR BLD CALC: 44 % (ref 42–52)
HEMOGLOBIN: 14.5 GM/DL (ref 13.5–18)
MCH RBC QN AUTO: 31.3 PG (ref 27–31)
MCHC RBC AUTO-ENTMCNC: 33 % (ref 32–36)
MCV RBC AUTO: 95 FL (ref 78–100)
PDW BLD-RTO: 13.1 % (ref 11.7–14.9)
PLATELET # BLD: 215 K/CU MM (ref 140–440)
PMV BLD AUTO: 10.1 FL (ref 7.5–11.1)
POTASSIUM SERPL-SCNC: 3.7 MMOL/L (ref 3.5–5.1)
RBC # BLD: 4.63 M/CU MM (ref 4.6–6.2)
SODIUM BLD-SCNC: 143 MMOL/L (ref 135–145)
WBC # BLD: 7.3 K/CU MM (ref 4–10.5)

## 2023-11-07 PROCEDURE — 2580000003 HC RX 258: Performed by: STUDENT IN AN ORGANIZED HEALTH CARE EDUCATION/TRAINING PROGRAM

## 2023-11-07 PROCEDURE — 93010 ELECTROCARDIOGRAM REPORT: CPT | Performed by: INTERNAL MEDICINE

## 2023-11-07 PROCEDURE — 6360000002 HC RX W HCPCS: Performed by: STUDENT IN AN ORGANIZED HEALTH CARE EDUCATION/TRAINING PROGRAM

## 2023-11-07 PROCEDURE — 6370000000 HC RX 637 (ALT 250 FOR IP): Performed by: STUDENT IN AN ORGANIZED HEALTH CARE EDUCATION/TRAINING PROGRAM

## 2023-11-07 PROCEDURE — 80048 BASIC METABOLIC PNL TOTAL CA: CPT

## 2023-11-07 PROCEDURE — 36415 COLL VENOUS BLD VENIPUNCTURE: CPT

## 2023-11-07 PROCEDURE — 1200000000 HC SEMI PRIVATE

## 2023-11-07 PROCEDURE — 94761 N-INVAS EAR/PLS OXIMETRY MLT: CPT

## 2023-11-07 PROCEDURE — 85027 COMPLETE CBC AUTOMATED: CPT

## 2023-11-07 RX ADMIN — DIVALPROEX SODIUM 1000 MG: 250 TABLET, DELAYED RELEASE ORAL at 21:24

## 2023-11-07 RX ADMIN — ENOXAPARIN SODIUM 40 MG: 100 INJECTION SUBCUTANEOUS at 18:00

## 2023-11-07 RX ADMIN — PRIMIDONE 50 MG: 50 TABLET ORAL at 11:09

## 2023-11-07 RX ADMIN — QUETIAPINE FUMARATE 50 MG: 50 TABLET, EXTENDED RELEASE ORAL at 21:36

## 2023-11-07 RX ADMIN — PRIMIDONE 50 MG: 50 TABLET ORAL at 13:59

## 2023-11-07 RX ADMIN — DIVALPROEX SODIUM 500 MG: 250 TABLET, DELAYED RELEASE ORAL at 11:09

## 2023-11-07 RX ADMIN — LACTULOSE 30 G: 20 SOLUTION ORAL at 21:22

## 2023-11-07 RX ADMIN — TAMSULOSIN HYDROCHLORIDE 0.4 MG: 0.4 CAPSULE ORAL at 11:09

## 2023-11-07 RX ADMIN — SODIUM CHLORIDE, PRESERVATIVE FREE 10 ML: 5 INJECTION INTRAVENOUS at 11:08

## 2023-11-07 RX ADMIN — PRIMIDONE 50 MG: 50 TABLET ORAL at 21:24

## 2023-11-07 RX ADMIN — LATANOPROST 1 DROP: 50 SOLUTION OPHTHALMIC at 21:36

## 2023-11-07 RX ADMIN — PANTOPRAZOLE 20 MG: 20 TABLET, DELAYED RELEASE ORAL at 18:00

## 2023-11-07 RX ADMIN — LACOSAMIDE 200 MG: 100 TABLET, FILM COATED ORAL at 11:08

## 2023-11-07 RX ADMIN — PAROXETINE HYDROCHLORIDE 20 MG: 20 TABLET, FILM COATED ORAL at 11:09

## 2023-11-07 RX ADMIN — ASPIRIN 81 MG CHEWABLE TABLET 81 MG: 81 TABLET CHEWABLE at 11:08

## 2023-11-07 RX ADMIN — PANTOPRAZOLE 20 MG: 20 TABLET, DELAYED RELEASE ORAL at 06:03

## 2023-11-07 RX ADMIN — ZONISAMIDE 300 MG: 100 CAPSULE ORAL at 21:23

## 2023-11-07 RX ADMIN — SODIUM CHLORIDE, PRESERVATIVE FREE 10 ML: 5 INJECTION INTRAVENOUS at 21:25

## 2023-11-07 RX ADMIN — LACTULOSE 30 G: 20 SOLUTION ORAL at 13:59

## 2023-11-07 RX ADMIN — LACTULOSE 30 G: 20 SOLUTION ORAL at 11:08

## 2023-11-07 RX ADMIN — CEFTRIAXONE 1000 MG: 1 INJECTION, POWDER, FOR SOLUTION INTRAMUSCULAR; INTRAVENOUS at 21:30

## 2023-11-07 RX ADMIN — LEVOTHYROXINE SODIUM 125 MCG: 0.12 TABLET ORAL at 06:03

## 2023-11-07 RX ADMIN — ATORVASTATIN CALCIUM 80 MG: 40 TABLET, FILM COATED ORAL at 21:24

## 2023-11-07 RX ADMIN — CLONAZEPAM 0.25 MG: 0.5 TABLET ORAL at 11:09

## 2023-11-07 RX ADMIN — AZITHROMYCIN MONOHYDRATE 500 MG: 500 INJECTION, POWDER, LYOPHILIZED, FOR SOLUTION INTRAVENOUS at 22:05

## 2023-11-07 RX ADMIN — LACOSAMIDE 200 MG: 100 TABLET, FILM COATED ORAL at 21:23

## 2023-11-07 RX ADMIN — ZONISAMIDE 100 MG: 100 CAPSULE ORAL at 11:08

## 2023-11-07 RX ADMIN — CLOPIDOGREL BISULFATE 75 MG: 75 TABLET ORAL at 11:09

## 2023-11-07 NOTE — PLAN OF CARE
Problem: Discharge Planning  Goal: Discharge to home or other facility with appropriate resources  Outcome: Progressing  Flowsheets (Taken 11/7/2023 1107)  Discharge to home or other facility with appropriate resources: Identify barriers to discharge with patient and caregiver     Problem: Skin/Tissue Integrity  Goal: Absence of new skin breakdown  Description: 1. Monitor for areas of redness and/or skin breakdown  2. Assess vascular access sites hourly  3. Every 4-6 hours minimum:  Change oxygen saturation probe site  4. Every 4-6 hours:  If on nasal continuous positive airway pressure, respiratory therapy assess nares and determine need for appliance change or resting period.   Outcome: Progressing     Problem: ABCDS Injury Assessment  Goal: Absence of physical injury  Outcome: Progressing     Problem: Safety - Adult  Goal: Free from fall injury  Outcome: Progressing     Problem: Chronic Conditions and Co-morbidities  Goal: Patient's chronic conditions and co-morbidity symptoms are monitored and maintained or improved  Outcome: Progressing     Problem: Neurosensory - Adult  Goal: Achieves stable or improved neurological status  Outcome: Progressing     Problem: Skin/Tissue Integrity - Adult  Goal: Skin integrity remains intact  Outcome: Progressing  Flowsheets  Taken 11/7/2023 1636  Skin Integrity Remains Intact: Monitor for areas of redness and/or skin breakdown  Taken 11/7/2023 1107  Skin Integrity Remains Intact: Monitor for areas of redness and/or skin breakdown     Problem: Musculoskeletal - Adult  Goal: Return mobility to safest level of function  Outcome: Progressing  Goal: Return ADL status to a safe level of function  Outcome: Progressing     Problem: Gastrointestinal - Adult  Goal: Maintains adequate nutritional intake  Outcome: Progressing     Problem: Infection - Adult  Goal: Absence of infection at discharge  Outcome: Progressing     Problem: Metabolic/Fluid and Electrolytes - Adult  Goal:

## 2023-11-07 NOTE — CARE COORDINATION
11/07/23 0768   Service Assessment   History Provided By Medical Record; Other (see comment)  (Sister Yarely Fletcher)   Primary Caregiver Other (Comment)  (group home ,)   Patient's Healthcare Decision Maker is: Named in [address]  (Sister Dyna Ambrocio is pt's Guardian)   PCP Verified by CM Yes   Last Visit to PCP Within last 3 months   Prior Functional Level Assistance with the following:;Mobility; Feeding;Cooking;Housework; Shopping; Toileting; Other (see comment)  (Able to dress himself and transfer to Wheelchair)   Current Functional Level Bathing;Dressing; Toileting;Feeding;Cooking;Housework; Shopping;Mobility   Can patient return to prior living arrangement Unknown at present   Ability to make needs known: Fair   Family able to assist with home care needs: Other (comment)  (group home care givers)   Financial Resources Medicaid; Medicare   Social/Functional History   Lives With Home care staff   Type of Home House  (group home)   Home Layout One level   [place], standard   Active  No   Patient's  Info transport   Mode of Transportation Cite Solomon Jimenez   Condition of Participation: Discharge Planning   The Patient and/Or Patient Representative agree with the Discharge Plan? Yes     Reviewed chart, discussed in IDR , then spoke with pt's sister/guardian Tanesha Palacio. Pt is from a group home and plan is to return if able. He has to be able to transfer to [facility] to return and be medically able to return . PS to Dr Ninoska Webb requesting PT/OT orders , WB to therapy. CM will continue to follow.

## 2023-11-08 DIAGNOSIS — R56.9 SEIZURE (HCC): Primary | ICD-10-CM

## 2023-11-08 PROCEDURE — 76937 US GUIDE VASCULAR ACCESS: CPT

## 2023-11-08 PROCEDURE — 92526 ORAL FUNCTION THERAPY: CPT | Performed by: SPEECH-LANGUAGE PATHOLOGIST

## 2023-11-08 PROCEDURE — 97162 PT EVAL MOD COMPLEX 30 MIN: CPT

## 2023-11-08 PROCEDURE — 1200000000 HC SEMI PRIVATE

## 2023-11-08 PROCEDURE — 6360000002 HC RX W HCPCS: Performed by: STUDENT IN AN ORGANIZED HEALTH CARE EDUCATION/TRAINING PROGRAM

## 2023-11-08 PROCEDURE — 97535 SELF CARE MNGMENT TRAINING: CPT

## 2023-11-08 PROCEDURE — 2580000003 HC RX 258: Performed by: STUDENT IN AN ORGANIZED HEALTH CARE EDUCATION/TRAINING PROGRAM

## 2023-11-08 PROCEDURE — 94761 N-INVAS EAR/PLS OXIMETRY MLT: CPT

## 2023-11-08 PROCEDURE — 6370000000 HC RX 637 (ALT 250 FOR IP): Performed by: STUDENT IN AN ORGANIZED HEALTH CARE EDUCATION/TRAINING PROGRAM

## 2023-11-08 PROCEDURE — 97129 THER IVNTJ 1ST 15 MIN: CPT

## 2023-11-08 PROCEDURE — 97166 OT EVAL MOD COMPLEX 45 MIN: CPT

## 2023-11-08 PROCEDURE — 97530 THERAPEUTIC ACTIVITIES: CPT

## 2023-11-08 RX ADMIN — LACTULOSE 30 G: 20 SOLUTION ORAL at 22:41

## 2023-11-08 RX ADMIN — ZONISAMIDE 300 MG: 100 CAPSULE ORAL at 22:42

## 2023-11-08 RX ADMIN — PRIMIDONE 50 MG: 50 TABLET ORAL at 15:51

## 2023-11-08 RX ADMIN — QUETIAPINE FUMARATE 50 MG: 50 TABLET, EXTENDED RELEASE ORAL at 23:11

## 2023-11-08 RX ADMIN — ZONISAMIDE 100 MG: 100 CAPSULE ORAL at 10:10

## 2023-11-08 RX ADMIN — CLOPIDOGREL BISULFATE 75 MG: 75 TABLET ORAL at 10:13

## 2023-11-08 RX ADMIN — TIMOLOL MALEATE 1 DROP: 5 SOLUTION OPHTHALMIC at 10:19

## 2023-11-08 RX ADMIN — PRIMIDONE 50 MG: 50 TABLET ORAL at 10:12

## 2023-11-08 RX ADMIN — ASPIRIN 81 MG CHEWABLE TABLET 81 MG: 81 TABLET CHEWABLE at 10:11

## 2023-11-08 RX ADMIN — LACTULOSE 30 G: 20 SOLUTION ORAL at 15:32

## 2023-11-08 RX ADMIN — PRIMIDONE 50 MG: 50 TABLET ORAL at 22:43

## 2023-11-08 RX ADMIN — PAROXETINE HYDROCHLORIDE 20 MG: 20 TABLET, FILM COATED ORAL at 10:10

## 2023-11-08 RX ADMIN — LATANOPROST 1 DROP: 50 SOLUTION OPHTHALMIC at 23:11

## 2023-11-08 RX ADMIN — LACOSAMIDE 200 MG: 100 TABLET, FILM COATED ORAL at 22:42

## 2023-11-08 RX ADMIN — AMLODIPINE BESYLATE 10 MG: 10 TABLET ORAL at 10:12

## 2023-11-08 RX ADMIN — CLONAZEPAM 0.25 MG: 0.5 TABLET ORAL at 10:10

## 2023-11-08 RX ADMIN — TAMSULOSIN HYDROCHLORIDE 0.4 MG: 0.4 CAPSULE ORAL at 10:13

## 2023-11-08 RX ADMIN — LEVOTHYROXINE SODIUM 125 MCG: 0.12 TABLET ORAL at 05:36

## 2023-11-08 RX ADMIN — LACOSAMIDE 200 MG: 100 TABLET, FILM COATED ORAL at 10:14

## 2023-11-08 RX ADMIN — ENOXAPARIN SODIUM 40 MG: 100 INJECTION SUBCUTANEOUS at 19:22

## 2023-11-08 RX ADMIN — PANTOPRAZOLE 20 MG: 20 TABLET, DELAYED RELEASE ORAL at 05:36

## 2023-11-08 RX ADMIN — PANTOPRAZOLE 20 MG: 20 TABLET, DELAYED RELEASE ORAL at 15:51

## 2023-11-08 RX ADMIN — LACTULOSE 30 G: 20 SOLUTION ORAL at 10:13

## 2023-11-08 RX ADMIN — DIVALPROEX SODIUM 500 MG: 250 TABLET, DELAYED RELEASE ORAL at 10:10

## 2023-11-08 RX ADMIN — DIVALPROEX SODIUM 1000 MG: 250 TABLET, DELAYED RELEASE ORAL at 22:42

## 2023-11-08 RX ADMIN — ATORVASTATIN CALCIUM 80 MG: 40 TABLET, FILM COATED ORAL at 22:43

## 2023-11-08 RX ADMIN — SODIUM CHLORIDE, PRESERVATIVE FREE 10 ML: 5 INJECTION INTRAVENOUS at 10:13

## 2023-11-08 NOTE — PLAN OF CARE
Problem: Discharge Planning  Goal: Discharge to home or other facility with appropriate resources  Outcome: Progressing     Problem: Skin/Tissue Integrity  Goal: Absence of new skin breakdown  Description: 1. Monitor for areas of redness and/or skin breakdown  2. Assess vascular access sites hourly  3. Every 4-6 hours minimum:  Change oxygen saturation probe site  4. Every 4-6 hours:  If on nasal continuous positive airway pressure, respiratory therapy assess nares and determine need for appliance change or resting period.   Outcome: Progressing     Problem: ABCDS Injury Assessment  Goal: Absence of physical injury  Outcome: Progressing     Problem: Safety - Adult  Goal: Free from fall injury  Outcome: Progressing     Problem: Chronic Conditions and Co-morbidities  Goal: Patient's chronic conditions and co-morbidity symptoms are monitored and maintained or improved  Outcome: Progressing     Problem: Neurosensory - Adult  Goal: Achieves stable or improved neurological status  Outcome: Progressing     Problem: Skin/Tissue Integrity - Adult  Goal: Skin integrity remains intact  Outcome: Progressing  Flowsheets (Taken 11/8/2023 1120)  Skin Integrity Remains Intact: Monitor for areas of redness and/or skin breakdown     Problem: Musculoskeletal - Adult  Goal: Return mobility to safest level of function  Outcome: Progressing  Goal: Return ADL status to a safe level of function  Outcome: Progressing     Problem: Gastrointestinal - Adult  Goal: Maintains adequate nutritional intake  Outcome: Progressing     Problem: Infection - Adult  Goal: Absence of infection at discharge  Outcome: Progressing     Problem: Metabolic/Fluid and Electrolytes - Adult  Goal: Electrolytes maintained within normal limits  Outcome: Progressing     Problem: Respiratory - Adult  Goal: Achieves optimal ventilation and oxygenation  Outcome: Progressing

## 2023-11-08 NOTE — CARE COORDINATION
Reviewed chart, discussed in IDR and called pt's sister/guardian to give her an update. Plan remains to return to Group home. VM left for Group home administrator Thalia. CM will continue to follow.

## 2023-11-08 NOTE — CONSULTS
[address] PHYSICAL THERAPY EVALUATION  Christiano Sousa, 1955, 4108/4108-A, 11/8/2023    History  [place]:  The primary encounter diagnosis was Pneumonia of both lungs due to infectious organism, unspecified part of lung. A diagnosis of Altered mental status, unspecified altered mental status type was also pertinent to this visit. Patient  has a past medical history of Acute CVA (cerebrovascular accident) ([address]), Anemia, Aspiration pneumonia ([address]), Cerebral palsy ([address]), Depression, Epilepsy ([address]), Frequent falls, Glaucoma, History of IBS, Hx MRSA infection, Hyperammonemia ([address]), Hypertension, IBS (irritable bowel syndrome), Kidney stone, Mood disorder ([address]), MRSA (methicillin resistant staph aureus) culture positive, MRSA (methicillin resistant staph aureus) culture positive, Occasional tremors, Pressure injury of contiguous region involving back and right buttock, stage 2 ([address]), Pressure injury of left buttock, stage 1, Prostatitis, Thyroid disease, and Ulcerative colitis ([address]). Patient  has a past surgical history that includes Gallbladder surgery (2005); Vagus nerve stimulator insertion (2002); Cholecystectomy; Cystoscopy (Left, 07/11/2013); Kidney stone surgery; Colonoscopy (8/19/14, 5/2012); other surgical history (04/15/2015); Upper gastrointestinal endoscopy (N/A, 11/13/2018); Colonoscopy (02/04/2021); Colonoscopy (N/A, 2/4/2021); Stimulator Surgery (N/A, 3/24/2021); Stimulator Surgery (N/A, 3/24/2021); and Facial Surgery (N/A, 10/8/2021). Discharge Recommendation: home with assistance and HHPT    Subjective:    Patient states:  \"I need a hug.\"      Pain:  did not state numerical value.       Communication with other providers:  Handoff to RNKerry, Co-eval with Mimi Ace for safety and tolerance    Restrictions: General Precautions, Fall Risk    Home Setup/Prior level of function  Social/Functional History  Lives With: Home care staff  Type of Home: House (group home)  Home

## 2023-11-08 NOTE — PROGRESS NOTES
11/08/23 1602   Encounter Summary   Encounter Overview/Reason  Initial Encounter; Attempted Encounter   Service Provided For: Patient;Patient not available   [facility]   Support System Other (Comment)   Last Encounter  11/08/23  (Attempted visit)   Complexity of Encounter Low   Begin Time 1555   End Time  1600   Total Time Calculated 5 min   Spiritual/Emotional needs   Type Spiritual Support   Assessment/Intervention/Outcome   Assessment Coping   Intervention Prayer (assurance of)/[place];Sustaining Presence/Ministry of presence   Outcome Coping   Plan and Referrals   Plan/Referrals Continue Support (comment)  (as needed)
4 Eyes Skin Assessment     NAME:  Bárbara Morales  YOB: 1955  MEDICAL RECORD NUMBER:  [id number]    The patient is being assess for  Admission    I agree that 2 RN's have performed a thorough Head to Toe Skin Assessment on the patient. ALL assessment sites listed below have been assessed. Areas assessed by both nurses:    Head, Face, Ears, Shoulders, Back, Chest, Arms, Elbows, Hands, Sacrum. Buttock, Coccyx, Ischium, Legs. Feet and Heels, and Other leg wrapped with soft cast, under wound care  specialist         Does the Patient have a Wound? Yes wound(s) were present on assessment. LDA wound assessment was Initiated and completed by RN Left leg with soft cast and wrapped       Jean Prevention initiated:  Yes   Wound Care Orders initiated:  Yes Patient is presently being seen by wound care    Pressure Injury (Stage 3,4, Unstageable, DTI, NWPT, and Complex wounds) if present place consult order under [de-identified] NA    New and Established Ostomies if present place consult order under : NA      Nurse 1 eSignature: Electronically signed by Jose G Doyle RN on 11/6/23 at 2:02 AM EST    **SHARE this note so that the co-signing nurse is able to place an eSignature**    Nurse 2 eSignature: Electronically signed by Chris Dempsey LPN on 40/1/40 at 9:33 AM EST
Physician Progress Note      Sebastien Rosario  CSN #:                  [id number]  :                       1955  ADMIT DATE:       2023 2:19 PM  [facility] DATE:  RESPONDING  PROVIDER #:        Anni Lake MD          QUERY TEXT:    Pt admitted with Pneumonia. Pt noted to have speech eval with mild oral stage   dysphasia. If possible, please document in the progress notes and discharge   summary if you are evaluating and/or treating any of the following:    Note: CAP and HCAP indicate where the pneumonia was acquired, not a specific   type. The medical record reflects the following:    Risk Factors: Pneumonia, Hx of aspiration , CP, HX Right MCA and seizures  Clinical Indicators: Dysphagia Diagnosis: Mild oral stage dysphagia, Dysphagia   Outcome Severity Scale: Level 5: Mild dysphagia- Distant supervision. May   need one diet consistency restricted, He followed basic commands for the oral   mechanism exam which demonstrated reduced lingual coordination, Pt's   pharyngeal swallow appeared AFIA/[facility] [place] with no s/s aspiration for all trials. CXR:New bilateral infrahilar consolidative opacities concerning for pneumonia. Treatment: Speech eval, Soft and bite-size/Thins by cup or straw. Aspiration   precautions. Intermittent supervision for meals due to impulsivity. Thank you, Genna Zamora RN, [place] [phone]  Options provided:  -- Aspiration pneumonia  -- Other - I will add my own diagnosis  -- Disagree - Not applicable / Not valid  -- Disagree - Clinically unable to determine / Unknown  -- Refer to Clinical Documentation Reviewer    PROVIDER RESPONSE TEXT:    This patient has aspiration pneumonia. Query created by: Genna Zamora on 2023 12:53 PM      QUERY TEXT:    Pt admitted with bilateral community-acquired pneumonia and has encephalopathy   documented in PN notes .  If possible, please document in progress notes   and discharge summary further specificity regarding the type
SLP ALL NOTES  [address] [facility]  DEPARTMENT OF SPEECH/LANGUAGE PATHOLOGY  DAILY PROGRESS NOTE  Rosalio Alpesh  11/8/2023  [id number]  Acute encephalopathy [G93.40]  Altered mental status, unspecified altered mental status type [R41.82]  Pneumonia of both lungs due to infectious organism, unspecified part of lung [J18.9]  No Known Allergies      Pt was seen this date for dysphagia treatment. IMPRESSION AND RECOMMENDATIONS:    Pt seen this date for diet tolerance monitoring. Pt's lunch tray being saved for him as he was apparently not ready to eat when it was delivered. Pt was asleep seated in an upright position and awakened easily to his name. He was agreeable to PO trials of thins by straw x 3 and soft/moist solids (refused trials from lunch tray at this time) which he completed with prolonged mastication and normal clearance. Suspect mastication is much more efficient when pt has his dentures in.  0 s/s aspiration observed for all trials of thins by straw and soft solids. Hyolaryngeal excursion/elevation appeared [facility]. Recommend:  Downgrade to Minced and moist/Thins. Aspiration precautions.        GOALS (current status in bold):  Short-term Goals  Timeframe for Short-term Goals: 1 week  Goal 1: Pt will tolerate Soft and bite-size diet/Thins by cup/straw without clinical evidence for aspiration 100% Not Met, Discontinue  New Goal 1:  Pt will tolerate Minced and moist diet/Thin liquids with adequate mastication and 0 s/s aspiration 100%  Goal 2: Caregivers will use aspiration precautions for all PO intake 100%       EDUCATION:  diet level change with MEENAKSHI Payne pt asleep, no evidence of learning    PAIN RATING (0-10 Scale):  did not c/o pain  Time in/Time out:    1320/1340    Visit number:  2      Bjorn Chacon, SLP  11/8/2023  1:35 PM
SLP ALL NOTES  Facility/Department: [address]   CLINICAL BEDSIDE SWALLOW EVALUATION    NAME: Tonya Hinojosa  : 1955  MRN: [id number]    ADMISSION DATE: 2023  ADMITTING DIAGNOSIS: has Cerebral palsy, infantile hemiplegia ([address]); Rosacea, acne; IBS (irritable bowel syndrome); Essential hypertension; Calculus of ureter; Pyelonephritis; Sepsis ([address]); Pneumonia; Increased ammonia level; Aspiration pneumonia ([address]); Hypokalemia; Hypomagnesemia; Hypophosphatasia; Seizure disorder ([address]); Seizure disorder, grand mal ([address]); Sepsis due to undetermined organism with acute respiratory failure ([address]); Pain of upper abdomen; Diarrhea of presumed infectious origin; Abdominal pain; Pressure injury of contiguous region involving back and right buttock, stage 2 ([address]); Cellulitis, perineum; Pressure injury of right buttock, stage 2 ([address]); Pressure injury of left buttock, stage 1; COVID-19 virus detected; Hyponatremia; Multifocal pneumonia; TIA (transient ischemic attack); Acute CVA (cerebrovascular accident) ([address]); Ataxia; Dysarthria; Dysphagia due to recent stroke; Depression with anxiety; Facial abscess; Group B streptococcal UTI; History of cerebral palsy; Encephalopathy acute; PNA (pneumonia); and Acute encephalopathy on their problem list.    Impression  Dysphagia Diagnosis: Mild oral stage dysphagia  Dysphagia Outcome Severity Scale: Level 5: Mild dysphagia- Distant supervision. May need one diet consistency restricted     Tonya Hinojosa was seen for a clinical bedside swallow evaluation following admission for Acute encephalopathy secondary to bilateral community-acquired pneumonia. H/o CP, Right MCA CVA, HTN, and Seizures. Pt's baseline diet level is Soft and bite-size/Thins. Pt was seen seated upright in bed, alert, pleasant, and cooperative. He followed basic commands for the oral mechanism exam which demonstrated reduced lingual coordination.   Vocal quality and cough strength were normal.  PO trials of thins by
V2.0    [facility] [place]      Name:  Kobe Narayanan /Age/Sex: 1955  (80 y.o. male)   MRN & CSN:  [id number] & [id number] Encounter Date/Time: 2023 12:41 PM EST   Location:  [address] [place] [address] PCP: NIELS Salinas MD       Hospital Day: 4    Assessment and Recommendations   Kobe Narayanan is a 80 y.o. male  who presents with Acute encephalopathy      Acute encephalopathy in the setting of bilateral community-acquired pneumonia, improving  Reported increased lethargy, inattention and need for assistance with ADLs over past few days. Lives in group home. Hx of aspiration in the past.   --Afebrile, no leukocytosis, LA normal, ammonia and TSH normal, CTH nonacute, CXR showing new bilateral infrahilar opacities. --On Rocephin with azithromycin. Will transition to amox-clav on discharge  -- Appears to be back to baseline mentation     Essential hypretension  On Norvasc               Hx of seizures  --Continue AEDs. IBS-M  --No change in bowel habits. Hx of CVA  --Continue DAPT and Lipitor. Depression and anxiety  --Continue psychotropic medications. Hx of CP     Diet Diet NPO Exceptions are: Ice Chips, Sips of Water with Meds   DVT Prophylaxis [x] Lovenox, []  Heparin, [] SCDs, [] Ambulation,  [] Eliquis, [] Xarelto  [] Coumadin   Code Status Full Code   Disposition From: Group home  Expected Disposition: Group home  Estimated Date of Discharge: 2-3 days  Patient requires continued admission due to Encephalopthy 2/2 CAP   Surrogate Decision Maker/ POA  Legal guardian      Personally reviewed Lab Studies and Imaging           Subjective:     Chief Complaint:     Patient seen and examined at bedside this morning. No acute overnight events reported. Denies any complaints. Review of Systems:      Pertinent positives and negatives discussed in HPI    Objective:      Intake/Output Summary (Last 24 hours) at 2023 1207  Last data filed at 2023 1403  Gross
V2.0    [facility] [place]      Name:  Oleg Fitzpatrick /Age/Sex: 1955  (80 y.o. male)   MRN & CSN:  [id number] & [id number] Encounter Date/Time: 2023 12:41 PM EST   Location:  [address] [place] [address] PCP: NIELS Hodges MD       Hospital Day: 3    Assessment and Recommendations   Oleg Fitzpatrick is a 80 y.o. male  who presents with Acute encephalopathy      Acute encephalopathy in the setting of bilateral community-acquired pneumonia, improving  Reported increased lethargy, inattention and need for assistance with ADLs over past few days. Lives in group home. Hx of aspiration in the past.   --Afebrile, no leukocytosis, LA normal, ammonia and TSH normal, CTH nonacute, CXR showing new bilateral infrahilar opacities. --Continue Rocephin/Azithro  --NPO pending SLP evaluation. Essential hypretension  On Norvasc               Hx of seizures  --Continue AEDs. IBS-M  --No change in bowel habits. Hx of CVA  --Continue DAPT and Lipitor. Depression and anxiety  --Continue psychotropic medications. Hx of CP     Diet Diet NPO Exceptions are: Ice Chips, Sips of Water with Meds   DVT Prophylaxis [x] Lovenox, []  Heparin, [] SCDs, [] Ambulation,  [] Eliquis, [] Xarelto  [] Coumadin   Code Status Full Code   Disposition From: Group home  Expected Disposition: Group home  Estimated Date of Discharge: 2-3 days  Patient requires continued admission due to Encephalopthy 2/2 CAP   Surrogate Decision Maker/ POA  Legal guardian      Personally reviewed Lab Studies and Imaging           Subjective:     Chief Complaint:     Patient seen and examined at bedside this morning. Alert and oriented to person place and time. States that his breathing has improved since presentation. Denies chest pain, shortness of breath, nausea vomiting, fever or chills  Review of Systems:      Pertinent positives and negatives discussed in HPI    Objective:      Intake/Output Summary (Last 24 hours) at
much help from another person does the patient currently need. .. Unable  Dep A Lot  Max A A Lot   Mod A A Little  Min A A Little   CGA  SBA None   Mod I  Indep  Sup   1. Putting on and taking off regular lower body clothing? [] 1    [x] 2   [] 2   [] 3   [] 3   [] 4      2. Bathing (including washing, rinsing, drying)? [] 1   [] 2   [x] 2 [] 3 [] 3 [] 4   3. Toileting, which includes using toilet, bedpan, or urinal? [] 1    [x] 2   [] 2   [] 3   [] 3   [] 4     4. Putting on and taking off regular upper body clothing? [] 1   [] 2   [] 2   [x] 3   [] 3    [] 4      5. Taking care of personal grooming such as brushing teeth? [] 1   [] 2    [] 2 [x] 3    [] 3   [] 4      6. Eating meals? [] 1   [] 2   [] 2   [] 3   [x] 3   [] 4      Raw Score:  15    [24=0% impaired(CH), 23=1-19%(CI), 20-22=20-39%(CJ), 15-19=40-59%(CK), 10-14=60-79%(CL), 7-9=80-99%(CM), 6=100%(CN)]     Mobility:  Supine to sit: CGA   Sit to stand: CGA  Stand to sit: CGA  Sit to supine: [place] (to advance BLE into bed)   Functional Mobility: SBA (pt self propelled approx 120ft in wheelchair w/ mod verbal cues to attend to task and for sequencing)    SPT: CGA (to/from wheelchair and EOB)    Balance:   Sitting balance: Fair+  Standing balance: Fair-    Pt educated on role of therapy, POC, safety awareness, importance of OOB activity    Treatment:  Therapeutic Activity Training:   Therapeutic activity training was instructed today. Cues were given for safety, sequence, UE/LE placement, awareness, and balance. Activities performed today included bed mobility training, sup-sit, sit-stand, SPT, wheelchair mobility    Self Care Training:   Cues were given for safety, sequence, UE/LE placement, visual cues, and balance. Activities performed today included LB dressing tasks, grooming    Cognitive Education:  Cues were given for safety, problem solving, attention to task, orientation, sequencing   Cues were verbal and/or tactile.      Safety: Patient left
abnormality. SINUSES:  The visualized paranasal sinuses and mastoid air cells demonstrate no acute abnormality. SOFT TISSUES/SKULL: No acute abnormality of the visualized skull or soft tissues. No acute intracranial abnormality. CBC:   Recent Labs     11/05/23  1450 11/06/23 0253   WBC 8.2 9.0   HGB 16.3 15.4   * 231     BMP:    Recent Labs     11/05/23  1450 11/06/23 0253    143   K 4.4 4.1    108   CO2 20* 24   BUN 12 13   CREATININE 0.6* 0.6*   GLUCOSE 125* 98     Hepatic:   Recent Labs     11/05/23  1450 11/06/23 0253   AST 27 14*   ALT 18 15   BILITOT 0.4 0.3   ALKPHOS 101 99     Lipids:   Lab Results   Component Value Date/Time    CHOL 131 01/10/2023 07:34 AM    CHOL 216 10/07/2015 12:19 PM    HDL 45 01/10/2023 07:34 AM    TRIG 157 01/10/2023 07:34 AM     Hemoglobin A1C:   Lab Results   Component Value Date/Time    LABA1C 5.2 10/03/2021 04:25 AM     TSH:   Lab Results   Component Value Date/Time    TSH 1.82 10/07/2015 12:19 PM     Troponin:   Lab Results   Component Value Date/Time    TROPONINT <0.010 08/16/2022 12:59 PM    TROPONINT <0.010 12/06/2021 10:44 PM    TROPONINT <0.010 10/03/2021 04:25 AM     Lactic Acid: No results for input(s): \"LACTA\" in the last 72 hours. BNP: No results for input(s): \"PROBNP\" in the last 72 hours.   UA:  Lab Results   Component Value Date/Time    NITRU NEGATIVE 07/27/2023 12:38 PM    NITRU NEGATIVE 05/10/2013 02:51 PM    COLORU YELLOW 07/27/2023 12:38 PM    PHUR 6.0 05/10/2013 02:51 PM    WBCUA 30 07/08/2023 10:30 AM    RBCUA 1 07/08/2023 10:30 AM    MUCUS RARE 07/08/2023 10:30 AM    TRICHOMONAS NONE SEEN 07/08/2023 10:30 AM    YEAST OCCASIONAL 09/20/2021 05:00 PM    BACTERIA NEGATIVE 07/08/2023 10:30 AM    CLARITYU CLEAR 07/27/2023 12:38 PM    SPECGRAV 1.015 07/27/2023 12:38 PM    LEUKOCYTESUR NEGATIVE 07/27/2023 12:38 PM    UROBILINOGEN 0.2 07/27/2023 12:38 PM    BILIRUBINUR NEGATIVE 07/27/2023 12:38 PM    BLOODU NEGATIVE 07/27/2023 12:38 PM

## 2023-11-09 ENCOUNTER — HOSPITAL ENCOUNTER (INPATIENT)
Age: 68
DRG: 070 | End: 2023-11-09
Attending: PHYSICAL MEDICINE & REHABILITATION | Admitting: PHYSICAL MEDICINE & REHABILITATION
Payer: MEDICARE

## 2023-11-09 VITALS
HEART RATE: 88 BPM | BODY MASS INDEX: 29.44 KG/M2 | TEMPERATURE: 98.2 F | RESPIRATION RATE: 17 BRPM | OXYGEN SATURATION: 93 % | HEIGHT: 66 IN | WEIGHT: 183.2 LBS | DIASTOLIC BLOOD PRESSURE: 84 MMHG | SYSTOLIC BLOOD PRESSURE: 132 MMHG

## 2023-11-09 PROBLEM — G93.41 METABOLIC ENCEPHALOPATHY: Status: ACTIVE | Noted: 2023-11-09

## 2023-11-09 PROCEDURE — 94761 N-INVAS EAR/PLS OXIMETRY MLT: CPT

## 2023-11-09 PROCEDURE — 6370000000 HC RX 637 (ALT 250 FOR IP): Performed by: PHYSICAL MEDICINE & REHABILITATION

## 2023-11-09 PROCEDURE — 6370000000 HC RX 637 (ALT 250 FOR IP): Performed by: STUDENT IN AN ORGANIZED HEALTH CARE EDUCATION/TRAINING PROGRAM

## 2023-11-09 PROCEDURE — 2580000003 HC RX 258: Performed by: STUDENT IN AN ORGANIZED HEALTH CARE EDUCATION/TRAINING PROGRAM

## 2023-11-09 PROCEDURE — 1280000000 HC REHAB R&B

## 2023-11-09 PROCEDURE — 6360000002 HC RX W HCPCS: Performed by: STUDENT IN AN ORGANIZED HEALTH CARE EDUCATION/TRAINING PROGRAM

## 2023-11-09 PROCEDURE — 76937 US GUIDE VASCULAR ACCESS: CPT

## 2023-11-09 PROCEDURE — 99223 1ST HOSP IP/OBS HIGH 75: CPT | Performed by: PHYSICAL MEDICINE & REHABILITATION

## 2023-11-09 RX ORDER — TIMOLOL MALEATE 5 MG/ML
1 SOLUTION/ DROPS OPHTHALMIC DAILY
Status: CANCELLED | OUTPATIENT
Start: 2023-11-10

## 2023-11-09 RX ORDER — TAMSULOSIN HYDROCHLORIDE 0.4 MG/1
0.4 CAPSULE ORAL DAILY
Status: DISPENSED | OUTPATIENT
Start: 2023-11-10

## 2023-11-09 RX ORDER — DIVALPROEX SODIUM 500 MG/1
1000 TABLET, DELAYED RELEASE ORAL NIGHTLY
Status: DISPENSED | OUTPATIENT
Start: 2023-11-09

## 2023-11-09 RX ORDER — LACOSAMIDE 100 MG/1
200 TABLET ORAL 2 TIMES DAILY
Status: CANCELLED | OUTPATIENT
Start: 2023-11-09

## 2023-11-09 RX ORDER — BISACODYL 10 MG
10 SUPPOSITORY, RECTAL RECTAL DAILY PRN
Status: CANCELLED | OUTPATIENT
Start: 2023-11-09

## 2023-11-09 RX ORDER — ONDANSETRON 2 MG/ML
4 INJECTION INTRAMUSCULAR; INTRAVENOUS EVERY 6 HOURS PRN
Status: ACTIVE | OUTPATIENT
Start: 2023-11-09

## 2023-11-09 RX ORDER — QUETIAPINE FUMARATE 50 MG/1
50 TABLET, EXTENDED RELEASE ORAL NIGHTLY
Status: DISPENSED | OUTPATIENT
Start: 2023-11-09

## 2023-11-09 RX ORDER — DIVALPROEX SODIUM 250 MG/1
1000 TABLET, DELAYED RELEASE ORAL NIGHTLY
Status: CANCELLED | OUTPATIENT
Start: 2023-11-09

## 2023-11-09 RX ORDER — POTASSIUM CHLORIDE 7.45 MG/ML
10 INJECTION INTRAVENOUS PRN
Status: DISCONTINUED | OUTPATIENT
Start: 2023-11-09 | End: 2023-11-09

## 2023-11-09 RX ORDER — ACETAMINOPHEN 650 MG/1
650 SUPPOSITORY RECTAL EVERY 6 HOURS PRN
Status: CANCELLED | OUTPATIENT
Start: 2023-11-09

## 2023-11-09 RX ORDER — QUETIAPINE FUMARATE 50 MG/1
50 TABLET, EXTENDED RELEASE ORAL NIGHTLY
Status: CANCELLED | OUTPATIENT
Start: 2023-11-09

## 2023-11-09 RX ORDER — ATORVASTATIN CALCIUM 40 MG/1
80 TABLET, FILM COATED ORAL NIGHTLY
Status: DISPENSED | OUTPATIENT
Start: 2023-11-09

## 2023-11-09 RX ORDER — ZONISAMIDE 100 MG/1
300 CAPSULE ORAL
Status: DISPENSED | OUTPATIENT
Start: 2023-11-09

## 2023-11-09 RX ORDER — SODIUM CHLORIDE 0.9 % (FLUSH) 0.9 %
5-40 SYRINGE (ML) INJECTION EVERY 12 HOURS SCHEDULED
Status: CANCELLED | OUTPATIENT
Start: 2023-11-09

## 2023-11-09 RX ORDER — CLOPIDOGREL BISULFATE 75 MG/1
75 TABLET ORAL DAILY
Status: CANCELLED | OUTPATIENT
Start: 2023-11-10

## 2023-11-09 RX ORDER — AMOXICILLIN AND CLAVULANATE POTASSIUM 875; 125 MG/1; MG/1
1 TABLET, FILM COATED ORAL 2 TIMES DAILY
Qty: 14 TABLET | Refills: 0 | Status: ON HOLD | OUTPATIENT
Start: 2023-11-09 | End: 2023-11-16

## 2023-11-09 RX ORDER — SODIUM CHLORIDE 0.9 % (FLUSH) 0.9 %
5-40 SYRINGE (ML) INJECTION PRN
Status: CANCELLED | OUTPATIENT
Start: 2023-11-09

## 2023-11-09 RX ORDER — ENOXAPARIN SODIUM 100 MG/ML
40 INJECTION SUBCUTANEOUS EVERY EVENING
Status: DISPENSED | OUTPATIENT
Start: 2023-11-09

## 2023-11-09 RX ORDER — ATORVASTATIN CALCIUM 40 MG/1
80 TABLET, FILM COATED ORAL NIGHTLY
Status: CANCELLED | OUTPATIENT
Start: 2023-11-09

## 2023-11-09 RX ORDER — ZONISAMIDE 100 MG/1
100 CAPSULE ORAL DAILY
Status: CANCELLED | OUTPATIENT
Start: 2023-11-10

## 2023-11-09 RX ORDER — ZONISAMIDE 100 MG/1
300 CAPSULE ORAL
Status: CANCELLED | OUTPATIENT
Start: 2023-11-09

## 2023-11-09 RX ORDER — BISACODYL 10 MG
10 SUPPOSITORY, RECTAL RECTAL DAILY PRN
Status: ACTIVE | OUTPATIENT
Start: 2023-11-09

## 2023-11-09 RX ORDER — PANTOPRAZOLE SODIUM 20 MG/1
20 TABLET, DELAYED RELEASE ORAL
Status: CANCELLED | OUTPATIENT
Start: 2023-11-10

## 2023-11-09 RX ORDER — POLYETHYLENE GLYCOL 3350 17 G/17G
17 POWDER, FOR SOLUTION ORAL DAILY PRN
Status: ACTIVE | OUTPATIENT
Start: 2023-11-09

## 2023-11-09 RX ORDER — SODIUM CHLORIDE 0.9 % (FLUSH) 0.9 %
5-40 SYRINGE (ML) INJECTION EVERY 12 HOURS SCHEDULED
Status: DISCONTINUED | OUTPATIENT
Start: 2023-11-09 | End: 2023-11-11

## 2023-11-09 RX ORDER — PAROXETINE HYDROCHLORIDE 20 MG/1
20 TABLET, FILM COATED ORAL EVERY MORNING
Status: DISPENSED | OUTPATIENT
Start: 2023-11-10

## 2023-11-09 RX ORDER — PANTOPRAZOLE SODIUM 20 MG/1
20 TABLET, DELAYED RELEASE ORAL
Status: DISPENSED | OUTPATIENT
Start: 2023-11-10

## 2023-11-09 RX ORDER — TAMSULOSIN HYDROCHLORIDE 0.4 MG/1
0.4 CAPSULE ORAL DAILY
Status: CANCELLED | OUTPATIENT
Start: 2023-11-10

## 2023-11-09 RX ORDER — POLYETHYLENE GLYCOL 3350 17 G/17G
17 POWDER, FOR SOLUTION ORAL DAILY PRN
Status: CANCELLED | OUTPATIENT
Start: 2023-11-09

## 2023-11-09 RX ORDER — LACTULOSE 10 G/15ML
30 SOLUTION ORAL 3 TIMES DAILY
Status: CANCELLED | OUTPATIENT
Start: 2023-11-09

## 2023-11-09 RX ORDER — SODIUM CHLORIDE 0.9 % (FLUSH) 0.9 %
5-40 SYRINGE (ML) INJECTION PRN
Status: ACTIVE | OUTPATIENT
Start: 2023-11-09

## 2023-11-09 RX ORDER — ZONISAMIDE 100 MG/1
100 CAPSULE ORAL DAILY
Status: DISPENSED | OUTPATIENT
Start: 2023-11-10

## 2023-11-09 RX ORDER — MAGNESIUM SULFATE IN WATER 40 MG/ML
2000 INJECTION, SOLUTION INTRAVENOUS PRN
Status: DISCONTINUED | OUTPATIENT
Start: 2023-11-09 | End: 2023-11-09

## 2023-11-09 RX ORDER — DIVALPROEX SODIUM 250 MG/1
500 TABLET, DELAYED RELEASE ORAL DAILY
Status: CANCELLED | OUTPATIENT
Start: 2023-11-10

## 2023-11-09 RX ORDER — SODIUM CHLORIDE 9 MG/ML
INJECTION, SOLUTION INTRAVENOUS PRN
Status: CANCELLED | OUTPATIENT
Start: 2023-11-09

## 2023-11-09 RX ORDER — ONDANSETRON 2 MG/ML
4 INJECTION INTRAMUSCULAR; INTRAVENOUS EVERY 6 HOURS PRN
Status: CANCELLED | OUTPATIENT
Start: 2023-11-09

## 2023-11-09 RX ORDER — LEVOTHYROXINE SODIUM 0.12 MG/1
125 TABLET ORAL DAILY
Status: DISPENSED | OUTPATIENT
Start: 2023-11-10

## 2023-11-09 RX ORDER — LEVOTHYROXINE SODIUM 0.12 MG/1
125 TABLET ORAL DAILY
Status: CANCELLED | OUTPATIENT
Start: 2023-11-10

## 2023-11-09 RX ORDER — ACETAMINOPHEN 325 MG/1
650 TABLET ORAL EVERY 6 HOURS PRN
Status: CANCELLED | OUTPATIENT
Start: 2023-11-09

## 2023-11-09 RX ORDER — CLONAZEPAM 0.5 MG/1
0.25 TABLET ORAL DAILY
Status: DISPENSED | OUTPATIENT
Start: 2023-11-10

## 2023-11-09 RX ORDER — MAGNESIUM SULFATE IN WATER 40 MG/ML
2000 INJECTION, SOLUTION INTRAVENOUS PRN
Status: CANCELLED | OUTPATIENT
Start: 2023-11-09

## 2023-11-09 RX ORDER — PRIMIDONE 50 MG/1
50 TABLET ORAL 3 TIMES DAILY
Status: DISPENSED | OUTPATIENT
Start: 2023-11-09

## 2023-11-09 RX ORDER — SODIUM CHLORIDE 9 MG/ML
INJECTION, SOLUTION INTRAVENOUS PRN
Status: ACTIVE | OUTPATIENT
Start: 2023-11-09

## 2023-11-09 RX ORDER — ASPIRIN 81 MG/1
81 TABLET, CHEWABLE ORAL DAILY
Status: CANCELLED | OUTPATIENT
Start: 2023-11-10

## 2023-11-09 RX ORDER — AMOXICILLIN AND CLAVULANATE POTASSIUM 875; 125 MG/1; MG/1
1 TABLET, FILM COATED ORAL EVERY 12 HOURS SCHEDULED
Status: DISPENSED | OUTPATIENT
Start: 2023-11-09 | End: 2023-11-16

## 2023-11-09 RX ORDER — ENOXAPARIN SODIUM 100 MG/ML
40 INJECTION SUBCUTANEOUS EVERY EVENING
Status: CANCELLED | OUTPATIENT
Start: 2023-11-09

## 2023-11-09 RX ORDER — POTASSIUM CHLORIDE 7.45 MG/ML
10 INJECTION INTRAVENOUS PRN
Status: CANCELLED | OUTPATIENT
Start: 2023-11-09

## 2023-11-09 RX ORDER — AMLODIPINE BESYLATE 10 MG/1
10 TABLET ORAL DAILY
Status: CANCELLED | OUTPATIENT
Start: 2023-11-10

## 2023-11-09 RX ORDER — LACOSAMIDE 100 MG/1
200 TABLET ORAL 2 TIMES DAILY
Status: DISPENSED | OUTPATIENT
Start: 2023-11-09

## 2023-11-09 RX ORDER — ONDANSETRON 4 MG/1
4 TABLET, ORALLY DISINTEGRATING ORAL EVERY 8 HOURS PRN
Status: ACTIVE | OUTPATIENT
Start: 2023-11-09

## 2023-11-09 RX ORDER — LACTULOSE 10 G/15ML
30 SOLUTION ORAL 3 TIMES DAILY
Status: DISPENSED | OUTPATIENT
Start: 2023-11-09

## 2023-11-09 RX ORDER — CLOPIDOGREL BISULFATE 75 MG/1
75 TABLET ORAL DAILY
Status: DISPENSED | OUTPATIENT
Start: 2023-11-10

## 2023-11-09 RX ORDER — PAROXETINE HYDROCHLORIDE 20 MG/1
20 TABLET, FILM COATED ORAL EVERY MORNING
Status: CANCELLED | OUTPATIENT
Start: 2023-11-10

## 2023-11-09 RX ORDER — LATANOPROST 50 UG/ML
1 SOLUTION/ DROPS OPHTHALMIC NIGHTLY
Status: DISPENSED | OUTPATIENT
Start: 2023-11-09

## 2023-11-09 RX ORDER — ACETAMINOPHEN 325 MG/1
650 TABLET ORAL EVERY 6 HOURS PRN
Status: ACTIVE | OUTPATIENT
Start: 2023-11-09

## 2023-11-09 RX ORDER — TIMOLOL MALEATE 5 MG/ML
1 SOLUTION/ DROPS OPHTHALMIC DAILY
Status: ACTIVE | OUTPATIENT
Start: 2023-11-10

## 2023-11-09 RX ORDER — DIVALPROEX SODIUM 500 MG/1
500 TABLET, DELAYED RELEASE ORAL DAILY
Status: DISPENSED | OUTPATIENT
Start: 2023-11-10

## 2023-11-09 RX ORDER — AMLODIPINE BESYLATE 10 MG/1
10 TABLET ORAL DAILY
Status: DISPENSED | OUTPATIENT
Start: 2023-11-10

## 2023-11-09 RX ORDER — ACETAMINOPHEN 650 MG/1
650 SUPPOSITORY RECTAL EVERY 6 HOURS PRN
Status: DISCONTINUED | OUTPATIENT
Start: 2023-11-09 | End: 2023-11-09

## 2023-11-09 RX ORDER — CLONAZEPAM 0.5 MG/1
0.25 TABLET ORAL DAILY
Status: CANCELLED | OUTPATIENT
Start: 2023-11-10

## 2023-11-09 RX ORDER — ONDANSETRON 4 MG/1
4 TABLET, ORALLY DISINTEGRATING ORAL EVERY 8 HOURS PRN
Status: CANCELLED | OUTPATIENT
Start: 2023-11-09

## 2023-11-09 RX ORDER — ASPIRIN 81 MG/1
81 TABLET, CHEWABLE ORAL DAILY
Status: DISPENSED | OUTPATIENT
Start: 2023-11-10

## 2023-11-09 RX ORDER — PRIMIDONE 50 MG/1
50 TABLET ORAL 3 TIMES DAILY
Status: CANCELLED | OUTPATIENT
Start: 2023-11-09

## 2023-11-09 RX ORDER — LATANOPROST 50 UG/ML
1 SOLUTION/ DROPS OPHTHALMIC NIGHTLY
Status: CANCELLED | OUTPATIENT
Start: 2023-11-09

## 2023-11-09 RX ADMIN — SODIUM CHLORIDE 25 ML: 9 INJECTION, SOLUTION INTRAVENOUS at 00:14

## 2023-11-09 RX ADMIN — LACOSAMIDE 200 MG: 100 TABLET, FILM COATED ORAL at 21:46

## 2023-11-09 RX ADMIN — PAROXETINE HYDROCHLORIDE 20 MG: 20 TABLET, FILM COATED ORAL at 10:45

## 2023-11-09 RX ADMIN — ASPIRIN 81 MG CHEWABLE TABLET 81 MG: 81 TABLET CHEWABLE at 10:45

## 2023-11-09 RX ADMIN — CLONAZEPAM 0.25 MG: 0.5 TABLET ORAL at 10:46

## 2023-11-09 RX ADMIN — ZONISAMIDE 100 MG: 100 CAPSULE ORAL at 10:45

## 2023-11-09 RX ADMIN — LEVOTHYROXINE SODIUM 125 MCG: 0.12 TABLET ORAL at 06:41

## 2023-11-09 RX ADMIN — ATORVASTATIN CALCIUM 80 MG: 40 TABLET, FILM COATED ORAL at 21:46

## 2023-11-09 RX ADMIN — AMLODIPINE BESYLATE 10 MG: 10 TABLET ORAL at 10:45

## 2023-11-09 RX ADMIN — CEFTRIAXONE 1000 MG: 1 INJECTION, POWDER, FOR SOLUTION INTRAMUSCULAR; INTRAVENOUS at 00:15

## 2023-11-09 RX ADMIN — PANTOPRAZOLE 20 MG: 20 TABLET, DELAYED RELEASE ORAL at 06:41

## 2023-11-09 RX ADMIN — TAMSULOSIN HYDROCHLORIDE 0.4 MG: 0.4 CAPSULE ORAL at 10:45

## 2023-11-09 RX ADMIN — QUETIAPINE FUMARATE 50 MG: 50 TABLET, EXTENDED RELEASE ORAL at 21:46

## 2023-11-09 RX ADMIN — PRIMIDONE 50 MG: 50 TABLET ORAL at 21:46

## 2023-11-09 RX ADMIN — LATANOPROST 1 DROP: 50 SOLUTION OPHTHALMIC at 21:49

## 2023-11-09 RX ADMIN — LACOSAMIDE 200 MG: 100 TABLET, FILM COATED ORAL at 10:44

## 2023-11-09 RX ADMIN — LACTULOSE 30 G: 20 SOLUTION ORAL at 10:46

## 2023-11-09 RX ADMIN — SODIUM CHLORIDE 25 ML: 9 INJECTION, SOLUTION INTRAVENOUS at 00:54

## 2023-11-09 RX ADMIN — AZITHROMYCIN MONOHYDRATE 500 MG: 500 INJECTION, POWDER, LYOPHILIZED, FOR SOLUTION INTRAVENOUS at 01:03

## 2023-11-09 RX ADMIN — SODIUM CHLORIDE, PRESERVATIVE FREE 10 ML: 5 INJECTION INTRAVENOUS at 10:46

## 2023-11-09 RX ADMIN — ZONISAMIDE 300 MG: 100 CAPSULE ORAL at 21:46

## 2023-11-09 RX ADMIN — LACTULOSE 30 G: 20 SOLUTION ORAL at 21:48

## 2023-11-09 RX ADMIN — DIVALPROEX SODIUM 1000 MG: 500 TABLET, DELAYED RELEASE ORAL at 21:48

## 2023-11-09 RX ADMIN — PRIMIDONE 50 MG: 50 TABLET ORAL at 10:45

## 2023-11-09 RX ADMIN — TIMOLOL MALEATE 1 DROP: 5 SOLUTION OPHTHALMIC at 14:43

## 2023-11-09 RX ADMIN — CLOPIDOGREL BISULFATE 75 MG: 75 TABLET ORAL at 10:45

## 2023-11-09 RX ADMIN — DIVALPROEX SODIUM 500 MG: 250 TABLET, DELAYED RELEASE ORAL at 10:45

## 2023-11-09 RX ADMIN — AMOXICILLIN AND CLAVULANATE POTASSIUM 1 TABLET: 875; 125 TABLET, FILM COATED ORAL at 21:54

## 2023-11-09 ASSESSMENT — PAIN SCALES - GENERAL
PAINLEVEL_OUTOF10: 0
PAINLEVEL_OUTOF10: 0

## 2023-11-09 NOTE — PLAN OF CARE
ARU Interdisciplinary Plan of Care (IPOC)  [facility] [address]  [phone]  Fax: [phone]    Marychuy Watson    : 1955  Acct #: [de-identified]  MRN: [id number]   PHYSICIAN:  Patsy Carr MD  Primary Active Problems:   Active Hospital Problems    Diagnosis Date Noted    Metabolic encephalopathy [E02.48] 2023     Rehabilitation Diagnosis:     Metabolic encephalopathy [X37.35]  Metabolic encephalopathy [N21.88]      CARE PLAN     NURSING:  Marychuy Watson while on this unit will:      Bowel and Bladder   [x] Be continent of bowel and bladder      [x] Have an adequate number of bowel movements   [] Urinate with no urinary retention >300ml in bladder   [] Bladder Scan: (details)   [] Complete bladder protocol with toledo removal   [] Initiate Bladder Program to toilet every ___ hours   [] Initiate Bowel Program to toilet every ___hours   [] Bladder training    [] Bowel training  Pulmonary   [x] Maintain O2 SATs at 92% or greater  Pain Management   [x] Have pain managed while on ARU        [] Be pain free by discharge    [x] Medication Management and Education  Maintenance of Skin Integrity/Wound Management   [x] Have no skin breakdown while on ARU   [] Have improved skin integrity via wound measurements   [] Have no signs/symptoms of infection via infection protection and monitoring at the          wound site  Fall Prevention   [x] Be free from injury during hospitalization via fall prevention measures     [x] Disease management and Education  Precautions   [x] Weight Bearing Precautions   [] Swallowing Precautions   [x] Monitoring of Risks of Complications   [] DVT Prophylaxis    [] Fluid/electrolyte/Nutrition Management    [x] Complete education with patient/family with understanding demonstrated for          in-room safety with transfers to bed, toilet, wheelchair, shower as well as                bathroom activities Therapy  Suzy Soria will be seen a minimum of 3 hours of therapy per day/a minimum of 5 out of 7 days per week. [] In this rare instance due to the nature of this patient's medical involvement, this patient will be seen 15 hours per week (900 minutes within a 7 day period). Treatments may include therapeutic exercises, gait training, neuromuscular re-ed, transfer training, community reintegration, bed mobility, w/c mobility and training, self care, home mgmt, cognitive training, energy conservation,dysphagia tx, speech/language/communication therapy, group therapy, and patient/family education. In addition, dietician/nutritionist may monitor calorie count as well as intake and collaboratively work with SLP on dietary upgrades. Neuropsychology/Psychology may evaluate and provide necessary support. Group therapy as appropriate to facilitate improved endurance, STR, COORD, function, safety, transfers, awareness and insight into deficits, problem solving, memory, and social interaction and engagement. Medical issues being managed closely and that require 24 hour availability of a physician:   [x] Swallowing Precautions                                     [] Weight bearing precautions   [] Wound Care                             [x] Infection Prevention   [x] DVT Prophylaxis/assessment              [x] Monitoring for complications    [x] Fall Precautions/Prevention                         [x] Fluid/Electrolyte/Nutrition Balance   [x] Voice Protection                           [x] Medication Management   [x] Respiratory                   [x] Pain Mgmt   [x] Bowel/Bladder Fx        High Risk Drug Classes:  Use and Indication     Is taking: Check if the patient is taking any medications (or has them prescribed) by pharmacological classification, not how it is used, in the following classes  Indication noted:  If column 1 is checked, check if there is an indication noted for all meds in the drug class (must be

## 2023-11-09 NOTE — CARE COORDINATION
Reviewed chart, pt on discharge back to group home.  left for [facility] to see if they will transport pt or if this  needs to set up transport back to group home. [address] with Angie Overcast  would like transportation set up for 1500 and requested Home Care., uses [facility] PS to Dr Christine Peraza and Britt Canales with [address]      1440 Received call from [facility] and she would like pt to go to ARU prior to returning home.  left for [place] with ARU admission and updated Charge nurse Terri Mendoza who will cancel transport. [facility] states pt can come to ARU today. PS to Dr Christine Peraza. Updated Benoit Mix and will call and update [facility] adm.

## 2023-11-09 NOTE — CONSULTS
Consult completed. Procedure/rationale explained to pt & consent obtained. Nexiva 20g 1.75\" PIV initiated using UltraSound-guided technique without difficulty/complications. Pt tolerated well and no other c/o or needs noted or reported. Consult IV/PICC team if patient's needs change.

## 2023-11-09 NOTE — DISCHARGE SUMMARY
V2.0  Discharge Summary    Name:  Zeynep Mancia /Age/Sex: 1955 (37 y.o. male)   Admit Date: 2023  Discharge Date: 23    MRN & CSN:  [id number] & [id number] Encounter Date and Time 23 11:19 AM EST    Attending:  Cecil Hickman MD Discharging Provider: Cecil Hickman MD       Hospital Course:     Brief HPI:     Patient is a 60-year-old male with a PMHx of CP, CVA, seizures, HTN, hypothyroidism, depression and anxiety who presented to the ED from group home withncreased lethargy, inattention and assistance with ambulation and dressing over past few days. Hx of aspiration in the past, but no known recent episodes. No fevers, chills, cough,, CP, nausea, vomiting, abdominal pain or urinary changes. Reported having diarrhea about last BM over 2 days prior    Brief Problem Based Course:      Acute encephalopathy in the setting of bilateral community-acquired pneumonia, improving  Reported increased lethargy, inattention and need for assistance with ADLs over past few days. Lives in group home. Hx of aspiration in the past.   --Afebrile, no leukocytosis, LA normal, ammonia and TSH normal, CTH nonacute, CXR showing new bilateral infrahilar opacities. -- Was on Rocephin with azithromycin. Will transition to amox-clav on discharge  -- Appears to be back to baseline mentation     Essential hypretension  On Norvasc               Hx of seizures  --Continue AEDs. IBS-M  --No change in bowel habits. Hx of CVA  --Continue DAPT and Lipitor. Depression and anxiety  --Continue psychotropic medications. Hx of CP      The patient expressed appropriate understanding of, and agreement with the discharge recommendations, medications, and plan.      Consults this admission:  IP CONSULT TO IV TEAM    Discharge Diagnosis:   Acute encephalopathy        Discharge Instruction:   Follow up appointments:   Primary care physician: Aubrey Portillo PA-C within 2 weeks  Diet: regular diet   Activity: activity

## 2023-11-10 PROBLEM — R53.1 GENERALIZED WEAKNESS: Status: ACTIVE | Noted: 2023-11-10

## 2023-11-10 PROBLEM — Z86.73 HISTORY OF CVA (CEREBROVASCULAR ACCIDENT): Status: ACTIVE | Noted: 2023-11-10

## 2023-11-10 PROCEDURE — 97535 SELF CARE MNGMENT TRAINING: CPT

## 2023-11-10 PROCEDURE — 97542 WHEELCHAIR MNGMENT TRAINING: CPT

## 2023-11-10 PROCEDURE — 6360000002 HC RX W HCPCS: Performed by: STUDENT IN AN ORGANIZED HEALTH CARE EDUCATION/TRAINING PROGRAM

## 2023-11-10 PROCEDURE — 97163 PT EVAL HIGH COMPLEX 45 MIN: CPT

## 2023-11-10 PROCEDURE — 1280000000 HC REHAB R&B

## 2023-11-10 PROCEDURE — 6370000000 HC RX 637 (ALT 250 FOR IP): Performed by: PHYSICAL MEDICINE & REHABILITATION

## 2023-11-10 PROCEDURE — 94761 N-INVAS EAR/PLS OXIMETRY MLT: CPT

## 2023-11-10 PROCEDURE — 97167 OT EVAL HIGH COMPLEX 60 MIN: CPT

## 2023-11-10 PROCEDURE — 92523 SPEECH SOUND LANG COMPREHEN: CPT

## 2023-11-10 PROCEDURE — 94150 VITAL CAPACITY TEST: CPT

## 2023-11-10 PROCEDURE — 6370000000 HC RX 637 (ALT 250 FOR IP): Performed by: STUDENT IN AN ORGANIZED HEALTH CARE EDUCATION/TRAINING PROGRAM

## 2023-11-10 PROCEDURE — 97530 THERAPEUTIC ACTIVITIES: CPT

## 2023-11-10 PROCEDURE — 99232 SBSQ HOSP IP/OBS MODERATE 35: CPT | Performed by: PHYSICAL MEDICINE & REHABILITATION

## 2023-11-10 RX ADMIN — PANTOPRAZOLE SODIUM 20 MG: 20 TABLET, DELAYED RELEASE ORAL at 15:08

## 2023-11-10 RX ADMIN — PAROXETINE HYDROCHLORIDE 20 MG: 20 TABLET, FILM COATED ORAL at 09:09

## 2023-11-10 RX ADMIN — PRIMIDONE 50 MG: 50 TABLET ORAL at 09:09

## 2023-11-10 RX ADMIN — CLOPIDOGREL BISULFATE 75 MG: 75 TABLET ORAL at 09:09

## 2023-11-10 RX ADMIN — TIMOLOL MALEATE 1 DROP: 5 SOLUTION OPHTHALMIC at 20:42

## 2023-11-10 RX ADMIN — ENOXAPARIN SODIUM 40 MG: 100 INJECTION SUBCUTANEOUS at 20:44

## 2023-11-10 RX ADMIN — QUETIAPINE FUMARATE 50 MG: 50 TABLET, EXTENDED RELEASE ORAL at 20:42

## 2023-11-10 RX ADMIN — DIVALPROEX SODIUM 1000 MG: 500 TABLET, DELAYED RELEASE ORAL at 20:43

## 2023-11-10 RX ADMIN — PANTOPRAZOLE SODIUM 20 MG: 20 TABLET, DELAYED RELEASE ORAL at 06:11

## 2023-11-10 RX ADMIN — LACTULOSE 30 G: 20 SOLUTION ORAL at 09:11

## 2023-11-10 RX ADMIN — PRIMIDONE 50 MG: 50 TABLET ORAL at 20:42

## 2023-11-10 RX ADMIN — LATANOPROST 1 DROP: 50 SOLUTION OPHTHALMIC at 20:41

## 2023-11-10 RX ADMIN — PRIMIDONE 50 MG: 50 TABLET ORAL at 15:08

## 2023-11-10 RX ADMIN — AMOXICILLIN AND CLAVULANATE POTASSIUM 1 TABLET: 875; 125 TABLET, FILM COATED ORAL at 20:42

## 2023-11-10 RX ADMIN — LEVOTHYROXINE SODIUM 125 MCG: 0.12 TABLET ORAL at 06:11

## 2023-11-10 RX ADMIN — ATORVASTATIN CALCIUM 80 MG: 40 TABLET, FILM COATED ORAL at 20:42

## 2023-11-10 RX ADMIN — ZONISAMIDE 300 MG: 100 CAPSULE ORAL at 20:42

## 2023-11-10 RX ADMIN — AMOXICILLIN AND CLAVULANATE POTASSIUM 1 TABLET: 875; 125 TABLET, FILM COATED ORAL at 09:07

## 2023-11-10 RX ADMIN — LACTULOSE 30 G: 20 SOLUTION ORAL at 20:44

## 2023-11-10 RX ADMIN — CLONAZEPAM 0.25 MG: 0.5 TABLET ORAL at 09:08

## 2023-11-10 RX ADMIN — AMLODIPINE BESYLATE 10 MG: 10 TABLET ORAL at 09:07

## 2023-11-10 RX ADMIN — LACOSAMIDE 200 MG: 100 TABLET, FILM COATED ORAL at 20:42

## 2023-11-10 RX ADMIN — ASPIRIN 81 MG 81 MG: 81 TABLET ORAL at 09:10

## 2023-11-10 RX ADMIN — TAMSULOSIN HYDROCHLORIDE 0.4 MG: 0.4 CAPSULE ORAL at 09:09

## 2023-11-10 RX ADMIN — ZONISAMIDE 100 MG: 100 CAPSULE ORAL at 09:09

## 2023-11-10 RX ADMIN — LACOSAMIDE 200 MG: 100 TABLET, FILM COATED ORAL at 09:09

## 2023-11-10 RX ADMIN — DIVALPROEX SODIUM 500 MG: 500 TABLET, DELAYED RELEASE ORAL at 09:11

## 2023-11-10 ASSESSMENT — PAIN SCALES - GENERAL: PAINLEVEL_OUTOF10: 0

## 2023-11-10 NOTE — H&P
Alexandro Braswell    : 1955  [facility]  MRN: [id number]              History and physical    Date of face-to-face exam: 2023. Time of face-to-face exam: 1700. Admitting diagnosis: Metabolic encephalopathy ([place] 2.1)    Comorbid diagnoses impacting rehabilitation: Generalized weakness, gait disturbance, aspiration pneumonia, dysphagia, essential hypertension, seizure disorder, depression with anxiety, IBS-M, cerebral palsy, history of CVA    Chief complaint: Feeling weak. History of present illness: The patient is a 69-year-old right-hand-dominant male with a history of cerebral palsy and seizure disorder who lives at a group home. His mentation change over this past week alerting staff and he was brought to the hospital.  Patient was found to be lethargic with evidence of bilateral pneumonia felt to be possibly associated with aspiration. He required IV antibiotics, fluid support and oxygen supplementation. He appears to be responding some to the antibiotics and has become more alert but not quite to baseline. He has been unable to do his own toileting, transfers and self-care and cannot return directly home. He requires inpatient rehabilitation to address these issues. Review of systems: Fatigue. Infrequent coughing. He denied pain. Infrequent bowel movements with his history of IBS-M. The remainder of their review of systems was negative except as mentioned in the history of present illness.     Social History: Lives With: Home care staff  Type of Home: House (group home)  Home Layout: One level  Bathroom Shower/Tub: Walk-in shower  Bathroom Equipment: Shower chair  Home Equipment: Wirescan  ADL Assistance: Needs assistance  Bath:  (reports stand by)  Dressing: Independent  Feeding: Independent  Toileting: Needs assistance  Transfer Assistance:  (patient reports staff stood by while performing)  Active : No  Patient's  Info: transport  Mode of Thyroid disease     Ulcerative colitis ([address] [facility])     hx given per staff at [facility] 4/13/2015        Past Surgical History:     Past Surgical History:   Procedure Laterality Date    CHOLECYSTECTOMY      COLONOSCOPY  8/19/14, 5/2012 8/19/14: hemorrhoids, 5/2012 at [address]  02/04/2021    COLONOSCOPY N/A 2/4/2021    COLONOSCOPY POLYPECTOMY SNARE/COLD BIOPSY performed by Sujit Robb MD at [address] [facility] Left 07/11/2013    with stnet placement    FACIAL SURGERY N/A 10/8/2021    FACIAL INCISION AND DRAINAGE performed by Talbot Lanes, MD at [address]  2005    KIDNEY STONE SURGERY      OTHER SURGICAL HISTORY  04/15/2015    Revision of vagal nerve stimulator    STIMULATOR SURGERY N/A 3/24/2021    STIMULATOR INSERTION performed by Essence March MD at [address] 3/24/2021    STIMULATOR INSERTION STAGE 2 performed by Essence March MD at [facility] 11/13/2018    EGD DILATION BALLOON AND BIOPSY performed by Sean Zacarias MD at [facility]  2002    Has to have bettery changed every 5 yrs.         Current Medications:     Current Facility-Administered Medications:     bisacodyl (DULCOLAX) suppository 10 mg, 10 mg, Rectal, Daily PRN, Ace Holland MD    sodium chloride flush 0.9 % injection 5-40 mL, 5-40 mL, IntraVENous, 2 times per day, Jesús Callahan MD    sodium chloride flush 0.9 % injection 5-40 mL, 5-40 mL, IntraVENous, PRN, Jesús Callahan MD    0.9 % sodium chloride infusion, , IntraVENous, PRN, Jesús Callahan MD    potassium chloride 10 mEq/100 mL IVPB (Peripheral Line), 10 mEq, IntraVENous, PRN, Jesús Callahan MD    magnesium sulfate 2000 mg in 50 mL IVPB premix, 2,000 mg, IntraVENous, PRN, Jesús Callahan MD    enoxaparin (LOVENOX) injection 40 mg, 40 mg, SubCUTAneous, QPM, Jesús Callahan MD    ondansetron (ZOFRAN-ODT) disintegrating tablet 4 mg, 4 mg, Oral, Q8H

## 2023-11-10 NOTE — PROGRESS NOTES
Physical Therapy  [facility] ARU PHYSICAL THERAPY EVALUATION    Chart Review:  Past Medical History:   Diagnosis Date    Acute CVA (cerebrovascular accident) ([address]) 10/10/2021    Anemia     Aspiration pneumonia ([address])     dx 6/9/2014 with this per ecf/ per old chart dx with pneumonia with admission 9/18/2018    Cerebral palsy ([address])     \"has no feeling on left side of body\"alert but has some dementia but not diagnosed, has good long term but poor short term and wakes up disoriented after a nap\"(per yaneth)(2014)    Depression     Epilepsy ([address]) 1962    last seizure 2/2018- hx grand mal    Frequent falls     with phone assess- family stated pt fell this am (7/10/2013\"in the process of trying to find a facility to help build him back up- (pt now at [facility], [facility])    Glaucoma     \"has been in treatment for this in the past- no treatment needed at present\"    History of IBS     Hx MRSA infection     + nasal culture 4/2014    Hyperammonemia (HCC)     Hypertension     on Lisinopril    IBS (irritable bowel syndrome)     ulcerative colitis    Kidney stone     for surgery for stent placement 7/11/2013    Mood disorder ([address])     per staff at [address] [facility]. S.W (methicillin resistant staph aureus) culture positive 05/02/2014 05/27/20 nasal    MRSA (methicillin resistant staph aureus) culture positive 11/25/2020    Face; 10/8/21    Occasional tremors     \"makes it difficult for him to write\"    Pressure injury of contiguous region involving back and right buttock, stage 2 ([address]) 05/27/2020    Pressure injury of left buttock, stage 1 07/22/2020    Prostatitis     hx given per H&P old chart    Thyroid disease     Ulcerative colitis ([address])     hx given per staff at [facility] 4/13/2015     Past Surgical History:   Procedure Laterality Date    CHOLECYSTECTOMY      COLONOSCOPY  8/19/14, 5/2012 8/19/14: hemorrhoids, 5/2012 at [address]  02/04/2021    COLONOSCOPY N/A 2/4/2021    COLONOSCOPY POLYPECTOMY SNARE/COLD BIOPSY

## 2023-11-10 NOTE — PROGRESS NOTES
4 Eyes Skin Assessment     NAME:  Zeynep Mancia  YOB: 1955  MEDICAL RECORD NUMBER:  [id number]    The patient is being assessed for  Admission    I agree that at least one RN has performed a thorough Head to Toe Skin Assessment on the patient. ALL assessment sites listed below have been assessed. Areas assessed by both nurses:    Head, Face, Ears, Shoulders, Back, Chest, Arms, Elbows, Hands, Sacrum. Buttock, Coccyx, Ischium, and Legs. Feet and Heels        Does the Patient have a Wound? No noted wound(s). Heel and coccyx red and blanching. Scattered bruising.         Jean Prevention initiated by RN: Yes  Wound Care Orders initiated by RN: No    Pressure Injury (Stage 3,4, Unstageable, DTI, NWPT, and Complex wounds) if present, place Wound referral order by RN under : No    New Ostomies, if present place, Ostomy referral order under : No     Nurse 1 eSignature: Electronically signed by Arvind Schwartz LPN on 25/13/17 at 5:42 AM EST    **SHARE this note so that the co-signing nurse can place an eSignature**    Nurse 2 eSignature: Electronically signed by Mihai Quiroga on 11/10/23 at 3:21 AM EST

## 2023-11-10 NOTE — PROGRESS NOTES
Facility/Department: [facility] ARU  Initial Speech/Language/Cognitive Assessment    NAME: Juliana Go  : 1955   MRN: [id number]  ADMISSION DATE: 2023  ADMITTING DIAGNOSIS: has Cerebral palsy, infantile hemiplegia ([address]); Rosacea, acne; IBS (irritable bowel syndrome); Essential hypertension; Calculus of ureter; Pyelonephritis; Sepsis ([address]); Pneumonia; Increased ammonia level; Aspiration pneumonia ([address]); Hypokalemia; Hypomagnesemia; Hypophosphatasia; Seizure disorder ([address]); Seizure disorder, grand mal ([address]); Sepsis due to undetermined organism with acute respiratory failure ([address]); Pain of upper abdomen; Diarrhea of presumed infectious origin; Abdominal pain; Pressure injury of contiguous region involving back and right buttock, stage 2 ([address]); Cellulitis, perineum; Pressure injury of right buttock, stage 2 ([address]); Pressure injury of left buttock, stage 1; COVID-19 virus detected; Hyponatremia; Multifocal pneumonia; TIA (transient ischemic attack); Acute CVA (cerebrovascular accident) ([address]); Ataxia; Dysarthria; Dysphagia due to recent stroke; Depression with anxiety; Facial abscess; Group B streptococcal UTI; History of cerebral palsy; Encephalopathy acute; PNA (pneumonia); Acute encephalopathy; and Metabolic encephalopathy on their problem list.  DATE ONSET: 2023    Date of Eval: 11/10/2023   Evaluating Therapist: MAYANK Gonzáles    RECENT RESULTS  CT OF HEAD/MRI:  FINDINGS:  BRAIN/VENTRICLES: Unchanged encephalomalacia involving the right  frontotemporoparietal lobes with associated ex vacuo dilatation of the right  lateral ventricle and right frontal horn. There is no acute intracranial  hemorrhage, mass effect, or midline shift. No abnormal extra-axial fluid  collection. The gray-white differentiation is otherwise maintained without  evidence of an acute infarct. There is no evidence of hydrocephalus. Mild prominence of the ventricles and sulci consistent with parenchymal  volume loss.      Mild

## 2023-11-10 NOTE — CARE COORDINATION
Case Management Admission Note    Patient:Jovan Leal      MRX:[id number]  HIZ:[id number]  Rehab Dx/Hx: Metabolic encephalopathy [R87.46]  Metabolic encephalopathy [F46.50]    Chief Complaint:   Past Medical History:   Diagnosis Date    Acute CVA (cerebrovascular accident) ([address]) 10/10/2021    Anemia     Aspiration pneumonia ([address])     dx 6/9/2014 with this per ecf/ per old chart dx with pneumonia with admission 9/18/2018    Cerebral palsy ([address])     \"has no feeling on left side of body\"alert but has some dementia but not diagnosed, has good long term but poor short term and wakes up disoriented after a nap\"(per yaneth)(2014)    Depression     Epilepsy ([address]) 1961    last seizure 2/2018- hx grand mal    Frequent falls     with phone assess- family stated pt fell this am (7/10/2013\"in the process of trying to find a facility to help build him back up- (pt now at [facility], [facility])    Glaucoma     \"has been in treatment for this in the past- no treatment needed at present\"    History of IBS     Hx MRSA infection     + nasal culture 4/2014    Hyperammonemia (HCC)     Hypertension     on Lisinopril    IBS (irritable bowel syndrome)     ulcerative colitis    Kidney stone     for surgery for stent placement 7/11/2013    Mood disorder ([address])     per staff at [facility]    MRSA (methicillin resistant staph aureus) culture positive 05/02/2014 05/27/20 nasal    MRSA (methicillin resistant staph aureus) culture positive 11/25/2020    Face; 10/8/21    Occasional tremors     \"makes it difficult for him to write\"    Pressure injury of contiguous region involving back and right buttock, stage 2 ([address]) 05/27/2020    Pressure injury of left buttock, stage 1 07/22/2020    Prostatitis     hx given per H&P old chart    Thyroid disease     Ulcerative colitis ([address])     hx given per staff at [facility] 4/13/2015     Past Surgical History:   Procedure Laterality Date    CHOLECYSTECTOMY      COLONOSCOPY  8/19/14, 5/2012 8/19/14: hemorrhoids, 5/2012 at assistance. Guardian provided the contact info for patient's home supervisor. LSW met with patient for admission assessment. Patient lives in a group home. It in a 1-level home in [place]. There is a ramped entrance and no steps inside. Patient has PCP, needed assistance with ADLs PTA, and uses [facility] for prescriptions. Home DME includes a 2WW, WC, Hospital Bed, shower chair, alert button, and no pre-existing HHC. Patient reports access to food, utilities, and shelter and feels safe in their home environment. BIMS completed in initial assessment. Room whiteboard updated. Plan is to D/C to group home, with Natalie Metz and DME as recommended by therapy staff. F/U to be set with Sonya Swan PA-C and family will transport home.     Darci Harvey, MILADIS, KIRSTY, 11/10/2023, 1:03 PM

## 2023-11-10 NOTE — PROGRESS NOTES
Occupational Therapy                              [facility] ARU OCCUPATIONAL THERAPY EVALUATION    Chart Review:  Past Medical History:   Diagnosis Date    Acute CVA (cerebrovascular accident) ([address]) 10/10/2021    Anemia     Aspiration pneumonia ([address])     dx 6/9/2014 with this per ecf/ per old chart dx with pneumonia with admission 9/18/2018    Cerebral palsy ([address])     \"has no feeling on left side of body\"alert but has some dementia but not diagnosed, has good long term but poor short term and wakes up disoriented after a nap\"(per yaneth)(2014)    Depression     Epilepsy ([address]) 1961    last seizure 2/2018- hx grand mal    Frequent falls     with phone assess- family stated pt fell this am (7/10/2013\"in the process of trying to find a facility to help build him back up- (pt now at [facility], [facility])    Glaucoma     \"has been in treatment for this in the past- no treatment needed at present\"    History of IBS     Hx MRSA infection     + nasal culture 4/2014    Hyperammonemia (HCC)     Hypertension     on Lisinopril    IBS (irritable bowel syndrome)     ulcerative colitis    Kidney stone     for surgery for stent placement 7/11/2013    Mood disorder ([address])     per staff at [address] [facility]. S.W (methicillin resistant staph aureus) culture positive 05/02/2014 05/27/20 nasal    MRSA (methicillin resistant staph aureus) culture positive 11/25/2020    Face; 10/8/21    Occasional tremors     \"makes it difficult for him to write\"    Pressure injury of contiguous region involving back and right buttock, stage 2 ([address]) 05/27/2020    Pressure injury of left buttock, stage 1 07/22/2020    Prostatitis     hx given per H&P old chart    Thyroid disease     Ulcerative colitis ([address])     hx given per staff at [facility] 4/13/2015     Past Surgical History:   Procedure Laterality Date    CHOLECYSTECTOMY      COLONOSCOPY  8/19/14, 5/2012 8/19/14: hemorrhoids, 5/2012 at [address]  02/04/2021    COLONOSCOPY N/A 2/4/2021    COLONOSCOPY experienced a LOB requiring mod A to correct d/t poor safety. Pt did not follow commands well to initiate rest breaks when SOB; Sp02 92% and  with pt requiring max encouragement to rest until these vitals improved. I am hopeful though that pt will progress to supervision/setup goals that seem to be his baseline. The QI, MMT, and ROM standardized assessments were used this date to determine the above performance deficits, which compromise pt's ability to safely complete ADLs/IADLs/mobility. Pt will benefit from ARU OT services to increase functional performance and return to PLOF. Decision Making: High Complexity  Clinical Presentation:  Evolving c unstable characteristics (I.e. safety, SOB)  Patient education:   ARU trudil, Role of O.T., O.T. plan of care  []   Patient goal was established and reviewed in Rehabtracker with patient and/or family this date. Discharge Recommendations:  Home c assist, [address] OT?   Equipment Recommendations:  Likely none    Goals:     Short Term Goals  Time Frame for Short Term Goals: STGs=LTGs  Long Term Goals  Time Frame for Long Term Goals : ~10 days or until d/c  Long Term Goal 1: Pt will complete grooming tasks Ind  Long Term Goal 2: Pt will complete total body bathing c S  Long Term Goal 3: Pt will complete UB dressing c setup  Long Term Goal 4: Pt will complete LB dressing c S  Long Term Goal 5: Pt will doff/don footwear c S  Additional Goals?: Yes  Long Term Goal 6: Pt will complete toileting c S  Long Term Goal 7: Pt will complete functional transfers (bed, chair, toilet, shower) c DME PRN and S  Long Term Goal 8: Pt will perform therex/therax to facilitate increased strength/endurance/ax tolerance (c emphasis on dynamic standing balance/tolerance >5 mins, RUE endurance, aerobic capacity) c SBA    Plan:    Pt will be seen at least 60 minutes per day for a minimum of 5 days per week, plus group therapy as appropriate  Current Treatment Recommendations:

## 2023-11-11 PROCEDURE — 6370000000 HC RX 637 (ALT 250 FOR IP): Performed by: STUDENT IN AN ORGANIZED HEALTH CARE EDUCATION/TRAINING PROGRAM

## 2023-11-11 PROCEDURE — 1280000000 HC REHAB R&B

## 2023-11-11 PROCEDURE — 6370000000 HC RX 637 (ALT 250 FOR IP): Performed by: PHYSICAL MEDICINE & REHABILITATION

## 2023-11-11 PROCEDURE — 97530 THERAPEUTIC ACTIVITIES: CPT

## 2023-11-11 PROCEDURE — 6360000002 HC RX W HCPCS: Performed by: STUDENT IN AN ORGANIZED HEALTH CARE EDUCATION/TRAINING PROGRAM

## 2023-11-11 PROCEDURE — 97112 NEUROMUSCULAR REEDUCATION: CPT

## 2023-11-11 PROCEDURE — 97535 SELF CARE MNGMENT TRAINING: CPT

## 2023-11-11 PROCEDURE — 94761 N-INVAS EAR/PLS OXIMETRY MLT: CPT

## 2023-11-11 PROCEDURE — 97110 THERAPEUTIC EXERCISES: CPT

## 2023-11-11 PROCEDURE — 97542 WHEELCHAIR MNGMENT TRAINING: CPT

## 2023-11-11 RX ADMIN — LATANOPROST 1 DROP: 50 SOLUTION OPHTHALMIC at 21:39

## 2023-11-11 RX ADMIN — PAROXETINE HYDROCHLORIDE 20 MG: 20 TABLET, FILM COATED ORAL at 10:25

## 2023-11-11 RX ADMIN — CLONAZEPAM 0.25 MG: 0.5 TABLET ORAL at 10:25

## 2023-11-11 RX ADMIN — LEVOTHYROXINE SODIUM 125 MCG: 0.12 TABLET ORAL at 05:47

## 2023-11-11 RX ADMIN — LACTULOSE 30 G: 20 SOLUTION ORAL at 10:26

## 2023-11-11 RX ADMIN — AMOXICILLIN AND CLAVULANATE POTASSIUM 1 TABLET: 875; 125 TABLET, FILM COATED ORAL at 21:39

## 2023-11-11 RX ADMIN — PANTOPRAZOLE SODIUM 20 MG: 20 TABLET, DELAYED RELEASE ORAL at 05:47

## 2023-11-11 RX ADMIN — DIVALPROEX SODIUM 1000 MG: 500 TABLET, DELAYED RELEASE ORAL at 21:40

## 2023-11-11 RX ADMIN — QUETIAPINE FUMARATE 50 MG: 50 TABLET, EXTENDED RELEASE ORAL at 21:40

## 2023-11-11 RX ADMIN — ZONISAMIDE 300 MG: 100 CAPSULE ORAL at 21:40

## 2023-11-11 RX ADMIN — LACOSAMIDE 200 MG: 100 TABLET, FILM COATED ORAL at 21:39

## 2023-11-11 RX ADMIN — PANTOPRAZOLE SODIUM 20 MG: 20 TABLET, DELAYED RELEASE ORAL at 17:27

## 2023-11-11 RX ADMIN — AMOXICILLIN AND CLAVULANATE POTASSIUM 1 TABLET: 875; 125 TABLET, FILM COATED ORAL at 10:26

## 2023-11-11 RX ADMIN — ATORVASTATIN CALCIUM 80 MG: 40 TABLET, FILM COATED ORAL at 21:39

## 2023-11-11 RX ADMIN — DIVALPROEX SODIUM 500 MG: 500 TABLET, DELAYED RELEASE ORAL at 10:27

## 2023-11-11 RX ADMIN — CLOPIDOGREL BISULFATE 75 MG: 75 TABLET ORAL at 09:30

## 2023-11-11 RX ADMIN — LACTULOSE 30 G: 20 SOLUTION ORAL at 15:30

## 2023-11-11 RX ADMIN — TAMSULOSIN HYDROCHLORIDE 0.4 MG: 0.4 CAPSULE ORAL at 10:25

## 2023-11-11 RX ADMIN — ZONISAMIDE 100 MG: 100 CAPSULE ORAL at 10:27

## 2023-11-11 RX ADMIN — PRIMIDONE 50 MG: 50 TABLET ORAL at 15:00

## 2023-11-11 RX ADMIN — AMLODIPINE BESYLATE 10 MG: 10 TABLET ORAL at 10:25

## 2023-11-11 RX ADMIN — ENOXAPARIN SODIUM 40 MG: 100 INJECTION SUBCUTANEOUS at 17:28

## 2023-11-11 RX ADMIN — LACOSAMIDE 200 MG: 100 TABLET, FILM COATED ORAL at 10:25

## 2023-11-11 RX ADMIN — PRIMIDONE 50 MG: 50 TABLET ORAL at 21:40

## 2023-11-11 RX ADMIN — ASPIRIN 81 MG 81 MG: 81 TABLET ORAL at 10:26

## 2023-11-11 RX ADMIN — PRIMIDONE 50 MG: 50 TABLET ORAL at 10:26

## 2023-11-11 RX ADMIN — LACTULOSE 30 G: 20 SOLUTION ORAL at 21:41

## 2023-11-11 RX ADMIN — TIMOLOL MALEATE 1 DROP: 5 SOLUTION OPHTHALMIC at 09:30

## 2023-11-11 ASSESSMENT — PAIN SCALES - GENERAL
PAINLEVEL_OUTOF10: 0

## 2023-11-11 NOTE — PROGRESS NOTES
Occupational Therapy    Physical Rehabilitation: OCCUPATIONAL THERAPY     [x] daily progress note       [] discharge       Patient Name:  Marychuy Watson   :  1955 MRN: [id number]  Room:  [address] [place] [address] Date of Admission: 2023  Rehabilitation Diagnosis:   Metabolic encephalopathy [F13.50]  Metabolic encephalopathy [M31.16]       Date 2023       Day of ARU Week:  3   Time IN//930   Individual Tx Minutes 60   Group Tx Minutes    Co-Treat Minutes    Concurrent Tx Minutes    TOTAL Tx Time Mins 60   Variance Time    Variance Time []   Refusal due to:     []   Medical hold/reason:    []   Illness   []   Off Unit for test/procedure  []   Extra time needed to complete task  []   Therapeutic need  []   Other (specify):   Restrictions Restrictions/Precautions: Fall Risk, General Precautions, Modified Diet (minced and moist diet, pt to wear helmet while up)         Communication with other providers: [x]   OK to see per nursing:     []   Spoke with team member regarding:      Subjective observations and cognitive status: Pt in high fowlers on approach setup for breakfast.  Significant RUE intention tremor a barrier to pt's control of utensils. Therapist obtained a 1.5# wrist cuff to trial, however shortly afterwards pt had a significant choking/coughing episode requiring multiple minutes for coughing to subside. Pt also demo'ed impulsivity and poor safety judgement during this, putting another bite of eggs in his mouth while coughing before therapist and nurse could stop him. Reviewed with nursing recommendation for supervision during meals and this therapist will discuss with SLP on Monday    Pt c poor frustration tolerance throughout session and required max encouragement, at one point laying down back in bed and stating \"I give up\".   Pt did eventually engage in OOB ax     Pain level/location:    /10       Location: Denied   Discharge recommendations  Anticipated discharge date:  TBD  Destination:

## 2023-11-11 NOTE — PROGRESS NOTES
Refusing to eat lunch tried to feed patient he declined twice. Offered to set food up so he could try he refused all lunch today. Offered supplement or meal choice also declined.

## 2023-11-11 NOTE — PROGRESS NOTES
Juliana Back    : 1955  Acct #: [de-identified]  MRN: [id number]              PM&R Progress Note      Admitting diagnosis: Metabolic encephalopathy ([place] 2.1)     Comorbid diagnoses impacting rehabilitation: Generalized weakness, gait disturbance, aspiration pneumonia, dysphagia, essential hypertension, seizure disorder, depression with anxiety, IBS-M, cerebral palsy, history of CVA     Chief complaint: Anxious to \"go home\". No pain. Prior (baseline) level of function: Independent. Current level of function:         Current  IRF-BRENT and Goals:   Occupational Therapy:    Short Term Goals  Time Frame for Short Term Goals: STGs=LTGs :   Long Term Goals  Time Frame for Long Term Goals : ~10 days or until d/c  Long Term Goal 1: Pt will complete grooming tasks Ind  Long Term Goal 2: Pt will complete total body bathing c S  Long Term Goal 3: Pt will complete UB dressing c setup  Long Term Goal 4: Pt will complete LB dressing c S  Long Term Goal 5: Pt will doff/don footwear c S  Additional Goals?: Yes  Long Term Goal 6: Pt will complete toileting c S  Long Term Goal 7: Pt will complete functional transfers (bed, chair, toilet, shower) c DME PRN and S  Long Term Goal 8: Pt will perform therex/therax to facilitate increased strength/endurance/ax tolerance (c emphasis on dynamic standing balance/tolerance >5 mins, RUE endurance, aerobic capacity) c SBA :                                       Eating: Eating  Assistance Needed: Supervision or touching assistance  Comment: pt has significant intention tremor in RUE but nursing reports across multiple shifts that he is not allowing staff to physically assist him with eating.   Pt would benefit from trialing weighted utensils or even wrist cuff  CARE Score: 4  Discharge Goal: Set-up or clean-up assistance       Oral Hygiene: Oral Hygiene  Comment: refused despite encouragement (was becoming frustrated by this point of eval)  Reason if not Attempted: Patient refused  CARE

## 2023-11-11 NOTE — PROGRESS NOTES
Physical Therapy  [x] daily progress note       [] discharge       Patient Name:  Nelida Stallings   :  1955 MRN: [id number]  Room:  [address] [place] [address] Date of Admission: 2023  Rehabilitation Diagnosis:   Metabolic encephalopathy [O28.56]  Metabolic encephalopathy [C42.61]       Date 2023       Day of ARU Week:  3   Time IN/-1100     Individual Tx Minutes 90   Group Tx Minutes    Co-Treat Minutes    Concurrent Tx Minutes    TOTAL Tx Time Mins 90   Variance Time 30   Variance Time []   Refusal due to:     []   Medical hold/reason:    []   Illness   []   Off Unit for test/procedure  []   Extra time needed to complete task  []   Therapeutic need  [x]   Other (specify):Pt asleep and slow to respond for tx. Therapist attempt for 10 min and unable to get patient to move OOB   Restrictions Restrictions/Precautions  Restrictions/Precautions: Fall Risk, General Precautions, Modified Diet (minced and moist diet, pt to wear helmet while up)      Communication with other providers: [x]   OK to see per nursing:     []   Spoke with team member regarding:      Subjective observations and cognitive status: Upon arrival pt finishing up occupational therapy sitting in w/c.  Pt agreeable to tx     Pain level/location:  10  /10       Location: all over   Discharge recommendations  Anticipated discharge date:  TBD  Destination: []home alone   []home alone with assist PRN     [] home w/ family      [] Continuous supervision  []SNF    [] Assisted living     [] Other:   Continued therapy: []HHC PT  []OUTPATIENT  PT   [] No Further PT  Equipment needs: TBD         Bed Mobility:           []   Pt received out of bed   Rolling R/L:  with use of bed rails with SBA  Scooting:  SBA  Lying --> Sit:  SBA with use of bed rails  Sit --> lying:  SBA    Transfers:    Sit--> Stand:  pt required set up with CGA  Stand --> Sit:   CGA with 25% cues for safety  Chair-->Bed/Bed --> Chair:   CGA    Toilet Transfer (if applicable):  Min A for

## 2023-11-12 VITALS
SYSTOLIC BLOOD PRESSURE: 138 MMHG | BODY MASS INDEX: 29.02 KG/M2 | HEIGHT: 66 IN | WEIGHT: 180.56 LBS | DIASTOLIC BLOOD PRESSURE: 80 MMHG | TEMPERATURE: 98.8 F | OXYGEN SATURATION: 95 % | RESPIRATION RATE: 18 BRPM | HEART RATE: 83 BPM

## 2023-11-12 PROCEDURE — 6370000000 HC RX 637 (ALT 250 FOR IP): Performed by: STUDENT IN AN ORGANIZED HEALTH CARE EDUCATION/TRAINING PROGRAM

## 2023-11-12 PROCEDURE — 1280000000 HC REHAB R&B

## 2023-11-12 PROCEDURE — 6360000002 HC RX W HCPCS: Performed by: STUDENT IN AN ORGANIZED HEALTH CARE EDUCATION/TRAINING PROGRAM

## 2023-11-12 PROCEDURE — 94664 DEMO&/EVAL PT USE INHALER: CPT

## 2023-11-12 PROCEDURE — 6370000000 HC RX 637 (ALT 250 FOR IP): Performed by: PHYSICAL MEDICINE & REHABILITATION

## 2023-11-12 PROCEDURE — 94761 N-INVAS EAR/PLS OXIMETRY MLT: CPT

## 2023-11-12 PROCEDURE — 94150 VITAL CAPACITY TEST: CPT

## 2023-11-12 RX ADMIN — AMOXICILLIN AND CLAVULANATE POTASSIUM 1 TABLET: 875; 125 TABLET, FILM COATED ORAL at 10:12

## 2023-11-12 RX ADMIN — CLOPIDOGREL BISULFATE 75 MG: 75 TABLET ORAL at 10:12

## 2023-11-12 RX ADMIN — ASPIRIN 81 MG 81 MG: 81 TABLET ORAL at 10:13

## 2023-11-12 RX ADMIN — TIMOLOL MALEATE 1 DROP: 5 SOLUTION OPHTHALMIC at 10:14

## 2023-11-12 RX ADMIN — DIVALPROEX SODIUM 500 MG: 500 TABLET, DELAYED RELEASE ORAL at 10:15

## 2023-11-12 RX ADMIN — PANTOPRAZOLE SODIUM 20 MG: 20 TABLET, DELAYED RELEASE ORAL at 18:02

## 2023-11-12 RX ADMIN — AMOXICILLIN AND CLAVULANATE POTASSIUM 1 TABLET: 875; 125 TABLET, FILM COATED ORAL at 21:11

## 2023-11-12 RX ADMIN — LACOSAMIDE 200 MG: 100 TABLET, FILM COATED ORAL at 21:11

## 2023-11-12 RX ADMIN — PRIMIDONE 50 MG: 50 TABLET ORAL at 21:11

## 2023-11-12 RX ADMIN — ZONISAMIDE 100 MG: 100 CAPSULE ORAL at 10:15

## 2023-11-12 RX ADMIN — LATANOPROST 1 DROP: 50 SOLUTION OPHTHALMIC at 21:16

## 2023-11-12 RX ADMIN — LEVOTHYROXINE SODIUM 125 MCG: 0.12 TABLET ORAL at 05:37

## 2023-11-12 RX ADMIN — ATORVASTATIN CALCIUM 80 MG: 40 TABLET, FILM COATED ORAL at 21:11

## 2023-11-12 RX ADMIN — PAROXETINE HYDROCHLORIDE 20 MG: 20 TABLET, FILM COATED ORAL at 10:13

## 2023-11-12 RX ADMIN — TAMSULOSIN HYDROCHLORIDE 0.4 MG: 0.4 CAPSULE ORAL at 10:12

## 2023-11-12 RX ADMIN — ENOXAPARIN SODIUM 40 MG: 100 INJECTION SUBCUTANEOUS at 18:01

## 2023-11-12 RX ADMIN — PRIMIDONE 50 MG: 50 TABLET ORAL at 18:02

## 2023-11-12 RX ADMIN — CLONAZEPAM 0.25 MG: 0.5 TABLET ORAL at 10:12

## 2023-11-12 RX ADMIN — DIVALPROEX SODIUM 1000 MG: 500 TABLET, DELAYED RELEASE ORAL at 21:11

## 2023-11-12 RX ADMIN — LACOSAMIDE 200 MG: 100 TABLET, FILM COATED ORAL at 10:13

## 2023-11-12 RX ADMIN — PANTOPRAZOLE SODIUM 20 MG: 20 TABLET, DELAYED RELEASE ORAL at 05:37

## 2023-11-12 RX ADMIN — AMLODIPINE BESYLATE 10 MG: 10 TABLET ORAL at 10:12

## 2023-11-12 RX ADMIN — LACTULOSE 30 G: 20 SOLUTION ORAL at 10:12

## 2023-11-12 RX ADMIN — QUETIAPINE FUMARATE 50 MG: 50 TABLET, EXTENDED RELEASE ORAL at 21:11

## 2023-11-12 RX ADMIN — ZONISAMIDE 300 MG: 100 CAPSULE ORAL at 21:11

## 2023-11-12 RX ADMIN — PRIMIDONE 50 MG: 50 TABLET ORAL at 10:13

## 2023-11-12 ASSESSMENT — PAIN SCALES - GENERAL
PAINLEVEL_OUTOF10: 0
PAINLEVEL_OUTOF10: 0

## 2023-11-12 NOTE — PATIENT CARE CONFERENCE
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]     Oropharyngeal dysphagia [R13.12]      Date: 2023    CASE MANAGEMENT  Current issues/needs regarding patient and family discharge status: 1-level, ramped entrance, Home DME: 2WW, WC, Hospital Bed, SC, alert button  Patient and family goal: Home to group home    PHYSICAL THERAPY (Updated in QI)  Short Term Goals  Time Frame for Short Term Goals: 5 days  Short Term Goal 1: Patient will perform all aspects of bed mobility with mod-I  Short Term Goal 2: Patient will perform functional transfers SBA consistently except car transfer CGA  Short Term Goal 3: Patient will propel wheelchair 150' with BLEs and mod I  Short Term Goal 4: Pt will  light object with 2ww and reacher with CGA          Impairments/deficits, barriers:     Body Structures, Functions, Activity Limitations Requiring Skilled Therapeutic Intervention: Decreased ROM, Decreased strength, Decreased safe awareness, Decreased endurance, Decreased sensation, Decreased balance, Decreased posture, Decreased functional mobility      Therapy Prognosis: Good  Decision Making: High Complexity  Clinical Presentation: unpredicatable characteristics  Equipment needed at discharge:has needed equipment      PT IRF-BRENT scores since initial assessment  Bed Mobility:   Roll Left and Right  Assistance Needed: Independent  Comment: with rail  CARE Score: 6  Discharge Goal: Independent    Sit to Lying  Assistance Needed: Independent  Comment: with rail  CARE Score:

## 2023-11-13 PROCEDURE — 97110 THERAPEUTIC EXERCISES: CPT

## 2023-11-13 PROCEDURE — 97530 THERAPEUTIC ACTIVITIES: CPT

## 2023-11-13 PROCEDURE — 1280000000 HC REHAB R&B

## 2023-11-13 PROCEDURE — 6370000000 HC RX 637 (ALT 250 FOR IP): Performed by: PHYSICAL MEDICINE & REHABILITATION

## 2023-11-13 PROCEDURE — 6370000000 HC RX 637 (ALT 250 FOR IP): Performed by: STUDENT IN AN ORGANIZED HEALTH CARE EDUCATION/TRAINING PROGRAM

## 2023-11-13 PROCEDURE — 6360000002 HC RX W HCPCS: Performed by: STUDENT IN AN ORGANIZED HEALTH CARE EDUCATION/TRAINING PROGRAM

## 2023-11-13 PROCEDURE — 99232 SBSQ HOSP IP/OBS MODERATE 35: CPT | Performed by: PHYSICAL MEDICINE & REHABILITATION

## 2023-11-13 PROCEDURE — 97542 WHEELCHAIR MNGMENT TRAINING: CPT

## 2023-11-13 PROCEDURE — 97535 SELF CARE MNGMENT TRAINING: CPT

## 2023-11-13 PROCEDURE — 94761 N-INVAS EAR/PLS OXIMETRY MLT: CPT

## 2023-11-13 RX ADMIN — ZONISAMIDE 100 MG: 100 CAPSULE ORAL at 10:18

## 2023-11-13 RX ADMIN — PAROXETINE HYDROCHLORIDE 20 MG: 20 TABLET, FILM COATED ORAL at 10:17

## 2023-11-13 RX ADMIN — AMOXICILLIN AND CLAVULANATE POTASSIUM 1 TABLET: 875; 125 TABLET, FILM COATED ORAL at 21:25

## 2023-11-13 RX ADMIN — QUETIAPINE FUMARATE 50 MG: 50 TABLET, EXTENDED RELEASE ORAL at 21:29

## 2023-11-13 RX ADMIN — LATANOPROST 1 DROP: 50 SOLUTION OPHTHALMIC at 21:30

## 2023-11-13 RX ADMIN — ZONISAMIDE 300 MG: 100 CAPSULE ORAL at 21:28

## 2023-11-13 RX ADMIN — LACOSAMIDE 200 MG: 100 TABLET, FILM COATED ORAL at 21:24

## 2023-11-13 RX ADMIN — LACOSAMIDE 200 MG: 100 TABLET, FILM COATED ORAL at 10:16

## 2023-11-13 RX ADMIN — AMLODIPINE BESYLATE 10 MG: 10 TABLET ORAL at 10:16

## 2023-11-13 RX ADMIN — DIVALPROEX SODIUM 500 MG: 500 TABLET, DELAYED RELEASE ORAL at 10:18

## 2023-11-13 RX ADMIN — ASPIRIN 81 MG 81 MG: 81 TABLET ORAL at 10:15

## 2023-11-13 RX ADMIN — PANTOPRAZOLE SODIUM 20 MG: 20 TABLET, DELAYED RELEASE ORAL at 16:48

## 2023-11-13 RX ADMIN — LACTULOSE 30 G: 20 SOLUTION ORAL at 22:54

## 2023-11-13 RX ADMIN — LEVOTHYROXINE SODIUM 125 MCG: 0.12 TABLET ORAL at 05:27

## 2023-11-13 RX ADMIN — DIVALPROEX SODIUM 1000 MG: 500 TABLET, DELAYED RELEASE ORAL at 21:28

## 2023-11-13 RX ADMIN — CLONAZEPAM 0.25 MG: 0.5 TABLET ORAL at 10:30

## 2023-11-13 RX ADMIN — ATORVASTATIN CALCIUM 80 MG: 40 TABLET, FILM COATED ORAL at 22:54

## 2023-11-13 RX ADMIN — CLOPIDOGREL BISULFATE 75 MG: 75 TABLET ORAL at 10:16

## 2023-11-13 RX ADMIN — PRIMIDONE 50 MG: 50 TABLET ORAL at 14:50

## 2023-11-13 RX ADMIN — LACTULOSE 30 G: 20 SOLUTION ORAL at 14:50

## 2023-11-13 RX ADMIN — ENOXAPARIN SODIUM 40 MG: 100 INJECTION SUBCUTANEOUS at 16:48

## 2023-11-13 RX ADMIN — PANTOPRAZOLE SODIUM 20 MG: 20 TABLET, DELAYED RELEASE ORAL at 05:27

## 2023-11-13 RX ADMIN — LACTULOSE 20 G: 20 SOLUTION ORAL at 10:17

## 2023-11-13 RX ADMIN — AMOXICILLIN AND CLAVULANATE POTASSIUM 1 TABLET: 875; 125 TABLET, FILM COATED ORAL at 10:16

## 2023-11-13 RX ADMIN — TIMOLOL MALEATE 1 DROP: 5 SOLUTION OPHTHALMIC at 10:19

## 2023-11-13 RX ADMIN — PRIMIDONE 50 MG: 50 TABLET ORAL at 21:25

## 2023-11-13 RX ADMIN — TAMSULOSIN HYDROCHLORIDE 0.4 MG: 0.4 CAPSULE ORAL at 10:16

## 2023-11-13 RX ADMIN — PRIMIDONE 50 MG: 50 TABLET ORAL at 10:16

## 2023-11-13 NOTE — PROGRESS NOTES
Occupational Therapy    Physical Rehabilitation: OCCUPATIONAL THERAPY     [x] daily progress note       [] discharge       Patient Name:  Bárbara Morales   :  1955 MRN: [id number]  Room:  [address] [place] [address] Date of Admission: 2023  Rehabilitation Diagnosis:   Metabolic encephalopathy [C31.97]  Metabolic encephalopathy [U17.59]       Date 2023       Day of ARU Week:  5   Time IN/OUT 6209-1172  0567-3168   Individual Tx Minutes 75+60   Group Tx Minutes    Co-Treat Minutes    Concurrent Tx Minutes    TOTAL Tx Time Mins 135   Variance Time    Variance Time []   Refusal due to:     []   Medical hold/reason:    []   Illness   []   Off Unit for test/procedure  []   Extra time needed to complete task  []   Therapeutic need  []   Other (specify):   Restrictions Restrictions/Precautions: Fall Risk, General Precautions, Modified Diet (minced and moist diet, pt to wear helmet while up)         Communication with other providers: [x]   OK to see per nursing:     []   Spoke with team member regarding:      Subjective observations and cognitive status: Pt resting in bed on approach watching TV. Pt stated, \"I'm watching TV right now, come back later. \" This therapist educated pt that he has scheduled therapy and this therapist was unable to \"come back later. \" Pt stated, \"I'll just watch TV for therapy. That works the brain. \" This therapist told patient I would go get towels for the shower and when I got back he was to turn off the TV. Pt did comply when therapist returned. Pt also insisted that he was going home today and got frustrated when he was told he did not have a discharge date. At the end of the session, pt's caregiver, Sherri, entered the room at the end of the session. Sherri stated, \"He can come home now, I we need for him to do is be able to stand and transfer. \" Sherri also proceeded to assist patient with donning pants. Pt was finishing lunch on approach; pleasant and agreeable to therapy.     Pain

## 2023-11-13 NOTE — PROGRESS NOTES
Speech Therapy Note:    11/13/2023  Reji Gorman      Followed up with pt regarding diet--SAM Kamerontone May reported that caregiver was present and reported diet is consistent with what he was receiving at home but that pt unable to use straws and will make pt choke. Observed pt with few sips of water by cup with no choking and SAM reported observing pt with meal with no straws with no choking. Will change order to reflect no straws on tray and update whiteboard. Anibal Falk.  Madhu Sands, [facility] 11/13/2023  2:10 PM

## 2023-11-13 NOTE — PROGRESS NOTES
Physical Therapy      [x] daily progress note       [] discharge       Patient Name:  Whitley Ruth   :  1955 MRN: [id number]  Room:  [address] [place] [address] Date of Admission: 2023  Rehabilitation Diagnosis:   Metabolic encephalopathy [Z43.25]  Metabolic encephalopathy [K92.41]       Date 2023       Day of ARU Week:  5   Time IN/OUT 9148-4936   Individual Tx Minutes 60   TOTAL Tx Time Mins 60   Variance Time    Variance Time []   Refusal due to:     []   Medical hold/reason:    []   Illness   []   Off Unit for test/procedure  []   Extra time needed to complete task  []   Therapeutic need  []   Other (specify):   Restrictions Restrictions/Precautions  Restrictions/Precautions: Fall Risk, General Precautions, Modified Diet (minced and moist diet, pt to wear helmet while up)      Communication with other providers: [x]   OK to see per nursing:     []   Spoke with team member regarding:      Subjective observations and cognitive status: Pt received by OT in gym, willing to participate; Pt req max cues to stay on task, Pt pleasant, but not receptive to safety education with locking brakes on WC before reaching, ana when attempting to reach out of DESIREE.     Pain level/location:  Denies pain   Discharge recommendations  Anticipated discharge date:  TBD  Destination: []home alone   []home alone with assist PRN     [x] Group home      [x] Continuous supervision  []SNF    [] Assisted living     [] Other:   Continued therapy: []HHC PT  []OUTPATIENT  PT   [x] No Further PT  Equipment needs: TBD       Bed Mobility:         [x]   Pt received out of bed     Sit to Lying  Assistance Needed: Independent  Comment: with rail  CARE Score: 6  Discharge Goal: Independent    Transfers:    Sit to Stand  Assistance Needed: Supervision or touching assistance  [facility]  CARE Score: 4  Discharge Goal: Supervision or touching assistance    Chair/Bed-to-Chair Transfer  Assistance Needed: Supervision or touching

## 2023-11-14 PROCEDURE — 6370000000 HC RX 637 (ALT 250 FOR IP): Performed by: PHYSICAL MEDICINE & REHABILITATION

## 2023-11-14 PROCEDURE — 97535 SELF CARE MNGMENT TRAINING: CPT

## 2023-11-14 PROCEDURE — 94150 VITAL CAPACITY TEST: CPT

## 2023-11-14 PROCEDURE — 94664 DEMO&/EVAL PT USE INHALER: CPT

## 2023-11-14 PROCEDURE — 1280000000 HC REHAB R&B

## 2023-11-14 PROCEDURE — 97542 WHEELCHAIR MNGMENT TRAINING: CPT

## 2023-11-14 PROCEDURE — 94761 N-INVAS EAR/PLS OXIMETRY MLT: CPT

## 2023-11-14 PROCEDURE — 6370000000 HC RX 637 (ALT 250 FOR IP): Performed by: STUDENT IN AN ORGANIZED HEALTH CARE EDUCATION/TRAINING PROGRAM

## 2023-11-14 PROCEDURE — 97530 THERAPEUTIC ACTIVITIES: CPT

## 2023-11-14 PROCEDURE — 99232 SBSQ HOSP IP/OBS MODERATE 35: CPT | Performed by: PHYSICAL MEDICINE & REHABILITATION

## 2023-11-14 PROCEDURE — 6360000002 HC RX W HCPCS: Performed by: STUDENT IN AN ORGANIZED HEALTH CARE EDUCATION/TRAINING PROGRAM

## 2023-11-14 PROCEDURE — 97110 THERAPEUTIC EXERCISES: CPT

## 2023-11-14 RX ADMIN — QUETIAPINE FUMARATE 50 MG: 50 TABLET, EXTENDED RELEASE ORAL at 21:16

## 2023-11-14 RX ADMIN — DIVALPROEX SODIUM 1000 MG: 500 TABLET, DELAYED RELEASE ORAL at 21:16

## 2023-11-14 RX ADMIN — TIMOLOL MALEATE 1 DROP: 5 SOLUTION OPHTHALMIC at 09:39

## 2023-11-14 RX ADMIN — CLOPIDOGREL BISULFATE 75 MG: 75 TABLET ORAL at 09:41

## 2023-11-14 RX ADMIN — ZONISAMIDE 100 MG: 100 CAPSULE ORAL at 09:40

## 2023-11-14 RX ADMIN — ZONISAMIDE 300 MG: 100 CAPSULE ORAL at 21:16

## 2023-11-14 RX ADMIN — PRIMIDONE 50 MG: 50 TABLET ORAL at 21:12

## 2023-11-14 RX ADMIN — AMOXICILLIN AND CLAVULANATE POTASSIUM 1 TABLET: 875; 125 TABLET, FILM COATED ORAL at 09:40

## 2023-11-14 RX ADMIN — TAMSULOSIN HYDROCHLORIDE 0.4 MG: 0.4 CAPSULE ORAL at 09:41

## 2023-11-14 RX ADMIN — LATANOPROST 1 DROP: 50 SOLUTION OPHTHALMIC at 21:16

## 2023-11-14 RX ADMIN — ENOXAPARIN SODIUM 40 MG: 100 INJECTION SUBCUTANEOUS at 18:22

## 2023-11-14 RX ADMIN — PRIMIDONE 50 MG: 50 TABLET ORAL at 09:41

## 2023-11-14 RX ADMIN — PRIMIDONE 50 MG: 50 TABLET ORAL at 16:10

## 2023-11-14 RX ADMIN — PANTOPRAZOLE SODIUM 20 MG: 20 TABLET, DELAYED RELEASE ORAL at 06:15

## 2023-11-14 RX ADMIN — ASPIRIN 81 MG 81 MG: 81 TABLET ORAL at 09:41

## 2023-11-14 RX ADMIN — LACOSAMIDE 200 MG: 100 TABLET, FILM COATED ORAL at 21:12

## 2023-11-14 RX ADMIN — AMOXICILLIN AND CLAVULANATE POTASSIUM 1 TABLET: 875; 125 TABLET, FILM COATED ORAL at 21:12

## 2023-11-14 RX ADMIN — LACOSAMIDE 200 MG: 100 TABLET, FILM COATED ORAL at 09:41

## 2023-11-14 RX ADMIN — CLONAZEPAM 0.25 MG: 0.5 TABLET ORAL at 09:38

## 2023-11-14 RX ADMIN — LEVOTHYROXINE SODIUM 125 MCG: 0.12 TABLET ORAL at 06:15

## 2023-11-14 RX ADMIN — DIVALPROEX SODIUM 500 MG: 500 TABLET, DELAYED RELEASE ORAL at 09:39

## 2023-11-14 RX ADMIN — PANTOPRAZOLE SODIUM 20 MG: 20 TABLET, DELAYED RELEASE ORAL at 16:10

## 2023-11-14 RX ADMIN — ATORVASTATIN CALCIUM 80 MG: 40 TABLET, FILM COATED ORAL at 21:12

## 2023-11-14 RX ADMIN — PAROXETINE HYDROCHLORIDE 20 MG: 20 TABLET, FILM COATED ORAL at 09:41

## 2023-11-14 RX ADMIN — LACTULOSE 30 G: 20 SOLUTION ORAL at 21:12

## 2023-11-14 NOTE — PROGRESS NOTES
Occupational Therapy    Physical Rehabilitation: OCCUPATIONAL THERAPY     [x] daily progress note       [] discharge       Patient Name:  Ruth Nair   :  1955 MRN: [id number]  Room:  [address] [place] [address] Date of Admission: 2023  Rehabilitation Diagnosis:   Metabolic encephalopathy [W36.14]  Metabolic encephalopathy [M47.75]       Date 2023       Day of ARU Week:  6   Time IN/OUT 8302-1692  1898-2447   Individual Tx Minutes 90+50   Group Tx Minutes    Co-Treat Minutes    Concurrent Tx Minutes    TOTAL Tx Time Mins 140   Variance Time +20 MUMs    Variance Time []   Refusal due to:     []   Medical hold/reason:    []   Illness   []   Off Unit for test/procedure  []   Extra time needed to complete task  []   Therapeutic need  []   Other (specify):   Restrictions Restrictions/Precautions: Fall Risk, General Precautions, Modified Diet (minced and moist diet, pt to wear helmet while up)         Communication with other providers: [x]   OK to see per nursing:     []   Spoke with team member regarding:      Subjective observations and cognitive status: Pt was resting in bed on approach with nsg staff in room for a supervised feed. The nursing tech informed this therapist that she had been in there for 45 mins for the supervised feed as patient would only take a few bites at a time and very slowly. Pt stated to therapist, \"I'm not going to do therapy. I'm trying to eat. \" This therapist encouraged patient to participate in therapy for ~15 mins before patient agreed. Pt required max cues for re-direction throughout session. PM: Pt was sitting in recliner on approach; when seeing therapist, pt closed eyes and said \"no\". Pt required max encouragement to participate.  During session, pt was incontinent of bowel but did not inform therapist.      Pain level/location:    /10       Location:    Discharge recommendations  Anticipated discharge date:    Destination: []home alone   []home alone w assist prn   []

## 2023-11-14 NOTE — DISCHARGE INSTRUCTIONS
Your information:  Name: Matthew Peters  : 1955    Your instructions:    Pt is discharging to home with [address] N[address]id a[place]us [address] [place] [address]  [phone]  A representative from [facility] will contact you at home to schedule your home care needs    What to do after you leave the hospital:    Recommended diet: {diet:81950}    Recommended activity: {discharge activity:63413}    The following personal items were collected during your admission and were returned to you:    Belongings  Dental Appliances: Lowers, Uppers  Vision - Corrective Lenses: Eyeglasses  Hearing Aid: None  Clothing: Pants, Shirt, Footwear  Jewelry: None  Electronic Devices: None  Weapons (Notify Protective Services/Security): None  Home Medications: None  Valuables Given To: Patient  Provide Name(s) of Who Valuable(s) Were Given To: patient    Information obtained by:  By signing below, I understand that if any problems occur once I leave the hospital I am to contact ***. I understand and acknowledge receipt of the instructions indicated above.

## 2023-11-14 NOTE — CARE COORDINATION
LSW met with patient and provided written communication following Care Conference. Patient verbalized understanding. Whiteboard updated. Spoke with patient's  and discussed discharge date and recommendations. She requested [address] [address] [address] for the patient. She was already working with [facility] to get him services. She stated that the patient will have transport Thursday at 11:30 AM.      Patient provided with list of Medicare participating [address] in the geographic area of the patient served. Patient selected [facility] and was provided with a comparative data handout from [address] [place] [address] website. The patient (and/or Family) was educated on the quality outcomes for each provider. Patient (and/or Family) demonstrated understanding. Per patient/family request, referral made to [facility]. Spoke with patient's guardian by phone and updated her on discharge date and recommendations. Requested she bring in some clothes for patient. D/C Plan:  Estimated Date: Nov 16  DME: has needed DME  HHC:  PT, OT ([facility])  To:   Home to group home (facility will transport around 11:30 AM)

## 2023-11-14 NOTE — PROGRESS NOTES
g/kg)  Method Used for Fluid Requirements: 1 ml/kcal  Fluid (ml/day): 6498-1121    Nutrition Diagnosis:   Inadequate oral intake related to acute injury/trauma as evidenced by poor intake prior to admission    Nutrition Interventions:   Food and/or Nutrient Delivery: Continue Current Diet, Continue Oral Nutrition Supplement  Nutrition Education/Counseling: No recommendation at this time  Coordination of Nutrition Care: Feeding Assistance/Environment Change, Continue to monitor while inpatient       Goals:  Previous Goal Met: Progressing toward Goal(s)  Goals: Meet at least 75% of estimated needs       Nutrition Monitoring and Evaluation:   Behavioral-Environmental Outcomes: None Identified  Food/Nutrient Intake Outcomes: Diet Advancement/Tolerance, Food and Nutrient Intake, Supplement Intake  Physical Signs/Symptoms Outcomes: Biochemical Data, Chewing or Swallowing, Meal Time Behavior, Skin, Weight    Discharge Planning:    Continue current diet     Lane Downs RD, LD  Contact: [phone]

## 2023-11-14 NOTE — DISCHARGE INSTR - ACTIVITY
Pt is discharging to home with [facility], [address], [place], [address] [place] [address]  [phone]  A representative from [facility] will contact you at home to schedule your home care needs

## 2023-11-15 LAB
ANION GAP SERPL CALCULATED.3IONS-SCNC: 10 MMOL/L (ref 4–16)
BUN SERPL-MCNC: 12 MG/DL (ref 6–23)
CALCIUM SERPL-MCNC: 9.1 MG/DL (ref 8.3–10.6)
CHLORIDE BLD-SCNC: 102 MMOL/L (ref 99–110)
CO2: 27 MMOL/L (ref 21–32)
CREAT SERPL-MCNC: 0.5 MG/DL (ref 0.9–1.3)
GFR SERPL CREATININE-BSD FRML MDRD: >60 ML/MIN/1.73M2
GLUCOSE SERPL-MCNC: 92 MG/DL (ref 70–99)
HCT VFR BLD CALC: 39.8 % (ref 42–52)
HEMOGLOBIN: 12.8 GM/DL (ref 13.5–18)
MCH RBC QN AUTO: 30.3 PG (ref 27–31)
MCHC RBC AUTO-ENTMCNC: 32.2 % (ref 32–36)
MCV RBC AUTO: 94.3 FL (ref 78–100)
PDW BLD-RTO: 13.1 % (ref 11.7–14.9)
PLATELET # BLD: 236 K/CU MM (ref 140–440)
PMV BLD AUTO: 9.7 FL (ref 7.5–11.1)
POTASSIUM SERPL-SCNC: 3.9 MMOL/L (ref 3.5–5.1)
RBC # BLD: 4.22 M/CU MM (ref 4.6–6.2)
SODIUM BLD-SCNC: 139 MMOL/L (ref 135–145)
WBC # BLD: 6.3 K/CU MM (ref 4–10.5)

## 2023-11-15 PROCEDURE — 80048 BASIC METABOLIC PNL TOTAL CA: CPT

## 2023-11-15 PROCEDURE — 94761 N-INVAS EAR/PLS OXIMETRY MLT: CPT

## 2023-11-15 PROCEDURE — 36415 COLL VENOUS BLD VENIPUNCTURE: CPT

## 2023-11-15 PROCEDURE — 1280000000 HC REHAB R&B

## 2023-11-15 PROCEDURE — 6370000000 HC RX 637 (ALT 250 FOR IP): Performed by: PHYSICAL MEDICINE & REHABILITATION

## 2023-11-15 PROCEDURE — 85027 COMPLETE CBC AUTOMATED: CPT

## 2023-11-15 PROCEDURE — 99232 SBSQ HOSP IP/OBS MODERATE 35: CPT | Performed by: PHYSICAL MEDICINE & REHABILITATION

## 2023-11-15 PROCEDURE — 6370000000 HC RX 637 (ALT 250 FOR IP): Performed by: STUDENT IN AN ORGANIZED HEALTH CARE EDUCATION/TRAINING PROGRAM

## 2023-11-15 PROCEDURE — 97535 SELF CARE MNGMENT TRAINING: CPT

## 2023-11-15 PROCEDURE — 94150 VITAL CAPACITY TEST: CPT

## 2023-11-15 RX ORDER — AMLODIPINE BESYLATE 5 MG/1
5 TABLET ORAL DAILY
Status: DISCONTINUED | OUTPATIENT
Start: 2023-11-15 | End: 2023-11-16 | Stop reason: HOSPADM

## 2023-11-15 RX ORDER — AMLODIPINE BESYLATE 5 MG/1
5 TABLET ORAL DAILY
Qty: 30 TABLET | Refills: 0 | Status: SHIPPED | OUTPATIENT
Start: 2023-11-16

## 2023-11-15 RX ADMIN — PANTOPRAZOLE SODIUM 20 MG: 20 TABLET, DELAYED RELEASE ORAL at 05:46

## 2023-11-15 RX ADMIN — ASPIRIN 81 MG 81 MG: 81 TABLET ORAL at 09:29

## 2023-11-15 RX ADMIN — LACTULOSE 30 G: 20 SOLUTION ORAL at 09:24

## 2023-11-15 RX ADMIN — AMLODIPINE BESYLATE 5 MG: 5 TABLET ORAL at 09:29

## 2023-11-15 RX ADMIN — ATORVASTATIN CALCIUM 80 MG: 40 TABLET, FILM COATED ORAL at 20:04

## 2023-11-15 RX ADMIN — DIVALPROEX SODIUM 1000 MG: 500 TABLET, DELAYED RELEASE ORAL at 20:05

## 2023-11-15 RX ADMIN — DIVALPROEX SODIUM 500 MG: 500 TABLET, DELAYED RELEASE ORAL at 09:30

## 2023-11-15 RX ADMIN — LACOSAMIDE 200 MG: 100 TABLET, FILM COATED ORAL at 20:04

## 2023-11-15 RX ADMIN — PANTOPRAZOLE SODIUM 20 MG: 20 TABLET, DELAYED RELEASE ORAL at 16:01

## 2023-11-15 RX ADMIN — LACOSAMIDE 200 MG: 100 TABLET, FILM COATED ORAL at 09:28

## 2023-11-15 RX ADMIN — ZONISAMIDE 300 MG: 100 CAPSULE ORAL at 20:04

## 2023-11-15 RX ADMIN — PRIMIDONE 50 MG: 50 TABLET ORAL at 16:01

## 2023-11-15 RX ADMIN — LACTULOSE 30 G: 20 SOLUTION ORAL at 20:04

## 2023-11-15 RX ADMIN — ZONISAMIDE 100 MG: 100 CAPSULE ORAL at 09:28

## 2023-11-15 RX ADMIN — TIMOLOL MALEATE 1 DROP: 5 SOLUTION OPHTHALMIC at 09:23

## 2023-11-15 RX ADMIN — PRIMIDONE 50 MG: 50 TABLET ORAL at 20:04

## 2023-11-15 RX ADMIN — AMOXICILLIN AND CLAVULANATE POTASSIUM 1 TABLET: 875; 125 TABLET, FILM COATED ORAL at 20:04

## 2023-11-15 RX ADMIN — CLOPIDOGREL BISULFATE 75 MG: 75 TABLET ORAL at 09:29

## 2023-11-15 RX ADMIN — CLONAZEPAM 0.25 MG: 0.5 TABLET ORAL at 09:29

## 2023-11-15 RX ADMIN — TAMSULOSIN HYDROCHLORIDE 0.4 MG: 0.4 CAPSULE ORAL at 09:28

## 2023-11-15 RX ADMIN — PRIMIDONE 50 MG: 50 TABLET ORAL at 09:29

## 2023-11-15 RX ADMIN — QUETIAPINE FUMARATE 50 MG: 50 TABLET, EXTENDED RELEASE ORAL at 20:06

## 2023-11-15 RX ADMIN — LATANOPROST 1 DROP: 50 SOLUTION OPHTHALMIC at 20:05

## 2023-11-15 RX ADMIN — PAROXETINE HYDROCHLORIDE 20 MG: 20 TABLET, FILM COATED ORAL at 09:28

## 2023-11-15 RX ADMIN — LEVOTHYROXINE SODIUM 125 MCG: 0.12 TABLET ORAL at 05:46

## 2023-11-15 RX ADMIN — AMOXICILLIN AND CLAVULANATE POTASSIUM 1 TABLET: 875; 125 TABLET, FILM COATED ORAL at 09:29

## 2023-11-15 ASSESSMENT — PAIN SCALES - GENERAL
PAINLEVEL_OUTOF10: 0

## 2023-11-15 NOTE — PROGRESS NOTES
Liana Gonzalez    : 1955  Acct #: [de-identified]  MRN: [id number]              PM&R Progress Note      Admitting diagnosis: Metabolic encephalopathy ([place] 2.1)     Comorbid diagnoses impacting rehabilitation: Generalized weakness, gait disturbance, aspiration pneumonia, dysphagia, essential hypertension, seizure disorder, depression with anxiety, IBS-M, cerebral palsy, history of CVA     Chief complaint: He wants to return to his group home. No particular complaints of pain. Prior (baseline) level of function: Independent. Current level of function:         Current  IRF-BRENT and Goals:   Occupational Therapy:    Short Term Goals  Time Frame for Short Term Goals: STGs=LTGs :   Long Term Goals  Time Frame for Long Term Goals : ~10 days or until d/c  Long Term Goal 1: Pt will complete grooming tasks Ind  Long Term Goal 2: Pt will complete total body bathing c S  Long Term Goal 3: Pt will complete UB dressing c setup  Long Term Goal 4: Pt will complete LB dressing c S  Long Term Goal 5: Pt will doff/don footwear c S  Additional Goals?: Yes  Long Term Goal 6: Pt will complete toileting c S  Long Term Goal 7: Pt will complete functional transfers (bed, chair, toilet, shower) c DME PRN and S  Long Term Goal 8: Pt will perform therex/therax to facilitate increased strength/endurance/ax tolerance (c emphasis on dynamic standing balance/tolerance >5 mins, RUE endurance, aerobic capacity) c SBA :                                       Eating: Eating  Assistance Needed: Supervision or touching assistance  Comment: Pt used 1# wrist weight and weighted utensil d/t RUE temor; pt able to scoop food and feed self.  Pt able to swallow w/o coughing  CARE Score: 4  Discharge Goal: Set-up or clean-up assistance       Oral Hygiene: Oral Hygiene  Assistance Needed: Setup or clean-up assistance  Comment: Pt was able to open mouthwash and use mouthwash at sink  Reason if not Attempted: Patient refused  CARE Score: 5  Discharge Goal:

## 2023-11-15 NOTE — PLAN OF CARE
Problem: Discharge Planning  Goal: Discharge to home or other facility with appropriate resources  11/15/2023 1126 by Yadira Dumont RN  Outcome: Progressing  11/15/2023 1125 by Yadira Dumont RN  Outcome: Progressing  Flowsheets (Taken 11/15/2023 2504)  Discharge to home or other facility with appropriate resources:   Identify barriers to discharge with patient and caregiver   Arrange for needed discharge resources and transportation as appropriate   Identify discharge learning needs (meds, wound care, etc)   Refer to discharge planning if patient needs post-hospital services based on physician order or complex needs related to functional status, cognitive ability or social support system     Problem: Safety - Adult  Goal: Free from fall injury  11/15/2023 1126 by Yadira Dumont RN  Outcome: Progressing  11/15/2023 1125 by Yadira Dumont RN  Outcome: Progressing     Problem: Pain  Goal: Verbalizes/displays adequate comfort level or baseline comfort level  11/15/2023 1126 by Yadira Dumont RN  Outcome: Progressing  11/15/2023 1125 by Yadira Dumont RN  Outcome: Progressing     Problem: ABCDS Injury Assessment  Goal: Absence of physical injury  Outcome: Progressing

## 2023-11-15 NOTE — PROGRESS NOTES
Nelida Stallings    : 1955  Acct #: [de-identified]  MRN: [id number]              PM&R Progress Note      Admitting diagnosis: Metabolic encephalopathy ([place] 2.1)     Comorbid diagnoses impacting rehabilitation: Generalized weakness, gait disturbance, aspiration pneumonia, dysphagia, essential hypertension, seizure disorder, depression with anxiety, IBS-M, cerebral palsy, history of CVA     Chief complaint: He remains focused on getting home. Staff report inappropriate sexual behaviors and comments through the night from him. Prior (baseline) level of function: Independent. Current level of function:         Current  IRF-BRENT and Goals:   Occupational Therapy:    Short Term Goals  Time Frame for Short Term Goals: STGs=LTGs :   Long Term Goals  Time Frame for Long Term Goals : ~10 days or until d/c  Long Term Goal 1: Pt will complete grooming tasks Ind  Long Term Goal 2: Pt will complete total body bathing c S  Long Term Goal 3: Pt will complete UB dressing c setup  Long Term Goal 4: Pt will complete LB dressing c S  Long Term Goal 5: Pt will doff/don footwear c S  Additional Goals?: Yes  Long Term Goal 6: Pt will complete toileting c S  Long Term Goal 7: Pt will complete functional transfers (bed, chair, toilet, shower) c DME PRN and S  Long Term Goal 8: Pt will perform therex/therax to facilitate increased strength/endurance/ax tolerance (c emphasis on dynamic standing balance/tolerance >5 mins, RUE endurance, aerobic capacity) c SBA :                                       Eating: Eating  Assistance Needed: Supervision or touching assistance  Comment: Pt used 1# wrist weight and weighted utensil d/t RUE temor; pt able to scoop food and feed self.  Pt able to swallow w/o coughing  CARE Score: 4  Discharge Goal: Set-up or clean-up assistance       Oral Hygiene: Oral Hygiene  Assistance Needed: Setup or clean-up assistance  Comment: Pt was able to open mouthwash and use mouthwash at sink  Reason if not

## 2023-11-15 NOTE — PROGRESS NOTES
Throughout  shift pt making inappropriate comments towards female staffing such as \"come in bed with me I will keep you warm\"  or \"take your hair down so I can show you I can pull it in my bed\" pt redirected multiple times, pt laughs and continues to make inappropriate comments. Pt also attempts multiple times to each out and hold staffing by the arms and continuously rubs hands up and down staffing's arms. Pt redirected multiple times but continues actions.

## 2023-11-15 NOTE — CARE COORDINATION
[facility] Liaison spoke nik/ Adilene. Both are agreeable to [facility] at discharge. [facility] notified of dc date.

## 2023-11-15 NOTE — CARE COORDINATION
Provided a copy of the Important Message from Medicare form signed upon admission. LSW left a message for the house supervisor to confirm transport time and request someone come in prior to attend therapy session for additional support for the patient. Awaiting a return call.

## 2023-11-15 NOTE — PROGRESS NOTES
Occupational Therapy    Physical Rehabilitation: OCCUPATIONAL THERAPY     [x] daily progress note       [] discharge       Patient Name:  El De Paz   :  1955 MRN: [id number]  Room:  [address] [place] [address] Date of Admission: 2023  Rehabilitation Diagnosis:   Metabolic encephalopathy [U45.70]  Metabolic encephalopathy [Y78.16]       Date 11/15/2023       Day of ARU Week:  7   Time IN/OUT 1050-X  [id number]   Individual Tx Minutes 70   Group Tx Minutes    Co-Treat Minutes    Concurrent Tx Minutes    TOTAL Tx Time Mins 70   Variance Time +10 MUMs  (PT down 120, OT unable to make up minutes)    Variance Time []   Refusal due to:     []   Medical hold/reason:    []   Illness   []   Off Unit for test/procedure  []   Extra time needed to complete task  []   Therapeutic need  []   Other (specify):   Restrictions Restrictions/Precautions: Fall Risk, General Precautions, Modified Diet (minced and moist diet, pt to wear helmet while up)         Communication with other providers: [x]   OK to see per nursing:     []   Spoke with team member regarding:      Subjective observations and cognitive status: Pt resting in bed with eyes closed on approach; pt refusing therapy this date despite max encouragement, pt continued to close eyes and not engage with therapist and staying NO! Pt sleeping on approach; when therapist woke pt up, pt pleasant and agreeable to therapy. Pt was incontinent of stool, pt seemed unaware.       Pain level/location:    /10       Location:    Discharge recommendations  Anticipated discharge date:    Destination: []home alone   []home alone w assist prn   [] home w/ family    [] Continuous supervision       []SNF    [] Assisted living     [] Other:   Continued therapy: [x]HHC OT  []OUTPATIENT  OT   [] No Further OT  Equipment needs: none        ADLs:      Oral Hygiene: Oral Hygiene  Assistance Needed: Independent  Comment: Pt was able to open and use mouthwash at sink  Reason if not Attempted:

## 2023-11-15 NOTE — FLOWSHEET NOTE
helping with meds. Additional Comments: Pt sleeps in hospital bed with rails    Objective                                                                                    Goals:  (Update in navigator)  Short Term Goals  Time Frame for Short Term Goals: 5 days  Short Term Goal 1: Patient will perform all aspects of bed mobility with mod-I  Short Term Goal 2: Patient will perform functional transfers SBA consistently except car transfer CGA  Short Term Goal 3: Patient will propel wheelchair 150' with BLEs and mod I  Short Term Goal 4: Pt will  light object with 2ww and reacher with CGA:   :        Plan of Care                                                                              Times per week: 5 days per week for a minimum of 60 minutes/day plus group as appropriate for 60 minutes.   Treatment to include Current Treatment Recommendations: Strengthening, ROM, Balance training, Functional mobility training, Transfer training, Endurance training, Wheelchair mobility training, Neuromuscular re-education, Patient/Caregiver education & training, Equipment evaluation, education, & procurement, Therapeutic activities, Group Therapy    Electronically signed by   Sharri Stallings [id number]  11/15/2023, 10:55 AM
include Current Treatment Recommendations: Strengthening, ROM, Balance training, Functional mobility training, Transfer training, Endurance training, Wheelchair mobility training, Neuromuscular re-education, Patient/Caregiver education & training, Equipment evaluation, education, & procurement, Therapeutic activities, Group Therapy    Electronically signed by   Rere cMclure, [id number]  11/14/2023, 8:34 AM

## 2023-11-16 VITALS
WEIGHT: 179.23 LBS | BODY MASS INDEX: 28.81 KG/M2 | DIASTOLIC BLOOD PRESSURE: 62 MMHG | TEMPERATURE: 98.2 F | HEIGHT: 66 IN | SYSTOLIC BLOOD PRESSURE: 106 MMHG | RESPIRATION RATE: 18 BRPM | OXYGEN SATURATION: 96 % | HEART RATE: 55 BPM

## 2023-11-16 PROCEDURE — 6370000000 HC RX 637 (ALT 250 FOR IP): Performed by: STUDENT IN AN ORGANIZED HEALTH CARE EDUCATION/TRAINING PROGRAM

## 2023-11-16 PROCEDURE — 6370000000 HC RX 637 (ALT 250 FOR IP): Performed by: PHYSICAL MEDICINE & REHABILITATION

## 2023-11-16 PROCEDURE — 97530 THERAPEUTIC ACTIVITIES: CPT

## 2023-11-16 PROCEDURE — 99239 HOSP IP/OBS DSCHRG MGMT >30: CPT | Performed by: PHYSICAL MEDICINE & REHABILITATION

## 2023-11-16 PROCEDURE — 94150 VITAL CAPACITY TEST: CPT

## 2023-11-16 PROCEDURE — 94761 N-INVAS EAR/PLS OXIMETRY MLT: CPT

## 2023-11-16 PROCEDURE — 97542 WHEELCHAIR MNGMENT TRAINING: CPT

## 2023-11-16 RX ADMIN — AMLODIPINE BESYLATE 5 MG: 5 TABLET ORAL at 08:21

## 2023-11-16 RX ADMIN — ASPIRIN 81 MG 81 MG: 81 TABLET ORAL at 08:21

## 2023-11-16 RX ADMIN — PAROXETINE HYDROCHLORIDE 20 MG: 20 TABLET, FILM COATED ORAL at 08:21

## 2023-11-16 RX ADMIN — ACETAMINOPHEN 650 MG: 325 TABLET ORAL at 05:32

## 2023-11-16 RX ADMIN — ZONISAMIDE 100 MG: 100 CAPSULE ORAL at 08:22

## 2023-11-16 RX ADMIN — LEVOTHYROXINE SODIUM 125 MCG: 0.12 TABLET ORAL at 05:32

## 2023-11-16 RX ADMIN — CLOPIDOGREL BISULFATE 75 MG: 75 TABLET ORAL at 08:21

## 2023-11-16 RX ADMIN — CLONAZEPAM 0.25 MG: 0.5 TABLET ORAL at 08:19

## 2023-11-16 RX ADMIN — PANTOPRAZOLE SODIUM 20 MG: 20 TABLET, DELAYED RELEASE ORAL at 05:32

## 2023-11-16 RX ADMIN — DIVALPROEX SODIUM 500 MG: 500 TABLET, DELAYED RELEASE ORAL at 08:21

## 2023-11-16 RX ADMIN — TIMOLOL MALEATE 1 DROP: 5 SOLUTION OPHTHALMIC at 08:29

## 2023-11-16 RX ADMIN — LACOSAMIDE 200 MG: 100 TABLET, FILM COATED ORAL at 08:19

## 2023-11-16 RX ADMIN — PRIMIDONE 50 MG: 50 TABLET ORAL at 08:21

## 2023-11-16 RX ADMIN — AMOXICILLIN AND CLAVULANATE POTASSIUM 1 TABLET: 875; 125 TABLET, FILM COATED ORAL at 08:19

## 2023-11-16 RX ADMIN — TAMSULOSIN HYDROCHLORIDE 0.4 MG: 0.4 CAPSULE ORAL at 08:29

## 2023-11-16 ASSESSMENT — PAIN SCALES - GENERAL: PAINLEVEL_OUTOF10: 0

## 2023-11-16 NOTE — DISCHARGE SUMMARY
Patient Name: Whitley Ruth  Patient :  1955  Patient MRN:   [id number]      Admission Date:  2023  Discharge Date: 2023    Admitting diagnosis: Metabolic encephalopathy ([place] 2.1)     Comorbid diagnoses impacting rehabilitation: Generalized weakness, gait disturbance, aspiration pneumonia, dysphagia, essential hypertension, seizure disorder, depression with anxiety, IBS-M, cerebral palsy, history of CVA    Discharging diagnosis: Metabolic encephalopathy ([place] 2.1)     Comorbid diagnoses impacting rehabilitation: Generalized weakness, gait disturbance, aspiration pneumonia, dysphagia, essential hypertension, seizure disorder, depression with anxiety, IBS-M, cerebral palsy, history of CVA     History of present illness: The patient is a 60-year-old right-hand-dominant male with a history of cerebral palsy and seizure disorder who lives at a group home. His mentation change over this past week alerting staff and he was brought to the hospital.  Patient was found to be lethargic with evidence of bilateral pneumonia felt to be possibly associated with aspiration. He required IV antibiotics, fluid support and oxygen supplementation. He seemed to be responding some to the antibiotics and had become more alert but not quite to baseline. Prior (baseline) level of function: Independent.     Current level of function:         Current  IRF-BRENT and Goals:   Occupational Therapy:    Short Term Goals  Time Frame for Short Term Goals: STGs=LTGs :   Long Term Goals  Time Frame for Long Term Goals : ~10 days or until d/c  Long Term Goal 1: Pt will complete grooming tasks Ind  Long Term Goal 2: Pt will complete total body bathing c S  Long Term Goal 3: Pt will complete UB dressing c setup  Long Term Goal 4: Pt will complete LB dressing c S  Long Term Goal 5: Pt will doff/don footwear c S  Additional Goals?: Yes  Long Term Goal 6: Pt will complete toileting c S  Long Term Goal 7: Pt will complete functional

## 2023-11-16 NOTE — PROGRESS NOTES
Physical Therapy  [x] daily progress note       [x] discharge       Patient Name:  Romero Kendrick   :  1955 MRN: [id number]  Room:  [address] [place] [address] Date of Admission: 2023  Rehabilitation Diagnosis:   Metabolic encephalopathy [E64.01]  Metabolic encephalopathy [F66.96]       Date 2023       Day of ARU Week:  1   Time IN//1035   Individual Tx Minutes 65   Group Tx Minutes    Co-Treat Minutes    Concurrent Tx Minutes    TOTAL Tx Time Mins 65   Variance Time    Variance Time []   Refusal due to:     []   Medical hold/reason:    []   Illness   []   Off Unit for test/procedure  []   Extra time needed to complete task  []   Therapeutic need  []   Other (specify):   Restrictions Restrictions/Precautions  Restrictions/Precautions: Fall Risk, General Precautions, Modified Diet (minced and moist diet, pt to wear helmet while up)      Interdisciplinary communication [x]   Cleared for therapy per nursing     []   RN notified about issues during session  []   RN updated on pt performance  []   Spoke with   []   Spoke with OT  []   Spoke with MD  []   Other:    Subjective observations and cognitive status: Pt up in recliner, dressed and finishing breakfast. Relieved aide doing supervised feed. Pt took several more bites then was done. Needed mod encouragement to complete QI and then at end of session pt would not get into recliner.  PT left in w/c with alarm     Pain level/location:  0  /10       Location:    Discharge recommendations  Anticipated discharge date:    Destination: []home alone   []home alone with assist PRN     [] home w/ family      [] Continuous supervision  []SNF    [] Assisted living     [x] Other:group home  Continued therapy: [x]HHC PT  []OUTPATIENT PT   [] No Further PT  []SNF PT  Caregiver training recommended: []Yes  [] No   Equipment needs: none     PT IRF-BRENT scores and goals for discharge assessment:   Roll Left and Right  Assistance Needed: Independent  Comment: mod

## 2023-11-16 NOTE — CARE COORDINATION
ARU  Discharge Summary    D/C Date: 11/16/2023    Patient discharged to: Group home    Transported by: Group home staff 11:30 AM    Referrals made to: [facility]    Additional information: Follow up scheduled with Sudeep Gutierrez PA-C Thursday Nov 16, 2023 2:30 PM, MARY Kruse CNP Tuesday Nov 28, 2023 9:45 AM.  notified, AVS updated.     Caregiver training: none requested    Discharge BIMS completed:  [x]

## 2023-11-16 NOTE — PROGRESS NOTES
ARU Discharge Assessment - [facility]    Transportation  \"In the past 6-12 months, has lack of transportation kept you from medical appointments, meetings, work, or from getting things needed for daily living? \"Check all that apply:  [] A.  Yes, it has kept me from medical appointments or from getting my medications  [] B.  Yes, it has kept me from non-medical meetings, appointments, work, or from getting things that I need  [x] C.  No  [] X. Patient unable to respond    Provision of Current Reconciled Medication List to Subsequent Provider at Discharge     [] No, current reconciled medication list not provided to the subsequent provider. NO if D/C home with Outpatient or no services   [x] Yes, current reconciled medication list provided to the subsequent provider. YES if D/C to Acute hospital, SNF, home with home health  (**We MUST Select route of transmission below**)      [] Via Electronic Health Record   [] [facility]  [] Verbal (e.g. in person, telephone, video conferencing)  [x] Paper-based (e.g. fax, copies, printouts)   [] Other Methods (e.g. texting, email, CDs)    Provision of Current Reconciled Medication List to Patient at Discharge  [x] No, current reconciled medication list not provided to the patient, family and/or caregiver. NO If D/C to Acute hospital, SNF, home with home health   [] Yes, current reconciled medication list provided to the patient, family and/or caregiver.   YES if D/C home with Outpatient or no services   (**WE MUST Select route of transmission below**)     [] Via Electronic Health Record (e.g., electronic access to patient portal)   [] Via [employer]  [] Verbal (e.g. in person, telephone, video conferencing)  [] Paper-based (e.g. fax, copies, printouts)   [] Other Methods (e.g. texting, email, CDs)    Health Literacy  \"How often do you need to have someone help you when you read instructions,

## 2023-11-22 ENCOUNTER — TELEPHONE (OUTPATIENT)
Dept: NEUROLOGY | Age: 68
End: 2023-11-22

## 2023-11-22 NOTE — TELEPHONE ENCOUNTER
Lion Garcia, home supervisor to inquire about pt's 48 hr EEG. Pt has not had 48 hr EEG done. Pt has an appt for the testing at the beginning of December. Thalia would like to keep pt's appt as \" he is not acting like his usual self. \"

## 2023-11-28 ENCOUNTER — OFFICE VISIT (OUTPATIENT)
Dept: NEUROLOGY | Age: 68
End: 2023-11-28
Payer: MEDICARE

## 2023-11-28 VITALS — DIASTOLIC BLOOD PRESSURE: 80 MMHG | BODY MASS INDEX: 28.91 KG/M2 | SYSTOLIC BLOOD PRESSURE: 128 MMHG | WEIGHT: 179 LBS

## 2023-11-28 DIAGNOSIS — R56.9 SEIZURE (HCC): ICD-10-CM

## 2023-11-28 DIAGNOSIS — I69.354 SPASTIC HEMIPLEGIA OF LEFT NONDOMINANT SIDE AS LATE EFFECT OF CEREBRAL INFARCTION (HCC): ICD-10-CM

## 2023-11-28 DIAGNOSIS — G80.9 CEREBRAL PALSY, UNSPECIFIED TYPE (HCC): ICD-10-CM

## 2023-11-28 DIAGNOSIS — G25.0 ESSENTIAL TREMOR: ICD-10-CM

## 2023-11-28 DIAGNOSIS — R56.9 SEIZURE (HCC): Primary | ICD-10-CM

## 2023-11-28 PROCEDURE — 3074F SYST BP LT 130 MM HG: CPT | Performed by: NURSE PRACTITIONER

## 2023-11-28 PROCEDURE — G8484 FLU IMMUNIZE NO ADMIN: HCPCS | Performed by: NURSE PRACTITIONER

## 2023-11-28 PROCEDURE — 1123F ACP DISCUSS/DSCN MKR DOCD: CPT | Performed by: NURSE PRACTITIONER

## 2023-11-28 PROCEDURE — 99214 OFFICE O/P EST MOD 30 MIN: CPT | Performed by: NURSE PRACTITIONER

## 2023-11-28 PROCEDURE — 1036F TOBACCO NON-USER: CPT | Performed by: NURSE PRACTITIONER

## 2023-11-28 PROCEDURE — 1111F DSCHRG MED/CURRENT MED MERGE: CPT | Performed by: NURSE PRACTITIONER

## 2023-11-28 PROCEDURE — G8427 DOCREV CUR MEDS BY ELIG CLIN: HCPCS | Performed by: NURSE PRACTITIONER

## 2023-11-28 PROCEDURE — 3079F DIAST BP 80-89 MM HG: CPT | Performed by: NURSE PRACTITIONER

## 2023-11-28 PROCEDURE — G8419 CALC BMI OUT NRM PARAM NOF/U: HCPCS | Performed by: NURSE PRACTITIONER

## 2023-11-28 PROCEDURE — 3017F COLORECTAL CA SCREEN DOC REV: CPT | Performed by: NURSE PRACTITIONER

## 2023-11-28 RX ORDER — FAMOTIDINE 20 MG/1
20 TABLET, FILM COATED ORAL 2 TIMES DAILY
COMMUNITY

## 2023-11-28 RX ORDER — NITROFURANTOIN MACROCRYSTALS 50 MG/1
50 CAPSULE ORAL NIGHTLY
COMMUNITY

## 2023-11-28 NOTE — PROGRESS NOTES
tablet by mouth daily 30 tablet 3    atorvastatin (LIPITOR) 80 MG tablet Take 1 tablet by mouth nightly 30 tablet 3    clopidogrel (PLAVIX) 75 MG tablet Take 1 tablet by mouth daily 30 tablet 3    levothyroxine (SYNTHROID) 125 MCG tablet Take 1 tablet by mouth Daily 30 tablet 3    clonazePAM (KLONOPIN) 0.5 MG tablet Take 0.5 tablets by mouth 2 times daily as needed for Anxiety. primidone (MYSOLINE) 50 MG tablet Take 1 tablet by mouth 3 times daily      latanoprost (XALATAN) 0.005 % ophthalmic solution Place 1 drop into both eyes nightly      timolol (TIMOPTIC) 0.5 % ophthalmic solution Place 1 drop into both eyes daily      Lactobacillus (ACIDOPHILUS) CAPS capsule Take 1 capsule by mouth daily      Lactulose Encephalopathy (ENULOSE PO) Take 30 mLs by mouth 3 times daily      folic acid (FOLVITE) 1 MG tablet Take 1 tablet by mouth daily      PARoxetine (PAXIL) 20 MG tablet Take 1.5 tablets by mouth every morning      QUEtiapine (SEROQUEL XR) 50 MG extended release tablet Take 1 tablet by mouth nightly      tamsulosin (FLOMAX) 0.4 MG capsule Take 1 capsule by mouth daily 30 capsule 0    Cholecalciferol (VITAMIN D3) 1000 UNITS CAPS Take 1 tablet by mouth daily. acetaminophen (APAP EXTRA STRENGTH) 500 MG tablet Take 1 tablet by mouth every 6 hours as needed for Pain (Patient not taking: Reported on 11/28/2023) 30 tablet 0    omeprazole (PRILOSEC) 20 MG delayed release capsule Take 1 capsule by mouth 2 times daily (before meals) (Patient not taking: Reported on 11/28/2023)      Wheat Dextrin (BENEFIBER) POWD Take 4 g by mouth 2 times daily Takes 3 tsp in liquid twice per day (Patient not taking: Reported on 11/28/2023)       No current facility-administered medications for this visit.        Physical Exam:  Also present during visit: health care professional.  Mental Status: A&O to self, location, month and year, NAD, speech clear, language fluent, repetition and naming intact, follows commands appropriately

## 2023-11-28 NOTE — TELEPHONE ENCOUNTER
Received fax from [facility] for Zonisamide refill. Rx pending.      Requested Prescriptions     Pending Prescriptions Disp Refills    zonisamide (ZONEGRAN) 100 MG capsule 120 capsule 3     Sig: Take 1 capsule in the morning and 3 capsules at bedtime

## 2023-11-29 RX ORDER — ZONISAMIDE 100 MG/1
CAPSULE ORAL
Qty: 120 CAPSULE | Refills: 3 | Status: SHIPPED | OUTPATIENT
Start: 2023-11-29

## 2024-01-04 ENCOUNTER — HOSPITAL ENCOUNTER (OUTPATIENT)
Age: 69
Setting detail: SPECIMEN
Discharge: HOME | End: 2024-01-04
Payer: MEDICARE

## 2024-01-04 LAB — AMMONIA: 43 UMOL/L (ref 16–60)

## 2024-01-04 PROCEDURE — 82140 ASSAY OF AMMONIA: CPT

## 2024-01-08 DIAGNOSIS — R56.9 SEIZURE (HCC): ICD-10-CM

## 2024-01-08 NOTE — TELEPHONE ENCOUNTER
Last ov 11/28/23, next ov 2/28/24. Rx pending.     Requested Prescriptions     Pending Prescriptions Disp Refills    lacosamide (VIMPAT) 200 MG tablet [Pharmacy Med Name: Lacosamide 200 MG Tablet] 60 tablet 5     Sig: (CONTROL CYCLE) TAKE 1 TABLET BY MOUTH TWICE DAILY FOR EPILEPSY

## 2024-01-10 RX ORDER — LACOSAMIDE 200 MG/1
200 TABLET ORAL 2 TIMES DAILY
Qty: 60 TABLET | Refills: 5 | Status: SHIPPED | OUTPATIENT
Start: 2024-01-10 | End: 2024-08-30

## 2024-02-01 ENCOUNTER — HOSPITAL ENCOUNTER (OUTPATIENT)
Age: 69
Setting detail: SPECIMEN
Discharge: HOME | End: 2024-02-01
Payer: MEDICARE

## 2024-02-01 LAB — AMMONIA: 41 UMOL/L (ref 16–60)

## 2024-02-01 PROCEDURE — 82140 ASSAY OF AMMONIA: CPT

## 2024-02-15 RX ORDER — SODIUM CHLORIDE, SODIUM LACTATE, POTASSIUM CHLORIDE, CALCIUM CHLORIDE 600; 310; 30; 20 MG/100ML; MG/100ML; MG/100ML; MG/100ML
INJECTION, SOLUTION INTRAVENOUS CONTINUOUS
Status: CANCELLED | OUTPATIENT
Start: 2024-02-22

## 2024-02-15 NOTE — PROGRESS NOTES
Spoke with Thalia at [facility] and patient will arrive at 0845 at [facility] on 2/22/2024 for his procedure at 1015. IV order is in epic.    NOTHING TO EAT OR DRINK AFTER MIDNIGHT DAY OF SURGERY    1. Enter thru the hospital main entrance on day of surgery, check in at the Information Desk. If you arrive prior to 6:00am, enter thru the ER entrance.    2. Follow the directions as prescribed by the doctor for your procedure and medications.         Morning of surgery take:vimpat, synthroid, pepcid, depakote amlodipine and clonazepam.         Stop vitamins, supplements and NSAIDS:  plavix last dose 2/16/2024.    3. Check with your Doctor regarding stopping blood thinners and follow their instructions.    4. Do not smoke, vape or use chewing tobacco morning of surgery. Do not drink any alcoholic beverages 24 hours prior to surgery.       This includes NA Beer. No street drugs 7 days prior to surgery.    5. If you have dentures, contacts of glasses they will be removed before going to the OR; please bring a case.    6. Please bring picture ID, insurance card, paperwork from the doctor’s office (H & P, Consent, & card for implantable devices).    7. Take a shower with an antibacterial soap the night before surgery and the morning of surgery. Do not put anything on your skin      After your morning shower.    8. You will need a responsible adult to drive you home and check on you after surgery.

## 2024-02-21 ENCOUNTER — ANESTHESIA EVENT (OUTPATIENT)
Dept: ENDOSCOPY | Age: 69
End: 2024-02-21
Payer: MEDICARE

## 2024-02-21 ASSESSMENT — LIFESTYLE VARIABLES: SMOKING_STATUS: 0

## 2024-02-22 ENCOUNTER — ANESTHESIA (OUTPATIENT)
Dept: ENDOSCOPY | Age: 69
End: 2024-02-22
Payer: MEDICARE

## 2024-02-22 ENCOUNTER — HOSPITAL ENCOUNTER (OUTPATIENT)
Age: 69
Setting detail: OUTPATIENT SURGERY
Discharge: HOME | End: 2024-02-22
Attending: INTERNAL MEDICINE | Admitting: INTERNAL MEDICINE
Payer: MEDICARE

## 2024-02-22 VITALS
TEMPERATURE: 96.8 F | RESPIRATION RATE: 16 BRPM | DIASTOLIC BLOOD PRESSURE: 80 MMHG | OXYGEN SATURATION: 95 % | HEIGHT: 66 IN | BODY MASS INDEX: 28.77 KG/M2 | HEART RATE: 63 BPM | WEIGHT: 179 LBS | SYSTOLIC BLOOD PRESSURE: 153 MMHG

## 2024-02-22 PROCEDURE — 2500000003 HC RX 250 WO HCPCS: Performed by: NURSE ANESTHETIST, CERTIFIED REGISTERED

## 2024-02-22 PROCEDURE — 6360000002 HC RX W HCPCS: Performed by: NURSE ANESTHETIST, CERTIFIED REGISTERED

## 2024-02-22 PROCEDURE — 3700000000 HC ANESTHESIA ATTENDED CARE: Performed by: INTERNAL MEDICINE

## 2024-02-22 PROCEDURE — 2709999900 HC NON-CHARGEABLE SUPPLY: Performed by: INTERNAL MEDICINE

## 2024-02-22 PROCEDURE — 3700000001 HC ADD 15 MINUTES (ANESTHESIA): Performed by: INTERNAL MEDICINE

## 2024-02-22 PROCEDURE — 2580000003 HC RX 258: Performed by: NURSE ANESTHETIST, CERTIFIED REGISTERED

## 2024-02-22 PROCEDURE — 7100000010 HC PHASE II RECOVERY - FIRST 15 MIN: Performed by: INTERNAL MEDICINE

## 2024-02-22 PROCEDURE — 7100000011 HC PHASE II RECOVERY - ADDTL 15 MIN: Performed by: INTERNAL MEDICINE

## 2024-02-22 PROCEDURE — 3609027000 HC COLONOSCOPY: Performed by: INTERNAL MEDICINE

## 2024-02-22 RX ORDER — EPHEDRINE SULFATE 50 MG/ML
INJECTION INTRAVENOUS PRN
Status: DISCONTINUED | OUTPATIENT
Start: 2024-02-22 | End: 2024-02-22 | Stop reason: SDUPTHER

## 2024-02-22 RX ORDER — PROPOFOL 10 MG/ML
INJECTION, EMULSION INTRAVENOUS PRN
Status: DISCONTINUED | OUTPATIENT
Start: 2024-02-22 | End: 2024-02-22 | Stop reason: SDUPTHER

## 2024-02-22 RX ORDER — LIDOCAINE HYDROCHLORIDE 20 MG/ML
INJECTION, SOLUTION INTRAVENOUS PRN
Status: DISCONTINUED | OUTPATIENT
Start: 2024-02-22 | End: 2024-02-22 | Stop reason: SDUPTHER

## 2024-02-22 RX ORDER — SODIUM CHLORIDE, SODIUM LACTATE, POTASSIUM CHLORIDE, CALCIUM CHLORIDE 600; 310; 30; 20 MG/100ML; MG/100ML; MG/100ML; MG/100ML
INJECTION, SOLUTION INTRAVENOUS CONTINUOUS PRN
Status: DISCONTINUED | OUTPATIENT
Start: 2024-02-22 | End: 2024-02-22 | Stop reason: SDUPTHER

## 2024-02-22 RX ADMIN — EPHEDRINE SULFATE 15 MG: 50 INJECTION INTRAVENOUS at 11:00

## 2024-02-22 RX ADMIN — SODIUM CHLORIDE, POTASSIUM CHLORIDE, SODIUM LACTATE AND CALCIUM CHLORIDE: 600; 310; 30; 20 INJECTION, SOLUTION INTRAVENOUS at 10:27

## 2024-02-22 RX ADMIN — LIDOCAINE HYDROCHLORIDE 60 MG: 20 INJECTION, SOLUTION INTRAVENOUS at 10:36

## 2024-02-22 RX ADMIN — EPHEDRINE SULFATE 25 MG: 50 INJECTION INTRAVENOUS at 10:43

## 2024-02-22 RX ADMIN — PROPOFOL 30 MG: 10 INJECTION, EMULSION INTRAVENOUS at 10:36

## 2024-02-22 RX ADMIN — PROPOFOL 100 MCG/KG/MIN: 10 INJECTION, EMULSION INTRAVENOUS at 10:37

## 2024-02-22 ASSESSMENT — PAIN - FUNCTIONAL ASSESSMENT
PAIN_FUNCTIONAL_ASSESSMENT: 0-10
PAIN_FUNCTIONAL_ASSESSMENT: 0-10

## 2024-02-22 ASSESSMENT — LIFESTYLE VARIABLES: SMOKING_STATUS: 0

## 2024-02-22 NOTE — ANESTHESIA PRE PROCEDURE
Department of Anesthesiology  Preprocedure Note       Name:  Jovan Leal   Age:  68 y.o.  :  1955                                          MRN:  [id number]         Date:  2024      Surgeon: Surgeon(s):  Xavier Rodriguez MD    Procedure: Procedure(s):  COLONOSCOPY DIAGNOSTIC    Medications prior to admission:   Prior to Admission medications    Medication Sig Start Date End Date Taking? Authorizing Provider   lacosamide (VIMPAT) 200 MG tablet Take 1 tablet by mouth 2 times daily for 233 days. (CONTROL CYCLE) TAKE 1 TABLET BY MOUTH TWICE DAILY FOR EPILEPSY Max Daily Amount: 400 mg 1/10/24 8/30/24  Maged Blanca APRN - CNP   zonisamide (ZONEGRAN) 100 MG capsule Take 1 capsule in the morning and 3 capsules at bedtime 23   Maged Blanca APRN - CNP   famotidine (PEPCID) 20 MG tablet Take 1 tablet by mouth 2 times daily    Chriss Buckner MD   mirabegron (MYRBETRIQ) 50 MG TB24 Take 50 mg by mouth daily    Chriss Buckner MD   nitrofurantoin (MACRODANTIN) 50 MG capsule Take 1 capsule by mouth nightly    Chriss Buckner MD   amLODIPine (NORVASC) 5 MG tablet Take 1 tablet by mouth daily  Patient taking differently: Take 2 tablets by mouth daily 23   MONISHA Grissom MD   divalproex (DEPAKOTE) 500 MG DR tablet Take 2 tablets by mouth nightly 22   Walter Rand MD   divalproex (DEPAKOTE) 500 MG DR tablet Take 1 tablet by mouth daily 22   Walter Rand MD   aspirin 81 MG EC tablet Take 1 tablet by mouth daily 10/10/21   Biju Engle MD   atorvastatin (LIPITOR) 80 MG tablet Take 1 tablet by mouth nightly 10/9/21   Biju Engle MD   clopidogrel (PLAVIX) 75 MG tablet Take 1 tablet by mouth daily 10/10/21   Biju Engle MD   AMITIZA 24 MCG capsule  21  Chriss Buckner MD   acetaminophen (APAP EXTRA STRENGTH) 500 MG tablet Take 1 tablet by mouth every 6 hours as needed for Pain  Patient not taking: Reported on 2023 
Never   Substance Use Topics   • Alcohol use: No                                Counseling given: Not Answered      Vital Signs (Current):   Vitals:    02/15/24 1051 02/22/24 0939   BP:  (!) 162/72   Pulse:  62   Resp:  18   TempSrc:  Temporal   SpO2:  99%   Weight: 81.2 kg (179 lb)    Height: 1.676 m (5' 6\")                                               BP Readings from Last 3 Encounters:   02/22/24 (!) 162/72   11/28/23 128/80   11/16/23 106/62       NPO Status:                                                                                 BMI:   Wt Readings from Last 3 Encounters:   02/15/24 81.2 kg (179 lb)   11/28/23 81.2 kg (179 lb)   11/16/23 81.3 kg (179 lb 3.7 oz)     Body mass index is 28.89 kg/m².    CBC:   Lab Results   Component Value Date/Time    WBC 6.3 11/15/2023 07:39 AM    RBC 4.22 11/15/2023 07:39 AM    HGB 12.8 11/15/2023 07:39 AM    HCT 39.8 11/15/2023 07:39 AM    MCV 94.3 11/15/2023 07:39 AM    RDW 13.1 11/15/2023 07:39 AM     11/15/2023 07:39 AM       CMP:   Lab Results   Component Value Date/Time     11/15/2023 07:39 AM    K 3.9 11/15/2023 07:39 AM     11/15/2023 07:39 AM    CO2 27 11/15/2023 07:39 AM    BUN 12 11/15/2023 07:39 AM    CREATININE 0.5 11/15/2023 07:39 AM    GFRAA >60 09/27/2022 12:43 PM    LABGLOM >60 11/15/2023 07:39 AM    GLUCOSE 92 11/15/2023 07:39 AM    PROT 6.7 11/06/2023 02:53 AM    CALCIUM 9.1 11/15/2023 07:39 AM    BILITOT 0.3 11/06/2023 02:53 AM    ALKPHOS 99 11/06/2023 02:53 AM    AST 14 11/06/2023 02:53 AM    ALT 15 11/06/2023 02:53 AM       POC Tests: No results for input(s): \"POCGLU\", \"POCNA\", \"POCK\", \"POCCL\", \"POCBUN\", \"POCHEMO\", \"POCHCT\" in the last 72 hours.    Coags:   Lab Results   Component Value Date/Time    PROTIME 14.1 11/06/2023 02:53 AM    INR 1.1 11/06/2023 02:53 AM    APTT 26.2 09/28/2023 01:19 PM       HCG (If Applicable): No results found for: \"PREGTESTUR\", \"PREGSERUM\", \"HCG\", \"HCGQUANT\"     ABGs:   Lab Results   Component Value

## 2024-02-22 NOTE — PROGRESS NOTES
1112 received pt from cecilio, report taken from MEENAKSHI Graham.  Pt is alert and awake, vss, caretakers at bedside, no c/o, call light in reach, pt given Diet Starry.  1137 vss, caretakers at bedside, pt ready to get dressed for d/c.  Iv removed.  D/c instructions given to Beth and they are getting pt dressed for d/c.  1152 pt d/c to home with caretakers Dorene.  Pt stable at d/c

## 2024-02-22 NOTE — DISCHARGE INSTRUCTIONS
[facility]  [phone]    Do not drive, work around machines or use equipment.  Do not drink any alcoholic beverages.  Do not smoke while alone.  Avoid making important decisions.  Plan to spend a quiet, relaxed evening @ home.  Resume normal activities as you begin to feel better.  Eat lightly for your first meal, then gradually increase your diet to what is normal for you.  In case of nausea, avoid food and drink only clear liquids.  Resume food as nausea ceases.  Notify your surgeon if you experience fever, chills, large amount of bleeding, difficulty breathing, persistent nausea and vomiting or any other disturbing problem.  Call for a follow-up appointment with your surgeon. COLONOSCOPY    DR. DON    OFFICE NUMBER  [phone]    FOLLOW UP APPOINTMENT AS NEEDED.     REPEAT PROCEDURE IN 10 YEARS.    TEST ORDERED: NONE     What to expect at home:  Your Recovery   Your doctor will tell you when you can eat and do your other usual activities Your doctor will talk to you about when you will need your next colonoscopy. Your doctor can help you decide how often you need to be checked. This will depend on the results of your test and your risk for colorectal cancer.  After the test, you may be bloated or have gas pains. You may need to pass gas. If a biopsy was done or a polyp was removed, you may have streaks of blood in your stool (feces) for a few days.  This care sheet gives you a general idea about how long it will take for you to recover. But each person recovers at a different pace. Follow the steps below to get better as quickly as possible.  How can you care for yourself at home?  Activity  Rest when you feel tired.  Diet  Follow your doctor's directions for eating.  Unless your doctor has told you not to, drink plenty of fluids. This helps to replace the fluids that were lost during the colon prep.  DO NOT DRINK ALCOHOL.  Medicines  Your doctor will tell you if and when

## 2024-02-22 NOTE — OP NOTE
Operative Note      Patient: Jovan Leal  YOB: 1955  MRN: [id number]    Date of Procedure: 2/22/2024    Pre-Op Diagnosis Codes:     * Diarrhea, unspecified type [R19.7]     * Cerebral palsy, unspecified type (HCC) [G80.9]     * Rectal polyp [K62.1]    [facility]      BRIEF OP REPORT:    Impression:    1) Grade II hemorrhoids     2) Colon capacious    3) Otherwise non diagnostic        Suggest:   1) high-fiber diet   2) follow-up as needed   3) recall in 10 years     Full EGD/COLONOSCOPY report available by going to \"chart review\" then \"procedures\" then  \"EGD/Colonoscopy\"  then \"View Endoscopy Report\"       Please note that time of note may not reflect time of procedure     Electronically signed by Xavier Rodriguez MD on 2/22/2024 at 10:59 AM

## 2024-02-22 NOTE — ANESTHESIA POSTPROCEDURE EVALUATION
Department of Anesthesiology  Postprocedure Note    Patient: Jovan Leal  MRN: [id number]  YOB: 1955  Date of evaluation: 2/22/2024    Procedure Summary       Date: 02/22/24 Room / Location: [place] [address] / [facility]    Anesthesia Start: 1027 Anesthesia Stop: 1107    Procedure: COLONOSCOPY DIAGNOSTIC Diagnosis:       Diarrhea, unspecified type      Cerebral palsy, unspecified type (HCC)      Rectal polyp      (Diarrhea, unspecified type [R19.7])      (Cerebral palsy, unspecified type (HCC) [G80.9])      (Rectal polyp [K62.1])    Surgeons: Xavier Rodriguez MD Responsible Provider: Carlie Haq MD    Anesthesia Type: MAC ASA Status: 3            Anesthesia Type: No value filed.    Emmanuelle Phase I:      Emmanuelle Phase II:      Anesthesia Post Evaluation    Patient location during evaluation: bedside  Patient participation: complete - patient participated  Level of consciousness: awake  Airway patency: patent  Cardiovascular status: hemodynamically stable  Respiratory status: acceptable    There were no known notable events for this encounter.

## 2024-02-22 NOTE — ANESTHESIA POSTPROCEDURE EVALUATION
Department of Anesthesiology  Postprocedure Note    Patient: Jovan Leal  MRN: [id number]  YOB: 1955  Date of evaluation: 2/22/2024    Procedure Summary       Date: 02/22/24 Room / Location: [place] [address] / [facility]    Anesthesia Start: 1027 Anesthesia Stop: 1107    Procedure: COLONOSCOPY DIAGNOSTIC Diagnosis:       Diarrhea, unspecified type      Cerebral palsy, unspecified type (HCC)      Rectal polyp      (Diarrhea, unspecified type [R19.7])      (Cerebral palsy, unspecified type (HCC) [G80.9])      (Rectal polyp [K62.1])    Surgeons: Xavier Rodriguez MD Responsible Provider: Carlie Haq MD    Anesthesia Type: MAC ASA Status: 3            Anesthesia Type: No value filed.    Emmanuelle Phase I:      Emmanuelle Phase II:      Anesthesia Post Evaluation    Patient location during evaluation: PACU  Patient participation: complete - patient participated  Level of consciousness: awake and alert  Pain score: 0  Airway patency: patent  Nausea & Vomiting: no nausea and no vomiting  Cardiovascular status: hemodynamically stable  Respiratory status: spontaneous ventilation and room air  Hydration status: stable  Pain management: satisfactory to patient    No notable events documented.

## 2024-02-22 NOTE — H&P
[facility] – [place], [place]    [facility]   Pre-operative History and Physical    Patient: Jovan Leal  : 1955      History Obtained From: Patient, Nursing chart and Guardian/family members        HISTORY OF PRESENT ILLNESS:                The patient is a 68 y.o. male with significant past medical history as below who presents for C scope    Indication chronic diarrhea    Past Medical History:        Diagnosis Date    Acute CVA (cerebrovascular accident) (formerly [facility]) 10/10/2021    Anemia     Aspiration pneumonia (formerly [facility])     dx 2014 with this per ecf/ per old chart dx with pneumonia with admission 2018    Cerebral palsy (formerly [facility])     \"has no feeling on left side of body\"alert but has some dementia but not diagnosed, has good long term but poor short term and wakes up disoriented after a nap\"(per yaneth)()    Depression     Epilepsy (formerly [facility])     last seizure 2018- hx grand mal    Frequent falls     with phone assess- family stated pt fell this am (7/10/2013\"in the process of trying to find a facility to help build him back up- (pt now at [facility])    Glaucoma     \"has been in treatment for this in the past- no treatment needed at present\"    History of IBS     Hx MRSA infection     + nasal culture 2014    Hyperammonemia (formerly [facility])     Hypertension     on Lisinopril    IBS (irritable bowel syndrome)     ulcerative colitis    Kidney stone     for surgery for stent placement 2013    Mood disorder (formerly [facility])     per staff at [facility]    MRSA (methicillin resistant staph aureus) culture positive 2014 nasal    MRSA (methicillin resistant staph aureus) culture positive 2020    Face; 10/8/21    Occasional tremors     \"makes it difficult for him to write\"    Pressure injury of contiguous region involving back and right buttock, stage 2 (formerly [facility]) 2020    Pressure injury of left buttock, stage 1 2020    Prostatitis     hx given per H&P old chart    Thyroid disease

## 2024-03-01 DIAGNOSIS — R56.9 SEIZURE (HCC): ICD-10-CM

## 2024-03-04 ENCOUNTER — HOSPITAL ENCOUNTER (OUTPATIENT)
Age: 69
Setting detail: SPECIMEN
Discharge: HOME | End: 2024-03-04
Payer: MEDICARE

## 2024-03-04 LAB — AMMONIA: 40 UMOL/L (ref 16–60)

## 2024-03-04 PROCEDURE — 82140 ASSAY OF AMMONIA: CPT

## 2024-03-04 RX ORDER — ZONISAMIDE 100 MG/1
CAPSULE ORAL
Qty: 120 CAPSULE | Refills: 11 | OUTPATIENT
Start: 2024-03-04

## 2024-03-05 ENCOUNTER — OFFICE VISIT (OUTPATIENT)
Dept: NEUROLOGY | Age: 69
End: 2024-03-05
Payer: MEDICARE

## 2024-03-05 VITALS
SYSTOLIC BLOOD PRESSURE: 96 MMHG | DIASTOLIC BLOOD PRESSURE: 52 MMHG | RESPIRATION RATE: 18 BRPM | OXYGEN SATURATION: 94 % | HEART RATE: 62 BPM

## 2024-03-05 DIAGNOSIS — G80.9 CEREBRAL PALSY, UNSPECIFIED TYPE (HCC): ICD-10-CM

## 2024-03-05 DIAGNOSIS — G25.0 ESSENTIAL TREMOR: ICD-10-CM

## 2024-03-05 DIAGNOSIS — R56.9 SEIZURE (HCC): Primary | ICD-10-CM

## 2024-03-05 DIAGNOSIS — I69.354 SPASTIC HEMIPLEGIA OF LEFT NONDOMINANT SIDE AS LATE EFFECT OF CEREBRAL INFARCTION (HCC): ICD-10-CM

## 2024-03-05 PROCEDURE — 3078F DIAST BP <80 MM HG: CPT | Performed by: NURSE PRACTITIONER

## 2024-03-05 PROCEDURE — G8427 DOCREV CUR MEDS BY ELIG CLIN: HCPCS | Performed by: NURSE PRACTITIONER

## 2024-03-05 PROCEDURE — 3074F SYST BP LT 130 MM HG: CPT | Performed by: NURSE PRACTITIONER

## 2024-03-05 PROCEDURE — 3017F COLORECTAL CA SCREEN DOC REV: CPT | Performed by: NURSE PRACTITIONER

## 2024-03-05 PROCEDURE — 1036F TOBACCO NON-USER: CPT | Performed by: NURSE PRACTITIONER

## 2024-03-05 PROCEDURE — 99213 OFFICE O/P EST LOW 20 MIN: CPT | Performed by: NURSE PRACTITIONER

## 2024-03-05 PROCEDURE — 1123F ACP DISCUSS/DSCN MKR DOCD: CPT | Performed by: NURSE PRACTITIONER

## 2024-03-05 PROCEDURE — G8419 CALC BMI OUT NRM PARAM NOF/U: HCPCS | Performed by: NURSE PRACTITIONER

## 2024-03-05 PROCEDURE — G8484 FLU IMMUNIZE NO ADMIN: HCPCS | Performed by: NURSE PRACTITIONER

## 2024-03-05 RX ORDER — ZONISAMIDE 100 MG/1
100 CAPSULE ORAL DAILY
COMMUNITY

## 2024-03-05 RX ORDER — BISMUTH SUBSALICYLATE 525 MG/15ML
SUSPENSION ORAL PRN
COMMUNITY
Start: 2023-12-18

## 2024-03-05 RX ORDER — LACTULOSE 10 G/15ML
SOLUTION ORAL
COMMUNITY
Start: 2024-03-04

## 2024-03-05 RX ORDER — PSEUDOEPHEDRINE HCL 30 MG/1
30 TABLET ORAL EVERY 4 HOURS PRN
COMMUNITY

## 2024-03-05 RX ORDER — ALBUTEROL SULFATE 2.5 MG/3ML
2.5 SOLUTION RESPIRATORY (INHALATION) EVERY 6 HOURS PRN
COMMUNITY

## 2024-03-05 NOTE — PROGRESS NOTES
3/5/24    Jovan Leal  1955    Chief Complaint   Patient presents with    3 Month Follow-Up     Here for follow up in seizures.     Incontinence     Has incontinence of bowel movements. This has been on going since his hospital visit in November.        History of Present Illness  Jovan is a 68 y.o. male presenting today for follow-up of: Cerebral palsy, seizure disorder status post VNS placement.  Last visit he was referred to neurosurgery to manage his VNS, it was at 0% battery life, device will be replaced 3/6/2024.  He is currently controlled on Vimpat 200 mg twice daily, zonisamide 100 in the morning 300 bedtime and Depakote 500 and the morning 1000 at bedtime.   CBC/CMP normal on 11/6/2023.    He recently had a colonoscopy for chronic diarrhea on 2/22/2024.  Otherwise, he has been doing well, no concerns of seizure activity.  Still having right arm tremor, reassured him that this was captured on ambulatory EEG and nonepileptic.    Current Outpatient Medications   Medication Sig Dispense Refill    albuterol (PROVENTIL) (2.5 MG/3ML) 0.083% nebulizer solution Inhale 3 mLs into the lungs every 6 hours as needed for Wheezing      magnesium hydroxide (MILK OF MAGNESIA) 400 MG/5ML suspension Take 30 mLs by mouth nightly as needed for Heartburn      ANTI-DIARRHEAL 2 MG tablet as needed for Diarrhea      lactulose (CHRONULAC) 10 GM/15ML solution       Pseudoephedrine HCl (NEXAFED) 30 MG TABA Take 30 mg by mouth every 4 hours as needed      zonisamide (ZONEGRAN) 100 MG capsule Take 1 capsule by mouth daily Every morning      lacosamide (VIMPAT) 200 MG tablet Take 1 tablet by mouth 2 times daily for 233 days. (CONTROL CYCLE) TAKE 1 TABLET BY MOUTH TWICE DAILY FOR EPILEPSY Max Daily Amount: 400 mg 60 tablet 5    zonisamide (ZONEGRAN) 100 MG capsule Take 1 capsule in the morning and 3 capsules at bedtime 120 capsule 3    famotidine (PEPCID) 20 MG tablet Take 1 tablet by mouth 2 times daily      amLODIPine (NORVASC)

## 2024-03-06 DIAGNOSIS — R56.9 SEIZURE (HCC): Primary | ICD-10-CM

## 2024-03-06 RX ORDER — ZONISAMIDE 100 MG/1
CAPSULE ORAL
Qty: 120 CAPSULE | Refills: 5 | Status: SHIPPED | OUTPATIENT
Start: 2024-03-06

## 2024-03-06 NOTE — TELEPHONE ENCOUNTER
Last ov 3/5/24, next ov due 9/2024. Rx pending.     Requested Prescriptions     Pending Prescriptions Disp Refills    zonisamide (ZONEGRAN) 100 MG capsule [Pharmacy Med Name: Zonisamide 100 MG Capsule] 120 capsule 5     Sig: TAKE 1 CAPSULE BY MOUTH EVERY MORNING FOR SEIZURES/EPILEPSY;TAKE 3 CAPSULES (300MG) BY MOUTH EVERY NIGHT AT BEDTIME FOR SEIZURES/EPILEPSY

## 2024-05-06 ENCOUNTER — HOSPITAL ENCOUNTER (OUTPATIENT)
Age: 69
Setting detail: SPECIMEN
Discharge: HOME | End: 2024-05-06
Payer: MEDICARE

## 2024-05-06 LAB — AMMONIA: 41 UMOL/L (ref 16–60)

## 2024-05-06 PROCEDURE — 82140 ASSAY OF AMMONIA: CPT

## 2024-05-13 DIAGNOSIS — R56.9 SEIZURE (HCC): ICD-10-CM

## 2024-05-13 NOTE — TELEPHONE ENCOUNTER
Vimpat needs to be sent to [facility] pharmacy, they will not fill this anymore because its controlled.     Rx pending.       Requested Prescriptions     Pending Prescriptions Disp Refills    lacosamide (VIMPAT) 200 MG tablet 60 tablet 5     Sig: Take 1 tablet by mouth 2 times daily for 233 days. (CONTROL CYCLE) TAKE 1 TABLET BY MOUTH TWICE DAILY FOR EPILEPSY Max Daily Amount: 400 mg

## 2024-05-15 RX ORDER — LACOSAMIDE 200 MG/1
200 TABLET ORAL 2 TIMES DAILY
Qty: 60 TABLET | Refills: 5 | Status: SHIPPED | OUTPATIENT
Start: 2024-05-15 | End: 2025-01-03

## 2024-07-01 DIAGNOSIS — R56.9 SEIZURE (HCC): ICD-10-CM

## 2024-07-01 RX ORDER — LACOSAMIDE 200 MG/1
TABLET ORAL
Qty: 60 TABLET | Refills: 4 | OUTPATIENT
Start: 2024-07-01

## 2024-07-05 DIAGNOSIS — R56.9 SEIZURE (HCC): ICD-10-CM

## 2024-07-08 RX ORDER — LACOSAMIDE 200 MG/1
TABLET ORAL
Qty: 60 TABLET | Refills: 5 | OUTPATIENT
Start: 2024-07-08

## 2024-07-17 DIAGNOSIS — R56.9 SEIZURE (HCC): ICD-10-CM

## 2024-07-17 RX ORDER — LACOSAMIDE 200 MG/1
TABLET ORAL
Qty: 60 TABLET | Refills: 5 | OUTPATIENT
Start: 2024-07-17

## 2024-08-07 DIAGNOSIS — R56.9 SEIZURE (HCC): ICD-10-CM

## 2024-08-07 RX ORDER — LACOSAMIDE 200 MG/1
200 TABLET ORAL 2 TIMES DAILY
Qty: 60 TABLET | Refills: 5 | Status: SHIPPED | OUTPATIENT
Start: 2024-08-07 | End: 2025-03-28

## 2024-08-12 NOTE — TELEPHONE ENCOUNTER
Last Visit: 3/5/24    Next Visit: 9/17/24    Rx is pending.     Requested Prescriptions     Pending Prescriptions Disp Refills    zonisamide (ZONEGRAN) 100 MG capsule [Pharmacy Med Name: Zonisamide 100 MG Capsule] 120 capsule 11     Sig: TAKE 1 CAPSULE BY MOUTH EVERY MORNING FOR SEIZURES/EPILEPSY;TAKE 3 CAPSULES (300MG) BY MOUTH EVERY NIGHT AT BEDTIME FOR SEIZURES/EPILEPSY

## 2024-08-13 RX ORDER — ZONISAMIDE 100 MG/1
CAPSULE ORAL
Qty: 120 CAPSULE | Refills: 11 | Status: SHIPPED | OUTPATIENT
Start: 2024-08-13

## 2024-09-17 ENCOUNTER — OFFICE VISIT (OUTPATIENT)
Dept: NEUROLOGY | Age: 69
End: 2024-09-17
Payer: MEDICARE

## 2024-09-17 VITALS — HEART RATE: 64 BPM | DIASTOLIC BLOOD PRESSURE: 64 MMHG | SYSTOLIC BLOOD PRESSURE: 112 MMHG | OXYGEN SATURATION: 96 %

## 2024-09-17 DIAGNOSIS — I69.354 SPASTIC HEMIPLEGIA OF LEFT NONDOMINANT SIDE AS LATE EFFECT OF CEREBRAL INFARCTION (HCC): ICD-10-CM

## 2024-09-17 DIAGNOSIS — R56.9 SEIZURE (HCC): Primary | ICD-10-CM

## 2024-09-17 DIAGNOSIS — G93.41 METABOLIC ENCEPHALOPATHY: ICD-10-CM

## 2024-09-17 DIAGNOSIS — G80.9 CEREBRAL PALSY, UNSPECIFIED TYPE (HCC): ICD-10-CM

## 2024-09-17 DIAGNOSIS — G25.0 ESSENTIAL TREMOR: ICD-10-CM

## 2024-09-17 PROCEDURE — 3078F DIAST BP <80 MM HG: CPT | Performed by: NURSE PRACTITIONER

## 2024-09-17 PROCEDURE — 99214 OFFICE O/P EST MOD 30 MIN: CPT | Performed by: NURSE PRACTITIONER

## 2024-09-17 PROCEDURE — 3017F COLORECTAL CA SCREEN DOC REV: CPT | Performed by: NURSE PRACTITIONER

## 2024-09-17 PROCEDURE — 1036F TOBACCO NON-USER: CPT | Performed by: NURSE PRACTITIONER

## 2024-09-17 PROCEDURE — 1123F ACP DISCUSS/DSCN MKR DOCD: CPT | Performed by: NURSE PRACTITIONER

## 2024-09-17 PROCEDURE — G8427 DOCREV CUR MEDS BY ELIG CLIN: HCPCS | Performed by: NURSE PRACTITIONER

## 2024-09-17 PROCEDURE — 3074F SYST BP LT 130 MM HG: CPT | Performed by: NURSE PRACTITIONER

## 2024-09-17 PROCEDURE — G8419 CALC BMI OUT NRM PARAM NOF/U: HCPCS | Performed by: NURSE PRACTITIONER

## 2024-09-17 RX ORDER — SULFAMETHOXAZOLE/TRIMETHOPRIM 800-160 MG
1 TABLET ORAL 2 TIMES DAILY
COMMUNITY

## 2024-09-18 LAB — AMMONIA: 28 MMOL/L (ref 11–35)

## 2024-09-19 ENCOUNTER — TELEPHONE (OUTPATIENT)
Dept: NEUROLOGY | Age: 69
End: 2024-09-19

## 2024-09-19 RX ORDER — PRIMIDONE 50 MG/1
50 TABLET ORAL 4 TIMES DAILY
Qty: 120 TABLET | Refills: 5 | Status: SHIPPED | OUTPATIENT
Start: 2024-09-19

## 2024-10-12 NOTE — FLOWSHEET NOTE
Attempted to reach [address] [place] [address] to provide update, no answer, left message to call ED [facility]  part of [facility]    Progress Note    Ayla Velez Patient Status:  Inpatient    1922 MRN [id number]   Location [facility] 4W/SW/SE Attending Ayla Swann MD   Hosp Day # 1 PCP AYLA SWANN MD, MD     Subjective:     Constitutional:  Negative for fever.   HENT:  Negative for ear pain and sinus pressure.    Respiratory:  Negative for choking and shortness of breath.    Cardiovascular:  Negative for leg swelling.   Gastrointestinal:  Negative for diarrhea.   Genitourinary:  Negative for flank pain.   Neurological:  Negative for light-headedness.       Objective:   Blood pressure 152/77, pulse 88, temperature 99.3 °F (37.4 °C), temperature source Oral, resp. rate 18, weight 107 lb 14.4 oz (48.9 kg), SpO2 98%.  Physical Exam  Vitals reviewed. Exam conducted with a chaperone present.   Constitutional:       General: He is not in acute distress.     Appearance: He is normal weight.   HENT:      Head: Normocephalic.   Eyes:      Extraocular Movements: Extraocular movements intact.   Cardiovascular:      Rate and Rhythm: Normal rate.      Heart sounds: No murmur heard.  Pulmonary:      Breath sounds: No wheezing.   Abdominal:      General: There is no distension.   Musculoskeletal:         General: Normal range of motion.      Cervical back: Neck supple.   Neurological:      General: No focal deficit present.      Mental Status: He is alert and oriented to person, place, and time.         Results:     Lab Results   Component Value Date    WBC 10.3 10/12/2024    HGB 9.5 (L) 10/12/2024    HCT 30.9 (L) 10/12/2024    .0 10/12/2024    CREATSERUM 1.07 10/12/2024    BUN 27 (H) 10/12/2024     10/12/2024    K 4.5 10/12/2024     10/12/2024    CO2 26.0 10/12/2024     (H) 10/12/2024    CA 8.4 (L) 10/12/2024    ALB 3.6 10/12/2024    ALKPHO 72 10/12/2024    BILT 0.5 10/12/2024    TP 6.0 10/12/2024    AST 15 10/12/2024    ALT 10 10/12/2024    PTT  117.0 (H) 12/09/2023    INR 1.00 12/09/2023    T4F 1.1 02/08/2024    TSH 3.219 02/08/2024    MG 1.8 11/20/2023       XR CHEST AP PORTABLE  (CPT=71045)    Result Date: 10/11/2024  CONCLUSION:  1. Probable scarring in the mid and lower lungs.  No consolidation.    Dictated by (CST): Wilton Ralph MD on 10/11/2024 at 1:09 PM     Finalized by (CST): Wilton Ralph MD on 10/11/2024 at 1:12 PM          XR CHEST AP PORTABLE  (CPT=71045)    Result Date: 10/11/2024  CONCLUSION:   Mild linear appearing bibasilar opacity likely scarring atelectasis.  Infection thought to be less likely.  A preliminary report was issued by the [facility] teleradiology service. There are no major discrepancies.    Dictated by (CST): Gabe Bruce MD on 10/11/2024 at 10:22 AM     Finalized by (CST): Gabe Bruce MD on 10/11/2024 at 10:23 AM          CT BRAIN OR HEAD (CPT=70450)    Result Date: 10/11/2024  CONCLUSION:   No acute intracranial abnormality.  Chronic microvascular white matter ischemia.  Left basal ganglia lacunar infarct.  A preliminary report was issued by the [facility] Radiology teleradiology service. There are no major discrepancies.    Dictated by (CST): Gabe Bruce MD on 10/11/2024 at 8:52 AM     Finalized by (CST): Gabe Bruce MD on 10/11/2024 at 8:55 AM         EKG    Result Date: 10/11/2024  Sinus rhythm with 1st degree A-V block Low voltage QRS, consider pulmonary disease, pericardial effusion, or normal variant Right bundle branch block Septal infarct (cited on or before 16-NOV-2023) Abnormal ECG When compared with ECG of 07-FEB-2024 15:53, Vent. rate has increased BY  40 BPM Questionable change in initial forces of Septal leads Confirmed by REMIGIO PARADA ([id number]) on 10/11/2024 7:09:24 AM     Assessment and Plan:   Principal Problem:    Fever, unspecified fever cause  Active Problems:    Community acquired pneumonia of right lower lobe of lung    Leukocytosis    Azotemia    Hyperkalemia     Hyperglycemia  UTI      PLAN  Iv rocephine.  PT eval.  Home tomorrow if  stable.  Blood culture neg in 1 day.  Dw son.  Dw RN.  Labs in am.  Hold lisinopril,  Scd both legs.      CAROLYN OROURKE MD, MD  10/11/2024

## 2024-10-22 ENCOUNTER — TELEPHONE (OUTPATIENT)
Dept: NEUROLOGY | Age: 69
End: 2024-10-22

## 2024-10-22 NOTE — TELEPHONE ENCOUNTER
Patient's caregiver from [facility], [place], states she has concerns about patient's current dose of Primidone 50mg, states last visit it was upped from 3 times daily to 4 times daily. She states current does is causing him to just sleep and also states is not helping. Wants to request for dose to be changed and order to be sent to her to update on their end. Also would like for [facility] lab order to be sent to her as well. Fax number is [phone]. We can put \"Attention: [place]\" on the cover sheet. Her callback number is [phone].

## 2024-10-23 ENCOUNTER — HOSPITAL ENCOUNTER (INPATIENT)
Age: 69
LOS: 2 days | Discharge: HOME | End: 2024-10-25
Attending: STUDENT IN AN ORGANIZED HEALTH CARE EDUCATION/TRAINING PROGRAM | Admitting: STUDENT IN AN ORGANIZED HEALTH CARE EDUCATION/TRAINING PROGRAM
Payer: MEDICARE

## 2024-10-23 DIAGNOSIS — R56.9 SEIZURE (HCC): Primary | ICD-10-CM

## 2024-10-23 DIAGNOSIS — G93.41 METABOLIC ENCEPHALOPATHY: ICD-10-CM

## 2024-10-23 DIAGNOSIS — G45.9 TRANSIENT ISCHEMIC ATTACK: Primary | ICD-10-CM

## 2024-10-23 PROCEDURE — 6360000002 HC RX W HCPCS: Performed by: STUDENT IN AN ORGANIZED HEALTH CARE EDUCATION/TRAINING PROGRAM

## 2024-10-23 PROCEDURE — 2580000003 HC RX 258: Performed by: STUDENT IN AN ORGANIZED HEALTH CARE EDUCATION/TRAINING PROGRAM

## 2024-10-23 PROCEDURE — 93005 ELECTROCARDIOGRAM TRACING: CPT | Performed by: STUDENT IN AN ORGANIZED HEALTH CARE EDUCATION/TRAINING PROGRAM

## 2024-10-23 PROCEDURE — 6370000000 HC RX 637 (ALT 250 FOR IP): Performed by: STUDENT IN AN ORGANIZED HEALTH CARE EDUCATION/TRAINING PROGRAM

## 2024-10-23 PROCEDURE — 1200000000 HC SEMI PRIVATE

## 2024-10-23 RX ORDER — SODIUM CHLORIDE 0.9 % (FLUSH) 0.9 %
5-40 SYRINGE (ML) INJECTION EVERY 12 HOURS SCHEDULED
Status: DISCONTINUED | OUTPATIENT
Start: 2024-10-23 | End: 2024-10-25 | Stop reason: HOSPADM

## 2024-10-23 RX ORDER — ASPIRIN 300 MG/1
300 SUPPOSITORY RECTAL DAILY
Status: DISCONTINUED | OUTPATIENT
Start: 2024-10-23 | End: 2024-10-25 | Stop reason: HOSPADM

## 2024-10-23 RX ORDER — DIVALPROEX SODIUM 500 MG/1
500 TABLET, DELAYED RELEASE ORAL DAILY
Status: DISCONTINUED | OUTPATIENT
Start: 2024-10-24 | End: 2024-10-25 | Stop reason: HOSPADM

## 2024-10-23 RX ORDER — ZONISAMIDE 100 MG/1
100 CAPSULE ORAL DAILY
Status: DISCONTINUED | OUTPATIENT
Start: 2024-10-24 | End: 2024-10-25 | Stop reason: HOSPADM

## 2024-10-23 RX ORDER — PRIMIDONE 50 MG/1
50 TABLET ORAL 3 TIMES DAILY
Status: DISCONTINUED | OUTPATIENT
Start: 2024-10-23 | End: 2024-10-25 | Stop reason: HOSPADM

## 2024-10-23 RX ORDER — TIMOLOL MALEATE 5 MG/ML
1 SOLUTION/ DROPS OPHTHALMIC DAILY
Status: DISCONTINUED | OUTPATIENT
Start: 2024-10-24 | End: 2024-10-25 | Stop reason: HOSPADM

## 2024-10-23 RX ORDER — DIVALPROEX SODIUM 500 MG/1
1000 TABLET, DELAYED RELEASE ORAL NIGHTLY
Status: DISCONTINUED | OUTPATIENT
Start: 2024-10-23 | End: 2024-10-25 | Stop reason: HOSPADM

## 2024-10-23 RX ORDER — QUETIAPINE FUMARATE 25 MG/1
50 TABLET, FILM COATED ORAL NIGHTLY
Status: DISCONTINUED | OUTPATIENT
Start: 2024-10-23 | End: 2024-10-25 | Stop reason: HOSPADM

## 2024-10-23 RX ORDER — FOLIC ACID 1 MG/1
1 TABLET ORAL DAILY
Status: DISCONTINUED | OUTPATIENT
Start: 2024-10-23 | End: 2024-10-25 | Stop reason: HOSPADM

## 2024-10-23 RX ORDER — ALBUTEROL SULFATE 0.83 MG/ML
2.5 SOLUTION RESPIRATORY (INHALATION) EVERY 6 HOURS PRN
Status: DISCONTINUED | OUTPATIENT
Start: 2024-10-23 | End: 2024-10-25 | Stop reason: HOSPADM

## 2024-10-23 RX ORDER — AMLODIPINE BESYLATE 5 MG/1
5 TABLET ORAL DAILY
Status: DISCONTINUED | OUTPATIENT
Start: 2024-10-24 | End: 2024-10-25 | Stop reason: HOSPADM

## 2024-10-23 RX ORDER — ZONISAMIDE 100 MG/1
300 CAPSULE ORAL
Status: DISCONTINUED | OUTPATIENT
Start: 2024-10-23 | End: 2024-10-25 | Stop reason: HOSPADM

## 2024-10-23 RX ORDER — LACTOBACILLUS RHAMNOSUS GG 10B CELL
1 CAPSULE ORAL DAILY
Status: DISCONTINUED | OUTPATIENT
Start: 2024-10-24 | End: 2024-10-25 | Stop reason: HOSPADM

## 2024-10-23 RX ORDER — ROSUVASTATIN CALCIUM 20 MG/1
40 TABLET, COATED ORAL NIGHTLY
Status: DISCONTINUED | OUTPATIENT
Start: 2024-10-23 | End: 2024-10-25 | Stop reason: HOSPADM

## 2024-10-23 RX ORDER — TAMSULOSIN HYDROCHLORIDE 0.4 MG/1
0.4 CAPSULE ORAL DAILY
Status: DISCONTINUED | OUTPATIENT
Start: 2024-10-23 | End: 2024-10-25 | Stop reason: HOSPADM

## 2024-10-23 RX ORDER — LACOSAMIDE 100 MG/1
200 TABLET ORAL 2 TIMES DAILY
Status: DISCONTINUED | OUTPATIENT
Start: 2024-10-23 | End: 2024-10-25 | Stop reason: HOSPADM

## 2024-10-23 RX ORDER — SODIUM CHLORIDE 0.9 % (FLUSH) 0.9 %
5-40 SYRINGE (ML) INJECTION PRN
Status: DISCONTINUED | OUTPATIENT
Start: 2024-10-23 | End: 2024-10-25 | Stop reason: HOSPADM

## 2024-10-23 RX ORDER — LACTULOSE 10 G/15ML
20 SOLUTION ORAL 3 TIMES DAILY
Status: DISCONTINUED | OUTPATIENT
Start: 2024-10-23 | End: 2024-10-25 | Stop reason: HOSPADM

## 2024-10-23 RX ORDER — ONDANSETRON 4 MG/1
4 TABLET, ORALLY DISINTEGRATING ORAL EVERY 8 HOURS PRN
Status: DISCONTINUED | OUTPATIENT
Start: 2024-10-23 | End: 2024-10-25 | Stop reason: HOSPADM

## 2024-10-23 RX ORDER — ATORVASTATIN CALCIUM 40 MG/1
80 TABLET, FILM COATED ORAL NIGHTLY
Status: DISCONTINUED | OUTPATIENT
Start: 2024-10-23 | End: 2024-10-25 | Stop reason: HOSPADM

## 2024-10-23 RX ORDER — LEVOTHYROXINE SODIUM 125 UG/1
125 TABLET ORAL DAILY
Status: DISCONTINUED | OUTPATIENT
Start: 2024-10-24 | End: 2024-10-25 | Stop reason: HOSPADM

## 2024-10-23 RX ORDER — CLONAZEPAM 0.5 MG/1
0.25 TABLET ORAL 2 TIMES DAILY PRN
Status: DISCONTINUED | OUTPATIENT
Start: 2024-10-23 | End: 2024-10-25 | Stop reason: HOSPADM

## 2024-10-23 RX ORDER — ASPIRIN 81 MG/1
81 TABLET, CHEWABLE ORAL DAILY
Status: DISCONTINUED | OUTPATIENT
Start: 2024-10-23 | End: 2024-10-25 | Stop reason: HOSPADM

## 2024-10-23 RX ORDER — ASPIRIN 81 MG/1
81 TABLET, CHEWABLE ORAL DAILY
Status: DISCONTINUED | OUTPATIENT
Start: 2024-10-24 | End: 2024-10-23

## 2024-10-23 RX ORDER — ONDANSETRON 2 MG/ML
4 INJECTION INTRAMUSCULAR; INTRAVENOUS EVERY 6 HOURS PRN
Status: DISCONTINUED | OUTPATIENT
Start: 2024-10-23 | End: 2024-10-25 | Stop reason: HOSPADM

## 2024-10-23 RX ORDER — SODIUM CHLORIDE 9 MG/ML
INJECTION, SOLUTION INTRAVENOUS PRN
Status: DISCONTINUED | OUTPATIENT
Start: 2024-10-23 | End: 2024-10-25 | Stop reason: HOSPADM

## 2024-10-23 RX ORDER — LATANOPROST 50 UG/ML
1 SOLUTION/ DROPS OPHTHALMIC NIGHTLY
Status: DISCONTINUED | OUTPATIENT
Start: 2024-10-23 | End: 2024-10-25 | Stop reason: HOSPADM

## 2024-10-23 RX ORDER — POLYETHYLENE GLYCOL 3350 17 G/17G
17 POWDER, FOR SOLUTION ORAL DAILY PRN
Status: DISCONTINUED | OUTPATIENT
Start: 2024-10-23 | End: 2024-10-25 | Stop reason: HOSPADM

## 2024-10-23 RX ORDER — PRIMIDONE 50 MG/1
50 TABLET ORAL 3 TIMES DAILY
Qty: 90 TABLET | Refills: 5 | Status: SHIPPED | OUTPATIENT
Start: 2024-10-23

## 2024-10-23 RX ORDER — LACOSAMIDE 100 MG/1
200 TABLET ORAL DAILY PRN
Status: DISCONTINUED | OUTPATIENT
Start: 2024-10-23 | End: 2024-10-25 | Stop reason: HOSPADM

## 2024-10-23 RX ORDER — CLOPIDOGREL BISULFATE 75 MG/1
75 TABLET ORAL DAILY
Status: DISCONTINUED | OUTPATIENT
Start: 2024-10-24 | End: 2024-10-25 | Stop reason: HOSPADM

## 2024-10-23 RX ORDER — LABETALOL HYDROCHLORIDE 5 MG/ML
10 INJECTION, SOLUTION INTRAVENOUS EVERY 10 MIN PRN
Status: DISCONTINUED | OUTPATIENT
Start: 2024-10-23 | End: 2024-10-25 | Stop reason: HOSPADM

## 2024-10-23 RX ORDER — ENOXAPARIN SODIUM 100 MG/ML
40 INJECTION SUBCUTANEOUS DAILY
Status: DISCONTINUED | OUTPATIENT
Start: 2024-10-23 | End: 2024-10-25 | Stop reason: HOSPADM

## 2024-10-23 RX ORDER — LOPERAMIDE HYDROCHLORIDE 2 MG/1
2 CAPSULE ORAL 4 TIMES DAILY PRN
Status: DISCONTINUED | OUTPATIENT
Start: 2024-10-23 | End: 2024-10-25 | Stop reason: HOSPADM

## 2024-10-23 RX ORDER — FAMOTIDINE 20 MG/1
20 TABLET, FILM COATED ORAL 2 TIMES DAILY
Status: DISCONTINUED | OUTPATIENT
Start: 2024-10-23 | End: 2024-10-25 | Stop reason: HOSPADM

## 2024-10-23 RX ADMIN — FOLIC ACID 1 MG: 1 TABLET ORAL at 23:53

## 2024-10-23 RX ADMIN — PRIMIDONE 50 MG: 50 TABLET ORAL at 23:52

## 2024-10-23 RX ADMIN — ROSUVASTATIN CALCIUM 40 MG: 20 TABLET, FILM COATED ORAL at 23:52

## 2024-10-23 RX ADMIN — SODIUM CHLORIDE, PRESERVATIVE FREE 10 ML: 5 INJECTION INTRAVENOUS at 23:56

## 2024-10-23 RX ADMIN — LACTULOSE 20 G: 20 SOLUTION ORAL at 23:52

## 2024-10-23 RX ADMIN — FAMOTIDINE 20 MG: 20 TABLET ORAL at 23:59

## 2024-10-23 RX ADMIN — ENOXAPARIN SODIUM 40 MG: 100 INJECTION SUBCUTANEOUS at 23:53

## 2024-10-23 RX ADMIN — ZONISAMIDE 300 MG: 100 CAPSULE ORAL at 23:52

## 2024-10-23 RX ADMIN — DIVALPROEX SODIUM 1000 MG: 500 TABLET, DELAYED RELEASE ORAL at 23:53

## 2024-10-23 RX ADMIN — LATANOPROST 1 DROP: 50 SOLUTION OPHTHALMIC at 23:52

## 2024-10-23 RX ADMIN — ATORVASTATIN CALCIUM 80 MG: 40 TABLET, FILM COATED ORAL at 23:53

## 2024-10-23 RX ADMIN — TAMSULOSIN HYDROCHLORIDE 0.4 MG: 0.4 CAPSULE ORAL at 23:52

## 2024-10-23 RX ADMIN — LACOSAMIDE 200 MG: 100 TABLET, FILM COATED ORAL at 23:52

## 2024-10-23 RX ADMIN — ASPIRIN 81 MG: 81 TABLET, CHEWABLE ORAL at 23:53

## 2024-10-23 RX ADMIN — QUETIAPINE FUMARATE 50 MG: 25 TABLET ORAL at 23:53

## 2024-10-23 ASSESSMENT — PAIN DESCRIPTION - LOCATION: LOCATION: GROIN

## 2024-10-23 ASSESSMENT — PAIN SCALES - WONG BAKER
WONGBAKER_NUMERICALRESPONSE: NO HURT
WONGBAKER_NUMERICALRESPONSE: NO HURT

## 2024-10-23 ASSESSMENT — PAIN SCALES - GENERAL
PAINLEVEL_OUTOF10: 0
PAINLEVEL_OUTOF10: 5

## 2024-10-23 ASSESSMENT — PAIN DESCRIPTION - ORIENTATION: ORIENTATION: RIGHT

## 2024-10-23 NOTE — H&P
10/9/21   Biju Engle MD   clopidogrel (PLAVIX) 75 MG tablet Take 1 tablet by mouth daily 10/10/21   Biju Engle MD   AMITIZA 24 MCG capsule  9/22/21 8/20/22  Chriss Buckner MD   levothyroxine (SYNTHROID) 125 MCG tablet Take 1 tablet by mouth Daily 4/14/20   Angus Baker MD   clonazePAM (KLONOPIN) 0.5 MG tablet Take 0.5 tablets by mouth 2 times daily as needed for Anxiety.    Chriss Buckner MD   latanoprost (XALATAN) 0.005 % ophthalmic solution Place 1 drop into both eyes nightly    Chriss Buckner MD   timolol (TIMOPTIC) 0.5 % ophthalmic solution Place 1 drop into both eyes daily    Chriss Buckner MD   Lactobacillus (ACIDOPHILUS) CAPS capsule Take 1 capsule by mouth daily    Chriss Buckner MD   Lactulose Encephalopathy (ENULOSE PO) Take 30 mLs by mouth 3 times daily    Chriss Buckner MD   folic acid (FOLVITE) 1 MG tablet Take 1 tablet by mouth daily    Chriss Buckner MD   PARoxetine (PAXIL) 30 MG tablet Take 1 tablet by mouth every morning    Chriss Buckner MD   QUEtiapine (SEROQUEL XR) 50 MG extended release tablet Take 1 tablet by mouth nightly    Chriss Buckner MD   tamsulosin (FLOMAX) 0.4 MG capsule Take 1 capsule by mouth daily 3/18/17   Eladia Hampton PA   Cholecalciferol (VITAMIN D3) 1000 UNITS CAPS Take 1 tablet by mouth daily.    ProviderChriss MD       Physical Exam:    Physical Exam     General: NAD  Eyes: EOMI  ENT: neck supple  Cardiovascular: Regular rate.  Respiratory: Clear to auscultation  Gastrointestinal: Soft, non tender  Genitourinary: no suprapubic tenderness  Musculoskeletal: No edema  Skin: warm, dry  Neuro: Alert.  Psych: Mood appropriate.       Past Medical History:   PMHx   Past Medical History:   Diagnosis Date    Acute CVA (cerebrovascular accident) (HCC) 10/10/2021    Anemia     Aspiration pneumonia (HCC)     dx 6/9/2014 with this per ecf/ per old chart dx with pneumonia with admission 9/18/2018     Cerebral palsy (HCC)     \"has no feeling on left side of body\"alert but has some dementia but not diagnosed, has good long term but poor short term and wakes up disoriented after a nap\"(per yaneth)(2014)    Depression     Epilepsy ([facility]) 1962    last seizure 2/2018- hx grand mal    Frequent falls     with phone assess- family stated pt fell this am (7/10/2013\"in the process of trying to find a facility to help build him back up- (pt now at [facility])    Glaucoma     \"has been in treatment for this in the past- no treatment needed at present\"    History of IBS     Hx MRSA infection     + nasal culture 4/2014    Hyperammonemia ([facility])     Hypertension     on Lisinopril    IBS (irritable bowel syndrome)     ulcerative colitis    Kidney stone     for surgery for stent placement 7/11/2013    Mood disorder ([facility])     per staff at [facility]    MRSA (methicillin resistant staph aureus) culture positive 05/02/2014 05/27/20 nasal    MRSA (methicillin resistant staph aureus) culture positive 11/25/2020    Face; 10/8/21    Occasional tremors     \"makes it difficult for him to write\"    Pressure injury of contiguous region involving back and right buttock, stage 2 ([facility]) 05/27/2020    Pressure injury of left buttock, stage 1 07/22/2020    Prostatitis     hx given per H&P old chart    Thyroid disease     Ulcerative colitis ([facility])     hx given per staff at [facility] 4/13/2015     PSHX:  has a past surgical history that includes Gallbladder surgery (2005); Vagus nerve stimulator insertion (2002); Cholecystectomy; Cystoscopy (Left, 07/11/2013); Kidney stone surgery; Colonoscopy (8/19/14, 5/2012); other surgical history (04/15/2015); Upper gastrointestinal endoscopy (N/A, 11/13/2018); Colonoscopy (02/04/2021); Colonoscopy (N/A, 2/4/2021); Stimulator Surgery (N/A, 3/24/2021); Stimulator Surgery (N/A, 3/24/2021); Facial Surgery (N/A, 10/8/2021); and Colonoscopy (N/A, 2/22/2024).  Allergies: No Known Allergies  Fam HX:  family history

## 2024-10-23 NOTE — PROGRESS NOTES
4 Eyes Skin Assessment     NAME:  Jovan Leal  YOB: 1955  MEDICAL RECORD NUMBER:  [id number]    The patient is being assessed for  Admission    I agree that at least one RN has performed a thorough Head to Toe Skin Assessment on the patient. ALL assessment sites listed below have been assessed.      Areas assessed by both nurses:    Head, Face, Ears, Shoulders, Back, Chest, Arms, Elbows, Hands, Sacrum. Buttock, Coccyx, Ischium, and Legs. Feet and Heels        Does the Patient have a Wound? yes       Jean Prevention initiated by RN: No  Wound Care Orders initiated by RN:Yes    Pressure Injury (Stage 3,4, Unstageable, DTI, NWPT, and Complex wounds) if present, place Wound referral order by RN under : No    New Ostomies, if present place, Ostomy referral order under : No     Nurse 1 eSignature: Electronically signed by Berkley De Santiago RN on 10/23/24 at 6:46 PM EDT    **SHARE this note so that the co-signing nurse can place an eSignature**    Nurse 2 eSignature: Electronically signed by Caleb Paz RN on 10/24/24 at 6:39 AM EDT

## 2024-10-24 ENCOUNTER — APPOINTMENT (OUTPATIENT)
Dept: INTERVENTIONAL RADIOLOGY/VASCULAR | Age: 69
End: 2024-10-24
Attending: STUDENT IN AN ORGANIZED HEALTH CARE EDUCATION/TRAINING PROGRAM
Payer: MEDICARE

## 2024-10-24 ENCOUNTER — APPOINTMENT (OUTPATIENT)
Dept: NON INVASIVE DIAGNOSTICS | Age: 69
End: 2024-10-24
Attending: STUDENT IN AN ORGANIZED HEALTH CARE EDUCATION/TRAINING PROGRAM
Payer: MEDICARE

## 2024-10-24 LAB
ANION GAP SERPL CALCULATED.3IONS-SCNC: 12 MMOL/L (ref 9–17)
BUN SERPL-MCNC: 7 MG/DL (ref 7–20)
CALCIUM SERPL-MCNC: 9.2 MG/DL (ref 8.3–10.6)
CHLORIDE SERPL-SCNC: 103 MMOL/L (ref 99–110)
CHOLEST SERPL-MCNC: 103 MG/DL (ref 125–199)
CO2 SERPL-SCNC: 21 MMOL/L (ref 21–32)
CREAT SERPL-MCNC: 0.5 MG/DL (ref 0.8–1.3)
ECHO AO ROOT DIAM: 3.2 CM
ECHO AO ROOT INDEX: 1.69 CM/M2
ECHO AV AREA PEAK VELOCITY: 1.1 CM2
ECHO AV AREA VTI: 1.7 CM2
ECHO AV AREA/BSA PEAK VELOCITY: 0.6 CM2/M2
ECHO AV AREA/BSA VTI: 0.9 CM2/M2
ECHO AV MEAN GRADIENT: 2 MMHG
ECHO AV MEAN VELOCITY: 0.8 M/S
ECHO AV PEAK GRADIENT: 4 MMHG
ECHO AV PEAK VELOCITY: 1 M/S
ECHO AV VELOCITY RATIO: 0.5
ECHO AV VTI: 14.5 CM
ECHO BSA: 1.92 M2
ECHO LA AREA 4C: 15.8 CM2
ECHO LA DIAMETER INDEX: 1.43 CM/M2
ECHO LA DIAMETER: 2.7 CM
ECHO LA MAJOR AXIS: 6.3 CM
ECHO LA TO AORTIC ROOT RATIO: 0.84
ECHO LA VOL MOD A4C: 31 ML (ref 18–58)
ECHO LA VOLUME INDEX MOD A4C: 16 ML/M2 (ref 16–34)
ECHO LV E' LATERAL VELOCITY: 13.7 CM/S
ECHO LV E' SEPTAL VELOCITY: 7.1 CM/S
ECHO LV EDV A4C: 62 ML
ECHO LV EDV INDEX A4C: 33 ML/M2
ECHO LV EF PHYSICIAN: 55 %
ECHO LV EJECTION FRACTION A4C: 58 %
ECHO LV ESV A4C: 26 ML
ECHO LV ESV INDEX A4C: 14 ML/M2
ECHO LV FRACTIONAL SHORTENING: 43 % (ref 28–44)
ECHO LV INTERNAL DIMENSION DIASTOLE INDEX: 1.96 CM/M2
ECHO LV INTERNAL DIMENSION DIASTOLIC: 3.7 CM (ref 4.2–5.9)
ECHO LV INTERNAL DIMENSION SYSTOLIC INDEX: 1.11 CM/M2
ECHO LV INTERNAL DIMENSION SYSTOLIC: 2.1 CM
ECHO LV IVSD: 1.4 CM (ref 0.6–1)
ECHO LV MASS 2D: 166.5 G (ref 88–224)
ECHO LV MASS INDEX 2D: 88.1 G/M2 (ref 49–115)
ECHO LV POSTERIOR WALL DIASTOLIC: 1.2 CM (ref 0.6–1)
ECHO LV RELATIVE WALL THICKNESS RATIO: 0.65
ECHO LVOT AREA: 2.3 CM2
ECHO LVOT AV VTI INDEX: 0.73
ECHO LVOT DIAM: 1.7 CM
ECHO LVOT MEAN GRADIENT: 0 MMHG
ECHO LVOT PEAK GRADIENT: 1 MMHG
ECHO LVOT PEAK VELOCITY: 0.5 M/S
ECHO LVOT STROKE VOLUME INDEX: 12.7 ML/M2
ECHO LVOT SV: 24 ML
ECHO LVOT VTI: 10.6 CM
ECHO MV A VELOCITY: 0.75 M/S
ECHO MV E VELOCITY: 0.49 M/S
ECHO MV E/A RATIO: 0.65
ECHO MV E/E' LATERAL: 3.58
ECHO MV E/E' RATIO (AVERAGED): 5.24
ECHO MV E/E' SEPTAL: 6.9
ECHO RV MID DIMENSION: 3.2 CM
ERYTHROCYTE [DISTWIDTH] IN BLOOD BY AUTOMATED COUNT: 13.3 % (ref 11.7–14.9)
EST. AVERAGE GLUCOSE BLD GHB EST-MCNC: 114 MG/DL
GFR, ESTIMATED: >90 ML/MIN/1.73M2
GLUCOSE SERPL-MCNC: 76 MG/DL (ref 74–99)
HBA1C MFR BLD: 5.6 % (ref 4.2–6.3)
HCT VFR BLD AUTO: 46.6 % (ref 42–52)
HDLC SERPL-MCNC: 42 MG/DL
HGB BLD-MCNC: 15.5 G/DL (ref 13.5–18)
LDLC SERPL CALC-MCNC: 38 MG/DL
MCH RBC QN AUTO: 31 PG (ref 27–31)
MCHC RBC AUTO-ENTMCNC: 33.3 G/DL (ref 32–36)
MCV RBC AUTO: 93.2 FL (ref 78–100)
PLATELET # BLD AUTO: 144 K/UL (ref 140–440)
PMV BLD AUTO: 11.6 FL (ref 7.5–11.1)
POTASSIUM SERPL-SCNC: 4.2 MMOL/L (ref 3.5–5.1)
RBC # BLD AUTO: 5 M/UL (ref 4.6–6.2)
SODIUM SERPL-SCNC: 136 MMOL/L (ref 136–145)
TRIGL SERPL-MCNC: 117 MG/DL
WBC OTHER # BLD: 7.3 K/UL (ref 4–10.5)

## 2024-10-24 PROCEDURE — 36223 PLACE CATH CAROTID/INOM ART: CPT | Performed by: PSYCHIATRY & NEUROLOGY

## 2024-10-24 PROCEDURE — 36226 PLACE CATH VERTEBRAL ART: CPT | Performed by: PSYCHIATRY & NEUROLOGY

## 2024-10-24 PROCEDURE — C1894 INTRO/SHEATH, NON-LASER: HCPCS

## 2024-10-24 PROCEDURE — 93306 TTE W/DOPPLER COMPLETE: CPT | Performed by: INTERNAL MEDICINE

## 2024-10-24 PROCEDURE — 80048 BASIC METABOLIC PNL TOTAL CA: CPT

## 2024-10-24 PROCEDURE — B31Q1ZZ FLUOROSCOPY OF CERVICO-CEREBRAL ARCH USING LOW OSMOLAR CONTRAST: ICD-10-PCS | Performed by: NURSE PRACTITIONER

## 2024-10-24 PROCEDURE — 36224 PLACE CATH CAROTD ART: CPT | Performed by: PSYCHIATRY & NEUROLOGY

## 2024-10-24 PROCEDURE — 2580000003 HC RX 258: Performed by: STUDENT IN AN ORGANIZED HEALTH CARE EDUCATION/TRAINING PROGRAM

## 2024-10-24 PROCEDURE — B3161ZZ FLUOROSCOPY OF RIGHT INTERNAL CAROTID ARTERY USING LOW OSMOLAR CONTRAST: ICD-10-PCS | Performed by: NURSE PRACTITIONER

## 2024-10-24 PROCEDURE — 36415 COLL VENOUS BLD VENIPUNCTURE: CPT

## 2024-10-24 PROCEDURE — 99222 1ST HOSP IP/OBS MODERATE 55: CPT | Performed by: NURSE PRACTITIONER

## 2024-10-24 PROCEDURE — 6360000002 HC RX W HCPCS

## 2024-10-24 PROCEDURE — B3191ZZ FLUOROSCOPY OF RIGHT EXTERNAL CAROTID ARTERY USING LOW OSMOLAR CONTRAST: ICD-10-PCS | Performed by: NURSE PRACTITIONER

## 2024-10-24 PROCEDURE — 2500000003 HC RX 250 WO HCPCS: Performed by: PSYCHIATRY & NEUROLOGY

## 2024-10-24 PROCEDURE — 99152 MOD SED SAME PHYS/QHP 5/>YRS: CPT

## 2024-10-24 PROCEDURE — 6370000000 HC RX 637 (ALT 250 FOR IP): Performed by: STUDENT IN AN ORGANIZED HEALTH CARE EDUCATION/TRAINING PROGRAM

## 2024-10-24 PROCEDURE — 36223 PLACE CATH CAROTID/INOM ART: CPT

## 2024-10-24 PROCEDURE — B3141ZZ FLUOROSCOPY OF LEFT COMMON CAROTID ARTERY USING LOW OSMOLAR CONTRAST: ICD-10-PCS | Performed by: NURSE PRACTITIONER

## 2024-10-24 PROCEDURE — 1200000000 HC SEMI PRIVATE

## 2024-10-24 PROCEDURE — B41F1ZZ FLUOROSCOPY OF RIGHT LOWER EXTREMITY ARTERIES USING LOW OSMOLAR CONTRAST: ICD-10-PCS | Performed by: NURSE PRACTITIONER

## 2024-10-24 PROCEDURE — 2700000000 HC OXYGEN THERAPY PER DAY

## 2024-10-24 PROCEDURE — 85027 COMPLETE CBC AUTOMATED: CPT

## 2024-10-24 PROCEDURE — 36226 PLACE CATH VERTEBRAL ART: CPT

## 2024-10-24 PROCEDURE — 36224 PLACE CATH CAROTD ART: CPT

## 2024-10-24 PROCEDURE — B31G1ZZ FLUOROSCOPY OF BILATERAL VERTEBRAL ARTERIES USING LOW OSMOLAR CONTRAST: ICD-10-PCS | Performed by: NURSE PRACTITIONER

## 2024-10-24 PROCEDURE — 6360000004 HC RX CONTRAST MEDICATION

## 2024-10-24 PROCEDURE — 36227 PLACE CATH XTRNL CAROTID: CPT | Performed by: PSYCHIATRY & NEUROLOGY

## 2024-10-24 PROCEDURE — 6360000002 HC RX W HCPCS: Performed by: PSYCHIATRY & NEUROLOGY

## 2024-10-24 PROCEDURE — 36227 PLACE CATH XTRNL CAROTID: CPT

## 2024-10-24 PROCEDURE — 36225 PLACE CATH SUBCLAVIAN ART: CPT

## 2024-10-24 PROCEDURE — 36225 PLACE CATH SUBCLAVIAN ART: CPT | Performed by: PSYCHIATRY & NEUROLOGY

## 2024-10-24 PROCEDURE — B3111ZZ FLUOROSCOPY OF RIGHT BRACHIOCEPHALIC-SUBCLAVIAN ARTERY USING LOW OSMOLAR CONTRAST: ICD-10-PCS | Performed by: NURSE PRACTITIONER

## 2024-10-24 PROCEDURE — 80061 LIPID PANEL: CPT

## 2024-10-24 PROCEDURE — 93306 TTE W/DOPPLER COMPLETE: CPT

## 2024-10-24 PROCEDURE — 2580000003 HC RX 258: Performed by: NURSE PRACTITIONER

## 2024-10-24 PROCEDURE — 83036 HEMOGLOBIN GLYCOSYLATED A1C: CPT

## 2024-10-24 PROCEDURE — 94761 N-INVAS EAR/PLS OXIMETRY MLT: CPT

## 2024-10-24 RX ORDER — SODIUM CHLORIDE 0.9 % (FLUSH) 0.9 %
5-40 SYRINGE (ML) INJECTION PRN
Status: DISCONTINUED | OUTPATIENT
Start: 2024-10-24 | End: 2024-10-25 | Stop reason: HOSPADM

## 2024-10-24 RX ORDER — MIDAZOLAM HYDROCHLORIDE 2 MG/2ML
INJECTION, SOLUTION INTRAMUSCULAR; INTRAVENOUS PRN
Status: COMPLETED | OUTPATIENT
Start: 2024-10-24 | End: 2024-10-24

## 2024-10-24 RX ORDER — LIDOCAINE HYDROCHLORIDE 10 MG/ML
INJECTION, SOLUTION EPIDURAL; INFILTRATION; INTRACAUDAL; PERINEURAL PRN
Status: COMPLETED | OUTPATIENT
Start: 2024-10-24 | End: 2024-10-24

## 2024-10-24 RX ORDER — SODIUM CHLORIDE 9 MG/ML
INJECTION, SOLUTION INTRAVENOUS CONTINUOUS
Status: DISCONTINUED | OUTPATIENT
Start: 2024-10-24 | End: 2024-10-25 | Stop reason: HOSPADM

## 2024-10-24 RX ORDER — FENTANYL CITRATE 50 UG/ML
INJECTION, SOLUTION INTRAMUSCULAR; INTRAVENOUS PRN
Status: COMPLETED | OUTPATIENT
Start: 2024-10-24 | End: 2024-10-24

## 2024-10-24 RX ORDER — SODIUM CHLORIDE 0.9 % (FLUSH) 0.9 %
5-40 SYRINGE (ML) INJECTION EVERY 12 HOURS SCHEDULED
Status: DISCONTINUED | OUTPATIENT
Start: 2024-10-24 | End: 2024-10-25 | Stop reason: HOSPADM

## 2024-10-24 RX ADMIN — LEVOTHYROXINE SODIUM 125 MCG: 0.12 TABLET ORAL at 06:05

## 2024-10-24 RX ADMIN — ZONISAMIDE 100 MG: 100 CAPSULE ORAL at 10:07

## 2024-10-24 RX ADMIN — LACTULOSE 20 G: 20 SOLUTION ORAL at 10:11

## 2024-10-24 RX ADMIN — PRIMIDONE 50 MG: 50 TABLET ORAL at 20:58

## 2024-10-24 RX ADMIN — MIDAZOLAM HYDROCHLORIDE 1 MG: 1 INJECTION, SOLUTION INTRAMUSCULAR; INTRAVENOUS at 11:56

## 2024-10-24 RX ADMIN — LACTULOSE 20 G: 20 SOLUTION ORAL at 14:45

## 2024-10-24 RX ADMIN — FAMOTIDINE 20 MG: 20 TABLET ORAL at 10:09

## 2024-10-24 RX ADMIN — SODIUM CHLORIDE, PRESERVATIVE FREE 10 ML: 5 INJECTION INTRAVENOUS at 10:08

## 2024-10-24 RX ADMIN — SODIUM CHLORIDE, PRESERVATIVE FREE 10 ML: 5 INJECTION INTRAVENOUS at 16:41

## 2024-10-24 RX ADMIN — LIDOCAINE HYDROCHLORIDE 5 ML: 10 INJECTION, SOLUTION EPIDURAL; INFILTRATION; INTRACAUDAL; PERINEURAL at 12:01

## 2024-10-24 RX ADMIN — FENTANYL CITRATE 50 MCG: 50 INJECTION, SOLUTION INTRAMUSCULAR; INTRAVENOUS at 11:55

## 2024-10-24 RX ADMIN — DIVALPROEX SODIUM 1000 MG: 500 TABLET, DELAYED RELEASE ORAL at 20:58

## 2024-10-24 RX ADMIN — ASPIRIN 81 MG: 81 TABLET, CHEWABLE ORAL at 10:08

## 2024-10-24 RX ADMIN — LACTULOSE 20 G: 20 SOLUTION ORAL at 20:57

## 2024-10-24 RX ADMIN — DIVALPROEX SODIUM 500 MG: 500 TABLET, DELAYED RELEASE ORAL at 10:09

## 2024-10-24 RX ADMIN — CLOPIDOGREL BISULFATE 75 MG: 75 TABLET ORAL at 10:10

## 2024-10-24 RX ADMIN — PRIMIDONE 50 MG: 50 TABLET ORAL at 10:10

## 2024-10-24 RX ADMIN — TAMSULOSIN HYDROCHLORIDE 0.4 MG: 0.4 CAPSULE ORAL at 20:58

## 2024-10-24 RX ADMIN — ROSUVASTATIN CALCIUM 40 MG: 20 TABLET, FILM COATED ORAL at 20:57

## 2024-10-24 RX ADMIN — AMLODIPINE BESYLATE 5 MG: 5 TABLET ORAL at 14:45

## 2024-10-24 RX ADMIN — PRIMIDONE 50 MG: 50 TABLET ORAL at 14:45

## 2024-10-24 RX ADMIN — LACOSAMIDE 200 MG: 100 TABLET, FILM COATED ORAL at 20:58

## 2024-10-24 RX ADMIN — TIMOLOL MALEATE 1 DROP: 5 SOLUTION OPHTHALMIC at 10:07

## 2024-10-24 RX ADMIN — FAMOTIDINE 20 MG: 20 TABLET ORAL at 20:58

## 2024-10-24 RX ADMIN — LATANOPROST 1 DROP: 50 SOLUTION OPHTHALMIC at 20:57

## 2024-10-24 RX ADMIN — PAROXETINE 30 MG: 10 TABLET, FILM COATED ORAL at 10:09

## 2024-10-24 RX ADMIN — LACOSAMIDE 200 MG: 100 TABLET, FILM COATED ORAL at 10:08

## 2024-10-24 RX ADMIN — Medication 1 CAPSULE: at 10:10

## 2024-10-24 RX ADMIN — ATORVASTATIN CALCIUM 80 MG: 40 TABLET, FILM COATED ORAL at 20:58

## 2024-10-24 RX ADMIN — QUETIAPINE FUMARATE 50 MG: 25 TABLET ORAL at 20:57

## 2024-10-24 RX ADMIN — FOLIC ACID 1 MG: 1 TABLET ORAL at 10:10

## 2024-10-24 RX ADMIN — SODIUM CHLORIDE: 9 INJECTION, SOLUTION INTRAVENOUS at 16:43

## 2024-10-24 RX ADMIN — ZONISAMIDE 300 MG: 100 CAPSULE ORAL at 20:57

## 2024-10-24 ASSESSMENT — PAIN SCALES - WONG BAKER
WONGBAKER_NUMERICALRESPONSE: NO HURT

## 2024-10-24 ASSESSMENT — PAIN SCALES - GENERAL
PAINLEVEL_OUTOF10: 0

## 2024-10-24 NOTE — PROGRESS NOTES
Occupational Therapy  OT/PT attempted to see pt this AM for initial eval. Pt KYLIE for cerebral angiogram. Will reattempt to see pt as able.     Zoë Leggett OTR/L [id number]  10/24/2024     11:51 AM

## 2024-10-24 NOTE — PROGRESS NOTES
Speech-Language Pathology Department   oJvan Leal  1955  [id number]      Attempted to see Jovan Leal for a bedside swallowing evaluation x2 10/24/24.  Deferred assessment- pt is out of the room for testing at this time.  SLP will re-attempt.    Kimberly Jiménez MS, CCC-SLP, 10/24/2024

## 2024-10-24 NOTE — PLAN OF CARE
Problem: Chronic Conditions and Co-morbidities  Goal: Patient's chronic conditions and co-morbidity symptoms are monitored and maintained or improved  10/24/2024 0125 by Caleb Paz RN  Outcome: Progressing  10/23/2024 1821 by Berkley De Santiago RN  Outcome: Progressing     Problem: Discharge Planning  Goal: Discharge to home or other facility with appropriate resources  10/24/2024 0125 by Caleb Paz RN  Outcome: Progressing  10/23/2024 1821 by Berkley De Santiago RN  Outcome: Progressing     Problem: Pain  Goal: Verbalizes/displays adequate comfort level or baseline comfort level  10/24/2024 0125 by Caleb Paz RN  Outcome: Progressing  10/23/2024 1821 by Berkley De Santiago RN  Outcome: Progressing     Problem: Skin/Tissue Integrity  Goal: Absence of new skin breakdown  Description: 1.  Monitor for areas of redness and/or skin breakdown  2.  Assess vascular access sites hourly  3.  Every 4-6 hours minimum:  Change oxygen saturation probe site  4.  Every 4-6 hours:  If on nasal continuous positive airway pressure, respiratory therapy assess nares and determine need for appliance change or resting period.  10/24/2024 0125 by Caleb Paz RN  Outcome: Progressing  10/23/2024 1821 by Berkley De Santiago RN  Outcome: Progressing     Problem: Safety - Adult  Goal: Free from fall injury  10/24/2024 0125 by Caleb Paz RN  Outcome: Progressing  10/23/2024 1821 by Berkley De Santiago RN  Outcome: Progressing

## 2024-10-24 NOTE — PROGRESS NOTES
Comprehensive Nutrition Assessment    Type and Reason for Visit:  Initial, Positive Nutrition Screen (unsure of weight loss)    Nutrition Recommendations/Plan:   Resume diet when appropriate with consistency as per speech therapy  May offer standard oral nutrition supplement as needed when jos to resume diet  Will continue to follow up during stay      Malnutrition Assessment:  Malnutrition Status:  Insufficient data (10/24/24 1033)    Context:  Acute Illness       Nutrition Assessment:    Admit with stroke like symptoms. NPO at this time, per hx usually on minced and moist diet. Plan for speech therapy swallow eval. No signficant weight loss noted in past year. Will follow at moderate nutrition risk at this time with NPO status and plan for diet unlcear at this time.    Nutrition Related Findings:    receiving care on visit, hx HTN, CVA, cerebral palsy, from NH    HbA1c5.6% Wound Type: None       Current Nutrition Intake & Therapies:    Average Meal Intake: NPO  Average Supplements Intake: NPO      Anthropometric Measures:  Height: 167.6 cm (5' 6\")  Ideal Body Weight (IBW): 142 lbs (65 kg)    Admission Body Weight: 79 kg (174 lb 2.6 oz)  Current Body Weight: 78.9 kg (173 lb 15.1 oz), 122.5 % IBW. Weight Source: Bed Scale  Current BMI (kg/m2): 28.1  Usual Body Weight: 81.6 kg (180 lb) (2023 and July 2024)  % Weight Change (Calculated): -3.4  Weight Adjustment For: No Adjustment                 BMI Categories: Overweight (BMI 25.0-29.9)    Estimated Daily Nutrient Needs:  Energy Requirements Based On: Kcal/kg  Weight Used for Energy Requirements: Current  Energy (kcal/day): 6960-8813 (25-30 dionisio/kg)  Weight Used for Protein Requirements: Current  Protein (g/day): 79-95 (1-1.2 g/kg)  Method Used for Fluid Requirements: 1 ml/kcal  Fluid (ml/day): 2000    Nutrition Diagnosis:   Predicted inadequate energy intake related to cognitive or neurological impairment, swallowing difficulty as evidenced by NPO or clear liquid

## 2024-10-24 NOTE — PRE SEDATION
Sedation Pre-Procedure Note    Patient Name: Jovan Leal   YOB: 1955  Room/Bed: 3022/3022-A  Medical Record Number: [id number]  Date: 10/24/2024   Time: 9:42 AM       Indication:  Diagnostic cerebral angiogram with possible stent of the left ICA     Consent: I have discussed with the patient and/or the patient representative the indication, alternatives, and the possible risks and/or complications of the planned procedure and the anesthesia methods. The patient and/or patient representative appear to understand and agree to proceed.    Vital Signs:   Vitals:    10/24/24 0857   BP:    Pulse: 54   Resp: 17   Temp:    SpO2: 97%       Past Medical History:   has a past medical history of Acute CVA (cerebrovascular accident) ([facility]), Anemia, Aspiration pneumonia ([facility]), Cerebral palsy ([facility]), Depression, Epilepsy ([facility]), Frequent falls, Glaucoma, History of IBS, Hx MRSA infection, Hyperammonemia ([facility]), Hypertension, IBS (irritable bowel syndrome), Kidney stone, Mood disorder ([facility]), MRSA (methicillin resistant staph aureus) culture positive, MRSA (methicillin resistant staph aureus) culture positive, Occasional tremors, Pressure injury of contiguous region involving back and right buttock, stage 2 (HCC), Pressure injury of left buttock, stage 1, Prostatitis, Thyroid disease, and Ulcerative colitis ([facility]).    Past Surgical History:   has a past surgical history that includes Gallbladder surgery (2005); Vagus nerve stimulator insertion (2002); Cholecystectomy; Cystoscopy (Left, 07/11/2013); Kidney stone surgery; Colonoscopy (8/19/14, 5/2012); other surgical history (04/15/2015); Upper gastrointestinal endoscopy (N/A, 11/13/2018); Colonoscopy (02/04/2021); Colonoscopy (N/A, 2/4/2021); Stimulator Surgery (N/A, 3/24/2021); Stimulator Surgery (N/A, 3/24/2021); Facial Surgery (N/A, 10/8/2021); and Colonoscopy (N/A, 2/22/2024).    Medications:   Scheduled Meds:    sodium chloride flush  5-40 mL IntraVENous 2 times per  tablet Take 1 tablet by mouth daily   Yes Chriss Buckner MD   PARoxetine (PAXIL) 30 MG tablet Take 1 tablet by mouth every morning   Yes Chriss Buckner MD   QUEtiapine (SEROQUEL XR) 50 MG extended release tablet Take 1 tablet by mouth nightly   Yes Chriss Buckner MD   tamsulosin (FLOMAX) 0.4 MG capsule Take 1 capsule by mouth daily 3/18/17  Yes Eladia Hampton PA   Cholecalciferol (VITAMIN D3) 1000 UNITS CAPS Take 1 tablet by mouth daily.   Yes Chriss Buckner MD   albuterol (PROVENTIL) (2.5 MG/3ML) 0.083% nebulizer solution Inhale 3 mLs into the lungs every 6 hours as needed for Wheezing    Chriss Buckner MD   magnesium hydroxide (MILK OF MAGNESIA) 400 MG/5ML suspension Take 30 mLs by mouth nightly as needed for Heartburn    Chriss Buckner MD   ANTI-DIARRHEAL 2 MG tablet as needed for Diarrhea 12/18/23   Chriss Buckner MD   Pseudoephedrine HCl (NEXAFED) 30 MG TABA Take 30 mg by mouth every 4 hours as needed    Chriss Buckner MD   AMITIZA 24 MCG capsule  9/22/21 8/20/22  Chriss Buckner MD     Coumadin Use Last 7 Days:  no  Antiplatelet drug therapy use last 7 days: yes - aspirin and plavix  Other anticoagulant use last 7 days: no  Additional Medication Information:  See above      Pre-Sedation Documentation and Exam:   I have personally completed a history, physical exam & review of systems for this patient (see notes).    Mallampati Airway Assessment:  Mallampati Class II - (soft palate, fauces & uvula are visible)    Prior History of Anesthesia Complications:   none    ASA Classification:  Class 2 - A normal healthy patient with mild systemic disease    Sedation/ Anesthesia Plan:   intravenous sedation    Medications Planned:   fentanyl and versed intravenously    Patient is an appropriate candidate for plan of sedation: yes    Electronically signed by MARY Sauceda CNP on 10/24/2024 at 9:42 AM

## 2024-10-24 NOTE — OR NURSING
PROCEDURE START:1201  PROCEDURE END:1225  FLUORO TIME: 4.9 min   AK: 640 mGy   DAP: 119 Gycm2  CONTRAST VOLUME: 120 mL    ARRHYTHMIA: None   INTERVENTION: Intervention

## 2024-10-24 NOTE — CARE COORDINATION
10/24/24 [facility]   Service Assessment   Patient Orientation Unable to Assess  (Pt off the floor in special procedures)   Cognition Dementia / Early Alzheimer's   History Provided By Other (see comment)  (guardirebeca Porter)   Primary Caregiver Other (Comment)  (group home)   Support Systems Home Care Staff   Patient's Healthcare Decision Maker is: Named in Scanned ACP Document   PCP Verified by CM Yes   Prior Functional Level Assistance with the following:;Mobility   Current Functional Level Assistance with the following:;Mobility   Can patient return to prior living arrangement Unknown at present   Ability to make needs known: Poor   Family able to assist with home care needs: No   Would you like for me to discuss the discharge plan with any other family members/significant others, and if so, who? Yes  (chuck Porter)   Financial Resources Medicare;Medicaid   Community Resources ECF/Home Care     CM call to Pt edvinrebeca Charlotte to initiate discharge planning.  Pt is from group home.  Discharge plan at this time is to return pending any needs.  Charlotte is agreeable to therapy at discharge if recommended.  Pt went to ARU on last admission.     Charlotte denies any needs at this time.  CM following

## 2024-10-24 NOTE — PROGRESS NOTES
Report called to 3E.  Right groin site WNL  Pulses palpable.  SBP improved.  Transportation will take pt back to 3E.

## 2024-10-24 NOTE — PROGRESS NOTES
Pt has an implanted VNS - the implant is MRI conditional. However, there are strict guidelines to follow which requires pts supervising neurologist to complete and sign a form (link below). It also requires MD or rep onsite to turn stimulation off before scan and back on again after scan.    RN made aware of this and that MRI cannot be done at this time. If MD ordering MRI wants continue with MRI, they will need to contact pts supervising neurologist to get form completed and approval to scan. Pts implanting MD is at [facility].    [email or web address]    [email or web address]

## 2024-10-24 NOTE — PROGRESS NOTES
V2.0  Progress Note      Name:  Jovan Leal /Age/Sex: 1955  (69 y.o. male)   MRN & CSN:  [id number] & [id number] Encounter Date/Time: 10/24/2024 7:47 PM EDT   Location:  [address] [place] [address]- PCP: Jovan Lazcano PA-C       Hospital Day: 2    Assessment and Plan:   Jovan Leal is a 69 y.o. male with a pmh of CP, CVA, seizures, HTN, hypothyroidism, depression and anxiety who presents with TIA (transient ischemic attack)    Hospital Problems             Last Modified POA    * (Principal) TIA (transient ischemic attack) 10/23/2024 Yes    Transient ischemic attack 10/23/2024 Yes       Plan:  Strokelike symptoms:  -Admit to MedSurg  -Telemetry  -Neurochecks  -CT head unremarkable  -CTA head and neck with moderate left proximal ICA stenosis  -Bedside swallow eval/SLP  -Patient usually tolerates a dysphagia diet but is not able to drink from a straw  -PT/OT  -Permissive hypertension for 24 hours  -Neurointervention will perform angio on 10/24  -TTE  -Continue with DAPT and atorvastatin    Essential hypertension-permissive hypertension, restart amlodipine tomorrow   History of CVA-continue DAPT and Lipitor  History of cerebral palsy  Anxiety and depression-continue with home meds  History of seizures-continue AED  Miscellaneous-continue with rest of home meds except as contraindicated    Disposition:   Current Living situation: Nursing home  Expected Disposition: Nursing home  Estimated D/C: 2 days    Diet ADULT DIET; Dysphagia - Minced and Moist   DVT Prophylaxis [x] Lovenox, []  Heparin, [] SCDs, [] Ambulation,  [] Eliquis, [] Xarelto, [] Coumadin   Code Status Full Code   Surrogate Decision Maker/ POA Sister     Personally reviewed Lab Studies and Imaging     Discussed management of the case with ED physician who recommended an patient further workup    EKG interpreted personally and results sinus bradycardia    Imaging that was interpreted personally includes CT head and results unremarkable    Drugs that require  PRN  albuterol, 2.5 mg, Q6H PRN  lacosamide, 200 mg, Daily PRN        Labs      CBC:   Recent Labs     10/24/24  0323   WBC 7.3   HGB 15.5        BMP:    Recent Labs     10/24/24  0323      K 4.2      CO2 21   BUN 7   CREATININE 0.5*   GLUCOSE 76     Hepatic: No results for input(s): \"AST\", \"ALT\", \"BILITOT\", \"ALKPHOS\" in the last 72 hours.    Invalid input(s): \"ALB\"  Lipids:   Lab Results   Component Value Date/Time    CHOL 103 10/24/2024 03:23 AM    HDL 42 10/24/2024 03:23 AM    TRIG 117 10/24/2024 03:23 AM     Hemoglobin A1C:   Lab Results   Component Value Date/Time    LABA1C 5.6 10/24/2024 03:23 AM     TSH:   Lab Results   Component Value Date/Time    TSH 1.82 10/07/2015 12:19 PM     Troponin:   Lab Results   Component Value Date/Time    TROPONINT <0.010 08/16/2022 12:59 PM    TROPONINT <0.010 12/06/2021 10:44 PM    TROPONINT <0.010 10/03/2021 04:25 AM     Lactic Acid: No results for input(s): \"LACTA\" in the last 72 hours.  BNP: No results for input(s): \"PROBNP\" in the last 72 hours.  UA:  Lab Results   Component Value Date/Time    NITRU NEGATIVE 07/27/2023 12:38 PM    NITRU NEGATIVE 05/10/2013 02:51 PM    COLORU YELLOW 07/27/2023 12:38 PM    PHUR 6.5 07/27/2023 12:38 PM    PHUR 6.0 05/10/2013 02:51 PM    WBCUA 30 07/08/2023 10:30 AM    RBCUA 1 07/08/2023 10:30 AM    MUCUS RARE 07/08/2023 10:30 AM    TRICHOMONAS NONE SEEN 07/08/2023 10:30 AM    YEAST OCCASIONAL 09/20/2021 05:00 PM    BACTERIA NEGATIVE 07/08/2023 10:30 AM    CLARITYU CLEAR 07/27/2023 12:38 PM    LEUKOCYTESUR NEGATIVE 07/27/2023 12:38 PM    UROBILINOGEN 0.2 07/27/2023 12:38 PM    BILIRUBINUR NEGATIVE 07/27/2023 12:38 PM    BLOODU NEGATIVE 07/27/2023 12:38 PM    GLUCOSEU NEGATIVE 07/27/2023 12:38 PM    GLUCOSEU NEGATIVE 05/10/2013 02:51 PM    KETUA TRACE 07/27/2023 12:38 PM    AMORPHOUS OCCASIONAL 11/25/2020 01:15 PM     Urine Cultures: No results found for: \"LABURIN\"  Blood Cultures: No results found for: \"BC\"  No results found

## 2024-10-24 NOTE — PROGRESS NOTES
TRANSFER - OUT REPORT:    Verbal report given to Cyndy on Jovan Leal being transferred to IR holding for routine progression of patient care       Report consisted of patient's Situation, Background, Assessment and   Recommendations(SBAR).     Cerebral angiogram complete. Angio-seal to right fem. Pulses at baseline. See MAR     Information from the following report(s) Nurse Handoff Report was reviewed with the receiving nurse.    Opportunity for questions and clarification was provided.      Patient transported with:   Registered Nurse

## 2024-10-24 NOTE — PROGRESS NOTES
Pay for Housing in the Last Year: No     Number of Times Moved in the Last Year: 0     Homeless in the Last Year: No      Family History   Problem Relation Age of Onset    Heart Disease Mother         atrial fib    High Blood Pressure Mother     Asthma Mother     High Cholesterol Mother     Depression Mother     Migraines Mother     High Blood Pressure Father     Heart Disease Father     Asthma Sister     High Cholesterol Sister     Depression Sister     Migraines Sister        Review of Symptoms:    10-point system review completed. All of which are negative except as mentioned above.    Physical Exam:       Vitals:    10/23/24 1745 10/23/24 2130 10/24/24 0250 10/24/24 0810   BP: 139/81 128/66 136/64 136/64   Pulse: 55 56  56   Resp: 10 20     Temp: 97.7 °F (36.5 °C) 97.9 °F (36.6 °C) 97.8 °F (36.6 °C)    TempSrc: Oral Oral Oral    SpO2: 100% 100% 99%    Weight:  82 kg (180 lb 11.2 oz) 79 kg (174 lb 3.2 oz) 78.9 kg (174 lb)   Height:  1.676 m (5' 6\")  1.676 m (5' 6\")        Gen: A&O x 4, NAD, cooperative  HEENT: NC/AT, EOMI, PERRL, mmm, no carotid bruits, neck supple  Heart: RRR, S1S2  Lungs: Respirations even and unlabored  Ext: no edema, no calf tenderness b/l  Psych: normal mood and affect  Skin: no rashes or lesions    NEUROLOGIC EXAM:    Mental Status: A&O to self, location, month and year, NAD, speech clear, language fluent, repetition and naming intact, follows commands appropriately    Cranial Nerve Exam:   CN II-XII: PERRL, VFF, no nystagmus, no gaze paresis, sensation V1-V3 intact b/l, muscles of facial expression symmetric; hearing intact to conversational tone, palate elevates symmetrically, shoulder elevation symmetric and tongue protrudes midline with movement side to side.    Motor Exam:       Strength 5/5 RUE 3/5 LUE, contractures of the left hand, 4/5 b/l LE (patient wheelchair bound at baseline)  Tone and bulk normal       Deep Tendon Reflexes: 2/4 biceps, triceps, brachioradialis, patellar, and  angiogram today for evaluation of left ICA and right MCA arteries.  -Continue Asprin, Plavix, and Lipitor for secondary prevention of stoke  -PT/OT/ST to evaluate and treat    Discussion of all treatment options for carotid stenosis given to the patient and his sister including explanation of risk and benefits of carotid enterectomy and endovascular carotid artery stenting (transfemoral and TCAR) and optimal medical therapy.  There is a 0.5% risk of serious injury to the CFA or groin area that could require blood transfusion or surgery.  There is a 10% risk of mild hematoma in the groin area.  For diagnostic cases there is a 1% risk of stroke or serious complication, for intervention cases there is a less 3% risk of stroke or serious complication, for ischemic stroke interventions there is a 10% risk of stroke or worsening.  Other risk include but are not limited to infection, dissection, radiation injury, hemorrhage, contrast reactions, nephrotoxicity and death.          Thank you for allowing us to participate in the care of your patient.  If there are any questions regarding evaluation please feel free to contact us.     Ying Rebollar, APRN - CNP, 10/24/2024

## 2024-10-25 VITALS
TEMPERATURE: 97.6 F | HEART RATE: 66 BPM | DIASTOLIC BLOOD PRESSURE: 60 MMHG | BODY MASS INDEX: 27.97 KG/M2 | HEIGHT: 66 IN | OXYGEN SATURATION: 99 % | RESPIRATION RATE: 17 BRPM | SYSTOLIC BLOOD PRESSURE: 129 MMHG | WEIGHT: 174 LBS

## 2024-10-25 LAB
EKG ATRIAL RATE: 55 BPM
EKG DIAGNOSIS: NORMAL
EKG P AXIS: 30 DEGREES
EKG P-R INTERVAL: 170 MS
EKG Q-T INTERVAL: 456 MS
EKG QRS DURATION: 158 MS
EKG QTC CALCULATION (BAZETT): 436 MS
EKG R AXIS: 268 DEGREES
EKG T AXIS: 14 DEGREES
EKG VENTRICULAR RATE: 55 BPM

## 2024-10-25 PROCEDURE — 2580000003 HC RX 258: Performed by: NURSE PRACTITIONER

## 2024-10-25 PROCEDURE — 2580000003 HC RX 258: Performed by: STUDENT IN AN ORGANIZED HEALTH CARE EDUCATION/TRAINING PROGRAM

## 2024-10-25 PROCEDURE — 97163 PT EVAL HIGH COMPLEX 45 MIN: CPT

## 2024-10-25 PROCEDURE — 92610 EVALUATE SWALLOWING FUNCTION: CPT

## 2024-10-25 PROCEDURE — 99231 SBSQ HOSP IP/OBS SF/LOW 25: CPT | Performed by: NURSE PRACTITIONER

## 2024-10-25 PROCEDURE — 6370000000 HC RX 637 (ALT 250 FOR IP): Performed by: STUDENT IN AN ORGANIZED HEALTH CARE EDUCATION/TRAINING PROGRAM

## 2024-10-25 PROCEDURE — 97530 THERAPEUTIC ACTIVITIES: CPT

## 2024-10-25 PROCEDURE — 93010 ELECTROCARDIOGRAM REPORT: CPT | Performed by: INTERNAL MEDICINE

## 2024-10-25 PROCEDURE — 97535 SELF CARE MNGMENT TRAINING: CPT

## 2024-10-25 PROCEDURE — 94761 N-INVAS EAR/PLS OXIMETRY MLT: CPT

## 2024-10-25 PROCEDURE — 97166 OT EVAL MOD COMPLEX 45 MIN: CPT

## 2024-10-25 RX ADMIN — CLOPIDOGREL BISULFATE 75 MG: 75 TABLET ORAL at 09:13

## 2024-10-25 RX ADMIN — AMLODIPINE BESYLATE 5 MG: 5 TABLET ORAL at 09:14

## 2024-10-25 RX ADMIN — SODIUM CHLORIDE: 9 INJECTION, SOLUTION INTRAVENOUS at 05:32

## 2024-10-25 RX ADMIN — PAROXETINE 30 MG: 10 TABLET, FILM COATED ORAL at 09:14

## 2024-10-25 RX ADMIN — Medication 1 CAPSULE: at 09:12

## 2024-10-25 RX ADMIN — SODIUM CHLORIDE, PRESERVATIVE FREE 10 ML: 5 INJECTION INTRAVENOUS at 11:06

## 2024-10-25 RX ADMIN — LACTULOSE 20 G: 20 SOLUTION ORAL at 09:12

## 2024-10-25 RX ADMIN — PRIMIDONE 50 MG: 50 TABLET ORAL at 09:13

## 2024-10-25 RX ADMIN — ASPIRIN 81 MG: 81 TABLET, CHEWABLE ORAL at 09:14

## 2024-10-25 RX ADMIN — PRIMIDONE 50 MG: 50 TABLET ORAL at 14:22

## 2024-10-25 RX ADMIN — FOLIC ACID 1 MG: 1 TABLET ORAL at 09:12

## 2024-10-25 RX ADMIN — ZONISAMIDE 100 MG: 100 CAPSULE ORAL at 09:26

## 2024-10-25 RX ADMIN — FAMOTIDINE 20 MG: 20 TABLET ORAL at 09:13

## 2024-10-25 RX ADMIN — SODIUM CHLORIDE, PRESERVATIVE FREE 10 ML: 5 INJECTION INTRAVENOUS at 11:05

## 2024-10-25 RX ADMIN — TIMOLOL MALEATE 1 DROP: 5 SOLUTION OPHTHALMIC at 09:26

## 2024-10-25 RX ADMIN — LACTULOSE 20 G: 20 SOLUTION ORAL at 14:22

## 2024-10-25 RX ADMIN — LEVOTHYROXINE SODIUM 125 MCG: 0.12 TABLET ORAL at 07:09

## 2024-10-25 RX ADMIN — LACOSAMIDE 200 MG: 100 TABLET, FILM COATED ORAL at 11:22

## 2024-10-25 RX ADMIN — DIVALPROEX SODIUM 500 MG: 500 TABLET, DELAYED RELEASE ORAL at 09:15

## 2024-10-25 ASSESSMENT — PAIN SCALES - GENERAL: PAINLEVEL_OUTOF10: 0

## 2024-10-25 NOTE — PROGRESS NOTES
Vascular/Interventional Neurology Progress Note  [facility]  Patient Name: Jovan Leal     : 1955      Subjective:     The patient was seen and examined.   No acute events overnight.          Objective:   Scheduled Meds:   sodium chloride flush  5-40 mL IntraVENous 2 times per day    sodium chloride flush  5-40 mL IntraVENous 2 times per day    [Held by provider] enoxaparin  40 mg SubCUTAneous Daily    aspirin  81 mg Oral Daily    Or    aspirin  300 mg Rectal Daily    rosuvastatin  40 mg Oral Nightly    atorvastatin  80 mg Oral Nightly    clopidogrel  75 mg Oral Daily    divalproex  1,000 mg Oral Nightly    divalproex  500 mg Oral Daily    famotidine  20 mg Oral BID    folic acid  1 mg Oral Daily    lacosamide  200 mg Oral BID    lactobacillus  1 capsule Oral Daily    lactulose  20 g Oral TID    latanoprost  1 drop Both Eyes Nightly    levothyroxine  125 mcg Oral Daily    PARoxetine  30 mg Oral QAM    primidone  50 mg Oral TID    QUEtiapine  50 mg Oral Nightly    tamsulosin  0.4 mg Oral Daily    timolol  1 drop Both Eyes Daily    zonisamide  100 mg Oral Daily    zonisamide  300 mg Oral QHS    amLODIPine  5 mg Oral Daily     Continuous Infusions:   sodium chloride 75 mL/hr at 10/25/24 0532    sodium chloride       PRN Meds:.sodium chloride flush, sodium chloride flush, sodium chloride, ondansetron **OR** ondansetron, polyethylene glycol, labetalol, loperamide, clonazePAM, magnesium hydroxide, albuterol, lacosamide    Vital Signs:  Vitals:    10/24/24 1429 10/24/24 1600 10/24/24 1804 10/24/24 2045   BP: 115/72 115/72     Pulse: 56 56 51    Resp: 18 18 15    Temp: 97.2 °F (36.2 °C) 97.2 °F (36.2 °C)  97.6 °F (36.4 °C)   TempSrc: Oral Oral  Oral   SpO2: 100% 100%  100%   Weight:       Height:            General: A&O x 4, NAD, cooperative  HEENT: NC/AT, EOMI, PERRL, mmm, neck supple  Extremities: no edema, no calf tenderness b/l, right groin soft, no hematoma, bruising right groin and right

## 2024-10-25 NOTE — PLAN OF CARE
Problem: Chronic Conditions and Co-morbidities  Goal: Patient's chronic conditions and co-morbidity symptoms are monitored and maintained or improved  Outcome: Progressing  Flowsheets (Taken 10/25/2024 0250)  Care Plan - Patient's Chronic Conditions and Co-Morbidity Symptoms are Monitored and Maintained or Improved:   Monitor and assess patient's chronic conditions and comorbid symptoms for stability, deterioration, or improvement   Collaborate with multidisciplinary team to address chronic and comorbid conditions and prevent exacerbation or deterioration   Update acute care plan with appropriate goals if chronic or comorbid symptoms are exacerbated and prevent overall improvement and discharge     Problem: Skin/Tissue Integrity  Goal: Absence of new skin breakdown  Description: 1.  Monitor for areas of redness and/or skin breakdown  2.  Assess vascular access sites hourly  3.  Every 4-6 hours minimum:  Change oxygen saturation probe site  4.  Every 4-6 hours:  If on nasal continuous positive airway pressure, respiratory therapy assess nares and determine need for appliance change or resting period.  Outcome: Progressing     Problem: Nutrition Deficit:  Goal: Optimize nutritional status  Outcome: Progressing  Flowsheets (Taken 10/24/2024 1034 by Ivonne Brandt, RD, LD)  Nutrient intake appropriate for improving, restoring, or maintaining nutritional needs:   Assess nutritional status and recommend course of action   Monitor oral intake, labs, and treatment plans   Recommend appropriate diets, oral nutritional supplements, and vitamin/mineral supplements

## 2024-10-25 NOTE — PROGRESS NOTES
Secured transportation via [place] for 4 pm. Updated patient, mother at bedside in person and called his sister/guardian Charlotte and Supriya at his home to provide  timing.

## 2024-10-25 NOTE — PROGRESS NOTES
Speech-Language Pathology Department  Facility/Department: [facility] 3E   CLINICAL BEDSIDE SWALLOW EVALUATION    NAME: Jovan Leal  : 1955  MRN: [id number]    ADMISSION DATE: 10/23/2024  ADMITTING DIAGNOSIS: has Cerebral palsy, infantile hemiplegia (HCC); Rosacea, acne; IBS (irritable bowel syndrome); Essential hypertension; Calculus of ureter; Pyelonephritis; Sepsis (HCC); Pneumonia; Increased ammonia level; Aspiration pneumonia (HCC); Hypokalemia; Hypomagnesemia; Hypophosphatasia; Seizure disorder (HCC); Seizure disorder, grand mal (HCC); Sepsis due to undetermined organism with acute respiratory failure (HCC); Pain of upper abdomen; Diarrhea of presumed infectious origin; Abdominal pain; Pressure injury of contiguous region involving back and right buttock, stage 2 (HCC); Cellulitis, perineum; Pressure injury of right buttock, stage 2 (HCC); Pressure injury of left buttock, stage 1; COVID-19 virus detected; Hyponatremia; Multifocal pneumonia; TIA (transient ischemic attack); Acute CVA (cerebrovascular accident) (HCC); Ataxic cerebral palsy (HCC); Dysarthria; Oropharyngeal dysphagia; Depression with anxiety; Facial abscess; Group B streptococcal UTI; History of cerebral palsy; Encephalopathy acute; PNA (pneumonia); Acute encephalopathy; Metabolic encephalopathy; Generalized weakness; History of CVA (cerebrovascular accident); and Transient ischemic attack on their problem list.  ONSET DATE: Chronic    Recent Chest Xray/CT of Chest: No recent Imaging last 3 months.    Date of Eval: 10/25/2024  Evaluating Therapist: Delfin Moon, SLP    Impressions: Jovan Leal was seen for a bedside swallow evaluation following admission to [facility] due to TIA. Jovan Leal with the following relevant pmh: CVA, pneumonia, cerebral palsy, HTN, and seizures. RN on floor contacted prior to meeting with pt. Jovan Leal was awake, alert and oriented to self, month/location, location. Jovan Leal was  pleasant and shared how \"all I want is a bite of a burger\" and c/o his diet.     Jovan Leal completed an oral mechanism evaluation which revealed functional lingual ROM and strength, left labial droop, functional labial strength, and upper/lower dentures.    Jovan Leal participated in a bedside swallow evaluation. Pt put on upper and lower dentures for this bedside swallow evaluation. Jovan Leal consumed thin liquids via cup/straw, puree, soft solids, and solids. Jovan Leal demonstrated evidence of moderate oral phase dysphasia evidence by oral acceptance, with functional labial seal, functional manipulation of bolus with puree, with pocketing of solids cleared with a verbal cue, and mild oral residue with soft solid and solids cleared with a liquid wash. Pharyngeal phase is characterized by perceived delayed swallow, and no overt s/s of aspiration. Jovan Leal expressed understanding but reinforcement needed.      Recommendations: Recommend Jovan Leal consume soft and bite sized diet with thin liquids. Recommend wearing dentures, supervision to check for pocketing, alternating solids and liquids, straws okay with supervision or support with cup drinks, and aspiration precautions: sitting upright, alternate solids and liquids. SLP will follow to monitor diet tolerance and least restrictive diet. RN confirmed diet upgrade following the visit.    Current Diet level:  Current Diet : Minced and Moist  Current Liquid Diet : Thin    Primary Complaint   Stroke/TIA    Pain:  Pain Assessment  Pain Assessment: None - Denies Pain  Pain Level: 0  Monsivais-Baker Pain Rating: No hurt  Patient's Stated Pain Goal: 0 - No pain  Pain Location: Groin  Pain Orientation: Right  Response to Pain Intervention: None (pain unchanged or no improvement)  Side Effects: No reported side effects  Multiple Pain Sites: No    Reason for Referral  Jovan Leal was referred for a bedside swallow evaluation to assess the efficiency of

## 2024-10-25 NOTE — PROGRESS NOTES
Physician Progress Note      PATIENT:               AMARILIS CHEATHAM  CSN #:                  [id number]  :                       1955  ADMIT DATE:       10/23/2024 5:34 PM  DISCH DATE:  RESPONDING  PROVIDER #:        Melissa Hamilton MD          QUERY TEXT:    Pt admitted with TIA. Pt noted to have Moderate stenosis of the proximal left   ICA. If possible, please document in progress notes and discharge summary if   you are evaluating and /or treating any of the following:  The medical record reflects the following:  Risk Factors: Moderate stenosis of the proximal left ICA, Cerebral Palsy,   Epilepsy, Hx of CVA    Clinical Indicators: H&P:  presenting with slurred speech.    Treatment: Continue Aspirin, Plavix, and Lipitor for secondary prevention of   stoke, Cerebral angiogram, LILLI, Neuro checks,  CT head, CTA SLP consult, Neuro consult  Options provided:  -- TIA due to Moderate stenosis of the proximal left ICA  without infarction  -- Other - I will add my own diagnosis  -- Disagree - Not applicable / Not valid  -- Disagree - Clinically unable to determine / Unknown  -- Refer to Clinical Documentation Reviewer    PROVIDER RESPONSE TEXT:    TIA due to Moderate stenosis of the proximal left ICA without infarction    Query created by: Catracho Fleming on 10/25/2024 1:48 PM      Electronically signed by:  Melissa Hamilton MD 10/25/2024 2:44 PM

## 2024-10-25 NOTE — PROGRESS NOTES
CGA  SBA None   Mod I  Indep  Sup   1.  Putting on and taking off regular lower body clothing? [] 1    [] 2   [] 2   [] 3   [x] 3   [] 4      2. Bathing (including washing, rinsing, drying)? [] 1   [] 2   [x] 2 [] 3 [] 3 [] 4   3. Toileting, which includes using toilet, bedpan, or urinal? [] 1    [] 2   [] 2   [x] 3   [] 3   [] 4     4. Putting on and taking off regular upper body clothing? [] 1   [] 2   [] 2   [] 3   [x] 3    [] 4      5. Taking care of personal grooming such as brushing teeth? [] 1   [] 2    [] 2 [] 3    [x] 3   [] 4      6. Eating meals?   [] 1   [] 2   [] 2   [] 3   [] 3   [x] 4        Raw Score:  18     [24=0% impaired(CH), 23=1-19%(CI), 20-22=20-39%(CJ), 15-19=40-59%(CK), 10-14=60-79%(CL), 7-9=80-99%(CM), 6=100%(CN)]     Treatment:    Self Care Training:   Cues were given for safety, sequence, UE/LE placement, visual cues, and balance.    Activities performed today included LB dressing tasks, toileting, hand hygiene       Educated pt on role of OT, therapy POC and functional goals, progression w/ ADLs and transfers, importance of movement and OOB activity, d/c recommendations     Safety Measures: Gait belt used, Left in recliner, Alarm in place  Recommendations for NURSING activity:  Up to chair for all 3 meals and up to BSC vs bathroom for all toileting needs     Assessment:  Pt is a 69 y o M admitted d/t TIA. Pt at baseline has assistance for ADLs, assistance for high level IADLs, and assistance for functional transfers/mobility w/ w/c. Pt currently presents w/ deficits in ADL and high level IADL independence, functional ADL transfers, strength, and functional activity tolerance. Continued OT services recommended to increase safety and independence with ADL routine and to address remaining functional deficits. Pt would benefit from continued acute care OT services w/ return to group home at d/c + [facility].    Complexity: Moderate  Prognosis: Good, no significant barriers to participation at  this time.   Occupational Therapy Plan  Times Per Week: 2+       Goals:  Pt will complete all aspects of bed mobility for EOB/OOB ADLs w/ supervision.  Pt will complete UB ADLs w/ SBA.  Pt will complete LB ADLs w/ min A.  Pt will complete all functional transfers to and from bed, chair, toilet, shower chair w/ SBA.  Pt will complete all aspects of toileting task w/ SBA.  Pt will perform therex/theract in order to increase strength and functional activity tolerance necessary for increased independence w/ ADL routine.    Pt goal: go home, get stronger  Time Frame for STGs: discharge    Equipment: Continue to assess at next LOC    Time:   Time in: 1350  Time out: 1416  Total time: 26  Timed treatment minutes: 16        Electronically signed by:      ADARSH Roy/L  [id number]

## 2024-10-25 NOTE — DISCHARGE INSTRUCTIONS
No lifting more than 10 pounds for 5 days.    No soaking incision.  Keep right groin puncture site clean and dry.

## 2024-10-25 NOTE — PROGRESS NOTES
Physical Therapy  Facility/Department: [facility] 3E  Physical Therapy Initial Assessment    Name: Jovan Leal  : 1955  MRN: [id number]  Date of Service: 10/25/2024    Discharge Recommendations:     Encourage facility for moderate post-acute rehabilitation, anticipate 1-2 hours per day and 5 days per week.           Patient Diagnosis(es): The encounter diagnosis was Transient ischemic attack.  Past Medical History:  has a past medical history of Acute CVA (cerebrovascular accident) (HCC), Anemia, Aspiration pneumonia (HCC), Cerebral palsy (HCC), Depression, Epilepsy ([facility]), Frequent falls, Glaucoma, History of IBS, Hx MRSA infection, Hyperammonemia (HCC), Hypertension, IBS (irritable bowel syndrome), Kidney stone, Mood disorder (HCC), MRSA (methicillin resistant staph aureus) culture positive, MRSA (methicillin resistant staph aureus) culture positive, Occasional tremors, Pressure injury of contiguous region involving back and right buttock, stage 2 (HCC), Pressure injury of left buttock, stage 1, Prostatitis, Thyroid disease, and Ulcerative colitis ([facility]).  Past Surgical History:  has a past surgical history that includes Gallbladder surgery (); Vagus nerve stimulator insertion (); Cholecystectomy; Cystoscopy (Left, 2013); Kidney stone surgery; Colonoscopy (14, 2012); other surgical history (04/15/2015); Upper gastrointestinal endoscopy (N/A, 2018); Colonoscopy (2021); Colonoscopy (N/A, 2021); Stimulator Surgery (N/A, 3/24/2021); Stimulator Surgery (N/A, 3/24/2021); Facial Surgery (N/A, 10/8/2021); and Colonoscopy (N/A, 2024).    Assessment  Body Structures, Functions, Activity Limitations Requiring Skilled Therapeutic Intervention: Decreased functional mobility ;Decreased cognition;Decreased endurance;Decreased ADL status;Decreased balance;Decreased high-level IADLs;Decreased strength;Decreased safe awareness  Therapy Prognosis: Good  Decision Making: High

## 2024-10-25 NOTE — PROGRESS NOTES
Pt has a VNS device that requires the supervising neurologist, who is in the [place] network, to sign paperwork before we could reach out to the device company to schedule a rep to come out to set the device before & after the study. Ps'd the ordering this information & was given the ok to cancel the study.

## 2024-10-25 NOTE — PLAN OF CARE
Problem: Chronic Conditions and Co-morbidities  Goal: Patient's chronic conditions and co-morbidity symptoms are monitored and maintained or improved  10/25/2024 1357 by Melisa Chand RN  Outcome: Adequate for Discharge  10/25/2024 0250 by Cuong Deleon RN  Outcome: Progressing  Flowsheets (Taken 10/25/2024 0250)  Care Plan - Patient's Chronic Conditions and Co-Morbidity Symptoms are Monitored and Maintained or Improved:   Monitor and assess patient's chronic conditions and comorbid symptoms for stability, deterioration, or improvement   Collaborate with multidisciplinary team to address chronic and comorbid conditions and prevent exacerbation or deterioration   Update acute care plan with appropriate goals if chronic or comorbid symptoms are exacerbated and prevent overall improvement and discharge     Problem: Discharge Planning  Goal: Discharge to home or other facility with appropriate resources  Outcome: Adequate for Discharge     Problem: Pain  Goal: Verbalizes/displays adequate comfort level or baseline comfort level  Outcome: Adequate for Discharge     Problem: Skin/Tissue Integrity  Goal: Absence of new skin breakdown  Description: 1.  Monitor for areas of redness and/or skin breakdown  2.  Assess vascular access sites hourly  3.  Every 4-6 hours minimum:  Change oxygen saturation probe site  4.  Every 4-6 hours:  If on nasal continuous positive airway pressure, respiratory therapy assess nares and determine need for appliance change or resting period.  10/25/2024 1357 by Melisa Chand RN  Outcome: Adequate for Discharge  10/25/2024 0250 by Cuong Deleon RN  Outcome: Progressing     Problem: Safety - Adult  Goal: Free from fall injury  Outcome: Adequate for Discharge     Problem: Nutrition Deficit:  Goal: Optimize nutritional status  10/25/2024 1357 by Melisa Chand RN  Outcome: Adequate for Discharge  10/25/2024 0250 by Cuong Deleon, RN  Outcome:

## 2024-10-27 NOTE — DISCHARGE SUMMARY
with fall precautions  Disposition: Discharged to:   [x]Home, []C, []SNF, []Acute Rehab, []Hospice group home  Condition on discharge: Stable  Labs and Tests to be Followed up as an outpatient by PCP or Specialist:     Discharge Medications:        Medication List        CONTINUE taking these medications      acidophilus Caps capsule     albuterol (2.5 MG/3ML) 0.083% nebulizer solution  Commonly known as: PROVENTIL     amLODIPine 5 MG tablet  Commonly known as: NORVASC  Take 1 tablet by mouth daily     Anti-Diarrheal 2 MG tablet  Generic drug: loperamide     aspirin 81 MG EC tablet  Take 1 tablet by mouth daily     atorvastatin 80 MG tablet  Commonly known as: LIPITOR  Take 1 tablet by mouth nightly     CLEAR FIBER POWDER PO     clonazePAM 0.5 MG tablet  Commonly known as: KLONOPIN     clopidogrel 75 MG tablet  Commonly known as: PLAVIX  Take 1 tablet by mouth daily     * divalproex 500 MG DR tablet  Commonly known as: DEPAKOTE  Take 2 tablets by mouth nightly     * divalproex 500 MG DR tablet  Commonly known as: DEPAKOTE  Take 1 tablet by mouth daily     ENULOSE PO     famotidine 20 MG tablet  Commonly known as: PEPCID     folic acid 1 MG tablet  Commonly known as: FOLVITE     lacosamide 200 MG tablet  Commonly known as: VIMPAT  Take 1 tablet by mouth 2 times daily for 233 days. (CONTROL CYCLE) TAKE 1 TABLET BY MOUTH TWICE DAILY FOR EPILEPSY Max Daily Amount: 400 mg     lactulose 10 GM/15ML solution  Commonly known as: CHRONULAC     latanoprost 0.005 % ophthalmic solution  Commonly known as: XALATAN     levothyroxine 125 MCG tablet  Commonly known as: SYNTHROID  Take 1 tablet by mouth Daily     magnesium hydroxide 400 MG/5ML suspension  Commonly known as: MILK OF MAGNESIA     Nexafed 30 MG Taba  Generic drug: Pseudoephedrine HCl     PARoxetine 30 MG tablet  Commonly known as: PAXIL     primidone 50 MG tablet  Commonly known as: MYSOLINE  Take 1 tablet by mouth 3 times daily     QUEtiapine 50 MG extended release

## 2024-11-21 ENCOUNTER — OFFICE VISIT (OUTPATIENT)
Dept: NEUROLOGY | Age: 69
End: 2024-11-21
Payer: MEDICARE

## 2024-11-21 VITALS — OXYGEN SATURATION: 96 % | HEART RATE: 102 BPM | SYSTOLIC BLOOD PRESSURE: 132 MMHG | DIASTOLIC BLOOD PRESSURE: 72 MMHG

## 2024-11-21 DIAGNOSIS — R56.9 SEIZURE (HCC): Primary | ICD-10-CM

## 2024-11-21 DIAGNOSIS — G80.9 CEREBRAL PALSY, UNSPECIFIED TYPE (HCC): ICD-10-CM

## 2024-11-21 DIAGNOSIS — I69.354 SPASTIC HEMIPLEGIA OF LEFT NONDOMINANT SIDE AS LATE EFFECT OF CEREBRAL INFARCTION (HCC): ICD-10-CM

## 2024-11-21 DIAGNOSIS — G93.41 METABOLIC ENCEPHALOPATHY: ICD-10-CM

## 2024-11-21 DIAGNOSIS — G25.0 ESSENTIAL TREMOR: ICD-10-CM

## 2024-11-21 PROCEDURE — 1111F DSCHRG MED/CURRENT MED MERGE: CPT | Performed by: NURSE PRACTITIONER

## 2024-11-21 PROCEDURE — G8484 FLU IMMUNIZE NO ADMIN: HCPCS | Performed by: NURSE PRACTITIONER

## 2024-11-21 PROCEDURE — G8427 DOCREV CUR MEDS BY ELIG CLIN: HCPCS | Performed by: NURSE PRACTITIONER

## 2024-11-21 PROCEDURE — 3075F SYST BP GE 130 - 139MM HG: CPT | Performed by: NURSE PRACTITIONER

## 2024-11-21 PROCEDURE — 3017F COLORECTAL CA SCREEN DOC REV: CPT | Performed by: NURSE PRACTITIONER

## 2024-11-21 PROCEDURE — 3078F DIAST BP <80 MM HG: CPT | Performed by: NURSE PRACTITIONER

## 2024-11-21 PROCEDURE — 1159F MED LIST DOCD IN RCRD: CPT | Performed by: NURSE PRACTITIONER

## 2024-11-21 PROCEDURE — 1123F ACP DISCUSS/DSCN MKR DOCD: CPT | Performed by: NURSE PRACTITIONER

## 2024-11-21 PROCEDURE — G8419 CALC BMI OUT NRM PARAM NOF/U: HCPCS | Performed by: NURSE PRACTITIONER

## 2024-11-21 PROCEDURE — 1036F TOBACCO NON-USER: CPT | Performed by: NURSE PRACTITIONER

## 2024-11-21 PROCEDURE — 99214 OFFICE O/P EST MOD 30 MIN: CPT | Performed by: NURSE PRACTITIONER

## 2024-11-21 RX ORDER — ACETAMINOPHEN 325 MG/1
650 TABLET ORAL EVERY 4 HOURS PRN
COMMUNITY

## 2024-11-21 NOTE — PROGRESS NOTES
11/21/24    Jovan Leal  1955    Chief Complaint   Patient presents with    Follow-up     Hospital follow up for Seizures.        History of Present Illness  Jovan is a 69 y.o. male presenting today for follow-up of: Cerebral palsy, seizure disorder status post VNS placement.  Last visit he was referred to neurosurgery to manage his VNS, it was at 0% battery life, the battery was replaced by Dr. Flores 3/6/2024.  He continues Vimpat 200 mg twice daily, zonisamide 100 in the morning 300 bedtime and Depakote 500 and the morning 1000 at bedtime.     Jovan tells me today that if I do not get his right hand tremor controlled, \"he is going to find a new neurologist.\"  EEG captured right hand tremor, it is not epileptic in nature. He is prescribed primidone 50 mg 3 times daily by another provider for his tremor.  We have tried to increase it to 4 times daily last visit but it made him too sleepy.    He just followed up with epileptologist on 11/12/2024.  He was continued on Vimpat 200 mg twice daily, zonisamide 100 in the morning 300 bedtime and Depakote 500 and the morning 1000 at bedtime.  His VNS was interrogated and no changes were made.    He was recently in the hospital on 10/23/2024 for strokelike symptoms (slurred speech and questionable weakness).  MRI could not be completed due to VNS.  CT head/CTA head and neck nonacute.  He was continued on aspirin, Plavix, statin for secondary stroke prevention.  He had a cerebral angiogram in the hospital without any significant abnormal findings. He is currently on antibiotics for UTI discovered while he was in the hospital.    He was recently taken off of his clonazepam 0.5 mg twice daily by his PCP.  Since then he has noticed his anxiety and tremor has worsened.    Current Outpatient Medications   Medication Sig Dispense Refill    acetaminophen (TYLENOL) 325 MG tablet Take 2 tablets by mouth every 4 hours as needed for Pain      primidone (MYSOLINE) 50 MG tablet

## 2024-12-05 ENCOUNTER — TELEPHONE (OUTPATIENT)
Dept: NEUROLOGY | Age: 69
End: 2024-12-05

## 2024-12-05 NOTE — TELEPHONE ENCOUNTER
Received msg from Indy, pt caretaker on HIPAA, requesting AVS from November ov. Spoke with Indy and she stated that they would like ov note as well. Faxed AVS and most recent ov note to fax# [phone] per request.

## 2024-12-26 DIAGNOSIS — R56.9 SEIZURE (HCC): ICD-10-CM

## 2024-12-26 RX ORDER — LACOSAMIDE 200 MG/1
TABLET ORAL
Qty: 22 TABLET | Refills: 5 | OUTPATIENT
Start: 2024-12-26

## 2024-12-27 DIAGNOSIS — R56.9 SEIZURE (HCC): ICD-10-CM

## 2024-12-27 NOTE — TELEPHONE ENCOUNTER
Patient's pharmacy requesting refills on lacosamide, patient was last seen 11/21/2024. Expected to be seen 05/21/2025 for follow up.

## 2024-12-30 RX ORDER — LACOSAMIDE 200 MG/1
TABLET ORAL
Qty: 22 TABLET | Refills: 5 | Status: SHIPPED | OUTPATIENT
Start: 2024-12-30 | End: 2025-06-30

## 2025-01-28 ENCOUNTER — ANESTHESIA EVENT (OUTPATIENT)
Dept: OPERATING ROOM | Age: 70
DRG: 659 | End: 2025-01-28
Payer: MEDICARE

## 2025-01-28 ENCOUNTER — APPOINTMENT (OUTPATIENT)
Dept: CT IMAGING | Age: 70
DRG: 178 | End: 2025-01-28
Payer: MEDICARE

## 2025-01-28 ENCOUNTER — APPOINTMENT (OUTPATIENT)
Dept: GENERAL RADIOLOGY | Age: 70
DRG: 178 | End: 2025-01-28
Payer: MEDICARE

## 2025-01-28 ENCOUNTER — HOSPITAL ENCOUNTER (INPATIENT)
Age: 70
LOS: 1 days | Discharge: NURSING, REHAB OR LONG-TERM CARE | DRG: 178 | End: 2025-01-29
Attending: STUDENT IN AN ORGANIZED HEALTH CARE EDUCATION/TRAINING PROGRAM | Admitting: INTERNAL MEDICINE
Payer: MEDICARE

## 2025-01-28 ENCOUNTER — ANESTHESIA (OUTPATIENT)
Dept: OPERATING ROOM | Age: 70
DRG: 659 | End: 2025-01-28
Payer: MEDICARE

## 2025-01-28 DIAGNOSIS — J96.01 ACUTE HYPOXIC RESPIRATORY FAILURE (HCC): Primary | ICD-10-CM

## 2025-01-28 DIAGNOSIS — N13.9 LOWER OBSTRUCTIVE UROPATHY: ICD-10-CM

## 2025-01-28 PROBLEM — N20.0 KIDNEY STONE: Status: ACTIVE | Noted: 2025-01-28

## 2025-01-28 LAB
ALBUMIN SERPL-MCNC: 2.3 G/DL (ref 3.4–5)
ALBUMIN/GLOB SERPL: 0.9 {RATIO} (ref 1.1–2.2)
ALP SERPL-CCNC: 91 U/L (ref 40–129)
ALT SERPL-CCNC: 14 U/L (ref 10–40)
ANION GAP SERPL CALCULATED.3IONS-SCNC: 11 MMOL/L (ref 9–17)
AST SERPL-CCNC: 46 U/L (ref 15–37)
BANDS: 18 %
BASOPHILS # BLD: 0.02 K/UL
BASOPHILS NFR BLD: 0 % (ref 0–1)
BILIRUB SERPL-MCNC: 0.2 MG/DL (ref 0–1)
BILIRUB UR QL STRIP: NEGATIVE
BUN SERPL-MCNC: 25 MG/DL (ref 7–20)
CALCIUM SERPL-MCNC: 8.1 MG/DL (ref 8.3–10.6)
CHARACTER UR: ABNORMAL
CHLORIDE SERPL-SCNC: 105 MMOL/L (ref 99–110)
CLARITY UR: CLEAR
CO2 SERPL-SCNC: 23 MMOL/L (ref 21–32)
COLOR UR: YELLOW
CREAT SERPL-MCNC: 1.3 MG/DL (ref 0.8–1.3)
EKG ATRIAL RATE: 68 BPM
EKG DIAGNOSIS: NORMAL
EKG P AXIS: 52 DEGREES
EKG P-R INTERVAL: 148 MS
EKG Q-T INTERVAL: 426 MS
EKG QRS DURATION: 158 MS
EKG QTC CALCULATION (BAZETT): 452 MS
EKG R AXIS: -31 DEGREES
EKG T AXIS: -3 DEGREES
EKG VENTRICULAR RATE: 68 BPM
EOSINOPHIL # BLD: 0 K/UL
EOSINOPHILS RELATIVE PERCENT: 1 % (ref 0–3)
EPI CELLS #/AREA URNS HPF: <1 /HPF
ERYTHROCYTE [DISTWIDTH] IN BLOOD BY AUTOMATED COUNT: 13.7 % (ref 11.7–14.9)
GFR, ESTIMATED: 57 ML/MIN/1.73M2
GLUCOSE BLD-MCNC: 113 MG/DL (ref 74–99)
GLUCOSE SERPL-MCNC: 114 MG/DL (ref 74–99)
GLUCOSE UR STRIP-MCNC: NEGATIVE MG/DL
HCT VFR BLD AUTO: 31 % (ref 42–52)
HGB BLD-MCNC: 9.6 G/DL (ref 13.5–18)
HGB UR QL STRIP.AUTO: ABNORMAL
IMM GRANULOCYTES # BLD AUTO: 0.05 K/UL
IMM GRANULOCYTES NFR BLD: 0 %
KETONES UR STRIP-MCNC: NEGATIVE MG/DL
LACTATE BLDV-SCNC: 1.5 MMOL/L (ref 0.4–2)
LACTATE BLDV-SCNC: 1.8 MMOL/L (ref 0.4–2)
LEUKOCYTE ESTERASE UR QL STRIP: ABNORMAL
LYMPHOCYTES NFR BLD: 0.36 K/UL
LYMPHOCYTES RELATIVE PERCENT: 9 % (ref 24–44)
MAGNESIUM SERPL-MCNC: 2.2 MG/DL (ref 1.8–2.4)
MCH RBC QN AUTO: 29.3 PG (ref 27–31)
MCHC RBC AUTO-ENTMCNC: 31 G/DL (ref 32–36)
MCV RBC AUTO: 94.5 FL (ref 78–100)
METAMYELOCYTES: 1 %
MONOCYTES NFR BLD: 1.36 K/UL
MONOCYTES NFR BLD: 16 % (ref 0–4)
MUCOUS THREADS URNS QL MICRO: ABNORMAL
NEUTROPHILS NFR BLD: 55 % (ref 36–66)
NEUTS SEG NFR BLD: 5.15 K/UL
NITRITE UR QL STRIP: NEGATIVE
PH UR STRIP: 6 [PH] (ref 5–8)
PLATELET # BLD AUTO: 115 K/UL (ref 140–440)
PLATELET ESTIMATE: ABNORMAL
PMV BLD AUTO: 10.5 FL (ref 7.5–11.1)
POTASSIUM SERPL-SCNC: 4.4 MMOL/L (ref 3.5–5.1)
PROT SERPL-MCNC: 5 G/DL (ref 6.4–8.2)
PROT UR STRIP-MCNC: ABNORMAL MG/DL
RBC # BLD AUTO: 3.28 M/UL (ref 4.6–6.2)
RBC # BLD: ABNORMAL 10*6/UL
RBC #/AREA URNS HPF: 1 /HPF (ref 0–2)
SODIUM SERPL-SCNC: 139 MMOL/L (ref 136–145)
SP GR UR STRIP: 1.01 (ref 1–1.03)
TROPONIN I SERPL HS-MCNC: 24 NG/L (ref 0–22)
TROPONIN I SERPL HS-MCNC: 24 NG/L (ref 0–22)
UROBILINOGEN UR STRIP-ACNC: 1 EU/DL (ref 0–1)
WBC # BLD: ABNORMAL 10*3/UL
WBC #/AREA URNS HPF: 12 /HPF (ref 0–5)
WBC OTHER # BLD: 6.9 K/UL (ref 4–10.5)

## 2025-01-28 PROCEDURE — 6360000002 HC RX W HCPCS: Performed by: STUDENT IN AN ORGANIZED HEALTH CARE EDUCATION/TRAINING PROGRAM

## 2025-01-28 PROCEDURE — 96361 HYDRATE IV INFUSION ADD-ON: CPT

## 2025-01-28 PROCEDURE — 87040 BLOOD CULTURE FOR BACTERIA: CPT

## 2025-01-28 PROCEDURE — 99285 EMERGENCY DEPT VISIT HI MDM: CPT

## 2025-01-28 PROCEDURE — C1769 GUIDE WIRE: HCPCS | Performed by: UROLOGY

## 2025-01-28 PROCEDURE — 76000 FLUOROSCOPY <1 HR PHYS/QHP: CPT

## 2025-01-28 PROCEDURE — 3600000003 HC SURGERY LEVEL 3 BASE: Performed by: UROLOGY

## 2025-01-28 PROCEDURE — 0T778DZ DILATION OF LEFT URETER WITH INTRALUMINAL DEVICE, VIA NATURAL OR ARTIFICIAL OPENING ENDOSCOPIC: ICD-10-PCS | Performed by: UROLOGY

## 2025-01-28 PROCEDURE — 70450 CT HEAD/BRAIN W/O DYE: CPT

## 2025-01-28 PROCEDURE — C2617 STENT, NON-COR, TEM W/O DEL: HCPCS | Performed by: UROLOGY

## 2025-01-28 PROCEDURE — 93005 ELECTROCARDIOGRAM TRACING: CPT | Performed by: STUDENT IN AN ORGANIZED HEALTH CARE EDUCATION/TRAINING PROGRAM

## 2025-01-28 PROCEDURE — 83605 ASSAY OF LACTIC ACID: CPT

## 2025-01-28 PROCEDURE — 96374 THER/PROPH/DIAG INJ IV PUSH: CPT

## 2025-01-28 PROCEDURE — 36415 COLL VENOUS BLD VENIPUNCTURE: CPT

## 2025-01-28 PROCEDURE — 6360000002 HC RX W HCPCS: Performed by: INTERNAL MEDICINE

## 2025-01-28 PROCEDURE — 85025 COMPLETE CBC W/AUTO DIFF WBC: CPT

## 2025-01-28 PROCEDURE — C1758 CATHETER, URETERAL: HCPCS | Performed by: UROLOGY

## 2025-01-28 PROCEDURE — 94761 N-INVAS EAR/PLS OXIMETRY MLT: CPT

## 2025-01-28 PROCEDURE — 7100000001 HC PACU RECOVERY - ADDTL 15 MIN: Performed by: UROLOGY

## 2025-01-28 PROCEDURE — 96365 THER/PROPH/DIAG IV INF INIT: CPT

## 2025-01-28 PROCEDURE — 81001 URINALYSIS AUTO W/SCOPE: CPT

## 2025-01-28 PROCEDURE — 2700000000 HC OXYGEN THERAPY PER DAY

## 2025-01-28 PROCEDURE — 2580000003 HC RX 258: Performed by: STUDENT IN AN ORGANIZED HEALTH CARE EDUCATION/TRAINING PROGRAM

## 2025-01-28 PROCEDURE — 6360000004 HC RX CONTRAST MEDICATION: Performed by: STUDENT IN AN ORGANIZED HEALTH CARE EDUCATION/TRAINING PROGRAM

## 2025-01-28 PROCEDURE — BT1F1ZZ FLUOROSCOPY OF LEFT KIDNEY, URETER AND BLADDER USING LOW OSMOLAR CONTRAST: ICD-10-PCS | Performed by: UROLOGY

## 2025-01-28 PROCEDURE — 6360000002 HC RX W HCPCS

## 2025-01-28 PROCEDURE — 74177 CT ABD & PELVIS W/CONTRAST: CPT

## 2025-01-28 PROCEDURE — 1200000000 HC SEMI PRIVATE

## 2025-01-28 PROCEDURE — 6370000000 HC RX 637 (ALT 250 FOR IP): Performed by: INTERNAL MEDICINE

## 2025-01-28 PROCEDURE — 80053 COMPREHEN METABOLIC PANEL: CPT

## 2025-01-28 PROCEDURE — 87086 URINE CULTURE/COLONY COUNT: CPT

## 2025-01-28 PROCEDURE — 2500000003 HC RX 250 WO HCPCS: Performed by: INTERNAL MEDICINE

## 2025-01-28 PROCEDURE — 93010 ELECTROCARDIOGRAM REPORT: CPT | Performed by: INTERNAL MEDICINE

## 2025-01-28 PROCEDURE — 2709999900 HC NON-CHARGEABLE SUPPLY: Performed by: UROLOGY

## 2025-01-28 PROCEDURE — 82962 GLUCOSE BLOOD TEST: CPT

## 2025-01-28 PROCEDURE — 7100000000 HC PACU RECOVERY - FIRST 15 MIN: Performed by: UROLOGY

## 2025-01-28 PROCEDURE — 3700000001 HC ADD 15 MINUTES (ANESTHESIA): Performed by: UROLOGY

## 2025-01-28 PROCEDURE — 3700000000 HC ANESTHESIA ATTENDED CARE: Performed by: UROLOGY

## 2025-01-28 PROCEDURE — P9045 ALBUMIN (HUMAN), 5%, 250 ML: HCPCS

## 2025-01-28 PROCEDURE — 2500000003 HC RX 250 WO HCPCS

## 2025-01-28 PROCEDURE — 83735 ASSAY OF MAGNESIUM: CPT

## 2025-01-28 PROCEDURE — 3600000013 HC SURGERY LEVEL 3 ADDTL 15MIN: Performed by: UROLOGY

## 2025-01-28 PROCEDURE — 2500000003 HC RX 250 WO HCPCS: Performed by: STUDENT IN AN ORGANIZED HEALTH CARE EDUCATION/TRAINING PROGRAM

## 2025-01-28 PROCEDURE — 71275 CT ANGIOGRAPHY CHEST: CPT

## 2025-01-28 PROCEDURE — 84484 ASSAY OF TROPONIN QUANT: CPT

## 2025-01-28 DEVICE — VARIABLE LENGTH URETERAL STENT
Type: IMPLANTABLE DEVICE | Site: URETER | Status: FUNCTIONAL
Brand: CONTOUR VL™

## 2025-01-28 RX ORDER — TIMOLOL MALEATE 5 MG/ML
1 SOLUTION/ DROPS OPHTHALMIC 2 TIMES DAILY
Status: DISCONTINUED | OUTPATIENT
Start: 2025-01-28 | End: 2025-01-29 | Stop reason: HOSPADM

## 2025-01-28 RX ORDER — FENTANYL CITRATE 50 UG/ML
25 INJECTION, SOLUTION INTRAMUSCULAR; INTRAVENOUS EVERY 5 MIN PRN
Status: DISCONTINUED | OUTPATIENT
Start: 2025-01-28 | End: 2025-01-28 | Stop reason: HOSPADM

## 2025-01-28 RX ORDER — METRONIDAZOLE 500 MG/100ML
500 INJECTION, SOLUTION INTRAVENOUS EVERY 8 HOURS
Status: DISCONTINUED | OUTPATIENT
Start: 2025-01-28 | End: 2025-01-29 | Stop reason: HOSPADM

## 2025-01-28 RX ORDER — ALBUMIN HUMAN 50 G/1000ML
SOLUTION INTRAVENOUS
Status: DISCONTINUED | OUTPATIENT
Start: 2025-01-28 | End: 2025-01-28 | Stop reason: SDUPTHER

## 2025-01-28 RX ORDER — HYDRALAZINE HYDROCHLORIDE 20 MG/ML
10 INJECTION INTRAMUSCULAR; INTRAVENOUS
Status: DISCONTINUED | OUTPATIENT
Start: 2025-01-28 | End: 2025-01-28 | Stop reason: HOSPADM

## 2025-01-28 RX ORDER — VALPROIC ACID 250 MG/5ML
500 SOLUTION ORAL EVERY MORNING
COMMUNITY

## 2025-01-28 RX ORDER — SODIUM CHLORIDE 0.9 % (FLUSH) 0.9 %
5-40 SYRINGE (ML) INJECTION EVERY 12 HOURS SCHEDULED
Status: DISCONTINUED | OUTPATIENT
Start: 2025-01-28 | End: 2025-01-29 | Stop reason: HOSPADM

## 2025-01-28 RX ORDER — LACOSAMIDE 10 MG/ML
200 SOLUTION ORAL 2 TIMES DAILY
Status: DISCONTINUED | OUTPATIENT
Start: 2025-01-28 | End: 2025-01-29 | Stop reason: HOSPADM

## 2025-01-28 RX ORDER — LATANOPROST 50 UG/ML
1 SOLUTION/ DROPS OPHTHALMIC NIGHTLY
Status: DISCONTINUED | OUTPATIENT
Start: 2025-01-28 | End: 2025-01-29 | Stop reason: HOSPADM

## 2025-01-28 RX ORDER — MAGNESIUM SULFATE IN WATER 40 MG/ML
2000 INJECTION, SOLUTION INTRAVENOUS PRN
Status: DISCONTINUED | OUTPATIENT
Start: 2025-01-28 | End: 2025-01-29 | Stop reason: HOSPADM

## 2025-01-28 RX ORDER — IOPAMIDOL 755 MG/ML
75 INJECTION, SOLUTION INTRAVASCULAR
Status: COMPLETED | OUTPATIENT
Start: 2025-01-28 | End: 2025-01-28

## 2025-01-28 RX ORDER — VALPROIC ACID 250 MG/5ML
1000 SOLUTION ORAL NIGHTLY
Status: DISCONTINUED | OUTPATIENT
Start: 2025-01-28 | End: 2025-01-29 | Stop reason: HOSPADM

## 2025-01-28 RX ORDER — FENTANYL CITRATE 50 UG/ML
INJECTION, SOLUTION INTRAMUSCULAR; INTRAVENOUS
Status: DISCONTINUED | OUTPATIENT
Start: 2025-01-28 | End: 2025-01-28 | Stop reason: SDUPTHER

## 2025-01-28 RX ORDER — CLOPIDOGREL BISULFATE 75 MG/1
75 TABLET ORAL DAILY
Status: DISCONTINUED | OUTPATIENT
Start: 2025-01-29 | End: 2025-01-29 | Stop reason: HOSPADM

## 2025-01-28 RX ORDER — CLONAZEPAM 0.5 MG/1
0.5 TABLET ORAL 2 TIMES DAILY
Status: DISCONTINUED | OUTPATIENT
Start: 2025-01-28 | End: 2025-01-29 | Stop reason: HOSPADM

## 2025-01-28 RX ORDER — PHENOL 1.4 %
10 AEROSOL, SPRAY (ML) MUCOUS MEMBRANE NIGHTLY
COMMUNITY

## 2025-01-28 RX ORDER — LACOSAMIDE 100 MG/1
200 TABLET ORAL 2 TIMES DAILY
Status: DISCONTINUED | OUTPATIENT
Start: 2025-01-28 | End: 2025-01-28 | Stop reason: SDUPTHER

## 2025-01-28 RX ORDER — GUAIFENESIN AND DEXTROMETHORPHAN HYDROBROMIDE 10; 100 MG/5ML; MG/5ML
5 SYRUP ORAL EVERY 4 HOURS PRN
COMMUNITY

## 2025-01-28 RX ORDER — POLYETHYLENE GLYCOL 3350 17 G/17G
17 POWDER, FOR SOLUTION ORAL DAILY PRN
Status: DISCONTINUED | OUTPATIENT
Start: 2025-01-28 | End: 2025-01-29 | Stop reason: HOSPADM

## 2025-01-28 RX ORDER — NALOXONE HYDROCHLORIDE 0.4 MG/ML
INJECTION, SOLUTION INTRAMUSCULAR; INTRAVENOUS; SUBCUTANEOUS PRN
Status: DISCONTINUED | OUTPATIENT
Start: 2025-01-28 | End: 2025-01-28 | Stop reason: HOSPADM

## 2025-01-28 RX ORDER — DOCUSATE SODIUM 100 MG/1
100 CAPSULE, LIQUID FILLED ORAL DAILY
COMMUNITY

## 2025-01-28 RX ORDER — GUAIFENESIN 600 MG/1
600 TABLET, EXTENDED RELEASE ORAL EVERY 12 HOURS PRN
COMMUNITY

## 2025-01-28 RX ORDER — ALBUTEROL SULFATE 0.83 MG/ML
2.5 SOLUTION RESPIRATORY (INHALATION) EVERY 6 HOURS PRN
Status: DISCONTINUED | OUTPATIENT
Start: 2025-01-28 | End: 2025-01-29 | Stop reason: HOSPADM

## 2025-01-28 RX ORDER — SODIUM CHLORIDE 9 MG/ML
INJECTION, SOLUTION INTRAVENOUS PRN
Status: DISCONTINUED | OUTPATIENT
Start: 2025-01-28 | End: 2025-01-29 | Stop reason: HOSPADM

## 2025-01-28 RX ORDER — 0.9 % SODIUM CHLORIDE 0.9 %
500 INTRAVENOUS SOLUTION INTRAVENOUS ONCE
Status: COMPLETED | OUTPATIENT
Start: 2025-01-28 | End: 2025-01-28

## 2025-01-28 RX ORDER — ATORVASTATIN CALCIUM 40 MG/1
80 TABLET, FILM COATED ORAL NIGHTLY
Status: DISCONTINUED | OUTPATIENT
Start: 2025-01-28 | End: 2025-01-29 | Stop reason: HOSPADM

## 2025-01-28 RX ORDER — FOLIC ACID 1 MG/1
1 TABLET ORAL DAILY
Status: DISCONTINUED | OUTPATIENT
Start: 2025-01-29 | End: 2025-01-29 | Stop reason: HOSPADM

## 2025-01-28 RX ORDER — EPHEDRINE SULFATE 50 MG/ML
INJECTION INTRAVENOUS
Status: DISCONTINUED | OUTPATIENT
Start: 2025-01-28 | End: 2025-01-28 | Stop reason: SDUPTHER

## 2025-01-28 RX ORDER — ACETAMINOPHEN 325 MG/1
650 TABLET ORAL
Status: DISCONTINUED | OUTPATIENT
Start: 2025-01-28 | End: 2025-01-28 | Stop reason: HOSPADM

## 2025-01-28 RX ORDER — BENZONATATE 100 MG/1
200 CAPSULE ORAL 3 TIMES DAILY PRN
Status: DISCONTINUED | OUTPATIENT
Start: 2025-01-28 | End: 2025-01-29 | Stop reason: HOSPADM

## 2025-01-28 RX ORDER — ONDANSETRON 2 MG/ML
4 INJECTION INTRAMUSCULAR; INTRAVENOUS EVERY 6 HOURS PRN
Status: DISCONTINUED | OUTPATIENT
Start: 2025-01-28 | End: 2025-01-29 | Stop reason: HOSPADM

## 2025-01-28 RX ORDER — QUETIAPINE FUMARATE 100 MG/1
100 TABLET, FILM COATED ORAL NIGHTLY
Status: DISCONTINUED | OUTPATIENT
Start: 2025-01-28 | End: 2025-01-29 | Stop reason: HOSPADM

## 2025-01-28 RX ORDER — CARBOXYMETHYLCELLULOSE SODIUM 10 MG/ML
1 GEL OPHTHALMIC EVERY MORNING
Status: DISCONTINUED | OUTPATIENT
Start: 2025-01-29 | End: 2025-01-29 | Stop reason: HOSPADM

## 2025-01-28 RX ORDER — DOCUSATE SODIUM 100 MG/1
100 CAPSULE, LIQUID FILLED ORAL DAILY
Status: DISCONTINUED | OUTPATIENT
Start: 2025-01-29 | End: 2025-01-29 | Stop reason: HOSPADM

## 2025-01-28 RX ORDER — 0.9 % SODIUM CHLORIDE 0.9 %
1000 INTRAVENOUS SOLUTION INTRAVENOUS ONCE
Status: COMPLETED | OUTPATIENT
Start: 2025-01-28 | End: 2025-01-28

## 2025-01-28 RX ORDER — PHENYLEPHRINE HCL IN 0.9% NACL 1 MG/10 ML
SYRINGE (ML) INTRAVENOUS
Status: DISCONTINUED | OUTPATIENT
Start: 2025-01-28 | End: 2025-01-28 | Stop reason: SDUPTHER

## 2025-01-28 RX ORDER — POTASSIUM CHLORIDE 1500 MG/1
40 TABLET, EXTENDED RELEASE ORAL PRN
Status: DISCONTINUED | OUTPATIENT
Start: 2025-01-28 | End: 2025-01-29 | Stop reason: HOSPADM

## 2025-01-28 RX ORDER — LACOSAMIDE 10 MG/ML
200 SOLUTION ORAL 2 TIMES DAILY
Status: ON HOLD | COMMUNITY
End: 2025-01-29 | Stop reason: HOSPADM

## 2025-01-28 RX ORDER — ENOXAPARIN SODIUM 100 MG/ML
40 INJECTION SUBCUTANEOUS DAILY
Status: DISCONTINUED | OUTPATIENT
Start: 2025-01-28 | End: 2025-01-29 | Stop reason: HOSPADM

## 2025-01-28 RX ORDER — BENZONATATE 100 MG/1
200 CAPSULE ORAL 3 TIMES DAILY PRN
COMMUNITY
Start: 2024-12-20

## 2025-01-28 RX ORDER — GUAIFENESIN/DEXTROMETHORPHAN 100-10MG/5
5 SYRUP ORAL EVERY 4 HOURS PRN
Status: DISCONTINUED | OUTPATIENT
Start: 2025-01-28 | End: 2025-01-29 | Stop reason: HOSPADM

## 2025-01-28 RX ORDER — SODIUM CHLORIDE 0.9 % (FLUSH) 0.9 %
5-40 SYRINGE (ML) INJECTION PRN
Status: DISCONTINUED | OUTPATIENT
Start: 2025-01-28 | End: 2025-01-29 | Stop reason: HOSPADM

## 2025-01-28 RX ORDER — PROPOFOL 10 MG/ML
INJECTION, EMULSION INTRAVENOUS
Status: DISCONTINUED | OUTPATIENT
Start: 2025-01-28 | End: 2025-01-28 | Stop reason: SDUPTHER

## 2025-01-28 RX ORDER — ZINC OXIDE 13 %
CREAM (GRAM) TOPICAL PRN
COMMUNITY

## 2025-01-28 RX ORDER — VALPROIC ACID 250 MG/5ML
1000 SOLUTION ORAL NIGHTLY
COMMUNITY

## 2025-01-28 RX ORDER — PRIMIDONE 50 MG/1
50 TABLET ORAL 3 TIMES DAILY
Status: DISCONTINUED | OUTPATIENT
Start: 2025-01-28 | End: 2025-01-29 | Stop reason: HOSPADM

## 2025-01-28 RX ORDER — FAMOTIDINE 20 MG/1
20 TABLET, FILM COATED ORAL 2 TIMES DAILY
Status: DISCONTINUED | OUTPATIENT
Start: 2025-01-28 | End: 2025-01-29 | Stop reason: HOSPADM

## 2025-01-28 RX ORDER — ASPIRIN 81 MG/1
81 TABLET, CHEWABLE ORAL DAILY
Status: DISCONTINUED | OUTPATIENT
Start: 2025-01-29 | End: 2025-01-29 | Stop reason: HOSPADM

## 2025-01-28 RX ORDER — ONDANSETRON 4 MG/1
4 TABLET, ORALLY DISINTEGRATING ORAL EVERY 8 HOURS PRN
Status: DISCONTINUED | OUTPATIENT
Start: 2025-01-28 | End: 2025-01-29 | Stop reason: HOSPADM

## 2025-01-28 RX ORDER — LEVOTHYROXINE SODIUM 125 UG/1
125 TABLET ORAL DAILY
Status: DISCONTINUED | OUTPATIENT
Start: 2025-01-29 | End: 2025-01-29 | Stop reason: HOSPADM

## 2025-01-28 RX ORDER — SODIUM CHLORIDE 0.9 % (FLUSH) 0.9 %
5-40 SYRINGE (ML) INJECTION PRN
Status: DISCONTINUED | OUTPATIENT
Start: 2025-01-28 | End: 2025-01-28 | Stop reason: HOSPADM

## 2025-01-28 RX ORDER — MAGNESIUM HYDROXIDE/ALUMINUM HYDROXICE/SIMETHICONE 120; 1200; 1200 MG/30ML; MG/30ML; MG/30ML
5 SUSPENSION ORAL EVERY 4 HOURS PRN
COMMUNITY

## 2025-01-28 RX ORDER — AMLODIPINE BESYLATE 5 MG/1
5 TABLET ORAL DAILY
Status: DISCONTINUED | OUTPATIENT
Start: 2025-01-29 | End: 2025-01-29 | Stop reason: HOSPADM

## 2025-01-28 RX ORDER — VALPROIC ACID 250 MG/5ML
500 SOLUTION ORAL EVERY MORNING
Status: DISCONTINUED | OUTPATIENT
Start: 2025-01-29 | End: 2025-01-29 | Stop reason: HOSPADM

## 2025-01-28 RX ORDER — QUETIAPINE FUMARATE 50 MG/1
100 TABLET, FILM COATED ORAL NIGHTLY
COMMUNITY
Start: 2025-01-15

## 2025-01-28 RX ORDER — IBUPROFEN 200 MG
600 TABLET ORAL EVERY 6 HOURS
Status: ON HOLD | COMMUNITY
End: 2025-01-29 | Stop reason: HOSPADM

## 2025-01-28 RX ORDER — ONDANSETRON 2 MG/ML
INJECTION INTRAMUSCULAR; INTRAVENOUS
Status: DISCONTINUED | OUTPATIENT
Start: 2025-01-28 | End: 2025-01-28 | Stop reason: SDUPTHER

## 2025-01-28 RX ORDER — DEXAMETHASONE SODIUM PHOSPHATE 4 MG/ML
INJECTION, SOLUTION INTRA-ARTICULAR; INTRALESIONAL; INTRAMUSCULAR; INTRAVENOUS; SOFT TISSUE
Status: DISCONTINUED | OUTPATIENT
Start: 2025-01-28 | End: 2025-01-28 | Stop reason: SDUPTHER

## 2025-01-28 RX ORDER — CARBOXYMETHYLCELLULOSE SODIUM 5 MG/ML
1 SOLUTION/ DROPS OPHTHALMIC EVERY MORNING
COMMUNITY
Start: 2024-10-08

## 2025-01-28 RX ORDER — ZONISAMIDE 100 MG/1
300 CAPSULE ORAL NIGHTLY
Status: DISCONTINUED | OUTPATIENT
Start: 2025-01-28 | End: 2025-01-29 | Stop reason: HOSPADM

## 2025-01-28 RX ORDER — LACTOBACILLUS RHAMNOSUS GG 10B CELL
1 CAPSULE ORAL DAILY
Status: DISCONTINUED | OUTPATIENT
Start: 2025-01-29 | End: 2025-01-29 | Stop reason: HOSPADM

## 2025-01-28 RX ORDER — SODIUM CHLORIDE 0.9 % (FLUSH) 0.9 %
5-40 SYRINGE (ML) INJECTION EVERY 12 HOURS SCHEDULED
Status: DISCONTINUED | OUTPATIENT
Start: 2025-01-28 | End: 2025-01-28 | Stop reason: HOSPADM

## 2025-01-28 RX ORDER — POTASSIUM CHLORIDE 7.45 MG/ML
10 INJECTION INTRAVENOUS PRN
Status: DISCONTINUED | OUTPATIENT
Start: 2025-01-28 | End: 2025-01-29 | Stop reason: HOSPADM

## 2025-01-28 RX ORDER — SODIUM CHLORIDE 9 MG/ML
INJECTION, SOLUTION INTRAVENOUS PRN
Status: DISCONTINUED | OUTPATIENT
Start: 2025-01-28 | End: 2025-01-28 | Stop reason: HOSPADM

## 2025-01-28 RX ORDER — PROCHLORPERAZINE EDISYLATE 5 MG/ML
5 INJECTION INTRAMUSCULAR; INTRAVENOUS
Status: DISCONTINUED | OUTPATIENT
Start: 2025-01-28 | End: 2025-01-28 | Stop reason: HOSPADM

## 2025-01-28 RX ORDER — ACETAMINOPHEN 650 MG/1
650 SUPPOSITORY RECTAL EVERY 6 HOURS PRN
Status: DISCONTINUED | OUTPATIENT
Start: 2025-01-28 | End: 2025-01-29 | Stop reason: HOSPADM

## 2025-01-28 RX ORDER — ONDANSETRON 2 MG/ML
4 INJECTION INTRAMUSCULAR; INTRAVENOUS
Status: DISCONTINUED | OUTPATIENT
Start: 2025-01-28 | End: 2025-01-28 | Stop reason: HOSPADM

## 2025-01-28 RX ORDER — LACTULOSE 10 G/15ML
30 SOLUTION ORAL 2 TIMES DAILY
Status: DISCONTINUED | OUTPATIENT
Start: 2025-01-28 | End: 2025-01-29 | Stop reason: HOSPADM

## 2025-01-28 RX ORDER — ACETAMINOPHEN 325 MG/1
650 TABLET ORAL EVERY 6 HOURS PRN
Status: DISCONTINUED | OUTPATIENT
Start: 2025-01-28 | End: 2025-01-29 | Stop reason: HOSPADM

## 2025-01-28 RX ORDER — GUAIFENESIN 600 MG/1
600 TABLET, EXTENDED RELEASE ORAL EVERY 12 HOURS PRN
Status: DISCONTINUED | OUTPATIENT
Start: 2025-01-28 | End: 2025-01-29 | Stop reason: HOSPADM

## 2025-01-28 RX ADMIN — ENOXAPARIN SODIUM 40 MG: 100 INJECTION SUBCUTANEOUS at 17:26

## 2025-01-28 RX ADMIN — Medication 10 MG: at 23:00

## 2025-01-28 RX ADMIN — ONDANSETRON 4 MG: 2 INJECTION INTRAMUSCULAR; INTRAVENOUS at 14:34

## 2025-01-28 RX ADMIN — FENTANYL CITRATE 100 MCG: 50 INJECTION, SOLUTION INTRAMUSCULAR; INTRAVENOUS at 14:29

## 2025-01-28 RX ADMIN — WATER 1000 MG: 1 INJECTION INTRAMUSCULAR; INTRAVENOUS; SUBCUTANEOUS at 11:51

## 2025-01-28 RX ADMIN — TIMOLOL MALEATE 1 DROP: 5 SOLUTION OPHTHALMIC at 23:02

## 2025-01-28 RX ADMIN — LACTULOSE 30 G: 20 SOLUTION ORAL at 23:00

## 2025-01-28 RX ADMIN — SODIUM CHLORIDE, PRESERVATIVE FREE 10 ML: 5 INJECTION INTRAVENOUS at 23:01

## 2025-01-28 RX ADMIN — ZONISAMIDE 300 MG: 100 CAPSULE ORAL at 23:02

## 2025-01-28 RX ADMIN — Medication 0.1 MG: at 14:34

## 2025-01-28 RX ADMIN — CLONAZEPAM 0.5 MG: 0.5 TABLET ORAL at 22:59

## 2025-01-28 RX ADMIN — PROPOFOL 150 MG: 10 INJECTION, EMULSION INTRAVENOUS at 14:29

## 2025-01-28 RX ADMIN — DEXAMETHASONE SODIUM PHOSPHATE 4 MG: 4 INJECTION, SOLUTION INTRAMUSCULAR; INTRAVENOUS at 14:34

## 2025-01-28 RX ADMIN — Medication 0.1 MG: at 14:29

## 2025-01-28 RX ADMIN — SODIUM CHLORIDE 500 ML: 9 INJECTION, SOLUTION INTRAVENOUS at 12:14

## 2025-01-28 RX ADMIN — SODIUM CHLORIDE 1000 ML: 9 INJECTION, SOLUTION INTRAVENOUS at 11:31

## 2025-01-28 RX ADMIN — LATANOPROST 1 DROP: 50 SOLUTION OPHTHALMIC at 23:23

## 2025-01-28 RX ADMIN — SODIUM CHLORIDE 1000 ML: 9 INJECTION, SOLUTION INTRAVENOUS at 09:20

## 2025-01-28 RX ADMIN — EPHEDRINE SULFATE 25 MG: 50 INJECTION INTRAVENOUS at 14:38

## 2025-01-28 RX ADMIN — QUETIAPINE FUMARATE 100 MG: 100 TABLET ORAL at 23:00

## 2025-01-28 RX ADMIN — FAMOTIDINE 20 MG: 20 TABLET, FILM COATED ORAL at 23:00

## 2025-01-28 RX ADMIN — AZITHROMYCIN MONOHYDRATE 500 MG: 500 INJECTION, POWDER, LYOPHILIZED, FOR SOLUTION INTRAVENOUS at 12:36

## 2025-01-28 RX ADMIN — METRONIDAZOLE 500 MG: 500 INJECTION, SOLUTION INTRAVENOUS at 23:07

## 2025-01-28 RX ADMIN — PRIMIDONE 50 MG: 50 TABLET ORAL at 22:59

## 2025-01-28 RX ADMIN — ALBUMIN (HUMAN) 12.5 G: 12.5 INJECTION, SOLUTION INTRAVENOUS at 14:49

## 2025-01-28 RX ADMIN — IOPAMIDOL 75 ML: 755 INJECTION, SOLUTION INTRAVENOUS at 11:14

## 2025-01-28 RX ADMIN — ATORVASTATIN CALCIUM 80 MG: 40 TABLET, FILM COATED ORAL at 23:00

## 2025-01-28 RX ADMIN — LACOSAMIDE 200 MG: 10 SOLUTION ORAL at 23:02

## 2025-01-28 RX ADMIN — VALPROIC ACID 1000 MG: 250 SOLUTION ORAL at 23:00

## 2025-01-28 RX ADMIN — IOPAMIDOL 75 ML: 755 INJECTION, SOLUTION INTRAVENOUS at 11:15

## 2025-01-28 RX ADMIN — EPHEDRINE SULFATE 25 MG: 50 INJECTION INTRAVENOUS at 14:39

## 2025-01-28 ASSESSMENT — PAIN SCALES - WONG BAKER
WONGBAKER_NUMERICALRESPONSE: NO HURT
WONGBAKER_NUMERICALRESPONSE: NO HURT

## 2025-01-28 ASSESSMENT — PAIN SCALES - GENERAL: PAINLEVEL_OUTOF10: 0

## 2025-01-28 NOTE — ED NOTES
Male purewick placed on patient. Bladder scan showed 0 mL in patients bladder. Dr. Argueta notified.

## 2025-01-28 NOTE — ED PROVIDER NOTES
EMERGENCY DEPARTMENT HISTORY AND PHYSICAL EXAM      Patient Name: Jovan Leal  MRN: [id number]  : 1955  Date of Evaluation: 2025  ED Provider:  Mariya Argueta DO    History of Presenting Illness     Chief Complaint:   Chief Complaint   Patient presents with    Altered Mental Status    Respiratory Distress     Low 80s for EMS on arrival to scene, from nursing home       HPI: Patient is a 69 y.o. male with past medical history of cerebral palsy, seizure disorder and CVA presenting to the emergency department from nursing home given reports of altered mental status and increased work of breathing.  EMS reports that patient was found to be altered today.  When they arrived his O2 sats were 80% on room air and he seemed to have increased work of breathing.  Nursing home states that he has been more sluggish today than usual and not acting quite right.  Patient denies any current symptoms.  Patient states he feels fine.  Patient denies any pain.  Patient denies any shortness of breath.  Patient is a DNR CC per documentation.    Per nursing home staff patient has been complaining of left lower quadrant abdominal pain for the last 4 to 5 days.  This morning patient was disoriented and weak in general.  Per nursing home staff patient has not had recent fevers or cough.  Patient has chronic left-sided weakness due to previous stroke which nursing home staff states seems consistent with baseline today.    Past History     Past Medical History:   Past Medical History:   Diagnosis Date    Acute CVA (cerebrovascular accident) ([facility]) 10/10/2021    Anemia     Aspiration pneumonia ([facility])     dx 2014 with this per ecf/ per old chart dx with pneumonia with admission 2018    Cerebral palsy (HCC)     \"has no feeling on left side of body\"alert but has some dementia but not diagnosed, has good long term but poor short term and wakes up disoriented after a nap\"(per yaneth)(2014)    Depression     Epilepsy ([facility])  and old charts  -vital sign assessments  -direct patient care  -medication orders and management  -interpreting and reviewing diagnostic studies/labs  -re-evaluations  -documentation time    Aggregate critical care time was 55 minutes, which includes only time during which I was engaged in work directly related to the patient's care as described above, whether I was at bedside or elsewhere in the Emergency Department. It did not include time spent performing other reported procedures or the services of residents, students, nurses, or advance practice providers.       Jose Angel Argueta D.O.      Diagnosis and Disposition     CLINICAL IMPRESSION:  1. Acute hypoxic respiratory failure    2. Lower obstructive uropathy        Disposition: Admission    Patient condition at time of disposition: Serious    Disposition medications (if applicable):  Current Discharge Medication List          Voice Dictation Disclaimer     Comment: Please note this report has been produced using speech recognition software and may contain errors related to that system including errors in grammar, punctuation, and spelling, as well as words and phrases that may be inappropriate.  Efforts were made to edit the dictations.      Jose Angel Argueta D.O.       Mariya Argueta DO  01/29/25 0749

## 2025-01-28 NOTE — OP NOTE
[address] [place] [address]     Operative Note  [facility]    Patient: Jovan Leal    Date: 2025  : 1955     DOA: 2025   MRN: [id number]    [facility] [id number]  ROOM#: OR/NONE       Pre-operative Diagnosis: Left ureteral stone, hypotension    Post-operative Diagnosis: same    Procedure: Cystoscopy, left retrograde pyelogram/stent insertion    Surgeons: Shonna    Anesthesia: General LMA anesthesia    EBL: Minimal    Complications: none    Specimens: were not obtained    Indication for Procedure:      Jovan Leal is a 69 y.o.  male with history of ureterolithiasis.  Imaging suggested a left ureteral calculus which measured 9 mm. The patient was unable to get pain relief and unable to pass calculus, so Jovan Leal was brought to the OR today for cystourethroscopy, left retrograde pyelogram, left ureteral stent insertion.  Risks and benefits were explained & oJvan Leal's POA agreed to proceed as planned.  H/o CVA on Plavix.  NPO. H/o CP.  H/o stones treated by Dr. Banuelos remotely.    Description of procedure:     The patient was identified in the holding area, taken to procedure room, and placed in supine position. Anesthesia was administered.  The patient was then placed in the dorsal lithotomy position, sterilely prepped and then draped in the usual fashion.  Time out was taken to ensure this was the proper operation, for the proper patient, on the proper side; everyone in the room agreed.     Rigid cystoscopy ensued.  A left retrograde pyelogram with a cone tipped catheter was performed opacifying the collecting system with findings of left proximal ureteral filling defect with pus drained after.       A sensor wire was then placed into the patient's left renal pelvis.       Using the sensor wire I passed up a 4.8 Wallisian variable length double J stent with a cystoscope.  It was noted to be in good position proximally fluoroscopically and distally cystoscopically.  The patient's  bladder was drained with a catheter.  Jovan Leal tolerated the procedure well & without difficulty and was then transferred to PACU to recover.       Jovan Leal will need to have intervention of the stone as an outpatient once the urine is deemed to be sterile.  This may be via ureteroscopy/laser stone manipulation or ESWL.     KUB is to be ordered to determine if the stone is radio-opaque.     Left retrograde pyelogram was as follows:  Using a cone-tip catheter, 6 cc's of contrast was injected into the left collecting system opacifying it to completion.  A filling defect was seen in the left ureter consistent with that noted on prior imaging.    Bernardino Kilpatrick MD  Electronically signed at 1/28/2025

## 2025-01-28 NOTE — ANESTHESIA PRE PROCEDURE
Department of Anesthesiology  Preprocedure Note       Name:  Jovan Leal   Age:  69 y.o.  :  1955                                          MRN:  [id number]         Date:  2025      Surgeon: Surgeon(s):  Bernardino Kilpatrick MD    Procedure: Procedure(s):  CYSTOSCOPY, URETEROSCOPY, RETROGRADE PYELOGRAM, STONE MANIPULATION WITH LASER, LEFT URETERAL STENT INSERTION    Medications prior to admission:   Prior to Admission medications    Medication Sig Start Date End Date Taking? Authorizing Provider   carboxymethylcellulose (REFRESH PLUS) 0.5 % SOLN ophthalmic solution Place 1 drop into both eyes every morning 10/8/24  Yes Chriss Buckner MD   docusate sodium (COLACE) 100 MG capsule Take 1 capsule by mouth daily   Yes Chriss Buckner MD   ibuprofen (ADVIL;MOTRIN) 200 MG tablet Take 3 tablets by mouth every 6 hours   Yes Chriss Buckner MD   Melatonin 10 MG TABS Take 10 mg by mouth nightly   Yes Chriss Buckner MD   QUEtiapine (SEROQUEL) 50 MG tablet Take 2 tablets by mouth nightly 1/15/25  Yes Chriss Buckner MD   sodium chloride (OCEAN) 0.65 % nasal spray 1 spray by Nasal route as needed for Congestion   Yes Chriss Buckner MD   valproic acid (DEPAKENE) 250 MG/5ML SOLN oral solution Take 10 mLs by mouth every morning   Yes Chriss Buckner MD   valproic acid (DEPAKENE) 250 MG/5ML SOLN oral solution Take 20 mLs by mouth nightly   Yes Chriss Buckner MD   lacosamide (VIMPAT) 10 MG/ML SOLN oral solution Take 20 mLs by mouth 2 times daily. Max Daily Amount: 400 mg   Yes Chriss Buckner MD   primidone (MYSOLINE) 50 MG tablet Take 1 tablet by mouth 3 times daily 10/23/24  Yes Maged Blanca, APRN - CNP   Corn Dextrin (CLEAR FIBER POWDER PO) Take by mouth every morning 25 Receives 2 tsp every morning   Yes Chriss Buckner MD   magnesium hydroxide (MILK OF MAGNESIA) 400 MG/5ML suspension Take 30 mLs by mouth nightly as needed for Heartburn   Yes

## 2025-01-28 NOTE — ED NOTES
This RN transported patient to surgery. Report given to Dr. Kilpatrick and Surgery RN. Patient attached to cardiac monitor and nasal cannula at 2L. Guardian at bedside.

## 2025-01-28 NOTE — ED NOTES
Spoke with microbiology regarding blood cultures. Tech in micro said that she does have both sets of blood cultures.

## 2025-01-28 NOTE — H&P
V2.0  History and Physical      Name:  Jovan Leal /Age/Sex: 1955  (69 y.o. male)   MRN & CSN:  [id number] & [id number] Encounter Date/Time: 2025 6:41 PM EST   Location:  [address] [place] [address] PCP: Jovan Lazcano PA-C       Hospital Day: 1    Assessment and Plan:   Jovan Leal is a 69 y.o. male  who presents with Kidney stone    Hospital Problems             Last Modified POA    * (Principal) Kidney stone 2025 Yes     # Nephrolithiasis  -Patient with 3 days of pain, decreased appetite, weakness  -CT head showing no acute intracranial hemorrhage or mass effect  -CT abdomen pelvis showing obstructing 9 mm calculus in proximal left ureter with resultant mild to moderate left hydronephrosis  -S/p cystoscopy, pus drained, stent placed  Urology following, appreciate recs  Continue ceftriaxone  Follow-up on urine culture  Follow-up on blood culture    # Possible aspiration pneumonia  # Acute hypoxic respiratory failure  #Chronic oropharyngeal dysphagia  -CTA chest showing no PE, bilateral airspace disease particularly showing areas of patchy consolidation in both lower lungs, likely reflective of atypical pneumonia with differentials or congestive heart failure  -Patient with recent COVID 19 on  with acute hypoxic respiratory failure requiring dexamethasone for 10 days and was initially on remdesivir  We will add metronidazole for anaerobic coverage  SLP evaluation  Procalcitonin and proBNP ordered    # Hypotension-resolved  -Initially patient was going to go to the ICU for pressor support however patient's blood pressure improved with IV fluids    # Elevated troponin likely in the setting of demand ischemia  -Troponin: 24 -->24  -EKG: NSR, no acute ischemic changes, RBBB  Continue to monitor for signs of ACS  Troponin remained flat, will stop trending    # Nonspecific 14 mm cystic structure  -In left perirectal fat with adjacent surgical clip  May need follow-up?    # Recent cervical spinal  BID    latanoprost  1 drop Both Eyes Nightly    [START ON 1/29/2025] levothyroxine  125 mcg Oral Daily    melatonin  10 mg Oral Nightly    [START ON 1/29/2025] PARoxetine  30 mg Oral QAM    primidone  50 mg Oral TID    QUEtiapine  100 mg Oral Nightly    timolol  1 drop Both Eyes BID    [START ON 1/29/2025] valproic acid  500 mg Oral QAM    valproic acid  1,000 mg Oral Nightly    sodium chloride flush  5-40 mL IntraVENous 2 times per day    enoxaparin  40 mg SubCUTAneous Daily    [START ON 1/29/2025] cefTRIAXone (ROCEPHIN) IV  1,000 mg IntraVENous Q24H      Infusions:    sodium chloride       PRN Meds: albuterol, 2.5 mg, Q6H PRN  benzonatate, 200 mg, TID PRN  guaiFENesin-dextromethorphan, 5 mL, Q4H PRN  guaiFENesin, 600 mg, Q12H PRN  sodium chloride, 1 spray, PRN  sodium chloride flush, 5-40 mL, PRN  sodium chloride, , PRN  potassium chloride, 40 mEq, PRN   Or  potassium alternative oral replacement, 40 mEq, PRN   Or  potassium chloride, 10 mEq, PRN  magnesium sulfate, 2,000 mg, PRN  ondansetron, 4 mg, Q8H PRN   Or  ondansetron, 4 mg, Q6H PRN  polyethylene glycol, 17 g, Daily PRN  acetaminophen, 650 mg, Q6H PRN   Or  acetaminophen, 650 mg, Q6H PRN        Labs      CBC:   Recent Labs     01/28/25  0935   WBC 6.9   HGB 9.6*   *     BMP:    Recent Labs     01/28/25  0935      K 4.4      CO2 23   BUN 25*   CREATININE 1.3   GLUCOSE 114*     Hepatic:   Recent Labs     01/28/25  0935   AST 46*   ALT 14   BILITOT 0.2   ALKPHOS 91     Lipids:   Lab Results   Component Value Date/Time    CHOL 103 10/24/2024 03:23 AM    HDL 42 10/24/2024 03:23 AM    TRIG 117 10/24/2024 03:23 AM     Hemoglobin A1C:   Lab Results   Component Value Date/Time    LABA1C 5.6 10/24/2024 03:23 AM     TSH:   Lab Results   Component Value Date/Time    TSH 1.82 10/07/2015 12:19 PM     Troponin:   Lab Results   Component Value Date/Time    TROPONINT <0.010 08/16/2022 12:59 PM    TROPONINT <0.010 12/06/2021 10:44 PM    TROPONINT <0.010  10/03/2021 04:25 AM     Lactic Acid: No results for input(s): \"LACTA\" in the last 72 hours.  BNP: No results for input(s): \"PROBNP\" in the last 72 hours.  UA:  Lab Results   Component Value Date/Time    NITRU NEGATIVE 01/28/2025 09:30 AM    COLORU Yellow 01/28/2025 09:30 AM    PHUR 6.0 01/28/2025 09:30 AM    PHUR 6.0 05/10/2013 02:51 PM    WBCUA 12 01/28/2025 09:30 AM    RBCUA 1 01/28/2025 09:30 AM    RBCUA 1 07/08/2023 10:30 AM    MUCUS RARE 01/28/2025 09:30 AM    TRICHOMONAS NONE SEEN 07/08/2023 10:30 AM    YEAST OCCASIONAL 09/20/2021 05:00 PM    BACTERIA NEGATIVE 07/08/2023 10:30 AM    CLARITYU CLEAR 07/27/2023 12:38 PM    LEUKOCYTESUR TRACE 01/28/2025 09:30 AM    UROBILINOGEN 1.0 01/28/2025 09:30 AM    BILIRUBINUR NEGATIVE 01/28/2025 09:30 AM    BLOODU NEGATIVE 07/27/2023 12:38 PM    GLUCOSEU NEGATIVE 01/28/2025 09:30 AM    KETUA NEGATIVE 01/28/2025 09:30 AM    AMORPHOUS OCCASIONAL 11/25/2020 01:15 PM     Urine Cultures: No results found for: \"LABURIN\"  Blood Cultures: No results found for: \"BC\"  No results found for: \"BLOODCULT2\"  Organism:   Lab Results   Component Value Date/Time    ORG ENC 07/11/2013 11:22 AM       Imaging/Diagnostics Last 24 Hours   FL LESS THAN 1 HOUR    Result Date: 1/28/2025  Radiology exam is complete. No Radiologist dictation. Please follow up with ordering provider.     CTA PULMONARY W CONTRAST    Result Date: 1/28/2025  REASON FOR EXAM Hypoxemia; AMS Study: CT angiography of the chest pulmonary embolism protocol study 1/28/2025 at 1059 hours. COMPARISON: None available PROCEDURE: Axial imaging of the chest was acquired at 2.5 mm segments following intravenous contrast administration. Additional sagittal and coronal reconstructed images were reviewed. CT radiation dose optimization techniques including automated exposure control and use of iterative reconstruction techniques or adjustment of the mA and or kV according to patient size were used  to limit the patient's radiation dose.

## 2025-01-28 NOTE — PROGRESS NOTES
1451: pt arrived to PACU. Monitors applied and alarms on. Report from Anton ARRIETA and Nita STEINER. Pt drowsy upon arrival. Respirations are even and unlabored. No concerns at this time.   1502: hospitalist at bedside, she states she will be back later once he starts to wake up.  1540: pt waking up to the calling of name. Pt states \"What.\"

## 2025-01-28 NOTE — PROGRESS NOTES
Reviewed via telephone with Dr Argueta  Pt en route same day surgery for uretal stone removal.  No hypotension- norepinephrine begun with order; no hypoxia. Pt may need to be admitted to hospitalist service- to be determined by surgical team.  Available via perfect serve

## 2025-01-28 NOTE — CONSULTS
Department of Urology   Consult Note  [facility] 1 2 3 4 5      Date: 2025   Patient: Jovan Leal   : 1955   DOA: 2025   MRN: [id number]   ROOM#: OR/NONE     Reason for Consult:  left ureteral stone/colic  Requesting Practitioner:  [facility] ED    CHIEF COMPLAINT:     Altered Mental Status    Respiratory Distress       Low 80s for EMS on arrival to scene, from nursing home       History Obtained From:  family member - MIKE, electronic medical record    HISTORY OF PRESENT ILLNESS:                The patient is a 69 y.o. male with significant past medical history of stones who presented with AMS to [facility] ED.  DNR - CC.  On Plavix - CVA.    ED Provider's HPI: \"Patient is a 69 y.o. male with past medical history of cerebral palsy, seizure disorder and CVA presenting to the emergency department from nursing home given reports of altered mental status and increased work of breathing.  EMS reports that patient was found to be altered today.  When they arrived his O2 sats were 80% on room air and he seemed to have increased work of breathing.  Nursing home states that he has been more sluggish today than usual and not acting quite right.  Patient denies any current symptoms.  Patient states he feels fine.  Patient denies any pain.  Patient denies any shortness of breath.  Patient is a DNR CC per documentation.     Per nursing home staff patient has been complaining of left lower quadrant abdominal pain for the last 4 to 5 days.  This morning patient was disoriented and weak in general.  Per nursing home staff patient has not had recent fevers or cough.  Patient has chronic left-sided weakness due to previous stroke which nursing home staff states seems consistent with baseline today.\"    Past Medical History:        Diagnosis Date    Acute CVA (cerebrovascular accident) (HCC) 10/10/2021    Anemia     Aspiration pneumonia (HCC)     dx 2014 with this per ecf/ per old chart dx with pneumonia with admission  following intravenous contrast administration. Additional sagittal and coronal reconstructed images were reviewed. CT radiation dose optimization techniques including automated exposure control and use of iterative reconstruction techniques or adjustment of the mA and or kV according to patient size were used  to limit the patient's radiation dose. Standard chest CT findings: There is airspace infiltrative changes involving the left upper lobe. Similar changes with further more prominent areas of infiltrate or consolidation are seen involving both right and left lower lobes. Small right base pleural effusion is indicated. Heart is enlarged. A small pericardial effusion is present. Imaging of the upper abdomen shows no adrenal masses. Gallbladder is absent. There is indication of enlargement of the left kidney possibly within associated  hydronephrosis or parapelvic renal cysts. CT angiography findings: The upper abdominal aorta as well as the thoracic aorta show no evidence of aneurysm or dissection. Right main pulmonary artery shows cross-sectional diameter 26 mm with the left measuring 27 mm both within normal standards. No major filling defects are seen within the primary right and left pulmonary arteries. Distal vessels are difficult to evaluate due to limitations in contrast. No definitive filling defects are noted. This includes areas of consolidation within both lung bases particularly on the right. IMPRESSION: No CT suspected evidence of pulmonary embolism. Bilateral airspace disease particularly showing areas of patchy consolidation in  both lower lungs is likely reflective of atypical pneumonia with differentials or congestive failure. Suspected abnormal appearance of partial visualization of left kidney. This may indicate possible hydronephrosis or obstruction of the left kidney. Cardiomegaly.  Dictated and Electronically Signed By: Hilton Rosario DO 1/28/2025 12:51        CT ABDOMEN PELVIS W IV CONTRAST  Additional Contrast? None    Result Date: 1/28/2025  PROCEDURE: CT ABDOMEN PELVIS W IV CONTRAST DATE OF EXAM:  1/28/2025 12:18 DEMOGRAPHICS: 69 years old Male INDICATION: LLQ tenderness Contrast utilized and the relevant clinical information: IOPAMIDOL 76 % IV SOLN COMPARISON: No existing relevant imaging study corresponding to the same anatomical region is available. TECHNIQUE: Contiguous axial slices of the abdomen and pelvis were submitted after the IV administration of contrast. No oral contrast was utilized. Additional coronal reformatted images were submitted.  DOSE OPTIMIZATION: CT radiation dose optimization techniques (automated exposure  control, and use of iterative reconstruction techniques, or adjustment of the mA and/or kV according to patient size) were used to limit patient radiation dose. FINDINGS: Lung bases: Patchy airspace disease in the bilateral lung bases. Trace right pleural effusion.Same-day CT angiography of the chest dictated separately. Liver: Homogeneous in attenuationSmooth contour. No suspicious liver lesion. Gallbladder/Biliary Tree: Cholecystectomy. No intrahepatic or extrahepatic biliary duct dilatation. Spleen: Normal size. Pancreas: Homogenous in attenuation. No peripancreatic stranding. Adrenal glands: Normal size and morphology of the bilateral adrenal glands. Genitourinary: Symmetric renal size. Delayed left nephrogram. Multiple left-sided renal cysts measuring up to 31 mm. 9 mm obstructing calculus in the proximal left ureter with resultant mild to moderate left hydronephrosis and mild left periureteral fat stranding. No right-sided hydronephrosis. Urinary bladder is unremarkable. Solid pelvic viscera are unremarkable. Bowel: Stomach and duodenum are unremarkable. No small bowel or large bowel wall  thickening or dilatation.  Normal appendix. Intraperitoneal/Extraperitoneal: Normal size of the abdominal aorta. Mild vascular calcifications.Hepatic, portal, splenic and superior

## 2025-01-28 NOTE — PROGRESS NOTES
Pt's guardian Charlotte called to get information on procedure. This nurse forwarded information to Dr Kilpatrick.

## 2025-01-28 NOTE — PROGRESS NOTES
Medication History  [facility]    Patient Name: Jovan Leal 1955     Medication history has been completed by: Xenia Cole CP    Source(s) of information: Medication list provided by [facility]     Primary Care Physician: Jovan Lazcano PA-C     Pharmacy:     Allergies as of 01/28/2025    (No Known Allergies)        Prior to Admission medications    Medication Sig Start Date End Date Taking? Authorizing Provider   carboxymethylcellulose (REFRESH PLUS) 0.5 % SOLN ophthalmic solution Place 1 drop into both eyes every morning 10/8/24  Yes Chriss Buckner MD   docusate sodium (COLACE) 100 MG capsule Take 1 capsule by mouth daily   Yes Chriss Buckner MD   ibuprofen (ADVIL;MOTRIN) 200 MG tablet Take 3 tablets by mouth every 6 hours   Yes Chriss Buckner MD   Melatonin 10 MG TABS Take 10 mg by mouth nightly   Yes Chriss Buckner MD   QUEtiapine (SEROQUEL) 50 MG tablet Take 2 tablets by mouth nightly 1/15/25  Yes Chriss Buckner MD   sodium chloride (OCEAN) 0.65 % nasal spray 1 spray by Nasal route as needed for Congestion   Yes Chriss Buckner MD   valproic acid (DEPAKENE) 250 MG/5ML SOLN oral solution Take 10 mLs by mouth every morning   Yes Chriss Buckner MD   valproic acid (DEPAKENE) 250 MG/5ML SOLN oral solution Take 20 mLs by mouth nightly   Yes Chriss Buckner MD   lacosamide (VIMPAT) 10 MG/ML SOLN oral solution Take 20 mLs by mouth 2 times daily. Max Daily Amount: 400 mg   Yes Chriss Buckner MD   primidone (MYSOLINE) 50 MG tablet Take 1 tablet by mouth 3 times daily 10/23/24  Yes Maged Blanca, APRN - CNP   Corn Dextrin (CLEAR FIBER POWDER PO) Take by mouth every morning 01/28/25 Receives 2 tsp every morning   Yes Chriss Buckner MD   magnesium hydroxide (MILK OF MAGNESIA) 400 MG/5ML suspension Take 30 mLs by mouth nightly as needed for Heartburn   Yes Chriss Buckner MD   lactulose (CHRONULAC)

## 2025-01-28 NOTE — ED NOTES
CHRISTI Argueta notified of patients BP. This RN to pressure bag the remainder of the current IV bolus as well as the 500 ml bolus. See MAR.

## 2025-01-28 NOTE — ED TRIAGE NOTES
Patient responsive to verbal stimuli. Answers questions and follows commands.    Starting this morning, patient was less responsive to staff at the nursing home. Patient's oxygen saturation was 80s on room air for EMS, was put on non rebreather by EMS, saturation improved to 90s. Currently 100% on 6L NC.

## 2025-01-29 ENCOUNTER — APPOINTMENT (OUTPATIENT)
Dept: GENERAL RADIOLOGY | Age: 70
DRG: 178 | End: 2025-01-29
Payer: MEDICARE

## 2025-01-29 VITALS
BODY MASS INDEX: 28.25 KG/M2 | RESPIRATION RATE: 24 BRPM | WEIGHT: 175 LBS | TEMPERATURE: 97.9 F | HEART RATE: 97 BPM | OXYGEN SATURATION: 93 % | DIASTOLIC BLOOD PRESSURE: 67 MMHG | SYSTOLIC BLOOD PRESSURE: 171 MMHG

## 2025-01-29 LAB
ABSOLUTE BANDS: 1.43 K/UL
ANION GAP SERPL CALCULATED.3IONS-SCNC: 11 MMOL/L (ref 9–17)
BANDS: 19 %
BASOPHILS # BLD: 0 K/UL
BASOPHILS NFR BLD: 0 % (ref 0–1)
BNP SERPL-MCNC: 5199 PG/ML (ref 0–125)
BUN SERPL-MCNC: 21 MG/DL (ref 7–20)
CALCIUM SERPL-MCNC: 8.5 MG/DL (ref 8.3–10.6)
CHLORIDE SERPL-SCNC: 108 MMOL/L (ref 99–110)
CO2 SERPL-SCNC: 22 MMOL/L (ref 21–32)
CREAT SERPL-MCNC: 0.8 MG/DL (ref 0.8–1.3)
EOSINOPHIL # BLD: 0 K/UL
EOSINOPHILS RELATIVE PERCENT: 0 % (ref 0–3)
ERYTHROCYTE [DISTWIDTH] IN BLOOD BY AUTOMATED COUNT: 13.5 % (ref 11.7–14.9)
GFR, ESTIMATED: >90 ML/MIN/1.73M2
GLUCOSE SERPL-MCNC: 146 MG/DL (ref 74–99)
HCT VFR BLD AUTO: 31.6 % (ref 42–52)
HGB BLD-MCNC: 10.2 G/DL (ref 13.5–18)
LYMPHOCYTES NFR BLD: 0.45 K/UL
LYMPHOCYTES RELATIVE PERCENT: 6 % (ref 24–44)
MAGNESIUM SERPL-MCNC: 2.1 MG/DL (ref 1.8–2.4)
MCH RBC QN AUTO: 29.7 PG (ref 27–31)
MCHC RBC AUTO-ENTMCNC: 32.3 G/DL (ref 32–36)
MCV RBC AUTO: 91.9 FL (ref 78–100)
METAMYELOCYTES ABSOLUTE COUNT: 0.08 K/UL
METAMYELOCYTES: 1 %
MICROORGANISM SPEC CULT: NORMAL
MONOCYTES NFR BLD: 1.35 K/UL
MONOCYTES NFR BLD: 18 % (ref 0–4)
NEUTROPHILS NFR BLD: 56 % (ref 36–66)
NEUTS SEG NFR BLD: 4.2 K/UL
PLATELET # BLD AUTO: 102 K/UL (ref 140–440)
PLATELET ESTIMATE: NORMAL
PMV BLD AUTO: 10.3 FL (ref 7.5–11.1)
POTASSIUM SERPL-SCNC: 3.4 MMOL/L (ref 3.5–5.1)
PROCALCITONIN SERPL-MCNC: 0.23 NG/ML
RBC # BLD AUTO: 3.44 M/UL (ref 4.6–6.2)
RBC # BLD: ABNORMAL 10*6/UL
RBC # BLD: ABNORMAL 10*6/UL
RBC # BLD: NORMAL 10*6/UL
SODIUM SERPL-SCNC: 141 MMOL/L (ref 136–145)
SPECIMEN DESCRIPTION: NORMAL
WBC # BLD: NORMAL 10*3/UL
WBC OTHER # BLD: 7.5 K/UL (ref 4–10.5)

## 2025-01-29 PROCEDURE — 92610 EVALUATE SWALLOWING FUNCTION: CPT

## 2025-01-29 PROCEDURE — 83735 ASSAY OF MAGNESIUM: CPT

## 2025-01-29 PROCEDURE — 84145 PROCALCITONIN (PCT): CPT

## 2025-01-29 PROCEDURE — 2500000003 HC RX 250 WO HCPCS: Performed by: INTERNAL MEDICINE

## 2025-01-29 PROCEDURE — 83880 ASSAY OF NATRIURETIC PEPTIDE: CPT

## 2025-01-29 PROCEDURE — 2700000000 HC OXYGEN THERAPY PER DAY

## 2025-01-29 PROCEDURE — 2580000003 HC RX 258: Performed by: INTERNAL MEDICINE

## 2025-01-29 PROCEDURE — 85025 COMPLETE CBC W/AUTO DIFF WBC: CPT

## 2025-01-29 PROCEDURE — 6370000000 HC RX 637 (ALT 250 FOR IP): Performed by: INTERNAL MEDICINE

## 2025-01-29 PROCEDURE — 1800000000 HC LEAVE OF ABSENCE

## 2025-01-29 PROCEDURE — 6360000002 HC RX W HCPCS: Performed by: INTERNAL MEDICINE

## 2025-01-29 PROCEDURE — 80048 BASIC METABOLIC PNL TOTAL CA: CPT

## 2025-01-29 PROCEDURE — 94761 N-INVAS EAR/PLS OXIMETRY MLT: CPT

## 2025-01-29 PROCEDURE — 36415 COLL VENOUS BLD VENIPUNCTURE: CPT

## 2025-01-29 PROCEDURE — 74018 RADEX ABDOMEN 1 VIEW: CPT

## 2025-01-29 RX ORDER — CEPHALEXIN 500 MG/1
500 CAPSULE ORAL 4 TIMES DAILY
Qty: 20 CAPSULE | Refills: 0 | Status: SHIPPED | OUTPATIENT
Start: 2025-01-29 | End: 2025-01-29 | Stop reason: HOSPADM

## 2025-01-29 RX ORDER — TAMSULOSIN HYDROCHLORIDE 0.4 MG/1
0.4 CAPSULE ORAL DAILY
Qty: 30 CAPSULE | Refills: 0 | Status: SHIPPED | OUTPATIENT
Start: 2025-01-29

## 2025-01-29 RX ADMIN — LEVOTHYROXINE SODIUM 125 MCG: 0.12 TABLET ORAL at 05:23

## 2025-01-29 RX ADMIN — SODIUM CHLORIDE: 9 INJECTION, SOLUTION INTRAVENOUS at 15:02

## 2025-01-29 RX ADMIN — DOCUSATE SODIUM 100 MG: 100 CAPSULE, LIQUID FILLED ORAL at 11:46

## 2025-01-29 RX ADMIN — METRONIDAZOLE 500 MG: 500 INJECTION, SOLUTION INTRAVENOUS at 15:04

## 2025-01-29 RX ADMIN — SODIUM CHLORIDE, PRESERVATIVE FREE 10 ML: 5 INJECTION INTRAVENOUS at 11:49

## 2025-01-29 RX ADMIN — CLOPIDOGREL BISULFATE 75 MG: 75 TABLET ORAL at 11:47

## 2025-01-29 RX ADMIN — LACTULOSE 30 G: 20 SOLUTION ORAL at 11:46

## 2025-01-29 RX ADMIN — METRONIDAZOLE 500 MG: 500 INJECTION, SOLUTION INTRAVENOUS at 05:36

## 2025-01-29 RX ADMIN — PRIMIDONE 50 MG: 50 TABLET ORAL at 11:47

## 2025-01-29 RX ADMIN — PRIMIDONE 50 MG: 50 TABLET ORAL at 15:02

## 2025-01-29 RX ADMIN — WATER 1000 MG: 1 INJECTION INTRAMUSCULAR; INTRAVENOUS; SUBCUTANEOUS at 15:02

## 2025-01-29 RX ADMIN — LACOSAMIDE 200 MG: 10 SOLUTION ORAL at 11:44

## 2025-01-29 RX ADMIN — FAMOTIDINE 20 MG: 20 TABLET, FILM COATED ORAL at 11:46

## 2025-01-29 RX ADMIN — VALPROIC ACID 500 MG: 250 SOLUTION ORAL at 11:45

## 2025-01-29 RX ADMIN — POTASSIUM BICARBONATE 40 MEQ: 782 TABLET, EFFERVESCENT ORAL at 06:47

## 2025-01-29 RX ADMIN — ASPIRIN 81 MG: 81 TABLET, CHEWABLE ORAL at 11:45

## 2025-01-29 RX ADMIN — FOLIC ACID 1 MG: 1 TABLET ORAL at 11:47

## 2025-01-29 RX ADMIN — AMLODIPINE BESYLATE 5 MG: 5 TABLET ORAL at 11:47

## 2025-01-29 RX ADMIN — CLONAZEPAM 0.5 MG: 0.5 TABLET ORAL at 11:47

## 2025-01-29 RX ADMIN — PAROXETINE 30 MG: 10 TABLET, FILM COATED ORAL at 11:47

## 2025-01-29 RX ADMIN — ENOXAPARIN SODIUM 40 MG: 100 INJECTION SUBCUTANEOUS at 11:47

## 2025-01-29 RX ADMIN — CARBOXYMETHYLCELLULOSE SODIUM 1 DROP: 10 GEL OPHTHALMIC at 11:49

## 2025-01-29 RX ADMIN — Medication 1 CAPSULE: at 11:46

## 2025-01-29 NOTE — DISCHARGE INSTR - DIET

## 2025-01-29 NOTE — PROGRESS NOTES
Outpatient Pharmacy Progress Note for Meds-to-Beds    The outpatient pharmacy received prescription(s) for the patient, however, the patient is going to an assisted living facility or SNF. The facility will provide the patient's home medications. The outpatient pharmacy will profile the prescriptions and have them on file.    A medication list and facesheet should be provided to facility so their staff can provide the appropriate medications.       Thank you for letting [place] [address]    Phone: [phone]    Fax: [phone]

## 2025-01-29 NOTE — DISCHARGE INSTR - COC
Continuity of Care Form    Patient Name: Jovan Leal   :  1955  MRN:  [id number]    Admit date:  2025  Discharge date:  25    Code Status Order: Full Code   Advance Directives:   Advance Care Flowsheet Documentation        Date/Time Healthcare Directive Type of Healthcare Directive Copy in Chart Healthcare Agent Appointed Healthcare Agent's Name Healthcare Agent's Phone Number    [phone] No, patient does not have an advance directive for healthcare treatment  --  --  --  --  --                     Admitting Physician:  Alma Bermudez MD  PCP: Jovan Lazcano PA-C    Discharging Nurse: Sujit  [facility]3002-A  Discharging Unit Phone Number: [phone]    Emergency Contact:   Extended Emergency Contact Information  Primary Emergency Contact: [place] [address] [facility]  Home Phone: [phone]  Work Phone: [phone]  Mobile Phone: [phone]  Relation: Legal Guardian  Secondary Emergency Contact: [facility]   Home Phone: [phone]  Mobile Phone: [phone]  Relation: Other    Past Surgical History:  Past Surgical History:   Procedure Laterality Date    CHOLECYSTECTOMY      COLONOSCOPY  14, 14: hemorrhoids, 2012 at [facility]    COLONOSCOPY  2021    COLONOSCOPY N/A 2021    COLONOSCOPY POLYPECTOMY SNARE/COLD BIOPSY performed by Xavier Rodriguez MD at [facility] ENDOSCOPY    COLONOSCOPY N/A 2024    COLONOSCOPY DIAGNOSTIC performed by Xavier Rodriguez MD at [facility] ENDOSCOPY    CYSTOSCOPY Left 2013    with stnet placement    FACIAL SURGERY N/A 10/8/2021    FACIAL INCISION AND DRAINAGE performed by Jesus Manuel Butts MD at [facility] OR    GALLBLADDER SURGERY      KIDNEY STONE SURGERY      OTHER SURGICAL HISTORY  04/15/2015    Revision of vagal nerve stimulator    STIMULATOR SURGERY N/A 3/24/2021    STIMULATOR INSERTION performed by Anibal Banuelos MD at [facility]  cerebral palsy (HCC) G80.4    Dysarthria R47.1    Oropharyngeal dysphagia R13.12    Depression with anxiety F41.8    Facial abscess L02.01    Group B streptococcal UTI N39.0, B95.1    History of cerebral palsy Z86.69    Encephalopathy acute G93.40    PNA (pneumonia) J18.9    Acute encephalopathy G93.40    Metabolic encephalopathy G93.41    Generalized weakness R53.1    History of CVA (cerebrovascular accident) Z86.73    Transient ischemic attack G45.9    Kidney stone N20.0       Isolation/Infection:   Isolation            No Isolation          Patient Infection Status       Infection Onset Added Last Indicated Last Indicated By Review Planned Expiration Resolved Resolved By    None active    Resolved    C-diff Rule Out 23 Gastrointestinal Panel, Molecular   11/10/23 Karla Donaldson RN    No c-diff test ordered    MRSA 11/25/20 10/04/21 10/08/21 Culture, Body Fluid   23 Nolan Jacob RN    COVID-19 20 COVID-19   20 Infection     MRSA 20 Culture, MRSA, Screening   10/04/21 Sandi Conley, MEENAKSHI    MRSA  14 Ruba Bardales RN   05/21/15 Polly Lopez 14                       Nurse Assessment:  Last Vital Signs: BP (!) 178/72   Pulse 97   Temp 97.9 °F (36.6 °C) (Axillary)   Resp 24   Wt 79.4 kg (175 lb)   SpO2 93%   BMI 28.25 kg/m²     Last documented pain score (0-10 scale): Pain Level: 0  Last Weight:   Wt Readings from Last 1 Encounters:   25 79.4 kg (175 lb)     Mental Status:  disoriented    IV Access:  - None    Nursing Mobility/ADLs:  Walking   Dependent  Transfer  Dependent  Bathing  Dependent  Dressing  Dependent  Toileting  Dependent  Feeding  Dependent  Med Admin  Dependent  Med Delivery   whole and With apple sauce or pudding, a few at a time    Wound Care Documentation and Therapy:  Wound 10/06/21 Face Left upper lip (Active)   Number of days: 1210       Wound  10/06/21 Knee Anterior;Left (Active)   Number of days: 1210       Incision 03/24/21 Sacrum (Active)   Number of days: 1406       Incision 10/08/21 Nose (Active)   Number of days: 1208        Elimination:  Continence:   Bowel: No  Bladder: No  Urinary Catheter: Insertion Date: 1/28/25 and Indication for Use of Catheter: Urology/Urologist seeing this patient or inserted indwelling catheter   Colostomy/Ileostomy/Ileal Conduit: No       Date of Last BM: prior to admission    Intake/Output Summary (Last 24 hours) at 1/29/2025 1025  Last data filed at 1/29/2025 0700  Gross per 24 hour   Intake 1530.01 ml   Output 750 ml   Net 780.01 ml     I/O last 3 completed shifts:  In: 2530 [P.O.:280; IV Piggyback:2250]  Out: 750 [Urine:750]    Safety Concerns:     At Risk for Falls and Aspiration Risk    Impairments/Disabilities:      Spastic hemiplegic CP      Patient's personal belongings (please select all that are sent with patient):  None    RN SIGNATURE:  Electronically signed by REYNALDO MATHEW RN on 1/29/25 at 3:17 PM EST      PHYSICIAN SECTION    Prognosis: Fair    Condition at Discharge: Stable    Rehab Potential (if transferring to Rehab): Good    Recommended Labs or Other Treatments After Discharge:     Nutrition Therapy:  Current Nutrition Therapy:   - Oral Diet:  Cardiac    Routes of Feeding: Oral  Liquids: No Restrictions      Treatments at the Time of Hospital Discharge:   Respiratory Treatments: none  Oxygen Therapy:  is not on home oxygen therapy.  Ventilator:    - No ventilator support    Rehab Therapies: Physical Therapy      Physician Certification: I certify the above information and transfer of Jovan Leal  is necessary for the continuing treatment of the diagnosis listed and that he requires Skilled Nursing Facility for greater 30 days.     Update Admission H&P: No change in H&P    PHYSICIAN SIGNATURE:  Electronically signed by Gulshan Randall MD on 1/29/25 at 11:47 AM EST

## 2025-01-29 NOTE — CARE COORDINATION
LSW received a call from [facility] informing this LSW that they need to push back  time.  Pt will now be picked up at 6:30.  LSW called [facility] and left a message regarding new  time.  LSW informed pt guardian Charlotte of the  time change.

## 2025-01-29 NOTE — CARE COORDINATION
Pt is on discharge to return to [facility].  [facility] to  pt at 5:45PM (which is their next available time).  [facility] paperwork completed on both sides and placed on packet.  Copy of AVS with both NITIN placed in packet.  RN, Pt  S guardian Charlotte and Molly with OW all aware of  time.

## 2025-01-29 NOTE — CARE COORDINATION
LSW spoke with Molly with [facility] and pt is at their facility skilled.  Pt can return once stable.  LSW spoke with pt's Guardian Charlotte and he is agreeable for pt to return to [facility].      CM will need NITIN to be completed by RN and doctor. If pt is discharged after hours please complete the following.... Call report to [phone]     Place copy of AVS with both NITIN and any written Rx for pain and anxiety in the packet.  Set up transportation with [facility] [phone] and call family.

## 2025-01-29 NOTE — PLAN OF CARE
Problem: Discharge Planning  Goal: Discharge to home or other facility with appropriate resources  Outcome: Progressing     Problem: Pain  Goal: Verbalizes/displays adequate comfort level or baseline comfort level  Outcome: Progressing     Problem: Safety - Adult  Goal: Free from fall injury  Outcome: Progressing     Problem: Chronic Conditions and Co-morbidities  Goal: Patient's chronic conditions and co-morbidity symptoms are monitored and maintained or improved  Outcome: Progressing     Problem: Skin/Tissue Integrity  Goal: Skin integrity remains intact  Description: 1.  Monitor for areas of redness and/or skin breakdown  2.  Assess vascular access sites hourly  3.  Every 4-6 hours minimum:  Change oxygen saturation probe site  4.  Every 4-6 hours:  If on nasal continuous positive airway pressure, respiratory therapy assess nares and determine need for appliance change or resting period  Outcome: Progressing     Problem: ABCDS Injury Assessment  Goal: Absence of physical injury  Outcome: Progressing

## 2025-01-29 NOTE — PROGRESS NOTES
4 Eyes Skin Assessment     NAME:  Jovan Leal  YOB: 1955  MEDICAL RECORD NUMBER:  [id number]    The patient is being assessed for  Admission    I agree that at least one RN has performed a thorough Head to Toe Skin Assessment on the patient. ALL assessment sites listed below have been assessed.      Areas assessed by both nurses:    Head, Face, Ears, Shoulders, Back, Chest, Arms, Elbows, Hands, Sacrum. Buttock, Coccyx, Ischium, and Legs. Feet and Heels        Does the Patient have a Wound? No noted wound(s)       Jean Prevention initiated by RN: Yes  Wound Care Orders initiated by RN: No    Pressure Injury (Stage 3,4, Unstageable, DTI, NWPT, and Complex wounds) if present, place Wound referral order by RN under : No    New Ostomies, if present place, Ostomy referral order under : No     Nurse 1 eSignature: Electronically signed by Caleb Paz RN on 1/29/25 at 6:26 AM EST    **SHARE this note so that the co-signing nurse can place an eSignature**    Nurse 2 eSignature: Electronically signed by Kimberly Richey RN on 1/29/25 at 7:01 AM EST

## 2025-01-29 NOTE — PROGRESS NOTES
[address] [address]   Progress Note  [facility] 0 1 2      Date: 2025   Patient: Jovan Leal   : 1955   DOA: 2025   MRN: [id number]   ROOM#: 3002/3002-A     Admit Date: 2025     Collaborating Urologist on Call at time of admission: Dr. Kilpatrick  CC: AMS  Reason for Consult: Left ureteral stone/colic  S/p cystoscopy, left RGPG/stent insertion 25. Purulent urine noted upon stent placement.    Subjective:     Pain: none, no nausea and no vomiting,   Bowel Movement/Flatus: Yes  Voiding: Indwelling catheter with clear yellow urine     Pt resting in bed, denies any pain.    Objective:    Vitals:   BP (!) 155/71   Pulse 94   Temp 97.6 °F (36.4 °C) (Axillary)   Resp 21   Wt 79.4 kg (175 lb)   SpO2 97%   BMI 28.25 kg/m²   Temp  Av.6 °F (36.4 °C)  Min: 97.3 °F (36.3 °C)  Max: 98.3 °F (36.8 °C)    Intake/Output Summary (Last 24 hours) at 2025 0746  Last data filed at 2025 0700  Gross per 24 hour   Intake 2530.02 ml   Output 750 ml   Net 1780.02 ml       Physical Exam:   Gen: Pleasant male, in NAD  Neuro: Non-focal  Resp: Unlabored breathing  Abd: Soft, non-distended, non-tender to palpation, no rebound  Back:   No CVAT  :  Indwelling catheter with clear yellow urine   Ext: No edema of bilateral LEs    Labs:  WBC:    Lab Results   Component Value Date/Time    WBC 7.5 2025 02:34 AM     Hemoglobin/Hematocrit:    Lab Results   Component Value Date/Time    HGB 10.2 2025 02:34 AM    HCT 31.6 2025 02:34 AM     BMP:   Lab Results   Component Value Date/Time     2025 02:34 AM    K 3.4 2025 02:34 AM     2025 02:34 AM    CO2 22 2025 02:34 AM    BUN 21 2025 02:34 AM    CREATININE 0.8 2025 02:34 AM    CALCIUM 8.5 2025 02:34 AM    GFRAA >60 2022 12:43 PM    LABGLOM >90 2025 02:34 AM    LABGLOM >60 11/15/2023 07:39 AM     Blood Culture:  pending  Urine Culture:  pending    Imaging:  I  Electronically Signed By: Hilton Rosario DO 1/28/2025 12:51        CT ABDOMEN PELVIS W IV CONTRAST Additional Contrast? None    Result Date: 1/28/2025  PROCEDURE: CT ABDOMEN PELVIS W IV CONTRAST DATE OF EXAM:  1/28/2025 12:18 DEMOGRAPHICS: 69 years old Male INDICATION: LLQ tenderness Contrast utilized and the relevant clinical information: IOPAMIDOL 76 % IV SOLN COMPARISON: No existing relevant imaging study corresponding to the same anatomical region is available. TECHNIQUE: Contiguous axial slices of the abdomen and pelvis were submitted after the IV administration of contrast. No oral contrast was utilized. Additional coronal reformatted images were submitted.  DOSE OPTIMIZATION: CT radiation dose optimization techniques (automated exposure  control, and use of iterative reconstruction techniques, or adjustment of the mA and/or kV according to patient size) were used to limit patient radiation dose. FINDINGS: Lung bases: Patchy airspace disease in the bilateral lung bases. Trace right pleural effusion.Same-day CT angiography of the chest dictated separately. Liver: Homogeneous in attenuationSmooth contour. No suspicious liver lesion. Gallbladder/Biliary Tree: Cholecystectomy. No intrahepatic or extrahepatic biliary duct dilatation. Spleen: Normal size. Pancreas: Homogenous in attenuation. No peripancreatic stranding. Adrenal glands: Normal size and morphology of the bilateral adrenal glands. Genitourinary: Symmetric renal size. Delayed left nephrogram. Multiple left-sided renal cysts measuring up to 31 mm. 9 mm obstructing calculus in the proximal left ureter with resultant mild to moderate left hydronephrosis and mild left periureteral fat stranding. No right-sided hydronephrosis. Urinary bladder is unremarkable. Solid pelvic viscera are unremarkable. Bowel: Stomach and duodenum are unremarkable. No small bowel or large bowel wall  thickening or dilatation.  Normal appendix. Intraperitoneal/Extraperitoneal:  Normal size of the abdominal aorta. Mild vascular calcifications.Hepatic, portal, splenic and superior mesenteric veins are patent.  No free fluid or air.  14 mm cystic structure in the left perirectal fat with adjacent surgical clip. Lymph nodes: No enlarged lymph nodes by CT size criteria. Musculoskeletal: A sacral nerve stimulator is in place.No acute osseous abnormality. Ossification of the supraspinous ligament. Questionable fusion of portions of the right sacroiliac joint. Degenerative changes spine. IMPRESSION: Patchy bilateral airspace disease and trace right pleural effusion and the visualized lung bases. Same-day CT angiography dictated separately. Obstructing 9 mm calculus in the proximal left ureter with resultant mild to moderate left hydronephrosis. Cholecystectomy. Nonspecific 14 mm cystic structure in the left perirectal fat with adjacent surgical clip. Correlate with clinical history. Osseous changes in the spine and pelvis, which can be seen in the setting of ankylosing spondylitis. Clinical correlation advised. Ancillary findings as detailed above. This dictation was created with voice recognition software.  While attempts have  been made to review the dictation as it is transcribed, on occasion the spoken word can be misinterpreted by the technology leading to omissions or inappropriate words, phrases or sentences.  Dictated and Electronically Signed By: Kolby Verma MD 1/28/2025 12:49        CT HEAD WO CONTRAST    Result Date: 1/28/2025  PROCEDURE: CT HEAD WO CONTRAST DATE OF EXAM:  1/28/2025 12:13 DEMOGRAPHICS: 69 years old Male INDICATION: AMS; left upper extremity weakness COMPARISON: No existing relevant imaging study corresponding to the same anatomical region is available. TECHNIQUE: Contiguous axial slices of the head were submitted without IV contrast.   DOSE OPTIMIZATION: CT radiation dose optimization techniques (automated exposure  control, and use of iterative reconstruction  on day of discharge.   Pt will need to have intervention of the stone as an outpatient once the urine is deemed to be sterile. This may be via ureteroscopy/laser stone manipulation or ESWL.     2) ?Complicated UTI: Purulent urine noted upon stent placement. Urine and blood cultures pending.   WBC 7.5, AF/VSS   On IV Rocephin; abx per primary    Will follow.  Patient seen and examined, chart reviewed.     Electronically signed by Tomer Isaacs PA-C on 1/29/2025 at 7:46 AM

## 2025-01-30 ENCOUNTER — HOSPITAL ENCOUNTER (INPATIENT)
Age: 70
LOS: 4 days | Discharge: NURSING, REHAB OR LONG-TERM CARE | DRG: 178 | End: 2025-02-03
Attending: INTERNAL MEDICINE | Admitting: INTERNAL MEDICINE
Payer: MEDICARE

## 2025-01-30 PROBLEM — J69.0 ACUTE ASPIRATION PNEUMONIA (HCC): Status: ACTIVE | Noted: 2025-01-30

## 2025-01-30 PROCEDURE — 6360000002 HC RX W HCPCS: Performed by: FAMILY MEDICINE

## 2025-01-30 PROCEDURE — 2580000003 HC RX 258: Performed by: INTERNAL MEDICINE

## 2025-01-30 PROCEDURE — 2500000003 HC RX 250 WO HCPCS: Performed by: FAMILY MEDICINE

## 2025-01-30 PROCEDURE — 2580000003 HC RX 258: Performed by: FAMILY MEDICINE

## 2025-01-30 PROCEDURE — 6360000002 HC RX W HCPCS: Performed by: INTERNAL MEDICINE

## 2025-01-30 PROCEDURE — 1200000000 HC SEMI PRIVATE

## 2025-01-30 PROCEDURE — 87899 AGENT NOS ASSAY W/OPTIC: CPT

## 2025-01-30 PROCEDURE — 87449 NOS EACH ORGANISM AG IA: CPT

## 2025-01-30 RX ORDER — DOCUSATE SODIUM 100 MG/1
100 CAPSULE, LIQUID FILLED ORAL DAILY
Status: DISCONTINUED | OUTPATIENT
Start: 2025-01-30 | End: 2025-02-03 | Stop reason: HOSPADM

## 2025-01-30 RX ORDER — CLOPIDOGREL BISULFATE 75 MG/1
75 TABLET ORAL DAILY
Status: DISCONTINUED | OUTPATIENT
Start: 2025-01-30 | End: 2025-02-03 | Stop reason: HOSPADM

## 2025-01-30 RX ORDER — SODIUM CHLORIDE 0.9 % (FLUSH) 0.9 %
5-40 SYRINGE (ML) INJECTION PRN
Status: DISCONTINUED | OUTPATIENT
Start: 2025-01-30 | End: 2025-02-03 | Stop reason: HOSPADM

## 2025-01-30 RX ORDER — ONDANSETRON 4 MG/1
4 TABLET, ORALLY DISINTEGRATING ORAL EVERY 8 HOURS PRN
Status: DISCONTINUED | OUTPATIENT
Start: 2025-01-30 | End: 2025-02-03 | Stop reason: HOSPADM

## 2025-01-30 RX ORDER — ONDANSETRON 2 MG/ML
4 INJECTION INTRAMUSCULAR; INTRAVENOUS EVERY 6 HOURS PRN
Status: DISCONTINUED | OUTPATIENT
Start: 2025-01-30 | End: 2025-02-03 | Stop reason: HOSPADM

## 2025-01-30 RX ORDER — TAMSULOSIN HYDROCHLORIDE 0.4 MG/1
0.4 CAPSULE ORAL DAILY
Status: DISCONTINUED | OUTPATIENT
Start: 2025-01-30 | End: 2025-02-03 | Stop reason: HOSPADM

## 2025-01-30 RX ORDER — BENZONATATE 100 MG/1
200 CAPSULE ORAL 3 TIMES DAILY PRN
Status: DISCONTINUED | OUTPATIENT
Start: 2025-01-30 | End: 2025-02-03 | Stop reason: HOSPADM

## 2025-01-30 RX ORDER — ENOXAPARIN SODIUM 100 MG/ML
40 INJECTION SUBCUTANEOUS DAILY
Status: DISCONTINUED | OUTPATIENT
Start: 2025-01-30 | End: 2025-02-03 | Stop reason: HOSPADM

## 2025-01-30 RX ORDER — SODIUM CHLORIDE 0.9 % (FLUSH) 0.9 %
5-40 SYRINGE (ML) INJECTION EVERY 12 HOURS SCHEDULED
Status: DISCONTINUED | OUTPATIENT
Start: 2025-01-30 | End: 2025-02-03 | Stop reason: HOSPADM

## 2025-01-30 RX ORDER — SODIUM CHLORIDE 9 MG/ML
INJECTION, SOLUTION INTRAVENOUS CONTINUOUS
Status: DISCONTINUED | OUTPATIENT
Start: 2025-01-31 | End: 2025-01-31

## 2025-01-30 RX ORDER — SODIUM CHLORIDE 9 MG/ML
INJECTION, SOLUTION INTRAVENOUS PRN
Status: DISCONTINUED | OUTPATIENT
Start: 2025-01-30 | End: 2025-02-03 | Stop reason: HOSPADM

## 2025-01-30 RX ORDER — PRIMIDONE 50 MG/1
50 TABLET ORAL 3 TIMES DAILY
Status: DISCONTINUED | OUTPATIENT
Start: 2025-01-30 | End: 2025-02-03 | Stop reason: HOSPADM

## 2025-01-30 RX ORDER — VALPROIC ACID 250 MG/5ML
1000 SOLUTION ORAL NIGHTLY
Status: DISCONTINUED | OUTPATIENT
Start: 2025-01-30 | End: 2025-02-03 | Stop reason: HOSPADM

## 2025-01-30 RX ORDER — LOPERAMIDE HYDROCHLORIDE 2 MG/1
2 CAPSULE ORAL 3 TIMES DAILY PRN
Status: DISCONTINUED | OUTPATIENT
Start: 2025-01-30 | End: 2025-02-03 | Stop reason: HOSPADM

## 2025-01-30 RX ORDER — VALPROIC ACID 250 MG/5ML
500 SOLUTION ORAL EVERY MORNING
Status: DISCONTINUED | OUTPATIENT
Start: 2025-01-31 | End: 2025-02-03 | Stop reason: HOSPADM

## 2025-01-30 RX ORDER — ALBUTEROL SULFATE 0.83 MG/ML
2.5 SOLUTION RESPIRATORY (INHALATION) EVERY 6 HOURS PRN
Status: DISCONTINUED | OUTPATIENT
Start: 2025-01-30 | End: 2025-02-03 | Stop reason: HOSPADM

## 2025-01-30 RX ORDER — LACTOBACILLUS RHAMNOSUS GG 10B CELL
1 CAPSULE ORAL DAILY
Status: DISCONTINUED | OUTPATIENT
Start: 2025-01-30 | End: 2025-02-03 | Stop reason: HOSPADM

## 2025-01-30 RX ORDER — GUAIFENESIN 600 MG/1
600 TABLET, EXTENDED RELEASE ORAL EVERY 12 HOURS PRN
Status: DISCONTINUED | OUTPATIENT
Start: 2025-01-30 | End: 2025-02-03 | Stop reason: HOSPADM

## 2025-01-30 RX ORDER — ACETAMINOPHEN 325 MG/1
650 TABLET ORAL EVERY 6 HOURS PRN
Status: DISCONTINUED | OUTPATIENT
Start: 2025-01-30 | End: 2025-02-03 | Stop reason: HOSPADM

## 2025-01-30 RX ORDER — GUAIFENESIN/DEXTROMETHORPHAN 100-10MG/5
5 SYRUP ORAL EVERY 4 HOURS PRN
Status: DISCONTINUED | OUTPATIENT
Start: 2025-01-30 | End: 2025-02-03 | Stop reason: HOSPADM

## 2025-01-30 RX ORDER — ACETAMINOPHEN 650 MG/1
650 SUPPOSITORY RECTAL EVERY 6 HOURS PRN
Status: DISCONTINUED | OUTPATIENT
Start: 2025-01-30 | End: 2025-02-03 | Stop reason: HOSPADM

## 2025-01-30 RX ORDER — SODIUM CHLORIDE, SODIUM LACTATE, POTASSIUM CHLORIDE, CALCIUM CHLORIDE 600; 310; 30; 20 MG/100ML; MG/100ML; MG/100ML; MG/100ML
INJECTION, SOLUTION INTRAVENOUS CONTINUOUS
Status: DISCONTINUED | OUTPATIENT
Start: 2025-01-30 | End: 2025-01-30

## 2025-01-30 RX ORDER — AMLODIPINE BESYLATE 5 MG/1
5 TABLET ORAL DAILY
Status: DISCONTINUED | OUTPATIENT
Start: 2025-01-30 | End: 2025-02-03 | Stop reason: HOSPADM

## 2025-01-30 RX ORDER — LACOSAMIDE 100 MG/1
200 TABLET ORAL 2 TIMES DAILY
Status: DISCONTINUED | OUTPATIENT
Start: 2025-01-30 | End: 2025-02-03 | Stop reason: HOSPADM

## 2025-01-30 RX ORDER — FOLIC ACID 1 MG/1
1 TABLET ORAL DAILY
Status: DISCONTINUED | OUTPATIENT
Start: 2025-01-30 | End: 2025-02-03 | Stop reason: HOSPADM

## 2025-01-30 RX ORDER — LATANOPROST 50 UG/ML
1 SOLUTION/ DROPS OPHTHALMIC NIGHTLY
Status: DISCONTINUED | OUTPATIENT
Start: 2025-01-30 | End: 2025-02-03 | Stop reason: HOSPADM

## 2025-01-30 RX ORDER — LEVOTHYROXINE SODIUM 125 UG/1
125 TABLET ORAL DAILY
Status: DISCONTINUED | OUTPATIENT
Start: 2025-01-31 | End: 2025-02-03 | Stop reason: HOSPADM

## 2025-01-30 RX ORDER — FAMOTIDINE 20 MG/1
20 TABLET, FILM COATED ORAL 2 TIMES DAILY
Status: DISCONTINUED | OUTPATIENT
Start: 2025-01-30 | End: 2025-02-03 | Stop reason: HOSPADM

## 2025-01-30 RX ORDER — TIMOLOL MALEATE 5 MG/ML
1 SOLUTION/ DROPS OPHTHALMIC 2 TIMES DAILY
Status: DISCONTINUED | OUTPATIENT
Start: 2025-01-30 | End: 2025-02-03 | Stop reason: HOSPADM

## 2025-01-30 RX ORDER — MAGNESIUM HYDROXIDE/ALUMINUM HYDROXICE/SIMETHICONE 120; 1200; 1200 MG/30ML; MG/30ML; MG/30ML
5 SUSPENSION ORAL EVERY 4 HOURS PRN
Status: DISCONTINUED | OUTPATIENT
Start: 2025-01-30 | End: 2025-02-03 | Stop reason: HOSPADM

## 2025-01-30 RX ORDER — ATORVASTATIN CALCIUM 40 MG/1
80 TABLET, FILM COATED ORAL NIGHTLY
Status: DISCONTINUED | OUTPATIENT
Start: 2025-01-30 | End: 2025-02-03 | Stop reason: HOSPADM

## 2025-01-30 RX ORDER — ASPIRIN 81 MG/1
81 TABLET, CHEWABLE ORAL DAILY
Status: DISCONTINUED | OUTPATIENT
Start: 2025-01-30 | End: 2025-02-03 | Stop reason: HOSPADM

## 2025-01-30 RX ORDER — CLONAZEPAM 0.5 MG/1
0.5 TABLET ORAL 2 TIMES DAILY
Status: DISCONTINUED | OUTPATIENT
Start: 2025-01-30 | End: 2025-02-03 | Stop reason: HOSPADM

## 2025-01-30 RX ORDER — LACTULOSE 10 G/15ML
30 SOLUTION ORAL 2 TIMES DAILY
Status: DISCONTINUED | OUTPATIENT
Start: 2025-01-30 | End: 2025-02-03 | Stop reason: HOSPADM

## 2025-01-30 RX ORDER — QUETIAPINE FUMARATE 100 MG/1
100 TABLET, FILM COATED ORAL NIGHTLY
Status: DISCONTINUED | OUTPATIENT
Start: 2025-01-30 | End: 2025-02-03 | Stop reason: HOSPADM

## 2025-01-30 RX ORDER — POLYETHYLENE GLYCOL 3350 17 G/17G
17 POWDER, FOR SOLUTION ORAL DAILY PRN
Status: DISCONTINUED | OUTPATIENT
Start: 2025-01-30 | End: 2025-02-03 | Stop reason: HOSPADM

## 2025-01-30 RX ADMIN — SODIUM CHLORIDE, POTASSIUM CHLORIDE, SODIUM LACTATE AND CALCIUM CHLORIDE: 600; 310; 30; 20 INJECTION, SOLUTION INTRAVENOUS at 20:57

## 2025-01-30 RX ADMIN — ENOXAPARIN SODIUM 40 MG: 100 INJECTION SUBCUTANEOUS at 19:58

## 2025-01-30 RX ADMIN — SODIUM CHLORIDE: 9 INJECTION, SOLUTION INTRAVENOUS at 23:55

## 2025-01-30 RX ADMIN — WATER 5 MG: 1 INJECTION INTRAMUSCULAR; INTRAVENOUS; SUBCUTANEOUS at 21:15

## 2025-01-30 RX ADMIN — PIPERACILLIN AND TAZOBACTAM 4500 MG: 4; .5 INJECTION, POWDER, FOR SOLUTION INTRAVENOUS at 19:59

## 2025-01-30 NOTE — PROGRESS NOTES
4 Eyes Skin Assessment     NAME:  Jovan Leal  YOB: 1955  MEDICAL RECORD NUMBER:  [id number]    The patient is being assessed for  Admission    I agree that at least one RN has performed a thorough Head to Toe Skin Assessment on the patient. ALL assessment sites listed below have been assessed.      Areas assessed by both nurses:    Head, Face, Ears, Shoulders, Back, Chest, Arms, Elbows, Hands, Sacrum. Buttock, Coccyx, Ischium, and Legs. Feet and Heels        Does the Patient have a Wound? Large scab to forehead, scattered scabbing, and bruising       Jean Prevention initiated by RN: Yes  Wound Care Orders initiated by RN: No    Pressure Injury (Stage 3,4, Unstageable, DTI, NWPT, and Complex wounds) if present, place Wound referral order by RN under : No    New Ostomies, if present place, Ostomy referral order under : No     Nurse 1 eSignature: Electronically signed by Kerry Mas RN on 1/30/25 at 6:33 PM EST    **SHARE this note so that the co-signing nurse can place an eSignature**    Nurse 2 eSignature: Electronically signed by Namita Oneil LPN on 1/30/25 at 6:41 PM EST

## 2025-01-30 NOTE — ANESTHESIA POSTPROCEDURE EVALUATION
Department of Anesthesiology  Postprocedure Note    Patient: Jovan Leal  MRN: [id number]  YOB: 1955  Date of evaluation: 1/30/2025    Procedure Summary       Date: 01/28/25 Room / Location: [address]    Anesthesia Start: 1423 Anesthesia Stop: 1456    Procedure: CYSTOSCOPY, URETEROSCOPY LEFT URETERAL STENT INSERTION (Left: Ureter) Diagnosis:       Kidney stone on left side      (LEFT KIDNEY STONE)    Surgeons: Bernardino Kilpatrick MD Responsible Provider: Vanita Nguyễn MD    Anesthesia Type: general ASA Status: 3 - Emergent            Anesthesia Type: No value filed.    Emmanuelle Phase I: Emmanuelle Score: 8    Emmanuelle Phase II:      Anesthesia Post Evaluation    Patient location during evaluation: bedside  Patient participation: complete - patient participated  Level of consciousness: awake  Airway patency: patent  Nausea & Vomiting: no nausea  Cardiovascular status: blood pressure returned to baseline  Respiratory status: acceptable  Hydration status: euvolemic  Pain management: adequate    No notable events documented.

## 2025-01-31 LAB
ANION GAP SERPL CALCULATED.3IONS-SCNC: 9 MMOL/L (ref 9–17)
BASOPHILS # BLD: 0 K/UL
BASOPHILS NFR BLD: 0 % (ref 0–1)
BUN SERPL-MCNC: 17 MG/DL (ref 7–20)
CALCIUM SERPL-MCNC: 8.8 MG/DL (ref 8.3–10.6)
CHLORIDE SERPL-SCNC: 112 MMOL/L (ref 99–110)
CO2 SERPL-SCNC: 25 MMOL/L (ref 21–32)
CREAT SERPL-MCNC: 0.6 MG/DL (ref 0.8–1.3)
EOSINOPHIL # BLD: 0 K/UL
EOSINOPHILS RELATIVE PERCENT: 0 % (ref 0–3)
ERYTHROCYTE [DISTWIDTH] IN BLOOD BY AUTOMATED COUNT: 14 % (ref 11.7–14.9)
GFR, ESTIMATED: >90 ML/MIN/1.73M2
GLUCOSE BLD-MCNC: 74 MG/DL (ref 74–99)
GLUCOSE SERPL-MCNC: 96 MG/DL (ref 74–99)
HCT VFR BLD AUTO: 31.4 % (ref 42–52)
HGB BLD-MCNC: 9.6 G/DL (ref 13.5–18)
L PNEUMO1 AG UR QL IA.RAPID: NEGATIVE
LYMPHOCYTES NFR BLD: 0.34 K/UL
LYMPHOCYTES RELATIVE PERCENT: 4 % (ref 24–44)
MAGNESIUM SERPL-MCNC: 2.1 MG/DL (ref 1.8–2.4)
MCH RBC QN AUTO: 29.4 PG (ref 27–31)
MCHC RBC AUTO-ENTMCNC: 30.6 G/DL (ref 32–36)
MCV RBC AUTO: 96 FL (ref 78–100)
MONOCYTES NFR BLD: 1.29 K/UL
MONOCYTES NFR BLD: 15 % (ref 0–4)
NEUTROPHILS NFR BLD: 81 % (ref 36–66)
NEUTS SEG NFR BLD: 6.97 K/UL
PLATELET # BLD AUTO: 104 K/UL (ref 140–440)
PLATELET ESTIMATE: NORMAL
PMV BLD AUTO: 10.3 FL (ref 7.5–11.1)
POTASSIUM SERPL-SCNC: 3 MMOL/L (ref 3.5–5.1)
RBC # BLD AUTO: 3.27 M/UL (ref 4.6–6.2)
RBC # BLD: NORMAL 10*6/UL
S PNEUM AG SPEC QL: NEGATIVE
SODIUM SERPL-SCNC: 146 MMOL/L (ref 136–145)
SPECIMEN SOURCE: NORMAL
WBC # BLD: NORMAL 10*3/UL
WBC OTHER # BLD: 8.6 K/UL (ref 4–10.5)

## 2025-01-31 PROCEDURE — 80048 BASIC METABOLIC PNL TOTAL CA: CPT

## 2025-01-31 PROCEDURE — 92610 EVALUATE SWALLOWING FUNCTION: CPT

## 2025-01-31 PROCEDURE — 2580000003 HC RX 258: Performed by: INTERNAL MEDICINE

## 2025-01-31 PROCEDURE — 82962 GLUCOSE BLOOD TEST: CPT

## 2025-01-31 PROCEDURE — 94761 N-INVAS EAR/PLS OXIMETRY MLT: CPT

## 2025-01-31 PROCEDURE — 2580000003 HC RX 258: Performed by: STUDENT IN AN ORGANIZED HEALTH CARE EDUCATION/TRAINING PROGRAM

## 2025-01-31 PROCEDURE — 2700000000 HC OXYGEN THERAPY PER DAY

## 2025-01-31 PROCEDURE — 6370000000 HC RX 637 (ALT 250 FOR IP): Performed by: INTERNAL MEDICINE

## 2025-01-31 PROCEDURE — 2500000003 HC RX 250 WO HCPCS: Performed by: INTERNAL MEDICINE

## 2025-01-31 PROCEDURE — 76937 US GUIDE VASCULAR ACCESS: CPT

## 2025-01-31 PROCEDURE — 83735 ASSAY OF MAGNESIUM: CPT

## 2025-01-31 PROCEDURE — 85025 COMPLETE CBC W/AUTO DIFF WBC: CPT

## 2025-01-31 PROCEDURE — 36415 COLL VENOUS BLD VENIPUNCTURE: CPT

## 2025-01-31 PROCEDURE — 6360000002 HC RX W HCPCS: Performed by: INTERNAL MEDICINE

## 2025-01-31 PROCEDURE — 1200000000 HC SEMI PRIVATE

## 2025-01-31 RX ORDER — DEXTROSE MONOHYDRATE 100 MG/ML
INJECTION, SOLUTION INTRAVENOUS CONTINUOUS PRN
Status: DISCONTINUED | OUTPATIENT
Start: 2025-01-31 | End: 2025-02-03 | Stop reason: HOSPADM

## 2025-01-31 RX ORDER — GLUCAGON 1 MG/ML
1 KIT INJECTION PRN
Status: DISCONTINUED | OUTPATIENT
Start: 2025-01-31 | End: 2025-02-03 | Stop reason: HOSPADM

## 2025-01-31 RX ORDER — SODIUM CHLORIDE, SODIUM LACTATE, POTASSIUM CHLORIDE, CALCIUM CHLORIDE 600; 310; 30; 20 MG/100ML; MG/100ML; MG/100ML; MG/100ML
INJECTION, SOLUTION INTRAVENOUS CONTINUOUS
Status: DISCONTINUED | OUTPATIENT
Start: 2025-01-31 | End: 2025-02-03

## 2025-01-31 RX ADMIN — LATANOPROST 1 DROP: 50 SOLUTION OPHTHALMIC at 21:45

## 2025-01-31 RX ADMIN — ENOXAPARIN SODIUM 40 MG: 100 INJECTION SUBCUTANEOUS at 08:30

## 2025-01-31 RX ADMIN — TIMOLOL MALEATE 1 DROP: 5 SOLUTION OPHTHALMIC at 21:45

## 2025-01-31 RX ADMIN — SODIUM CHLORIDE, POTASSIUM CHLORIDE, SODIUM LACTATE AND CALCIUM CHLORIDE: 600; 310; 30; 20 INJECTION, SOLUTION INTRAVENOUS at 13:45

## 2025-01-31 RX ADMIN — LACTULOSE 30 G: 20 SOLUTION ORAL at 21:44

## 2025-01-31 RX ADMIN — PIPERACILLIN AND TAZOBACTAM 4500 MG: 4; .5 INJECTION, POWDER, FOR SOLUTION INTRAVENOUS at 01:39

## 2025-01-31 RX ADMIN — PIPERACILLIN AND TAZOBACTAM 4500 MG: 4; .5 INJECTION, POWDER, FOR SOLUTION INTRAVENOUS at 21:48

## 2025-01-31 RX ADMIN — CLONAZEPAM 0.5 MG: 0.5 TABLET ORAL at 21:44

## 2025-01-31 RX ADMIN — SODIUM CHLORIDE, PRESERVATIVE FREE 10 ML: 5 INJECTION INTRAVENOUS at 08:32

## 2025-01-31 RX ADMIN — PIPERACILLIN AND TAZOBACTAM 4500 MG: 4; .5 INJECTION, POWDER, FOR SOLUTION INTRAVENOUS at 08:38

## 2025-01-31 RX ADMIN — PRIMIDONE 50 MG: 50 TABLET ORAL at 21:44

## 2025-01-31 RX ADMIN — VALPROIC ACID 1000 MG: 250 SOLUTION ORAL at 21:44

## 2025-01-31 RX ADMIN — SODIUM CHLORIDE, PRESERVATIVE FREE 10 ML: 5 INJECTION INTRAVENOUS at 21:45

## 2025-01-31 RX ADMIN — FAMOTIDINE 20 MG: 20 TABLET, FILM COATED ORAL at 21:44

## 2025-01-31 RX ADMIN — TIMOLOL MALEATE 1 DROP: 5 SOLUTION OPHTHALMIC at 08:31

## 2025-01-31 RX ADMIN — QUETIAPINE FUMARATE 100 MG: 100 TABLET ORAL at 21:43

## 2025-01-31 RX ADMIN — Medication 10 MG: at 21:43

## 2025-01-31 RX ADMIN — SODIUM CHLORIDE: 9 INJECTION, SOLUTION INTRAVENOUS at 21:47

## 2025-01-31 RX ADMIN — LACOSAMIDE 200 MG: 100 TABLET, FILM COATED ORAL at 21:43

## 2025-01-31 RX ADMIN — ATORVASTATIN CALCIUM 80 MG: 40 TABLET, FILM COATED ORAL at 21:43

## 2025-01-31 ASSESSMENT — PAIN SCALES - GENERAL: PAINLEVEL_OUTOF10: 0

## 2025-01-31 NOTE — PROGRESS NOTES
V2.0    [facility] [place]      Name:  Jovan Leal /Age/Sex: 1955  (69 y.o. male)   MRN & CSN:  [id number] & [id number] Encounter Date/Time: 2025 9:29 AM EST   Location:  [address] [place] [address] PCP: Jovan Lazcano PA-C     Attending:Giulia Faustin MD       Hospital Day: 2    Assessment and Recommendations   Jovan Leal is a 69 y.o. male with pmh of multiple admissions who presents with Acute aspiration pneumonia (HCC)      Plan:     #Aspiration pneumonia  #Chronic oropharyngeal dysphagia  -Patient presents for fever of 101.1, no new symptoms, still coughing  -CTA chest from  showing no PE, bilateral airspace disease particularly showing areas of patchy consolidation in both lower lungs, likely reflective of atypical pneumonia with differentials or congestive heart failure  -Patient with recent COVID 19 on  with acute hypoxic respiratory failure requiring dexamethasone for 10 days and was initially on remdesivir  -Was discharged on Augmentin yesterday  -Blood cultures taken at [place] [facility] and received Zosyn and IV fluids  Continue Zosyn  IV fluids  Telemetry monitoring  Follow-up on blood cultures     # Possible UTI  -UA at [place] [facility] is positive for leukocytes   -Urine culture from  without growth  Zosyn should cover UTI     # Recent admission for nephrolithiasis s/p ureteral stent placement  -Discharged on   Follow-up with urology as outpatient for ureteroscopy/laser stone manipulation or ESWL     # Hypotension-resolved  -Initially patient was going to go to the ICU for pressor support however patient's blood pressure improved with IV fluids     # Elevated troponin likely in the setting of demand ischemia  -Troponin: 24 -->24  -EKG: NSR, no acute ischemic changes, RBBB  Continue to monitor for signs of ACS  Troponin remained flat, will stop trending     # Nonspecific 14 mm cystic structure  -In left perirectal fat with adjacent surgical clip  May need follow-up?     # Recent cervical  ozzy  Neuro: CN II to XII grossly intact  Psych: Normal mood        Medications:   Medications:    Polyvinyl Alcohol-Povidone PF  1 drop Both Eyes QAM    [Held by provider] amLODIPine  5 mg Oral Daily    aspirin  81 mg Oral Daily    atorvastatin  80 mg Oral Nightly    clonazePAM  0.5 mg Oral BID    clopidogrel  75 mg Oral Daily    docusate sodium  100 mg Oral Daily    famotidine  20 mg Oral BID    folic acid  1 mg Oral Daily    lacosamide  200 mg Oral BID    lactobacillus  1 capsule Oral Daily    lactulose  30 g Oral BID    latanoprost  1 drop Both Eyes Nightly    levothyroxine  125 mcg Oral Daily    melatonin  10 mg Oral Nightly    PARoxetine  30 mg Oral QAM    primidone  50 mg Oral TID    QUEtiapine  100 mg Oral Nightly    tamsulosin  0.4 mg Oral Daily    timolol  1 drop Both Eyes BID    valproic acid  500 mg Oral QAM    valproic acid  1,000 mg Oral Nightly    sodium chloride flush  5-40 mL IntraVENous 2 times per day    enoxaparin  40 mg SubCUTAneous Daily    piperacillin-tazobactam  4,500 mg IntraVENous Q8H      Infusions:    sodium chloride      sodium chloride 125 mL/hr at 01/30/25 2355     PRN Meds: albuterol, 2.5 mg, Q6H PRN  aluminum & magnesium hydroxide-simethicone, 5 mL, Q4H PRN  loperamide, 2 mg, TID PRN  benzonatate, 200 mg, TID PRN  guaiFENesin-dextromethorphan, 5 mL, Q4H PRN  guaiFENesin, 600 mg, Q12H PRN  sodium chloride, 1 spray, PRN  sodium chloride flush, 5-40 mL, PRN  sodium chloride, , PRN  ondansetron, 4 mg, Q8H PRN   Or  ondansetron, 4 mg, Q6H PRN  polyethylene glycol, 17 g, Daily PRN  acetaminophen, 650 mg, Q6H PRN   Or  acetaminophen, 650 mg, Q6H PRN        Labs and Imaging   XR ABDOMEN (KUB) (SINGLE AP VIEW)    Result Date: 1/29/2025  PROCEDURE: XR ABDOMEN (KUB) (SINGLE AP VIEW) DATE OF EXAM: 1/29/2025 5:51 DEMOGRAPHICS: 69 years old Male INDICATION: left ureteral stone visible on KUB after stent placed? COMPARISON: CT abdomen pelvis dated 1/28/2025 TECHNIQUE: 2 images of the abdomen  IOPAMIDOL 76 % IV SOLN COMPARISON: No existing relevant imaging study corresponding to the same anatomical region is available. TECHNIQUE: Contiguous axial slices of the abdomen and pelvis were submitted after the IV administration of contrast. No oral contrast was utilized. Additional coronal reformatted images were submitted.  DOSE OPTIMIZATION: CT radiation dose optimization techniques (automated exposure  control, and use of iterative reconstruction techniques, or adjustment of the mA and/or kV according to patient size) were used to limit patient radiation dose. FINDINGS: Lung bases: Patchy airspace disease in the bilateral lung bases. Trace right pleural effusion.Same-day CT angiography of the chest dictated separately. Liver: Homogeneous in attenuationSmooth contour. No suspicious liver lesion. Gallbladder/Biliary Tree: Cholecystectomy. No intrahepatic or extrahepatic biliary duct dilatation. Spleen: Normal size. Pancreas: Homogenous in attenuation. No peripancreatic stranding. Adrenal glands: Normal size and morphology of the bilateral adrenal glands. Genitourinary: Symmetric renal size. Delayed left nephrogram. Multiple left-sided renal cysts measuring up to 31 mm. 9 mm obstructing calculus in the proximal left ureter with resultant mild to moderate left hydronephrosis and mild left periureteral fat stranding. No right-sided hydronephrosis. Urinary bladder is unremarkable. Solid pelvic viscera are unremarkable. Bowel: Stomach and duodenum are unremarkable. No small bowel or large bowel wall  thickening or dilatation.  Normal appendix. Intraperitoneal/Extraperitoneal: Normal size of the abdominal aorta. Mild vascular calcifications.Hepatic, portal, splenic and superior mesenteric veins are patent.  No free fluid or air.  14 mm cystic structure in the left perirectal fat with adjacent surgical clip. Lymph nodes: No enlarged lymph nodes by CT size criteria. Musculoskeletal: A sacral nerve stimulator is in  patent. No midline shift. Large area of encephalomalacia involving the right MCA distribution compatible with prior infarct.Remaining gray-white differentiation is otherwise preserved. Internal carotid artery calcifications are noted. Globes are symmetric in size.  Visualized paranasal sinuses and mastoid air cells are well aerated. No suspicious focal lesion of the scalp or bony calvarium.Cerumen/debris in the bilateral external auditory canals. IMPRESSION: No acute intracranial hemorrhage or mass effect. Old right MCA territory. Correlate with clinical history. This dictation was created with voice recognition software.  While attempts have  been made to review the dictation as it is transcribed, on occasion the spoken word can be misinterpreted by the technology leading to omissions or inappropriate words, phrases or sentences.  Dictated and Electronically Signed By: Kolby Verma MD 1/28/2025 12:35          CBC:   Recent Labs     01/28/25  0935 01/29/25 0234 01/31/25  0339   WBC 6.9 7.5 8.6   HGB 9.6* 10.2* 9.6*   * 102* 104*     BMP:    Recent Labs     01/28/25  0935 01/29/25  0234 01/31/25  0339    141 146*   K 4.4 3.4* 3.0*    108 112*   CO2 23 22 25   BUN 25* 21* 17   CREATININE 1.3 0.8 0.6*   GLUCOSE 114* 146* 96     Hepatic:   Recent Labs     01/28/25  0935   AST 46*   ALT 14   BILITOT 0.2   ALKPHOS 91     Lipids:   Lab Results   Component Value Date/Time    CHOL 103 10/24/2024 03:23 AM    HDL 42 10/24/2024 03:23 AM    TRIG 117 10/24/2024 03:23 AM     Hemoglobin A1C:   Lab Results   Component Value Date/Time    LABA1C 5.6 10/24/2024 03:23 AM     TSH:   Lab Results   Component Value Date/Time    TSH 1.82 10/07/2015 12:19 PM     Troponin:   Lab Results   Component Value Date/Time    TROPONINT <0.010 08/16/2022 12:59 PM    TROPONINT <0.010 12/06/2021 10:44 PM    TROPONINT <0.010 10/03/2021 04:25 AM     Lactic Acid: No results for input(s): \"LACTA\" in the last 72 hours.  BNP:   Recent Labs      01/29/25  0234   PROBNP 5,199*     UA:  Lab Results   Component Value Date/Time    NITRU NEGATIVE 01/28/2025 09:30 AM    COLORU Yellow 01/28/2025 09:30 AM    PHUR 6.0 01/28/2025 09:30 AM    PHUR 6.0 05/10/2013 02:51 PM    WBCUA 12 01/28/2025 09:30 AM    RBCUA 1 01/28/2025 09:30 AM    RBCUA 1 07/08/2023 10:30 AM    MUCUS RARE 01/28/2025 09:30 AM    TRICHOMONAS NONE SEEN 07/08/2023 10:30 AM    YEAST OCCASIONAL 09/20/2021 05:00 PM    BACTERIA NEGATIVE 07/08/2023 10:30 AM    CLARITYU CLEAR 07/27/2023 12:38 PM    LEUKOCYTESUR TRACE 01/28/2025 09:30 AM    UROBILINOGEN 1.0 01/28/2025 09:30 AM    BILIRUBINUR NEGATIVE 01/28/2025 09:30 AM    BLOODU NEGATIVE 07/27/2023 12:38 PM    GLUCOSEU NEGATIVE 01/28/2025 09:30 AM    KETUA NEGATIVE 01/28/2025 09:30 AM    AMORPHOUS OCCASIONAL 11/25/2020 01:15 PM     Urine Cultures: No results found for: \"LABURIN\"  Blood Cultures: No results found for: \"BC\"  No results found for: \"BLOODCULT2\"  Organism:   Lab Results   Component Value Date/Time    ORG ENC 07/11/2013 11:22 AM         Electronically signed by Giulia Faustin MD on 1/31/2025 at 9:29 AM

## 2025-01-31 NOTE — CONSULTS
Consult completed. Primary RN reports pt has pulled out multiple IV's. Procedure/rationale explained to pt & consent obtained. #20ga Nexiva extra-long, 1.75\" PIV initiated using UltraSound-guided technique without difficulty/complications to RUE medial upper forearm - site dressed with SecurePortIV, Tegaderm, and reinforced with silk tape for site protection. #20ga Nexiva extra-long, 1.75\" PIV initiated using US-guided technique without difficulty/complications as secondary access r/t incompatible infusions to RUE medial lower forearm - site dressed with SecurePortIV, Tegaderm, and reinforced with silk tape for site protection. Pt tolerated well and no other c/o or needs noted or reported.

## 2025-01-31 NOTE — PROGRESS NOTES
Physician Progress Note      PATIENT:               AMARILIS CHEATHAM  CSN #:                  [id number]  :                       1955  ADMIT DATE:       2025 9:07 AM  DISCH DATE:        2025 7:57 PM  RESPONDING  PROVIDER #:        Gulshan Randall MD          QUERY TEXT:    Dear Dr. Randall,    Patient admitted with AMS, respiratory distress with 02 sats low 80s on ra,   increased work of breathing and course lung sound bilaterally, placed on   non-rebreather then titrated down to 6L per n/c. Respirations 24. Noted   documentation reflects, 'Acute hypoxic respiratory failure' Per ED note and   H/P dated . If possible, please document in the progress notes and   discharge summary if Acute hypoxic respiratory failure was ruled in or ruled   out:    The medical record reflects the following:  Risk Factors: Cerebral Palsy, infantile hemiplegia, seizure disorder Pneumonia  Clinical Indicators: Per ED note AMS, respiratory distress, 02 sats low 80s,   work of breathing and coarse lung sounds  Treatment: Non-rebreather, 02 mask at 6L monitoring per Respiratory therapist      Thank you  Helena Mcguire RN BSN CRCR  Clinical   [email or web address]  Options provided:  -- Acute respiratory due to pneumonia confirmed after study.  -- Acute respiratory ruled out after study.  -- Other - I will add my own diagnosis  -- Disagree - Not applicable / Not valid  -- Disagree - Clinically unable to determine / Unknown  -- Refer to Clinical Documentation Reviewer    PROVIDER RESPONSE TEXT:    Acute respiratory due to pneumonia confirmed after study.    Query created by: Helena Mcguire on 2025 8:01 AM      QUERY TEXT:    Dear Dr. Randall,  Patient admitted with documentation of aspiration Pneumonia from ED note and   H/P dated . Pt had recent admission for aspiration Pneumonia. If possible,   please document in the progress notes and discharge summary if aspiration   pneumonia  was ruled in or out:    The medical record reflects the following:  Risk Factors: recent admission for same, Cerebral Palsy, infantile hemiplegia,   seizure disorder, chronic oropharyngeal dysphagia  Clinical Indicators: CT chest-airspace infiltrative changes involving the left   upper lobe, similar changes with more prominent areas of infiltrate or   consolidation are seen involving both right and left lower lobes  Treatment: Azithromycin, Metronidazole, SLP evaluation, urine cx, bld cx CT   imaging    Thank you  Helena Mcguire RN BSN CRCR  Clinical   [email or web address]  Options provided:  -- This patient had aspiration pneumonia POA confirmed after study.  -- Aspiration ruled out after study.  -- Other - I will add my own diagnosis  -- Disagree - Not applicable / Not valid  -- Disagree - Clinically unable to determine / Unknown  -- Refer to Clinical Documentation Reviewer    PROVIDER RESPONSE TEXT:    This patient had aspiration pneumonia POA confirmed after study.    Query created by: Helena Mcguire on 1/31/2025 8:17 AM      Electronically signed by:  Gulshan Randall MD 1/31/2025 9:10 AM

## 2025-01-31 NOTE — CARE COORDINATION
01/31/25 1231   Service Assessment   Patient Orientation Alert and Oriented;Person   Cognition Alert   History Provided By Child/Family   Primary Caregiver   (Pt is from [place] and plan per LG is for pt to return.)   Support Systems Family Members;Other (Comment)  ([facility])   Patient's Healthcare Decision Maker is: Named in Scanned ACP Document  (pt has Legal guardian.)   PCP Verified by CM Yes   Last Visit to PCP Within last year   Prior Functional Level Assistance with the following:;Bathing;Dressing;Toileting;Cooking;Housework;Shopping;Mobility   Current Functional Level Assistance with the following:;Bathing;Dressing;Toileting;Cooking;Housework;Shopping;Mobility   Can patient return to prior living arrangement Yes   Ability to make needs known: Other (see comment)  (CM called Charlotte RANDOLPH and confirmed that the plan is to return to [place] for SNF)   Would you like for me to discuss the discharge plan with any other family members/significant others, and if so, who? Yes  (permission from pt to speak with Charlotte RANDOLPH)   Condition of Participation: Discharge Planning   The Patient and/or Patient Representative was provided with a Choice of Provider? Patient   The Patient and/Or Patient Representative agree with the Discharge Plan? Yes     CM into see pt to initiate a safe discharge plan. Cm introduced self and explained role of CM. Pt is kind, alert and oriented. Pt is from SNF at [place]. Per sister Charlotte who is LG informed CM that pt was living at Group home and had a fall. Pt had neck fx and was taken to [place]. Pt was discharged to [place] for SNF placement. SisterTOMASA would like pt to return to [place] to finish his SNF stay. CM messaged Dr Faustin to call pts LG to update her per LG request.   CM provided card and encouraged to call for any needs or concern.   CM is available if any needs arise.

## 2025-01-31 NOTE — DISCHARGE INSTR - COC
Continuity of Care Form    Patient Name: Jovan Leal   :  1955  MRN:  [id number]    Admit date:  2025  Discharge date:  2/3/2023    Code Status Order: Full Code   Advance Directives:   Advance Care Flowsheet Documentation             Admitting Physician:  Alma Bermudez MD  PCP: Jovan Lazcano PA-C    Discharging Nurse: Farrah Gilliam RN  Discharging Hospital Unit/Room#: 4106/4106-A  Discharging Unit Phone Number: [phone]    Emergency Contact:   Extended Emergency Contact Information  Primary Emergency Contact: [place] [address] [facility]  Home Phone: [phone]  Work Phone: [phone]  Mobile Phone: [phone]  Relation: Legal Guardian  Secondary Emergency Contact: [facility]   Home Phone: [phone]  Mobile Phone: [phone]  Relation: Other    Past Surgical History:  Past Surgical History:   Procedure Laterality Date    CHOLECYSTECTOMY      COLONOSCOPY  14, 14: hemorrhoids, 2012 at [facility]    COLONOSCOPY  2021    COLONOSCOPY N/A 2021    COLONOSCOPY POLYPECTOMY SNARE/COLD BIOPSY performed by Xavier Rodriguez MD at [facility] ENDOSCOPY    COLONOSCOPY N/A 2024    COLONOSCOPY DIAGNOSTIC performed by Xavier Rodriguez MD at [facility] ENDOSCOPY    CYSTOSCOPY Left 2013    with stnet placement    CYSTOSCOPY Left 2025    CYSTOSCOPY, URETEROSCOPY LEFT URETERAL STENT INSERTION performed by Bernardino Kilpatrick MD at [facility] OR    FACIAL SURGERY N/A 10/8/2021    FACIAL INCISION AND DRAINAGE performed by Jesus Manuel Butts MD at [facility] OR    GALLBLADDER SURGERY      KIDNEY STONE SURGERY      OTHER SURGICAL HISTORY  04/15/2015    Revision of vagal nerve stimulator    STIMULATOR SURGERY N/A 3/24/2021    STIMULATOR INSERTION performed by Anibal Banuelos MD at [facility] OR    STIMULATOR SURGERY N/A 3/24/2021    STIMULATOR INSERTION STAGE 2 performed by Anibal Banuelos MD at [facility] OR    UPPER  GASTROINTESTINAL ENDOSCOPY N/A 11/13/2018    EGD DILATION BALLOON AND BIOPSY performed by Pelon Nguyễn MD at [facility] ENDOSCOPY    VAGAL NERVE STIMULATION  2002    Has to have bettery changed every 5 yrs.        Immunization History:   Immunization History   Administered Date(s) Administered    COVID-19, MODERNA Bivalent, (age 12y+), IM, 50 mcg/0.5 mL 03/02/2023    COVID-19, PFIZER GRAY top, DO NOT Dilute, (age 12 y+), IM, 30 mcg/0.3 mL 07/22/2022    Influenza, Quadv, 6 mo and older, IM, PF (Flulaval, Fluarix) 09/19/2018    TDaP, ADACEL (age 10y-64y), BOOSTRIX (age 10y+), IM, 0.5mL 11/03/2016    Tetanus 11/30/1976, 12/10/1980, 05/24/1984, 07/11/1990, 08/21/1999       Active Problems:  Patient Active Problem List   Diagnosis Code    Cerebral palsy, infantile hemiplegia ([facility]) G80.8    Rosacea, acne L71.9    IBS (irritable bowel syndrome) K58.9    Essential hypertension I10    Calculus of ureter N20.1    Pyelonephritis N12    Sepsis ([facility]) A41.9    Pneumonia J18.9    Increased ammonia level R79.89    Aspiration pneumonia ([facility]) J69.0    Hypokalemia E87.6    Hypomagnesemia E83.42    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    Seizure disorder ([facility]) G40.909    Seizure disorder, grand mal ([facility]) G40.409    Sepsis due to undetermined organism with acute respiratory failure ([facility]) A41.9, R65.20, J96.00    Pain of upper abdomen R10.10    Diarrhea of presumed infectious origin R19.7    Abdominal pain R10.9    Pressure injury of contiguous region involving back and right buttock, stage 2 ([facility]) L89.42    Cellulitis, perineum L03.315    Pressure injury of right buttock, stage 2 ([facility]) L89.312    Pressure injury of left buttock, stage 1 L89.321    COVID-19 virus detected U07.1    Hyponatremia E87.1    Multifocal pneumonia J18.9    TIA (transient ischemic attack) G45.9    Acute CVA (cerebrovascular accident) ([facility]) I63.9    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    Dysarthria R47.1    Oropharyngeal dysphagia R13.12    Depression with anxiety F41.8    Facial  abscess L02.01    Group B streptococcal UTI N39.0, B95.1    History of cerebral palsy Z86.69    Encephalopathy acute G93.40    PNA (pneumonia) J18.9    Acute encephalopathy G93.40    Metabolic encephalopathy G93.41    Generalized weakness R53.1    History of CVA (cerebrovascular accident) Z86.73    Transient ischemic attack G45.9    Kidney stone N20.0    Acute aspiration pneumonia (HCC) J69.0       Isolation/Infection:   Isolation            No Isolation          Patient Infection Status       Infection Onset Added Last Indicated Last Indicated By Review Planned Expiration Resolved Resolved By    None active    Resolved    C-diff Rule Out 23 Gastrointestinal Panel, Molecular   11/10/23 Karla Donaldson, RN    No c-diff test ordered    MRSA 11/25/20 10/04/21 10/08/21 Culture, Body Fluid   23 Nolan Jacob, MEENAKSHI    COVID-19 20 COVID-19   20 Infection     MRSA 20 Culture, MRSA, Screening   10/04/21 Sandi Conley, MEENAKSHI    MRSA  14 Ruba Bardales RN   05/21/15 Polly Lopez 14                       Nurse Assessment:  Last Vital Signs: BP (!) 156/83   Pulse (!) 116   Temp 98.1 °F (36.7 °C) (Oral)   Resp 24   Ht 1.676 m (5' 6\")   Wt 81.7 kg (180 lb 3.2 oz)   SpO2 91%   BMI 29.09 kg/m²     Last documented pain score (0-10 scale):    Last Weight:   Wt Readings from Last 1 Encounters:   25 81.7 kg (180 lb 3.2 oz)     Mental Status:  oriented and alert    IV Access:  - PICC - site  R Upper Arm, insertion date: ***    Nursing Mobility/ADLs:  Walking   Dependent  Transfer  Dependent  Bathing  Dependent  Dressing  Dependent  Toileting  Dependent  Feeding  Dependent  Med Admin  Dependent  Med Delivery   crushed    Wound Care Documentation and Therapy:  Wound 10/06/21 Face Left upper lip (Active)   Number of days: 1212       Wound 10/06/21 Knee Anterior;Left (Active)   Number of days:  Treatments:     Prognosis: Fair    Condition at Discharge: Stable    Rehab Potential (if transferring to Rehab): Good    Recommended Labs or Other Treatments After Discharge:     Physician Certification: I certify the above information and transfer of Jovan Leal  is necessary for the continuing treatment of the diagnosis listed and that he requires Skilled Nursing Facility for greater 30 days.     Update Admission H&P: No change in H&P    PHYSICIAN SIGNATURE:  Electronically signed by Giulia Faustin MD on 2/3/25 at 2:25 PM EST

## 2025-01-31 NOTE — CARE COORDINATION
CM reviewed notes. Pt is from [place]. Pt was discharged on 1/29 and readmitted on 1/30. Pending discharge plan. Pt has a TOMASA Porter and Luis Antonio Hilton.    ATTN ALL STAFF: Pt has a Legal Guardian, Please contact ONLY them for ALL decisions (treatments consents, discharge, etc...)    GARCÍA called Star at [facility] and was informed that pt is able to return whenever med cleared. LH

## 2025-01-31 NOTE — H&P
V2.0  History and Physical      Name:  Jovan Leal /Age/Sex: 1955  (69 y.o. male)   MRN & CSN:  [id number] & [id number] Encounter Date/Time: 2025 7:27 PM EST   Location:  [address] [place] [address]-A PCP: Jovan Lazcano PA-C       Hospital Day: 1    Assessment and Plan:   Jovan Leal is a 69 y.o. male  who presents with Acute aspiration pneumonia (HCC)    Hospital Problems             Last Modified POA    * (Principal) Acute aspiration pneumonia (HCC) 2025 Yes     #Aspiration pneumonia  #Chronic oropharyngeal dysphagia  -Patient presents for fever of 101.1, no new symptoms, still coughing  -CTA chest from  showing no PE, bilateral airspace disease particularly showing areas of patchy consolidation in both lower lungs, likely reflective of atypical pneumonia with differentials or congestive heart failure  -Patient with recent COVID 19 on  with acute hypoxic respiratory failure requiring dexamethasone for 10 days and was initially on remdesivir  -Was discharged on Augmentin yesterday  -Blood cultures taken at [place] [facility] and received Zosyn and IV fluids  Continue Zosyn  IV fluids  Telemetry monitoring  Follow-up on blood cultures    # Possible UTI  -UA at [place] [facility] is positive for leukocytes   -Urine culture from  without growth  Zosyn should cover UTI    # Recent admission for nephrolithiasis s/p ureteral stent placement  -Discharged on   Follow-up with urology as outpatient for ureteroscopy/laser stone manipulation or ESWL     # Hypotension-resolved  -Initially patient was going to go to the ICU for pressor support however patient's blood pressure improved with IV fluids     # Elevated troponin likely in the setting of demand ischemia  -Troponin: 24 -->24  -EKG: NSR, no acute ischemic changes, RBBB  Continue to monitor for signs of ACS  Troponin remained flat, will stop trending     # Nonspecific 14 mm cystic structure  -In left perirectal fat with adjacent surgical clip  May need  differently: Take 1 capsule in the morning and 3 capsules at bedtime  01/28/25 Flagged for review, Has claims from outside pharmacy, Not Receiving in Nursing Facility. 11/29/23   Maged Blanca, MARY - CNP   famotidine (PEPCID) 20 MG tablet Take 1 tablet by mouth 2 times daily    Chriss Buckner MD   amLODIPine (NORVASC) 5 MG tablet Take 1 tablet by mouth daily 11/16/23   MONISHA Grissom MD   divalproex (DEPAKOTE) 500 MG DR tablet Take 2 tablets by mouth nightly 8/20/22   Walter Rand MD   divalproex (DEPAKOTE) 500 MG DR tablet Take 1 tablet by mouth daily 8/21/22   Walter Rand MD   aspirin 81 MG EC tablet Take 1 tablet by mouth daily 10/10/21   Biju Engle MD   atorvastatin (LIPITOR) 80 MG tablet Take 1 tablet by mouth nightly 10/9/21   Biju Engle MD   clopidogrel (PLAVIX) 75 MG tablet Take 1 tablet by mouth daily 10/10/21   Biju Engle MD   AMITIZA 24 MCG capsule  9/22/21 8/20/22  Chriss Buckner MD   levothyroxine (SYNTHROID) 125 MCG tablet Take 1 tablet by mouth Daily 4/14/20   Angus Baker MD   clonazePAM (KLONOPIN) 0.5 MG tablet Take 1 tablet by mouth 2 times daily.    Chriss Buckner MD   latanoprost (XALATAN) 0.005 % ophthalmic solution Place 1 drop into both eyes nightly    Chriss Buckner MD   timolol (TIMOPTIC) 0.5 % ophthalmic solution Place 1 drop into both eyes 2 times daily    Chriss Buckner MD   Lactobacillus (ACIDOPHILUS) 100 MG CAPS Take 100 mg by mouth daily    Chriss Buckner MD   Lactulose Encephalopathy (ENULOSE PO) Take 30 mLs by mouth 3 times daily    Chriss Buckner MD   folic acid (FOLVITE) 1 MG tablet Take 1 tablet by mouth daily    Chriss Buckner MD   PARoxetine (PAXIL) 30 MG tablet Take 1 tablet by mouth every morning    Chriss Buckner MD   Cholecalciferol (VITAMIN D3) 1000 UNITS CAPS Take 1 tablet by mouth daily.    Chriss Buckner MD       Physical Exam:      General: NAD, sleepy  right MCA distribution compatible with prior infarct.Remaining gray-white differentiation is otherwise preserved. Internal carotid artery calcifications are noted. Globes are symmetric in size.  Visualized paranasal sinuses and mastoid air cells are well aerated. No suspicious focal lesion of the scalp or bony calvarium.Cerumen/debris in the bilateral external auditory canals. IMPRESSION: No acute intracranial hemorrhage or mass effect. Old right MCA territory. Correlate with clinical history. This dictation was created with voice recognition software.  While attempts have  been made to review the dictation as it is transcribed, on occasion the spoken word can be misinterpreted by the technology leading to omissions or inappropriate words, phrases or sentences.  Dictated and Electronically Signed By: Kolby Verma MD 1/28/2025 12:35            Electronically signed by Alma Bermudez MD on 1/30/2025 at 7:27 PM

## 2025-01-31 NOTE — PROGRESS NOTES
Facility/Department: [address]   CLINICAL BEDSIDE SWALLOW EVALUATION    NAME: Jovan Leal  : 1955  MRN: [id number]    ADMISSION DATE: 2025  ADMITTING DIAGNOSIS: has Cerebral palsy, infantile hemiplegia (HCC); Rosacea, acne; IBS (irritable bowel syndrome); Essential hypertension; Calculus of ureter; Pyelonephritis; Sepsis (HCC); Pneumonia; Increased ammonia level; Aspiration pneumonia (HCC); Hypokalemia; Hypomagnesemia; Hypophosphatasia; Seizure disorder (HCC); Seizure disorder, grand mal (HCC); Sepsis due to undetermined organism with acute respiratory failure (HCC); Pain of upper abdomen; Diarrhea of presumed infectious origin; Abdominal pain; Pressure injury of contiguous region involving back and right buttock, stage 2 (HCC); Cellulitis, perineum; Pressure injury of right buttock, stage 2 (HCC); Pressure injury of left buttock, stage 1; COVID-19 virus detected; Hyponatremia; Multifocal pneumonia; TIA (transient ischemic attack); Acute CVA (cerebrovascular accident) (HCC); Ataxic cerebral palsy (HCC); Dysarthria; Oropharyngeal dysphagia; Depression with anxiety; Facial abscess; Group B streptococcal UTI; History of cerebral palsy; Encephalopathy acute; PNA (pneumonia); Acute encephalopathy; Metabolic encephalopathy; Generalized weakness; History of CVA (cerebrovascular accident); Transient ischemic attack; Kidney stone; and Acute aspiration pneumonia ([facility]) on their problem list.      Impressions: Jovan Leal was seen for a bedside swallowing evaluation after being admitted to [facility] with pneumonia.  Pt was alert and cooperative throughout assessment.  Relevant medical hx includes seizures disorder, cerebral palsy, developmental delay, pneumonia, CVA, thyroid disease and dysphagia.  Pt had a recent modified barium swallow study completed at [facility] on 24 which indicated \"Pharyngeal phase characterized by delayed/incoordinated swallow and inconsistent penetration with thin liquids.  aspiration and make recommendations regarding safe dietary consistencies, effective compensatory strategies, and safe eating environment.    Impression  Dysphagia Diagnosis: Moderate oral stage dysphagia;Moderate pharyngeal stage dysphagia  Dysphagia Outcome Severity Scale: Level 3: Moderate dysphagia- Total assistance, supervision or strategies. Two or more diet consistencies restricted     Treatment Plan  Requires SLP Intervention: Yes  Duration of Treatment: LOS or until goals are met          Recommended Diet and Intervention  Diet Solids Recommendation: Minced & Moist  Liquid Consistency Recommendation: Thin  Recommended Form of Meds: Meds in puree     Therapeutic Interventions: Diet tolerance monitoring;Therapeutic PO trials with SLP;Patient/Family education    Compensatory Swallowing Strategies  Compensatory Swallowing Strategies : Alternate solids and liquids;Upright as possible for all oral intake;Remain upright for 30-45 minutes after meals;Small bites/sips    Treatment/Goals  Short-term Goals  Timeframe for Short-term Goals: LOS or until goals are met  Goal 1: Pt will tolerate minced and moist solids/thin liquids by small sips without clinical evidence of aspiration 100%  Goal 2: Pt/caregivers will demonstrate comrpehension of recommendations/safe feeding protocol/POC    General  Chart Reviewed: Yes  Behavior/Cognition: Alert;Cooperative;Pleasant mood  Respiratory Status: O2 via nasual cannula  O2 Device: Nasal cannula  Communication Observation: Functional  Follows Directions: Simple  Dentition: Edentulous  Patient Positioning: Upright in bed  Baseline Vocal Quality: Normal  Volitional Cough: Congested;Weak  Volitional Swallow: Delayed  Prior Dysphagia History: dysphagia 12/12/24  Consistencies Administered: Pureed;Thin - straw;Thin - cup;Thin - teaspoon;Minced and Moist    Vision/Hearing       Oral Motor Deficits  Consistencies Administered: Pureed;Thin - straw;Thin - cup;Thin - teaspoon;Minced and  Moist    Oral Phase Dysfunction  Oral Phase  Oral Phase: Exceptions     Indicators of Pharyngeal Phase Dysfunction        Prognosis       Education  Patient Education: recommendations/POC  Patient Education Response: Verbalizes understanding             Therapy Time  SLP Individual Minutes  Time In: 1515  Time Out: 1540  Minutes: 25          Kimberly Jiménez MS, CCC-SLP, 1/31/2025

## 2025-01-31 NOTE — CONSULTS
Consult completed without need for secondary access at this time: pt has patent PIV and order for LR has been D/C'd and switched to NS infusion - infusions are no longer incompatible and therapeutic needs met with existing access. Beth, Primary Nurse, notified & agrees.

## 2025-01-31 NOTE — PROGRESS NOTES
Pt not understanding why he cannot eat or drink. Asked NP Andrea Naegele to review. She stated that she \"does not know, that Dr. Bermudez put the order in at 1830 after pt was seen by SLP, will have to honor his order for now.\"

## 2025-01-31 NOTE — PLAN OF CARE
Problem: Chronic Conditions and Co-morbidities  Goal: Patient's chronic conditions and co-morbidity symptoms are monitored and maintained or improved  Outcome: Progressing     Problem: Discharge Planning  Goal: Discharge to home or other facility with appropriate resources  Outcome: Progressing     Problem: Safety - Adult  Goal: Free from fall injury  Outcome: Progressing     Problem: Skin/Tissue Integrity  Goal: Skin integrity remains intact  Description: 1.  Monitor for areas of redness and/or skin breakdown  2.  Assess vascular access sites hourly  3.  Every 4-6 hours minimum:  Change oxygen saturation probe site  4.  Every 4-6 hours:  If on nasal continuous positive airway pressure, respiratory therapy assess nares and determine need for appliance change or resting period  Outcome: Progressing

## 2025-02-01 PROCEDURE — 94761 N-INVAS EAR/PLS OXIMETRY MLT: CPT

## 2025-02-01 PROCEDURE — 2700000000 HC OXYGEN THERAPY PER DAY

## 2025-02-01 PROCEDURE — 6370000000 HC RX 637 (ALT 250 FOR IP): Performed by: INTERNAL MEDICINE

## 2025-02-01 PROCEDURE — 6360000002 HC RX W HCPCS: Performed by: INTERNAL MEDICINE

## 2025-02-01 PROCEDURE — 2580000003 HC RX 258: Performed by: INTERNAL MEDICINE

## 2025-02-01 PROCEDURE — 1200000000 HC SEMI PRIVATE

## 2025-02-01 PROCEDURE — 2500000003 HC RX 250 WO HCPCS: Performed by: INTERNAL MEDICINE

## 2025-02-01 PROCEDURE — 6370000000 HC RX 637 (ALT 250 FOR IP): Performed by: STUDENT IN AN ORGANIZED HEALTH CARE EDUCATION/TRAINING PROGRAM

## 2025-02-01 RX ORDER — CARBOXYMETHYLCELLULOSE SODIUM 10 MG/ML
1 GEL OPHTHALMIC EVERY MORNING
Status: DISCONTINUED | OUTPATIENT
Start: 2025-02-01 | End: 2025-02-03 | Stop reason: HOSPADM

## 2025-02-01 RX ADMIN — PRIMIDONE 50 MG: 50 TABLET ORAL at 20:38

## 2025-02-01 RX ADMIN — ATORVASTATIN CALCIUM 80 MG: 40 TABLET, FILM COATED ORAL at 20:38

## 2025-02-01 RX ADMIN — LACTULOSE 30 G: 20 SOLUTION ORAL at 12:27

## 2025-02-01 RX ADMIN — FAMOTIDINE 20 MG: 20 TABLET, FILM COATED ORAL at 20:38

## 2025-02-01 RX ADMIN — VALPROIC ACID 500 MG: 250 SOLUTION ORAL at 12:28

## 2025-02-01 RX ADMIN — PIPERACILLIN AND TAZOBACTAM 4500 MG: 4; .5 INJECTION, POWDER, FOR SOLUTION INTRAVENOUS at 12:49

## 2025-02-01 RX ADMIN — AMLODIPINE BESYLATE 5 MG: 5 TABLET ORAL at 12:29

## 2025-02-01 RX ADMIN — VALPROIC ACID 1000 MG: 250 SOLUTION ORAL at 20:37

## 2025-02-01 RX ADMIN — CARBOXYMETHYLCELLULOSE SODIUM 1 DROP: 10 GEL OPHTHALMIC at 16:27

## 2025-02-01 RX ADMIN — CLONAZEPAM 0.5 MG: 0.5 TABLET ORAL at 20:37

## 2025-02-01 RX ADMIN — LACOSAMIDE 200 MG: 100 TABLET, FILM COATED ORAL at 12:29

## 2025-02-01 RX ADMIN — TIMOLOL MALEATE 1 DROP: 5 SOLUTION OPHTHALMIC at 12:30

## 2025-02-01 RX ADMIN — ASPIRIN 81 MG CHEWABLE TABLET 81 MG: 81 TABLET CHEWABLE at 12:29

## 2025-02-01 RX ADMIN — FOLIC ACID 1 MG: 1 TABLET ORAL at 12:29

## 2025-02-01 RX ADMIN — CLONAZEPAM 0.5 MG: 0.5 TABLET ORAL at 12:29

## 2025-02-01 RX ADMIN — PIPERACILLIN AND TAZOBACTAM 4500 MG: 4; .5 INJECTION, POWDER, FOR SOLUTION INTRAVENOUS at 20:41

## 2025-02-01 RX ADMIN — Medication 1 CAPSULE: at 12:29

## 2025-02-01 RX ADMIN — QUETIAPINE FUMARATE 100 MG: 100 TABLET ORAL at 20:38

## 2025-02-01 RX ADMIN — TAMSULOSIN HYDROCHLORIDE 0.4 MG: 0.4 CAPSULE ORAL at 12:29

## 2025-02-01 RX ADMIN — SODIUM CHLORIDE, PRESERVATIVE FREE 10 ML: 5 INJECTION INTRAVENOUS at 20:44

## 2025-02-01 RX ADMIN — LATANOPROST 1 DROP: 50 SOLUTION OPHTHALMIC at 20:51

## 2025-02-01 RX ADMIN — LACTULOSE 30 G: 20 SOLUTION ORAL at 20:38

## 2025-02-01 RX ADMIN — LACOSAMIDE 200 MG: 100 TABLET, FILM COATED ORAL at 20:38

## 2025-02-01 RX ADMIN — LEVOTHYROXINE SODIUM 125 MCG: 0.12 TABLET ORAL at 05:10

## 2025-02-01 RX ADMIN — ENOXAPARIN SODIUM 40 MG: 100 INJECTION SUBCUTANEOUS at 12:27

## 2025-02-01 RX ADMIN — Medication 10 MG: at 20:37

## 2025-02-01 RX ADMIN — PIPERACILLIN AND TAZOBACTAM 4500 MG: 4; .5 INJECTION, POWDER, FOR SOLUTION INTRAVENOUS at 05:09

## 2025-02-01 RX ADMIN — PAROXETINE 30 MG: 10 TABLET, FILM COATED ORAL at 12:29

## 2025-02-01 RX ADMIN — PRIMIDONE 50 MG: 50 TABLET ORAL at 12:43

## 2025-02-01 RX ADMIN — SODIUM CHLORIDE, PRESERVATIVE FREE 10 ML: 5 INJECTION INTRAVENOUS at 12:28

## 2025-02-01 RX ADMIN — CLOPIDOGREL BISULFATE 75 MG: 75 TABLET ORAL at 12:28

## 2025-02-01 RX ADMIN — TIMOLOL MALEATE 1 DROP: 5 SOLUTION OPHTHALMIC at 20:51

## 2025-02-01 RX ADMIN — FAMOTIDINE 20 MG: 20 TABLET, FILM COATED ORAL at 12:28

## 2025-02-01 RX ADMIN — SODIUM CHLORIDE: 9 INJECTION, SOLUTION INTRAVENOUS at 12:48

## 2025-02-01 RX ADMIN — DOCUSATE SODIUM 100 MG: 100 CAPSULE, LIQUID FILLED ORAL at 12:29

## 2025-02-01 NOTE — PROGRESS NOTES
V2.0    [facility] Progress Note      Name:  Jovan Leal /Age/Sex: 1955  (69 y.o. male)   MRN & CSN:  [id number] & [id number] Encounter Date/Time: 2025 9:29 AM EST   Location:  [address] [place] [address] PCP: Jovan Lazcano PA-C     Attending:Giulia Faustin MD       Hospital Day: 3    Assessment and Recommendations   Jovan Leal is a 69 y.o. male with pmh of multiple admissions who presents with Acute aspiration pneumonia (HCC)      Plan:     #Aspiration pneumonia  #Chronic oropharyngeal dysphagia  -Patient presents for fever of 101.1, no new symptoms, still coughing  -CTA chest from  showing no PE, bilateral airspace disease particularly showing areas of patchy consolidation in both lower lungs, likely reflective of atypical pneumonia with differentials or congestive heart failure  -Patient with recent COVID 19 on  with acute hypoxic respiratory failure requiring dexamethasone for 10 days and was initially on remdesivir  -Was discharged on Augmentin yesterday  -Blood cultures taken at [place] [facility] and received Zosyn and IV fluids  Continue Zosyn  IV fluids  Telemetry monitoring  Follow-up on blood cultures     # Possible UTI  -UA at [facility] [facility] is positive for leukocytes   -Urine culture from  without growth  Zosyn should cover UTI     # Recent admission for nephrolithiasis s/p ureteral stent placement  -Discharged on   Follow-up with urology as outpatient for ureteroscopy/laser stone manipulation or ESWL     # Hypotension-resolved  -Initially patient was going to go to the ICU for pressor support however patient's blood pressure improved with IV fluids     # Elevated troponin likely in the setting of demand ischemia  -Troponin: 24 -->24  -EKG: NSR, no acute ischemic changes, RBBB  Continue to monitor for signs of ACS  Troponin remained flat, will stop trending     # Nonspecific 14 mm cystic structure  -In left perirectal fat with adjacent surgical clip  May need follow-up?     # Recent cervical          Physical Exam:      General: NAD, sleepy but easily arousable  Eyes: EOMI  HENT: Atraumatic  CVS: Normal S1 and S2  Respiratory: Normal breath sounds, clear to auscultation bilaterally, no respiratory distress  GI: Normal bowel sounds, soft, nondistended, nontender  MSK: no lower extremity edema  Skin: Intact, dry, warm, no rashes  Neuro: CN II to XII grossly intact  Psych: Normal mood        Medications:   Medications:    Polyvinyl Alcohol-Povidone PF  1 drop Both Eyes QAM    amLODIPine  5 mg Oral Daily    aspirin  81 mg Oral Daily    atorvastatin  80 mg Oral Nightly    clonazePAM  0.5 mg Oral BID    clopidogrel  75 mg Oral Daily    docusate sodium  100 mg Oral Daily    famotidine  20 mg Oral BID    folic acid  1 mg Oral Daily    lacosamide  200 mg Oral BID    lactobacillus  1 capsule Oral Daily    lactulose  30 g Oral BID    latanoprost  1 drop Both Eyes Nightly    levothyroxine  125 mcg Oral Daily    melatonin  10 mg Oral Nightly    PARoxetine  30 mg Oral QAM    primidone  50 mg Oral TID    QUEtiapine  100 mg Oral Nightly    tamsulosin  0.4 mg Oral Daily    timolol  1 drop Both Eyes BID    valproic acid  500 mg Oral QAM    valproic acid  1,000 mg Oral Nightly    sodium chloride flush  5-40 mL IntraVENous 2 times per day    enoxaparin  40 mg SubCUTAneous Daily    piperacillin-tazobactam  4,500 mg IntraVENous Q8H      Infusions:    lactated ringers 100 mL/hr at 01/31/25 1345    dextrose      sodium chloride 25 mL/hr at 01/31/25 2147     PRN Meds: glucose, 4 tablet, PRN  dextrose bolus, 125 mL, PRN   Or  dextrose bolus, 250 mL, PRN  glucagon (rDNA), 1 mg, PRN  dextrose, , Continuous PRN  albuterol, 2.5 mg, Q6H PRN  aluminum & magnesium hydroxide-simethicone, 5 mL, Q4H PRN  loperamide, 2 mg, TID PRN  benzonatate, 200 mg, TID PRN  guaiFENesin-dextromethorphan, 5 mL, Q4H PRN  guaiFENesin, 600 mg, Q12H PRN  sodium chloride, 1 spray, PRN  sodium chloride flush, 5-40 mL, PRN  sodium chloride, , PRN  ondansetron, 4  abnormal appearance of partial visualization of left kidney. This may indicate possible hydronephrosis or obstruction of the left kidney. Cardiomegaly.  Dictated and Electronically Signed By: Hilton Rosario DO 1/28/2025 12:51        CT ABDOMEN PELVIS W IV CONTRAST Additional Contrast? None    Result Date: 1/28/2025  PROCEDURE: CT ABDOMEN PELVIS W IV CONTRAST DATE OF EXAM:  1/28/2025 12:18 DEMOGRAPHICS: 69 years old Male INDICATION: LLQ tenderness Contrast utilized and the relevant clinical information: IOPAMIDOL 76 % IV SOLN COMPARISON: No existing relevant imaging study corresponding to the same anatomical region is available. TECHNIQUE: Contiguous axial slices of the abdomen and pelvis were submitted after the IV administration of contrast. No oral contrast was utilized. Additional coronal reformatted images were submitted.  DOSE OPTIMIZATION: CT radiation dose optimization techniques (automated exposure  control, and use of iterative reconstruction techniques, or adjustment of the mA and/or kV according to patient size) were used to limit patient radiation dose. FINDINGS: Lung bases: Patchy airspace disease in the bilateral lung bases. Trace right pleural effusion.Same-day CT angiography of the chest dictated separately. Liver: Homogeneous in attenuationSmooth contour. No suspicious liver lesion. Gallbladder/Biliary Tree: Cholecystectomy. No intrahepatic or extrahepatic biliary duct dilatation. Spleen: Normal size. Pancreas: Homogenous in attenuation. No peripancreatic stranding. Adrenal glands: Normal size and morphology of the bilateral adrenal glands. Genitourinary: Symmetric renal size. Delayed left nephrogram. Multiple left-sided renal cysts measuring up to 31 mm. 9 mm obstructing calculus in the proximal left ureter with resultant mild to moderate left hydronephrosis and mild left periureteral fat stranding. No right-sided hydronephrosis. Urinary bladder is unremarkable. Solid pelvic viscera are  Date/Time    LABA1C 5.6 10/24/2024 03:23 AM     TSH:   Lab Results   Component Value Date/Time    TSH 1.82 10/07/2015 12:19 PM     Troponin:   Lab Results   Component Value Date/Time    TROPONINT <0.010 08/16/2022 12:59 PM    TROPONINT <0.010 12/06/2021 10:44 PM    TROPONINT <0.010 10/03/2021 04:25 AM     Lactic Acid: No results for input(s): \"LACTA\" in the last 72 hours.  BNP:   No results for input(s): \"PROBNP\" in the last 72 hours.    UA:  Lab Results   Component Value Date/Time    NITRU NEGATIVE 01/28/2025 09:30 AM    COLORU Yellow 01/28/2025 09:30 AM    PHUR 6.0 01/28/2025 09:30 AM    PHUR 6.0 05/10/2013 02:51 PM    WBCUA 12 01/28/2025 09:30 AM    RBCUA 1 01/28/2025 09:30 AM    RBCUA 1 07/08/2023 10:30 AM    MUCUS RARE 01/28/2025 09:30 AM    TRICHOMONAS NONE SEEN 07/08/2023 10:30 AM    YEAST OCCASIONAL 09/20/2021 05:00 PM    BACTERIA NEGATIVE 07/08/2023 10:30 AM    CLARITYU CLEAR 07/27/2023 12:38 PM    LEUKOCYTESUR TRACE 01/28/2025 09:30 AM    UROBILINOGEN 1.0 01/28/2025 09:30 AM    BILIRUBINUR NEGATIVE 01/28/2025 09:30 AM    BLOODU NEGATIVE 07/27/2023 12:38 PM    GLUCOSEU NEGATIVE 01/28/2025 09:30 AM    KETUA NEGATIVE 01/28/2025 09:30 AM    AMORPHOUS OCCASIONAL 11/25/2020 01:15 PM     Urine Cultures: No results found for: \"LABURIN\"  Blood Cultures: No results found for: \"BC\"  No results found for: \"BLOODCULT2\"  Organism:   Lab Results   Component Value Date/Time    ORG ENC 07/11/2013 11:22 AM         Electronically signed by Giulia Faustin MD on 2/1/2025 at 10:04 AM

## 2025-02-02 LAB
ANION GAP SERPL CALCULATED.3IONS-SCNC: 8 MMOL/L (ref 9–17)
BUN SERPL-MCNC: 10 MG/DL (ref 7–20)
CALCIUM SERPL-MCNC: 8.4 MG/DL (ref 8.3–10.6)
CHLORIDE SERPL-SCNC: 111 MMOL/L (ref 99–110)
CO2 SERPL-SCNC: 29 MMOL/L (ref 21–32)
CREAT SERPL-MCNC: 0.6 MG/DL (ref 0.8–1.3)
ERYTHROCYTE [DISTWIDTH] IN BLOOD BY AUTOMATED COUNT: 14.5 % (ref 11.7–14.9)
GFR, ESTIMATED: >90 ML/MIN/1.73M2
GLUCOSE SERPL-MCNC: 87 MG/DL (ref 74–99)
HCT VFR BLD AUTO: 27.6 % (ref 42–52)
HGB BLD-MCNC: 8.3 G/DL (ref 13.5–18)
MCH RBC QN AUTO: 29.1 PG (ref 27–31)
MCHC RBC AUTO-ENTMCNC: 30.1 G/DL (ref 32–36)
MCV RBC AUTO: 96.8 FL (ref 78–100)
PLATELET # BLD AUTO: 120 K/UL (ref 140–440)
PMV BLD AUTO: 10.1 FL (ref 7.5–11.1)
POTASSIUM SERPL-SCNC: 3.1 MMOL/L (ref 3.5–5.1)
RBC # BLD AUTO: 2.85 M/UL (ref 4.6–6.2)
SODIUM SERPL-SCNC: 148 MMOL/L (ref 136–145)
WBC OTHER # BLD: 7 K/UL (ref 4–10.5)

## 2025-02-02 PROCEDURE — 6360000002 HC RX W HCPCS: Performed by: INTERNAL MEDICINE

## 2025-02-02 PROCEDURE — 6370000000 HC RX 637 (ALT 250 FOR IP): Performed by: STUDENT IN AN ORGANIZED HEALTH CARE EDUCATION/TRAINING PROGRAM

## 2025-02-02 PROCEDURE — 6360000002 HC RX W HCPCS: Performed by: STUDENT IN AN ORGANIZED HEALTH CARE EDUCATION/TRAINING PROGRAM

## 2025-02-02 PROCEDURE — 2700000000 HC OXYGEN THERAPY PER DAY

## 2025-02-02 PROCEDURE — 1200000000 HC SEMI PRIVATE

## 2025-02-02 PROCEDURE — 2500000003 HC RX 250 WO HCPCS: Performed by: INTERNAL MEDICINE

## 2025-02-02 PROCEDURE — 85027 COMPLETE CBC AUTOMATED: CPT

## 2025-02-02 PROCEDURE — 2580000003 HC RX 258: Performed by: INTERNAL MEDICINE

## 2025-02-02 PROCEDURE — 94761 N-INVAS EAR/PLS OXIMETRY MLT: CPT

## 2025-02-02 PROCEDURE — 36415 COLL VENOUS BLD VENIPUNCTURE: CPT

## 2025-02-02 PROCEDURE — 2580000003 HC RX 258: Performed by: STUDENT IN AN ORGANIZED HEALTH CARE EDUCATION/TRAINING PROGRAM

## 2025-02-02 PROCEDURE — 80048 BASIC METABOLIC PNL TOTAL CA: CPT

## 2025-02-02 PROCEDURE — 6370000000 HC RX 637 (ALT 250 FOR IP): Performed by: INTERNAL MEDICINE

## 2025-02-02 RX ORDER — HYDROXYZINE HYDROCHLORIDE 10 MG/1
10 TABLET, FILM COATED ORAL 3 TIMES DAILY PRN
Status: DISCONTINUED | OUTPATIENT
Start: 2025-02-02 | End: 2025-02-03 | Stop reason: HOSPADM

## 2025-02-02 RX ORDER — POTASSIUM CHLORIDE 7.45 MG/ML
10 INJECTION INTRAVENOUS
Status: COMPLETED | OUTPATIENT
Start: 2025-02-02 | End: 2025-02-02

## 2025-02-02 RX ADMIN — VALPROIC ACID 1000 MG: 250 SOLUTION ORAL at 20:27

## 2025-02-02 RX ADMIN — LACTULOSE 30 G: 20 SOLUTION ORAL at 20:28

## 2025-02-02 RX ADMIN — CLOPIDOGREL BISULFATE 75 MG: 75 TABLET ORAL at 08:30

## 2025-02-02 RX ADMIN — ACETAMINOPHEN 650 MG: 325 TABLET ORAL at 20:28

## 2025-02-02 RX ADMIN — AMLODIPINE BESYLATE 5 MG: 5 TABLET ORAL at 08:29

## 2025-02-02 RX ADMIN — FAMOTIDINE 20 MG: 20 TABLET, FILM COATED ORAL at 20:28

## 2025-02-02 RX ADMIN — PRIMIDONE 50 MG: 50 TABLET ORAL at 13:56

## 2025-02-02 RX ADMIN — POTASSIUM CHLORIDE 10 MEQ: 7.46 INJECTION, SOLUTION INTRAVENOUS at 15:40

## 2025-02-02 RX ADMIN — FOLIC ACID 1 MG: 1 TABLET ORAL at 08:29

## 2025-02-02 RX ADMIN — FAMOTIDINE 20 MG: 20 TABLET, FILM COATED ORAL at 08:30

## 2025-02-02 RX ADMIN — POTASSIUM CHLORIDE 10 MEQ: 7.46 INJECTION, SOLUTION INTRAVENOUS at 13:14

## 2025-02-02 RX ADMIN — LEVOTHYROXINE SODIUM 125 MCG: 0.12 TABLET ORAL at 06:05

## 2025-02-02 RX ADMIN — HYDROXYZINE HYDROCHLORIDE 10 MG: 10 TABLET, FILM COATED ORAL at 18:36

## 2025-02-02 RX ADMIN — POTASSIUM CHLORIDE 10 MEQ: 7.46 INJECTION, SOLUTION INTRAVENOUS at 12:12

## 2025-02-02 RX ADMIN — Medication 1 CAPSULE: at 08:28

## 2025-02-02 RX ADMIN — SODIUM CHLORIDE 3000 MG: 900 INJECTION INTRAVENOUS at 20:22

## 2025-02-02 RX ADMIN — POTASSIUM CHLORIDE 10 MEQ: 7.46 INJECTION, SOLUTION INTRAVENOUS at 18:18

## 2025-02-02 RX ADMIN — LACOSAMIDE 200 MG: 100 TABLET, FILM COATED ORAL at 08:29

## 2025-02-02 RX ADMIN — ENOXAPARIN SODIUM 40 MG: 100 INJECTION SUBCUTANEOUS at 08:30

## 2025-02-02 RX ADMIN — POTASSIUM CHLORIDE 10 MEQ: 7.46 INJECTION, SOLUTION INTRAVENOUS at 14:47

## 2025-02-02 RX ADMIN — QUETIAPINE FUMARATE 100 MG: 100 TABLET ORAL at 20:28

## 2025-02-02 RX ADMIN — TAMSULOSIN HYDROCHLORIDE 0.4 MG: 0.4 CAPSULE ORAL at 08:29

## 2025-02-02 RX ADMIN — PIPERACILLIN AND TAZOBACTAM 4500 MG: 4; .5 INJECTION, POWDER, FOR SOLUTION INTRAVENOUS at 05:13

## 2025-02-02 RX ADMIN — Medication 10 MG: at 20:28

## 2025-02-02 RX ADMIN — SODIUM CHLORIDE, PRESERVATIVE FREE 10 ML: 5 INJECTION INTRAVENOUS at 20:29

## 2025-02-02 RX ADMIN — TIMOLOL MALEATE 1 DROP: 5 SOLUTION OPHTHALMIC at 08:31

## 2025-02-02 RX ADMIN — ATORVASTATIN CALCIUM 80 MG: 40 TABLET, FILM COATED ORAL at 20:28

## 2025-02-02 RX ADMIN — POTASSIUM CHLORIDE 10 MEQ: 7.46 INJECTION, SOLUTION INTRAVENOUS at 16:56

## 2025-02-02 RX ADMIN — PAROXETINE 30 MG: 10 TABLET, FILM COATED ORAL at 08:29

## 2025-02-02 RX ADMIN — LATANOPROST 1 DROP: 50 SOLUTION OPHTHALMIC at 20:31

## 2025-02-02 RX ADMIN — VALPROIC ACID 500 MG: 250 SOLUTION ORAL at 08:26

## 2025-02-02 RX ADMIN — PRIMIDONE 50 MG: 50 TABLET ORAL at 08:29

## 2025-02-02 RX ADMIN — CARBOXYMETHYLCELLULOSE SODIUM 1 DROP: 10 GEL OPHTHALMIC at 08:31

## 2025-02-02 RX ADMIN — SODIUM CHLORIDE 3000 MG: 900 INJECTION INTRAVENOUS at 13:57

## 2025-02-02 RX ADMIN — DOCUSATE SODIUM 100 MG: 100 CAPSULE, LIQUID FILLED ORAL at 08:28

## 2025-02-02 RX ADMIN — LACOSAMIDE 200 MG: 100 TABLET, FILM COATED ORAL at 20:28

## 2025-02-02 RX ADMIN — CLONAZEPAM 0.5 MG: 0.5 TABLET ORAL at 08:28

## 2025-02-02 RX ADMIN — TIMOLOL MALEATE 1 DROP: 5 SOLUTION OPHTHALMIC at 20:31

## 2025-02-02 RX ADMIN — PRIMIDONE 50 MG: 50 TABLET ORAL at 20:28

## 2025-02-02 RX ADMIN — LACTULOSE 30 G: 20 SOLUTION ORAL at 08:26

## 2025-02-02 RX ADMIN — CLONAZEPAM 0.5 MG: 0.5 TABLET ORAL at 20:28

## 2025-02-02 RX ADMIN — ASPIRIN 81 MG CHEWABLE TABLET 81 MG: 81 TABLET CHEWABLE at 08:29

## 2025-02-02 RX ADMIN — SODIUM CHLORIDE: 9 INJECTION, SOLUTION INTRAVENOUS at 12:11

## 2025-02-02 ASSESSMENT — PAIN DESCRIPTION - DESCRIPTORS: DESCRIPTORS: PATIENT UNABLE TO DESCRIBE

## 2025-02-02 ASSESSMENT — PAIN DESCRIPTION - ORIENTATION: ORIENTATION: RIGHT;LEFT

## 2025-02-02 ASSESSMENT — PAIN DESCRIPTION - LOCATION: LOCATION: GENERALIZED

## 2025-02-02 NOTE — FLOWSHEET NOTE
[facility] RN rounded on pt for DI score of 54.  Pt in bed resting quietly with eyes closed.  No C/O pain.  Pt VSS.  Pt on 4 Lt NC.  Noted K+ 3.0 1/31/25, Replacement ordered and on MAR for today.

## 2025-02-02 NOTE — PLAN OF CARE
Problem: Chronic Conditions and Co-morbidities  Goal: Patient's chronic conditions and co-morbidity symptoms are monitored and maintained or improved  2/1/2025 2233 by Cathryn Schaefer RN  Outcome: Progressing  2/1/2025 1645 by Alexandr Hansen LPN  Outcome: Progressing

## 2025-02-02 NOTE — PROGRESS NOTES
V2.0    [facility] Progress Note      Name:  Jovan Leal /Age/Sex: 1955  (69 y.o. male)   MRN & CSN:  [id number] & [id number] Encounter Date/Time: 2025 9:29 AM EST   Location:  [address] [place] [address] PCP: Jovan Lazcano PA-C     Attending:Giulia Faustin MD       Hospital Day: 4    Assessment and Recommendations   Jovan Leal is a 69 y.o. male with pmh of multiple admissions who presents with Acute aspiration pneumonia (HCC)      Plan:     #Aspiration pneumonia  #Chronic oropharyngeal dysphagia  -Patient presents for fever of 101.1, no new symptoms, still coughing  -CTA chest from  showing no PE, bilateral airspace disease particularly showing areas of patchy consolidation in both lower lungs, likely reflective of atypical pneumonia with differentials or congestive heart failure  -Patient with recent COVID 19 on  with acute hypoxic respiratory failure requiring dexamethasone for 10 days and was initially on remdesivir  -Was discharged on Augmentin yesterday  -Blood cultures taken at [place] [facility] and received Zosyn and IV fluids  Zosyn down graded to unasyn  IV fluids  Telemetry monitoring  Blood cultures from  show no growth to date    Hypokalemia  Replacements ordered     # Possible UTI  -UA at [place] [facility] is positive for leukocytes   -Urine culture from  without growth  Unasyn should cover UTI     # Recent admission for nephrolithiasis s/p ureteral stent placement  -Discharged on   Follow-up with urology as outpatient for ureteroscopy/laser stone manipulation or ESWL     # Hypotension-resolved  -Initially patient was going to go to the ICU for pressor support however patient's blood pressure improved with IV fluids     # Elevated troponin likely in the setting of demand ischemia  -Troponin: 24 -->24  -EKG: NSR, no acute ischemic changes, RBBB  Continue to monitor for signs of ACS  Troponin remained flat, will stop trending     # Nonspecific 14 mm cystic structure  -In left perirectal fat with  600 mg, Q12H PRN  sodium chloride, 1 spray, PRN  sodium chloride flush, 5-40 mL, PRN  sodium chloride, , PRN  ondansetron, 4 mg, Q8H PRN   Or  ondansetron, 4 mg, Q6H PRN  polyethylene glycol, 17 g, Daily PRN  acetaminophen, 650 mg, Q6H PRN   Or  acetaminophen, 650 mg, Q6H PRN        Labs and Imaging   XR ABDOMEN (KUB) (SINGLE AP VIEW)    Result Date: 1/29/2025  PROCEDURE: XR ABDOMEN (KUB) (SINGLE AP VIEW) DATE OF EXAM: 1/29/2025 5:51 DEMOGRAPHICS: 69 years old Male INDICATION: left ureteral stone visible on KUB after stent placed? COMPARISON: CT abdomen pelvis dated 1/28/2025 TECHNIQUE: 2 images of the abdomen presented for interpretation. FINDINGS: The upper abdomen is excluded from the field-of-view. There are multiple dilated  air filled segments of small and large bowel throughout the abdomen although not dilated by size criteria. Overlying bowel gas limits evaluation of the renal  shadows. There has been interval placement of a left nephroureteral stent, cranial aspect of which projects over the left hemiabdomen at the level of L2-L3, caudal aspect of which projects over the pelvis/expected location of the urinary bladder. There is a subtle hyperdensity/calcification projecting just lateral to the stent at the level of L2-L3 which is favored to correspond to the  previously demonstrated proximal left ureteral calculus. Abdullahi catheter projects over the pelvis. A sacral nerve stimulator is evident, generator pack of which projects over the right lower quadrant. A surgical clip projects over the pelvis. Degenerative changes are identified within the spine. IMPRESSION: Interval placement of a left nephroureteral stent. A subtle hyperdensity projecting adjacent to the lateral aspect of the proximal stent is favored to represent the previously demonstrated proximal left ureteral calculus.  Dictated and Electronically Signed By: Peña Lezama MD 1/29/2025 6:16        FL LESS THAN 1 HOUR    Result Date:

## 2025-02-03 VITALS
WEIGHT: 180.2 LBS | HEIGHT: 66 IN | BODY MASS INDEX: 28.96 KG/M2 | TEMPERATURE: 97.7 F | HEART RATE: 73 BPM | RESPIRATION RATE: 16 BRPM | OXYGEN SATURATION: 100 % | DIASTOLIC BLOOD PRESSURE: 62 MMHG | SYSTOLIC BLOOD PRESSURE: 148 MMHG

## 2025-02-03 LAB
ANION GAP SERPL CALCULATED.3IONS-SCNC: 7 MMOL/L (ref 9–17)
BUN SERPL-MCNC: 11 MG/DL (ref 7–20)
CALCIUM SERPL-MCNC: 8.9 MG/DL (ref 8.3–10.6)
CHLORIDE SERPL-SCNC: 111 MMOL/L (ref 99–110)
CO2 SERPL-SCNC: 31 MMOL/L (ref 21–32)
CREAT SERPL-MCNC: 0.7 MG/DL (ref 0.8–1.3)
ERYTHROCYTE [DISTWIDTH] IN BLOOD BY AUTOMATED COUNT: 14.6 % (ref 11.7–14.9)
GFR, ESTIMATED: >90 ML/MIN/1.73M2
GLUCOSE SERPL-MCNC: 109 MG/DL (ref 74–99)
HCT VFR BLD AUTO: 31.3 % (ref 42–52)
HGB BLD-MCNC: 9.3 G/DL (ref 13.5–18)
MCH RBC QN AUTO: 28.9 PG (ref 27–31)
MCHC RBC AUTO-ENTMCNC: 29.7 G/DL (ref 32–36)
MCV RBC AUTO: 97.2 FL (ref 78–100)
PLATELET # BLD AUTO: 119 K/UL (ref 140–440)
PMV BLD AUTO: 9.5 FL (ref 7.5–11.1)
POTASSIUM SERPL-SCNC: 3.4 MMOL/L (ref 3.5–5.1)
RBC # BLD AUTO: 3.22 M/UL (ref 4.6–6.2)
SODIUM SERPL-SCNC: 149 MMOL/L (ref 136–145)
WBC OTHER # BLD: 10.5 K/UL (ref 4–10.5)

## 2025-02-03 PROCEDURE — 2700000000 HC OXYGEN THERAPY PER DAY

## 2025-02-03 PROCEDURE — 97112 NEUROMUSCULAR REEDUCATION: CPT

## 2025-02-03 PROCEDURE — 97166 OT EVAL MOD COMPLEX 45 MIN: CPT

## 2025-02-03 PROCEDURE — 2580000003 HC RX 258: Performed by: INTERNAL MEDICINE

## 2025-02-03 PROCEDURE — 6370000000 HC RX 637 (ALT 250 FOR IP): Performed by: STUDENT IN AN ORGANIZED HEALTH CARE EDUCATION/TRAINING PROGRAM

## 2025-02-03 PROCEDURE — 6360000002 HC RX W HCPCS: Performed by: STUDENT IN AN ORGANIZED HEALTH CARE EDUCATION/TRAINING PROGRAM

## 2025-02-03 PROCEDURE — 6370000000 HC RX 637 (ALT 250 FOR IP): Performed by: INTERNAL MEDICINE

## 2025-02-03 PROCEDURE — 2580000003 HC RX 258: Performed by: STUDENT IN AN ORGANIZED HEALTH CARE EDUCATION/TRAINING PROGRAM

## 2025-02-03 PROCEDURE — 92526 ORAL FUNCTION THERAPY: CPT

## 2025-02-03 PROCEDURE — 6360000002 HC RX W HCPCS: Performed by: INTERNAL MEDICINE

## 2025-02-03 PROCEDURE — 2500000003 HC RX 250 WO HCPCS: Performed by: INTERNAL MEDICINE

## 2025-02-03 PROCEDURE — 97162 PT EVAL MOD COMPLEX 30 MIN: CPT

## 2025-02-03 PROCEDURE — 36415 COLL VENOUS BLD VENIPUNCTURE: CPT

## 2025-02-03 PROCEDURE — 80048 BASIC METABOLIC PNL TOTAL CA: CPT

## 2025-02-03 PROCEDURE — 97530 THERAPEUTIC ACTIVITIES: CPT

## 2025-02-03 PROCEDURE — 85027 COMPLETE CBC AUTOMATED: CPT

## 2025-02-03 PROCEDURE — 94761 N-INVAS EAR/PLS OXIMETRY MLT: CPT

## 2025-02-03 RX ORDER — POLYETHYLENE GLYCOL 3350 17 G/17G
17 POWDER, FOR SOLUTION ORAL DAILY PRN
Qty: 527 G | Refills: 1 | Status: SHIPPED | OUTPATIENT
Start: 2025-02-03 | End: 2025-04-06

## 2025-02-03 RX ORDER — HYDROXYZINE HYDROCHLORIDE 10 MG/1
10 TABLET, FILM COATED ORAL 3 TIMES DAILY PRN
Qty: 15 TABLET | Refills: 0 | Status: SHIPPED | OUTPATIENT
Start: 2025-02-03 | End: 2025-02-08

## 2025-02-03 RX ADMIN — SODIUM CHLORIDE 3000 MG: 900 INJECTION INTRAVENOUS at 09:28

## 2025-02-03 RX ADMIN — PAROXETINE 30 MG: 10 TABLET, FILM COATED ORAL at 09:10

## 2025-02-03 RX ADMIN — SODIUM CHLORIDE: 9 INJECTION, SOLUTION INTRAVENOUS at 09:27

## 2025-02-03 RX ADMIN — SODIUM CHLORIDE 3000 MG: 900 INJECTION INTRAVENOUS at 01:35

## 2025-02-03 RX ADMIN — SODIUM CHLORIDE, PRESERVATIVE FREE 10 ML: 5 INJECTION INTRAVENOUS at 09:44

## 2025-02-03 RX ADMIN — LACOSAMIDE 200 MG: 100 TABLET, FILM COATED ORAL at 09:10

## 2025-02-03 RX ADMIN — CLOPIDOGREL BISULFATE 75 MG: 75 TABLET ORAL at 09:11

## 2025-02-03 RX ADMIN — SODIUM CHLORIDE 3000 MG: 900 INJECTION INTRAVENOUS at 15:02

## 2025-02-03 RX ADMIN — FAMOTIDINE 20 MG: 20 TABLET, FILM COATED ORAL at 09:11

## 2025-02-03 RX ADMIN — TAMSULOSIN HYDROCHLORIDE 0.4 MG: 0.4 CAPSULE ORAL at 09:11

## 2025-02-03 RX ADMIN — DOCUSATE SODIUM 100 MG: 100 CAPSULE, LIQUID FILLED ORAL at 09:10

## 2025-02-03 RX ADMIN — TIMOLOL MALEATE 1 DROP: 5 SOLUTION OPHTHALMIC at 09:44

## 2025-02-03 RX ADMIN — Medication 1 CAPSULE: at 09:11

## 2025-02-03 RX ADMIN — PRIMIDONE 50 MG: 50 TABLET ORAL at 09:11

## 2025-02-03 RX ADMIN — GUAIFENESIN SYRUP AND DEXTROMETHORPHAN 5 ML: 100; 10 SYRUP ORAL at 06:25

## 2025-02-03 RX ADMIN — CLONAZEPAM 0.5 MG: 0.5 TABLET ORAL at 09:11

## 2025-02-03 RX ADMIN — CARBOXYMETHYLCELLULOSE SODIUM 1 DROP: 10 GEL OPHTHALMIC at 09:44

## 2025-02-03 RX ADMIN — LACTULOSE 30 G: 20 SOLUTION ORAL at 09:10

## 2025-02-03 RX ADMIN — LEVOTHYROXINE SODIUM 125 MCG: 0.12 TABLET ORAL at 06:16

## 2025-02-03 RX ADMIN — PRIMIDONE 50 MG: 50 TABLET ORAL at 15:01

## 2025-02-03 RX ADMIN — VALPROIC ACID 500 MG: 250 SOLUTION ORAL at 09:11

## 2025-02-03 RX ADMIN — ENOXAPARIN SODIUM 40 MG: 100 INJECTION SUBCUTANEOUS at 09:11

## 2025-02-03 RX ADMIN — AMLODIPINE BESYLATE 5 MG: 5 TABLET ORAL at 09:11

## 2025-02-03 RX ADMIN — ASPIRIN 81 MG CHEWABLE TABLET 81 MG: 81 TABLET CHEWABLE at 09:11

## 2025-02-03 RX ADMIN — FOLIC ACID 1 MG: 1 TABLET ORAL at 09:11

## 2025-02-03 ASSESSMENT — PAIN SCALES - GENERAL: PAINLEVEL_OUTOF10: 0

## 2025-02-03 NOTE — PROGRESS NOTES
[facility]  DEPARTMENT OF SPEECH/LANGUAGE PATHOLOGY  DAILY PROGRESS NOTE  Jovan Leal  2/3/2025  [id number]  Acute aspiration pneumonia (HCC) [J69.0]  Sepsis (HCC) [A41.9]  No Known Allergies    Pt was seen this date for dysphagia treatment.     IMPRESSION AND RECOMMENDATIONS: Jovan Leal was seen for dysphagia treatment and diet tolerance monitoring. Discussed with MEENAKSHI Yan who reports no concern with intake. Pt asleep upon entry and lethargic throughout sessions requiring repeated verbal stimulation to remain awake. HOB elevated and pt repositioning as upright as possible in bed. Pt noted to have positional difficulties with in take during beside evaluation. No overt s/s of aspiration noted when pt is fully upright position. Pt consumed trials of thin liquid via straw, nectar via straw, puree, and minced and moist solids. Oral deficits characterized by discoordinated attempts at labial seal around straw, decreased attention to bolus, prolonged/disorganized manipulation, decreased formation, slow ap transit, decreased bolus formation, suspect premature spillage and diffuse oral residue, especially in buccal vestibule. Pt sensate to oral residue but required multiple cues to clear oral cavity completely. Immediate cough x2 with initial sips, no other overt s/s of aspiration with any consistencies, including further thin liquid trials. Suspect pt's lethargy impacting swallow function as no overt s/s of aspiration when more alert during session.     Recommend minced and moist solids/thin liquids by small sips with strict aspiration precautions. Pt requires being positioned upright 90 degrees for all PO intake given. Pills should be given in puree. SLP will continue to follow.        GOALS (current status in bold):  Short-term Goals  Timeframe for Short-term Goals: LOS or until goals are met  Goal 1: Pt will tolerate minced and moist solids/thin liquids by small sips without clinical  evidence of aspiration 100% - Not Met, Pt with cough x2 during treatment; Continue  Goal 2: Pt/caregivers will demonstrate comrpehension of recommendations/safe feeding protocol/POC - discussed recommendations/safe swallowing strategies with MEENAKSHI Yan    EDUCATION: recommendations    PAIN RATING (0-10 Scale): Does not report pain  Time in/Time out: SLP Individual Minutes  Time In: 1320  Time Out: 1336  Minutes: 16    Visit number: 2    MAYANK Barton  2/3/2025  2:11 PM

## 2025-02-03 NOTE — CONSULTS
[facility] ACUTE CARE PHYSICAL THERAPY EVALUATION  Jovan Leal, 1955, 4106/4106-A, 2/3/2025    History  [place]:    Patient  has a past medical history of Acute CVA (cerebrovascular accident) (HCC), Anemia, Aspiration pneumonia (HCC), Cerebral palsy (HCC), Depression, Epilepsy (HCC), Frequent falls, Glaucoma, History of IBS, Hx MRSA infection, Hyperammonemia (HCC), Hypertension, IBS (irritable bowel syndrome), Kidney stone, Mood disorder (HCC), MRSA (methicillin resistant staph aureus) culture positive, MRSA (methicillin resistant staph aureus) culture positive, Occasional tremors, Pressure injury of contiguous region involving back and right buttock, stage 2 (HCC), Pressure injury of left buttock, stage 1, Prostatitis, Thyroid disease, and Ulcerative colitis (HCC).  Patient  has a past surgical history that includes Gallbladder surgery (2005); Vagus nerve stimulator insertion (2002); Cholecystectomy; Cystoscopy (Left, 07/11/2013); Kidney stone surgery; Colonoscopy (8/19/14, 5/2012); other surgical history (04/15/2015); Upper gastrointestinal endoscopy (N/A, 11/13/2018); Colonoscopy (02/04/2021); Colonoscopy (N/A, 2/4/2021); Stimulator Surgery (N/A, 3/24/2021); Stimulator Surgery (N/A, 3/24/2021); Facial Surgery (N/A, 10/8/2021); Colonoscopy (N/A, 2/22/2024); and Cystoscopy (Left, 1/28/2025).    Discharge Recommendation: Facility for moderate post-acute rehabilitation, anticipate 1-2 hours per day and 5 days per week.     Equipment: TBD at next level of care    Subjective:    Patient states:  \"It's nice to meet you!\"      Pain:  denies pain.      Communication with other providers:  Handoff to RN, OT    Restrictions: general precautions, fall risk    Home Setup/Prior level of function  Social/Functional History  Lives With: Home care staff  Type of Home: House (group home)  Home Equipment: Wheelchair - Manual  Prior Level of Assist for ADLs: Needs assistance (set-up A with feeding, min to max A

## 2025-02-03 NOTE — PROGRESS NOTES
2/3/2025 3:53 PM  Patient Room #: 4106/4106-A  Patient Name: Jovan Leal    (Step 1 Done by RN if possible otherwise call Pulmonary Diagnostics)  Place patient on room air at rest for at least 30 minutes.  If patient falls below 88% before 30 minutes then you can record the level and stop.  Record room air saturation level __ %.  If patient is at 88% or below, they will qualify for home oxygen and you can stop.  If level does not fall below 88%, fill in level above. If indicated continue to Step 2.   Signature:_____ Date: ___  (Step 2&3 Done by P)  Ambulate patient on room air until saturation falls below 89%.  Record level of room air saturation with ambulation___ %.  Next, place patient back on ___lpm oxygen and ambulate, record level __%.  (Note:  this level must show improvement from room air level done with ambulation.)  If patient’s saturation on room air with ambulation is 88% or below AND patient shows improvement with oxygen during ambulation, they will qualify for home oxygen and you can stop.  If patient does not drop below 89%, then patient should have an overnight oximetry trending on room air to see if level falls below 88%.  Complete level in Step 3 below.    Room air overnight oximetry level 88 % for___  cumulative minutes.  If patient’s room air oxygen level is below <89% for any amount of time they will qualify for nocturnal home oxygen.        (Attach Night Trending Report)    Complete order below: Diagnosis:__Pneumonia__  Home oxygen at:  Length of Need: ? Lifetime ?X 3 Months     ___lpm or __%   via  [] nasal cannula  []mask  [] other         []continuous []  with activity  []  Nocturnal   [] Portable Tanks []  Concentrator  [] Conserving Device        Therapist Signature:_______     Date:  ___  Physician Signature:  __Electronically Signed in EMR_    Date:___  Physician Printed Name:  ___Giulia Faustin MD  NPI:  [id number] ___    [] Patient Qualifies      [] Patient Does NOT

## 2025-02-03 NOTE — PLAN OF CARE
Problem: Chronic Conditions and Co-morbidities  Goal: Patient's chronic conditions and co-morbidity symptoms are monitored and maintained or improved  2/2/2025 2302 by Addie Bradley RN  Outcome: Progressing  2/2/2025 1406 by Alexandr Hansen LPN  Outcome: Progressing     Problem: Discharge Planning  Goal: Discharge to home or other facility with appropriate resources  2/2/2025 2302 by Addie Bradley RN  Outcome: Progressing  2/2/2025 1406 by Alexandr Hansen LPN  Outcome: Progressing     Problem: Safety - Adult  Goal: Free from fall injury  2/2/2025 2302 by Addie Bradley RN  Outcome: Progressing  2/2/2025 1406 by Alexandr Hansen LPN  Outcome: Progressing     Problem: Skin/Tissue Integrity  Goal: Skin integrity remains intact  Description: 1.  Monitor for areas of redness and/or skin breakdown  2.  Assess vascular access sites hourly  3.  Every 4-6 hours minimum:  Change oxygen saturation probe site  4.  Every 4-6 hours:  If on nasal continuous positive airway pressure, respiratory therapy assess nares and determine need for appliance change or resting period  2/2/2025 1406 by Alexandr Hansen LPN  Outcome: Progressing

## 2025-02-03 NOTE — CARE COORDINATION
Pt on discharge to [facility], set up with [place] [facility] for 1600.  Updated nurse Farrah, pt's Guardian Charlotte, (she has a question for Dr Faustin, PS sent to call her), and VM left for Star at [facility].

## 2025-02-03 NOTE — PROGRESS NOTES
Called MEENAKSHI Yan to start home o2 eval.    She stated   Patient was discharged and has already left.  Pt going to [facility] and uses [facility]lpm there.  No Home O2 was needed.

## 2025-02-03 NOTE — DISCHARGE SUMMARY
V2.0  Discharge Summary    Name:  Jovan Leal /Age/Sex: 1955 (69 y.o. male)   Admit Date: 2025  Discharge Date: 2/3/25    MRN & CSN:  [id number] & [id number] Encounter Date and Time 2/3/25 2:25 PM EST    Attending:  Giulia Faustin MD Discharging Provider: Giulia Faustin MD       Hospital Course:     Brief HPI: Jovan Leal is a 69 y.o. male who presented with ***    Brief Problem Based Course:   ***      The patient expressed appropriate understanding of, and agreement with the discharge recommendations, medications, and plan.     Consults this admission:  IP CONSULT TO VASCULAR ACCESS TEAM  IP CONSULT TO VASCULAR ACCESS TEAM    Discharge Diagnosis:   Acute aspiration pneumonia (HCC)    ***    Discharge Instruction:   Follow up appointments: ***  Primary care physician: Jovan Lazcano PA-C within 1 week  Diet: {diet:24627}   Activity: {discharge activity:31259}  Disposition: Discharged to:   []Home, [][facility], []SNF, []Acute Rehab, []Hospice ***  Condition on discharge: Stable  Labs and Tests to be Followed up as an outpatient by PCP or Specialist: ***    Discharge Medications:        Medication List        START taking these medications      hydrOXYzine HCl 10 MG tablet  Commonly known as: ATARAX  Take 1 tablet by mouth 3 times daily as needed for Itching     polyethylene glycol 17 g packet  Commonly known as: GLYCOLAX  Take 1 packet by mouth daily as needed for Constipation            CHANGE how you take these medications      lacosamide 200 MG tablet  Commonly known as: VIMPAT  (CONTROL CYCLE) TAKE 1 TABLET BY MOUTH TWICE DAILY FOR EPILEPSY (MAX DAILY AMOUNT: 400MG)  What changed: See the new instructions.     zonisamide 100 MG capsule  Commonly known as: ZONEGRAN  Take 1 capsule in the morning and 3 capsules at bedtime  What changed:   additional instructions  Another medication with the same name was removed. Continue taking this medication, and follow the directions you see here.            CONTINUE    primidone 50 MG tablet  Commonly known as: MYSOLINE  Take 1 tablet by mouth 3 times daily     QUEtiapine 50 MG tablet  Commonly known as: SEROQUEL     sodium chloride 0.65 % nasal spray  Commonly known as: OCEAN     tamsulosin 0.4 MG capsule  Commonly known as: FLOMAX  Take 1 capsule by mouth daily     timolol 0.5 % ophthalmic solution  Commonly known as: TIMOPTIC     * valproic acid 250 MG/5ML Soln oral solution  Commonly known as: DEPAKENE     * valproic acid 250 MG/5ML Soln oral solution  Commonly known as: DEPAKENE           * This list has 4 medication(s) that are the same as other medications prescribed for you. Read the directions carefully, and ask your doctor or other care provider to review them with you.                STOP taking these medications      Amitiza 24 MCG capsule  Generic drug: lubiprostone     CLEAR FIBER POWDER PO     ENULOSE PO     vitamin D 25 MCG (1000 UT) Caps               Where to Get Your Medications        These medications were sent to Dinesh BRENNAN ([facility]) - [address] - P [phone] - F [phone]  [address] [place] [address] [address] [address]      Phone: [phone]   amoxicillin-clavulanate 875-125 MG per tablet  hydrOXYzine HCl 10 MG tablet  polyethylene glycol 17 g packet        Objective Findings at Discharge:   BP (!) 148/62   Pulse 73   Temp 97.7 °F (36.5 °C) (Oral)   Resp 16   Ht 1.676 m (5' 6\")   Wt 81.7 kg (180 lb 3.2 oz)   SpO2 100%   BMI 29.09 kg/m²       Physical Exam:     General: NAD, sleepy but easily arousable  Eyes: EOMI  HENT: Atraumatic  CVS: Normal S1 and S2  Respiratory: Normal breath sounds, clear to auscultation bilaterally, no respiratory distress  GI: Normal bowel sounds, soft, nondistended, nontender  MSK: no lower extremity edema  Skin: Intact, dry, warm, no rashes  Neuro: CN II to XII grossly intact  Psych: Normal mood        Labs and Imaging   XR ABDOMEN (KUB) (SINGLE AP VIEW)    Result Date:  at 2.5 mm segments following intravenous contrast administration. Additional sagittal and coronal reconstructed images were reviewed. CT radiation dose optimization techniques including automated exposure control and use of iterative reconstruction techniques or adjustment of the mA and or kV according to patient size were used  to limit the patient's radiation dose. Standard chest CT findings: There is airspace infiltrative changes involving the left upper lobe. Similar changes with further more prominent areas of infiltrate or consolidation are seen involving both right and left lower lobes. Small right base pleural effusion is indicated. Heart is enlarged. A small pericardial effusion is present. Imaging of the upper abdomen shows no adrenal masses. Gallbladder is absent. There is indication of enlargement of the left kidney possibly within associated  hydronephrosis or parapelvic renal cysts. CT angiography findings: The upper abdominal aorta as well as the thoracic aorta show no evidence of aneurysm or dissection. Right main pulmonary artery shows cross-sectional diameter 26 mm with the left measuring 27 mm both within normal standards. No major filling defects are seen within the primary right and left pulmonary arteries. Distal vessels are difficult to evaluate due to limitations in contrast. No definitive filling defects are noted. This includes areas of consolidation within both lung bases particularly on the right. IMPRESSION: No CT suspected evidence of pulmonary embolism. Bilateral airspace disease particularly showing areas of patchy consolidation in  both lower lungs is likely reflective of atypical pneumonia with differentials or congestive failure. Suspected abnormal appearance of partial visualization of left kidney. This may indicate possible hydronephrosis or obstruction of the left kidney. Cardiomegaly.  Dictated and Electronically Signed By: Hilton Rosario DO 1/28/2025 12:51        CT ABDOMEN  splenic and superior mesenteric veins are patent.  No free fluid or air.  14 mm cystic structure in the left perirectal fat with adjacent surgical clip. Lymph nodes: No enlarged lymph nodes by CT size criteria. Musculoskeletal: A sacral nerve stimulator is in place.No acute osseous abnormality. Ossification of the supraspinous ligament. Questionable fusion of portions of the right sacroiliac joint. Degenerative changes spine. IMPRESSION: Patchy bilateral airspace disease and trace right pleural effusion and the visualized lung bases. Same-day CT angiography dictated separately. Obstructing 9 mm calculus in the proximal left ureter with resultant mild to moderate left hydronephrosis. Cholecystectomy. Nonspecific 14 mm cystic structure in the left perirectal fat with adjacent surgical clip. Correlate with clinical history. Osseous changes in the spine and pelvis, which can be seen in the setting of ankylosing spondylitis. Clinical correlation advised. Ancillary findings as detailed above. This dictation was created with voice recognition software.  While attempts have  been made to review the dictation as it is transcribed, on occasion the spoken word can be misinterpreted by the technology leading to omissions or inappropriate words, phrases or sentences.  Dictated and Electronically Signed By: Kolby Verma MD 1/28/2025 12:49        CT HEAD WO CONTRAST    Result Date: 1/28/2025  PROCEDURE: CT HEAD WO CONTRAST DATE OF EXAM:  1/28/2025 12:13 DEMOGRAPHICS: 69 years old Male INDICATION: AMS; left upper extremity weakness COMPARISON: No existing relevant imaging study corresponding to the same anatomical region is available. TECHNIQUE: Contiguous axial slices of the head were submitted without IV contrast.   DOSE OPTIMIZATION: CT radiation dose optimization techniques (automated exposure  control, and use of iterative reconstruction techniques, or adjustment of the mA and/or kV according to patient size) were used to limit

## 2025-02-03 NOTE — CONSULTS
[facility] ACUTE CARE OCCUPATIONAL THERAPY EVALUATION  Jovan Leal, 1955, 4106/4106-A, 2/3/2025    Discharge Recommendation: Encourage facility for post-acute rehabilitation, anticipate 1-2 hours per day and 5 days per week.    History  [place]:  PNA  Patient  has a past medical history of Acute CVA (cerebrovascular accident) ([facility]), Anemia, Aspiration pneumonia ([facility]), Cerebral palsy ([facility]), Depression, Epilepsy ([facility]), Frequent falls, Glaucoma, History of IBS, Hx MRSA infection, Hyperammonemia ([facility]), Hypertension, IBS (irritable bowel syndrome), Kidney stone, Mood disorder ([facility]), MRSA (methicillin resistant staph aureus) culture positive, MRSA (methicillin resistant staph aureus) culture positive, Occasional tremors, Pressure injury of contiguous region involving back and right buttock, stage 2 (HCC), Pressure injury of left buttock, stage 1, Prostatitis, Thyroid disease, and Ulcerative colitis ([facility]).  Patient  has a past surgical history that includes Gallbladder surgery (2005); Vagus nerve stimulator insertion (2002); Cholecystectomy; Cystoscopy (Left, 07/11/2013); Kidney stone surgery; Colonoscopy (8/19/14, 5/2012); other surgical history (04/15/2015); Upper gastrointestinal endoscopy (N/A, 11/13/2018); Colonoscopy (02/04/2021); Colonoscopy (N/A, 2/4/2021); Stimulator Surgery (N/A, 3/24/2021); Stimulator Surgery (N/A, 3/24/2021); Facial Surgery (N/A, 10/8/2021); Colonoscopy (N/A, 2/22/2024); and Cystoscopy (Left, 1/28/2025).    Subjective:  Patient states:  \"Blue\" -pt perseverating on last name during orientation questions   Pain:  denies    Communication: RN, CM, co-eval with PT Cyndy for safety and activity tolerance   Restrictions: general precautions, fall risk, Hx of CP with L side spacticity     Home Setup/Prior level of function  Social/Functional History  Lives With: Home care staff  Type of Home: House (group home)  Home Equipment: Wheelchair - Manual  Prior Level of Assist for ADLs: Needs  assistance (set-up A with feeding, min to max A with other ADLs)  Prior Level of Assist for Transfers:  (supervision)  Additional Comments: Pt was living in group home prior to November 2024 where he preformed bed mobility/SPT SBA and propelled w/c mod I. Pt broke neck November 2024 and has since been in rehab working towards PL and returning to group home    Examination of body systems (includes body structures/functions, activity/participation limitations):  Observation:  Supine in bed upon arrival, agreeable to therapy   Vision:  appears WFL  Hearing:  WFL  Cardiopulmonary:  On 4L, SpO2 EOB >90%      Body Systems and functions:  ROM R/L:  R shoulder ~90d, elbow appears WFL, forearm supination slightly impaired, wrist and hand appear WFL; L UE impaired by CP with minimal AROM noted  Strength R/L:  RUE 2- to 4-/5,  LUE 2-/5, R  4-/5  Sensation: WFL  Tone: Normal  Coordination: WFL  Perception: WNL    Pt body function inferred from observation of gross motor function, and assessment of mobility, balance, posture, ADL performance, and activity tolerance.    Activities of Daily Living (ADLs):  Feeding: Mod A   Grooming: Mod A   UB bathing: Max A  LB bathing: Dep A   UB dressing: Max A  LB dressing: Dep A  Toileting: Dep A       Pt ADL function inferred from observation of gross motor function, and assessment of mobility, balance, posture, safety awareness, cognition, and activity tolerance.     AM-PAC 6 click short form for inpatient daily activity:   How much help from another person does the patient currently need... Unable  Dep A Lot  Max A A Lot   Mod A A Little  Min A A Little   CGA  SBA None   Mod I  Indep  Sup   1.  Putting on and taking off regular lower body clothing? [x] 1    [] 2   [] 2   [] 3   [] 3   [] 4      2. Bathing (including washing, rinsing, drying)? [x] 1   [] 2   [] 2 [] 3 [] 3 [] 4   3. Toileting, which includes using toilet, bedpan, or urinal? [x] 1    [] 2   [] 2   [] 3   [] 3   [] 4    instructed today.  Cues were given for safety, sequence, UE/LE placement, awareness, and balance.    Activities performed today included bed mobility training, sup-sit      Safety: Patient left in bed with alarm on, call light within reach, RN notified, gait belt used.    Assessment:  Pt is a 70 y/o male admitted for PNA. Pt at baseline is assist with ADLs and stand-by assist with functional transfers/mobility using w/c, pt has been in SNF since November 2024. Pt currently presents w/ deficits relating to ADLs, IADLs, UE strength/ROM, functional activity tolerance, cognition, safety awareness, and functional mobility. Pt would benefit from continued acute care OT services     Complexity: Moderate   Prognosis: Good, no significant barriers to participation at this time.   Occupational Therapy Plan  Times Per Week: 3-5x wk       Goals:  - Pt will perform UE ADLs with Mod A  using AE PRN by d/c  - Pt will perform LE ADLs with Max A  using AE PRN by d/c  - Pt will perform toileting with Max A using AE PRN by d/c  - Pt will perform HH distance functional mobility with Max A  using DME PRN in preparation for return to home   - Pt will perform functional transfers to/from bed, chair, and toilet with Max A using AE PRN by d/c  - Pt will perform therex/theract in order to increase functional activity tolerance    Treatment plan:  Pt will perform therex/theract in order to increase functional activity tolerance in preparation for ADL participation.     Recommendations for NURSING activity: Up to chair for all 3 meals and t[place] to [facility] for all toileting needs    Time:   Time in: 1135  Time out: 1157  Timed treatment minutes: 11  Total time: 22    Electronically signed by:    ADARSH Pablo/L [id number]  2/3/2025, 5:49 PM

## 2025-02-04 LAB
MICROORGANISM SPEC CULT: NORMAL
MICROORGANISM SPEC CULT: NORMAL
SERVICE CMNT-IMP: NORMAL
SERVICE CMNT-IMP: NORMAL
SPECIMEN DESCRIPTION: NORMAL
SPECIMEN DESCRIPTION: NORMAL

## 2025-02-10 ENCOUNTER — TELEPHONE (OUTPATIENT)
Age: 70
End: 2025-02-10

## 2025-02-10 NOTE — TELEPHONE ENCOUNTER
Received call from Alberto with [facility] stating Zonisamide discontinued 2/3/25. Advised to check with discontinuing agency, as our office did not discontinue medication. He stated he would leave on medication list, as this could have been accidental. Upon review, appears pt recently hospitalized and upon discharge, pt was to continue medication.

## 2025-02-11 ENCOUNTER — HOSPITAL ENCOUNTER (INPATIENT)
Age: 70
LOS: 2 days | Discharge: HOSPICE | DRG: 641 | End: 2025-02-14
Attending: EMERGENCY MEDICINE | Admitting: INTERNAL MEDICINE
Payer: MEDICARE

## 2025-02-11 DIAGNOSIS — Z86.69 HISTORY OF CEREBRAL PALSY: ICD-10-CM

## 2025-02-11 DIAGNOSIS — E87.0 HYPERNATREMIA: Primary | ICD-10-CM

## 2025-02-11 LAB
ANION GAP SERPL CALCULATED.3IONS-SCNC: 4 MMOL/L (ref 9–17)
ARTERIAL PATENCY WRIST A: ABNORMAL
BASOPHILS # BLD: 0.05 K/UL
BASOPHILS NFR BLD: 1 % (ref 0–1)
BODY TEMPERATURE: 37
BUN SERPL-MCNC: 12 MG/DL (ref 7–20)
CALCIUM SERPL-MCNC: 9.6 MG/DL (ref 8.3–10.6)
CHLORIDE SERPL-SCNC: 117 MMOL/L (ref 99–110)
CO2 SERPL-SCNC: 39 MMOL/L (ref 21–32)
COHGB MFR BLD: 0.6 % (ref 0.5–1.5)
CREAT SERPL-MCNC: 0.6 MG/DL (ref 0.8–1.3)
EOSINOPHIL # BLD: 0.02 K/UL
EOSINOPHILS RELATIVE PERCENT: 0 % (ref 0–3)
ERYTHROCYTE [DISTWIDTH] IN BLOOD BY AUTOMATED COUNT: 15.4 % (ref 11.7–14.9)
GFR, ESTIMATED: >90 ML/MIN/1.73M2
GLUCOSE SERPL-MCNC: 124 MG/DL (ref 74–99)
HCO3 VENOUS: 35.7 MMOL/L (ref 22–29)
HCT VFR BLD AUTO: 33.4 % (ref 42–52)
HGB BLD-MCNC: 9.7 G/DL (ref 13.5–18)
IMM GRANULOCYTES # BLD AUTO: 0.38 K/UL
IMM GRANULOCYTES NFR BLD: 5 %
LYMPHOCYTES NFR BLD: 1.78 K/UL
LYMPHOCYTES RELATIVE PERCENT: 23 % (ref 24–44)
MAGNESIUM SERPL-MCNC: 2.4 MG/DL (ref 1.8–2.4)
MCH RBC QN AUTO: 29 PG (ref 27–31)
MCHC RBC AUTO-ENTMCNC: 29 G/DL (ref 32–36)
MCV RBC AUTO: 100 FL (ref 78–100)
METHEMOGLOBIN: 0 % (ref 0.5–1.5)
MONOCYTES NFR BLD: 0.84 K/UL
MONOCYTES NFR BLD: 11 % (ref 0–4)
NEUTROPHILS NFR BLD: 61 % (ref 36–66)
NEUTS SEG NFR BLD: 4.85 K/UL
OXYHGB MFR BLD: 40 %
PCO2 VENOUS: 63.9 MM HG (ref 38–54)
PH VENOUS: 7.37 (ref 7.32–7.43)
PLATELET # BLD AUTO: 220 K/UL (ref 140–440)
PMV BLD AUTO: 10.7 FL (ref 7.5–11.1)
PO2 VENOUS: 26.3 MM HG (ref 23–48)
POSITIVE BASE EXCESS, VEN: 9 MMOL/L (ref 0–3)
POTASSIUM SERPL-SCNC: 3.3 MMOL/L (ref 3.5–5.1)
RBC # BLD AUTO: 3.34 M/UL (ref 4.6–6.2)
SODIUM SERPL-SCNC: 160 MMOL/L (ref 136–145)
TEXT FOR RESPIRATORY: ABNORMAL
WBC OTHER # BLD: 7.9 K/UL (ref 4–10.5)

## 2025-02-11 PROCEDURE — 36415 COLL VENOUS BLD VENIPUNCTURE: CPT

## 2025-02-11 PROCEDURE — 82805 BLOOD GASES W/O2 SATURATION: CPT

## 2025-02-11 PROCEDURE — 99285 EMERGENCY DEPT VISIT HI MDM: CPT

## 2025-02-11 PROCEDURE — 80048 BASIC METABOLIC PNL TOTAL CA: CPT

## 2025-02-11 PROCEDURE — 85025 COMPLETE CBC W/AUTO DIFF WBC: CPT

## 2025-02-11 PROCEDURE — 2700000000 HC OXYGEN THERAPY PER DAY

## 2025-02-11 PROCEDURE — 83735 ASSAY OF MAGNESIUM: CPT

## 2025-02-11 RX ORDER — DEXTROSE MONOHYDRATE 50 MG/ML
INJECTION, SOLUTION INTRAVENOUS CONTINUOUS
Status: DISCONTINUED | OUTPATIENT
Start: 2025-02-12 | End: 2025-02-14 | Stop reason: HOSPADM

## 2025-02-11 ASSESSMENT — PAIN SCALES - GENERAL: PAINLEVEL_OUTOF10: 0

## 2025-02-11 NOTE — TELEPHONE ENCOUNTER
Reached out to [facility], spoke with nurse there and per administrative history, it seems like they have been giving medication. I explained we haven't refilled this medication since 11/2023. States she will discuss with Nurse Practitioner there and call us back once clarified if they have been or are reordering every time.

## 2025-02-12 PROBLEM — E87.0 HYPERNATREMIA: Status: ACTIVE | Noted: 2025-02-12

## 2025-02-12 LAB
ANION GAP SERPL CALCULATED.3IONS-SCNC: 1 MMOL/L (ref 9–17)
ANION GAP SERPL CALCULATED.3IONS-SCNC: 1 MMOL/L (ref 9–17)
ANION GAP SERPL CALCULATED.3IONS-SCNC: 2 MMOL/L (ref 9–17)
ANION GAP SERPL CALCULATED.3IONS-SCNC: 4 MMOL/L (ref 9–17)
BASOPHILS # BLD: 0.02 K/UL
BASOPHILS NFR BLD: 0 % (ref 0–1)
BILIRUB UR QL STRIP: NEGATIVE
BUN SERPL-MCNC: 11 MG/DL (ref 7–20)
BUN SERPL-MCNC: 12 MG/DL (ref 7–20)
BUN SERPL-MCNC: 12 MG/DL (ref 7–20)
BUN SERPL-MCNC: 9 MG/DL (ref 7–20)
CALCIUM SERPL-MCNC: 9 MG/DL (ref 8.3–10.6)
CALCIUM SERPL-MCNC: 9 MG/DL (ref 8.3–10.6)
CALCIUM SERPL-MCNC: 9.2 MG/DL (ref 8.3–10.6)
CALCIUM SERPL-MCNC: 9.3 MG/DL (ref 8.3–10.6)
CHLORIDE SERPL-SCNC: 110 MMOL/L (ref 99–110)
CHLORIDE SERPL-SCNC: 110 MMOL/L (ref 99–110)
CHLORIDE SERPL-SCNC: 116 MMOL/L (ref 99–110)
CHLORIDE SERPL-SCNC: 116 MMOL/L (ref 99–110)
CLARITY UR: ABNORMAL
CO2 SERPL-SCNC: 34 MMOL/L (ref 21–32)
CO2 SERPL-SCNC: 38 MMOL/L (ref 21–32)
CO2 SERPL-SCNC: 41 MMOL/L (ref 21–32)
CO2 SERPL-SCNC: 42 MMOL/L (ref 21–32)
COLOR UR: YELLOW
CREAT SERPL-MCNC: 0.4 MG/DL (ref 0.8–1.3)
CREAT SERPL-MCNC: 0.5 MG/DL (ref 0.8–1.3)
CREAT SERPL-MCNC: 0.6 MG/DL (ref 0.8–1.3)
CREAT SERPL-MCNC: 0.6 MG/DL (ref 0.8–1.3)
CREAT UR-MCNC: 20.4 MG/DL (ref 39–259)
EOSINOPHIL # BLD: 0.03 K/UL
EOSINOPHILS RELATIVE PERCENT: 1 % (ref 0–3)
ERYTHROCYTE [DISTWIDTH] IN BLOOD BY AUTOMATED COUNT: 15.7 % (ref 11.7–14.9)
GFR, ESTIMATED: >90 ML/MIN/1.73M2
GLUCOSE SERPL-MCNC: 143 MG/DL (ref 74–99)
GLUCOSE SERPL-MCNC: 154 MG/DL (ref 74–99)
GLUCOSE SERPL-MCNC: 161 MG/DL (ref 74–99)
GLUCOSE SERPL-MCNC: 202 MG/DL (ref 74–99)
GLUCOSE UR STRIP-MCNC: NEGATIVE MG/DL
HCT VFR BLD AUTO: 24.8 % (ref 42–52)
HGB BLD-MCNC: 7.1 G/DL (ref 13.5–18)
HGB UR QL STRIP.AUTO: ABNORMAL
IMM GRANULOCYTES # BLD AUTO: 0.19 K/UL
IMM GRANULOCYTES NFR BLD: 4 %
KETONES UR STRIP-MCNC: NEGATIVE MG/DL
LEUKOCYTE ESTERASE UR QL STRIP: ABNORMAL
LYMPHOCYTES NFR BLD: 1.54 K/UL
LYMPHOCYTES RELATIVE PERCENT: 31 % (ref 24–44)
MAGNESIUM SERPL-MCNC: 2 MG/DL (ref 1.8–2.4)
MAGNESIUM SERPL-MCNC: 2.1 MG/DL (ref 1.8–2.4)
MCH RBC QN AUTO: 29.1 PG (ref 27–31)
MCHC RBC AUTO-ENTMCNC: 28.6 G/DL (ref 32–36)
MCV RBC AUTO: 101.6 FL (ref 78–100)
MONOCYTES NFR BLD: 0.57 K/UL
MONOCYTES NFR BLD: 11 % (ref 0–4)
NEUTROPHILS NFR BLD: 53 % (ref 36–66)
NEUTS SEG NFR BLD: 2.65 K/UL
NITRITE UR QL STRIP: NEGATIVE
OSMOLALITY UR: 458 MOSM/KG (ref 292–1090)
PH UR STRIP: 8.5 [PH] (ref 5–8)
PLATELET # BLD AUTO: 120 K/UL (ref 140–440)
PMV BLD AUTO: 11 FL (ref 7.5–11.1)
POTASSIUM SERPL-SCNC: 2.9 MMOL/L (ref 3.5–5.1)
POTASSIUM SERPL-SCNC: 3 MMOL/L (ref 3.5–5.1)
POTASSIUM SERPL-SCNC: 3.4 MMOL/L (ref 3.5–5.1)
POTASSIUM SERPL-SCNC: 3.6 MMOL/L (ref 3.5–5.1)
PROT UR STRIP-MCNC: 100 MG/DL
RBC # BLD AUTO: 2.44 M/UL (ref 4.6–6.2)
RBC #/AREA URNS HPF: 1936 /HPF (ref 0–2)
SODIUM SERPL-SCNC: 148 MMOL/L (ref 136–145)
SODIUM SERPL-SCNC: 149 MMOL/L (ref 136–145)
SODIUM SERPL-SCNC: 159 MMOL/L (ref 136–145)
SODIUM SERPL-SCNC: 159 MMOL/L (ref 136–145)
SODIUM UR-SCNC: 184 MMOL/L (ref 40–220)
SP GR UR STRIP: 1.02 (ref 1–1.03)
UROBILINOGEN UR STRIP-ACNC: 0.2 EU/DL (ref 0–1)
WBC #/AREA URNS HPF: 44 /HPF (ref 0–5)
WBC OTHER # BLD: 5 K/UL (ref 4–10.5)

## 2025-02-12 PROCEDURE — 92610 EVALUATE SWALLOWING FUNCTION: CPT

## 2025-02-12 PROCEDURE — 36415 COLL VENOUS BLD VENIPUNCTURE: CPT

## 2025-02-12 PROCEDURE — 6370000000 HC RX 637 (ALT 250 FOR IP): Performed by: INTERNAL MEDICINE

## 2025-02-12 PROCEDURE — 2580000003 HC RX 258: Performed by: EMERGENCY MEDICINE

## 2025-02-12 PROCEDURE — 6360000002 HC RX W HCPCS: Performed by: INTERNAL MEDICINE

## 2025-02-12 PROCEDURE — 84300 ASSAY OF URINE SODIUM: CPT

## 2025-02-12 PROCEDURE — 80048 BASIC METABOLIC PNL TOTAL CA: CPT

## 2025-02-12 PROCEDURE — 1200000000 HC SEMI PRIVATE

## 2025-02-12 PROCEDURE — 2500000003 HC RX 250 WO HCPCS: Performed by: INTERNAL MEDICINE

## 2025-02-12 PROCEDURE — 82570 ASSAY OF URINE CREATININE: CPT

## 2025-02-12 PROCEDURE — 83735 ASSAY OF MAGNESIUM: CPT

## 2025-02-12 PROCEDURE — 85025 COMPLETE CBC W/AUTO DIFF WBC: CPT

## 2025-02-12 PROCEDURE — 83935 ASSAY OF URINE OSMOLALITY: CPT

## 2025-02-12 PROCEDURE — 81001 URINALYSIS AUTO W/SCOPE: CPT

## 2025-02-12 RX ORDER — LACOSAMIDE 10 MG/ML
200 SOLUTION ORAL 2 TIMES DAILY
Status: DISCONTINUED | OUTPATIENT
Start: 2025-02-12 | End: 2025-02-14 | Stop reason: HOSPADM

## 2025-02-12 RX ORDER — TAMSULOSIN HYDROCHLORIDE 0.4 MG/1
0.4 CAPSULE ORAL DAILY
Status: DISCONTINUED | OUTPATIENT
Start: 2025-02-12 | End: 2025-02-14 | Stop reason: HOSPADM

## 2025-02-12 RX ORDER — LATANOPROST 50 UG/ML
1 SOLUTION/ DROPS OPHTHALMIC EVERY EVENING
Status: DISCONTINUED | OUTPATIENT
Start: 2025-02-12 | End: 2025-02-14 | Stop reason: HOSPADM

## 2025-02-12 RX ORDER — CARBOXYMETHYLCELLULOSE SODIUM 10 MG/ML
1 GEL OPHTHALMIC EVERY MORNING
Status: DISCONTINUED | OUTPATIENT
Start: 2025-02-12 | End: 2025-02-14 | Stop reason: HOSPADM

## 2025-02-12 RX ORDER — AMLODIPINE BESYLATE 5 MG/1
5 TABLET ORAL DAILY
Status: DISCONTINUED | OUTPATIENT
Start: 2025-02-12 | End: 2025-02-14 | Stop reason: HOSPADM

## 2025-02-12 RX ORDER — DOCUSATE SODIUM 100 MG/1
100 CAPSULE, LIQUID FILLED ORAL DAILY
Status: DISCONTINUED | OUTPATIENT
Start: 2025-02-12 | End: 2025-02-14 | Stop reason: HOSPADM

## 2025-02-12 RX ORDER — ACETAMINOPHEN 325 MG/1
650 TABLET ORAL EVERY 6 HOURS PRN
Status: DISCONTINUED | OUTPATIENT
Start: 2025-02-12 | End: 2025-02-14 | Stop reason: HOSPADM

## 2025-02-12 RX ORDER — VALPROIC ACID 250 MG/5ML
1000 SOLUTION ORAL EVERY EVENING
Status: DISCONTINUED | OUTPATIENT
Start: 2025-02-12 | End: 2025-02-14 | Stop reason: HOSPADM

## 2025-02-12 RX ORDER — LACTULOSE 10 G/15ML
30 SOLUTION ORAL 2 TIMES DAILY
Status: DISCONTINUED | OUTPATIENT
Start: 2025-02-12 | End: 2025-02-14 | Stop reason: HOSPADM

## 2025-02-12 RX ORDER — POTASSIUM CHLORIDE 7.45 MG/ML
10 INJECTION INTRAVENOUS PRN
Status: DISCONTINUED | OUTPATIENT
Start: 2025-02-12 | End: 2025-02-14 | Stop reason: HOSPADM

## 2025-02-12 RX ORDER — FOLIC ACID 1 MG/1
1 TABLET ORAL DAILY
Status: DISCONTINUED | OUTPATIENT
Start: 2025-02-12 | End: 2025-02-14 | Stop reason: HOSPADM

## 2025-02-12 RX ORDER — ZONISAMIDE 100 MG/1
100 CAPSULE ORAL DAILY
Status: DISCONTINUED | OUTPATIENT
Start: 2025-02-12 | End: 2025-02-14 | Stop reason: HOSPADM

## 2025-02-12 RX ORDER — ONDANSETRON 4 MG/1
4 TABLET, ORALLY DISINTEGRATING ORAL EVERY 8 HOURS PRN
Status: DISCONTINUED | OUTPATIENT
Start: 2025-02-12 | End: 2025-02-14 | Stop reason: HOSPADM

## 2025-02-12 RX ORDER — HYDROXYZINE HYDROCHLORIDE 10 MG/1
10 TABLET, FILM COATED ORAL 3 TIMES DAILY PRN
Status: DISCONTINUED | OUTPATIENT
Start: 2025-02-12 | End: 2025-02-14 | Stop reason: HOSPADM

## 2025-02-12 RX ORDER — FUROSEMIDE 20 MG/1
20 TABLET ORAL DAILY
Status: ON HOLD | COMMUNITY
End: 2025-02-14 | Stop reason: HOSPADM

## 2025-02-12 RX ORDER — ASPIRIN 81 MG/1
81 TABLET, CHEWABLE ORAL DAILY
Status: DISCONTINUED | OUTPATIENT
Start: 2025-02-12 | End: 2025-02-14 | Stop reason: HOSPADM

## 2025-02-12 RX ORDER — CLOPIDOGREL BISULFATE 75 MG/1
75 TABLET ORAL DAILY
Status: DISCONTINUED | OUTPATIENT
Start: 2025-02-12 | End: 2025-02-14 | Stop reason: HOSPADM

## 2025-02-12 RX ORDER — POLYETHYLENE GLYCOL 3350 17 G/17G
17 POWDER, FOR SOLUTION ORAL DAILY PRN
Status: DISCONTINUED | OUTPATIENT
Start: 2025-02-12 | End: 2025-02-14 | Stop reason: HOSPADM

## 2025-02-12 RX ORDER — ZONISAMIDE 100 MG/1
300 CAPSULE ORAL EVERY EVENING
Status: DISCONTINUED | OUTPATIENT
Start: 2025-02-12 | End: 2025-02-14 | Stop reason: HOSPADM

## 2025-02-12 RX ORDER — DIVALPROEX SODIUM 500 MG/1
1000 TABLET, DELAYED RELEASE ORAL NIGHTLY
Status: CANCELLED | OUTPATIENT
Start: 2025-02-12

## 2025-02-12 RX ORDER — POTASSIUM CHLORIDE 1500 MG/1
40 TABLET, EXTENDED RELEASE ORAL PRN
Status: DISCONTINUED | OUTPATIENT
Start: 2025-02-12 | End: 2025-02-14 | Stop reason: HOSPADM

## 2025-02-12 RX ORDER — ENOXAPARIN SODIUM 100 MG/ML
40 INJECTION SUBCUTANEOUS DAILY
Status: DISCONTINUED | OUTPATIENT
Start: 2025-02-12 | End: 2025-02-14 | Stop reason: HOSPADM

## 2025-02-12 RX ORDER — SODIUM CHLORIDE 0.9 % (FLUSH) 0.9 %
5-40 SYRINGE (ML) INJECTION EVERY 12 HOURS SCHEDULED
Status: DISCONTINUED | OUTPATIENT
Start: 2025-02-12 | End: 2025-02-14 | Stop reason: HOSPADM

## 2025-02-12 RX ORDER — QUETIAPINE FUMARATE 100 MG/1
100 TABLET, FILM COATED ORAL EVERY EVENING
Status: DISCONTINUED | OUTPATIENT
Start: 2025-02-12 | End: 2025-02-14 | Stop reason: HOSPADM

## 2025-02-12 RX ORDER — MAGNESIUM HYDROXIDE/ALUMINUM HYDROXICE/SIMETHICONE 120; 1200; 1200 MG/30ML; MG/30ML; MG/30ML
5 SUSPENSION ORAL EVERY 4 HOURS PRN
Status: DISCONTINUED | OUTPATIENT
Start: 2025-02-12 | End: 2025-02-14 | Stop reason: HOSPADM

## 2025-02-12 RX ORDER — VALPROIC ACID 250 MG/5ML
500 SOLUTION ORAL EVERY MORNING
Status: DISCONTINUED | OUTPATIENT
Start: 2025-02-12 | End: 2025-02-14 | Stop reason: HOSPADM

## 2025-02-12 RX ORDER — TIMOLOL MALEATE 5 MG/ML
1 SOLUTION/ DROPS OPHTHALMIC 2 TIMES DAILY
Status: DISCONTINUED | OUTPATIENT
Start: 2025-02-12 | End: 2025-02-14 | Stop reason: HOSPADM

## 2025-02-12 RX ORDER — BENZONATATE 100 MG/1
200 CAPSULE ORAL 3 TIMES DAILY PRN
Status: DISCONTINUED | OUTPATIENT
Start: 2025-02-12 | End: 2025-02-14 | Stop reason: HOSPADM

## 2025-02-12 RX ORDER — FAMOTIDINE 20 MG/1
20 TABLET, FILM COATED ORAL 2 TIMES DAILY
Status: DISCONTINUED | OUTPATIENT
Start: 2025-02-12 | End: 2025-02-14 | Stop reason: HOSPADM

## 2025-02-12 RX ORDER — ATORVASTATIN CALCIUM 40 MG/1
80 TABLET, FILM COATED ORAL EVERY EVENING
Status: DISCONTINUED | OUTPATIENT
Start: 2025-02-12 | End: 2025-02-14 | Stop reason: HOSPADM

## 2025-02-12 RX ORDER — GUAIFENESIN 600 MG/1
600 TABLET, EXTENDED RELEASE ORAL EVERY 12 HOURS PRN
Status: DISCONTINUED | OUTPATIENT
Start: 2025-02-12 | End: 2025-02-14 | Stop reason: HOSPADM

## 2025-02-12 RX ORDER — HYDROXYZINE HYDROCHLORIDE 10 MG/1
10 TABLET, FILM COATED ORAL 3 TIMES DAILY PRN
COMMUNITY

## 2025-02-12 RX ORDER — SODIUM CHLORIDE 9 MG/ML
INJECTION, SOLUTION INTRAVENOUS PRN
Status: DISCONTINUED | OUTPATIENT
Start: 2025-02-12 | End: 2025-02-14 | Stop reason: HOSPADM

## 2025-02-12 RX ORDER — ONDANSETRON 2 MG/ML
4 INJECTION INTRAMUSCULAR; INTRAVENOUS EVERY 6 HOURS PRN
Status: DISCONTINUED | OUTPATIENT
Start: 2025-02-12 | End: 2025-02-14 | Stop reason: HOSPADM

## 2025-02-12 RX ORDER — LEVOTHYROXINE SODIUM 125 UG/1
125 TABLET ORAL DAILY
Status: DISCONTINUED | OUTPATIENT
Start: 2025-02-12 | End: 2025-02-14 | Stop reason: HOSPADM

## 2025-02-12 RX ORDER — DIVALPROEX SODIUM 500 MG/1
500 TABLET, DELAYED RELEASE ORAL DAILY
Status: CANCELLED | OUTPATIENT
Start: 2025-02-12

## 2025-02-12 RX ORDER — CLONAZEPAM 0.5 MG/1
0.5 TABLET ORAL 2 TIMES DAILY
Status: DISCONTINUED | OUTPATIENT
Start: 2025-02-12 | End: 2025-02-14 | Stop reason: HOSPADM

## 2025-02-12 RX ORDER — ALBUTEROL SULFATE 0.83 MG/ML
2.5 SOLUTION RESPIRATORY (INHALATION) EVERY 6 HOURS PRN
Status: DISCONTINUED | OUTPATIENT
Start: 2025-02-12 | End: 2025-02-14 | Stop reason: HOSPADM

## 2025-02-12 RX ORDER — PRIMIDONE 50 MG/1
50 TABLET ORAL 3 TIMES DAILY
Status: DISCONTINUED | OUTPATIENT
Start: 2025-02-12 | End: 2025-02-14 | Stop reason: HOSPADM

## 2025-02-12 RX ORDER — SODIUM CHLORIDE 0.9 % (FLUSH) 0.9 %
5-40 SYRINGE (ML) INJECTION PRN
Status: DISCONTINUED | OUTPATIENT
Start: 2025-02-12 | End: 2025-02-14 | Stop reason: HOSPADM

## 2025-02-12 RX ORDER — LACTOBACILLUS RHAMNOSUS GG 10B CELL
1 CAPSULE ORAL DAILY
Status: DISCONTINUED | OUTPATIENT
Start: 2025-02-12 | End: 2025-02-14 | Stop reason: HOSPADM

## 2025-02-12 RX ORDER — ACETAMINOPHEN 650 MG/1
650 SUPPOSITORY RECTAL EVERY 6 HOURS PRN
Status: DISCONTINUED | OUTPATIENT
Start: 2025-02-12 | End: 2025-02-14 | Stop reason: HOSPADM

## 2025-02-12 RX ORDER — MAGNESIUM SULFATE IN WATER 40 MG/ML
2000 INJECTION, SOLUTION INTRAVENOUS PRN
Status: DISCONTINUED | OUTPATIENT
Start: 2025-02-12 | End: 2025-02-14 | Stop reason: HOSPADM

## 2025-02-12 RX ADMIN — ZONISAMIDE 300 MG: 100 CAPSULE ORAL at 21:26

## 2025-02-12 RX ADMIN — QUETIAPINE FUMARATE 100 MG: 100 TABLET ORAL at 21:25

## 2025-02-12 RX ADMIN — LACTULOSE 30 G: 20 SOLUTION ORAL at 21:26

## 2025-02-12 RX ADMIN — FAMOTIDINE 20 MG: 20 TABLET ORAL at 21:26

## 2025-02-12 RX ADMIN — ATORVASTATIN CALCIUM 80 MG: 40 TABLET, FILM COATED ORAL at 21:26

## 2025-02-12 RX ADMIN — VALPROIC ACID 1000 MG: 250 SOLUTION ORAL at 21:31

## 2025-02-12 RX ADMIN — TIMOLOL MALEATE 1 DROP: 5 SOLUTION OPHTHALMIC at 21:32

## 2025-02-12 RX ADMIN — CLONAZEPAM 0.5 MG: 0.5 TABLET ORAL at 11:57

## 2025-02-12 RX ADMIN — DEXTROSE MONOHYDRATE: 50 INJECTION, SOLUTION INTRAVENOUS at 21:07

## 2025-02-12 RX ADMIN — POTASSIUM CHLORIDE 10 MEQ: 7.46 INJECTION, SOLUTION INTRAVENOUS at 05:39

## 2025-02-12 RX ADMIN — CLOPIDOGREL BISULFATE 75 MG: 75 TABLET ORAL at 12:08

## 2025-02-12 RX ADMIN — VALPROIC ACID 500 MG: 250 SOLUTION ORAL at 11:52

## 2025-02-12 RX ADMIN — DEXTROSE MONOHYDRATE: 50 INJECTION, SOLUTION INTRAVENOUS at 10:19

## 2025-02-12 RX ADMIN — POTASSIUM CHLORIDE 10 MEQ: 7.46 INJECTION, SOLUTION INTRAVENOUS at 13:25

## 2025-02-12 RX ADMIN — ENOXAPARIN SODIUM 40 MG: 100 INJECTION SUBCUTANEOUS at 10:19

## 2025-02-12 RX ADMIN — POTASSIUM CHLORIDE 10 MEQ: 7.46 INJECTION, SOLUTION INTRAVENOUS at 12:05

## 2025-02-12 RX ADMIN — HYDROXYZINE HYDROCHLORIDE 10 MG: 10 TABLET ORAL at 11:54

## 2025-02-12 RX ADMIN — PRIMIDONE 50 MG: 50 TABLET ORAL at 21:26

## 2025-02-12 RX ADMIN — LACTULOSE 30 G: 20 SOLUTION ORAL at 12:06

## 2025-02-12 RX ADMIN — SODIUM CHLORIDE, PRESERVATIVE FREE 10 ML: 5 INJECTION INTRAVENOUS at 21:27

## 2025-02-12 RX ADMIN — FAMOTIDINE 20 MG: 20 TABLET ORAL at 12:08

## 2025-02-12 RX ADMIN — DEXTROSE MONOHYDRATE: 50 INJECTION, SOLUTION INTRAVENOUS at 00:18

## 2025-02-12 RX ADMIN — Medication 10 MG: at 21:26

## 2025-02-12 RX ADMIN — POTASSIUM CHLORIDE 10 MEQ: 7.46 INJECTION, SOLUTION INTRAVENOUS at 10:18

## 2025-02-12 RX ADMIN — LATANOPROST 1 DROP: 50 SOLUTION OPHTHALMIC at 21:31

## 2025-02-12 RX ADMIN — CLONAZEPAM 0.5 MG: 0.5 TABLET ORAL at 21:26

## 2025-02-12 RX ADMIN — LATANOPROST 1 DROP: 50 SOLUTION OPHTHALMIC at 11:56

## 2025-02-12 RX ADMIN — TIMOLOL MALEATE 1 DROP: 5 SOLUTION OPHTHALMIC at 11:56

## 2025-02-12 RX ADMIN — CARBOXYMETHYLCELLULOSE SODIUM 1 DROP: 10 GEL OPHTHALMIC at 11:55

## 2025-02-12 RX ADMIN — POTASSIUM CHLORIDE 10 MEQ: 7.46 INJECTION, SOLUTION INTRAVENOUS at 06:42

## 2025-02-12 RX ADMIN — PAROXETINE HYDROCHLORIDE 30 MG: 10 TABLET, FILM COATED ORAL at 11:56

## 2025-02-12 RX ADMIN — POTASSIUM CHLORIDE 10 MEQ: 7.46 INJECTION, SOLUTION INTRAVENOUS at 08:34

## 2025-02-12 RX ADMIN — SALINE NASAL SPRAY 1 SPRAY: 1.5 SOLUTION NASAL at 11:55

## 2025-02-12 ASSESSMENT — PAIN SCALES - WONG BAKER: WONGBAKER_NUMERICALRESPONSE: NO HURT

## 2025-02-12 ASSESSMENT — PAIN SCALES - GENERAL: PAINLEVEL_OUTOF10: 0

## 2025-02-12 NOTE — PROGRESS NOTES
4 Eyes Skin Assessment     NAME:  Jovan Leal  YOB: 1955  MEDICAL RECORD NUMBER:  [id number]    The patient is being assessed for  Admission    I agree that at least one RN has performed a thorough Head to Toe Skin Assessment on the patient. ALL assessment sites listed below have been assessed.      Areas assessed by both nurses:    Head, Face, Ears, Shoulders, Back, Chest, Arms, Elbows, Hands, Sacrum. Buttock, Coccyx, Ischium, and Legs. Feet and Heels        Does the Patient have a Wound? Yes wound(s) were present on assessment. LDA wound assessment was Initiated and completed by RN       Jean Prevention initiated by RN: Yes  Wound Care Orders initiated by RN: No    Pressure Injury (Stage 3,4, Unstageable, DTI, NWPT, and Complex wounds) if present, place Wound referral order by RN under : No    New Ostomies, if present place, Ostomy referral order under : No     Nurse 1 eSignature: Electronically signed by Randi Cadena LPN on 2/12/25 at 5:49 PM EST    **SHARE this note so that the co-signing nurse can place an eSignature**    Nurse 2 eSignature: Electronically signed by Xenia Davis RN on 2/12/25 at 5:59 PM EST

## 2025-02-12 NOTE — ED NOTES
This nurse contacted family Charlotte DA SILVA. Charlotte wants us to adhere to the DNRCC while in the ED. She did state that basic lab work could be done at this time. And fluids could be given if needed.

## 2025-02-12 NOTE — PLAN OF CARE
Seen and examined in the morning at emergency room.  Patient ready wakes up.  He kept on repeating \" how are you?\"  Over and over and did not answer any of my questions.    Patient is a DNR CC who was enrolled into hospice recently and was brought back because of hypernatremia.  His hospice was revoked at this time.    Spoke to patient's sister who is his guardian.      According to the sister, he has mentation change since his urologic procedure on 1/29/2025.  Prior to the procedure, he was normally interactive with the nurses, he was able to participate with therapy.  Since the surgery, he has been repetitive in his speech, has more physical debility.  She is unsure about his intake at the facility but he has to be assisted with feeding.    Patient has been on puréed/minced and moist diet since prior to the surgery.  I reviewed previous hospitalization, did not have problem with electrolytes prior to this.  Hypernatremia at this time is most likely related to recent mentation changes.    The patient had recent urologic procedure and is supposed to have outpatient ESWL and stent removal.       We discussed about brain MRI.  Patient has a vagal nerve stimulator and has not been able to obtain MRIs in the past.    At the end of our conversation, she is okay to keep his CODE STATUS as DNR CC but she is contemplating whether to enroll him into hospice again or not at the time of discharge.      As far as hypernatremia is concerned, we will continue with hypotonic solution.      Patient also seem to have problem with aspiration per bedside nursing.  SLP to evaluate.

## 2025-02-12 NOTE — H&P
History and Physical      Name:  Jovan Leal /Age/Sex: 1955  (69 y.o. male)   MRN & CSN:  [id number] & [id number] Encounter Date/Time: 2025 12:19 AM EST   Location:  ED27/ED-27 PCP: Jovan Lazcano PA-C       Hospital Day: 2    Assessment and Plan:     #.  Hypernatremia  -Sodium 160  -Started on D5 in ER  -Monitor with repeat BMP every 6 hours  -UA pending  -Patient currently under hospice, but family/POA wanted the patient to receive IV fluids and sodium corrected.    #.  Chronic respiratory failure on home oxygen at 4 L/min as per documentation    #.  Recent admission -2/3/2025 for aspiration pneumonia    #.  Admission 2025 for nephrolithiasis status post ureteral stent placement  -As per documentation, hospice was consulted and procedure with urology was cancelled-2/10/2025    #.  Unstable cervical spine fracture-2024 and status post fusion  #.  Admission to [place] -2025 after a fall  -Patient had a C5 vertebral body fracture, prevertebral and paravertebral [id number], separation of C5-C6 acute left-sided 11-12 rib fractures, left L1-L2 transverse process fracture  -Status post C2-T1 cervical fusion-2024  -Patient was also COVID-positive-2024-completed course of remdesivir, Decadron    #.  Hypertension-on amlodipine-continue with holding parameters    #.  History of cerebral palsy with left-sided hemiplegia    #.  Dysphagia  -On puréed diet, thin liquids  -Aspiration precautions     #.  Multiple admissions for pneumonia and sepsis-thought to be secondary to aspiration    #. Peptic stricture  -EGD with stricture dilation-2018    #.  Seizure disorder  -Has vagal nerve stimulator  -On multiple antiepileptics-valproic acid, lacosamide, zonisamide, clonazepam  -On review of medication list from the nursing home Depakote and valproic acid are both listed-needs to be clarified.  -Continue valproic acid, lacosamide, zonisamide, clonazepam  -Seizure  3/24/2021); Stimulator Surgery (N/A, 3/24/2021); Facial Surgery (N/A, 10/8/2021); Colonoscopy (N/A, 2/22/2024); and Cystoscopy (Left, 1/28/2025).  Allergies: No Known Allergies  Fam HX: family history includes Asthma in his mother and sister; Depression in his mother and sister; Heart Disease in his father and mother; High Blood Pressure in his father and mother; High Cholesterol in his mother and sister; Migraines in his mother and sister.  Soc HX:   Social History     Socioeconomic History    Marital status: Single   Tobacco Use    Smoking status: Never    Smokeless tobacco: Never   Vaping Use    Vaping status: Never Used   Substance and Sexual Activity    Alcohol use: No    Drug use: No     Social Determinants of Health     Food Insecurity: No Food Insecurity (1/30/2025)    Hunger Vital Sign     Worried About Running Out of Food in the Last Year: Never true     Ran Out of Food in the Last Year: Never true   Transportation Needs: No Transportation Needs (1/30/2025)    PRAPARE - Transportation     Lack of Transportation (Medical): No     Lack of Transportation (Non-Medical): No   Housing Stability: Low Risk  (1/30/2025)    Housing Stability Vital Sign     Unable to Pay for Housing in the Last Year: No     Number of Times Moved in the Last Year: 0     Homeless in the Last Year: No       Medications:   Medications:    Infusions:    dextrose 100 mL/hr at 02/12/25 0018     PRN Meds:      Labs      CBC:   Recent Labs     02/11/25  2201   WBC 7.9   HGB 9.7*        BMP:    Recent Labs     02/11/25  2201   *   K 3.3*   *   CO2 39*   BUN 12   CREATININE 0.6*   GLUCOSE 124*     Hepatic: No results for input(s): \"AST\", \"ALT\", \"BILITOT\", \"ALKPHOS\" in the last 72 hours.    Invalid input(s): \"ALB\"  Lipids:   Lab Results   Component Value Date/Time    CHOL 103 10/24/2024 03:23 AM    HDL 42 10/24/2024 03:23 AM    TRIG 117 10/24/2024 03:23 AM     Hemoglobin A1C:   Lab Results   Component Value Date/Time    LABA1C

## 2025-02-12 NOTE — ED NOTES
Pt from [place]. EMS stated patient Na is low. Pt is a DNRCC and on hospice prior to arrival. This nurse attempted to call [facility] to clarify desires of patient and physicians as to why they sent out a hospice patient. No answer from facility at this time.

## 2025-02-12 NOTE — PROGRESS NOTES
Speech-Language Pathology Department   Facility/Department: [facility] EMERGENCY DEPARTMENT   CLINICAL BEDSIDE SWALLOW EVALUATION    NAME: Jovan Leal  : 1955  MRN: [id number]    ADMISSION DATE: 2025  ADMITTING DIAGNOSIS: has Cerebral palsy, infantile hemiplegia (HCC); Rosacea, acne; IBS (irritable bowel syndrome); Essential hypertension; Calculus of ureter; Pyelonephritis; Sepsis (HCC); Pneumonia; Increased ammonia level; Aspiration pneumonia (HCC); Hypokalemia; Hypomagnesemia; Hypophosphatasia; Seizure disorder (HCC); Seizure disorder, grand mal (HCC); Sepsis due to undetermined organism with acute respiratory failure (HCC); Pain of upper abdomen; Diarrhea of presumed infectious origin; Abdominal pain; Pressure injury of contiguous region involving back and right buttock, stage 2 (HCC); Cellulitis, perineum; Pressure injury of right buttock, stage 2 (HCC); Pressure injury of left buttock, stage 1; COVID-19 virus detected; Hyponatremia; Multifocal pneumonia; TIA (transient ischemic attack); Acute CVA (cerebrovascular accident) (HCC); Ataxic cerebral palsy (HCC); Dysarthria; Oropharyngeal dysphagia; Depression with anxiety; Facial abscess; Group B streptococcal UTI; History of cerebral palsy; Encephalopathy acute; PNA (pneumonia); Acute encephalopathy; Metabolic encephalopathy; Generalized weakness; History of CVA (cerebrovascular accident); Transient ischemic attack; Kidney stone; Acute aspiration pneumonia (HCC); and Hypernatremia on their problem list.  ONSET DATE: this admission     MBS completed @ [facility] 24:  Assessment Summary Comments: Pt presents with mild-moderate oral phase dysphagia and mild pharyngeal phase dysphagia. Oral phase is characterized by severely prolonged mastication, prolonged bolus formation, delayed anterior to posterior transit, lingual pumping, and premature posterior spillage. Pharyngeal phase characterized by delayed/incoordinated swallow  suspected delay in pharyngeal swallow initiation. Mild-moderate oral residue noted with soft solid. Residue clears with trials of puree/liquid wash. Pt with cough following trials of thin liquid via straw x3, nectar thick liquid via x2, and during mastication of soft solid x2 during visit. Cough delayed with thin liquid x1 and nectar x2. Suspect delayed initiation of pharyngeal swallow. Pt is at risk of aspiration based on hx of dysphagia and complex medical diagnosis. Pt with hx of multiple instrumental swallow evaluations, most recent on 12/13/24 at [place], which revealed penetration of thin liquids without aspiration. Pt noted to cough throughout study in the absence of aspiration. Pt may benefit from repeat MBSS if concern for aspiration pneumonia as appropriate based on goals of care. SLP to perform ongoing evaluation at bedside to determine need. Provided education to pt regarding recommendations/POC, no evidence of learning.     Recommendations: Recommend Jovan Leal consume a pureed diet and thin liquids via small sips with strict aspiration precautions. Administer meds crushed in puree. Ensure pt is positioned upright for PO intake. Pt requires assistance for set-up/feeding. SLP to follow to monitor diet tolerance and perform ongoing evaluation.     Current Diet level:  Current Diet : Pureed  Current Liquid Diet : Thin    Primary Complaint  Concern for aspiration    Pain:  Pain Assessment  Pain Assessment: 0-10  Pain Level: 0  Patient's Stated Pain Goal: 0 - No pain    Reason for Referral  Jovan Leal was referred for a bedside swallow evaluation to assess the efficiency of his swallow function, identify signs and symptoms of aspiration and make recommendations regarding safe dietary consistencies, effective compensatory strategies, and safe eating environment.    Impression  Dysphagia Diagnosis: Mild to moderate pharyngeal stage dysphagia;Moderate oral stage dysphagia  Dysphagia Outcome Severity

## 2025-02-12 NOTE — ED NOTES
ED TO INPATIENT SBAR HANDOFF    Patient Name: Jovan Leal   :  1955  69 y.o.   Preferred Name  Jovan  Family/Caregiver Present no   Restraints no   C-SSRS: Risk of Suicide: No Risk  Sitter no   Sepsis Risk Score        Situation  No chief complaint on file.    Brief Description of Patient's Condition: Pt came in for Abnormal labs per facility. Pt is a DNRCC and was on hospice prior to arrival to the hospital. Pt AO to self only. Charlotte REKHA contacted upon pt arrival. Per POA she was ok with giving some fluids while pt in ER. Pt Na was 160 and pt started on D5. IV in RAC and pt is dependent on care.   Mental Status: disoriented, alert, and coherent  Arrived from: nursing home    Imaging:   No orders to display     Abnormal labs:   Abnormal Labs Reviewed   CBC WITH AUTO DIFFERENTIAL - Abnormal; Notable for the following components:       Result Value    RBC 3.34 (*)     Hemoglobin 9.7 (*)     Hematocrit 33.4 (*)     MCHC 29.0 (*)     RDW 15.4 (*)     Lymphocytes % 23 (*)     Monocytes % 11 (*)     Immature Granulocytes % 5 (*)     All other components within normal limits   BASIC METABOLIC PANEL - Abnormal; Notable for the following components:    Sodium 160 (*)     Potassium 3.3 (*)     Chloride 117 (*)     CO2 39 (*)     Anion Gap 4 (*)     Glucose 124 (*)     Creatinine 0.6 (*)     All other components within normal limits   BLOOD GAS, VENOUS - Abnormal; Notable for the following components:    pCO2, Aren 63.9 (*)     HCO3, Venous 35.7 (*)     Positive Base Excess, Aren 9.0 (*)     Methemoglobin 0.0 (*)     All other components within normal limits        Background  History:   Past Medical History:   Diagnosis Date    Acute CVA (cerebrovascular accident) ([facility]) 10/10/2021    Anemia     Aspiration pneumonia ([facility])     dx 2014 with this per ecf/ per old chart dx with pneumonia with admission 2018    Cerebral palsy ([facility])     \"has no feeling on left side of body\"alert but has some dementia but not  symptoms 02/11/25 2203   Infiltration Assessment 0 02/11/25 2203   Dressing Status New dressing applied 02/11/25 2203   Dressing Type Transparent 02/11/25 2203   Dressing Intervention New 02/11/25 2203       Pertinent or High Risk Medications/Drips: no   If Yes, please provide details: na      Blood Product Administration: no  If Yes, please provide details: na    Recommendation    Incomplete orders none  Additional Comments: none   If any further questions, please call Sending RN at [phone]      Electronically signed by: Electronically signed by Clarence Collins RN on 2/12/2025 at 12:27 AM

## 2025-02-12 NOTE — ED PROVIDER NOTES
CHIEF COMPLAINT    No chief complaint on file.    HPI  Jovan Leal is a 69 y.o. male with history of autism spectrum disorder, developmental delay, spastic hemiplegic cerebral palsy, CVA, epilepsy, hypertension, hyperlipidemia, hypothyroidism, ureterolithiasis who presents to the ED via EMS from his living facility with concerns for abnormal laboratory study.  Patient himself is able to tell me his name only but unable provide much other history.  This looks to be baseline for him.  Daughter of patient was contacted and states that patient's sodium level was elevated.  He is noted to be DNR CC but they would like for him to receive IV fluids and correction of hyponatremia.  No other immediate history available.  Given patient is unable provide history he is unable to describe severity of illness or any alleviating or aggravating factors.      REVIEW OF SYSTEMS  Unable to obtain secondary to patient condition  ?  ?  PAST MEDICAL HISTORY  Past Medical History:   Diagnosis Date    Acute CVA (cerebrovascular accident) (HCC) 10/10/2021    Anemia     Aspiration pneumonia ([facility])     dx 6/9/2014 with this per ecf/ per old chart dx with pneumonia with admission 9/18/2018    Cerebral palsy (HCC)     \"has no feeling on left side of body\"alert but has some dementia but not diagnosed, has good long term but poor short term and wakes up disoriented after a nap\"(per yaneth)(2014)    Depression     Enlarged prostate     Epilepsy ([facility]) 1962    last seizure 2/2018- hx grand mal    Frequent falls     with phone assess- family stated pt fell this am (7/10/2013\"in the process of trying to find a facility to help build him back up- (pt now at [facility])    Glaucoma     \"has been in treatment for this in the past- no treatment needed at present\"    Hearing loss     Bilat    History of IBS     Hyperammonemia (HCC)     Hypertension     on Lisinopril    IBS (irritable bowel syndrome)     ulcerative colitis    Kidney stone     for  done

## 2025-02-12 NOTE — ED NOTES
Patient needs swallow study by speech therapy. D/t coughing after eating or drinking anything. Dr. Fuad cutler.

## 2025-02-13 LAB
ANION GAP SERPL CALCULATED.3IONS-SCNC: 2 MMOL/L (ref 9–17)
ANION GAP SERPL CALCULATED.3IONS-SCNC: 5 MMOL/L (ref 9–17)
BUN SERPL-MCNC: 6 MG/DL (ref 7–20)
BUN SERPL-MCNC: 8 MG/DL (ref 7–20)
CALCIUM SERPL-MCNC: 8.8 MG/DL (ref 8.3–10.6)
CALCIUM SERPL-MCNC: 9.3 MG/DL (ref 8.3–10.6)
CHLORIDE SERPL-SCNC: 107 MMOL/L (ref 99–110)
CHLORIDE SERPL-SCNC: 110 MMOL/L (ref 99–110)
CO2 SERPL-SCNC: 32 MMOL/L (ref 21–32)
CO2 SERPL-SCNC: 36 MMOL/L (ref 21–32)
CREAT SERPL-MCNC: 0.4 MG/DL (ref 0.8–1.3)
CREAT SERPL-MCNC: 0.4 MG/DL (ref 0.8–1.3)
GFR, ESTIMATED: >90 ML/MIN/1.73M2
GFR, ESTIMATED: >90 ML/MIN/1.73M2
GLUCOSE SERPL-MCNC: 104 MG/DL (ref 74–99)
GLUCOSE SERPL-MCNC: 133 MG/DL (ref 74–99)
POTASSIUM SERPL-SCNC: 3.6 MMOL/L (ref 3.5–5.1)
POTASSIUM SERPL-SCNC: 3.8 MMOL/L (ref 3.5–5.1)
SODIUM SERPL-SCNC: 144 MMOL/L (ref 136–145)
SODIUM SERPL-SCNC: 147 MMOL/L (ref 136–145)

## 2025-02-13 PROCEDURE — 94761 N-INVAS EAR/PLS OXIMETRY MLT: CPT

## 2025-02-13 PROCEDURE — 2700000000 HC OXYGEN THERAPY PER DAY

## 2025-02-13 PROCEDURE — 2580000003 HC RX 258: Performed by: EMERGENCY MEDICINE

## 2025-02-13 PROCEDURE — 6370000000 HC RX 637 (ALT 250 FOR IP): Performed by: INTERNAL MEDICINE

## 2025-02-13 PROCEDURE — 6360000002 HC RX W HCPCS: Performed by: INTERNAL MEDICINE

## 2025-02-13 PROCEDURE — 1200000000 HC SEMI PRIVATE

## 2025-02-13 PROCEDURE — 92526 ORAL FUNCTION THERAPY: CPT

## 2025-02-13 PROCEDURE — 80048 BASIC METABOLIC PNL TOTAL CA: CPT

## 2025-02-13 PROCEDURE — 2500000003 HC RX 250 WO HCPCS: Performed by: NURSE PRACTITIONER

## 2025-02-13 PROCEDURE — 2500000003 HC RX 250 WO HCPCS: Performed by: INTERNAL MEDICINE

## 2025-02-13 PROCEDURE — 36415 COLL VENOUS BLD VENIPUNCTURE: CPT

## 2025-02-13 RX ADMIN — PRIMIDONE 50 MG: 50 TABLET ORAL at 20:56

## 2025-02-13 RX ADMIN — FAMOTIDINE 20 MG: 20 TABLET ORAL at 11:55

## 2025-02-13 RX ADMIN — Medication 1 CAPSULE: at 11:55

## 2025-02-13 RX ADMIN — DEXTROSE MONOHYDRATE: 50 INJECTION, SOLUTION INTRAVENOUS at 18:12

## 2025-02-13 RX ADMIN — LACOSAMIDE 200 MG: 10 SOLUTION ORAL at 22:16

## 2025-02-13 RX ADMIN — DOCUSATE SODIUM 100 MG: 100 CAPSULE, LIQUID FILLED ORAL at 11:55

## 2025-02-13 RX ADMIN — TIMOLOL MALEATE 1 DROP: 5 SOLUTION OPHTHALMIC at 21:05

## 2025-02-13 RX ADMIN — LATANOPROST 1 DROP: 50 SOLUTION OPHTHALMIC at 21:05

## 2025-02-13 RX ADMIN — ZONISAMIDE 300 MG: 100 CAPSULE ORAL at 20:51

## 2025-02-13 RX ADMIN — MICONAZOLE NITRATE: 2 POWDER TOPICAL at 21:21

## 2025-02-13 RX ADMIN — PAROXETINE HYDROCHLORIDE 30 MG: 10 TABLET, FILM COATED ORAL at 11:55

## 2025-02-13 RX ADMIN — ASPIRIN 81 MG CHEWABLE TABLET 81 MG: 81 TABLET CHEWABLE at 11:55

## 2025-02-13 RX ADMIN — ATORVASTATIN CALCIUM 80 MG: 40 TABLET, FILM COATED ORAL at 20:57

## 2025-02-13 RX ADMIN — LACTULOSE 30 G: 20 SOLUTION ORAL at 11:55

## 2025-02-13 RX ADMIN — Medication 10 MG: at 20:55

## 2025-02-13 RX ADMIN — LEVOTHYROXINE SODIUM 125 MCG: 0.12 TABLET ORAL at 05:48

## 2025-02-13 RX ADMIN — TIMOLOL MALEATE 1 DROP: 5 SOLUTION OPHTHALMIC at 11:57

## 2025-02-13 RX ADMIN — CLOPIDOGREL BISULFATE 75 MG: 75 TABLET ORAL at 11:56

## 2025-02-13 RX ADMIN — FOLIC ACID 1 MG: 1 TABLET ORAL at 11:55

## 2025-02-13 RX ADMIN — CARBOXYMETHYLCELLULOSE SODIUM 1 DROP: 10 GEL OPHTHALMIC at 11:54

## 2025-02-13 RX ADMIN — DEXTROSE MONOHYDRATE: 50 INJECTION, SOLUTION INTRAVENOUS at 07:36

## 2025-02-13 RX ADMIN — CLONAZEPAM 0.5 MG: 0.5 TABLET ORAL at 11:55

## 2025-02-13 RX ADMIN — MICONAZOLE NITRATE: 2 POWDER TOPICAL at 12:11

## 2025-02-13 RX ADMIN — FAMOTIDINE 20 MG: 20 TABLET ORAL at 20:56

## 2025-02-13 RX ADMIN — PRIMIDONE 50 MG: 50 TABLET ORAL at 11:55

## 2025-02-13 RX ADMIN — SODIUM CHLORIDE, PRESERVATIVE FREE 10 ML: 5 INJECTION INTRAVENOUS at 22:21

## 2025-02-13 RX ADMIN — VALPROIC ACID 1000 MG: 250 SOLUTION ORAL at 21:17

## 2025-02-13 RX ADMIN — AMLODIPINE BESYLATE 5 MG: 5 TABLET ORAL at 11:56

## 2025-02-13 RX ADMIN — ZONISAMIDE 100 MG: 100 CAPSULE ORAL at 11:55

## 2025-02-13 RX ADMIN — ENOXAPARIN SODIUM 40 MG: 100 INJECTION SUBCUTANEOUS at 11:56

## 2025-02-13 RX ADMIN — CLONAZEPAM 0.5 MG: 0.5 TABLET ORAL at 21:18

## 2025-02-13 RX ADMIN — VALPROIC ACID 500 MG: 250 SOLUTION ORAL at 11:57

## 2025-02-13 RX ADMIN — LACTULOSE 30 G: 20 SOLUTION ORAL at 20:57

## 2025-02-13 RX ADMIN — TAMSULOSIN HYDROCHLORIDE 0.4 MG: 0.4 CAPSULE ORAL at 11:55

## 2025-02-13 RX ADMIN — QUETIAPINE FUMARATE 100 MG: 100 TABLET ORAL at 20:56

## 2025-02-13 ASSESSMENT — PAIN SCALES - GENERAL
PAINLEVEL_OUTOF10: 0
PAINLEVEL_OUTOF10: 0

## 2025-02-13 ASSESSMENT — PAIN SCALES - WONG BAKER: WONGBAKER_NUMERICALRESPONSE: NO HURT

## 2025-02-13 NOTE — PROGRESS NOTES
V2.0    [facility] Progress Note      Name:  Jovan Leal /Age/Sex: 1955  (69 y.o. male)   MRN & CSN:  [id number] & [id number] Encounter Date/Time: 2025 4:14 PM EST   Location:  [address] [place] [address]-A PCP: Jovan Lazcano PA-C     Attending:Gatito Robertson MD       Hospital Day: 3    Assessment and Recommendations   Jovan Leal is a 69 y.o. male with pmh of  cerebral palsy, MRDD, left hemiplegia, seizure disorder, depression, anxiety, hypothyroidism, BPH, hypertension, recent cervical spine surgery, history of COVID-19 x 2, history of nephrolithiasis who presents with Hypernatremia      Plan:      Hypernatremia, resolved  -Sodium 160  -Started on D5 in ER  -Monitor with repeat BMP every 6 hours  -UA pending  -Patient currently under hospice, but family/POA wanted the patient to receive IV fluids and sodium corrected.    #.  Urge incontinence-status post InterStim neuromodulator device-3/2021      # Kidney stone  -S/P stent placement on 25 due to infected 9 mm proximal left ureteral stone  -Urology consult, plan for ureteroscopy/laser lithotripsy on Tuesday  -Hold ASA and Plavix       #.  Chronic respiratory failure on home oxygen at 4 L/min as per documentation     #.  Recent admission -2/3/2025 for aspiration pneumonia     #.  Admission 2025 for nephrolithiasis status post ureteral stent placement  -As per documentation, hospice was consulted and procedure with urology was cancelled-2/10/2025     #.  Unstable cervical spine fracture-2024 and status post fusion  #.  Admission to [facility] -2025 after a fall  -Patient had a C5 vertebral body fracture, prevertebral and paravertebral [id number]-V4-0, separation of C5-C6 acute left-sided 11-12 rib fractures, left L1-L2 transverse process fracture  -Status post C2-T1 cervical fusion-2024  -Patient was also COVID-positive-2024-completed course of remdesivir, Decadron     #.  Hypertension-on amlodipine-continue with holding  PM    WBCUA 44 02/12/2025 10:56 AM    RBCUA 1936 02/12/2025 10:56 AM    RBCUA 1 07/08/2023 10:30 AM    MUCUS RARE 01/28/2025 09:30 AM    TRICHOMONAS NONE SEEN 07/08/2023 10:30 AM    YEAST OCCASIONAL 09/20/2021 05:00 PM    BACTERIA NEGATIVE 07/08/2023 10:30 AM    CLARITYU CLEAR 07/27/2023 12:38 PM    LEUKOCYTESUR SMALL 02/12/2025 10:56 AM    UROBILINOGEN 0.2 02/12/2025 10:56 AM    BILIRUBINUR NEGATIVE 02/12/2025 10:56 AM    BLOODU NEGATIVE 07/27/2023 12:38 PM    GLUCOSEU NEGATIVE 02/12/2025 10:56 AM    KETUA NEGATIVE 02/12/2025 10:56 AM    AMORPHOUS OCCASIONAL 11/25/2020 01:15 PM     Urine Cultures: No results found for: \"LABURIN\"  Blood Cultures: No results found for: \"BC\"  No results found for: \"BLOODCULT2\"  Organism:   Lab Results   Component Value Date/Time    ORG ENC 07/11/2013 11:22 AM         Electronically signed by MARY France CNP on 2/13/2025 at 4:14 PM

## 2025-02-13 NOTE — CARE COORDINATION
Chart reviewed and voicemail left for patients legal guardian, Charlotte, to initiate discharge planning.

## 2025-02-13 NOTE — CONSULTS
[facility] Nurse  Consult Note       Jovan Leal  AGE: 69 y.o.   GENDER: male  : 1955  TODAY'S DATE:  2025    Subjective:     Reason for Evaluation and Assessment: wound care eval.      Jovan Leal is a 69 y.o. male referred by:   [x] Physician  [] Nursing  [] Other:     Wound Identification:  Wound Type: pressure and masd  Contributing Factors: chronic pressure, decreased mobility, malnutrition, and incontinence of stool        PAST MEDICAL HISTORY        Diagnosis Date    Acute CVA (cerebrovascular accident) ([facility]) 10/10/2021    Anemia     Aspiration pneumonia ([facility])     dx 2014 with this per ecf/ per old chart dx with pneumonia with admission 2018    Cerebral palsy ([facility])     \"has no feeling on left side of body\"alert but has some dementia but not diagnosed, has good long term but poor short term and wakes up disoriented after a nap\"(per yaneth)()    Depression     Enlarged prostate     Epilepsy ([facility])     last seizure 2018- hx grand mal    Frequent falls     with phone assess- family stated pt fell this am (7/10/2013\"in the process of trying to find a facility to help build him back up- (pt now at [facility] [facility])    Glaucoma     \"has been in treatment for this in the past- no treatment needed at present\"    Hearing loss     Bilat    History of IBS     Hyperammonemia ([facility])     Hypertension     on Lisinopril    IBS (irritable bowel syndrome)     ulcerative colitis    Kidney stone     for surgery for stent placement 2013    Mood disorder ([facility])     [facility]    MRSA (methicillin resistant staph aureus) culture positive 2014 nasal    MRSA (methicillin resistant staph aureus) culture positive 2020    Face; 10/8/21    Occasional tremors     \"makes it difficult for him to write\"    Pressure injury of contiguous region involving back and right buttock, stage 2 ([facility]) 2020    Pressure injury of left buttock, stage 1 2020  (ACIDOPHILUS) 100 MG CAPS Take 100 mg by mouth daily      folic acid (FOLVITE) 1 MG tablet Take 1 tablet by mouth daily      PARoxetine (PAXIL) 30 MG tablet Take 1 tablet by mouth every morning           Objective:      BP (!) 107/53   Pulse 56   Temp 97.5 °F (36.4 °C) (Oral)   Resp 15   Wt 78.2 kg (172 lb 6.4 oz)   SpO2 100%   BMI 27.83 kg/m²   Jean Risk Score: Jean Scale Score: 14    LABS    CBC:   Lab Results   Component Value Date/Time    WBC 5.0 02/12/2025 05:27 AM    RBC 2.44 02/12/2025 05:27 AM    HGB 7.1 02/12/2025 05:27 AM    HCT 24.8 02/12/2025 05:27 AM    .6 02/12/2025 05:27 AM    MCH 29.1 02/12/2025 05:27 AM    MCHC 28.6 02/12/2025 05:27 AM    RDW 15.7 02/12/2025 05:27 AM     02/12/2025 05:27 AM    MPV 11.0 02/12/2025 05:27 AM     CMP:    Lab Results   Component Value Date/Time     02/13/2025 02:08 AM    K 3.6 02/13/2025 02:08 AM     02/13/2025 02:08 AM    CO2 36 02/13/2025 02:08 AM    BUN 8 02/13/2025 02:08 AM    CREATININE 0.4 02/13/2025 02:08 AM    GFRAA >60 09/27/2022 12:43 PM    LABGLOM >90 02/13/2025 02:08 AM    LABGLOM >60 11/15/2023 07:39 AM    GLUCOSE 133 02/13/2025 02:08 AM    CALCIUM 8.8 02/13/2025 02:08 AM    BILITOT 0.2 01/28/2025 09:35 AM    ALKPHOS 91 01/28/2025 09:35 AM    AST 46 01/28/2025 09:35 AM    ALT 14 01/28/2025 09:35 AM     Albumin:  No results found for: \"LABALBU\"  PT/INR:    Lab Results   Component Value Date/Time    PROTIME 14.1 11/06/2023 02:53 AM    INR 1.1 11/06/2023 02:53 AM     HgBA1c:    Lab Results   Component Value Date/Time    LABA1C 5.6 10/24/2024 03:23 AM         Assessment:     Patient Active Problem List   Diagnosis    Cerebral palsy, infantile hemiplegia (HCC)    Rosacea, acne    IBS (irritable bowel syndrome)    Essential hypertension    Calculus of ureter    Pyelonephritis    Sepsis (HCC)    Pneumonia    Increased ammonia level    Aspiration pneumonia (HCC)    Hypokalemia    Hypomagnesemia    Hypophosphatasia    Seizure

## 2025-02-13 NOTE — PROGRESS NOTES
Pt refused the primidone for this nurse, this nurse educated pt and notified provider. No new orders at this time.

## 2025-02-13 NOTE — PROGRESS NOTES
[facility]  DEPARTMENT OF SPEECH/LANGUAGE PATHOLOGY  DAILY PROGRESS NOTE  Jovan Leal  2/13/2025  [id number]  Hypernatremia [E87.0]  History of cerebral palsy [Z86.69]  No Known Allergies      Pt was seen this date for dysphagia treatment.       IMPRESSION AND RECOMMENDATIONS: Jovan Lela was seen today for dysphagia tx and ongoing evaluation. Pt seen for BSE yesterday 2/12/25, which recommended a puree/thin liquid diet. Jovan Leal was positioned upright in bed with uneaten meal tray at bedside. RN student assisted in boosting pt up in bed. SLP discussed importance of upright positioning with MEENAKSHI Amador (based on previous SLP visits) prior to PO trials. Pt pleasant and agreeable, appears somewhat lethargic. Pt continues to present with echolalic speech. Pt on 4.5L O2 via NC.    Jovan Leal consumed trials of thin liquid via straw, puree, and soft solid during this visit. Pt presents with evidence of oropharyngeal dysphagia characterized by reduced bolus control, prolonged/disorganized bolus preparation/mastication, delay in a/p transit, reduced oral clearance, and suspected delay in pharyngeal swallow initiation. Mild-moderate oral residue noted with soft solid, clears with puree/thin liquid wash. Pt with intermittent oral holding of puree/soft solid trials, suspected d/t fatigue. SLP provided verbal prompts to swallow which were effective. Pt with cough prior to PO trials. Cough response noted x1 with thin water via straw. No further overt s/sx of penetration/aspiration noted with trials of thin/puree/soft solid. Pt is at risk of aspiration based on hx of dysphagia and complex medical diagnosis. Benefits from strict positioning at 90 degrees for PO intake. SLP provided education to pt regarding recommendations. Pt/caregivers would benefit from ongoing education.     Recommend Jovan Leal consume a pureed diet and thin liquids via small sips with strict aspiration precautions.  Administer meds crushed in puree. Ensure pt is positioned upright for PO intake. Pt requires assistance for set-up/feeding. SLP to follow to monitor diet tolerance and perform ongoing evaluation.        GOALS (current status in bold):  Short-term Goals  Timeframe for Short-term Goals: LOS or until goals are met  Goal 1: Pt will tolerate a pureed diet/thin liquids without overt s/sx of penetration/aspiration in 100% of trials Ongoing; Continue  Goal 2: Pt will participate in ongoing evaluation to determine safest/least restrictive diet recommendation Meeting; Continue, pt trialed soft solid during this visit  Goal 3: Pt/caregivers will demonstrate understanding of SLP recommendations/POC Ongoing; Continue, pt and MEENAKSHI Amador provided education on recommendations    EDUCATION: Recommendations/POC    PAIN RATING (0-10 Scale): 0  Time in/Time out: SLP Individual Minutes  Time In: 0931  Time Out: 0949  Minutes: 18    Visit number: 2      Indy Givens MA, CF-SLP 2/13/2025

## 2025-02-13 NOTE — CARE COORDINATION
CM received return call from patients sister/legal guardian, Charlotte. Charlotte confirmed that patient is from [place] with [facility]. Charlotte states plan is for patient to return to [place] with [facility] care at discharge. CM informed Marlene AGEE during IDR that patient will need a hospice consult prior to discharge back to [place].        02/13/25 6606   Service Assessment   Patient Orientation Other (see comment)  (cerebral palsy & MRDD per H&P)   History Provided By Child/Family  (sister/legal guardian - Charlotte)   Support Systems Family Members  (sister/guardian - Charlotte)   Patient's Healthcare Decision Maker is: Named in Scanned ACP Document   PCP Verified by CM Yes   Can patient return to prior living arrangement Yes   Financial Resources Medicare;Medicaid   Community Resources ECF/Home Care;Hospice  ([place] w/ [facility])   Social/Functional History   Type of Home Facility   Discharge Planning   Type of Residence Long-Term Care  ([facility] w/ [facility])   Potential Assistance Purchasing Medications No   Type of Home Care Services Hospice   Patient expects to be discharged to: Long-term care   Services At/After Discharge   Services At/After Discharge Hospice   Condition of Participation: Discharge Planning   The Patient and/or Patient Representative was provided with a Choice of Provider? Patient Representative   Name of the Patient Representative who was provided with the Choice of Provider and agrees with the Discharge Plan?  Charlotte   The Patient and/Or Patient Representative agree with the Discharge Plan? Yes   Freedom of Choice list was provided with basic dialogue that supports the patient's individualized plan of care/goals, treatment preferences, and shares the quality data associated with the providers?  Yes

## 2025-02-13 NOTE — PLAN OF CARE
Problem: Safety - Adult  Goal: Free from fall injury  Outcome: Progressing     Problem: Chronic Conditions and Co-morbidities  Goal: Patient's chronic conditions and co-morbidity symptoms are monitored and maintained or improved  Outcome: Progressing     Problem: Discharge Planning  Goal: Discharge to home or other facility with appropriate resources  Outcome: Progressing     Problem: Pain  Goal: Verbalizes/displays adequate comfort level or baseline comfort level  Outcome: Progressing     Problem: Skin/Tissue Integrity  Goal: Skin integrity remains intact  Description: 1.  Monitor for areas of redness and/or skin breakdown  2.  Assess vascular access sites hourly  3.  Every 4-6 hours minimum:  Change oxygen saturation probe site  4.  Every 4-6 hours:  If on nasal continuous positive airway pressure, respiratory therapy assess nares and determine need for appliance change or resting period  Outcome: Progressing

## 2025-02-14 VITALS
DIASTOLIC BLOOD PRESSURE: 58 MMHG | HEART RATE: 61 BPM | OXYGEN SATURATION: 99 % | TEMPERATURE: 97.9 F | WEIGHT: 172.4 LBS | RESPIRATION RATE: 18 BRPM | SYSTOLIC BLOOD PRESSURE: 147 MMHG | HEIGHT: 66 IN | BODY MASS INDEX: 27.71 KG/M2

## 2025-02-14 LAB
ANION GAP SERPL CALCULATED.3IONS-SCNC: 5 MMOL/L (ref 9–17)
BASOPHILS # BLD: 0.07 K/UL
BASOPHILS NFR BLD: 1 % (ref 0–1)
BUN SERPL-MCNC: 5 MG/DL (ref 7–20)
CALCIUM SERPL-MCNC: 9 MG/DL (ref 8.3–10.6)
CHLORIDE SERPL-SCNC: 103 MMOL/L (ref 99–110)
CO2 SERPL-SCNC: 33 MMOL/L (ref 21–32)
CREAT SERPL-MCNC: 0.4 MG/DL (ref 0.8–1.3)
EOSINOPHIL # BLD: 0.11 K/UL
EOSINOPHILS RELATIVE PERCENT: 1 % (ref 0–3)
ERYTHROCYTE [DISTWIDTH] IN BLOOD BY AUTOMATED COUNT: 15.2 % (ref 11.7–14.9)
GFR, ESTIMATED: >90 ML/MIN/1.73M2
GLUCOSE SERPL-MCNC: 134 MG/DL (ref 74–99)
HCT VFR BLD AUTO: 34.2 % (ref 42–52)
HGB BLD-MCNC: 9.9 G/DL (ref 13.5–18)
IMM GRANULOCYTES # BLD AUTO: 0.3 K/UL
IMM GRANULOCYTES NFR BLD: 3 %
LYMPHOCYTES NFR BLD: 2.21 K/UL
LYMPHOCYTES RELATIVE PERCENT: 20 % (ref 24–44)
MAGNESIUM SERPL-MCNC: 1.8 MG/DL (ref 1.8–2.4)
MCH RBC QN AUTO: 28.6 PG (ref 27–31)
MCHC RBC AUTO-ENTMCNC: 28.9 G/DL (ref 32–36)
MCV RBC AUTO: 98.8 FL (ref 78–100)
MONOCYTES NFR BLD: 1.51 K/UL
MONOCYTES NFR BLD: 14 % (ref 0–4)
NEUTROPHILS NFR BLD: 61 % (ref 36–66)
NEUTS SEG NFR BLD: 6.68 K/UL
PLATELET, FLUORESCENCE: 92 K/UL (ref 140–440)
PMV BLD AUTO: 11 FL (ref 7.5–11.1)
POTASSIUM SERPL-SCNC: 3.5 MMOL/L (ref 3.5–5.1)
RBC # BLD AUTO: 3.46 M/UL (ref 4.6–6.2)
SODIUM SERPL-SCNC: 140 MMOL/L (ref 136–145)
WBC OTHER # BLD: 10.9 K/UL (ref 4–10.5)

## 2025-02-14 PROCEDURE — 85025 COMPLETE CBC W/AUTO DIFF WBC: CPT

## 2025-02-14 PROCEDURE — 94761 N-INVAS EAR/PLS OXIMETRY MLT: CPT

## 2025-02-14 PROCEDURE — 87086 URINE CULTURE/COLONY COUNT: CPT

## 2025-02-14 PROCEDURE — 6360000002 HC RX W HCPCS: Performed by: INTERNAL MEDICINE

## 2025-02-14 PROCEDURE — 87077 CULTURE AEROBIC IDENTIFY: CPT

## 2025-02-14 PROCEDURE — 36415 COLL VENOUS BLD VENIPUNCTURE: CPT

## 2025-02-14 PROCEDURE — 80048 BASIC METABOLIC PNL TOTAL CA: CPT

## 2025-02-14 PROCEDURE — 2700000000 HC OXYGEN THERAPY PER DAY

## 2025-02-14 PROCEDURE — 6370000000 HC RX 637 (ALT 250 FOR IP): Performed by: INTERNAL MEDICINE

## 2025-02-14 PROCEDURE — 87186 SC STD MICRODIL/AGAR DIL: CPT

## 2025-02-14 PROCEDURE — 83735 ASSAY OF MAGNESIUM: CPT

## 2025-02-14 PROCEDURE — 2580000003 HC RX 258: Performed by: EMERGENCY MEDICINE

## 2025-02-14 RX ADMIN — TAMSULOSIN HYDROCHLORIDE 0.4 MG: 0.4 CAPSULE ORAL at 11:31

## 2025-02-14 RX ADMIN — PRIMIDONE 50 MG: 50 TABLET ORAL at 11:31

## 2025-02-14 RX ADMIN — ZONISAMIDE 100 MG: 100 CAPSULE ORAL at 11:31

## 2025-02-14 RX ADMIN — CARBOXYMETHYLCELLULOSE SODIUM 1 DROP: 10 GEL OPHTHALMIC at 11:44

## 2025-02-14 RX ADMIN — PAROXETINE HYDROCHLORIDE 30 MG: 10 TABLET, FILM COATED ORAL at 11:31

## 2025-02-14 RX ADMIN — AMLODIPINE BESYLATE 5 MG: 5 TABLET ORAL at 11:31

## 2025-02-14 RX ADMIN — Medication 1 CAPSULE: at 11:31

## 2025-02-14 RX ADMIN — LACTULOSE 30 G: 20 SOLUTION ORAL at 11:31

## 2025-02-14 RX ADMIN — CLONAZEPAM 0.5 MG: 0.5 TABLET ORAL at 11:31

## 2025-02-14 RX ADMIN — FOLIC ACID 1 MG: 1 TABLET ORAL at 11:31

## 2025-02-14 RX ADMIN — MICONAZOLE NITRATE: 2 POWDER TOPICAL at 11:32

## 2025-02-14 RX ADMIN — DOCUSATE SODIUM 100 MG: 100 CAPSULE, LIQUID FILLED ORAL at 11:31

## 2025-02-14 RX ADMIN — VALPROIC ACID 500 MG: 250 SOLUTION ORAL at 11:32

## 2025-02-14 RX ADMIN — FAMOTIDINE 20 MG: 20 TABLET ORAL at 11:31

## 2025-02-14 RX ADMIN — ENOXAPARIN SODIUM 40 MG: 100 INJECTION SUBCUTANEOUS at 11:32

## 2025-02-14 RX ADMIN — TIMOLOL MALEATE 1 DROP: 5 SOLUTION OPHTHALMIC at 11:44

## 2025-02-14 RX ADMIN — DEXTROSE MONOHYDRATE: 50 INJECTION, SOLUTION INTRAVENOUS at 04:09

## 2025-02-14 RX ADMIN — LEVOTHYROXINE SODIUM 125 MCG: 0.12 TABLET ORAL at 06:51

## 2025-02-14 ASSESSMENT — PAIN SCALES - GENERAL: PAINLEVEL_OUTOF10: 0

## 2025-02-14 NOTE — DISCHARGE SUMMARY
V2.0  Discharge Summary    Name:  Jovan Leal /Age/Sex: 1955 (69 y.o. male)   Admit Date: 2025  Discharge Date: 25    MRN & CSN:  [id number] & [id number] Encounter Date and Time 25 12:51 PM EST    Attending:  Gatito Robertson MD Discharging Provider: MARY France [facility] Course:     Brief HPI: Jovan Leal is a 69 y.o. male who presented with cerebral palsy, MRDD, left hemiplegia, seizure disorder, depression, anxiety, hypothyroidism, BPH, hypertension, recent cervical spine surgery, history of COVID-19 x 2, history of nephrolithiasis sent to ED from nursing home for abnormal lab work.  Patient was noted to have hypernatremia.  Patient is unable to provide any information at this time.  Just repeats \"yeah\", \"hello\".  Does not follow any commands.  Most of the information was on review of records.  Vitals on arrival-/76, HR 75, RR 18, temp 99.2, saturating 100% on 6 L of oxygen.  Labs significant for sodium 160, potassium 3.3, chloride 117, BUN 12, creatinine 0.6, random glucose 124, hemoglobin 9.7, platelets 220.  VBG-pH 7.365, pCO2 63.9, pCO2 26.3, HCO3 35.7.     Patient started on D5 at 100 cc/h    Brief Problem Based Course:     Hypernatremia, resolved  -Sodium 160-->140  -Started on D5 in ER  -Monitor with repeat BMP every 6 hours  -Patient currently under hospice, but family/POA wanted the patient to receive IV fluids and sodium corrected.  -D/C to [place]     #.  Urge incontinence-status post InterStim neuromodulator device-3/2021        # Kidney stone  -S/P stent placement on 25 due to infected 9 mm proximal left ureteral stone  -Hold ASA and Plavix  -Urology consult, plan for ureteroscopy/laser lithotripsy on 25        #.  Chronic respiratory failure on home oxygen at 4 L/min as per documentation     #.  Recent admission -2/3/2025 for aspiration pneumonia     #.  Admission 2025 for nephrolithiasis status post ureteral stent

## 2025-02-14 NOTE — PROGRESS NOTES
This nurse attempted to contact nursing staff at [facility] to give report, however, there was no answer and went to a voicemail.

## 2025-02-14 NOTE — DISCHARGE INSTR - COC
Description Serosanguinous 02/13/25 0824   Odor None 02/13/25 0824   Melanie-wound Assessment Intact 02/13/25 0824   Margins Defined edges 02/13/25 0824   Wound Thickness Description not for Pressure Injury Partial thickness 02/13/25 0824   Number of days: 1       Incision 03/24/21 Sacrum (Active)   Number of days: 1422       Incision 10/08/21 Nose (Active)   Number of days: 1225        Elimination:  Continence:   Bowel: No  Bladder: No  Urinary Catheter: None   Colostomy/Ileostomy/Ileal Conduit: No       Date of Last BM: 02/14/2025  No intake or output data in the 24 hours ending 02/14/25 1250  No intake/output data recorded.    Safety Concerns:     At Risk for Falls and Aspiration Risk    Impairments/Disabilities:      None    Nutrition Therapy:  Current Nutrition Therapy:   - Oral Diet:  Dysphagia - Pureed    Routes of Feeding: Oral  Liquids: Thin Liquids  Daily Fluid Restriction: no  Last Modified Barium Swallow with Video (Video Swallowing Test): not done    Treatments at the Time of Hospital Discharge:   Respiratory Treatments: none  Oxygen Therapy:  is on oxygen at 4 L/min per nasal cannula.  Ventilator:    - No ventilator support    Rehab Therapies: none  Weight Bearing Status/Restrictions: No weight bearing restrictions  Other Medical Equipment (for information only, NOT a DME order):  none  Other Treatments: wound care    Patient's personal belongings (please select all that are sent with patient):  None    RN SIGNATURE:  Electronically signed by Perry Mccracken LPN on 2/14/25 at 1:14 PM EST    CASE MANAGEMENT/SOCIAL WORK SECTION    Inpatient Status Date: ***    Readmission Risk Assessment Score:  [facility] RISK OF UNPLANNED READMISSION 2.0             27.5 Total Score        Discharging to Facility/ Agency   Name:   Address:  Phone:  Fax:    Dialysis Facility (if applicable)   Name:  Address:  Dialysis Schedule:  Phone:  Fax:    / signature: {Esignature:[id number]}    PHYSICIAN  SECTION    Prognosis: Fair    Condition at Discharge: Stable    Rehab Potential (if transferring to Rehab): Fair    Recommended Labs or Other Treatments After Discharge: CMP, CBC with diff, and cystoscopy on 02/18/25    Physician Certification: I certify the above information and transfer of Jovan Leal  is necessary for the continuing treatment of the diagnosis listed and that he requires Skilled Nursing Facility for greater 30 days.     Update Admission H&P: No change in H&P    PHYSICIAN SIGNATURE:  Electronically signed by MARY France CNP on 2/14/25 at 12:51 PM EST

## 2025-02-14 NOTE — CARE COORDINATION
Hospice consult noted. Per conversation with pt sister/legal guardian, patient was active with [facility] [place]. Referral called to [facility] and informed them that patient is planned for discharge back to [facility] today. GARCÍA spoke with Damaris, who stated she would message scheduling to reach out to patients guardian.     Call to pts sister/legal guardian Charlotte and informed her of referral to [facility] and possible discharge today. Charlotte in agreement with plan.     Call to Molly, [facility] admissions, and updated her that patient is possible discharge today.    12:39 PM Received call from [facility] stating that patients services were not revoked and he is still active. Transport arranged with [facility] for 2pm pickup. CM left voicemail for [place] with information. CM also updated [facility] at [facility] and [facility] with transport time.

## 2025-02-14 NOTE — CONSULTS
Nutrition Note    RD consulted over sandra score. Pt has deep tissue injury. RD spoke with nsg staff, pt has been eating energy-protein intake adequately during admission. May offer supplement if pt continues during stay. Noted d/c order for today. Low nutrition risk at this time.     Electronically signed by Kacey Gavin RD, LD on 2/14/25 at 1:23 PM EST    Contact: [phone]

## 2025-02-15 NOTE — DISCHARGE SUMMARY
V2.0  Discharge Summary    Name:  Jovan Leal /Age/Sex: 1955 (69 y.o. male)   Admit Date: 2025  Discharge Date: 25    MRN & CSN:  [id number] & [id number] Encounter Date and Time 25 7:57 PM EST    Attending:  No att. providers found Discharging Provider: Gulshan Randall MD       Hospital Course:     Brief HPI: Jovan Leal is a 69 y.o. male who presented with flank pain and abdominal pain    Brief Hospital course summary:   69-year-old male with past medical history of chronic oropharyngeal dysphagia, right cervical spinal fusion, depression, autism, history of CVA, hypertension, hypothyroidism presented to the hospital because of 3 days of pain with decreased appetite and weakness pain was more so in the abdominal area.  CT scan of the abdominal pelvis was done showing obstructing 9 mm calculus in the proximal left ureter with mild to moderate hydronephrosis status post cystoscopy with drainage of the pus and stent placement.  Urology was consulted initially started on ceftriaxone cultures were drawn.  Plan is for the patient to be discharged back to the skilled nursing with Augmentin for 5 days .  Medically stable at the time of discharge back to skilled nursing facility    Assessment problems assessed and managed during hospital stay are as under:  Nephrolithiasis with mild to moderate hydronephrosis.  Urology was consulted s/p stent placement with plan for likely stone manipulation versus extracorporeal shockwave lithotripsy to be done on outpatient basis.  Patient was found to be medically stable for discharge  Chronic oropharyngeal dysphagia with chronic microaspiration's I specifically chose Augmentin as discovers for UTI and pneumonia for microaspiration's.  Aspiration precautions have not been helpful as the patient has microaspiration's from his own saliva I am concerned regarding guarded prognosis will give a chance with the kidney stone evaluation and treatment and the patient      acetaminophen 325 MG tablet  Commonly known as: TYLENOL     Acidophilus 100 MG Caps     albuterol (2.5 MG/3ML) 0.083% nebulizer solution  Commonly known as: PROVENTIL     aluminum & magnesium hydroxide-simethicone 200-200-20 MG/5ML Susp suspension  Commonly known as: MAALOX PLUS     amLODIPine 5 MG tablet  Commonly known as: NORVASC  Take 1 tablet by mouth daily     Anti-Diarrheal 2 MG tablet  Generic drug: loperamide     atorvastatin 80 MG tablet  Commonly known as: LIPITOR  Take 1 tablet by mouth nightly     benzonatate 100 MG capsule  Commonly known as: TESSALON     carboxymethylcellulose 0.5 % Soln ophthalmic solution  Commonly known as: REFRESH PLUS     Chest Congestion Relief DM  MG/5ML Syrp  Generic drug: Dextromethorphan-guaiFENesin     clonazePAM 0.5 MG tablet  Commonly known as: KLONOPIN     Desitin 13 % Crea  Generic drug: zinc oxide     docusate sodium 100 MG capsule  Commonly known as: COLACE     famotidine 20 MG tablet  Commonly known as: PEPCID     folic acid 1 MG tablet  Commonly known as: FOLVITE     guaiFENesin 600 MG extended release tablet  Commonly known as: MUCINEX     lactulose 10 GM/15ML solution  Commonly known as: CHRONULAC     latanoprost 0.005 % ophthalmic solution  Commonly known as: XALATAN     levothyroxine 125 MCG tablet  Commonly known as: SYNTHROID  Take 1 tablet by mouth Daily     magnesium hydroxide 400 MG/5ML suspension  Commonly known as: MILK OF MAGNESIA     Melatonin 10 MG Tabs     PARoxetine 30 MG tablet  Commonly known as: PAXIL     primidone 50 MG tablet  Commonly known as: MYSOLINE  Take 1 tablet by mouth 3 times daily     sodium chloride 0.65 % nasal spray  Commonly known as: OCEAN     timolol 0.5 % ophthalmic solution  Commonly known as: TIMOPTIC     * valproic acid 250 MG/5ML Soln oral solution  Commonly known as: DEPAKENE     * valproic acid 250 MG/5ML Soln oral solution  Commonly known as: DEPAKENE           * This list has 2 medication(s) that are the same  as other medications prescribed for you. Read the directions carefully, and ask your doctor or other care provider to review them with you.                STOP taking these medications      Amitiza 24 MCG capsule  Generic drug: lubiprostone     aspirin 81 MG EC tablet     CLEAR FIBER POWDER PO     clopidogrel 75 MG tablet  Commonly known as: PLAVIX     ENULOSE PO     ibuprofen 200 MG tablet  Commonly known as: ADVIL;MOTRIN     sulfamethoxazole-trimethoprim 800-160 MG per tablet  Commonly known as: BACTRIM DS;SEPTRA DS     vitamin D 25 MCG (1000 UT) Caps               Where to Get Your Medications        These medications were sent to [address] - P [phone] - F [phone]  [address] [place] [id number]      Phone: [phone]   tamsulosin 0.4 MG capsule        Objective Findings at Discharge:   BP (!) 171/67   Pulse 97   Temp 97.9 °F (36.6 °C) (Axillary)   Resp 24   Wt 79.4 kg (175 lb)   SpO2 93%   BMI 28.25 kg/m²       Physical Exam:   Physical Exam  Vitals reviewed.   Constitutional:       Appearance: He is normal weight.   HENT:      Head: Normocephalic.      Nose: Nose normal.      Mouth/Throat:      Mouth: Mucous membranes are moist.   Eyes:      Conjunctiva/sclera: Conjunctivae normal.      Pupils: Pupils are equal, round, and reactive to light.   Cardiovascular:      Rate and Rhythm: Normal rate and regular rhythm.      Pulses: Normal pulses.      Heart sounds: Normal heart sounds. No murmur heard.  Pulmonary:      Effort: Pulmonary effort is normal.      Breath sounds: Normal breath sounds. No wheezing, rhonchi or rales.   Abdominal:      General: Abdomen is flat. Bowel sounds are normal. There is no distension.      Palpations: Abdomen is soft.      Tenderness: There is no abdominal tenderness.   Musculoskeletal:         General: No deformity. Normal range of motion.      Cervical back: Normal range of motion and  neck supple.      Right lower leg: No edema.      Left lower leg: No edema.   Skin:     Coloration: Skin is not jaundiced or pale.   Neurological:      General: No focal deficit present.      Mental Status: He is alert. Mental status is at baseline.      Motor: Weakness present.           Labs and Imaging   No results found.     CBC:   Recent Labs     02/14/25  0836   WBC 10.9*   HGB 9.9*     BMP:    Recent Labs     02/13/25  0208 02/13/25  1009 02/14/25  0255   * 144 140   K 3.6 3.8 3.5    107 103   CO2 36* 32 33*   BUN 8 6* 5*   CREATININE 0.4* 0.4* 0.4*   GLUCOSE 133* 104* 134*     Hepatic: No results for input(s): \"AST\", \"ALT\", \"BILITOT\", \"ALKPHOS\" in the last 72 hours.    Invalid input(s): \"ALB\"  Lipids:   Lab Results   Component Value Date/Time    CHOL 103 10/24/2024 03:23 AM    HDL 42 10/24/2024 03:23 AM    TRIG 117 10/24/2024 03:23 AM     Hemoglobin A1C:   Lab Results   Component Value Date/Time    LABA1C 5.6 10/24/2024 03:23 AM     TSH:   Lab Results   Component Value Date/Time    TSH 1.82 10/07/2015 12:19 PM     Troponin:   Lab Results   Component Value Date/Time    TROPONINT <0.010 08/16/2022 12:59 PM    TROPONINT <0.010 12/06/2021 10:44 PM    TROPONINT <0.010 10/03/2021 04:25 AM     Lactic Acid: No results for input(s): \"LACTA\" in the last 72 hours.  BNP: No results for input(s): \"PROBNP\" in the last 72 hours.  UA:  Lab Results   Component Value Date/Time    NITRU NEGATIVE 02/12/2025 10:56 AM    COLORU Yellow 02/12/2025 10:56 AM    PHUR 8.5 02/12/2025 10:56 AM    PHUR 6.0 05/10/2013 02:51 PM    WBCUA 44 02/12/2025 10:56 AM    RBCUA 1936 02/12/2025 10:56 AM    RBCUA 1 07/08/2023 10:30 AM    MUCUS RARE 01/28/2025 09:30 AM    TRICHOMONAS NONE SEEN 07/08/2023 10:30 AM    YEAST OCCASIONAL 09/20/2021 05:00 PM    BACTERIA NEGATIVE 07/08/2023 10:30 AM    CLARITYU CLEAR 07/27/2023 12:38 PM    LEUKOCYTESUR SMALL 02/12/2025 10:56 AM    UROBILINOGEN 0.2 02/12/2025 10:56 AM    BILIRUBINUR NEGATIVE

## 2025-02-16 LAB
MICROORGANISM SPEC CULT: ABNORMAL
MICROORGANISM SPEC CULT: ABNORMAL
SERVICE CMNT-IMP: ABNORMAL
SPECIMEN DESCRIPTION: ABNORMAL

## (undated) DEVICE — MAT ABSRB W28XL48IN C6L FLR ULT W POLY BK

## (undated) DEVICE — STRIP SKIN CLSR W0.25XL4IN WHT SPUNBOUND FBR NYL HI ADH

## (undated) DEVICE — Z INACTIVE USE 2660663 SOLUTION IRRIG 1000ML STRIL H2O USP PLAS POUR BTL

## (undated) DEVICE — SPONGE GZ W4XL8IN COT WVN 12 PLY

## (undated) DEVICE — DRAPE SHEET ULTRAGARD: Brand: MEDLINE

## (undated) DEVICE — SUTURE MCRYL SZ 4-0 L27IN ABSRB UD RB-1 L17MM 1/2 CIR Y214H

## (undated) DEVICE — Device

## (undated) DEVICE — SUTURE PROL SZ 2-0 L30IN NONABSORBABLE BLU L26MM SH 1/2 CIR 8833H

## (undated) DEVICE — STERILE POLYISOPRENE POWDER-FREE SURGICAL GLOVES: Brand: PROTEXIS

## (undated) DEVICE — SINGLE PORT MANIFOLD: Brand: NEPTUNE 2

## (undated) DEVICE — SYRINGE MED 20ML STD CLR PLAS LUERLOCK TIP N CTRL DISP

## (undated) DEVICE — INTENDED FOR TISSUE SEPARATION, AND OTHER PROCEDURES THAT REQUIRE A SHARP SURGICAL BLADE TO PUNCTURE OR CUT.: Brand: BARD-PARKER ® STAINLESS STEEL BLADES

## (undated) DEVICE — FORCEPS BX L240CM JAW DIA2.8MM L CAP W/ NDL MIC MESH TOOTH

## (undated) DEVICE — SOLUTION PREP POVIDONE IOD FOR SKIN MUCOUS MEM PRIOR TO

## (undated) DEVICE — ENDOSCOPY KIT: Brand: MEDLINE INDUSTRIES, INC.

## (undated) DEVICE — Z INACTIVE USE 2635503 SOLUTION IRRIG 3000ML ST H2O USP UROMATIC PLAS CONT

## (undated) DEVICE — APPLICATOR MEDICATED 26 CC SOLUTION HI LT ORNG CHLORAPREP

## (undated) DEVICE — Z DISCONTINUED NO SUB IDED TRAY PREP DRY W/ PREM GLV 2 APPL 6 SPNG 2 UNDPD 1 OVERWRAP

## (undated) DEVICE — SET IRRIG L94IN ID0.281IN W/ 4.5IN DST FLX CONN 2 LD ON OFF

## (undated) DEVICE — SOLUTION IV IRRIG WATER 1000ML POUR BRL 2F7114

## (undated) DEVICE — YANKAUER,BULB TIP,W/O VENT,RIGID,STERILE: Brand: MEDLINE

## (undated) DEVICE — INTRODUCER NEUROSTIMULATOR LD FOR URIN CTRL INTERSTIM

## (undated) DEVICE — GLOVE ORANGE PI 7 1/2   MSG9075

## (undated) DEVICE — GAUZE,SPONGE,4"X4",16PLY,XRAY,STRL,LF: Brand: MEDLINE

## (undated) DEVICE — Z DISCONTINUED (USE MFG CAT MVABO)  TUBING GAS SAMPLING STD 6.5 FT FEMALE CONN SMRT CAPNOLINE

## (undated) DEVICE — ELECTRODE ES AD CRDLSS PT RET REM POLYHESIVE

## (undated) DEVICE — 3M™ IOBAN™ 2 ANTIMICROBIAL INCISE DRAPE 6650EZ: Brand: IOBAN™ 2

## (undated) DEVICE — DRESSING TRNSPAR W4XL10IN FLM MIC POR SURESITE 123

## (undated) DEVICE — ACUSNARE POLYPECTOMY SNARE: Brand: ACUSNARE

## (undated) DEVICE — PENCIL ES CRD L10FT HND SWCHING ROCK SWCH W/ EDGE COAT BLDE

## (undated) DEVICE — PACK,BASIC,IX: Brand: MEDLINE

## (undated) DEVICE — YANKAUER,FLEXIBLE HANDLE,REGLR CAPACITY: Brand: MEDLINE INDUSTRIES, INC.

## (undated) DEVICE — TOWEL,OR,DSP,ST,BLUE,STD,6/PK,12PK/CS: Brand: MEDLINE

## (undated) DEVICE — MARKER SURG SKIN UTIL REGULAR/FINE 2 TIP W/ RUL AND 9 LBL

## (undated) DEVICE — GOWN,ECLIPSE,POLYRNF,BRTHSLV,XL,30/CS: Brand: MEDLINE

## (undated) DEVICE — DRESSING FOAM W4XL4IN SIL FACED NONADHESIVE NONBORDERED

## (undated) DEVICE — COVER US PRB W15XL120CM W/ GEL RUBBERBAND TAPE STRP FLD GEN

## (undated) DEVICE — CONE TIP URETERAL CATHETER: Brand: URETERAL CATHETER

## (undated) DEVICE — GLOVE SURG SZ 6 THK91MIL LTX FREE SYN POLYISOPRENE ANTI

## (undated) DEVICE — PROGRAMMER NEUROSTIMULATOR PT FOR URIN CTRL INTERSTIM ICON

## (undated) DEVICE — COVER,C-ARM,41X74: Brand: MEDLINE

## (undated) DEVICE — MULTIPLE BAND LIGATOR: Brand: SPEEDBAND SUPERVIEW SUPER 7

## (undated) DEVICE — GOWN,ECLIPSE,POLYRNF,BRTHSLV,L,30/CS: Brand: MEDLINE

## (undated) DEVICE — HERCULES 3 STAGE BALLOON ESOPHAGEAL: Brand: HERCULES

## (undated) DEVICE — SYRINGE MED 10ML LUERLOCK TIP W/O SFTY DISP

## (undated) DEVICE — PROGRAMMER COMMUNICATION W/HANDSET F/SACRAL NERVE STIMULATORS

## (undated) DEVICE — TUBING, SUCTION, 9/32" X 10', STRAIGHT: Brand: MEDLINE

## (undated) DEVICE — DBD-PACK,CYSTOSCOPY,PK VI,AURORA: Brand: MEDLINE

## (undated) DEVICE — GOWN,SIRUS,POLYRNF,BRTHSLV,XLN/XL,20/CS: Brand: MEDLINE

## (undated) DEVICE — SYRINGE INFL 60ML DISP ALLIANCE II

## (undated) DEVICE — COUNTER NDL 30 COUNT FOAM STRP SGL MAG

## (undated) DEVICE — CATHETER,FOLEY,3WAY,SILI-ELAST,26FR,30ML: Brand: MEDLINE

## (undated) DEVICE — Z DISCONTINUED NO SUB IDED TUBING ETCO2 AD L6.5FT NSL ORAL CVD PRNG NONFLARED TIP OVR

## (undated) DEVICE — TUBE CULTURE LF UNIFORM BOTTOM STER

## (undated) DEVICE — DRAINBAG,ANTI-REFLUX TOWER,L/F,2000ML,LL: Brand: MEDLINE

## (undated) DEVICE — 1LYRTR 16FR10ML 100%SILI SNAP: Brand: MEDLINE INDUSTRIES, INC.

## (undated) DEVICE — DECANTER FLD 9IN ST BG FOR ASEP TRNSF OF FLD

## (undated) DEVICE — SYRINGE IRRIG 60ML SFT PLIABLE BLB EZ TO GRP 1 HND USE W/

## (undated) DEVICE — DRAPE TOWEL: Brand: CONVERTORS

## (undated) DEVICE — ADHESIVE SKIN CLSR 0.7ML TOP DERMBND ADV

## (undated) DEVICE — DRESSING TRNSPAR W2XL2.75IN FLM SHT SEMIPERMEABLE WIND

## (undated) DEVICE — NEEDLE HYPO 25GA L1.5IN BLU POLYPR HUB S STL REG BVL STR

## (undated) DEVICE — 3M™ STERI-STRIP™ COMPOUND BENZOIN TINCTURE 40 BAGS/CARTON 4 CARTONS/CASE C1544: Brand: 3M™ STERI-STRIP™

## (undated) DEVICE — GUIDEWIRE ENDOSCP L150CM DIA0.035IN TIP 3CM PTFE NIT

## (undated) DEVICE — Z DISCONTINUED PER MEDLINE USE 2741942 DRESSING AQUACEL 6 IN ALG W9XL15CM SIL CVR WTRPRF VIR BACT BARR ANTIMIC

## (undated) DEVICE — CONMED ACCESSORY ELECTRODE, NEEDLE WITH EXTENDED INSULATION: Brand: CONMED